# Patient Record
Sex: FEMALE | Race: WHITE | NOT HISPANIC OR LATINO
[De-identification: names, ages, dates, MRNs, and addresses within clinical notes are randomized per-mention and may not be internally consistent; named-entity substitution may affect disease eponyms.]

---

## 2017-02-03 ENCOUNTER — APPOINTMENT (OUTPATIENT)
Dept: MRI IMAGING | Facility: IMAGING CENTER | Age: 52
End: 2017-02-03

## 2017-02-03 ENCOUNTER — OUTPATIENT (OUTPATIENT)
Dept: OUTPATIENT SERVICES | Facility: HOSPITAL | Age: 52
LOS: 1 days | End: 2017-02-03
Payer: COMMERCIAL

## 2017-02-03 DIAGNOSIS — G91.9 HYDROCEPHALUS, UNSPECIFIED: ICD-10-CM

## 2017-04-13 PROCEDURE — 70553 MRI BRAIN STEM W/O & W/DYE: CPT

## 2017-04-13 PROCEDURE — A9585: CPT

## 2017-10-11 ENCOUNTER — APPOINTMENT (OUTPATIENT)
Dept: MRI IMAGING | Facility: IMAGING CENTER | Age: 52
End: 2017-10-11

## 2017-11-15 ENCOUNTER — OUTPATIENT (OUTPATIENT)
Dept: OUTPATIENT SERVICES | Facility: HOSPITAL | Age: 52
LOS: 1 days | End: 2017-11-15
Payer: COMMERCIAL

## 2017-11-15 ENCOUNTER — APPOINTMENT (OUTPATIENT)
Dept: MRI IMAGING | Facility: IMAGING CENTER | Age: 52
End: 2017-11-15
Payer: COMMERCIAL

## 2017-11-15 DIAGNOSIS — Z00.8 ENCOUNTER FOR OTHER GENERAL EXAMINATION: ICD-10-CM

## 2017-11-15 PROCEDURE — 70553 MRI BRAIN STEM W/O & W/DYE: CPT | Mod: 26

## 2017-11-15 PROCEDURE — A9585: CPT

## 2017-11-15 PROCEDURE — 70553 MRI BRAIN STEM W/O & W/DYE: CPT

## 2018-11-07 ENCOUNTER — OUTPATIENT (OUTPATIENT)
Dept: OUTPATIENT SERVICES | Facility: HOSPITAL | Age: 53
LOS: 1 days | End: 2018-11-07
Payer: COMMERCIAL

## 2018-11-07 ENCOUNTER — APPOINTMENT (OUTPATIENT)
Dept: MRI IMAGING | Facility: IMAGING CENTER | Age: 53
End: 2018-11-07
Payer: COMMERCIAL

## 2018-11-07 DIAGNOSIS — Z00.8 ENCOUNTER FOR OTHER GENERAL EXAMINATION: ICD-10-CM

## 2018-11-07 PROCEDURE — 70553 MRI BRAIN STEM W/O & W/DYE: CPT

## 2018-11-07 PROCEDURE — A9585: CPT

## 2018-11-07 PROCEDURE — 70553 MRI BRAIN STEM W/O & W/DYE: CPT | Mod: 26

## 2021-08-25 ENCOUNTER — APPOINTMENT (OUTPATIENT)
Dept: MRI IMAGING | Facility: IMAGING CENTER | Age: 56
End: 2021-08-25
Payer: COMMERCIAL

## 2021-08-25 ENCOUNTER — OUTPATIENT (OUTPATIENT)
Dept: OUTPATIENT SERVICES | Facility: HOSPITAL | Age: 56
LOS: 1 days | End: 2021-08-25
Payer: COMMERCIAL

## 2021-08-25 DIAGNOSIS — Z00.8 ENCOUNTER FOR OTHER GENERAL EXAMINATION: ICD-10-CM

## 2021-08-25 DIAGNOSIS — D49.6 NEOPLASM OF UNSPECIFIED BEHAVIOR OF BRAIN: ICD-10-CM

## 2021-08-25 PROCEDURE — 70553 MRI BRAIN STEM W/O & W/DYE: CPT

## 2021-08-25 PROCEDURE — A9585: CPT

## 2021-08-25 PROCEDURE — 70553 MRI BRAIN STEM W/O & W/DYE: CPT | Mod: 26

## 2023-10-19 ENCOUNTER — APPOINTMENT (OUTPATIENT)
Dept: MRI IMAGING | Facility: HOSPITAL | Age: 58
End: 2023-10-19
Payer: COMMERCIAL

## 2023-10-19 ENCOUNTER — OUTPATIENT (OUTPATIENT)
Dept: OUTPATIENT SERVICES | Facility: HOSPITAL | Age: 58
LOS: 1 days | End: 2023-10-19

## 2023-10-19 DIAGNOSIS — D49.6 NEOPLASM OF UNSPECIFIED BEHAVIOR OF BRAIN: ICD-10-CM

## 2023-10-19 PROCEDURE — 70553 MRI BRAIN STEM W/O & W/DYE: CPT | Mod: 26

## 2023-11-24 ENCOUNTER — OUTPATIENT (OUTPATIENT)
Dept: OUTPATIENT SERVICES | Facility: HOSPITAL | Age: 58
LOS: 1 days | End: 2023-11-24
Payer: COMMERCIAL

## 2023-11-24 VITALS
HEIGHT: 63.5 IN | SYSTOLIC BLOOD PRESSURE: 118 MMHG | TEMPERATURE: 98 F | DIASTOLIC BLOOD PRESSURE: 78 MMHG | RESPIRATION RATE: 16 BRPM | WEIGHT: 126.99 LBS | OXYGEN SATURATION: 98 % | HEART RATE: 83 BPM

## 2023-11-24 DIAGNOSIS — Z98.2 PRESENCE OF CEREBROSPINAL FLUID DRAINAGE DEVICE: Chronic | ICD-10-CM

## 2023-11-24 DIAGNOSIS — D49.6 NEOPLASM OF UNSPECIFIED BEHAVIOR OF BRAIN: ICD-10-CM

## 2023-11-24 DIAGNOSIS — Z87.898 PERSONAL HISTORY OF OTHER SPECIFIED CONDITIONS: Chronic | ICD-10-CM

## 2023-11-24 DIAGNOSIS — F41.9 ANXIETY DISORDER, UNSPECIFIED: ICD-10-CM

## 2023-11-24 LAB
ALBUMIN SERPL ELPH-MCNC: 3.8 G/DL — SIGNIFICANT CHANGE UP (ref 3.3–5)
ALBUMIN SERPL ELPH-MCNC: 3.8 G/DL — SIGNIFICANT CHANGE UP (ref 3.3–5)
ALP SERPL-CCNC: 97 U/L — SIGNIFICANT CHANGE UP (ref 40–120)
ALP SERPL-CCNC: 97 U/L — SIGNIFICANT CHANGE UP (ref 40–120)
ALT FLD-CCNC: 12 U/L — SIGNIFICANT CHANGE UP (ref 4–33)
ALT FLD-CCNC: 12 U/L — SIGNIFICANT CHANGE UP (ref 4–33)
ANION GAP SERPL CALC-SCNC: 7 MMOL/L — SIGNIFICANT CHANGE UP (ref 7–14)
ANION GAP SERPL CALC-SCNC: 7 MMOL/L — SIGNIFICANT CHANGE UP (ref 7–14)
AST SERPL-CCNC: 12 U/L — SIGNIFICANT CHANGE UP (ref 4–32)
AST SERPL-CCNC: 12 U/L — SIGNIFICANT CHANGE UP (ref 4–32)
BILIRUB SERPL-MCNC: 0.6 MG/DL — SIGNIFICANT CHANGE UP (ref 0.2–1.2)
BILIRUB SERPL-MCNC: 0.6 MG/DL — SIGNIFICANT CHANGE UP (ref 0.2–1.2)
BLD GP AB SCN SERPL QL: POSITIVE — SIGNIFICANT CHANGE UP
BLD GP AB SCN SERPL QL: POSITIVE — SIGNIFICANT CHANGE UP
BUN SERPL-MCNC: 20 MG/DL — SIGNIFICANT CHANGE UP (ref 7–23)
BUN SERPL-MCNC: 20 MG/DL — SIGNIFICANT CHANGE UP (ref 7–23)
CALCIUM SERPL-MCNC: 9.2 MG/DL — SIGNIFICANT CHANGE UP (ref 8.4–10.5)
CALCIUM SERPL-MCNC: 9.2 MG/DL — SIGNIFICANT CHANGE UP (ref 8.4–10.5)
CHLORIDE SERPL-SCNC: 106 MMOL/L — SIGNIFICANT CHANGE UP (ref 98–107)
CHLORIDE SERPL-SCNC: 106 MMOL/L — SIGNIFICANT CHANGE UP (ref 98–107)
CO2 SERPL-SCNC: 28 MMOL/L — SIGNIFICANT CHANGE UP (ref 22–31)
CO2 SERPL-SCNC: 28 MMOL/L — SIGNIFICANT CHANGE UP (ref 22–31)
CREAT SERPL-MCNC: 0.65 MG/DL — SIGNIFICANT CHANGE UP (ref 0.5–1.3)
CREAT SERPL-MCNC: 0.65 MG/DL — SIGNIFICANT CHANGE UP (ref 0.5–1.3)
EGFR: 102 ML/MIN/1.73M2 — SIGNIFICANT CHANGE UP
EGFR: 102 ML/MIN/1.73M2 — SIGNIFICANT CHANGE UP
GLUCOSE SERPL-MCNC: 85 MG/DL — SIGNIFICANT CHANGE UP (ref 70–99)
GLUCOSE SERPL-MCNC: 85 MG/DL — SIGNIFICANT CHANGE UP (ref 70–99)
HCT VFR BLD CALC: 39 % — SIGNIFICANT CHANGE UP (ref 34.5–45)
HCT VFR BLD CALC: 39 % — SIGNIFICANT CHANGE UP (ref 34.5–45)
HGB BLD-MCNC: 13.1 G/DL — SIGNIFICANT CHANGE UP (ref 11.5–15.5)
HGB BLD-MCNC: 13.1 G/DL — SIGNIFICANT CHANGE UP (ref 11.5–15.5)
MCHC RBC-ENTMCNC: 28.2 PG — SIGNIFICANT CHANGE UP (ref 27–34)
MCHC RBC-ENTMCNC: 28.2 PG — SIGNIFICANT CHANGE UP (ref 27–34)
MCHC RBC-ENTMCNC: 33.6 GM/DL — SIGNIFICANT CHANGE UP (ref 32–36)
MCHC RBC-ENTMCNC: 33.6 GM/DL — SIGNIFICANT CHANGE UP (ref 32–36)
MCV RBC AUTO: 84.1 FL — SIGNIFICANT CHANGE UP (ref 80–100)
MCV RBC AUTO: 84.1 FL — SIGNIFICANT CHANGE UP (ref 80–100)
NRBC # BLD: 0 /100 WBCS — SIGNIFICANT CHANGE UP (ref 0–0)
NRBC # BLD: 0 /100 WBCS — SIGNIFICANT CHANGE UP (ref 0–0)
NRBC # FLD: 0 K/UL — SIGNIFICANT CHANGE UP (ref 0–0)
NRBC # FLD: 0 K/UL — SIGNIFICANT CHANGE UP (ref 0–0)
PLATELET # BLD AUTO: 76 K/UL — LOW (ref 150–400)
PLATELET # BLD AUTO: 76 K/UL — LOW (ref 150–400)
POTASSIUM SERPL-MCNC: 5 MMOL/L — SIGNIFICANT CHANGE UP (ref 3.5–5.3)
POTASSIUM SERPL-MCNC: 5 MMOL/L — SIGNIFICANT CHANGE UP (ref 3.5–5.3)
POTASSIUM SERPL-SCNC: 5 MMOL/L — SIGNIFICANT CHANGE UP (ref 3.5–5.3)
POTASSIUM SERPL-SCNC: 5 MMOL/L — SIGNIFICANT CHANGE UP (ref 3.5–5.3)
PROT SERPL-MCNC: 7.1 G/DL — SIGNIFICANT CHANGE UP (ref 6–8.3)
PROT SERPL-MCNC: 7.1 G/DL — SIGNIFICANT CHANGE UP (ref 6–8.3)
RBC # BLD: 4.64 M/UL — SIGNIFICANT CHANGE UP (ref 3.8–5.2)
RBC # BLD: 4.64 M/UL — SIGNIFICANT CHANGE UP (ref 3.8–5.2)
RBC # FLD: 14.2 % — SIGNIFICANT CHANGE UP (ref 10.3–14.5)
RBC # FLD: 14.2 % — SIGNIFICANT CHANGE UP (ref 10.3–14.5)
RH IG SCN BLD-IMP: NEGATIVE — SIGNIFICANT CHANGE UP
SODIUM SERPL-SCNC: 141 MMOL/L — SIGNIFICANT CHANGE UP (ref 135–145)
SODIUM SERPL-SCNC: 141 MMOL/L — SIGNIFICANT CHANGE UP (ref 135–145)
WBC # BLD: 4.99 K/UL — SIGNIFICANT CHANGE UP (ref 3.8–10.5)
WBC # BLD: 4.99 K/UL — SIGNIFICANT CHANGE UP (ref 3.8–10.5)
WBC # FLD AUTO: 4.99 K/UL — SIGNIFICANT CHANGE UP (ref 3.8–10.5)
WBC # FLD AUTO: 4.99 K/UL — SIGNIFICANT CHANGE UP (ref 3.8–10.5)

## 2023-11-24 PROCEDURE — 86077 PHYS BLOOD BANK SERV XMATCH: CPT

## 2023-11-24 PROCEDURE — 93010 ELECTROCARDIOGRAM REPORT: CPT

## 2023-11-24 RX ORDER — SODIUM CHLORIDE 9 MG/ML
1000 INJECTION, SOLUTION INTRAVENOUS
Refills: 0 | Status: DISCONTINUED | OUTPATIENT
Start: 2023-12-01 | End: 2023-12-02

## 2023-11-24 NOTE — H&P PST ADULT - PROBLEM SELECTOR PLAN 1
Patient tentatively scheduled for stereotactic left retromastoid craniectomy for resection of brain tumor for 12/1/23. Pre-op instructions provided. Pt given verbal and written instructions with teach back on pepcid. Pt verbalized understanding with return demonstration.     CBC CMP T&S/ABO EKG done    Pt states she received cardiac clearance for the procedure.  Pending copy of cardiac evaluation with echo due to scheduled high risk procedure

## 2023-11-24 NOTE — H&P PST ADULT - PROBLEM SELECTOR PLAN 2
Patient instructed to take sertraline and bupropion with a sip of water on the morning of procedure.

## 2023-11-24 NOTE — H&P PST ADULT - NSICDXPASTMEDICALHX_GEN_ALL_CORE_FT
PAST MEDICAL HISTORY:  Anxiety     Asthma     Depression     History of hydrocephalus     Neoplasm of uncertain behavior of brain     Neurofibromatosis, type 1

## 2023-11-24 NOTE — H&P PST ADULT - NSICDXPASTSURGICALHX_GEN_ALL_CORE_FT
PAST SURGICAL HISTORY:  Delivery with history of      H/O brain tumor     History of arthroscopy of left knee meniscus     S/P LASIK Surgery     S/P tubal ligation     S/P  shunt

## 2023-11-24 NOTE — H&P PST ADULT - HISTORY OF PRESENT ILLNESS
59 y/o female presents to presurgical testing with diagnosis neoplasm of unspecified behavior of brain. Pt with h/o neurofibromatosis, s/p resection of brain tumor and  shunt placement in 2013. Pt with imbalance and headaches. Pt is scheduled for a stereotactic left retromastoid craniectomy for resection of brain tumor.

## 2023-11-30 ENCOUNTER — TRANSCRIPTION ENCOUNTER (OUTPATIENT)
Age: 58
End: 2023-11-30

## 2023-11-30 NOTE — ASU PATIENT PROFILE, ADULT - FALL HARM RISK - UNIVERSAL INTERVENTIONS
Bed in lowest position, wheels locked, appropriate side rails in place/Call bell, personal items and telephone in reach/Instruct patient to call for assistance before getting out of bed or chair/Non-slip footwear when patient is out of bed/[place] to call system/Physically safe environment - no spills, clutter or unnecessary equipment/Purposeful Proactive Rounding/Room/bathroom lighting operational, light cord in reach

## 2023-12-01 ENCOUNTER — INPATIENT (INPATIENT)
Facility: HOSPITAL | Age: 58
LOS: 0 days | Discharge: HOME | End: 2023-12-02
Attending: NEUROLOGICAL SURGERY | Admitting: NEUROLOGICAL SURGERY
Payer: COMMERCIAL

## 2023-12-01 ENCOUNTER — TRANSCRIPTION ENCOUNTER (OUTPATIENT)
Age: 58
End: 2023-12-01

## 2023-12-01 VITALS
SYSTOLIC BLOOD PRESSURE: 114 MMHG | HEIGHT: 63.5 IN | RESPIRATION RATE: 16 BRPM | TEMPERATURE: 99 F | DIASTOLIC BLOOD PRESSURE: 51 MMHG | OXYGEN SATURATION: 98 % | HEART RATE: 95 BPM | WEIGHT: 126.99 LBS

## 2023-12-01 DIAGNOSIS — Z98.2 PRESENCE OF CEREBROSPINAL FLUID DRAINAGE DEVICE: Chronic | ICD-10-CM

## 2023-12-01 DIAGNOSIS — Z87.898 PERSONAL HISTORY OF OTHER SPECIFIED CONDITIONS: Chronic | ICD-10-CM

## 2023-12-01 DIAGNOSIS — D49.6 NEOPLASM OF UNSPECIFIED BEHAVIOR OF BRAIN: ICD-10-CM

## 2023-12-01 LAB
ANION GAP SERPL CALC-SCNC: 10 MMOL/L — SIGNIFICANT CHANGE UP (ref 7–14)
ANION GAP SERPL CALC-SCNC: 10 MMOL/L — SIGNIFICANT CHANGE UP (ref 7–14)
BUN SERPL-MCNC: 17 MG/DL — SIGNIFICANT CHANGE UP (ref 7–23)
BUN SERPL-MCNC: 17 MG/DL — SIGNIFICANT CHANGE UP (ref 7–23)
CALCIUM SERPL-MCNC: 8.7 MG/DL — SIGNIFICANT CHANGE UP (ref 8.4–10.5)
CALCIUM SERPL-MCNC: 8.7 MG/DL — SIGNIFICANT CHANGE UP (ref 8.4–10.5)
CHLORIDE SERPL-SCNC: 101 MMOL/L — SIGNIFICANT CHANGE UP (ref 98–107)
CHLORIDE SERPL-SCNC: 101 MMOL/L — SIGNIFICANT CHANGE UP (ref 98–107)
CO2 SERPL-SCNC: 22 MMOL/L — SIGNIFICANT CHANGE UP (ref 22–31)
CO2 SERPL-SCNC: 22 MMOL/L — SIGNIFICANT CHANGE UP (ref 22–31)
CREAT SERPL-MCNC: 0.67 MG/DL — SIGNIFICANT CHANGE UP (ref 0.5–1.3)
CREAT SERPL-MCNC: 0.67 MG/DL — SIGNIFICANT CHANGE UP (ref 0.5–1.3)
EGFR: 101 ML/MIN/1.73M2 — SIGNIFICANT CHANGE UP
EGFR: 101 ML/MIN/1.73M2 — SIGNIFICANT CHANGE UP
GAS PNL BLDA: SIGNIFICANT CHANGE UP
GAS PNL BLDA: SIGNIFICANT CHANGE UP
GLUCOSE SERPL-MCNC: 130 MG/DL — HIGH (ref 70–99)
GLUCOSE SERPL-MCNC: 130 MG/DL — HIGH (ref 70–99)
HCT VFR BLD CALC: 33.9 % — LOW (ref 34.5–45)
HCT VFR BLD CALC: 33.9 % — LOW (ref 34.5–45)
HGB BLD-MCNC: 11.3 G/DL — LOW (ref 11.5–15.5)
HGB BLD-MCNC: 11.3 G/DL — LOW (ref 11.5–15.5)
MAGNESIUM SERPL-MCNC: 1.7 MG/DL — SIGNIFICANT CHANGE UP (ref 1.6–2.6)
MAGNESIUM SERPL-MCNC: 1.7 MG/DL — SIGNIFICANT CHANGE UP (ref 1.6–2.6)
MCHC RBC-ENTMCNC: 26.8 PG — LOW (ref 27–34)
MCHC RBC-ENTMCNC: 26.8 PG — LOW (ref 27–34)
MCHC RBC-ENTMCNC: 33.3 GM/DL — SIGNIFICANT CHANGE UP (ref 32–36)
MCHC RBC-ENTMCNC: 33.3 GM/DL — SIGNIFICANT CHANGE UP (ref 32–36)
MCV RBC AUTO: 80.3 FL — SIGNIFICANT CHANGE UP (ref 80–100)
MCV RBC AUTO: 80.3 FL — SIGNIFICANT CHANGE UP (ref 80–100)
NRBC # BLD: 0 /100 WBCS — SIGNIFICANT CHANGE UP (ref 0–0)
NRBC # BLD: 0 /100 WBCS — SIGNIFICANT CHANGE UP (ref 0–0)
NRBC # FLD: 0 K/UL — SIGNIFICANT CHANGE UP (ref 0–0)
NRBC # FLD: 0 K/UL — SIGNIFICANT CHANGE UP (ref 0–0)
PHOSPHATE SERPL-MCNC: 3.6 MG/DL — SIGNIFICANT CHANGE UP (ref 2.5–4.5)
PHOSPHATE SERPL-MCNC: 3.6 MG/DL — SIGNIFICANT CHANGE UP (ref 2.5–4.5)
PLATELET # BLD AUTO: 100 K/UL — LOW (ref 150–400)
PLATELET # BLD AUTO: 100 K/UL — LOW (ref 150–400)
POTASSIUM SERPL-MCNC: 4.1 MMOL/L — SIGNIFICANT CHANGE UP (ref 3.5–5.3)
POTASSIUM SERPL-MCNC: 4.1 MMOL/L — SIGNIFICANT CHANGE UP (ref 3.5–5.3)
POTASSIUM SERPL-SCNC: 4.1 MMOL/L — SIGNIFICANT CHANGE UP (ref 3.5–5.3)
POTASSIUM SERPL-SCNC: 4.1 MMOL/L — SIGNIFICANT CHANGE UP (ref 3.5–5.3)
RBC # BLD: 4.22 M/UL — SIGNIFICANT CHANGE UP (ref 3.8–5.2)
RBC # BLD: 4.22 M/UL — SIGNIFICANT CHANGE UP (ref 3.8–5.2)
RBC # FLD: 14 % — SIGNIFICANT CHANGE UP (ref 10.3–14.5)
RBC # FLD: 14 % — SIGNIFICANT CHANGE UP (ref 10.3–14.5)
SODIUM SERPL-SCNC: 133 MMOL/L — LOW (ref 135–145)
SODIUM SERPL-SCNC: 133 MMOL/L — LOW (ref 135–145)
WBC # BLD: 7.84 K/UL — SIGNIFICANT CHANGE UP (ref 3.8–10.5)
WBC # BLD: 7.84 K/UL — SIGNIFICANT CHANGE UP (ref 3.8–10.5)
WBC # FLD AUTO: 7.84 K/UL — SIGNIFICANT CHANGE UP (ref 3.8–10.5)
WBC # FLD AUTO: 7.84 K/UL — SIGNIFICANT CHANGE UP (ref 3.8–10.5)

## 2023-12-01 PROCEDURE — 88360 TUMOR IMMUNOHISTOCHEM/MANUAL: CPT | Mod: 26,59

## 2023-12-01 PROCEDURE — 99222 1ST HOSP IP/OBS MODERATE 55: CPT

## 2023-12-01 PROCEDURE — 88342 IMHCHEM/IMCYTCHM 1ST ANTB: CPT | Mod: 26

## 2023-12-01 PROCEDURE — 88334 PATH CONSLTJ SURG CYTO XM EA: CPT | Mod: 26,59

## 2023-12-01 PROCEDURE — 88341 IMHCHEM/IMCYTCHM EA ADD ANTB: CPT | Mod: 26

## 2023-12-01 PROCEDURE — 88307 TISSUE EXAM BY PATHOLOGIST: CPT | Mod: 26

## 2023-12-01 PROCEDURE — 88331 PATH CONSLTJ SURG 1 BLK 1SPC: CPT | Mod: 26

## 2023-12-01 DEVICE — SCREW UN3 AXS SELF DRILL 1.5X4MM: Type: IMPLANTABLE DEVICE | Status: FUNCTIONAL

## 2023-12-01 DEVICE — PLATE BONE MICRO 10MM 2H: Type: IMPLANTABLE DEVICE | Status: FUNCTIONAL

## 2023-12-01 DEVICE — BONE WAX 2.5GM: Type: IMPLANTABLE DEVICE | Status: FUNCTIONAL

## 2023-12-01 DEVICE — SURGIFLO MATRIX WITH THROMBIN KIT: Type: IMPLANTABLE DEVICE | Status: FUNCTIONAL

## 2023-12-01 DEVICE — SURGICEL 2 X 14": Type: IMPLANTABLE DEVICE | Status: FUNCTIONAL

## 2023-12-01 DEVICE — SURGIFOAM PAD 8CM X 12.5CM X 2MM (100C): Type: IMPLANTABLE DEVICE | Status: FUNCTIONAL

## 2023-12-01 DEVICE — TACHOSIL 4.8 X 4.8CM: Type: IMPLANTABLE DEVICE | Status: FUNCTIONAL

## 2023-12-01 DEVICE — MAYFIELD SKULL PIN ADULT PLASTIC: Type: IMPLANTABLE DEVICE | Status: FUNCTIONAL

## 2023-12-01 RX ORDER — HYDROMORPHONE HYDROCHLORIDE 2 MG/ML
0.5 INJECTION INTRAMUSCULAR; INTRAVENOUS; SUBCUTANEOUS
Refills: 0 | Status: DISCONTINUED | OUTPATIENT
Start: 2023-12-01 | End: 2023-12-02

## 2023-12-01 RX ORDER — ONDANSETRON 8 MG/1
4 TABLET, FILM COATED ORAL ONCE
Refills: 0 | Status: DISCONTINUED | OUTPATIENT
Start: 2023-12-01 | End: 2023-12-02

## 2023-12-01 RX ORDER — DEXAMETHASONE 0.5 MG/5ML
1 ELIXIR ORAL EVERY 24 HOURS
Refills: 0 | Status: CANCELLED | OUTPATIENT
Start: 2023-12-10 | End: 2023-12-02

## 2023-12-01 RX ORDER — DEXAMETHASONE 0.5 MG/5ML
2 ELIXIR ORAL EVERY 12 HOURS
Refills: 0 | Status: CANCELLED | OUTPATIENT
Start: 2023-12-06 | End: 2023-12-02

## 2023-12-01 RX ORDER — BUPROPION HYDROCHLORIDE 150 MG/1
150 TABLET, EXTENDED RELEASE ORAL DAILY
Refills: 0 | Status: DISCONTINUED | OUTPATIENT
Start: 2023-12-01 | End: 2023-12-02

## 2023-12-01 RX ORDER — DEXAMETHASONE 0.5 MG/5ML
4 ELIXIR ORAL EVERY 6 HOURS
Refills: 0 | Status: DISCONTINUED | OUTPATIENT
Start: 2023-12-01 | End: 2023-12-02

## 2023-12-01 RX ORDER — HYDROMORPHONE HYDROCHLORIDE 2 MG/ML
1 INJECTION INTRAMUSCULAR; INTRAVENOUS; SUBCUTANEOUS
Refills: 0 | Status: DISCONTINUED | OUTPATIENT
Start: 2023-12-01 | End: 2023-12-02

## 2023-12-01 RX ORDER — MAGNESIUM SULFATE 500 MG/ML
2 VIAL (ML) INJECTION ONCE
Refills: 0 | Status: COMPLETED | OUTPATIENT
Start: 2023-12-01 | End: 2023-12-01

## 2023-12-01 RX ORDER — DEXAMETHASONE 0.5 MG/5ML
1 ELIXIR ORAL EVERY 12 HOURS
Refills: 0 | Status: CANCELLED | OUTPATIENT
Start: 2023-12-08 | End: 2023-12-02

## 2023-12-01 RX ORDER — MIRTAZAPINE 45 MG/1
7.5 TABLET, ORALLY DISINTEGRATING ORAL DAILY
Refills: 0 | Status: DISCONTINUED | OUTPATIENT
Start: 2023-12-01 | End: 2023-12-02

## 2023-12-01 RX ORDER — DEXAMETHASONE 0.5 MG/5ML
3 ELIXIR ORAL EVERY 8 HOURS
Refills: 0 | Status: DISCONTINUED | OUTPATIENT
Start: 2023-12-03 | End: 2023-12-02

## 2023-12-01 RX ORDER — BUPROPION HYDROCHLORIDE 150 MG/1
300 TABLET, EXTENDED RELEASE ORAL DAILY
Refills: 0 | Status: DISCONTINUED | OUTPATIENT
Start: 2023-12-01 | End: 2023-12-02

## 2023-12-01 RX ORDER — SERTRALINE 25 MG/1
50 TABLET, FILM COATED ORAL DAILY
Refills: 0 | Status: DISCONTINUED | OUTPATIENT
Start: 2023-12-01 | End: 2023-12-02

## 2023-12-01 RX ORDER — ACETAMINOPHEN 500 MG
975 TABLET ORAL EVERY 6 HOURS
Refills: 0 | Status: DISCONTINUED | OUTPATIENT
Start: 2023-12-01 | End: 2023-12-02

## 2023-12-01 RX ORDER — DEXAMETHASONE 0.5 MG/5ML
ELIXIR ORAL
Refills: 0 | Status: DISCONTINUED | OUTPATIENT
Start: 2023-12-01 | End: 2023-12-02

## 2023-12-01 RX ORDER — OXYCODONE HYDROCHLORIDE 5 MG/1
5 TABLET ORAL EVERY 6 HOURS
Refills: 0 | Status: DISCONTINUED | OUTPATIENT
Start: 2023-12-01 | End: 2023-12-02

## 2023-12-01 RX ORDER — PANTOPRAZOLE SODIUM 20 MG/1
40 TABLET, DELAYED RELEASE ORAL
Refills: 0 | Status: DISCONTINUED | OUTPATIENT
Start: 2023-12-01 | End: 2023-12-02

## 2023-12-01 RX ORDER — OXYCODONE HYDROCHLORIDE 5 MG/1
5 TABLET ORAL ONCE
Refills: 0 | Status: DISCONTINUED | OUTPATIENT
Start: 2023-12-01 | End: 2023-12-01

## 2023-12-01 RX ADMIN — Medication 975 MILLIGRAM(S): at 21:35

## 2023-12-01 RX ADMIN — OXYCODONE HYDROCHLORIDE 5 MILLIGRAM(S): 5 TABLET ORAL at 17:08

## 2023-12-01 RX ADMIN — Medication 25 GRAM(S): at 21:37

## 2023-12-01 RX ADMIN — Medication 4 MILLIGRAM(S): at 19:02

## 2023-12-01 RX ADMIN — OXYCODONE HYDROCHLORIDE 5 MILLIGRAM(S): 5 TABLET ORAL at 22:05

## 2023-12-01 RX ADMIN — OXYCODONE HYDROCHLORIDE 5 MILLIGRAM(S): 5 TABLET ORAL at 16:38

## 2023-12-01 RX ADMIN — OXYCODONE HYDROCHLORIDE 5 MILLIGRAM(S): 5 TABLET ORAL at 21:34

## 2023-12-01 RX ADMIN — Medication 975 MILLIGRAM(S): at 22:05

## 2023-12-01 NOTE — CONSULT NOTE ADULT - SUBJECTIVE AND OBJECTIVE BOX
=====================================================                                                             SICU Consultation Note                                                             =====================================================  Patient is a 58y old  Female who presents with a chief complaint of "I'm having surgery to remove a brain tumor" (2023 07:17)    HPI: 59 y/o female presents to presurgical testing with diagnosis neoplasm of unspecified behavior of brain. Pt with h/o neurofibromatosis, s/p resection of brain tumor and  shunt placement in . Pt with imbalance and headaches. Pt is s/p stereotactic left retromastoid craniectomy for resection of brain tumor. SICU consulted for neurovascular checks    Surgery Information  ***  Case Duration:      EBL:       IV Fluids:       Blood Products:         Complications:        HISTORY  58y Female  Allergies: No Known Allergies  PAST MEDICAL & SURGICAL HISTORY:  Asthma  Depression  Neoplasm of uncertain behavior of brain  Neurofibromatosis, type 1  History of hydrocephalus  Anxiety  Delivery with history of     S/P tubal ligation    History of arthroscopy of left knee  meniscus   S/P LASIK Surgery    H/O brain tmor  S/P  shunt    FAMILY HISTORY:    SOCIAL HISTORY:    ADVANCE DIRECTIVES: Presumed Full Code    REVIEW OF SYSTEMS:    General: Non-Contributory  Skin/Breast: Non-Contributory  Ophthalmologic: Non-Contributory  ENMT: Non-Contributory  Respiratory and Thorax: Non-Contributory  Cardiovascular: Non-Contributory  Gastrointestinal: Non-Contributory  Genitourinary: Non-Contributory  Musculoskeletal: Non-Contributory  Neurological: Non-Contributory  Psychiatric: Non-Contributory  Hematology/Lymphatics: Non-Contributory  Endocrine: Non-Contributory  Allergic/Immunologic: Non-Contributory  HOME MEDICATIONS:   · 	buPROPion 300 mg/24 hours (XL) oral tablet, extended release: Last Dose Taken:  , 1 tab(s) orally once a day  · 	buPROPion 150 mg/24 hours (XL) oral tablet, extended release: Last Dose Taken:  , 1 tab(s) orally once a day  · 	sertraline 50 mg oral tablet: Last Dose Taken:  , 1 tab(s) orally once a day  · 	mirtazapine 7.5 mg oral tablet: Last Dose Taken:  , 1 tab(s) orally once a day (at bedtime)    CURRENT MEDICATIONS:   Gastrointestinal Medications  lactated ringers. 1000 milliLiter(s) IV Continuous <Continuous>    Topical/Other Medications    --------------------------------------------------------------------------------------  VITAL SIGNS, INS/OUTS (last 24 hours):  PENDING    EXAM  NEUROLOGY  Exam: Normal, NAD, alert, oriented x 3, no focal deficits.   HEENT  Exam: Normocephalic, atraumatic.  EOMI   RESPIRATORY  Exam: Lungs clear to auscultation, Normal expansion/effort.    Mechanical Ventilation:   CARDIOVASCULAR  Exam: S1, S2.  Regular rate and rhythm.  Peripheral edema    GI/NUTRITION  Exam: Abdomen soft, Non-tender, Non-distended.  Current Diet:  NPO  VASCULAR  Exam: Extremities warm, pink, well-perfused.   MUSCULOSKELETAL  Exam: All extremities moving spontaneously without limitations.    SKIN:  Exam: Good skin turgor, no skin breakdown.    METABOLIC/FLUIDS/ELECTROLYTES  lactated ringers. 1000 milliLiter(s) IV Continuous <Continuous>    HEMATOLOGIC  [x] DVT Prophylaxis:   Transfusions:	[] PRBC	[] Platelets		[] FFP	[] Cryoprecipitate  INFECTIOUS DISEASE  Antimicrobials/Immunologic Medications:    Day #  	of    ***  Tubes/Lines/Drains  ***  [x] Peripheral IV  [] Central Venous Line     	[] R	[] L	[] IJ	[] Fem	[] SC	Date Placed:   [] Arterial Line		[] R	[] L	[] Fem	[] Rad	[] Ax	Date Placed:   [] PICC:         	[] Midline		[] Mediport  [] Urinary Catheter		Date Placed:     LABS  ABG - ( 01 Dec 2023 11:13 )  pH: 7.44  /  pCO2: 37    /  pO2: 348   / HCO3: 25    / Base Excess: 1.1   /  SaO2: 98.9  / Lactate: x                                                                  =====================================================                                                             SICU Consultation Note                                                             =====================================================  Patient is a 58y old  Female who presents with a chief complaint of "I'm having surgery to remove a brain tumor" (2023 07:17)    HPI: 57 y/o female presents to presurgical testing with diagnosis neoplasm of unspecified behavior of brain. Pt with h/o neurofibromatosis, s/p resection of brain tumor and  shunt placement in . Pt with imbalance and headaches. Pt is s/p stereotactic left retromastoid craniectomy for resection of brain tumor. SICU consulted for neurovascular checks    Surgery Information  ***  Case Duration:      EBL:       IV Fluids:       Blood Products:         Complications:        HISTORY  58y Female  Allergies: No Known Allergies  PAST MEDICAL & SURGICAL HISTORY:  Asthma  Depression  Neoplasm of uncertain behavior of brain  Neurofibromatosis, type 1  History of hydrocephalus  Anxiety  Delivery with history of     S/P tubal ligation    History of arthroscopy of left knee  meniscus   S/P LASIK Surgery    H/O brain tmor  S/P  shunt    FAMILY HISTORY:    SOCIAL HISTORY:    ADVANCE DIRECTIVES: Presumed Full Code    REVIEW OF SYSTEMS:    General: Non-Contributory  Skin/Breast: Non-Contributory  Ophthalmologic: Non-Contributory  ENMT: Non-Contributory  Respiratory and Thorax: Non-Contributory  Cardiovascular: Non-Contributory  Gastrointestinal: Non-Contributory  Genitourinary: Non-Contributory  Musculoskeletal: Non-Contributory  Neurological: Non-Contributory  Psychiatric: Non-Contributory  Hematology/Lymphatics: Non-Contributory  Endocrine: Non-Contributory  Allergic/Immunologic: Non-Contributory  HOME MEDICATIONS:   · 	buPROPion 300 mg/24 hours (XL) oral tablet, extended release: Last Dose Taken:  , 1 tab(s) orally once a day  · 	buPROPion 150 mg/24 hours (XL) oral tablet, extended release: Last Dose Taken:  , 1 tab(s) orally once a day  · 	sertraline 50 mg oral tablet: Last Dose Taken:  , 1 tab(s) orally once a day  · 	mirtazapine 7.5 mg oral tablet: Last Dose Taken:  , 1 tab(s) orally once a day (at bedtime)    CURRENT MEDICATIONS:   Gastrointestinal Medications  lactated ringers. 1000 milliLiter(s) IV Continuous <Continuous>    Topical/Other Medications    --------------------------------------------------------------------------------------  VITAL SIGNS, INS/OUTS (last 24 hours):  PENDING    EXAM  NEUROLOGY  Exam: Normal, NAD, alert, oriented x 3, no focal deficits.   HEENT  Exam: Normocephalic, atraumatic.  EOMI   RESPIRATORY  Exam: Lungs clear to auscultation, Normal expansion/effort.    Mechanical Ventilation:   CARDIOVASCULAR  Exam: S1, S2.  Regular rate and rhythm.  NO Peripheral edema    GI/NUTRITION  Exam: Abdomen soft, Non-tender, Non-distended.  Current Diet:  NPO  VASCULAR  Exam: Extremities warm, pink, well-perfused.   MUSCULOSKELETAL  Exam: All extremities moving spontaneously without limitations.    SKIN:  Exam: Good skin turgor, no skin breakdown.    METABOLIC/FLUIDS/ELECTROLYTES  lactated ringers. 1000 milliLiter(s) IV Continuous <Continuous>    HEMATOLOGIC  [x] DVT Prophylaxis:   Transfusions:	[] PRBC	[] Platelets		[] FFP	[] Cryoprecipitate  INFECTIOUS DISEASE  Antimicrobials/Immunologic Medications:    Day #  	of    ***  Tubes/Lines/Drains  ***  [x] Peripheral IV  [] Central Venous Line     	[] R	[] L	[] IJ	[] Fem	[] SC	Date Placed:   [] Arterial Line		[] R	[] L	[] Fem	[] Rad	[] Ax	Date Placed:   [] PICC:         	[] Midline		[] Mediport  [] Urinary Catheter		Date Placed:     LABS  ABG - ( 01 Dec 2023 11:13 )  pH: 7.44  /  pCO2: 37    /  pO2: 348   / HCO3: 25    / Base Excess: 1.1   /  SaO2: 98.9  / Lactate: x

## 2023-12-01 NOTE — BRIEF OPERATIVE NOTE - OPERATION/FINDINGS
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

## 2023-12-01 NOTE — CONSULT NOTE ADULT - ASSESSMENT
58y Female s/p left crani for resection of tumor    PLAN:  ***  Neurologic:   - Tylenol standing for pain control  Respiratory:   -  Cardiovascular:   -  Gastrointestinal/Nutrition:   -  Renal/Genitourinary:   -  Hematologic:   - scds for DVT ppx  Infectious Disease:   -  Tubes/Lines/Drains:   Endocrine:   -  Disposition: PACU  --------------------------------------------------------------------------------------  Critical Care Diagnoses:   58y Female s/p left crani for resection of tumor, frozen neurofibromatosis     PLAN:  ***  Neurologic:   - Tylenol standing for pain control, dilaudid for moderate to severe pain  - Continue home Wellbutrin Remeron and zoloft  Respiratory:   -  Cardiovascular:   -  Gastrointestinal/Nutrition:   -  Renal/Genitourinary:   -  Hematologic:   - scds for DVT ppx  Infectious Disease:   -  Tubes/Lines/Drains:   Endocrine:   -  Disposition: PACU  --------------------------------------------------------------------------------------  Critical Care Diagnoses:   58y Female s/p left crani for resection of tumor, frozen neurofibromatosis     PLAN:  ***  Neurologic:   - Tylenol standing for pain control, dilaudid for moderate to severe pain  - Continue home Wellbutrin Remeron and zoloft  Respiratory:   - Satting well  Cardiovascular:   - Stable  Gastrointestinal/Nutrition:   - ADAT  Renal/Genitourinary:   - F/u bmp and replete lytes as needed  Hematologic:   - scds for DVT ppx  Infectious Disease:   - not warranted at this time  Tubes/Lines/Drains:   Endocrine:   - Decadron taper  Disposition: PACU    Patients needs an MRI tomorrow  --------------------------------------------------------------------------------------  Critical Care Diagnoses:   58y Female s/p left crani for resection of tumor, frozen neurofibromatosis     PLAN:  ***  Neurologic:   - Tylenol standing for pain control, Dilaudid for moderate to severe pain  - Continue home Wellbutrin Remeron and zoloft  Respiratory:   - Satting well  Cardiovascular:   - Stable  Gastrointestinal/Nutrition:   - ADAT  Renal/Genitourinary:   - F/u bmp and replete lytes as needed  Hematologic:   - scds for DVT ppx  Infectious Disease:   - not warranted at this time  Tubes/Lines/Drains:   Endocrine:   - Decadron taper  Disposition: PACU    Patients needs an MRI tomorrow  --------------------------------------------------------------------------------------  Critical Care Diagnoses:

## 2023-12-01 NOTE — PACU DISCHARGE NOTE - NSCLINEINSERTRD_GEN_ALL_CORE
Your provider has ordered testing for further evaluation. An order/prescription has been included in your paper work. To schedule outpatient testing, contact Central Scheduling by calling [employer] ([phone]). Plan:  1. Decrease metoprolol tartrate to 12.5 mg twice daily ~ trial to assist with symptom fatigue  2. Your condition does not require antibiotic prior to dental procedure. 3. Order echocardiogram after July 2023 ~ tricuspid regurgitation  ~A test that records movement of your heart valves and chambers by ultrasound. Evaluates heart valves, any chamber enlargement, abnormal openings, or any fluid in the sac surrounding the heart. 4.  Follow up in 1 year.
No

## 2023-12-01 NOTE — CONSULT NOTE ADULT - ATTENDING COMMENTS
I agree with the detailed interval history, physical, and plan, which I have reviewed and edited where appropriate'; also agree with notes/assessment with my team on service.  I have personally examined the patient.  I was physically present for the key portions of the evaluation and management (E/M) service provided.  I reviewed all the pertinent data.  The patient is a critical care patient with life threatening hemodynamic and metabolic instability in SICU.  The SICU team has a constant risk benefit analyzes discussion and coordinating care with the primary team and all consultants.   The patient is in SICU with the chief complaint and diagnosis mentioned in the note.   The plan will be specified in the note.  58y Female s/p left crani for resection of tumor, frozen neurofibromatosis.  SICU consult for HD and neuro monitoring.   AO3  Lung clear  Heart RR  Abd soft  PLAN:    Neurologic:   - Tylenol   - Wellbutrin Remeron and zoloft  Respiratory:   - RA  Cardiovascular:   - MAP >65  Gastrointestinal/Nutrition:   - ADAT  Renal/Genitourinary:   - F/u UO  Hematologic:   -  DVT ppx  Infectious Disease:   - No Abx  Endocrine  - Decadron taper  Disposition: PACU/SICU

## 2023-12-02 ENCOUNTER — TRANSCRIPTION ENCOUNTER (OUTPATIENT)
Age: 58
End: 2023-12-02

## 2023-12-02 VITALS
OXYGEN SATURATION: 97 % | TEMPERATURE: 99 F | SYSTOLIC BLOOD PRESSURE: 136 MMHG | RESPIRATION RATE: 16 BRPM | HEART RATE: 100 BPM | DIASTOLIC BLOOD PRESSURE: 75 MMHG

## 2023-12-02 LAB
ANION GAP SERPL CALC-SCNC: 11 MMOL/L — SIGNIFICANT CHANGE UP (ref 7–14)
ANION GAP SERPL CALC-SCNC: 11 MMOL/L — SIGNIFICANT CHANGE UP (ref 7–14)
BUN SERPL-MCNC: 15 MG/DL — SIGNIFICANT CHANGE UP (ref 7–23)
BUN SERPL-MCNC: 15 MG/DL — SIGNIFICANT CHANGE UP (ref 7–23)
CALCIUM SERPL-MCNC: 9 MG/DL — SIGNIFICANT CHANGE UP (ref 8.4–10.5)
CALCIUM SERPL-MCNC: 9 MG/DL — SIGNIFICANT CHANGE UP (ref 8.4–10.5)
CHLORIDE SERPL-SCNC: 101 MMOL/L — SIGNIFICANT CHANGE UP (ref 98–107)
CHLORIDE SERPL-SCNC: 101 MMOL/L — SIGNIFICANT CHANGE UP (ref 98–107)
CO2 SERPL-SCNC: 22 MMOL/L — SIGNIFICANT CHANGE UP (ref 22–31)
CO2 SERPL-SCNC: 22 MMOL/L — SIGNIFICANT CHANGE UP (ref 22–31)
CREAT SERPL-MCNC: 0.55 MG/DL — SIGNIFICANT CHANGE UP (ref 0.5–1.3)
CREAT SERPL-MCNC: 0.55 MG/DL — SIGNIFICANT CHANGE UP (ref 0.5–1.3)
EGFR: 106 ML/MIN/1.73M2 — SIGNIFICANT CHANGE UP
EGFR: 106 ML/MIN/1.73M2 — SIGNIFICANT CHANGE UP
GLUCOSE SERPL-MCNC: 128 MG/DL — HIGH (ref 70–99)
GLUCOSE SERPL-MCNC: 128 MG/DL — HIGH (ref 70–99)
HCT VFR BLD CALC: 34.6 % — SIGNIFICANT CHANGE UP (ref 34.5–45)
HCT VFR BLD CALC: 34.6 % — SIGNIFICANT CHANGE UP (ref 34.5–45)
HGB BLD-MCNC: 11.8 G/DL — SIGNIFICANT CHANGE UP (ref 11.5–15.5)
HGB BLD-MCNC: 11.8 G/DL — SIGNIFICANT CHANGE UP (ref 11.5–15.5)
MAGNESIUM SERPL-MCNC: 2.7 MG/DL — HIGH (ref 1.6–2.6)
MAGNESIUM SERPL-MCNC: 2.7 MG/DL — HIGH (ref 1.6–2.6)
MCHC RBC-ENTMCNC: 26.6 PG — LOW (ref 27–34)
MCHC RBC-ENTMCNC: 26.6 PG — LOW (ref 27–34)
MCHC RBC-ENTMCNC: 34.1 GM/DL — SIGNIFICANT CHANGE UP (ref 32–36)
MCHC RBC-ENTMCNC: 34.1 GM/DL — SIGNIFICANT CHANGE UP (ref 32–36)
MCV RBC AUTO: 78.1 FL — LOW (ref 80–100)
MCV RBC AUTO: 78.1 FL — LOW (ref 80–100)
NRBC # BLD: 0 /100 WBCS — SIGNIFICANT CHANGE UP (ref 0–0)
NRBC # BLD: 0 /100 WBCS — SIGNIFICANT CHANGE UP (ref 0–0)
NRBC # FLD: 0 K/UL — SIGNIFICANT CHANGE UP (ref 0–0)
NRBC # FLD: 0 K/UL — SIGNIFICANT CHANGE UP (ref 0–0)
PHOSPHATE SERPL-MCNC: 3.4 MG/DL — SIGNIFICANT CHANGE UP (ref 2.5–4.5)
PHOSPHATE SERPL-MCNC: 3.4 MG/DL — SIGNIFICANT CHANGE UP (ref 2.5–4.5)
PLATELET # BLD AUTO: 119 K/UL — LOW (ref 150–400)
PLATELET # BLD AUTO: 119 K/UL — LOW (ref 150–400)
POTASSIUM SERPL-MCNC: 4.4 MMOL/L — SIGNIFICANT CHANGE UP (ref 3.5–5.3)
POTASSIUM SERPL-MCNC: 4.4 MMOL/L — SIGNIFICANT CHANGE UP (ref 3.5–5.3)
POTASSIUM SERPL-SCNC: 4.4 MMOL/L — SIGNIFICANT CHANGE UP (ref 3.5–5.3)
POTASSIUM SERPL-SCNC: 4.4 MMOL/L — SIGNIFICANT CHANGE UP (ref 3.5–5.3)
RBC # BLD: 4.43 M/UL — SIGNIFICANT CHANGE UP (ref 3.8–5.2)
RBC # BLD: 4.43 M/UL — SIGNIFICANT CHANGE UP (ref 3.8–5.2)
RBC # FLD: 13.9 % — SIGNIFICANT CHANGE UP (ref 10.3–14.5)
RBC # FLD: 13.9 % — SIGNIFICANT CHANGE UP (ref 10.3–14.5)
SODIUM SERPL-SCNC: 134 MMOL/L — LOW (ref 135–145)
SODIUM SERPL-SCNC: 134 MMOL/L — LOW (ref 135–145)
WBC # BLD: 7.56 K/UL — SIGNIFICANT CHANGE UP (ref 3.8–10.5)
WBC # BLD: 7.56 K/UL — SIGNIFICANT CHANGE UP (ref 3.8–10.5)
WBC # FLD AUTO: 7.56 K/UL — SIGNIFICANT CHANGE UP (ref 3.8–10.5)
WBC # FLD AUTO: 7.56 K/UL — SIGNIFICANT CHANGE UP (ref 3.8–10.5)

## 2023-12-02 PROCEDURE — 70553 MRI BRAIN STEM W/O & W/DYE: CPT | Mod: 26

## 2023-12-02 PROCEDURE — 99233 SBSQ HOSP IP/OBS HIGH 50: CPT

## 2023-12-02 RX ORDER — PANTOPRAZOLE SODIUM 20 MG/1
1 TABLET, DELAYED RELEASE ORAL
Qty: 7 | Refills: 0
Start: 2023-12-02 | End: 2023-12-08

## 2023-12-02 RX ORDER — OXYCODONE HYDROCHLORIDE 5 MG/1
1 TABLET ORAL
Qty: 20 | Refills: 0
Start: 2023-12-02 | End: 2023-12-06

## 2023-12-02 RX ORDER — DEXAMETHASONE 0.5 MG/5ML
1 ELIXIR ORAL
Qty: 31 | Refills: 0
Start: 2023-12-02 | End: 2023-12-08

## 2023-12-02 RX ORDER — ACETAMINOPHEN 500 MG
3 TABLET ORAL
Qty: 0 | Refills: 0 | DISCHARGE
Start: 2023-12-02

## 2023-12-02 RX ORDER — INFLUENZA VIRUS VACCINE 15; 15; 15; 15 UG/.5ML; UG/.5ML; UG/.5ML; UG/.5ML
0.5 SUSPENSION INTRAMUSCULAR ONCE
Refills: 0 | Status: COMPLETED | OUTPATIENT
Start: 2023-12-02 | End: 2023-12-02

## 2023-12-02 RX ADMIN — BUPROPION HYDROCHLORIDE 150 MILLIGRAM(S): 150 TABLET, EXTENDED RELEASE ORAL at 13:08

## 2023-12-02 RX ADMIN — PANTOPRAZOLE SODIUM 40 MILLIGRAM(S): 20 TABLET, DELAYED RELEASE ORAL at 06:02

## 2023-12-02 RX ADMIN — Medication 4 MILLIGRAM(S): at 17:06

## 2023-12-02 RX ADMIN — Medication 975 MILLIGRAM(S): at 03:33

## 2023-12-02 RX ADMIN — Medication 975 MILLIGRAM(S): at 09:28

## 2023-12-02 RX ADMIN — OXYCODONE HYDROCHLORIDE 5 MILLIGRAM(S): 5 TABLET ORAL at 05:20

## 2023-12-02 RX ADMIN — OXYCODONE HYDROCHLORIDE 5 MILLIGRAM(S): 5 TABLET ORAL at 17:05

## 2023-12-02 RX ADMIN — OXYCODONE HYDROCHLORIDE 5 MILLIGRAM(S): 5 TABLET ORAL at 18:05

## 2023-12-02 RX ADMIN — OXYCODONE HYDROCHLORIDE 5 MILLIGRAM(S): 5 TABLET ORAL at 04:50

## 2023-12-02 RX ADMIN — MIRTAZAPINE 7.5 MILLIGRAM(S): 45 TABLET, ORALLY DISINTEGRATING ORAL at 13:09

## 2023-12-02 RX ADMIN — Medication 4 MILLIGRAM(S): at 06:02

## 2023-12-02 RX ADMIN — SERTRALINE 50 MILLIGRAM(S): 25 TABLET, FILM COATED ORAL at 13:08

## 2023-12-02 RX ADMIN — BUPROPION HYDROCHLORIDE 300 MILLIGRAM(S): 150 TABLET, EXTENDED RELEASE ORAL at 13:09

## 2023-12-02 RX ADMIN — Medication 4 MILLIGRAM(S): at 00:16

## 2023-12-02 RX ADMIN — Medication 4 MILLIGRAM(S): at 13:08

## 2023-12-02 RX ADMIN — Medication 975 MILLIGRAM(S): at 08:52

## 2023-12-02 RX ADMIN — Medication 975 MILLIGRAM(S): at 04:00

## 2023-12-02 NOTE — DISCHARGE NOTE PROVIDER - NPI NUMBER (FOR SYSADMIN USE ONLY) :
[facility] ONCOLOGY  Hematology/Oncology Follow Up Note     Patient: Karolyn Awad Age: 62 year old  MRN: [id number]     Date of Visit: January 7, 2022     Chief Complaint:   Metastatic non-small cell lung cancer  Cancer History:  Cancer Staging  Non-small cell lung cancer metastatic to lymph node of head and neck region (CMS/HCC)  Staging form: Lung, AJCC 8th Edition  - Clinical stage from 2/15/2018: Stage IV (rcTX, cN3, pM1) - Signed by Kamari Campbell DO on 10/20/2020      Oncology History   Non-small cell lung cancer metastatic to lymph node of head and neck region (CMS/HCC)   2/15/2018 -  Cancer Staged    Staging form: Lung, AJCC 8th Edition  - Clinical stage from 2/15/2018: Stage IV (rcTX, cN3, pM1) - Signed by Kamari Campbell DO on 10/20/2020       10/20/2020 Initial Diagnosis    Non-small cell lung cancer metastatic to lymph node of head and neck region (CMS/HCC)     10/23/2020 -  Immunotherapy     NIVOLUMAB 480 MG EVERY 28 DAYS  Plan Provider: Kamari Campbell DO  Treatment goal: Palliative  Line of treatment: Second Line          SUBJECTIVE     HPI:  Karolyn Awad is a 62 year old female who presents to the office with her  in follow-up for metastatic non-small cell lung cancer. She continues on therapy with nivolumab.  Her chronic cough has flared. She has a lose cough, foul tasting, green/yellow in color typically but this morning it was brown. No fevers. No sinus pain or pressure. No ear pain or pressure. She is breathing well. No recent illness or infection. Energy is good. Appetite is stable. No major bowel changes.       Review of Systems:  A 10 point review of systems was conducted and is negative unless mentioned in HPI.    Current Outpatient Medications   Medication Sig Dispense Refill   • sodium chloride 0.9 % Solution 100 mL with NIVOlumab 100 MG/10ML Solution Inject 240 mg into the vein every 28 days.     • oxyCODONE-acetaminophen (Percocet)  MG per tablet Take 2 tablets by  mouth every 4 hours as needed for Pain. 180 tablet 0   • ALPRAZolam (XANAX) 0.5 MG tablet 1 tablet three times daily prn anxiety 90 tablet 3   • FLUoxetine (PROzac) 10 MG capsule Take 30 mg by mouth daily.      • Ferrous Sulfate (IRON PO)      • fluticasone-umeclidin-vilanterol (Trelegy Ellipta) 100-62.5-25 MCG/INH inhaler Inhale 1 puff into the lungs every 24 hours. 1 each 11   • albuterol 108 (90 Base) MCG/ACT inhaler Inhale 2 puffs into the lungs every 4 hours as needed for Shortness of Breath or Wheezing. 1 Inhaler 11   • benzonatate (TESSALON PERLES) 100 MG capsule Take 1 capsule by mouth 3 times daily as needed for Cough. 90 capsule 11   • traZODone (DESYREL) 50 MG tablet Take 50 mg by mouth at bedtime.     • metoPROLOL succinate (TOPROL-XL) 200 MG 24 hr tablet Take 200 mg by mouth daily.     • levothyroxine 125 MCG tablet Take 125 mcg by mouth daily.      • dilTIAZem (Cartia XT) 300 MG 24 hr capsule Take 300 mg by mouth every 24 hours.      • Cobalamin Combinations (Vitamin B12-Folic Acid) 500-400 MCG Tab Take 1 tablet by mouth daily.     • atorvastatin (LIPITOR) 20 MG tablet TAKE 1 TABLET BY MOUTH EVERY DAY **2/1/20**       Current Facility-Administered Medications   Medication Dose Route Frequency Provider Last Rate Last Admin   • heparin 100 UNIT/ML lock flush 500 Units  5 mL Intracatheter Once PRN Morena Strange PA-C         ALLERGIES:  No Known Allergies      OBJECTIVE    There were no vitals taken for this visit.    Physical Examination  Performance Status: ECOG 1  General: Well-developed, well-nourished, in no acute distress  HEENT: normocephalic, without obvious abnormality, no scleral icterus  Lungs: clear to auscultation bilaterally  Heart: regular rate and rhythm, S1 and S2 normal  Extremities: no clubbing, cyanosis, edema  Musculoskeletal: symmetrical strength and tone  Skin: skin color and turgor normal; no rash, purpura, or petechiae  Neurologic: oriented x 3, no focal deficits  Psych: mood and  affect normal    Laboratory/Imaging    Office Visit on 12/10/2021   Component Date Value   • Sodium 12/10/2021 138    • Potassium 12/10/2021 4.1    • Chloride 12/10/2021 105    • Carbon Dioxide 12/10/2021 27    • Anion Gap 12/10/2021 10    • Glucose 12/10/2021 125*   • BUN 12/10/2021 13    • Creatinine 12/10/2021 0.89    • Glomerular Filtration Ra* 12/10/2021 70    • BUN/ Creatinine Ratio 12/10/2021 15    • Calcium 12/10/2021 8.5    • Bilirubin, Total 12/10/2021 0.4    • GOT/AST 12/10/2021 17    • GPT/ALT 12/10/2021 21    • Alkaline Phosphatase 12/10/2021 80    • Albumin 12/10/2021 3.6    • Protein, Total 12/10/2021 6.7    • Globulin 12/10/2021 3.1    • A/G Ratio 12/10/2021 1.2    • TSH 12/10/2021 2.561    • WBC 12/10/2021 4.2    • RBC 12/10/2021 4.55    • HGB 12/10/2021 11.3*   • HCT 12/10/2021 37.0    • MCV 12/10/2021 81.3    • MCH 12/10/2021 24.8*   • MCHC 12/10/2021 30.5*   • RDW-CV 12/10/2021 16.7*   • RDW-SD 12/10/2021 49.8    • PLT 12/10/2021 136*   • Neutrophil, Percent 12/10/2021 57    • Lymphocytes, Percent 12/10/2021 26    • Mono, Percent 12/10/2021 11    • Eosinophils, Percent 12/10/2021 5    • Basophils, Percent 12/10/2021 1    • Immature Granulocytes 12/10/2021 0    • Absolute Neutrophils 12/10/2021 2.4    • Absolute Lymphocytes 12/10/2021 1.1    • Absolute Monocytes 12/10/2021 0.5    • Absolute Eosinophils  12/10/2021 0.2    • Absolute Basophils 12/10/2021 0.0    • Absolute Immmature Granu* 12/10/2021 0.0           ASSESSMENT/PLAN  1. Metastatic non-small cell lung cancer. We will continue nivolumab. She is tolerating it well and recent scans have been stable. She will be due for CT imaging at this time. I will order for her to complete prior to her next visit.       2. Allergic rhinitis. Stable.    3. Chronic cough. This has been present for 1+ month and is purulent. I will send a zpak.      4. Chronic pain. She uses oxycodone with improvement. I refilled this today    5. Anxiety, she takes  alprazolam if needed. I refilled this today.     RTC: 4 weeks labs/MD,tx L. Rylee Scopp, PA-C    Hematology/Oncology         [[id number]] [[id number]] [[id number]]

## 2023-12-02 NOTE — PROGRESS NOTE ADULT - ASSESSMENT
58y Female s/p left crani for resection of tumor, frozen neurofibromatosis     PLAN:      Neurologic:   - Tylenol standing for pain control, Dilaudid for moderate to severe pain  - Continue home Wellbutrin Remeron and zoloft    Respiratory:   - Satting well    Cardiovascular:   - Stable    Gastrointestinal/Nutrition:   - ADAT    Renal/Genitourinary:   - F/u bmp and replete lytes as needed    Hematologic:   - scds for DVT ppx    Infectious Disease:   - not warranted at this time    Endocrine:   - Decadron taper  Disposition: PACU    Tubes/Lines/Drains:     Patients needs an MRI tomorrow  --------------------------------------------------------------------------------------  Critical Care Diagnoses:  
58F s/p left retromastoid craniectomy for resection of BT    P:  - q1h neuro checks  - po mri brain w/wo today  - c/w decadron taper

## 2023-12-02 NOTE — DISCHARGE NOTE PROVIDER - HOSPITAL COURSE
57 y/o female presents to presurgical testing with diagnosis neoplasm of unspecified behavior of brain. Pt with h/o neurofibromatosis, s/p resection of brain tumor and  shunt placement in 2013. Pt with imbalance and headaches. Pt is scheduled for a stereotactic left retromastoid craniectomy for resection of brain tumor. 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 Pt with h/o neurofibromatosis, s/p resection of brain tumor and  shunt placement in 2013. Pt with imbalance and headaches. Pt is scheduled for a stereotactic left retromastoid craniectomy for resection of brain tumor. 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

## 2023-12-02 NOTE — DISCHARGE NOTE PROVIDER - PROVIDER TOKENS
PROVIDER:[TOKEN:[[id number]],FOLLOWUP:[1 week]] PROVIDER:[TOKEN:[[id number]],FOLLOWUP:[1 week]] PROVIDER:[TOKEN:[[id number]],FOLLOWUP:[1 week]]

## 2023-12-02 NOTE — PROGRESS NOTE ADULT - SUBJECTIVE AND OBJECTIVE BOX
PAST 24HR EVENTS: s/p left retromastoid craniectomy for resection of BT, pod #1    HPI: 58y Female HPI:  57 y/o female presents to presurgical testing with diagnosis neoplasm of unspecified behavior of brain. Pt with h/o neurofibromatosis, s/p resection of brain tumor and  shunt placement in 2013. Pt with imbalance and headaches. Pt is scheduled for a stereotactic left retromastoid craniectomy for resection of brain tumor. (24 Nov 2023 07:17)        Vital Signs Last 24 Hrs  T(C): 36.7 (02 Dec 2023 04:00), Max: 37 (01 Dec 2023 10:07)  T(F): 98.1 (02 Dec 2023 04:00), Max: 98.6 (01 Dec 2023 10:07)  HR: 86 (02 Dec 2023 05:00) (82 - 95)  BP: 121/60 (02 Dec 2023 05:00) (106/48 - 131/67)  BP(mean): 75 (02 Dec 2023 05:00) (61 - 96)  RR: 14 (02 Dec 2023 05:00) (10 - 24)  SpO2: 100% (02 Dec 2023 05:00) (93% - 100%)    Parameters below as of 02 Dec 2023 04:00  Patient On (Oxygen Delivery Method): room air          MEDS:   acetaminophen     Tablet .. 975 milliGRAM(s) Oral every 6 hours PRN  buPROPion XL (24-Hour) . 300 milliGRAM(s) Oral daily  buPROPion XL (24-Hour) . 150 milliGRAM(s) Oral daily  dexAMETHasone     Tablet 4 milliGRAM(s) Oral every 6 hours  dexAMETHasone     Tablet   Oral   HYDROmorphone  Injectable 1 milliGRAM(s) IV Push every 10 minutes PRN  HYDROmorphone  Injectable 0.5 milliGRAM(s) IV Push every 10 minutes PRN  influenza   Vaccine 0.5 milliLiter(s) IntraMuscular once  lactated ringers. 1000 milliLiter(s) IV Continuous <Continuous>  mirtazapine 7.5 milliGRAM(s) Oral daily  ondansetron Injectable 4 milliGRAM(s) IV Push once PRN  oxyCODONE    IR 5 milliGRAM(s) Oral every 6 hours PRN  pantoprazole    Tablet 40 milliGRAM(s) Oral before breakfast  sertraline 50 milliGRAM(s) Oral daily      LABS:                        11.8   7.56  )-----------( 119      ( 02 Dec 2023 01:20 )             34.6     12-02    134<L>  |  101  |  15  ----------------------------<  128<H>  4.4   |  22  |  0.55    Ca    9.0      02 Dec 2023 01:20  Phos  3.4     12-02  Mg     2.70     12-02          RADIOLOGY:       PHYSICAL EXAM: AAOx3, fc  perrl  john 5/5  eomi   PAST 24HR EVENTS: s/p left retromastoid craniectomy for resection of BT, pod #1    HPI: 58y Female HPI:  59 y/o female presents to presurgical testing with diagnosis neoplasm of unspecified behavior of brain. Pt with h/o neurofibromatosis, s/p resection of brain tumor and  shunt placement in 2013. Pt with imbalance and headaches. Pt is scheduled for a stereotactic left retromastoid craniectomy for resection of brain tumor. (24 Nov 2023 07:17)        Vital Signs Last 24 Hrs  T(C): 36.7 (02 Dec 2023 04:00), Max: 37 (01 Dec 2023 10:07)  T(F): 98.1 (02 Dec 2023 04:00), Max: 98.6 (01 Dec 2023 10:07)  HR: 86 (02 Dec 2023 05:00) (82 - 95)  BP: 121/60 (02 Dec 2023 05:00) (106/48 - 131/67)  BP(mean): 75 (02 Dec 2023 05:00) (61 - 96)  RR: 14 (02 Dec 2023 05:00) (10 - 24)  SpO2: 100% (02 Dec 2023 05:00) (93% - 100%)    Parameters below as of 02 Dec 2023 04:00  Patient On (Oxygen Delivery Method): room air          MEDS:   acetaminophen     Tablet .. 975 milliGRAM(s) Oral every 6 hours PRN  buPROPion XL (24-Hour) . 300 milliGRAM(s) Oral daily  buPROPion XL (24-Hour) . 150 milliGRAM(s) Oral daily  dexAMETHasone     Tablet 4 milliGRAM(s) Oral every 6 hours  dexAMETHasone     Tablet   Oral   HYDROmorphone  Injectable 1 milliGRAM(s) IV Push every 10 minutes PRN  HYDROmorphone  Injectable 0.5 milliGRAM(s) IV Push every 10 minutes PRN  influenza   Vaccine 0.5 milliLiter(s) IntraMuscular once  lactated ringers. 1000 milliLiter(s) IV Continuous <Continuous>  mirtazapine 7.5 milliGRAM(s) Oral daily  ondansetron Injectable 4 milliGRAM(s) IV Push once PRN  oxyCODONE    IR 5 milliGRAM(s) Oral every 6 hours PRN  pantoprazole    Tablet 40 milliGRAM(s) Oral before breakfast  sertraline 50 milliGRAM(s) Oral daily      LABS:                        11.8   7.56  )-----------( 119      ( 02 Dec 2023 01:20 )             34.6     12-02    134<L>  |  101  |  15  ----------------------------<  128<H>  4.4   |  22  |  0.55    Ca    9.0      02 Dec 2023 01:20  Phos  3.4     12-02  Mg     2.70     12-02          RADIOLOGY:       PHYSICAL EXAM: AAOx3, fc  perrl  john 5/5  eomi

## 2023-12-02 NOTE — DISCHARGE NOTE PROVIDER - CARE PROVIDERS DIRECT ADDRESSES
[email or web address]

## 2023-12-02 NOTE — DISCHARGE NOTE PROVIDER - CARE PROVIDER_API CALL
Andrews Brush  Pediatric Neurosurgery  [address] [place] [address], [facility] [address]  [place] [address]  Phone: [phone]  Fax: [phone]  Follow Up Time: 1 week   Andrews Brush  Pediatric Neurosurgery  [address] [place] [address], [facility] [address]  [place] [address]  Phone: [phone]  Fax: [phone]  Follow Up Time: 1 week   Andrews Brush  Pediatric Neurosurgery  [address] [place] [address], [facility] [address]  [place] [address]  Phone: [phone]  Fax: [phone]  Follow Up Time: 1 week

## 2023-12-02 NOTE — DISCHARGE NOTE PROVIDER - NSDCFUADDINST_GEN_ALL_CORE_FT
- You had surgery on 12/1/23. The surgery you had was left craniotomy for resection of brain tumor.     - Remove bandage from incision site on post op day 3 if it was not removed by the surgical team prior to discharge. Once removed, incision site does not need a bandage or ointment on it. If you have steri strips (small, skinny beige strips), they will eventually fall off over time. Do not pull at steri strips. If steri strips are more than residential off, you may remove them. Do not touch or scratch incision to prevent infection.    - If your incision is closed with clear sutures, these are absorbable and will dissolve over time. If you see metallic staples or black stitches, these will need to be removed in the office 7-14 days after surgery. Your surgeon will tell you at your follow up appt when your sutures/staples will be removed, if applicable.  Do not pick or scratch at incision.     - Shower daily with shampoo/soap on post operative day 4 (12/5/23) Avoid long soaks and do not submerge incision in water (no baths.) Allow soap and water to run over the incision. Pat incision area dry with clean towel- do not scrub. Please shower regularly to ensure incision stays clean to avoid post operative infections.  You may have a body shower daily, as long as your head incision is covered by a shower cap and does not get wet until post op day 4.     - Notify your surgeon if you notice increased redness, drainage or your incision area opening.     - Return to ER immediately for high fevers, severe headache, vomiting, lethargy or weakness    - Please call your neurosurgeon following discharge to make follow up appointment in 1 week after discharge unless otherwise specified. See contact information.    - Prescription post operative medication has been sent to [facility] in the hospital. All post operative prescriptions should be picked up before departing the hospital. You can also take over the counter tylenol for pain as needed.     - Ambulate as tolerate. Continue with all "activities of daily living." Avoid strenuous activity or heavy lifting until cleared for additional activity at your follow up appointment. You cannot drive while taking narcotics (oxycodone, valium, etc.)     - Do not return to work or school until cleared by your neurosurgeon at your follow up visit unless specified to you during your hospital stay    - Do not take any blood thinning medications such as aspirin, motrin, ibuprofen, warfarin, coumadin, plavix, heparin, lovenox, Xarelto, Eliquis etc. until cleared by your neurosurgeon    - Surgery, anesthesia, and pain medications can cause constipation. Please take over the counter stool softeners daily until regular bowel movements are achieved. Examples include Miralax, Senna, Colace, Milk of Magnesia, or Dulcolax suppositories. Please consult drug store pharmacist or pediatrician if there are question about dosing if the patient is pediatric.   - You had surgery on 12/1/23. The surgery you had was left craniotomy for resection of brain tumor.     - Remove bandage from incision site on post op day 3 if it was not removed by the surgical team prior to discharge. Once removed, incision site does not need a bandage or ointment on it. If you have steri strips (small, skinny beige strips), they will eventually fall off over time. Do not pull at steri strips. If steri strips are more than longterm off, you may remove them. Do not touch or scratch incision to prevent infection.    - If your incision is closed with clear sutures, these are absorbable and will dissolve over time. If you see metallic staples or black stitches, these will need to be removed in the office 7-14 days after surgery. Your surgeon will tell you at your follow up appt when your sutures/staples will be removed, if applicable.  Do not pick or scratch at incision.     - Shower daily with shampoo/soap on post operative day 4 (12/5/23) Avoid long soaks and do not submerge incision in water (no baths.) Allow soap and water to run over the incision. Pat incision area dry with clean towel- do not scrub. Please shower regularly to ensure incision stays clean to avoid post operative infections.  You may have a body shower daily, as long as your head incision is covered by a shower cap and does not get wet until post op day 4.     - Notify your surgeon if you notice increased redness, drainage or your incision area opening.     - Return to ER immediately for high fevers, severe headache, vomiting, lethargy or weakness    - Please call your neurosurgeon following discharge to make follow up appointment in 1 week after discharge unless otherwise specified. See contact information.    - Prescription post operative medication has been sent to [facility] in the hospital. All post operative prescriptions should be picked up before departing the hospital. You can also take over the counter tylenol for pain as needed.     - Ambulate as tolerate. Continue with all "activities of daily living." Avoid strenuous activity or heavy lifting until cleared for additional activity at your follow up appointment. You cannot drive while taking narcotics (oxycodone, valium, etc.)     - Do not return to work or school until cleared by your neurosurgeon at your follow up visit unless specified to you during your hospital stay    - Do not take any blood thinning medications such as aspirin, motrin, ibuprofen, warfarin, coumadin, plavix, heparin, lovenox, Xarelto, Eliquis etc. until cleared by your neurosurgeon    - Surgery, anesthesia, and pain medications can cause constipation. Please take over the counter stool softeners daily until regular bowel movements are achieved. Examples include Miralax, Senna, Colace, Milk of Magnesia, or Dulcolax suppositories. Please consult drug store pharmacist or pediatrician if there are question about dosing if the patient is pediatric.   - You had surgery on 12/1/23. The surgery you had was left craniotomy for resection of brain tumor.     - Remove bandage from incision site on post op day 3 if it was not removed by the surgical team prior to discharge. Once removed, incision site does not need a bandage or ointment on it. If you have steri strips (small, skinny beige strips), they will eventually fall off over time. Do not pull at steri strips. If steri strips are more than prison off, you may remove them. Do not touch or scratch incision to prevent infection.    - If your incision is closed with clear sutures, these are absorbable and will dissolve over time. If you see metallic staples or black stitches, these will need to be removed in the office 7-14 days after surgery. Your surgeon will tell you at your follow up appt when your sutures/staples will be removed, if applicable.  Do not pick or scratch at incision.     - Shower daily with shampoo/soap on post operative day 4 (12/5/23) Avoid long soaks and do not submerge incision in water (no baths.) Allow soap and water to run over the incision. Pat incision area dry with clean towel- do not scrub. Please shower regularly to ensure incision stays clean to avoid post operative infections.  You may have a body shower daily, as long as your head incision is covered by a shower cap and does not get wet until post op day 4.     - Notify your surgeon if you notice increased redness, drainage or your incision area opening.     - Return to ER immediately for high fevers, severe headache, vomiting, lethargy or weakness    - Please call your neurosurgeon following discharge to make follow up appointment in 1 week after discharge unless otherwise specified. See contact information.    - Prescription post operative medication has been sent to [facility] in the hospital. All post operative prescriptions should be picked up before departing the hospital. You can also take over the counter tylenol for pain as needed.     - Ambulate as tolerate. Continue with all "activities of daily living." Avoid strenuous activity or heavy lifting until cleared for additional activity at your follow up appointment. You cannot drive while taking narcotics (oxycodone, valium, etc.)     - Do not return to work or school until cleared by your neurosurgeon at your follow up visit unless specified to you during your hospital stay    - Do not take any blood thinning medications such as aspirin, motrin, ibuprofen, warfarin, coumadin, plavix, heparin, lovenox, Xarelto, Eliquis etc. until cleared by your neurosurgeon    - Surgery, anesthesia, and pain medications can cause constipation. Please take over the counter stool softeners daily until regular bowel movements are achieved. Examples include Miralax, Senna, Colace, Milk of Magnesia, or Dulcolax suppositories. Please consult drug store pharmacist or pediatrician if there are question about dosing if the patient is pediatric.

## 2023-12-02 NOTE — DISCHARGE NOTE NURSING/CASE MANAGEMENT/SOCIAL WORK - NSDCPEFALRISK_GEN_ALL_CORE
For information on Fall & Injury Prevention, visit: [email or web address] OR  [email or web address] OR  https://www.cdc.gov/steadi/patient.html

## 2023-12-02 NOTE — PATIENT PROFILE ADULT - FALL HARM RISK - HARM RISK INTERVENTIONS
Assistance with ambulation/Assistance OOB with selected safe patient handling equipment/Communicate Risk of Fall with Harm to all staff/Monitor gait and stability/Reinforce activity limits and safety measures with patient and family/Review medications for side effects contributing to fall risk/Sit up slowly, dangle for a short time, stand at bedside before walking/Tailored Fall Risk Interventions/Toileting schedule using arm’s reach rule for commode and bathroom/Use of alarms - bed, chair and/or voice tab/Visual Cue: Yellow wristband and red socks/Bed in lowest position, wheels locked, appropriate side rails in place/Call bell, personal items and telephone in reach/Instruct patient to call for assistance before getting out of bed or chair/Non-slip footwear when patient is out of bed/[place] to call system/Physically safe environment - no spills, clutter or unnecessary equipment/Purposeful Proactive Rounding/Room/bathroom lighting operational, light cord in reach

## 2023-12-02 NOTE — PATIENT PROFILE ADULT - FALL HARM RISK - FACTORS NURSING JUDGEMENT
January 15, 2019      [facility]                                                                                                                                                       [address] [facility]  [place] [address]  Phone: [phone]  Fax: [phone]       Patient: Chelsie Payne   YOB: 1979  Date of Visit: 01/15/2019    To Whom It May Concern:    Hardik Payne  was at [facility] on 01/15/2019. She may return to work/school on 01/22/2019 with no restrictions. If you have any questions or concerns, or if I can be of further assistance, please do not hesitate to contact me.    Sincerely,        Wolfgang Cast MD     
Yes

## 2023-12-02 NOTE — DISCHARGE NOTE NURSING/CASE MANAGEMENT/SOCIAL WORK - PATIENT PORTAL LINK FT
You can access the FollowMyHealth Patient Portal offered by [facility] by registering at the following website: [email or web address]. By joining [facility]’s FollowMyHealth portal, you will also be able to view your health information using other applications (apps) compatible with our system.

## 2023-12-02 NOTE — DISCHARGE NOTE PROVIDER - NSDCCPCAREPLAN_GEN_ALL_CORE_FT
PRINCIPAL DISCHARGE DIAGNOSIS  Diagnosis: Neoplasm of unspecified behavior of brain  Assessment and Plan of Treatment:

## 2023-12-02 NOTE — PROGRESS NOTE ADULT - ATTENDING COMMENTS
I agree with the detailed interval history, physical, and plan, which I have reviewed and edited where appropriate'; also agree with notes/assessment with my team on service.  I have personally examined the patient.  I was physically present for the key portions of the evaluation and management (E/M) service provided.  I reviewed all the pertinent data.  The patient is a critical care patient with life threatening hemodynamic and metabolic instability in SICU.  The SICU team has a constant risk benefit analyzes discussion and coordinating care with the primary team and all consultants.   The patient is in SICU with the chief complaint and diagnosis mentioned in the note.   The plan will be specified in the note.  58y Female s/p left crani for resection of tumor, frozen neurofibromatosis.  SICU consult for HD and neuro monitoring.   AO3  Lung clear  Heart RR  Abd soft  PLAN:    Neurologic:   - Tylenol   - Wellbutrin Remeron and zoloft  Respiratory:   - RA  Cardiovascular:   - MAP >65  Gastrointestinal/Nutrition:   - ADAT  Renal/Genitourinary:   - F/u UO  Hematologic:   -  DVT ppx  Infectious Disease:   - No Abx  Endocrine  - Decadron taper  Disposition: DC Floor

## 2023-12-02 NOTE — DISCHARGE NOTE PROVIDER - NSDCMRMEDTOKEN_GEN_ALL_CORE_FT
acetaminophen 325 mg oral tablet: 3 tab(s) orally every 6 hours As needed Temp greater or equal to 38C (100.4F), Mild Pain (1 - 3)  buPROPion 150 mg/24 hours (XL) oral tablet, extended release: 1 tab(s) orally once a day  buPROPion 300 mg/24 hours (XL) oral tablet, extended release: 1 tab(s) orally once a day  dexAMETHasone 1 mg oral tablet: 1 tab(s) orally once a day 3mg orally every 8 hours x 2 days, 2mg every 12 hours x 2 days, 1mg every 12 hours x 2 days, 1mg every 24 hrs then off  mirtazapine 7.5 mg oral tablet: 1 tab(s) orally once a day (at bedtime)  oxyCODONE 5 mg oral tablet: 1 tab(s) orally every 6 hours MDD: 4  pantoprazole 20 mg oral delayed release tablet: 1 tab(s) orally once a day  sertraline 50 mg oral tablet: 1 tab(s) orally once a day

## 2023-12-02 NOTE — PROGRESS NOTE ADULT - SUBJECTIVE AND OBJECTIVE BOX
SICU Daily Progress Note:    HPI: 59 y/o female presents to presurgical testing with diagnosis neoplasm of unspecified behavior of brain. Pt with h/o neurofibromatosis, s/p resection of brain tumor and  shunt placement in 2013. Pt with imbalance and headaches. Pt is s/p stereotactic left retromastoid craniectomy for resection of brain tumor. SICU consulted for Q1 neurovascular checks    MEDICATIONS  (STANDING):  buPROPion XL (24-Hour) . 300 milliGRAM(s) Oral daily  buPROPion XL (24-Hour) . 150 milliGRAM(s) Oral daily  dexAMETHasone     Tablet 4 milliGRAM(s) Oral every 6 hours  dexAMETHasone     Tablet   Oral   lactated ringers. 1000 milliLiter(s) (30 mL/Hr) IV Continuous <Continuous>  mirtazapine 7.5 milliGRAM(s) Oral daily  pantoprazole    Tablet 40 milliGRAM(s) Oral before breakfast  sertraline 50 milliGRAM(s) Oral daily    MEDICATIONS  (PRN):  acetaminophen     Tablet .. 975 milliGRAM(s) Oral every 6 hours PRN Temp greater or equal to 38C (100.4F), Mild Pain (1 - 3)  HYDROmorphone  Injectable 0.5 milliGRAM(s) IV Push every 10 minutes PRN Moderate Pain (4 - 6)  HYDROmorphone  Injectable 1 milliGRAM(s) IV Push every 10 minutes PRN Severe Pain (7 - 10)  ondansetron Injectable 4 milliGRAM(s) IV Push once PRN Nausea and/or Vomiting  oxyCODONE    IR 5 milliGRAM(s) Oral every 6 hours PRN Moderate Pain (4 - 6)    ICU Vital Signs Last 24 Hrs  T(C): 36.7 (02 Dec 2023 00:00), Max: 37 (01 Dec 2023 10:07)  T(F): 98 (02 Dec 2023 00:00), Max: 98.6 (01 Dec 2023 10:07)  HR: 82 (02 Dec 2023 00:00) (82 - 95)  BP: 122/72 (02 Dec 2023 00:00) (106/48 - 131/67)  BP(mean): 74 (02 Dec 2023 00:00) (61 - 83)  ABP: 110/50 (02 Dec 2023 00:00) (110/50 - 141/71)  ABP(mean): 74 (02 Dec 2023 00:00) (74 - 103)  RR: 14 (02 Dec 2023 00:00) (10 - 24)  SpO2: 96% (02 Dec 2023 00:00) (93% - 98%)    O2 Parameters below as of 02 Dec 2023 00:00  Patient On (Oxygen Delivery Method): room air    I&O's Detail    01 Dec 2023 07:01  -  02 Dec 2023 01:00  --------------------------------------------------------  IN:    Oral Fluid: 240 mL  Total IN: 240 mL    OUT:    Indwelling Catheter - Urethral (mL): 650 mL    Voided (mL): 550 mL  Total OUT: 1200 mL    Total NET: -960 mL          --------------------------------------------------------------------------------------    EXAM  NEUROLOGY  Exam: Normal, NAD, alert, oriented x 3, no focal deficits.     HEENT  Exam: Normocephalic, atraumatic.  EOMI     RESPIRATORY  Exam: Lungs clear to auscultation, Normal expansion/effort.    Mechanical Ventilation:     CARDIOVASCULAR  Exam: S1, S2.  Regular rate and rhythm.  NO Peripheral edema      GI/NUTRITION  Exam: Abdomen soft, Non-tender, Non-distended.  Current Diet:  NPO    VASCULAR  Exam: Extremities warm, pink, well-perfused.     MUSCULOSKELETAL  Exam: All extremities moving spontaneously without limitations.      SKIN:  Exam: Good skin turgor, no skin breakdown.      METABOLIC/FLUIDS/ELECTROLYTES  lactated ringers. 1000 milliLiter(s) IV Continuous <Continuous>    HEMATOLOGIC  [x] DVT Prophylaxis:   Transfusions:	[] PRBC	[] Platelets		[] FFP	[] Cryoprecipitate  INFECTIOUS DISEASE  Antimicrobials/Immunologic Medications:    Day #  	of    ***  Tubes/Lines/Drains  ***  [x] Peripheral IV  [] Central Venous Line     	[] R	[] L	[] IJ	[] Fem	[] SC	Date Placed:   [] Arterial Line		[] R	[] L	[] Fem	[] Rad	[] Ax	Date Placed:   [] PICC:         	[] Midline		[] Mediport  [] Urinary Catheter		Date Placed:     LABS  CBC (12-01 @ 19:45)                              11.3<L>                         7.84    )----------------(  100<L>     --    % Neutrophils, --    % Lymphocytes, ANC: --                                  33.9<L>    BMP (12-01 @ 19:45)             133<L>  |  101     |  17    		Ca++ --      Ca 8.7                ---------------------------------( 130<H>		Mg 1.70               4.1     |  22      |  0.67  			Ph 3.6             ABG (12-01 @ 11:13)     7.44 / 37 / 348<H> / 25 / 1.1 / 98.9<H>%     Lactate:

## 2023-12-02 NOTE — DISCHARGE NOTE PROVIDER - NSDCCPTREATMENT_GEN_ALL_CORE_FT
PRINCIPAL PROCEDURE  Procedure: Craniotomy for brain tumor using navigation system  Findings and Treatment:

## 2023-12-08 LAB
SURGICAL PATHOLOGY STUDY: SIGNIFICANT CHANGE UP

## 2024-02-08 PROBLEM — F41.9 ANXIETY DISORDER, UNSPECIFIED: Chronic | Status: ACTIVE | Noted: 2023-11-24

## 2024-02-08 PROBLEM — Z86.69 PERSONAL HISTORY OF OTHER DISEASES OF THE NERVOUS SYSTEM AND SENSE ORGANS: Chronic | Status: ACTIVE | Noted: 2023-11-24

## 2024-02-23 ENCOUNTER — OUTPATIENT (OUTPATIENT)
Dept: OUTPATIENT SERVICES | Facility: HOSPITAL | Age: 59
LOS: 1 days | End: 2024-02-23

## 2024-02-23 ENCOUNTER — APPOINTMENT (OUTPATIENT)
Dept: MRI IMAGING | Facility: HOSPITAL | Age: 59
End: 2024-02-23
Payer: COMMERCIAL

## 2024-02-23 DIAGNOSIS — D49.6 NEOPLASM OF UNSPECIFIED BEHAVIOR OF BRAIN: ICD-10-CM

## 2024-02-23 DIAGNOSIS — Z98.2 PRESENCE OF CEREBROSPINAL FLUID DRAINAGE DEVICE: Chronic | ICD-10-CM

## 2024-02-23 DIAGNOSIS — Z87.898 PERSONAL HISTORY OF OTHER SPECIFIED CONDITIONS: Chronic | ICD-10-CM

## 2024-02-23 PROCEDURE — 70553 MRI BRAIN STEM W/O & W/DYE: CPT | Mod: 26

## 2024-04-08 ENCOUNTER — APPOINTMENT (OUTPATIENT)
Dept: NEUROLOGY | Facility: CLINIC | Age: 59
End: 2024-04-08
Payer: COMMERCIAL

## 2024-04-08 VITALS
RESPIRATION RATE: 18 BRPM | SYSTOLIC BLOOD PRESSURE: 123 MMHG | TEMPERATURE: 98.7 F | HEART RATE: 96 BPM | OXYGEN SATURATION: 98 % | DIASTOLIC BLOOD PRESSURE: 83 MMHG

## 2024-04-08 DIAGNOSIS — D49.7 NEOPLASM OF UNSPECIFIED BEHAVIOR OF ENDOCRINE GLANDS AND OTHER PARTS OF NERVOUS SYSTEM: ICD-10-CM

## 2024-04-08 DIAGNOSIS — G93.9 DISORDER OF BRAIN, UNSPECIFIED: ICD-10-CM

## 2024-04-08 DIAGNOSIS — Q85.00 NEUROFIBROMATOSIS, UNSPECIFIED: ICD-10-CM

## 2024-04-08 PROCEDURE — 99205 OFFICE O/P NEW HI 60 MIN: CPT

## 2024-04-08 RX ORDER — SERTRALINE HYDROCHLORIDE 50 MG/1
50 TABLET, FILM COATED ORAL DAILY
Refills: 0 | Status: ACTIVE | COMMUNITY
Start: 2024-04-08

## 2024-04-08 RX ORDER — BUPROPION HYDROCHLORIDE 150 MG/1
150 TABLET, FILM COATED, EXTENDED RELEASE ORAL DAILY
Refills: 0 | Status: ACTIVE | COMMUNITY
Start: 2024-04-08

## 2024-04-08 RX ORDER — BUPROPION HYDROCHLORIDE 300 MG/1
300 TABLET, EXTENDED RELEASE ORAL DAILY
Refills: 0 | Status: ACTIVE | COMMUNITY
Start: 2024-04-08

## 2024-04-14 PROBLEM — D49.7 GLIONEURONAL TUMOR: Status: ACTIVE | Noted: 2024-04-14

## 2024-04-14 PROBLEM — G93.9 BRAIN LESION: Status: ACTIVE | Noted: 2024-04-14

## 2024-04-14 PROBLEM — Q85.00 NEUROFIBROMATOSIS: Status: ACTIVE | Noted: 2024-04-14

## 2024-04-15 NOTE — DATA REVIEWED
[de-identified] : Serial MRI brain reviewed from 11/18, 8/21, 10/23, 2/24, showing resection of growing left cerebellar tumor, stable multiple other lesions in white matter and septum pellucidum, and new focus of vague enhancement in right trevor

## 2024-04-15 NOTE — DISCUSSION/SUMMARY
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 Acharya RTC 6w

## 2024-04-15 NOTE — PHYSICAL EXAM
[FreeTextEntry1] :  Exam as below (with documented exceptions in parentheses):  General:  Healthy appearing woman well groomed and without deformities. KPS is 70  Numerous neurofibromas  Mental Status:  Awake, alert and attentive. Oriented to person, place and time. Recent and remote memory intact. Normal concentration. Fluent spontaneous speech with intact naming and repetition. Normal fund of knowledge.    Cranial Nerves: II: Full visual fields. III,IV,VI:Pupils round, reactive to light. Full extraocular movements. No nystagmus. V: Normal bilateral bite, facial sensation. VII: No facial weakness. VIII: Hearing intact. IX,X: Palate midline, intact gag. XI:  Sternocleidomastoids normal. XII: Tongue protrudes midline. No dysarthria.  (dysconjugate gaze, but no palsies)  Motor:  Normal tone, bulk and power throughout including arms and legs, proximal and distal. No pronator drift. No abnormal movements.   Sensation: Normal in arms, legs and trunk to pin, proprioception and vibration. Negative Romberg.   Coordination: No dysmetria or dysdiadochokinesis bilaterally. Normal heel-shin testing. (right hemiataxia)  Gait: Normal including heel and toe walking. Normal station.  (cannot tandem)  Reflexes: Normoactive and symmetric throughout. Absent Babinski bilaterally.   Vascular: No peripheral edema/calf tenderness.   Eyes: Funduscopic exam without papilledema (right pallor)  Heart: Regular rate and rhythm. Normal s1-s2. No murmurs, rubs or gallops.   Respiratory: Lungs clear to auscultation bilaterally.   Abdomen: Nontender, non-distended. No hepatosplenomegaly. Normal bowel sounds in four quadrants.

## 2024-04-15 NOTE — HISTORY OF PRESENT ILLNESS
[FreeTextEntry1] : NEURO-ONCOLOGY  This 59 year old right handed woman is referred by Dr. Brush for my advice and opinion regarding brain tumor  She has neurofibromatosis.  She has multiple known brain tumors stable over time. She had a shunt placed in 2013.  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  Proliferation index was low.  Since surgery her balance remains poor, and she has slowed thinking. She has chronic poor vision.  Most recent MRI with punctate focus of enhancement in trevor of uncertain etiology.  No diplopia or facial palsy.    PMH: as above PSH: as above SHX:  Lives in [place].  nonsmoker. nondrinker.  worked as .  FHX: unknown

## 2024-05-21 ENCOUNTER — TRANSCRIPTION ENCOUNTER (OUTPATIENT)
Age: 59
End: 2024-05-21

## 2024-05-22 ENCOUNTER — INPATIENT (INPATIENT)
Facility: HOSPITAL | Age: 59
LOS: 36 days | Discharge: NURSING, REHAB OR LONG-TERM CARE | End: 2024-06-28
Attending: NEUROLOGICAL SURGERY | Admitting: NEUROLOGICAL SURGERY
Payer: COMMERCIAL

## 2024-05-22 ENCOUNTER — EMERGENCY (EMERGENCY)
Facility: HOSPITAL | Age: 59
LOS: 1 days | Discharge: ANOTHER HOSPITAL | End: 2024-05-22
Attending: STUDENT IN AN ORGANIZED HEALTH CARE EDUCATION/TRAINING PROGRAM
Payer: COMMERCIAL

## 2024-05-22 VITALS
RESPIRATION RATE: 21 BRPM | DIASTOLIC BLOOD PRESSURE: 81 MMHG | OXYGEN SATURATION: 96 % | SYSTOLIC BLOOD PRESSURE: 134 MMHG | HEART RATE: 116 BPM

## 2024-05-22 VITALS
SYSTOLIC BLOOD PRESSURE: 122 MMHG | DIASTOLIC BLOOD PRESSURE: 60 MMHG | TEMPERATURE: 98 F | OXYGEN SATURATION: 94 % | HEART RATE: 121 BPM | RESPIRATION RATE: 20 BRPM | WEIGHT: 154.32 LBS | HEIGHT: 66 IN

## 2024-05-22 VITALS
HEART RATE: 125 BPM | OXYGEN SATURATION: 90 % | RESPIRATION RATE: 21 BRPM | DIASTOLIC BLOOD PRESSURE: 74 MMHG | SYSTOLIC BLOOD PRESSURE: 151 MMHG

## 2024-05-22 DIAGNOSIS — Z98.2 PRESENCE OF CEREBROSPINAL FLUID DRAINAGE DEVICE: Chronic | ICD-10-CM

## 2024-05-22 DIAGNOSIS — S06.5XAA TRAUMATIC SUBDURAL HEMORRHAGE WITH LOSS OF CONSCIOUSNESS STATUS UNKNOWN, INITIAL ENCOUNTER: ICD-10-CM

## 2024-05-22 DIAGNOSIS — Z87.898 PERSONAL HISTORY OF OTHER SPECIFIED CONDITIONS: Chronic | ICD-10-CM

## 2024-05-22 LAB
ALBUMIN SERPL ELPH-MCNC: 3.6 G/DL — SIGNIFICANT CHANGE UP (ref 3.5–5)
ALBUMIN SERPL ELPH-MCNC: 3.8 G/DL — SIGNIFICANT CHANGE UP (ref 3.3–5)
ALP SERPL-CCNC: 107 U/L — SIGNIFICANT CHANGE UP (ref 40–120)
ALP SERPL-CCNC: 114 U/L — SIGNIFICANT CHANGE UP (ref 40–120)
ALT FLD-CCNC: 10 U/L — SIGNIFICANT CHANGE UP (ref 4–33)
ALT FLD-CCNC: 16 U/L DA — SIGNIFICANT CHANGE UP (ref 10–60)
ANION GAP SERPL CALC-SCNC: 12 MMOL/L — SIGNIFICANT CHANGE UP (ref 7–14)
ANION GAP SERPL CALC-SCNC: 14 MMOL/L — SIGNIFICANT CHANGE UP (ref 7–14)
ANION GAP SERPL CALC-SCNC: 9 MMOL/L — SIGNIFICANT CHANGE UP (ref 5–17)
ANISOCYTOSIS BLD QL: SLIGHT — SIGNIFICANT CHANGE UP
ANISOCYTOSIS BLD QL: SLIGHT — SIGNIFICANT CHANGE UP
APTT BLD: 38.1 SEC — HIGH (ref 24.5–35.6)
APTT BLD: 39.4 SEC — HIGH (ref 24.5–35.6)
APTT BLD: 40.5 SEC — HIGH (ref 24.5–35.6)
AST SERPL-CCNC: 10 U/L — SIGNIFICANT CHANGE UP (ref 4–32)
AST SERPL-CCNC: 8 U/L — LOW (ref 10–40)
BASE EXCESS BLDA CALC-SCNC: 0.6 MMOL/L — SIGNIFICANT CHANGE UP (ref -2–3)
BASOPHILS # BLD AUTO: 0.03 K/UL — SIGNIFICANT CHANGE UP (ref 0–0.2)
BASOPHILS # BLD AUTO: 0.09 K/UL — SIGNIFICANT CHANGE UP (ref 0–0.2)
BASOPHILS NFR BLD AUTO: 0.2 % — SIGNIFICANT CHANGE UP (ref 0–2)
BASOPHILS NFR BLD AUTO: 0.5 % — SIGNIFICANT CHANGE UP (ref 0–2)
BILIRUB SERPL-MCNC: 1 MG/DL — SIGNIFICANT CHANGE UP (ref 0.2–1.2)
BILIRUB SERPL-MCNC: 1 MG/DL — SIGNIFICANT CHANGE UP (ref 0.2–1.2)
BLD GP AB SCN SERPL QL: POSITIVE — SIGNIFICANT CHANGE UP
BUN SERPL-MCNC: 10 MG/DL — SIGNIFICANT CHANGE UP (ref 7–23)
BUN SERPL-MCNC: 12 MG/DL — SIGNIFICANT CHANGE UP (ref 7–18)
BUN SERPL-MCNC: 8 MG/DL — SIGNIFICANT CHANGE UP (ref 7–23)
CALCIUM SERPL-MCNC: 8.8 MG/DL — SIGNIFICANT CHANGE UP (ref 8.4–10.5)
CALCIUM SERPL-MCNC: 9 MG/DL — SIGNIFICANT CHANGE UP (ref 8.4–10.5)
CALCIUM SERPL-MCNC: 9.1 MG/DL — SIGNIFICANT CHANGE UP (ref 8.4–10.5)
CHLORIDE SERPL-SCNC: 101 MMOL/L — SIGNIFICANT CHANGE UP (ref 98–107)
CHLORIDE SERPL-SCNC: 107 MMOL/L — SIGNIFICANT CHANGE UP (ref 96–108)
CHLORIDE SERPL-SCNC: 97 MMOL/L — LOW (ref 98–107)
CO2 BLDA-SCNC: 26 MMOL/L — HIGH (ref 19–24)
CO2 SERPL-SCNC: 23 MMOL/L — SIGNIFICANT CHANGE UP (ref 22–31)
CREAT SERPL-MCNC: 0.35 MG/DL — LOW (ref 0.5–1.3)
CREAT SERPL-MCNC: 0.52 MG/DL — SIGNIFICANT CHANGE UP (ref 0.5–1.3)
CREAT SERPL-MCNC: 0.68 MG/DL — SIGNIFICANT CHANGE UP (ref 0.5–1.3)
EGFR: 100 ML/MIN/1.73M2 — SIGNIFICANT CHANGE UP
EGFR: 107 ML/MIN/1.73M2 — SIGNIFICANT CHANGE UP
EGFR: 118 ML/MIN/1.73M2 — SIGNIFICANT CHANGE UP
EOSINOPHIL # BLD AUTO: 0 K/UL — SIGNIFICANT CHANGE UP (ref 0–0.5)
EOSINOPHIL # BLD AUTO: 0.17 K/UL — SIGNIFICANT CHANGE UP (ref 0–0.5)
EOSINOPHIL NFR BLD AUTO: 0 % — SIGNIFICANT CHANGE UP (ref 0–6)
EOSINOPHIL NFR BLD AUTO: 1 % — SIGNIFICANT CHANGE UP (ref 0–6)
FLUAV AG NPH QL: SIGNIFICANT CHANGE UP
FLUBV AG NPH QL: SIGNIFICANT CHANGE UP
GLUCOSE SERPL-MCNC: 128 MG/DL — HIGH (ref 70–99)
GLUCOSE SERPL-MCNC: 133 MG/DL — HIGH (ref 70–99)
GLUCOSE SERPL-MCNC: 167 MG/DL — HIGH (ref 70–99)
HCG SERPL-ACNC: 1 MIU/ML — SIGNIFICANT CHANGE UP
HCO3 BLDA-SCNC: 24 MMOL/L — SIGNIFICANT CHANGE UP (ref 21–28)
HCT VFR BLD CALC: 33.2 % — LOW (ref 34.5–45)
HCT VFR BLD CALC: 36.9 % — SIGNIFICANT CHANGE UP (ref 34.5–45)
HCT VFR BLD CALC: 44.8 % — SIGNIFICANT CHANGE UP (ref 34.5–45)
HGB BLD-MCNC: 11.5 G/DL — SIGNIFICANT CHANGE UP (ref 11.5–15.5)
HGB BLD-MCNC: 12.3 G/DL — SIGNIFICANT CHANGE UP (ref 11.5–15.5)
HGB BLD-MCNC: 14 G/DL — SIGNIFICANT CHANGE UP (ref 11.5–15.5)
HOROWITZ INDEX BLDA+IHG-RTO: 60 — SIGNIFICANT CHANGE UP
HYPOCHROMIA BLD QL: SLIGHT — SIGNIFICANT CHANGE UP
IANC: 10.74 K/UL — HIGH (ref 1.8–7.4)
IMM GRANULOCYTES NFR BLD AUTO: 0.6 % — SIGNIFICANT CHANGE UP (ref 0–0.9)
IMM GRANULOCYTES NFR BLD AUTO: 1.1 % — HIGH (ref 0–0.9)
INR BLD: 1.07 RATIO — SIGNIFICANT CHANGE UP (ref 0.85–1.18)
INR BLD: 1.08 RATIO — SIGNIFICANT CHANGE UP (ref 0.85–1.18)
INR BLD: 1.1 RATIO — SIGNIFICANT CHANGE UP (ref 0.85–1.18)
LACTATE SERPL-SCNC: 7.4 MMOL/L — CRITICAL HIGH (ref 0.7–2)
LYMPHOCYTES # BLD AUTO: 0.67 K/UL — LOW (ref 1–3.3)
LYMPHOCYTES # BLD AUTO: 12.6 % — LOW (ref 13–44)
LYMPHOCYTES # BLD AUTO: 2.19 K/UL — SIGNIFICANT CHANGE UP (ref 1–3.3)
LYMPHOCYTES # BLD AUTO: 5.3 % — LOW (ref 13–44)
MAGNESIUM SERPL-MCNC: 1.8 MG/DL — SIGNIFICANT CHANGE UP (ref 1.6–2.6)
MANUAL SMEAR VERIFICATION: SIGNIFICANT CHANGE UP
MANUAL SMEAR VERIFICATION: SIGNIFICANT CHANGE UP
MCHC RBC-ENTMCNC: 26.1 PG — LOW (ref 27–34)
MCHC RBC-ENTMCNC: 26.1 PG — LOW (ref 27–34)
MCHC RBC-ENTMCNC: 26.4 PG — LOW (ref 27–34)
MCHC RBC-ENTMCNC: 31.3 GM/DL — LOW (ref 32–36)
MCHC RBC-ENTMCNC: 33.3 GM/DL — SIGNIFICANT CHANGE UP (ref 32–36)
MCHC RBC-ENTMCNC: 34.6 GM/DL — SIGNIFICANT CHANGE UP (ref 32–36)
MCV RBC AUTO: 76.1 FL — LOW (ref 80–100)
MCV RBC AUTO: 78.2 FL — LOW (ref 80–100)
MCV RBC AUTO: 83.4 FL — SIGNIFICANT CHANGE UP (ref 80–100)
MICROCYTES BLD QL: SLIGHT — SIGNIFICANT CHANGE UP
MONOCYTES # BLD AUTO: 1.12 K/UL — HIGH (ref 0–0.9)
MONOCYTES # BLD AUTO: 1.71 K/UL — HIGH (ref 0–0.9)
MONOCYTES NFR BLD AUTO: 8.9 % — SIGNIFICANT CHANGE UP (ref 2–14)
MONOCYTES NFR BLD AUTO: 9.8 % — SIGNIFICANT CHANGE UP (ref 2–14)
NEUTROPHILS # BLD AUTO: 10.74 K/UL — HIGH (ref 1.8–7.4)
NEUTROPHILS # BLD AUTO: 13.06 K/UL — HIGH (ref 1.8–7.4)
NEUTROPHILS NFR BLD AUTO: 75 % — SIGNIFICANT CHANGE UP (ref 43–77)
NEUTROPHILS NFR BLD AUTO: 85 % — HIGH (ref 43–77)
NRBC # BLD: 0 /100 WBCS — SIGNIFICANT CHANGE UP (ref 0–0)
NRBC # FLD: 0 K/UL — SIGNIFICANT CHANGE UP (ref 0–0)
NRBC # FLD: 0 K/UL — SIGNIFICANT CHANGE UP (ref 0–0)
PCO2 BLDA: 36 MMHG — SIGNIFICANT CHANGE UP (ref 32–45)
PH BLDA: 7.44 — SIGNIFICANT CHANGE UP (ref 7.35–7.45)
PHOSPHATE SERPL-MCNC: 3.1 MG/DL — SIGNIFICANT CHANGE UP (ref 2.5–4.5)
PLAT MORPH BLD: ABNORMAL
PLAT MORPH BLD: ABNORMAL
PLATELET # BLD AUTO: 106 K/UL — LOW (ref 150–400)
PLATELET # BLD AUTO: 107 K/UL — LOW (ref 150–400)
PLATELET # BLD AUTO: SIGNIFICANT CHANGE UP K/UL (ref 150–400)
PLATELET CLUMP BLD QL SMEAR: ABNORMAL
PLATELET CLUMP BLD QL SMEAR: SLIGHT
PLATELET COUNT - ESTIMATE: ABNORMAL
PLATELET COUNT - ESTIMATE: ABNORMAL
PO2 BLDA: 164 MMHG — HIGH (ref 83–108)
POIKILOCYTOSIS BLD QL AUTO: SLIGHT — SIGNIFICANT CHANGE UP
POLYCHROMASIA BLD QL SMEAR: SLIGHT — SIGNIFICANT CHANGE UP
POTASSIUM SERPL-MCNC: 3.6 MMOL/L — SIGNIFICANT CHANGE UP (ref 3.5–5.3)
POTASSIUM SERPL-MCNC: 4 MMOL/L — SIGNIFICANT CHANGE UP (ref 3.5–5.3)
POTASSIUM SERPL-MCNC: 4.3 MMOL/L — SIGNIFICANT CHANGE UP (ref 3.5–5.3)
POTASSIUM SERPL-SCNC: 3.6 MMOL/L — SIGNIFICANT CHANGE UP (ref 3.5–5.3)
POTASSIUM SERPL-SCNC: 4 MMOL/L — SIGNIFICANT CHANGE UP (ref 3.5–5.3)
POTASSIUM SERPL-SCNC: 4.3 MMOL/L — SIGNIFICANT CHANGE UP (ref 3.5–5.3)
PROT SERPL-MCNC: 7.2 G/DL — SIGNIFICANT CHANGE UP (ref 6–8.3)
PROT SERPL-MCNC: 7.8 G/DL — SIGNIFICANT CHANGE UP (ref 6–8.3)
PROTHROM AB SERPL-ACNC: 12.2 SEC — SIGNIFICANT CHANGE UP (ref 9.5–13)
PROTHROM AB SERPL-ACNC: 12.2 SEC — SIGNIFICANT CHANGE UP (ref 9.5–13)
PROTHROM AB SERPL-ACNC: 12.4 SEC — SIGNIFICANT CHANGE UP (ref 9.5–13)
RBC # BLD: 4.36 M/UL — SIGNIFICANT CHANGE UP (ref 3.8–5.2)
RBC # BLD: 4.72 M/UL — SIGNIFICANT CHANGE UP (ref 3.8–5.2)
RBC # BLD: 5.37 M/UL — HIGH (ref 3.8–5.2)
RBC # FLD: 14.6 % — HIGH (ref 10.3–14.5)
RBC BLD AUTO: ABNORMAL
RBC BLD AUTO: ABNORMAL
RH IG SCN BLD-IMP: NEGATIVE — SIGNIFICANT CHANGE UP
RSV RNA NPH QL NAA+NON-PROBE: SIGNIFICANT CHANGE UP
SAO2 % BLDA: 98.7 % — HIGH (ref 94–98)
SARS-COV-2 RNA SPEC QL NAA+PROBE: SIGNIFICANT CHANGE UP
SODIUM SERPL-SCNC: 134 MMOL/L — LOW (ref 135–145)
SODIUM SERPL-SCNC: 136 MMOL/L — SIGNIFICANT CHANGE UP (ref 135–145)
SODIUM SERPL-SCNC: 139 MMOL/L — SIGNIFICANT CHANGE UP (ref 135–145)
WBC # BLD: 11.17 K/UL — HIGH (ref 3.8–10.5)
WBC # BLD: 12.64 K/UL — HIGH (ref 3.8–10.5)
WBC # BLD: 17.42 K/UL — HIGH (ref 3.8–10.5)
WBC # FLD AUTO: 11.17 K/UL — HIGH (ref 3.8–10.5)
WBC # FLD AUTO: 12.64 K/UL — HIGH (ref 3.8–10.5)
WBC # FLD AUTO: 17.42 K/UL — HIGH (ref 3.8–10.5)

## 2024-05-22 PROCEDURE — 36415 COLL VENOUS BLD VENIPUNCTURE: CPT

## 2024-05-22 PROCEDURE — 80053 COMPREHEN METABOLIC PANEL: CPT

## 2024-05-22 PROCEDURE — 96375 TX/PRO/DX INJ NEW DRUG ADDON: CPT | Mod: XU

## 2024-05-22 PROCEDURE — 99285 EMERGENCY DEPT VISIT HI MDM: CPT | Mod: 25

## 2024-05-22 PROCEDURE — 99285 EMERGENCY DEPT VISIT HI MDM: CPT

## 2024-05-22 PROCEDURE — 71045 X-RAY EXAM CHEST 1 VIEW: CPT

## 2024-05-22 PROCEDURE — 71045 X-RAY EXAM CHEST 1 VIEW: CPT | Mod: 26,77

## 2024-05-22 PROCEDURE — 71045 X-RAY EXAM CHEST 1 VIEW: CPT | Mod: 26

## 2024-05-22 PROCEDURE — 70450 CT HEAD/BRAIN W/O DYE: CPT | Mod: 26

## 2024-05-22 PROCEDURE — 83605 ASSAY OF LACTIC ACID: CPT

## 2024-05-22 PROCEDURE — 84702 CHORIONIC GONADOTROPIN TEST: CPT

## 2024-05-22 PROCEDURE — 87040 BLOOD CULTURE FOR BACTERIA: CPT

## 2024-05-22 PROCEDURE — 99223 1ST HOSP IP/OBS HIGH 75: CPT

## 2024-05-22 PROCEDURE — 70498 CT ANGIOGRAPHY NECK: CPT | Mod: MC

## 2024-05-22 PROCEDURE — 96376 TX/PRO/DX INJ SAME DRUG ADON: CPT

## 2024-05-22 PROCEDURE — 70496 CT ANGIOGRAPHY HEAD: CPT | Mod: 26,MC

## 2024-05-22 PROCEDURE — 86077 PHYS BLOOD BANK SERV XMATCH: CPT

## 2024-05-22 PROCEDURE — 85025 COMPLETE CBC W/AUTO DIFF WBC: CPT

## 2024-05-22 PROCEDURE — 70496 CT ANGIOGRAPHY HEAD: CPT | Mod: MC

## 2024-05-22 PROCEDURE — 99291 CRITICAL CARE FIRST HOUR: CPT

## 2024-05-22 PROCEDURE — 61322 CRNEC/CRNOT DCMPRV W/O LOBEC: CPT | Mod: AS

## 2024-05-22 PROCEDURE — 96365 THER/PROPH/DIAG IV INF INIT: CPT | Mod: XU

## 2024-05-22 PROCEDURE — 85730 THROMBOPLASTIN TIME PARTIAL: CPT

## 2024-05-22 PROCEDURE — 70498 CT ANGIOGRAPHY NECK: CPT | Mod: 26,MC

## 2024-05-22 PROCEDURE — 85610 PROTHROMBIN TIME: CPT

## 2024-05-22 PROCEDURE — 93005 ELECTROCARDIOGRAM TRACING: CPT

## 2024-05-22 PROCEDURE — 82962 GLUCOSE BLOOD TEST: CPT

## 2024-05-22 DEVICE — SURGIFOAM PAD 8CM X 12.5CM X 2MM (100C): Type: IMPLANTABLE DEVICE | Status: FUNCTIONAL

## 2024-05-22 DEVICE — IMPLANTABLE DEVICE: Type: IMPLANTABLE DEVICE | Status: FUNCTIONAL

## 2024-05-22 DEVICE — MON KIT PRES INTCRAN TRANDUCTIP EACH: Type: IMPLANTABLE DEVICE | Status: FUNCTIONAL

## 2024-05-22 DEVICE — COLLAGEN DURAMATRIX ONLAY PLUS 4X5IN: Type: IMPLANTABLE DEVICE | Status: FUNCTIONAL

## 2024-05-22 DEVICE — SURGICEL 2 X 14": Type: IMPLANTABLE DEVICE | Status: FUNCTIONAL

## 2024-05-22 RX ORDER — BUPROPION HYDROCHLORIDE 150 MG/1
300 TABLET, EXTENDED RELEASE ORAL DAILY
Refills: 0 | Status: DISCONTINUED | OUTPATIENT
Start: 2024-05-22 | End: 2024-05-24

## 2024-05-22 RX ORDER — PROPOFOL 10 MG/ML
20 INJECTION, EMULSION INTRAVENOUS
Qty: 1000 | Refills: 0 | Status: DISCONTINUED | OUTPATIENT
Start: 2024-05-22 | End: 2024-05-24

## 2024-05-22 RX ORDER — SODIUM CHLORIDE 9 MG/ML
1000 INJECTION INTRAMUSCULAR; INTRAVENOUS; SUBCUTANEOUS ONCE
Refills: 0 | Status: COMPLETED | OUTPATIENT
Start: 2024-05-22 | End: 2024-05-22

## 2024-05-22 RX ORDER — LEVETIRACETAM 250 MG/1
1500 TABLET, FILM COATED ORAL ONCE
Refills: 0 | Status: DISCONTINUED | OUTPATIENT
Start: 2024-05-22 | End: 2024-05-22

## 2024-05-22 RX ORDER — ACETAMINOPHEN 325 MG
1000 TABLET ORAL EVERY 6 HOURS
Refills: 0 | Status: COMPLETED | OUTPATIENT
Start: 2024-05-22 | End: 2024-05-23

## 2024-05-22 RX ORDER — LEVETIRACETAM 250 MG/1
1500 TABLET, FILM COATED ORAL ONCE
Refills: 0 | Status: COMPLETED | OUTPATIENT
Start: 2024-05-22 | End: 2024-05-22

## 2024-05-22 RX ORDER — DEXTROSE MONOHYDRATE AND SODIUM CHLORIDE 5; .3 G/100ML; G/100ML
1000 INJECTION, SOLUTION INTRAVENOUS
Refills: 0 | Status: DISCONTINUED | OUTPATIENT
Start: 2024-05-22 | End: 2024-05-24

## 2024-05-22 RX ORDER — FENTANYL CITRATE 50 UG/ML
0.5 INJECTION, SOLUTION INTRAMUSCULAR; INTRAVENOUS
Qty: 2500 | Refills: 0 | Status: DISCONTINUED | OUTPATIENT
Start: 2024-05-22 | End: 2024-05-24

## 2024-05-22 RX ORDER — SODIUM CHLORIDE 3 G/100ML
500 INJECTION, SOLUTION INTRAVENOUS
Refills: 0 | Status: DISCONTINUED | OUTPATIENT
Start: 2024-05-22 | End: 2024-05-22

## 2024-05-22 RX ORDER — MIRTAZAPINE 15 MG/1
7.5 TABLET, FILM COATED ORAL AT BEDTIME
Refills: 0 | Status: DISCONTINUED | OUTPATIENT
Start: 2024-05-22 | End: 2024-05-25

## 2024-05-22 RX ORDER — BUPROPION HYDROCHLORIDE 150 MG/1
150 TABLET, EXTENDED RELEASE ORAL DAILY
Refills: 0 | Status: DISCONTINUED | OUTPATIENT
Start: 2024-05-22 | End: 2024-05-25

## 2024-05-22 RX ORDER — SERTRALINE HYDROCHLORIDE 100 MG/1
50 TABLET, FILM COATED ORAL DAILY
Refills: 0 | Status: DISCONTINUED | OUTPATIENT
Start: 2024-05-22 | End: 2024-05-25

## 2024-05-22 RX ORDER — SODIUM CHLORIDE 3 G/100ML
500 INJECTION, SOLUTION INTRAVENOUS
Refills: 0 | Status: DISCONTINUED | OUTPATIENT
Start: 2024-05-22 | End: 2024-05-23

## 2024-05-22 RX ORDER — CEFAZOLIN 10 G/1
1000 INJECTION, POWDER, FOR SOLUTION INTRAVENOUS EVERY 8 HOURS
Refills: 0 | Status: COMPLETED | OUTPATIENT
Start: 2024-05-22 | End: 2024-05-24

## 2024-05-22 RX ADMIN — LEVETIRACETAM 1500 MILLIGRAM(S): 250 TABLET, FILM COATED ORAL at 08:22

## 2024-05-22 RX ADMIN — PROPOFOL 8.4 MICROGRAM(S)/KG/MIN: 10 INJECTION, EMULSION INTRAVENOUS at 17:36

## 2024-05-22 RX ADMIN — SODIUM CHLORIDE 30 MILLILITER(S): 3 INJECTION, SOLUTION INTRAVENOUS at 21:55

## 2024-05-22 RX ADMIN — Medication 15 MILLILITER(S): at 19:19

## 2024-05-22 RX ADMIN — SODIUM CHLORIDE 1000 MILLILITER(S): 9 INJECTION INTRAMUSCULAR; INTRAVENOUS; SUBCUTANEOUS at 08:23

## 2024-05-22 RX ADMIN — LEVETIRACETAM 400 MILLIGRAM(S): 250 TABLET, FILM COATED ORAL at 07:36

## 2024-05-22 RX ADMIN — Medication 4 MILLIGRAM(S): at 07:15

## 2024-05-22 RX ADMIN — Medication 2 MILLIGRAM(S): at 06:54

## 2024-05-22 RX ADMIN — DEXTROSE MONOHYDRATE AND SODIUM CHLORIDE 70 MILLILITER(S): 5; .3 INJECTION, SOLUTION INTRAVENOUS at 21:55

## 2024-05-22 RX ADMIN — LEVETIRACETAM 1500 MILLIGRAM(S): 250 TABLET, FILM COATED ORAL at 06:58

## 2024-05-22 RX ADMIN — CEFAZOLIN 100 MILLIGRAM(S): 10 INJECTION, POWDER, FOR SOLUTION INTRAVENOUS at 22:26

## 2024-05-22 RX ADMIN — FENTANYL CITRATE 3.5 MICROGRAM(S)/KG/HR: 50 INJECTION, SOLUTION INTRAMUSCULAR; INTRAVENOUS at 16:47

## 2024-05-22 RX ADMIN — PROPOFOL 8.4 MICROGRAM(S)/KG/MIN: 10 INJECTION, EMULSION INTRAVENOUS at 21:55

## 2024-05-22 RX ADMIN — PROPOFOL 8.4 MICROGRAM(S)/KG/MIN: 10 INJECTION, EMULSION INTRAVENOUS at 22:27

## 2024-05-22 RX ADMIN — FENTANYL CITRATE 3.5 MICROGRAM(S)/KG/HR: 50 INJECTION, SOLUTION INTRAMUSCULAR; INTRAVENOUS at 21:55

## 2024-05-22 RX ADMIN — Medication 400 MILLIGRAM(S): at 23:39

## 2024-05-22 NOTE — ED PROVIDER NOTE - PHYSICAL EXAMINATION
Gen: not answering questions  Head: hematoma R forehead  Eyes: L lateral gaze   Resp: CTAB, no W/R/R  CV: tachy 113, RRR, +S1/S2, no M/R/G  GI: Abdomen soft non-distended, NTTP, no masses  MSK: No open wounds, no bruising, no lower extremity edema  Neuro: A&Ox0, nonverbal, MAEx4, not following commands  Ext: no edema, no deformity, warm and well-perfused  Skin: no rash or bruising Gen: not answering questions  Head: hematoma R forehead  Eyes: L lateral gaze   Resp: CTAB, no W/R/R  CV: tachy 113, RRR, +S1/S2, no M/R/G  GI: Abdomen soft non-distended, NTTP, no masses  MSK: No open wounds, no bruising, no lower extremity edema  Neuro: A&Ox0, moaning, MAEx4, not following commands  Ext: no edema, no deformity, warm and well-perfused  Skin: no rash or bruising

## 2024-05-22 NOTE — ED ADULT NURSE NOTE - ED STAT RN HANDOFF DETAILS
Pt will transfer to [facility] for Neuro eval. Endorses to [facility] for continuation of care. awaiting for transport.

## 2024-05-22 NOTE — ED PROVIDER NOTE - PROGRESS NOTE DETAILS
Martina LEE: Patient noted to have another seizure after receiving the 2 of Ativan.  Seizure broke after receiving 4 of IV Ativan.  Will give another 1500 of Keppra.  patient will be taken to CT.  Satting well on NRB, maintaining airway for now. Martina LEE: Patient noted to have another seizure after receiving the 2 of Ativan.  Seizure broke after receiving 4 of IV Ativan, total 6mg given. Will give another 1500 of Keppra.  patient will be taken to CT.  Satting 90 RA, improved to 2L NC. Martina LEE: pt noted to have subdural hematoma, no shift. attempted to call [facility] for neurosurgery, awaiting call back from Dr Mtz. Martina LEE: attempted to call Dr Brush again/[facility] for SDH. Updated patient's partner at bedside, all questions answered. Pt moaning and moving all 4 extremities. Sating 100 on 2L, , normotensive, in stable condition.

## 2024-05-22 NOTE — ED ADULT NURSE NOTE - NSFALLRISKINTERV_ED_ALL_ED
Assistance OOB with selected safe patient handling equipment if applicable/Assistance with ambulation/Communicate fall risk and risk factors to all staff, patient, and family/Monitor gait and stability/Monitor for mental status changes and reorient to person, place, and time, as needed/Provide visual cue: yellow wristband, yellow gown, etc/Reinforce activity limits and safety measures with patient and family/Toileting schedule using arm’s reach rule for commode and bathroom/Use of alarms - bed, stretcher, chair and/or video monitoring/Call bell, personal items and telephone in reach/Instruct patient to call for assistance before getting out of bed/chair/stretcher/Non-slip footwear applied when patient is off stretcher/[place] to call system/Physically safe environment - no spills, clutter or unnecessary equipment/Purposeful Proactive Rounding/Room/bathroom lighting operational, light cord in reach

## 2024-05-22 NOTE — ED ADULT NURSE REASSESSMENT NOTE - NS ED NURSE REASSESS COMMENT FT1
pt received upon changing shift, lethargic, responsive to tactile/painful stimuli. breathing unlabored via 2LNC. no active seizure noted. MD evaluation on going.

## 2024-05-22 NOTE — ED ADULT NURSE NOTE - HIV OFFER
Please schedule cystoscopy which has been ordered Unable to answer due to medical condition/unresponsive/etc...

## 2024-05-22 NOTE — ED PROVIDER NOTE - CARE PLAN
Principal Discharge DX:	SDH (subdural hematoma)  Secondary Diagnosis:	SAH (subarachnoid hemorrhage)  Secondary Diagnosis:	Seizure   1

## 2024-05-22 NOTE — ED ADULT TRIAGE NOTE - CHIEF COMPLAINT QUOTE
Pt BIBA c/o seizure. As per EMS pt has history of brain tumor, shunt and pt fell 2days ago, old hematoma noted to right forehead and discoloration noted under right eye

## 2024-05-22 NOTE — ED PROVIDER NOTE - CLINICAL SUMMARY MEDICAL DECISION MAKING FREE TEXT BOX
58 y/o female presents to presurgical testing with diagnosis neoplasm of unspecified behavior of brain. Pt with h/o neurofibromatosis, s/p resection of brain tumor and  shunt placement in 2013, s/p L craniectomy for resection of cerebellar brain tumor 12/2023 followed by Dr Brush (neurosurgery at [facility]) presenting with seizure and unresponsiveness.  Patient is accompanied by partner at bedside, Jonelle.  Unable to obtain history from patient.  Per Jonelle, patient was walking 2 days ago, when she accidentally fell and hit her head.  She sustained a hematoma to the right side of the forehead, at that time felt well and did not want to go to the hospital for further evaluation.  Patient was endorsing headache at the time,   no other complaints.   Patient was found at 6 AM on the ground with shaking-like activity, per Jonelle seized for around 2 minutes.  Patient would open her eyes however not able to answer questions.  Patient picked up by EMS, was found to have another seizure upon arrival to the ED, discontinued after receiving some Ativan.  Patient unable to contribute to history.  Patient with left lateral gaze, unable to follow commands.  Satting well on nasal cannula, tachycardic otherwise vital signs stable.  Large hematoma to the right side of the forehead.   Concern for possible intracranial catastrophe ICH versus SAH, or  sequela from multiple brain tumors surgeries/neurofibromatosis.  Will obtain CT imaging, as well as x-ray shunt series. Will keppra load, ativan at the bedside ready. CT imaging head 58 y/o female h/o neurofibromatosis type, s/p resection of brain tumor and  shunt placement in 2013, s/p L craniectomy for resection of cerebellar brain tumor 12/2023 followed by Dr Brush (neurosurgery at [facility]) presenting with seizure and unresponsiveness.  Patient is accompanied by partner at bedside, Jonelle.  Unable to obtain history from patient.  Per Jonelle, patient was walking 2 days ago, when she accidentally fell and hit her head.  She sustained a hematoma to the right side of the forehead, at that time felt well and did not want to go to the hospital for further evaluation.  Patient was endorsing headache at the time,   no other complaints.   Patient was found at 6 AM on the ground with shaking-like activity, per Jonelle seized for around 2 minutes.  Patient would open her eyes however not able to answer questions.  Patient picked up by EMS, was found to have another seizure upon arrival to the ED, discontinued after receiving some Ativan.  Patient unable to contribute to history.  Patient with left lateral gaze, unable to follow commands.  Satting well on nasal cannula, tachycardic otherwise vital signs stable.  Large hematoma to the right side of the forehead.   Concern for possible intracranial catastrophe ICH versus SAH, or  sequela from multiple brain tumors surgeries/neurofibromatosis.  Will obtain CT imaging, as well as x-ray shunt series. Will keppra load, ativan at the bedside ready. CT imaging head 58 y/o female h/o neurofibromatosis type, s/p resection of brain tumor and  shunt placement in 2013, s/p L craniectomy for resection of cerebellar brain tumor 12/2023 followed by Dr Brush (neurosurgery at [facility]) presenting with seizure and unresponsiveness.  Patient is accompanied by partner at bedside, Jonelle.  Unable to obtain history from patient.  Per Jonelle, patient was walking 2 days ago, when she accidentally fell and hit her head.  She sustained a hematoma to the right side of the forehead, at that time felt well and did not want to go to the hospital for further evaluation.  Patient was endorsing headache at the time,   no other complaints.   Patient was found at 6 AM on the ground with shaking-like activity, per Jonelle seized for around 2 minutes.  Patient would open her eyes however not able to answer questions.  Patient picked up by EMS, was found to have another seizure upon arrival to the ED, discontinued after receiving some Ativan.  Patient unable to contribute to history.  Patient with left lateral gaze, unable to follow commands in the setting of postictal state and receiving ativan.  Satting well on nasal cannula, tachycardic otherwise vital signs stable.  Large hematoma to the right side of the forehead.   Concern for possible intracranial catastrophe ICH versus SAH, or  sequela from multiple brain tumors surgeries/neurofibromatosis.  Will obtain CT imaging, as well as x-ray shunt series. Will keppra load, ativan at the bedside ready. CT imaging head

## 2024-05-22 NOTE — ED PROVIDER NOTE - OBJECTIVE STATEMENT
58 y/o female presents to presurgical testing with diagnosis neoplasm of unspecified behavior of brain. Pt with h/o neurofibromatosis, s/p resection of brain tumor and  shunt placement in 2013, s/p L craniectomy for resection of cerebellar brain tumor 12/2023 followed by Dr Brush (neurosurgery at [facility]) presenting with seizure and unresponsiveness.  Patient is accompanied by partner at bedside, Jonelle.  Unable to obtain history from patient.  Per Jonelle, patient was walking 2 days ago, when she accidentally fell and hit her head.  She sustained a hematoma to the right side of the forehead, at that time felt well and did not want to go to the hospital for further evaluation.  Patient was endorsing headache at the time,   no other complaints.   Patient was found at 6 AM on the ground with shaking-like activity, per Jonelle seized for around 2 minutes.  Patient would open her eyes however not able to answer questions.  Patient picked up by EMS, was found to have another seizure upon arrival to the ED, discontinued after receiving some Ativan.  Patient unable to contribute to history.  Patient with left lateral gaze, unable to follow commands.  Satting well on nasal cannula, tachycardic otherwise vital signs stable.  Large hematoma to the right side of the forehead. 60 y/o female h/o neurofibromatosis type I, s/p resection of brain tumor and  shunt placement in 2013, s/p L craniectomy for resection of cerebellar brain tumor 12/2023 followed by Dr Brush (neurosurgery at [facility]) presenting with seizure and unresponsiveness.  Patient is accompanied by partner at bedside, Jonelle.  Unable to obtain history from patient.  Per Jonelle, patient was walking 2 days ago, when she accidentally fell and hit her head.  She sustained a hematoma to the right side of the forehead, at that time felt well and did not want to go to the hospital for further evaluation.  Patient was endorsing headache at the time,   no other complaints.   Patient was found at 6 AM on the ground with shaking-like activity, per Jonelle seized for around 2 minutes.  Patient would open her eyes however not able to answer questions.  Patient picked up by EMS, was found to have another seizure upon arrival to the ED, discontinued after receiving some Ativan.  Patient unable to contribute to history.  Patient with left lateral gaze, unable to follow commands.  Satting well on nasal cannula, tachycardic otherwise vital signs stable.  Large hematoma to the right side of the forehead.

## 2024-05-23 LAB
ANION GAP SERPL CALC-SCNC: 10 MMOL/L — SIGNIFICANT CHANGE UP (ref 7–14)
ANION GAP SERPL CALC-SCNC: 11 MMOL/L — SIGNIFICANT CHANGE UP (ref 7–14)
ANION GAP SERPL CALC-SCNC: 12 MMOL/L — SIGNIFICANT CHANGE UP (ref 7–14)
ANION GAP SERPL CALC-SCNC: 13 MMOL/L — SIGNIFICANT CHANGE UP (ref 7–14)
ANION GAP SERPL CALC-SCNC: 13 MMOL/L — SIGNIFICANT CHANGE UP (ref 7–14)
APPEARANCE UR: CLEAR — SIGNIFICANT CHANGE UP
BACTERIA # UR AUTO: NEGATIVE /HPF — SIGNIFICANT CHANGE UP
BILIRUB UR-MCNC: NEGATIVE — SIGNIFICANT CHANGE UP
BLOOD GAS ARTERIAL - LYTES,HGB,ICA,LACT RESULT: SIGNIFICANT CHANGE UP
BLOOD GAS ARTERIAL - LYTES,HGB,ICA,LACT RESULT: SIGNIFICANT CHANGE UP
BUN SERPL-MCNC: 11 MG/DL — SIGNIFICANT CHANGE UP (ref 7–23)
BUN SERPL-MCNC: 11 MG/DL — SIGNIFICANT CHANGE UP (ref 7–23)
BUN SERPL-MCNC: 12 MG/DL — SIGNIFICANT CHANGE UP (ref 7–23)
CALCIUM SERPL-MCNC: 8.3 MG/DL — LOW (ref 8.4–10.5)
CALCIUM SERPL-MCNC: 8.3 MG/DL — LOW (ref 8.4–10.5)
CALCIUM SERPL-MCNC: 8.4 MG/DL — SIGNIFICANT CHANGE UP (ref 8.4–10.5)
CAST: 1 /LPF — SIGNIFICANT CHANGE UP (ref 0–4)
CHLORIDE SERPL-SCNC: 101 MMOL/L — SIGNIFICANT CHANGE UP (ref 98–107)
CHLORIDE SERPL-SCNC: 103 MMOL/L — SIGNIFICANT CHANGE UP (ref 98–107)
CHLORIDE SERPL-SCNC: 104 MMOL/L — SIGNIFICANT CHANGE UP (ref 98–107)
CHLORIDE SERPL-SCNC: 105 MMOL/L — SIGNIFICANT CHANGE UP (ref 98–107)
CHLORIDE SERPL-SCNC: 105 MMOL/L — SIGNIFICANT CHANGE UP (ref 98–107)
CO2 SERPL-SCNC: 21 MMOL/L — LOW (ref 22–31)
CO2 SERPL-SCNC: 21 MMOL/L — LOW (ref 22–31)
CO2 SERPL-SCNC: 22 MMOL/L — SIGNIFICANT CHANGE UP (ref 22–31)
COLOR SPEC: YELLOW — SIGNIFICANT CHANGE UP
CREAT SERPL-MCNC: 0.44 MG/DL — LOW (ref 0.5–1.3)
CREAT SERPL-MCNC: 0.44 MG/DL — LOW (ref 0.5–1.3)
CREAT SERPL-MCNC: 0.46 MG/DL — LOW (ref 0.5–1.3)
CREAT SERPL-MCNC: 0.48 MG/DL — LOW (ref 0.5–1.3)
CREAT SERPL-MCNC: 0.49 MG/DL — LOW (ref 0.5–1.3)
DIFF PNL FLD: ABNORMAL
EGFR: 108 ML/MIN/1.73M2 — SIGNIFICANT CHANGE UP
EGFR: 109 ML/MIN/1.73M2 — SIGNIFICANT CHANGE UP
EGFR: 110 ML/MIN/1.73M2 — SIGNIFICANT CHANGE UP
EGFR: 111 ML/MIN/1.73M2 — SIGNIFICANT CHANGE UP
EGFR: 111 ML/MIN/1.73M2 — SIGNIFICANT CHANGE UP
GLUCOSE SERPL-MCNC: 115 MG/DL — HIGH (ref 70–99)
GLUCOSE SERPL-MCNC: 117 MG/DL — HIGH (ref 70–99)
GLUCOSE SERPL-MCNC: 119 MG/DL — HIGH (ref 70–99)
GLUCOSE SERPL-MCNC: 124 MG/DL — HIGH (ref 70–99)
GLUCOSE SERPL-MCNC: 131 MG/DL — HIGH (ref 70–99)
GLUCOSE UR QL: NEGATIVE MG/DL — SIGNIFICANT CHANGE UP
HCT VFR BLD CALC: 27.9 % — LOW (ref 34.5–45)
HGB BLD-MCNC: 9.6 G/DL — LOW (ref 11.5–15.5)
KETONES UR-MCNC: ABNORMAL MG/DL
LEUKOCYTE ESTERASE UR-ACNC: NEGATIVE — SIGNIFICANT CHANGE UP
MAGNESIUM SERPL-MCNC: 1.8 MG/DL — SIGNIFICANT CHANGE UP (ref 1.6–2.6)
MAGNESIUM SERPL-MCNC: 1.8 MG/DL — SIGNIFICANT CHANGE UP (ref 1.6–2.6)
MAGNESIUM SERPL-MCNC: 1.9 MG/DL — SIGNIFICANT CHANGE UP (ref 1.6–2.6)
MCHC RBC-ENTMCNC: 26.6 PG — LOW (ref 27–34)
MCHC RBC-ENTMCNC: 34.4 GM/DL — SIGNIFICANT CHANGE UP (ref 32–36)
MCV RBC AUTO: 77.3 FL — LOW (ref 80–100)
NITRITE UR-MCNC: NEGATIVE — SIGNIFICANT CHANGE UP
NRBC # BLD: 0 /100 WBCS — SIGNIFICANT CHANGE UP (ref 0–0)
NRBC # FLD: 0 K/UL — SIGNIFICANT CHANGE UP (ref 0–0)
PH UR: 5.5 — SIGNIFICANT CHANGE UP (ref 5–8)
PHOSPHATE SERPL-MCNC: 2.5 MG/DL — SIGNIFICANT CHANGE UP (ref 2.5–4.5)
PHOSPHATE SERPL-MCNC: 2.7 MG/DL — SIGNIFICANT CHANGE UP (ref 2.5–4.5)
PHOSPHATE SERPL-MCNC: 3.8 MG/DL — SIGNIFICANT CHANGE UP (ref 2.5–4.5)
PHOSPHATE SERPL-MCNC: 3.8 MG/DL — SIGNIFICANT CHANGE UP (ref 2.5–4.5)
PHOSPHATE SERPL-MCNC: 4.1 MG/DL — SIGNIFICANT CHANGE UP (ref 2.5–4.5)
PLATELET # BLD AUTO: 116 K/UL — LOW (ref 150–400)
POTASSIUM SERPL-MCNC: 3.3 MMOL/L — LOW (ref 3.5–5.3)
POTASSIUM SERPL-MCNC: 3.3 MMOL/L — LOW (ref 3.5–5.3)
POTASSIUM SERPL-MCNC: 3.8 MMOL/L — SIGNIFICANT CHANGE UP (ref 3.5–5.3)
POTASSIUM SERPL-MCNC: 4.2 MMOL/L — SIGNIFICANT CHANGE UP (ref 3.5–5.3)
POTASSIUM SERPL-MCNC: 4.4 MMOL/L — SIGNIFICANT CHANGE UP (ref 3.5–5.3)
POTASSIUM SERPL-SCNC: 3.3 MMOL/L — LOW (ref 3.5–5.3)
POTASSIUM SERPL-SCNC: 3.3 MMOL/L — LOW (ref 3.5–5.3)
POTASSIUM SERPL-SCNC: 3.8 MMOL/L — SIGNIFICANT CHANGE UP (ref 3.5–5.3)
POTASSIUM SERPL-SCNC: 4.2 MMOL/L — SIGNIFICANT CHANGE UP (ref 3.5–5.3)
POTASSIUM SERPL-SCNC: 4.4 MMOL/L — SIGNIFICANT CHANGE UP (ref 3.5–5.3)
PROT UR-MCNC: NEGATIVE MG/DL — SIGNIFICANT CHANGE UP
RBC # BLD: 3.61 M/UL — LOW (ref 3.8–5.2)
RBC # FLD: 14.4 % — SIGNIFICANT CHANGE UP (ref 10.3–14.5)
RBC CASTS # UR COMP ASSIST: 15 /HPF — HIGH (ref 0–4)
SODIUM SERPL-SCNC: 136 MMOL/L — SIGNIFICANT CHANGE UP (ref 135–145)
SODIUM SERPL-SCNC: 136 MMOL/L — SIGNIFICANT CHANGE UP (ref 135–145)
SODIUM SERPL-SCNC: 137 MMOL/L — SIGNIFICANT CHANGE UP (ref 135–145)
SODIUM SERPL-SCNC: 138 MMOL/L — SIGNIFICANT CHANGE UP (ref 135–145)
SODIUM SERPL-SCNC: 138 MMOL/L — SIGNIFICANT CHANGE UP (ref 135–145)
SP GR SPEC: 1.03 — SIGNIFICANT CHANGE UP (ref 1–1.03)
SQUAMOUS # UR AUTO: 1 /HPF — SIGNIFICANT CHANGE UP (ref 0–5)
UROBILINOGEN FLD QL: 0.2 MG/DL — SIGNIFICANT CHANGE UP (ref 0.2–1)
WBC # BLD: 9.16 K/UL — SIGNIFICANT CHANGE UP (ref 3.8–10.5)
WBC # FLD AUTO: 9.16 K/UL — SIGNIFICANT CHANGE UP (ref 3.8–10.5)
WBC UR QL: 3 /HPF — SIGNIFICANT CHANGE UP (ref 0–5)

## 2024-05-23 PROCEDURE — 77011 CT SCAN FOR LOCALIZATION: CPT | Mod: 26

## 2024-05-23 PROCEDURE — 99291 CRITICAL CARE FIRST HOUR: CPT | Mod: 24

## 2024-05-23 PROCEDURE — 70450 CT HEAD/BRAIN W/O DYE: CPT | Mod: 26,GC

## 2024-05-23 PROCEDURE — 70450 CT HEAD/BRAIN W/O DYE: CPT | Mod: 26,77,59

## 2024-05-23 PROCEDURE — 71045 X-RAY EXAM CHEST 1 VIEW: CPT | Mod: 26

## 2024-05-23 RX ORDER — ALBUMIN HUMAN 5 %
250 INTRAVENOUS SOLUTION INTRAVENOUS ONCE
Refills: 0 | Status: COMPLETED | OUTPATIENT
Start: 2024-05-23 | End: 2024-05-23

## 2024-05-23 RX ORDER — HYDROMORPHONE HCL 0.2 MG/ML
0.5 INJECTION, SOLUTION INTRAVENOUS EVERY 4 HOURS
Refills: 0 | Status: DISCONTINUED | OUTPATIENT
Start: 2024-05-23 | End: 2024-05-25

## 2024-05-23 RX ORDER — SODIUM CHLORIDE 3 G/100ML
500 INJECTION, SOLUTION INTRAVENOUS
Refills: 0 | Status: COMPLETED | OUTPATIENT
Start: 2024-05-23 | End: 2024-05-24

## 2024-05-23 RX ORDER — POTASSIUM CHLORIDE 600 MG/1
10 TABLET, FILM COATED, EXTENDED RELEASE ORAL
Refills: 0 | Status: COMPLETED | OUTPATIENT
Start: 2024-05-23 | End: 2024-05-23

## 2024-05-23 RX ORDER — SODIUM CHLORIDE 3 G/100ML
500 INJECTION, SOLUTION INTRAVENOUS
Refills: 0 | Status: COMPLETED | OUTPATIENT
Start: 2024-05-23 | End: 2024-05-23

## 2024-05-23 RX ORDER — LEVETIRACETAM 100 MG/ML
500 INJECTION INTRAVENOUS EVERY 12 HOURS
Refills: 0 | Status: DISCONTINUED | OUTPATIENT
Start: 2024-05-23 | End: 2024-06-07

## 2024-05-23 RX ORDER — PANTOPRAZOLE SODIUM 40 MG/10ML
40 INJECTION, POWDER, FOR SOLUTION INTRAVENOUS DAILY
Refills: 0 | Status: DISCONTINUED | OUTPATIENT
Start: 2024-05-23 | End: 2024-05-23

## 2024-05-23 RX ORDER — HYDROMORPHONE HCL 0.2 MG/ML
0.2 INJECTION, SOLUTION INTRAVENOUS EVERY 4 HOURS
Refills: 0 | Status: DISCONTINUED | OUTPATIENT
Start: 2024-05-23 | End: 2024-05-23

## 2024-05-23 RX ORDER — PANTOPRAZOLE SODIUM 40 MG/10ML
40 INJECTION, POWDER, FOR SOLUTION INTRAVENOUS DAILY
Refills: 0 | Status: DISCONTINUED | OUTPATIENT
Start: 2024-05-23 | End: 2024-06-06

## 2024-05-23 RX ORDER — ACETAMINOPHEN 325 MG
1000 TABLET ORAL EVERY 6 HOURS
Refills: 0 | Status: COMPLETED | OUTPATIENT
Start: 2024-05-23 | End: 2024-05-24

## 2024-05-23 RX ORDER — PHENYLEPHRINE HYDROCHLORIDE 10 MG/ML
0.1 INJECTION INTRAVENOUS
Qty: 160 | Refills: 0 | Status: DISCONTINUED | OUTPATIENT
Start: 2024-05-23 | End: 2024-05-24

## 2024-05-23 RX ORDER — DEXMEDETOMIDINE HYDROCHLORIDE 4 UG/ML
0.5 INJECTION, SOLUTION INTRAVENOUS
Qty: 400 | Refills: 0 | Status: DISCONTINUED | OUTPATIENT
Start: 2024-05-23 | End: 2024-05-24

## 2024-05-23 RX ADMIN — POTASSIUM CHLORIDE 100 MILLIEQUIVALENT(S): 600 TABLET, FILM COATED, EXTENDED RELEASE ORAL at 07:58

## 2024-05-23 RX ADMIN — DEXTROSE MONOHYDRATE AND SODIUM CHLORIDE 70 MILLILITER(S): 5; .3 INJECTION, SOLUTION INTRAVENOUS at 20:06

## 2024-05-23 RX ADMIN — PROPOFOL 8.4 MICROGRAM(S)/KG/MIN: 10 INJECTION, EMULSION INTRAVENOUS at 03:02

## 2024-05-23 RX ADMIN — DEXMEDETOMIDINE HYDROCHLORIDE 6.88 MICROGRAM(S)/KG/HR: 4 INJECTION, SOLUTION INTRAVENOUS at 20:07

## 2024-05-23 RX ADMIN — LEVETIRACETAM 500 MILLIGRAM(S): 100 INJECTION INTRAVENOUS at 17:54

## 2024-05-23 RX ADMIN — Medication 125 MILLILITER(S): at 20:45

## 2024-05-23 RX ADMIN — POTASSIUM CHLORIDE 100 MILLIEQUIVALENT(S): 600 TABLET, FILM COATED, EXTENDED RELEASE ORAL at 04:14

## 2024-05-23 RX ADMIN — Medication 15 MILLILITER(S): at 05:20

## 2024-05-23 RX ADMIN — POTASSIUM CHLORIDE 100 MILLIEQUIVALENT(S): 600 TABLET, FILM COATED, EXTENDED RELEASE ORAL at 06:46

## 2024-05-23 RX ADMIN — PANTOPRAZOLE SODIUM 40 MILLIGRAM(S): 40 INJECTION, POWDER, FOR SOLUTION INTRAVENOUS at 11:41

## 2024-05-23 RX ADMIN — POTASSIUM CHLORIDE 100 MILLIEQUIVALENT(S): 600 TABLET, FILM COATED, EXTENDED RELEASE ORAL at 02:15

## 2024-05-23 RX ADMIN — PROPOFOL 8.4 MICROGRAM(S)/KG/MIN: 10 INJECTION, EMULSION INTRAVENOUS at 20:07

## 2024-05-23 RX ADMIN — PROPOFOL 8.4 MICROGRAM(S)/KG/MIN: 10 INJECTION, EMULSION INTRAVENOUS at 09:06

## 2024-05-23 RX ADMIN — CEFAZOLIN 100 MILLIGRAM(S): 10 INJECTION, POWDER, FOR SOLUTION INTRAVENOUS at 22:26

## 2024-05-23 RX ADMIN — Medication 400 MILLIGRAM(S): at 17:53

## 2024-05-23 RX ADMIN — Medication 15 MILLILITER(S): at 17:53

## 2024-05-23 RX ADMIN — FENTANYL CITRATE 3.5 MICROGRAM(S)/KG/HR: 50 INJECTION, SOLUTION INTRAMUSCULAR; INTRAVENOUS at 09:07

## 2024-05-23 RX ADMIN — Medication 400 MILLIGRAM(S): at 11:41

## 2024-05-23 RX ADMIN — CEFAZOLIN 100 MILLIGRAM(S): 10 INJECTION, POWDER, FOR SOLUTION INTRAVENOUS at 13:28

## 2024-05-23 RX ADMIN — CEFAZOLIN 100 MILLIGRAM(S): 10 INJECTION, POWDER, FOR SOLUTION INTRAVENOUS at 05:20

## 2024-05-23 RX ADMIN — SODIUM CHLORIDE 30 MILLILITER(S): 3 INJECTION, SOLUTION INTRAVENOUS at 20:08

## 2024-05-23 RX ADMIN — POTASSIUM CHLORIDE 100 MILLIEQUIVALENT(S): 600 TABLET, FILM COATED, EXTENDED RELEASE ORAL at 03:00

## 2024-05-23 RX ADMIN — DEXMEDETOMIDINE HYDROCHLORIDE 6.88 MICROGRAM(S)/KG/HR: 4 INJECTION, SOLUTION INTRAVENOUS at 13:26

## 2024-05-23 RX ADMIN — POTASSIUM CHLORIDE 100 MILLIEQUIVALENT(S): 600 TABLET, FILM COATED, EXTENDED RELEASE ORAL at 09:06

## 2024-05-23 RX ADMIN — Medication 400 MILLIGRAM(S): at 05:20

## 2024-05-23 RX ADMIN — SODIUM CHLORIDE 30 MILLILITER(S): 3 INJECTION, SOLUTION INTRAVENOUS at 06:03

## 2024-05-24 LAB
ANION GAP SERPL CALC-SCNC: 12 MMOL/L — SIGNIFICANT CHANGE UP (ref 7–14)
ANION GAP SERPL CALC-SCNC: 12 MMOL/L — SIGNIFICANT CHANGE UP (ref 7–14)
ANION GAP SERPL CALC-SCNC: 16 MMOL/L — HIGH (ref 7–14)
APPEARANCE UR: CLEAR — SIGNIFICANT CHANGE UP
BACTERIA # UR AUTO: NEGATIVE /HPF — SIGNIFICANT CHANGE UP
BILIRUB UR-MCNC: NEGATIVE — SIGNIFICANT CHANGE UP
BLOOD GAS ARTERIAL - LYTES,HGB,ICA,LACT RESULT: SIGNIFICANT CHANGE UP
BUN SERPL-MCNC: 11 MG/DL — SIGNIFICANT CHANGE UP (ref 7–23)
BUN SERPL-MCNC: 12 MG/DL — SIGNIFICANT CHANGE UP (ref 7–23)
BUN SERPL-MCNC: 12 MG/DL — SIGNIFICANT CHANGE UP (ref 7–23)
CALCIUM SERPL-MCNC: 8.3 MG/DL — LOW (ref 8.4–10.5)
CALCIUM SERPL-MCNC: 8.4 MG/DL — SIGNIFICANT CHANGE UP (ref 8.4–10.5)
CALCIUM SERPL-MCNC: 8.4 MG/DL — SIGNIFICANT CHANGE UP (ref 8.4–10.5)
CAST: 6 /LPF — HIGH (ref 0–4)
CHLORIDE SERPL-SCNC: 106 MMOL/L — SIGNIFICANT CHANGE UP (ref 98–107)
CHLORIDE SERPL-SCNC: 106 MMOL/L — SIGNIFICANT CHANGE UP (ref 98–107)
CHLORIDE SERPL-SCNC: 107 MMOL/L — SIGNIFICANT CHANGE UP (ref 98–107)
CO2 SERPL-SCNC: 17 MMOL/L — LOW (ref 22–31)
CO2 SERPL-SCNC: 20 MMOL/L — LOW (ref 22–31)
CO2 SERPL-SCNC: 20 MMOL/L — LOW (ref 22–31)
COLOR SPEC: YELLOW — SIGNIFICANT CHANGE UP
CREAT ?TM UR-MCNC: 48 MG/DL — SIGNIFICANT CHANGE UP
CREAT SERPL-MCNC: 0.38 MG/DL — LOW (ref 0.5–1.3)
CREAT SERPL-MCNC: 0.41 MG/DL — LOW (ref 0.5–1.3)
CREAT SERPL-MCNC: 0.44 MG/DL — LOW (ref 0.5–1.3)
CULTURE RESULTS: NO GROWTH — SIGNIFICANT CHANGE UP
DIFF PNL FLD: ABNORMAL
EGFR: 111 ML/MIN/1.73M2 — SIGNIFICANT CHANGE UP
EGFR: 113 ML/MIN/1.73M2 — SIGNIFICANT CHANGE UP
EGFR: 115 ML/MIN/1.73M2 — SIGNIFICANT CHANGE UP
GLUCOSE SERPL-MCNC: 111 MG/DL — HIGH (ref 70–99)
GLUCOSE SERPL-MCNC: 114 MG/DL — HIGH (ref 70–99)
GLUCOSE SERPL-MCNC: 97 MG/DL — SIGNIFICANT CHANGE UP (ref 70–99)
GLUCOSE UR QL: NEGATIVE MG/DL — SIGNIFICANT CHANGE UP
HCT VFR BLD CALC: 23.4 % — LOW (ref 34.5–45)
HGB BLD-MCNC: 7.8 G/DL — LOW (ref 11.5–15.5)
KETONES UR-MCNC: 40 MG/DL
LEUKOCYTE ESTERASE UR-ACNC: NEGATIVE — SIGNIFICANT CHANGE UP
MAGNESIUM SERPL-MCNC: 1.8 MG/DL — SIGNIFICANT CHANGE UP (ref 1.6–2.6)
MAGNESIUM SERPL-MCNC: 1.9 MG/DL — SIGNIFICANT CHANGE UP (ref 1.6–2.6)
MAGNESIUM SERPL-MCNC: 1.9 MG/DL — SIGNIFICANT CHANGE UP (ref 1.6–2.6)
MCHC RBC-ENTMCNC: 26.7 PG — LOW (ref 27–34)
MCHC RBC-ENTMCNC: 33.3 GM/DL — SIGNIFICANT CHANGE UP (ref 32–36)
MCV RBC AUTO: 80.1 FL — SIGNIFICANT CHANGE UP (ref 80–100)
NITRITE UR-MCNC: NEGATIVE — SIGNIFICANT CHANGE UP
NRBC # BLD: 0 /100 WBCS — SIGNIFICANT CHANGE UP (ref 0–0)
NRBC # FLD: 0 K/UL — SIGNIFICANT CHANGE UP (ref 0–0)
OSMOLALITY UR: 767 MOSM/KG — SIGNIFICANT CHANGE UP (ref 50–1200)
PH UR: 5.5 — SIGNIFICANT CHANGE UP (ref 5–8)
PHOSPHATE SERPL-MCNC: 2.1 MG/DL — LOW (ref 2.5–4.5)
PHOSPHATE SERPL-MCNC: 2.4 MG/DL — LOW (ref 2.5–4.5)
PHOSPHATE SERPL-MCNC: 2.6 MG/DL — SIGNIFICANT CHANGE UP (ref 2.5–4.5)
PLATELET # BLD AUTO: 108 K/UL — LOW (ref 150–400)
POTASSIUM SERPL-MCNC: 3.4 MMOL/L — LOW (ref 3.5–5.3)
POTASSIUM SERPL-MCNC: 3.8 MMOL/L — SIGNIFICANT CHANGE UP (ref 3.5–5.3)
POTASSIUM SERPL-MCNC: 3.9 MMOL/L — SIGNIFICANT CHANGE UP (ref 3.5–5.3)
POTASSIUM SERPL-SCNC: 3.4 MMOL/L — LOW (ref 3.5–5.3)
POTASSIUM SERPL-SCNC: 3.8 MMOL/L — SIGNIFICANT CHANGE UP (ref 3.5–5.3)
POTASSIUM SERPL-SCNC: 3.9 MMOL/L — SIGNIFICANT CHANGE UP (ref 3.5–5.3)
POTASSIUM UR-SCNC: 38.5 MMOL/L — SIGNIFICANT CHANGE UP
PROT ?TM UR-MCNC: 26 MG/DL — SIGNIFICANT CHANGE UP
PROT UR-MCNC: SIGNIFICANT CHANGE UP MG/DL
PROT/CREAT UR-RTO: 0.6 RATIO — HIGH (ref 0–0.2)
RBC # BLD: 2.92 M/UL — LOW (ref 3.8–5.2)
RBC # FLD: 14.5 % — SIGNIFICANT CHANGE UP (ref 10.3–14.5)
RBC CASTS # UR COMP ASSIST: 13 /HPF — HIGH (ref 0–4)
REVIEW: SIGNIFICANT CHANGE UP
SODIUM SERPL-SCNC: 138 MMOL/L — SIGNIFICANT CHANGE UP (ref 135–145)
SODIUM SERPL-SCNC: 138 MMOL/L — SIGNIFICANT CHANGE UP (ref 135–145)
SODIUM SERPL-SCNC: 140 MMOL/L — SIGNIFICANT CHANGE UP (ref 135–145)
SODIUM UR-SCNC: 199 MMOL/L — SIGNIFICANT CHANGE UP
SP GR SPEC: 1.02 — SIGNIFICANT CHANGE UP (ref 1–1.03)
SPECIMEN SOURCE: SIGNIFICANT CHANGE UP
SQUAMOUS # UR AUTO: 1 /HPF — SIGNIFICANT CHANGE UP (ref 0–5)
UROBILINOGEN FLD QL: 0.2 MG/DL — SIGNIFICANT CHANGE UP (ref 0.2–1)
UUN UR-MCNC: 616.9 MG/DL — SIGNIFICANT CHANGE UP
WBC # BLD: 6.03 K/UL — SIGNIFICANT CHANGE UP (ref 3.8–10.5)
WBC # FLD AUTO: 6.03 K/UL — SIGNIFICANT CHANGE UP (ref 3.8–10.5)
WBC UR QL: 3 /HPF — SIGNIFICANT CHANGE UP (ref 0–5)

## 2024-05-24 PROCEDURE — 99291 CRITICAL CARE FIRST HOUR: CPT | Mod: 24

## 2024-05-24 PROCEDURE — 99221 1ST HOSP IP/OBS SF/LOW 40: CPT

## 2024-05-24 PROCEDURE — 71045 X-RAY EXAM CHEST 1 VIEW: CPT | Mod: 26

## 2024-05-24 RX ORDER — MAGNESIUM SULFATE 100 %
1 POWDER (GRAM) MISCELLANEOUS ONCE
Refills: 0 | Status: COMPLETED | OUTPATIENT
Start: 2024-05-24 | End: 2024-05-24

## 2024-05-24 RX ORDER — POTASSIUM CHLORIDE 600 MG/1
10 TABLET, FILM COATED, EXTENDED RELEASE ORAL
Refills: 0 | Status: COMPLETED | OUTPATIENT
Start: 2024-05-24 | End: 2024-05-24

## 2024-05-24 RX ORDER — ACETAMINOPHEN 325 MG
1000 TABLET ORAL EVERY 6 HOURS
Refills: 0 | Status: COMPLETED | OUTPATIENT
Start: 2024-05-24 | End: 2024-05-25

## 2024-05-24 RX ORDER — BUPROPION HYDROCHLORIDE 150 MG/1
300 TABLET, EXTENDED RELEASE ORAL AT BEDTIME
Refills: 0 | Status: DISCONTINUED | OUTPATIENT
Start: 2024-05-24 | End: 2024-05-25

## 2024-05-24 RX ORDER — POTASSIUM PHOSPHATE, MONOBASIC POTASSIUM PHOSPHATE, DIBASIC INJECTION, 236; 224 MG/ML; MG/ML
15 SOLUTION, CONCENTRATE INTRAVENOUS ONCE
Refills: 0 | Status: COMPLETED | OUTPATIENT
Start: 2024-05-24 | End: 2024-05-24

## 2024-05-24 RX ORDER — HEPARIN SODIUM 50 [USP'U]/ML
5000 INJECTION, SOLUTION INTRAVENOUS EVERY 8 HOURS
Refills: 0 | Status: COMPLETED | OUTPATIENT
Start: 2024-05-24 | End: 2024-05-24

## 2024-05-24 RX ADMIN — Medication 400 MILLIGRAM(S): at 12:25

## 2024-05-24 RX ADMIN — Medication 1000 MILLIGRAM(S): at 19:00

## 2024-05-24 RX ADMIN — Medication 15 MILLILITER(S): at 05:28

## 2024-05-24 RX ADMIN — HEPARIN SODIUM 5000 UNIT(S): 50 INJECTION, SOLUTION INTRAVENOUS at 14:58

## 2024-05-24 RX ADMIN — POTASSIUM PHOSPHATE, MONOBASIC POTASSIUM PHOSPHATE, DIBASIC INJECTION, 62.5 MILLIMOLE(S): 236; 224 SOLUTION, CONCENTRATE INTRAVENOUS at 17:58

## 2024-05-24 RX ADMIN — POTASSIUM CHLORIDE 100 MILLIEQUIVALENT(S): 600 TABLET, FILM COATED, EXTENDED RELEASE ORAL at 17:48

## 2024-05-24 RX ADMIN — HEPARIN SODIUM 5000 UNIT(S): 50 INJECTION, SOLUTION INTRAVENOUS at 21:46

## 2024-05-24 RX ADMIN — Medication 1000 MILLIGRAM(S): at 13:00

## 2024-05-24 RX ADMIN — Medication 1 APPLICATION(S): at 15:15

## 2024-05-24 RX ADMIN — POTASSIUM CHLORIDE 100 MILLIEQUIVALENT(S): 600 TABLET, FILM COATED, EXTENDED RELEASE ORAL at 18:48

## 2024-05-24 RX ADMIN — SODIUM CHLORIDE 30 MILLILITER(S): 3 INJECTION, SOLUTION INTRAVENOUS at 05:29

## 2024-05-24 RX ADMIN — Medication 400 MILLIGRAM(S): at 23:38

## 2024-05-24 RX ADMIN — CEFAZOLIN 100 MILLIGRAM(S): 10 INJECTION, POWDER, FOR SOLUTION INTRAVENOUS at 14:57

## 2024-05-24 RX ADMIN — Medication 400 MILLIGRAM(S): at 18:30

## 2024-05-24 RX ADMIN — PANTOPRAZOLE SODIUM 40 MILLIGRAM(S): 40 INJECTION, POWDER, FOR SOLUTION INTRAVENOUS at 12:58

## 2024-05-24 RX ADMIN — Medication 100 GRAM(S): at 17:48

## 2024-05-24 RX ADMIN — LEVETIRACETAM 500 MILLIGRAM(S): 100 INJECTION INTRAVENOUS at 17:48

## 2024-05-24 RX ADMIN — CEFAZOLIN 100 MILLIGRAM(S): 10 INJECTION, POWDER, FOR SOLUTION INTRAVENOUS at 05:29

## 2024-05-24 RX ADMIN — Medication 400 MILLIGRAM(S): at 05:29

## 2024-05-24 RX ADMIN — POTASSIUM CHLORIDE 100 MILLIEQUIVALENT(S): 600 TABLET, FILM COATED, EXTENDED RELEASE ORAL at 19:50

## 2024-05-24 RX ADMIN — DEXMEDETOMIDINE HYDROCHLORIDE 6.88 MICROGRAM(S)/KG/HR: 4 INJECTION, SOLUTION INTRAVENOUS at 08:41

## 2024-05-24 RX ADMIN — LEVETIRACETAM 500 MILLIGRAM(S): 100 INJECTION INTRAVENOUS at 05:26

## 2024-05-24 RX ADMIN — SODIUM CHLORIDE 30 MILLILITER(S): 3 INJECTION, SOLUTION INTRAVENOUS at 08:41

## 2024-05-24 RX ADMIN — DEXTROSE MONOHYDRATE AND SODIUM CHLORIDE 70 MILLILITER(S): 5; .3 INJECTION, SOLUTION INTRAVENOUS at 08:41

## 2024-05-24 RX ADMIN — Medication 400 MILLIGRAM(S): at 00:29

## 2024-05-25 LAB
ANION GAP SERPL CALC-SCNC: 15 MMOL/L — HIGH (ref 7–14)
BLD GP AB SCN SERPL QL: POSITIVE — SIGNIFICANT CHANGE UP
BUN SERPL-MCNC: 10 MG/DL — SIGNIFICANT CHANGE UP (ref 7–23)
CALCIUM SERPL-MCNC: 8.4 MG/DL — SIGNIFICANT CHANGE UP (ref 8.4–10.5)
CHLORIDE SERPL-SCNC: 103 MMOL/L — SIGNIFICANT CHANGE UP (ref 98–107)
CO2 SERPL-SCNC: 18 MMOL/L — LOW (ref 22–31)
CREAT SERPL-MCNC: 0.32 MG/DL — LOW (ref 0.5–1.3)
EGFR: 120 ML/MIN/1.73M2 — SIGNIFICANT CHANGE UP
GLUCOSE SERPL-MCNC: 88 MG/DL — SIGNIFICANT CHANGE UP (ref 70–99)
HCT VFR BLD CALC: 24.2 % — LOW (ref 34.5–45)
HGB BLD-MCNC: 8.1 G/DL — LOW (ref 11.5–15.5)
MAGNESIUM SERPL-MCNC: 2 MG/DL — SIGNIFICANT CHANGE UP (ref 1.6–2.6)
MCHC RBC-ENTMCNC: 26.6 PG — LOW (ref 27–34)
MCHC RBC-ENTMCNC: 33.5 GM/DL — SIGNIFICANT CHANGE UP (ref 32–36)
MCV RBC AUTO: 79.3 FL — LOW (ref 80–100)
NRBC # BLD: 0 /100 WBCS — SIGNIFICANT CHANGE UP (ref 0–0)
NRBC # FLD: 0 K/UL — SIGNIFICANT CHANGE UP (ref 0–0)
PHOSPHATE SERPL-MCNC: 2.5 MG/DL — SIGNIFICANT CHANGE UP (ref 2.5–4.5)
PLATELET # BLD AUTO: 143 K/UL — LOW (ref 150–400)
POTASSIUM SERPL-MCNC: 3.8 MMOL/L — SIGNIFICANT CHANGE UP (ref 3.5–5.3)
POTASSIUM SERPL-SCNC: 3.8 MMOL/L — SIGNIFICANT CHANGE UP (ref 3.5–5.3)
RBC # BLD: 3.05 M/UL — LOW (ref 3.8–5.2)
RBC # FLD: 14.2 % — SIGNIFICANT CHANGE UP (ref 10.3–14.5)
RH IG SCN BLD-IMP: NEGATIVE — SIGNIFICANT CHANGE UP
SODIUM SERPL-SCNC: 136 MMOL/L — SIGNIFICANT CHANGE UP (ref 135–145)
WBC # BLD: 6.62 K/UL — SIGNIFICANT CHANGE UP (ref 3.8–10.5)
WBC # FLD AUTO: 6.62 K/UL — SIGNIFICANT CHANGE UP (ref 3.8–10.5)

## 2024-05-25 PROCEDURE — 74018 RADEX ABDOMEN 1 VIEW: CPT | Mod: 26

## 2024-05-25 PROCEDURE — 99291 CRITICAL CARE FIRST HOUR: CPT | Mod: 24

## 2024-05-25 RX ORDER — MIRTAZAPINE 15 MG/1
30 TABLET, FILM COATED ORAL AT BEDTIME
Refills: 0 | Status: DISCONTINUED | OUTPATIENT
Start: 2024-05-25 | End: 2024-05-25

## 2024-05-25 RX ORDER — ACETAMINOPHEN 325 MG
1000 TABLET ORAL ONCE
Refills: 0 | Status: COMPLETED | OUTPATIENT
Start: 2024-05-25 | End: 2024-05-25

## 2024-05-25 RX ORDER — POTASSIUM CHLORIDE 600 MG/1
10 TABLET, FILM COATED, EXTENDED RELEASE ORAL
Refills: 0 | Status: COMPLETED | OUTPATIENT
Start: 2024-05-25 | End: 2024-05-25

## 2024-05-25 RX ORDER — SERTRALINE HYDROCHLORIDE 100 MG/1
50 TABLET, FILM COATED ORAL EVERY 24 HOURS
Refills: 0 | Status: DISCONTINUED | OUTPATIENT
Start: 2024-05-26 | End: 2024-05-26

## 2024-05-25 RX ORDER — BUPROPION HYDROCHLORIDE 150 MG/1
150 TABLET, EXTENDED RELEASE ORAL
Refills: 0 | Status: DISCONTINUED | OUTPATIENT
Start: 2024-05-25 | End: 2024-05-25

## 2024-05-25 RX ORDER — DEXTROSE MONOHYDRATE, SODIUM CHLORIDE, AND POTASSIUM CHLORIDE 50; 4.5; 2.24 G/1000ML; G/1000ML; G/1000ML
1000 INJECTION, SOLUTION INTRAVENOUS
Refills: 0 | Status: DISCONTINUED | OUTPATIENT
Start: 2024-05-25 | End: 2024-05-26

## 2024-05-25 RX ORDER — POTASSIUM PHOSPHATE, MONOBASIC POTASSIUM PHOSPHATE, DIBASIC INJECTION, 236; 224 MG/ML; MG/ML
15 SOLUTION, CONCENTRATE INTRAVENOUS ONCE
Refills: 0 | Status: COMPLETED | OUTPATIENT
Start: 2024-05-25 | End: 2024-05-25

## 2024-05-25 RX ORDER — HEPARIN SODIUM 50 [USP'U]/ML
5000 INJECTION, SOLUTION INTRAVENOUS EVERY 8 HOURS
Refills: 0 | Status: DISCONTINUED | OUTPATIENT
Start: 2024-05-25 | End: 2024-06-16

## 2024-05-25 RX ORDER — MIRTAZAPINE 15 MG/1
7.5 TABLET, FILM COATED ORAL AT BEDTIME
Refills: 0 | Status: DISCONTINUED | OUTPATIENT
Start: 2024-05-25 | End: 2024-06-07

## 2024-05-25 RX ORDER — BUPROPION HYDROCHLORIDE 150 MG/1
150 TABLET, EXTENDED RELEASE ORAL
Refills: 0 | Status: DISCONTINUED | OUTPATIENT
Start: 2024-05-25 | End: 2024-06-07

## 2024-05-25 RX ADMIN — Medication 1000 MILLIGRAM(S): at 00:08

## 2024-05-25 RX ADMIN — BUPROPION HYDROCHLORIDE 150 MILLIGRAM(S): 150 TABLET, EXTENDED RELEASE ORAL at 23:24

## 2024-05-25 RX ADMIN — LEVETIRACETAM 500 MILLIGRAM(S): 100 INJECTION INTRAVENOUS at 06:13

## 2024-05-25 RX ADMIN — HEPARIN SODIUM 5000 UNIT(S): 50 INJECTION, SOLUTION INTRAVENOUS at 22:19

## 2024-05-25 RX ADMIN — Medication 1000 MILLIGRAM(S): at 07:00

## 2024-05-25 RX ADMIN — Medication 400 MILLIGRAM(S): at 11:30

## 2024-05-25 RX ADMIN — POTASSIUM CHLORIDE 100 MILLIEQUIVALENT(S): 600 TABLET, FILM COATED, EXTENDED RELEASE ORAL at 02:11

## 2024-05-25 RX ADMIN — DEXTROSE MONOHYDRATE, SODIUM CHLORIDE, AND POTASSIUM CHLORIDE 50 MILLILITER(S): 50; 4.5; 2.24 INJECTION, SOLUTION INTRAVENOUS at 03:01

## 2024-05-25 RX ADMIN — POTASSIUM CHLORIDE 100 MILLIEQUIVALENT(S): 600 TABLET, FILM COATED, EXTENDED RELEASE ORAL at 03:20

## 2024-05-25 RX ADMIN — HYDROMORPHONE HCL 0.5 MILLIGRAM(S): 0.2 INJECTION, SOLUTION INTRAVENOUS at 03:23

## 2024-05-25 RX ADMIN — Medication 1000 MILLIGRAM(S): at 12:00

## 2024-05-25 RX ADMIN — DEXTROSE MONOHYDRATE, SODIUM CHLORIDE, AND POTASSIUM CHLORIDE 50 MILLILITER(S): 50; 4.5; 2.24 INJECTION, SOLUTION INTRAVENOUS at 22:19

## 2024-05-25 RX ADMIN — Medication 400 MILLIGRAM(S): at 06:13

## 2024-05-25 RX ADMIN — LEVETIRACETAM 500 MILLIGRAM(S): 100 INJECTION INTRAVENOUS at 18:06

## 2024-05-25 RX ADMIN — Medication 400 MILLIGRAM(S): at 18:00

## 2024-05-25 RX ADMIN — Medication 1000 MILLIGRAM(S): at 19:00

## 2024-05-25 RX ADMIN — PANTOPRAZOLE SODIUM 40 MILLIGRAM(S): 40 INJECTION, POWDER, FOR SOLUTION INTRAVENOUS at 11:33

## 2024-05-25 RX ADMIN — HYDROMORPHONE HCL 0.5 MILLIGRAM(S): 0.2 INJECTION, SOLUTION INTRAVENOUS at 03:53

## 2024-05-25 RX ADMIN — POTASSIUM PHOSPHATE, MONOBASIC POTASSIUM PHOSPHATE, DIBASIC INJECTION, 62.5 MILLIMOLE(S): 236; 224 SOLUTION, CONCENTRATE INTRAVENOUS at 02:09

## 2024-05-25 RX ADMIN — MIRTAZAPINE 7.5 MILLIGRAM(S): 15 TABLET, FILM COATED ORAL at 23:23

## 2024-05-25 RX ADMIN — DEXTROSE MONOHYDRATE, SODIUM CHLORIDE, AND POTASSIUM CHLORIDE 50 MILLILITER(S): 50; 4.5; 2.24 INJECTION, SOLUTION INTRAVENOUS at 08:27

## 2024-05-25 RX ADMIN — Medication 1 APPLICATION(S): at 11:33

## 2024-05-26 LAB
ANION GAP SERPL CALC-SCNC: 15 MMOL/L — HIGH (ref 7–14)
BUN SERPL-MCNC: 8 MG/DL — SIGNIFICANT CHANGE UP (ref 7–23)
CALCIUM SERPL-MCNC: 8.7 MG/DL — SIGNIFICANT CHANGE UP (ref 8.4–10.5)
CHLORIDE SERPL-SCNC: 98 MMOL/L — SIGNIFICANT CHANGE UP (ref 98–107)
CO2 SERPL-SCNC: 18 MMOL/L — LOW (ref 22–31)
CREAT SERPL-MCNC: 0.24 MG/DL — LOW (ref 0.5–1.3)
EGFR: 129 ML/MIN/1.73M2 — SIGNIFICANT CHANGE UP
GLUCOSE SERPL-MCNC: 107 MG/DL — HIGH (ref 70–99)
HCT VFR BLD CALC: 26.9 % — LOW (ref 34.5–45)
HGB BLD-MCNC: 9.4 G/DL — LOW (ref 11.5–15.5)
MAGNESIUM SERPL-MCNC: 1.6 MG/DL — SIGNIFICANT CHANGE UP (ref 1.6–2.6)
MCHC RBC-ENTMCNC: 26.9 PG — LOW (ref 27–34)
MCHC RBC-ENTMCNC: 34.9 GM/DL — SIGNIFICANT CHANGE UP (ref 32–36)
MCV RBC AUTO: 76.9 FL — LOW (ref 80–100)
NRBC # BLD: 0 /100 WBCS — SIGNIFICANT CHANGE UP (ref 0–0)
NRBC # FLD: 0 K/UL — SIGNIFICANT CHANGE UP (ref 0–0)
PHOSPHATE SERPL-MCNC: 2 MG/DL — LOW (ref 2.5–4.5)
PLATELET # BLD AUTO: 228 K/UL — SIGNIFICANT CHANGE UP (ref 150–400)
POTASSIUM SERPL-MCNC: 3.5 MMOL/L — SIGNIFICANT CHANGE UP (ref 3.5–5.3)
POTASSIUM SERPL-SCNC: 3.5 MMOL/L — SIGNIFICANT CHANGE UP (ref 3.5–5.3)
RBC # BLD: 3.5 M/UL — LOW (ref 3.8–5.2)
RBC # FLD: 13.5 % — SIGNIFICANT CHANGE UP (ref 10.3–14.5)
SODIUM SERPL-SCNC: 131 MMOL/L — LOW (ref 135–145)
WBC # BLD: 7.23 K/UL — SIGNIFICANT CHANGE UP (ref 3.8–10.5)
WBC # FLD AUTO: 7.23 K/UL — SIGNIFICANT CHANGE UP (ref 3.8–10.5)

## 2024-05-26 PROCEDURE — 99232 SBSQ HOSP IP/OBS MODERATE 35: CPT | Mod: 24,GC

## 2024-05-26 RX ORDER — SERTRALINE HYDROCHLORIDE 100 MG/1
50 TABLET, FILM COATED ORAL EVERY 24 HOURS
Refills: 0 | Status: DISCONTINUED | OUTPATIENT
Start: 2024-05-27 | End: 2024-06-07

## 2024-05-26 RX ORDER — POTASSIUM CHLORIDE 600 MG/1
30 TABLET, FILM COATED, EXTENDED RELEASE ORAL ONCE
Refills: 0 | Status: COMPLETED | OUTPATIENT
Start: 2024-05-26 | End: 2024-05-26

## 2024-05-26 RX ORDER — SENNOSIDES 8.6 MG
10 TABLET ORAL AT BEDTIME
Refills: 0 | Status: DISCONTINUED | OUTPATIENT
Start: 2024-05-26 | End: 2024-06-01

## 2024-05-26 RX ORDER — ACETAMINOPHEN 325 MG
975 TABLET ORAL EVERY 6 HOURS
Refills: 0 | Status: DISCONTINUED | OUTPATIENT
Start: 2024-05-26 | End: 2024-05-26

## 2024-05-26 RX ORDER — DEXTROSE MONOHYDRATE AND SODIUM CHLORIDE 5; .3 G/100ML; G/100ML
1000 INJECTION, SOLUTION INTRAVENOUS
Refills: 0 | Status: DISCONTINUED | OUTPATIENT
Start: 2024-05-26 | End: 2024-05-26

## 2024-05-26 RX ORDER — MAGNESIUM SULFATE 100 %
2 POWDER (GRAM) MISCELLANEOUS ONCE
Refills: 0 | Status: COMPLETED | OUTPATIENT
Start: 2024-05-26 | End: 2024-05-26

## 2024-05-26 RX ORDER — SENNOSIDES 8.6 MG
2 TABLET ORAL AT BEDTIME
Refills: 0 | Status: DISCONTINUED | OUTPATIENT
Start: 2024-05-26 | End: 2024-05-26

## 2024-05-26 RX ORDER — POLYETHYLENE GLYCOL 3350 1 G/G
17 POWDER ORAL DAILY
Refills: 0 | Status: DISCONTINUED | OUTPATIENT
Start: 2024-05-26 | End: 2024-06-01

## 2024-05-26 RX ORDER — ACETAMINOPHEN 325 MG
650 TABLET ORAL EVERY 6 HOURS
Refills: 0 | Status: DISCONTINUED | OUTPATIENT
Start: 2024-05-26 | End: 2024-05-30

## 2024-05-26 RX ORDER — POLYETHYLENE GLYCOL 3350 1 G/G
17 POWDER ORAL DAILY
Refills: 0 | Status: DISCONTINUED | OUTPATIENT
Start: 2024-05-26 | End: 2024-05-26

## 2024-05-26 RX ORDER — POTASSIUM PHOSPHATE, MONOBASIC POTASSIUM PHOSPHATE, DIBASIC INJECTION, 236; 224 MG/ML; MG/ML
30 SOLUTION, CONCENTRATE INTRAVENOUS ONCE
Refills: 0 | Status: COMPLETED | OUTPATIENT
Start: 2024-05-26 | End: 2024-05-26

## 2024-05-26 RX ORDER — SODIUM CHLORIDE 0.9 % (FLUSH) 0.9 %
1000 SYRINGE (ML) INJECTION
Refills: 0 | Status: DISCONTINUED | OUTPATIENT
Start: 2024-05-26 | End: 2024-05-26

## 2024-05-26 RX ADMIN — POTASSIUM PHOSPHATE, MONOBASIC POTASSIUM PHOSPHATE, DIBASIC INJECTION, 83.33 MILLIMOLE(S): 236; 224 SOLUTION, CONCENTRATE INTRAVENOUS at 07:37

## 2024-05-26 RX ADMIN — Medication 975 MILLIGRAM(S): at 07:57

## 2024-05-26 RX ADMIN — HEPARIN SODIUM 5000 UNIT(S): 50 INJECTION, SOLUTION INTRAVENOUS at 21:20

## 2024-05-26 RX ADMIN — POTASSIUM CHLORIDE 30 MILLIEQUIVALENT(S): 600 TABLET, FILM COATED, EXTENDED RELEASE ORAL at 05:39

## 2024-05-26 RX ADMIN — PANTOPRAZOLE SODIUM 40 MILLIGRAM(S): 40 INJECTION, POWDER, FOR SOLUTION INTRAVENOUS at 11:47

## 2024-05-26 RX ADMIN — Medication 2 TABLET(S): at 21:21

## 2024-05-26 RX ADMIN — Medication 50 MILLILITER(S): at 07:59

## 2024-05-26 RX ADMIN — HEPARIN SODIUM 5000 UNIT(S): 50 INJECTION, SOLUTION INTRAVENOUS at 14:35

## 2024-05-26 RX ADMIN — Medication 975 MILLIGRAM(S): at 02:09

## 2024-05-26 RX ADMIN — LEVETIRACETAM 500 MILLIGRAM(S): 100 INJECTION INTRAVENOUS at 17:45

## 2024-05-26 RX ADMIN — Medication 25 GRAM(S): at 04:18

## 2024-05-26 RX ADMIN — Medication 975 MILLIGRAM(S): at 02:39

## 2024-05-26 RX ADMIN — HEPARIN SODIUM 5000 UNIT(S): 50 INJECTION, SOLUTION INTRAVENOUS at 05:39

## 2024-05-26 RX ADMIN — MIRTAZAPINE 7.5 MILLIGRAM(S): 15 TABLET, FILM COATED ORAL at 21:20

## 2024-05-26 RX ADMIN — BUPROPION HYDROCHLORIDE 150 MILLIGRAM(S): 150 TABLET, EXTENDED RELEASE ORAL at 21:47

## 2024-05-26 RX ADMIN — Medication 1 APPLICATION(S): at 11:48

## 2024-05-26 RX ADMIN — SERTRALINE HYDROCHLORIDE 50 MILLIGRAM(S): 100 TABLET, FILM COATED ORAL at 05:39

## 2024-05-26 RX ADMIN — BUPROPION HYDROCHLORIDE 150 MILLIGRAM(S): 150 TABLET, EXTENDED RELEASE ORAL at 11:47

## 2024-05-26 RX ADMIN — LEVETIRACETAM 500 MILLIGRAM(S): 100 INJECTION INTRAVENOUS at 05:39

## 2024-05-26 RX ADMIN — Medication 975 MILLIGRAM(S): at 14:14

## 2024-05-27 LAB
ANION GAP SERPL CALC-SCNC: 16 MMOL/L — HIGH (ref 7–14)
APPEARANCE UR: ABNORMAL
BACTERIA # UR AUTO: ABNORMAL /HPF
BILIRUB UR-MCNC: NEGATIVE — SIGNIFICANT CHANGE UP
BUN SERPL-MCNC: 11 MG/DL — SIGNIFICANT CHANGE UP (ref 7–23)
CALCIUM SERPL-MCNC: 9.3 MG/DL — SIGNIFICANT CHANGE UP (ref 8.4–10.5)
CHLORIDE SERPL-SCNC: 99 MMOL/L — SIGNIFICANT CHANGE UP (ref 98–107)
CO2 SERPL-SCNC: 18 MMOL/L — LOW (ref 22–31)
COLOR SPEC: SIGNIFICANT CHANGE UP
COMMENT - URINE: SIGNIFICANT CHANGE UP
CREAT SERPL-MCNC: 0.29 MG/DL — LOW (ref 0.5–1.3)
CULTURE RESULTS: SIGNIFICANT CHANGE UP
CULTURE RESULTS: SIGNIFICANT CHANGE UP
DIFF PNL FLD: ABNORMAL
EGFR: 123 ML/MIN/1.73M2 — SIGNIFICANT CHANGE UP
EPI CELLS # UR: 2 — SIGNIFICANT CHANGE UP
GLUCOSE SERPL-MCNC: 116 MG/DL — HIGH (ref 70–99)
GLUCOSE UR QL: NEGATIVE MG/DL — SIGNIFICANT CHANGE UP
HCT VFR BLD CALC: 35.3 % — SIGNIFICANT CHANGE UP (ref 34.5–45)
HGB BLD-MCNC: 12 G/DL — SIGNIFICANT CHANGE UP (ref 11.5–15.5)
KETONES UR-MCNC: NEGATIVE MG/DL — SIGNIFICANT CHANGE UP
LEUKOCYTE ESTERASE UR-ACNC: ABNORMAL
MAGNESIUM SERPL-MCNC: 2 MG/DL — SIGNIFICANT CHANGE UP (ref 1.6–2.6)
MCHC RBC-ENTMCNC: 26.1 PG — LOW (ref 27–34)
MCHC RBC-ENTMCNC: 34 GM/DL — SIGNIFICANT CHANGE UP (ref 32–36)
MCV RBC AUTO: 76.9 FL — LOW (ref 80–100)
NITRITE UR-MCNC: NEGATIVE — SIGNIFICANT CHANGE UP
NRBC # BLD: 0 /100 WBCS — SIGNIFICANT CHANGE UP (ref 0–0)
NRBC # FLD: 0 K/UL — SIGNIFICANT CHANGE UP (ref 0–0)
PH UR: 6 — SIGNIFICANT CHANGE UP (ref 5–8)
PHOSPHATE SERPL-MCNC: 2.5 MG/DL — SIGNIFICANT CHANGE UP (ref 2.5–4.5)
PLATELET # BLD AUTO: 352 K/UL — SIGNIFICANT CHANGE UP (ref 150–400)
POTASSIUM SERPL-MCNC: 4 MMOL/L — SIGNIFICANT CHANGE UP (ref 3.5–5.3)
POTASSIUM SERPL-SCNC: 4 MMOL/L — SIGNIFICANT CHANGE UP (ref 3.5–5.3)
PROT UR-MCNC: 100 MG/DL
RBC # BLD: 4.59 M/UL — SIGNIFICANT CHANGE UP (ref 3.8–5.2)
RBC # FLD: 13.6 % — SIGNIFICANT CHANGE UP (ref 10.3–14.5)
RBC CASTS # UR COMP ASSIST: 4 /HPF — SIGNIFICANT CHANGE UP (ref 0–4)
SODIUM SERPL-SCNC: 133 MMOL/L — LOW (ref 135–145)
SP GR SPEC: 1.04 — HIGH (ref 1–1.03)
SPECIMEN SOURCE: SIGNIFICANT CHANGE UP
SPECIMEN SOURCE: SIGNIFICANT CHANGE UP
UROBILINOGEN FLD QL: 1 MG/DL — SIGNIFICANT CHANGE UP (ref 0.2–1)
WBC # BLD: 8.28 K/UL — SIGNIFICANT CHANGE UP (ref 3.8–10.5)
WBC # FLD AUTO: 8.28 K/UL — SIGNIFICANT CHANGE UP (ref 3.8–10.5)
WBC UR QL: 5 /HPF — SIGNIFICANT CHANGE UP (ref 0–5)

## 2024-05-27 PROCEDURE — 71045 X-RAY EXAM CHEST 1 VIEW: CPT | Mod: 26

## 2024-05-27 PROCEDURE — 70450 CT HEAD/BRAIN W/O DYE: CPT | Mod: 26

## 2024-05-27 RX ORDER — SODIUM CHLORIDE 0.9 % (FLUSH) 0.9 %
2 SYRINGE (ML) INJECTION DAILY
Refills: 0 | Status: DISCONTINUED | OUTPATIENT
Start: 2024-05-27 | End: 2024-05-29

## 2024-05-27 RX ADMIN — Medication 650 MILLIGRAM(S): at 19:29

## 2024-05-27 RX ADMIN — BUPROPION HYDROCHLORIDE 150 MILLIGRAM(S): 150 TABLET, EXTENDED RELEASE ORAL at 21:50

## 2024-05-27 RX ADMIN — Medication 650 MILLIGRAM(S): at 19:59

## 2024-05-27 RX ADMIN — HEPARIN SODIUM 5000 UNIT(S): 50 INJECTION, SOLUTION INTRAVENOUS at 14:27

## 2024-05-27 RX ADMIN — MIRTAZAPINE 7.5 MILLIGRAM(S): 15 TABLET, FILM COATED ORAL at 21:50

## 2024-05-27 RX ADMIN — Medication 10 MILLILITER(S): at 21:50

## 2024-05-27 RX ADMIN — HEPARIN SODIUM 5000 UNIT(S): 50 INJECTION, SOLUTION INTRAVENOUS at 21:49

## 2024-05-27 RX ADMIN — LEVETIRACETAM 500 MILLIGRAM(S): 100 INJECTION INTRAVENOUS at 17:54

## 2024-05-27 RX ADMIN — Medication 2 GRAM(S): at 10:01

## 2024-05-27 RX ADMIN — Medication 650 MILLIGRAM(S): at 06:43

## 2024-05-27 RX ADMIN — BUPROPION HYDROCHLORIDE 150 MILLIGRAM(S): 150 TABLET, EXTENDED RELEASE ORAL at 10:01

## 2024-05-27 RX ADMIN — LEVETIRACETAM 500 MILLIGRAM(S): 100 INJECTION INTRAVENOUS at 06:30

## 2024-05-27 RX ADMIN — PANTOPRAZOLE SODIUM 40 MILLIGRAM(S): 40 INJECTION, POWDER, FOR SOLUTION INTRAVENOUS at 10:15

## 2024-05-27 RX ADMIN — SERTRALINE HYDROCHLORIDE 50 MILLIGRAM(S): 100 TABLET, FILM COATED ORAL at 06:42

## 2024-05-27 RX ADMIN — HEPARIN SODIUM 5000 UNIT(S): 50 INJECTION, SOLUTION INTRAVENOUS at 06:34

## 2024-05-28 LAB
ANION GAP SERPL CALC-SCNC: 15 MMOL/L — HIGH (ref 7–14)
BUN SERPL-MCNC: 27 MG/DL — HIGH (ref 7–23)
CALCIUM SERPL-MCNC: 9.2 MG/DL — SIGNIFICANT CHANGE UP (ref 8.4–10.5)
CHLORIDE SERPL-SCNC: 103 MMOL/L — SIGNIFICANT CHANGE UP (ref 98–107)
CO2 SERPL-SCNC: 19 MMOL/L — LOW (ref 22–31)
CREAT SERPL-MCNC: 0.35 MG/DL — LOW (ref 0.5–1.3)
EGFR: 118 ML/MIN/1.73M2 — SIGNIFICANT CHANGE UP
GLUCOSE SERPL-MCNC: 122 MG/DL — HIGH (ref 70–99)
HCT VFR BLD CALC: 35.3 % — SIGNIFICANT CHANGE UP (ref 34.5–45)
HGB BLD-MCNC: 12.1 G/DL — SIGNIFICANT CHANGE UP (ref 11.5–15.5)
MCHC RBC-ENTMCNC: 26.7 PG — LOW (ref 27–34)
MCHC RBC-ENTMCNC: 34.3 GM/DL — SIGNIFICANT CHANGE UP (ref 32–36)
MCV RBC AUTO: 77.9 FL — LOW (ref 80–100)
NRBC # BLD: 0 /100 WBCS — SIGNIFICANT CHANGE UP (ref 0–0)
NRBC # FLD: 0 K/UL — SIGNIFICANT CHANGE UP (ref 0–0)
PLATELET # BLD AUTO: 247 K/UL — SIGNIFICANT CHANGE UP (ref 150–400)
POTASSIUM SERPL-MCNC: 3.8 MMOL/L — SIGNIFICANT CHANGE UP (ref 3.5–5.3)
POTASSIUM SERPL-SCNC: 3.8 MMOL/L — SIGNIFICANT CHANGE UP (ref 3.5–5.3)
RBC # BLD: 4.53 M/UL — SIGNIFICANT CHANGE UP (ref 3.8–5.2)
RBC # FLD: 14.4 % — SIGNIFICANT CHANGE UP (ref 10.3–14.5)
SODIUM SERPL-SCNC: 137 MMOL/L — SIGNIFICANT CHANGE UP (ref 135–145)
WBC # BLD: 9.32 K/UL — SIGNIFICANT CHANGE UP (ref 3.8–10.5)
WBC # FLD AUTO: 9.32 K/UL — SIGNIFICANT CHANGE UP (ref 3.8–10.5)

## 2024-05-28 PROCEDURE — 99223 1ST HOSP IP/OBS HIGH 75: CPT

## 2024-05-28 PROCEDURE — 99222 1ST HOSP IP/OBS MODERATE 55: CPT

## 2024-05-28 PROCEDURE — 99232 SBSQ HOSP IP/OBS MODERATE 35: CPT

## 2024-05-28 PROCEDURE — 93010 ELECTROCARDIOGRAM REPORT: CPT

## 2024-05-28 RX ORDER — SODIUM CHLORIDE 0.9 % (FLUSH) 0.9 %
1000 SYRINGE (ML) INJECTION
Refills: 0 | Status: DISCONTINUED | OUTPATIENT
Start: 2024-05-28 | End: 2024-05-29

## 2024-05-28 RX ADMIN — LEVETIRACETAM 500 MILLIGRAM(S): 100 INJECTION INTRAVENOUS at 17:16

## 2024-05-28 RX ADMIN — SERTRALINE HYDROCHLORIDE 50 MILLIGRAM(S): 100 TABLET, FILM COATED ORAL at 06:03

## 2024-05-28 RX ADMIN — Medication 100 MILLILITER(S): at 13:10

## 2024-05-28 RX ADMIN — HEPARIN SODIUM 5000 UNIT(S): 50 INJECTION, SOLUTION INTRAVENOUS at 14:19

## 2024-05-28 RX ADMIN — HEPARIN SODIUM 5000 UNIT(S): 50 INJECTION, SOLUTION INTRAVENOUS at 21:36

## 2024-05-28 RX ADMIN — Medication 2 GRAM(S): at 13:12

## 2024-05-28 RX ADMIN — LEVETIRACETAM 500 MILLIGRAM(S): 100 INJECTION INTRAVENOUS at 06:02

## 2024-05-28 RX ADMIN — PANTOPRAZOLE SODIUM 40 MILLIGRAM(S): 40 INJECTION, POWDER, FOR SOLUTION INTRAVENOUS at 13:12

## 2024-05-28 RX ADMIN — HEPARIN SODIUM 5000 UNIT(S): 50 INJECTION, SOLUTION INTRAVENOUS at 06:02

## 2024-05-28 RX ADMIN — BUPROPION HYDROCHLORIDE 150 MILLIGRAM(S): 150 TABLET, EXTENDED RELEASE ORAL at 21:36

## 2024-05-28 RX ADMIN — Medication 10 MILLILITER(S): at 21:36

## 2024-05-28 RX ADMIN — POLYETHYLENE GLYCOL 3350 17 GRAM(S): 1 POWDER ORAL at 13:11

## 2024-05-28 RX ADMIN — MIRTAZAPINE 7.5 MILLIGRAM(S): 15 TABLET, FILM COATED ORAL at 21:36

## 2024-05-28 RX ADMIN — BUPROPION HYDROCHLORIDE 150 MILLIGRAM(S): 150 TABLET, EXTENDED RELEASE ORAL at 08:49

## 2024-05-29 LAB
ADD ON TEST-SPECIMEN IN LAB: SIGNIFICANT CHANGE UP
T4 FREE SERPL-MCNC: 1.8 NG/DL — SIGNIFICANT CHANGE UP (ref 0.9–1.8)
TSH SERPL-MCNC: 1.11 UIU/ML — SIGNIFICANT CHANGE UP (ref 0.27–4.2)

## 2024-05-29 PROCEDURE — 70450 CT HEAD/BRAIN W/O DYE: CPT | Mod: 26

## 2024-05-29 PROCEDURE — 99233 SBSQ HOSP IP/OBS HIGH 50: CPT

## 2024-05-29 PROCEDURE — 71275 CT ANGIOGRAPHY CHEST: CPT | Mod: 26

## 2024-05-29 RX ORDER — SODIUM CHLORIDE 0.9 % (FLUSH) 0.9 %
1 SYRINGE (ML) INJECTION DAILY
Refills: 0 | Status: DISCONTINUED | OUTPATIENT
Start: 2024-05-29 | End: 2024-06-07

## 2024-05-29 RX ORDER — SODIUM CHLORIDE 0.9 % (FLUSH) 0.9 %
1000 SYRINGE (ML) INJECTION
Refills: 0 | Status: DISCONTINUED | OUTPATIENT
Start: 2024-05-29 | End: 2024-05-31

## 2024-05-29 RX ORDER — ACETAMINOPHEN 325 MG
1000 TABLET ORAL ONCE
Refills: 0 | Status: COMPLETED | OUTPATIENT
Start: 2024-05-29 | End: 2024-05-29

## 2024-05-29 RX ADMIN — BUPROPION HYDROCHLORIDE 150 MILLIGRAM(S): 150 TABLET, EXTENDED RELEASE ORAL at 09:25

## 2024-05-29 RX ADMIN — HEPARIN SODIUM 5000 UNIT(S): 50 INJECTION, SOLUTION INTRAVENOUS at 13:32

## 2024-05-29 RX ADMIN — LEVETIRACETAM 500 MILLIGRAM(S): 100 INJECTION INTRAVENOUS at 06:08

## 2024-05-29 RX ADMIN — PANTOPRAZOLE SODIUM 40 MILLIGRAM(S): 40 INJECTION, POWDER, FOR SOLUTION INTRAVENOUS at 11:23

## 2024-05-29 RX ADMIN — Medication 75 MILLILITER(S): at 11:26

## 2024-05-29 RX ADMIN — HEPARIN SODIUM 5000 UNIT(S): 50 INJECTION, SOLUTION INTRAVENOUS at 22:17

## 2024-05-29 RX ADMIN — Medication 1000 MILLIGRAM(S): at 19:00

## 2024-05-29 RX ADMIN — HEPARIN SODIUM 5000 UNIT(S): 50 INJECTION, SOLUTION INTRAVENOUS at 06:08

## 2024-05-29 RX ADMIN — SERTRALINE HYDROCHLORIDE 50 MILLIGRAM(S): 100 TABLET, FILM COATED ORAL at 06:09

## 2024-05-29 RX ADMIN — LEVETIRACETAM 500 MILLIGRAM(S): 100 INJECTION INTRAVENOUS at 18:33

## 2024-05-29 RX ADMIN — Medication 400 MILLIGRAM(S): at 18:32

## 2024-05-30 LAB
ANION GAP SERPL CALC-SCNC: 14 MMOL/L — SIGNIFICANT CHANGE UP (ref 7–14)
BUN SERPL-MCNC: 19 MG/DL — SIGNIFICANT CHANGE UP (ref 7–23)
CALCIUM SERPL-MCNC: 9 MG/DL — SIGNIFICANT CHANGE UP (ref 8.4–10.5)
CHLORIDE SERPL-SCNC: 104 MMOL/L — SIGNIFICANT CHANGE UP (ref 98–107)
CO2 SERPL-SCNC: 20 MMOL/L — LOW (ref 22–31)
CREAT SERPL-MCNC: 0.31 MG/DL — LOW (ref 0.5–1.3)
EGFR: 121 ML/MIN/1.73M2 — SIGNIFICANT CHANGE UP
GLUCOSE SERPL-MCNC: 107 MG/DL — HIGH (ref 70–99)
HCT VFR BLD CALC: 30.8 % — LOW (ref 34.5–45)
HGB BLD-MCNC: 10.1 G/DL — LOW (ref 11.5–15.5)
MAGNESIUM SERPL-MCNC: 2.1 MG/DL — SIGNIFICANT CHANGE UP (ref 1.6–2.6)
MCHC RBC-ENTMCNC: 26.6 PG — LOW (ref 27–34)
MCHC RBC-ENTMCNC: 32.8 GM/DL — SIGNIFICANT CHANGE UP (ref 32–36)
MCV RBC AUTO: 81.1 FL — SIGNIFICANT CHANGE UP (ref 80–100)
NRBC # BLD: 0 /100 WBCS — SIGNIFICANT CHANGE UP (ref 0–0)
NRBC # FLD: 0 K/UL — SIGNIFICANT CHANGE UP (ref 0–0)
PHOSPHATE SERPL-MCNC: 3.9 MG/DL — SIGNIFICANT CHANGE UP (ref 2.5–4.5)
PLATELET # BLD AUTO: 317 K/UL — SIGNIFICANT CHANGE UP (ref 150–400)
POTASSIUM SERPL-MCNC: 4.1 MMOL/L — SIGNIFICANT CHANGE UP (ref 3.5–5.3)
POTASSIUM SERPL-SCNC: 4.1 MMOL/L — SIGNIFICANT CHANGE UP (ref 3.5–5.3)
RBC # BLD: 3.8 M/UL — SIGNIFICANT CHANGE UP (ref 3.8–5.2)
RBC # FLD: 14.6 % — HIGH (ref 10.3–14.5)
SODIUM SERPL-SCNC: 138 MMOL/L — SIGNIFICANT CHANGE UP (ref 135–145)
WBC # BLD: 9.34 K/UL — SIGNIFICANT CHANGE UP (ref 3.8–10.5)
WBC # FLD AUTO: 9.34 K/UL — SIGNIFICANT CHANGE UP (ref 3.8–10.5)

## 2024-05-30 PROCEDURE — 99223 1ST HOSP IP/OBS HIGH 75: CPT

## 2024-05-30 PROCEDURE — 99233 SBSQ HOSP IP/OBS HIGH 50: CPT

## 2024-05-30 RX ORDER — ACETAMINOPHEN 325 MG
1000 TABLET ORAL EVERY 6 HOURS
Refills: 0 | Status: COMPLETED | OUTPATIENT
Start: 2024-05-30 | End: 2024-06-01

## 2024-05-30 RX ADMIN — Medication 75 MILLILITER(S): at 05:55

## 2024-05-30 RX ADMIN — Medication 75 MILLILITER(S): at 11:24

## 2024-05-30 RX ADMIN — Medication 1 GRAM(S): at 11:25

## 2024-05-30 RX ADMIN — LEVETIRACETAM 500 MILLIGRAM(S): 100 INJECTION INTRAVENOUS at 17:04

## 2024-05-30 RX ADMIN — HEPARIN SODIUM 5000 UNIT(S): 50 INJECTION, SOLUTION INTRAVENOUS at 05:55

## 2024-05-30 RX ADMIN — BUPROPION HYDROCHLORIDE 150 MILLIGRAM(S): 150 TABLET, EXTENDED RELEASE ORAL at 10:41

## 2024-05-30 RX ADMIN — MIRTAZAPINE 7.5 MILLIGRAM(S): 15 TABLET, FILM COATED ORAL at 21:52

## 2024-05-30 RX ADMIN — Medication 1000 MILLIGRAM(S): at 17:04

## 2024-05-30 RX ADMIN — Medication 1000 MILLIGRAM(S): at 12:19

## 2024-05-30 RX ADMIN — BUPROPION HYDROCHLORIDE 150 MILLIGRAM(S): 150 TABLET, EXTENDED RELEASE ORAL at 21:52

## 2024-05-30 RX ADMIN — PANTOPRAZOLE SODIUM 40 MILLIGRAM(S): 40 INJECTION, POWDER, FOR SOLUTION INTRAVENOUS at 11:25

## 2024-05-30 RX ADMIN — Medication 75 MILLILITER(S): at 21:53

## 2024-05-30 RX ADMIN — Medication 10 MILLILITER(S): at 21:59

## 2024-05-30 RX ADMIN — HEPARIN SODIUM 5000 UNIT(S): 50 INJECTION, SOLUTION INTRAVENOUS at 21:53

## 2024-05-30 RX ADMIN — Medication 1000 MILLIGRAM(S): at 11:25

## 2024-05-30 RX ADMIN — HEPARIN SODIUM 5000 UNIT(S): 50 INJECTION, SOLUTION INTRAVENOUS at 13:02

## 2024-05-30 RX ADMIN — Medication 1000 MILLIGRAM(S): at 18:00

## 2024-05-30 RX ADMIN — LEVETIRACETAM 500 MILLIGRAM(S): 100 INJECTION INTRAVENOUS at 05:56

## 2024-05-31 DIAGNOSIS — Z98.890 OTHER SPECIFIED POSTPROCEDURAL STATES: ICD-10-CM

## 2024-05-31 LAB
ANION GAP SERPL CALC-SCNC: 12 MMOL/L — SIGNIFICANT CHANGE UP (ref 7–14)
BUN SERPL-MCNC: 21 MG/DL — SIGNIFICANT CHANGE UP (ref 7–23)
CALCIUM SERPL-MCNC: 9.1 MG/DL — SIGNIFICANT CHANGE UP (ref 8.4–10.5)
CHLORIDE SERPL-SCNC: 103 MMOL/L — SIGNIFICANT CHANGE UP (ref 98–107)
CO2 SERPL-SCNC: 22 MMOL/L — SIGNIFICANT CHANGE UP (ref 22–31)
CREAT SERPL-MCNC: 0.28 MG/DL — LOW (ref 0.5–1.3)
EGFR: 124 ML/MIN/1.73M2 — SIGNIFICANT CHANGE UP
GLUCOSE SERPL-MCNC: 118 MG/DL — HIGH (ref 70–99)
HCT VFR BLD CALC: 33.6 % — LOW (ref 34.5–45)
HGB BLD-MCNC: 10.8 G/DL — LOW (ref 11.5–15.5)
MAGNESIUM SERPL-MCNC: 1.9 MG/DL — SIGNIFICANT CHANGE UP (ref 1.6–2.6)
MCHC RBC-ENTMCNC: 25.7 PG — LOW (ref 27–34)
MCHC RBC-ENTMCNC: 32.1 GM/DL — SIGNIFICANT CHANGE UP (ref 32–36)
MCV RBC AUTO: 80 FL — SIGNIFICANT CHANGE UP (ref 80–100)
NRBC # BLD: 0 /100 WBCS — SIGNIFICANT CHANGE UP (ref 0–0)
NRBC # FLD: 0 K/UL — SIGNIFICANT CHANGE UP (ref 0–0)
PHOSPHATE SERPL-MCNC: 3.5 MG/DL — SIGNIFICANT CHANGE UP (ref 2.5–4.5)
PLATELET # BLD AUTO: 343 K/UL — SIGNIFICANT CHANGE UP (ref 150–400)
POTASSIUM SERPL-MCNC: 3.6 MMOL/L — SIGNIFICANT CHANGE UP (ref 3.5–5.3)
POTASSIUM SERPL-SCNC: 3.6 MMOL/L — SIGNIFICANT CHANGE UP (ref 3.5–5.3)
RBC # BLD: 4.2 M/UL — SIGNIFICANT CHANGE UP (ref 3.8–5.2)
RBC # FLD: 13.9 % — SIGNIFICANT CHANGE UP (ref 10.3–14.5)
SODIUM SERPL-SCNC: 137 MMOL/L — SIGNIFICANT CHANGE UP (ref 135–145)
WBC # BLD: 9.84 K/UL — SIGNIFICANT CHANGE UP (ref 3.8–10.5)
WBC # FLD AUTO: 9.84 K/UL — SIGNIFICANT CHANGE UP (ref 3.8–10.5)

## 2024-05-31 PROCEDURE — 99223 1ST HOSP IP/OBS HIGH 75: CPT

## 2024-05-31 PROCEDURE — 99232 SBSQ HOSP IP/OBS MODERATE 35: CPT

## 2024-05-31 RX ORDER — CEFTRIAXONE SODIUM 500 MG
1000 VIAL (EA) INJECTION EVERY 24 HOURS
Refills: 0 | Status: COMPLETED | OUTPATIENT
Start: 2024-05-31 | End: 2024-06-02

## 2024-05-31 RX ADMIN — BUPROPION HYDROCHLORIDE 150 MILLIGRAM(S): 150 TABLET, EXTENDED RELEASE ORAL at 23:11

## 2024-05-31 RX ADMIN — Medication 1000 MILLIGRAM(S): at 09:05

## 2024-05-31 RX ADMIN — Medication 1000 MILLIGRAM(S): at 23:11

## 2024-05-31 RX ADMIN — HEPARIN SODIUM 5000 UNIT(S): 50 INJECTION, SOLUTION INTRAVENOUS at 06:54

## 2024-05-31 RX ADMIN — PANTOPRAZOLE SODIUM 40 MILLIGRAM(S): 40 INJECTION, POWDER, FOR SOLUTION INTRAVENOUS at 11:42

## 2024-05-31 RX ADMIN — MIRTAZAPINE 7.5 MILLIGRAM(S): 15 TABLET, FILM COATED ORAL at 23:11

## 2024-05-31 RX ADMIN — Medication 1000 MILLIGRAM(S): at 10:05

## 2024-05-31 RX ADMIN — Medication 1000 MILLIGRAM(S): at 01:45

## 2024-05-31 RX ADMIN — Medication 1 GRAM(S): at 11:42

## 2024-05-31 RX ADMIN — BUPROPION HYDROCHLORIDE 150 MILLIGRAM(S): 150 TABLET, EXTENDED RELEASE ORAL at 09:01

## 2024-05-31 RX ADMIN — Medication 1000 MILLIGRAM(S): at 15:02

## 2024-05-31 RX ADMIN — LEVETIRACETAM 500 MILLIGRAM(S): 100 INJECTION INTRAVENOUS at 17:52

## 2024-05-31 RX ADMIN — Medication 100 MILLIGRAM(S): at 13:54

## 2024-05-31 RX ADMIN — Medication 1000 MILLIGRAM(S): at 14:02

## 2024-05-31 RX ADMIN — LEVETIRACETAM 500 MILLIGRAM(S): 100 INJECTION INTRAVENOUS at 06:54

## 2024-05-31 RX ADMIN — HEPARIN SODIUM 5000 UNIT(S): 50 INJECTION, SOLUTION INTRAVENOUS at 13:54

## 2024-05-31 RX ADMIN — HEPARIN SODIUM 5000 UNIT(S): 50 INJECTION, SOLUTION INTRAVENOUS at 23:12

## 2024-05-31 RX ADMIN — Medication 1000 MILLIGRAM(S): at 02:45

## 2024-05-31 RX ADMIN — SERTRALINE HYDROCHLORIDE 50 MILLIGRAM(S): 100 TABLET, FILM COATED ORAL at 06:54

## 2024-06-01 LAB
-  AMOXICILLIN/CLAVULANIC ACID: SIGNIFICANT CHANGE UP
-  AMOXICILLIN/CLAVULANIC ACID: SIGNIFICANT CHANGE UP
-  AMPICILLIN/SULBACTAM: SIGNIFICANT CHANGE UP
-  AMPICILLIN/SULBACTAM: SIGNIFICANT CHANGE UP
-  AMPICILLIN: SIGNIFICANT CHANGE UP
-  AMPICILLIN: SIGNIFICANT CHANGE UP
-  AZTREONAM: SIGNIFICANT CHANGE UP
-  AZTREONAM: SIGNIFICANT CHANGE UP
-  CEFAZOLIN: SIGNIFICANT CHANGE UP
-  CEFAZOLIN: SIGNIFICANT CHANGE UP
-  CEFEPIME: SIGNIFICANT CHANGE UP
-  CEFEPIME: SIGNIFICANT CHANGE UP
-  CEFOXITIN: SIGNIFICANT CHANGE UP
-  CEFOXITIN: SIGNIFICANT CHANGE UP
-  CEFTRIAXONE: SIGNIFICANT CHANGE UP
-  CEFTRIAXONE: SIGNIFICANT CHANGE UP
-  CEFUROXIME: SIGNIFICANT CHANGE UP
-  CIPROFLOXACIN: SIGNIFICANT CHANGE UP
-  CIPROFLOXACIN: SIGNIFICANT CHANGE UP
-  ERTAPENEM: SIGNIFICANT CHANGE UP
-  ERTAPENEM: SIGNIFICANT CHANGE UP
-  GENTAMICIN: SIGNIFICANT CHANGE UP
-  GENTAMICIN: SIGNIFICANT CHANGE UP
-  IMIPENEM: SIGNIFICANT CHANGE UP
-  IMIPENEM: SIGNIFICANT CHANGE UP
-  LEVOFLOXACIN: SIGNIFICANT CHANGE UP
-  LEVOFLOXACIN: SIGNIFICANT CHANGE UP
-  MEROPENEM: SIGNIFICANT CHANGE UP
-  MEROPENEM: SIGNIFICANT CHANGE UP
-  NITROFURANTOIN: SIGNIFICANT CHANGE UP
-  NITROFURANTOIN: SIGNIFICANT CHANGE UP
-  PIPERACILLIN/TAZOBACTAM: SIGNIFICANT CHANGE UP
-  PIPERACILLIN/TAZOBACTAM: SIGNIFICANT CHANGE UP
-  TOBRAMYCIN: SIGNIFICANT CHANGE UP
-  TOBRAMYCIN: SIGNIFICANT CHANGE UP
-  TRIMETHOPRIM/SULFAMETHOXAZOLE: SIGNIFICANT CHANGE UP
-  TRIMETHOPRIM/SULFAMETHOXAZOLE: SIGNIFICANT CHANGE UP
ANION GAP SERPL CALC-SCNC: 14 MMOL/L — SIGNIFICANT CHANGE UP (ref 7–14)
BUN SERPL-MCNC: 22 MG/DL — SIGNIFICANT CHANGE UP (ref 7–23)
CALCIUM SERPL-MCNC: 9.4 MG/DL — SIGNIFICANT CHANGE UP (ref 8.4–10.5)
CHLORIDE SERPL-SCNC: 101 MMOL/L — SIGNIFICANT CHANGE UP (ref 98–107)
CO2 SERPL-SCNC: 22 MMOL/L — SIGNIFICANT CHANGE UP (ref 22–31)
CREAT SERPL-MCNC: 0.3 MG/DL — LOW (ref 0.5–1.3)
CULTURE RESULTS: ABNORMAL
EGFR: 122 ML/MIN/1.73M2 — SIGNIFICANT CHANGE UP
GLUCOSE SERPL-MCNC: 131 MG/DL — HIGH (ref 70–99)
HCT VFR BLD CALC: 34.7 % — SIGNIFICANT CHANGE UP (ref 34.5–45)
HGB BLD-MCNC: 11.6 G/DL — SIGNIFICANT CHANGE UP (ref 11.5–15.5)
MAGNESIUM SERPL-MCNC: 2 MG/DL — SIGNIFICANT CHANGE UP (ref 1.6–2.6)
MCHC RBC-ENTMCNC: 26.1 PG — LOW (ref 27–34)
MCHC RBC-ENTMCNC: 33.4 GM/DL — SIGNIFICANT CHANGE UP (ref 32–36)
MCV RBC AUTO: 78.2 FL — LOW (ref 80–100)
METHOD TYPE: SIGNIFICANT CHANGE UP
METHOD TYPE: SIGNIFICANT CHANGE UP
NRBC # BLD: 0 /100 WBCS — SIGNIFICANT CHANGE UP (ref 0–0)
NRBC # FLD: 0 K/UL — SIGNIFICANT CHANGE UP (ref 0–0)
ORGANISM # SPEC MICROSCOPIC CNT: ABNORMAL
PHOSPHATE SERPL-MCNC: 3.5 MG/DL — SIGNIFICANT CHANGE UP (ref 2.5–4.5)
PLATELET # BLD AUTO: 369 K/UL — SIGNIFICANT CHANGE UP (ref 150–400)
POTASSIUM SERPL-MCNC: 3.9 MMOL/L — SIGNIFICANT CHANGE UP (ref 3.5–5.3)
POTASSIUM SERPL-SCNC: 3.9 MMOL/L — SIGNIFICANT CHANGE UP (ref 3.5–5.3)
RBC # BLD: 4.44 M/UL — SIGNIFICANT CHANGE UP (ref 3.8–5.2)
RBC # FLD: 14.3 % — SIGNIFICANT CHANGE UP (ref 10.3–14.5)
SODIUM SERPL-SCNC: 137 MMOL/L — SIGNIFICANT CHANGE UP (ref 135–145)
SPECIMEN SOURCE: SIGNIFICANT CHANGE UP
WBC # BLD: 9.77 K/UL — SIGNIFICANT CHANGE UP (ref 3.8–10.5)
WBC # FLD AUTO: 9.77 K/UL — SIGNIFICANT CHANGE UP (ref 3.8–10.5)

## 2024-06-01 PROCEDURE — 99232 SBSQ HOSP IP/OBS MODERATE 35: CPT

## 2024-06-01 RX ADMIN — BUPROPION HYDROCHLORIDE 150 MILLIGRAM(S): 150 TABLET, EXTENDED RELEASE ORAL at 10:51

## 2024-06-01 RX ADMIN — HEPARIN SODIUM 5000 UNIT(S): 50 INJECTION, SOLUTION INTRAVENOUS at 05:46

## 2024-06-01 RX ADMIN — MIRTAZAPINE 7.5 MILLIGRAM(S): 15 TABLET, FILM COATED ORAL at 21:52

## 2024-06-01 RX ADMIN — SERTRALINE HYDROCHLORIDE 50 MILLIGRAM(S): 100 TABLET, FILM COATED ORAL at 05:45

## 2024-06-01 RX ADMIN — BUPROPION HYDROCHLORIDE 150 MILLIGRAM(S): 150 TABLET, EXTENDED RELEASE ORAL at 21:52

## 2024-06-01 RX ADMIN — PANTOPRAZOLE SODIUM 40 MILLIGRAM(S): 40 INJECTION, POWDER, FOR SOLUTION INTRAVENOUS at 13:31

## 2024-06-01 RX ADMIN — Medication 1000 MILLIGRAM(S): at 00:11

## 2024-06-01 RX ADMIN — Medication 100 MILLIGRAM(S): at 13:31

## 2024-06-01 RX ADMIN — HEPARIN SODIUM 5000 UNIT(S): 50 INJECTION, SOLUTION INTRAVENOUS at 21:52

## 2024-06-01 RX ADMIN — HEPARIN SODIUM 5000 UNIT(S): 50 INJECTION, SOLUTION INTRAVENOUS at 13:32

## 2024-06-01 RX ADMIN — LEVETIRACETAM 500 MILLIGRAM(S): 100 INJECTION INTRAVENOUS at 05:45

## 2024-06-01 RX ADMIN — LEVETIRACETAM 500 MILLIGRAM(S): 100 INJECTION INTRAVENOUS at 18:15

## 2024-06-01 RX ADMIN — Medication 1 GRAM(S): at 13:32

## 2024-06-02 LAB
ANION GAP SERPL CALC-SCNC: 13 MMOL/L — SIGNIFICANT CHANGE UP (ref 7–14)
BUN SERPL-MCNC: 29 MG/DL — HIGH (ref 7–23)
CALCIUM SERPL-MCNC: 9.5 MG/DL — SIGNIFICANT CHANGE UP (ref 8.4–10.5)
CHLORIDE SERPL-SCNC: 104 MMOL/L — SIGNIFICANT CHANGE UP (ref 98–107)
CO2 SERPL-SCNC: 23 MMOL/L — SIGNIFICANT CHANGE UP (ref 22–31)
CREAT SERPL-MCNC: 0.33 MG/DL — LOW (ref 0.5–1.3)
EGFR: 119 ML/MIN/1.73M2 — SIGNIFICANT CHANGE UP
GLUCOSE SERPL-MCNC: 155 MG/DL — HIGH (ref 70–99)
HCT VFR BLD CALC: 37.4 % — SIGNIFICANT CHANGE UP (ref 34.5–45)
HGB BLD-MCNC: 12.2 G/DL — SIGNIFICANT CHANGE UP (ref 11.5–15.5)
MAGNESIUM SERPL-MCNC: 2.3 MG/DL — SIGNIFICANT CHANGE UP (ref 1.6–2.6)
MCHC RBC-ENTMCNC: 25.9 PG — LOW (ref 27–34)
MCHC RBC-ENTMCNC: 32.6 GM/DL — SIGNIFICANT CHANGE UP (ref 32–36)
MCV RBC AUTO: 79.4 FL — LOW (ref 80–100)
NRBC # BLD: 0 /100 WBCS — SIGNIFICANT CHANGE UP (ref 0–0)
NRBC # FLD: 0 K/UL — SIGNIFICANT CHANGE UP (ref 0–0)
PHOSPHATE SERPL-MCNC: 3.8 MG/DL — SIGNIFICANT CHANGE UP (ref 2.5–4.5)
PLATELET # BLD AUTO: 435 K/UL — HIGH (ref 150–400)
POTASSIUM SERPL-MCNC: 3.9 MMOL/L — SIGNIFICANT CHANGE UP (ref 3.5–5.3)
POTASSIUM SERPL-SCNC: 3.9 MMOL/L — SIGNIFICANT CHANGE UP (ref 3.5–5.3)
RBC # BLD: 4.71 M/UL — SIGNIFICANT CHANGE UP (ref 3.8–5.2)
RBC # FLD: 14.6 % — HIGH (ref 10.3–14.5)
SODIUM SERPL-SCNC: 140 MMOL/L — SIGNIFICANT CHANGE UP (ref 135–145)
WBC # BLD: 11.62 K/UL — HIGH (ref 3.8–10.5)
WBC # FLD AUTO: 11.62 K/UL — HIGH (ref 3.8–10.5)

## 2024-06-02 PROCEDURE — 71045 X-RAY EXAM CHEST 1 VIEW: CPT | Mod: 26

## 2024-06-02 PROCEDURE — 99232 SBSQ HOSP IP/OBS MODERATE 35: CPT

## 2024-06-02 PROCEDURE — 70450 CT HEAD/BRAIN W/O DYE: CPT | Mod: 26

## 2024-06-02 RX ADMIN — HEPARIN SODIUM 5000 UNIT(S): 50 INJECTION, SOLUTION INTRAVENOUS at 14:49

## 2024-06-02 RX ADMIN — BUPROPION HYDROCHLORIDE 150 MILLIGRAM(S): 150 TABLET, EXTENDED RELEASE ORAL at 22:20

## 2024-06-02 RX ADMIN — MIRTAZAPINE 7.5 MILLIGRAM(S): 15 TABLET, FILM COATED ORAL at 22:20

## 2024-06-02 RX ADMIN — HEPARIN SODIUM 5000 UNIT(S): 50 INJECTION, SOLUTION INTRAVENOUS at 06:28

## 2024-06-02 RX ADMIN — Medication 1 GRAM(S): at 12:35

## 2024-06-02 RX ADMIN — BUPROPION HYDROCHLORIDE 150 MILLIGRAM(S): 150 TABLET, EXTENDED RELEASE ORAL at 09:35

## 2024-06-02 RX ADMIN — LEVETIRACETAM 500 MILLIGRAM(S): 100 INJECTION INTRAVENOUS at 18:24

## 2024-06-02 RX ADMIN — SERTRALINE HYDROCHLORIDE 50 MILLIGRAM(S): 100 TABLET, FILM COATED ORAL at 06:28

## 2024-06-02 RX ADMIN — LEVETIRACETAM 500 MILLIGRAM(S): 100 INJECTION INTRAVENOUS at 06:28

## 2024-06-02 RX ADMIN — Medication 100 MILLIGRAM(S): at 14:48

## 2024-06-02 RX ADMIN — HEPARIN SODIUM 5000 UNIT(S): 50 INJECTION, SOLUTION INTRAVENOUS at 22:20

## 2024-06-02 RX ADMIN — PANTOPRAZOLE SODIUM 40 MILLIGRAM(S): 40 INJECTION, POWDER, FOR SOLUTION INTRAVENOUS at 12:35

## 2024-06-03 ENCOUNTER — APPOINTMENT (OUTPATIENT)
Dept: NEUROLOGY | Facility: CLINIC | Age: 59
End: 2024-06-03

## 2024-06-03 DIAGNOSIS — K59.00 CONSTIPATION, UNSPECIFIED: ICD-10-CM

## 2024-06-03 DIAGNOSIS — E87.1 HYPO-OSMOLALITY AND HYPONATREMIA: ICD-10-CM

## 2024-06-03 DIAGNOSIS — R50.9 FEVER, UNSPECIFIED: ICD-10-CM

## 2024-06-03 DIAGNOSIS — R06.03 ACUTE RESPIRATORY DISTRESS: ICD-10-CM

## 2024-06-03 LAB
ANION GAP SERPL CALC-SCNC: 13 MMOL/L — SIGNIFICANT CHANGE UP (ref 7–14)
ANION GAP SERPL CALC-SCNC: 14 MMOL/L — SIGNIFICANT CHANGE UP (ref 7–14)
APPEARANCE CSF: CLEAR — SIGNIFICANT CHANGE UP
APPEARANCE SPUN FLD: COLORLESS — SIGNIFICANT CHANGE UP
APPEARANCE UR: CLEAR — SIGNIFICANT CHANGE UP
BASE EXCESS BLDA CALC-SCNC: 2.9 MMOL/L — SIGNIFICANT CHANGE UP (ref -2–3)
BILIRUB UR-MCNC: NEGATIVE — SIGNIFICANT CHANGE UP
BLD GP AB SCN SERPL QL: NEGATIVE — SIGNIFICANT CHANGE UP
BLOOD GAS ARTERIAL - LYTES,HGB,ICA,LACT RESULT: SIGNIFICANT CHANGE UP
BUN SERPL-MCNC: 32 MG/DL — HIGH (ref 7–23)
BUN SERPL-MCNC: 33 MG/DL — HIGH (ref 7–23)
CALCIUM SERPL-MCNC: 9.3 MG/DL — SIGNIFICANT CHANGE UP (ref 8.4–10.5)
CALCIUM SERPL-MCNC: 9.4 MG/DL — SIGNIFICANT CHANGE UP (ref 8.4–10.5)
CHLORIDE SERPL-SCNC: 103 MMOL/L — SIGNIFICANT CHANGE UP (ref 98–107)
CHLORIDE SERPL-SCNC: 104 MMOL/L — SIGNIFICANT CHANGE UP (ref 98–107)
CO2 BLDA-SCNC: 27 MMOL/L — HIGH (ref 19–24)
CO2 SERPL-SCNC: 22 MMOL/L — SIGNIFICANT CHANGE UP (ref 22–31)
CO2 SERPL-SCNC: 24 MMOL/L — SIGNIFICANT CHANGE UP (ref 22–31)
COLOR CSF: COLORLESS — SIGNIFICANT CHANGE UP
COLOR SPEC: YELLOW — SIGNIFICANT CHANGE UP
CREAT SERPL-MCNC: 0.41 MG/DL — LOW (ref 0.5–1.3)
CREAT SERPL-MCNC: 0.44 MG/DL — LOW (ref 0.5–1.3)
CULTURE RESULTS: ABNORMAL
DIFF PNL FLD: NEGATIVE — SIGNIFICANT CHANGE UP
EGFR: 111 ML/MIN/1.73M2 — SIGNIFICANT CHANGE UP
EGFR: 113 ML/MIN/1.73M2 — SIGNIFICANT CHANGE UP
GLUCOSE BLDC GLUCOMTR-MCNC: 109 MG/DL — HIGH (ref 70–99)
GLUCOSE BLDC GLUCOMTR-MCNC: 125 MG/DL — HIGH (ref 70–99)
GLUCOSE BLDC GLUCOMTR-MCNC: 147 MG/DL — HIGH (ref 70–99)
GLUCOSE CSF-MCNC: 87 MG/DL — HIGH (ref 40–70)
GLUCOSE SERPL-MCNC: 129 MG/DL — HIGH (ref 70–99)
GLUCOSE SERPL-MCNC: 131 MG/DL — HIGH (ref 70–99)
GLUCOSE UR QL: NEGATIVE MG/DL — SIGNIFICANT CHANGE UP
GRAM STN FLD: SIGNIFICANT CHANGE UP
HCO3 BLDA-SCNC: 26 MMOL/L — SIGNIFICANT CHANGE UP (ref 21–28)
HCT VFR BLD CALC: 35.3 % — SIGNIFICANT CHANGE UP (ref 34.5–45)
HCT VFR BLD CALC: 38.5 % — SIGNIFICANT CHANGE UP (ref 34.5–45)
HGB BLD-MCNC: 11.7 G/DL — SIGNIFICANT CHANGE UP (ref 11.5–15.5)
HGB BLD-MCNC: 12.4 G/DL — SIGNIFICANT CHANGE UP (ref 11.5–15.5)
HOROWITZ INDEX BLDA+IHG-RTO: 21 — SIGNIFICANT CHANGE UP
KETONES UR-MCNC: NEGATIVE MG/DL — SIGNIFICANT CHANGE UP
LEUKOCYTE ESTERASE UR-ACNC: NEGATIVE — SIGNIFICANT CHANGE UP
LYMPHOCYTES # CSF: 26 % — SIGNIFICANT CHANGE UP
MAGNESIUM SERPL-MCNC: 2.3 MG/DL — SIGNIFICANT CHANGE UP (ref 1.6–2.6)
MAGNESIUM SERPL-MCNC: 2.7 MG/DL — HIGH (ref 1.6–2.6)
MCHC RBC-ENTMCNC: 25.9 PG — LOW (ref 27–34)
MCHC RBC-ENTMCNC: 26.4 PG — LOW (ref 27–34)
MCHC RBC-ENTMCNC: 32.2 GM/DL — SIGNIFICANT CHANGE UP (ref 32–36)
MCHC RBC-ENTMCNC: 33.1 GM/DL — SIGNIFICANT CHANGE UP (ref 32–36)
MCV RBC AUTO: 79.7 FL — LOW (ref 80–100)
MCV RBC AUTO: 80.5 FL — SIGNIFICANT CHANGE UP (ref 80–100)
MONOS+MACROS NFR CSF: 74 — SIGNIFICANT CHANGE UP
MRSA PCR RESULT.: SIGNIFICANT CHANGE UP
NEUTROPHILS # CSF: 0 % — SIGNIFICANT CHANGE UP
NITRITE UR-MCNC: NEGATIVE — SIGNIFICANT CHANGE UP
NRBC # BLD: 0 /100 WBCS — SIGNIFICANT CHANGE UP (ref 0–0)
NRBC # BLD: 0 /100 WBCS — SIGNIFICANT CHANGE UP (ref 0–0)
NRBC # FLD: 0 K/UL — SIGNIFICANT CHANGE UP (ref 0–0)
NRBC # FLD: 0 K/UL — SIGNIFICANT CHANGE UP (ref 0–0)
NRBC NFR CSF: 4 CELLS/UL — SIGNIFICANT CHANGE UP (ref 0–5)
PCO2 BLDA: 33 MMHG — SIGNIFICANT CHANGE UP (ref 32–45)
PH BLDA: 7.5 — HIGH (ref 7.35–7.45)
PH UR: 5.5 — SIGNIFICANT CHANGE UP (ref 5–8)
PHOSPHATE SERPL-MCNC: 3.8 MG/DL — SIGNIFICANT CHANGE UP (ref 2.5–4.5)
PHOSPHATE SERPL-MCNC: 4.8 MG/DL — HIGH (ref 2.5–4.5)
PLATELET # BLD AUTO: 442 K/UL — HIGH (ref 150–400)
PLATELET # BLD AUTO: 531 K/UL — HIGH (ref 150–400)
PO2 BLDA: 188 MMHG — HIGH (ref 83–108)
POTASSIUM SERPL-MCNC: 4.2 MMOL/L — SIGNIFICANT CHANGE UP (ref 3.5–5.3)
POTASSIUM SERPL-MCNC: 4.8 MMOL/L — SIGNIFICANT CHANGE UP (ref 3.5–5.3)
POTASSIUM SERPL-SCNC: 4.2 MMOL/L — SIGNIFICANT CHANGE UP (ref 3.5–5.3)
POTASSIUM SERPL-SCNC: 4.8 MMOL/L — SIGNIFICANT CHANGE UP (ref 3.5–5.3)
PROT CSF-MCNC: 7 MG/DL — LOW (ref 15–45)
PROT UR-MCNC: SIGNIFICANT CHANGE UP MG/DL
RBC # BLD: 4.43 M/UL — SIGNIFICANT CHANGE UP (ref 3.8–5.2)
RBC # BLD: 4.78 M/UL — SIGNIFICANT CHANGE UP (ref 3.8–5.2)
RBC # CSF: 114 CELLS/UL — HIGH (ref 0–0)
RBC # FLD: 15.1 % — HIGH (ref 10.3–14.5)
RBC # FLD: 15.4 % — HIGH (ref 10.3–14.5)
RH IG SCN BLD-IMP: NEGATIVE — SIGNIFICANT CHANGE UP
S AUREUS DNA NOSE QL NAA+PROBE: DETECTED
SAO2 % BLDA: 98.6 % — HIGH (ref 94–98)
SODIUM SERPL-SCNC: 139 MMOL/L — SIGNIFICANT CHANGE UP (ref 135–145)
SODIUM SERPL-SCNC: 141 MMOL/L — SIGNIFICANT CHANGE UP (ref 135–145)
SP GR SPEC: 1.06 — HIGH (ref 1–1.03)
SPECIMEN SOURCE: SIGNIFICANT CHANGE UP
TOTAL CELLS COUNTED, SPINAL FLUID: 55 CELLS — SIGNIFICANT CHANGE UP
TUBE TYPE: SIGNIFICANT CHANGE UP
UROBILINOGEN FLD QL: 0.2 MG/DL — SIGNIFICANT CHANGE UP (ref 0.2–1)
WBC # BLD: 14.01 K/UL — HIGH (ref 3.8–10.5)
WBC # BLD: 14.75 K/UL — HIGH (ref 3.8–10.5)
WBC # FLD AUTO: 14.01 K/UL — HIGH (ref 3.8–10.5)
WBC # FLD AUTO: 14.75 K/UL — HIGH (ref 3.8–10.5)

## 2024-06-03 PROCEDURE — 70450 CT HEAD/BRAIN W/O DYE: CPT | Mod: 26

## 2024-06-03 PROCEDURE — 99222 1ST HOSP IP/OBS MODERATE 55: CPT

## 2024-06-03 PROCEDURE — 71045 X-RAY EXAM CHEST 1 VIEW: CPT | Mod: 26

## 2024-06-03 PROCEDURE — 71260 CT THORAX DX C+: CPT | Mod: 26

## 2024-06-03 PROCEDURE — 71045 X-RAY EXAM CHEST 1 VIEW: CPT | Mod: 26,77

## 2024-06-03 PROCEDURE — 74177 CT ABD & PELVIS W/CONTRAST: CPT | Mod: 26

## 2024-06-03 PROCEDURE — 93010 ELECTROCARDIOGRAM REPORT: CPT

## 2024-06-03 RX ORDER — ACETAMINOPHEN 325 MG
1000 TABLET ORAL ONCE
Refills: 0 | Status: COMPLETED | OUTPATIENT
Start: 2024-06-03 | End: 2024-06-03

## 2024-06-03 RX ORDER — SODIUM CHLORIDE 0.9 % (FLUSH) 0.9 %
1000 SYRINGE (ML) INJECTION
Refills: 0 | Status: DISCONTINUED | OUTPATIENT
Start: 2024-06-03 | End: 2024-06-05

## 2024-06-03 RX ORDER — SODIUM CHLORIDE 0.9 % (FLUSH) 0.9 %
500 SYRINGE (ML) INJECTION ONCE
Refills: 0 | Status: COMPLETED | OUTPATIENT
Start: 2024-06-03 | End: 2024-06-03

## 2024-06-03 RX ORDER — PROPOFOL 10 MG/ML
50 INJECTION, EMULSION INTRAVENOUS
Qty: 1000 | Refills: 0 | Status: DISCONTINUED | OUTPATIENT
Start: 2024-06-03 | End: 2024-06-04

## 2024-06-03 RX ORDER — ACETAMINOPHEN 325 MG
975 TABLET ORAL EVERY 6 HOURS
Refills: 0 | Status: DISCONTINUED | OUTPATIENT
Start: 2024-06-03 | End: 2024-06-07

## 2024-06-03 RX ORDER — SODIUM CHLORIDE 0.9 % (FLUSH) 0.9 %
1000 SYRINGE (ML) INJECTION ONCE
Refills: 0 | Status: COMPLETED | OUTPATIENT
Start: 2024-06-03 | End: 2024-06-03

## 2024-06-03 RX ADMIN — PANTOPRAZOLE SODIUM 40 MILLIGRAM(S): 40 INJECTION, POWDER, FOR SOLUTION INTRAVENOUS at 12:23

## 2024-06-03 RX ADMIN — HEPARIN SODIUM 5000 UNIT(S): 50 INJECTION, SOLUTION INTRAVENOUS at 06:41

## 2024-06-03 RX ADMIN — LEVETIRACETAM 500 MILLIGRAM(S): 100 INJECTION INTRAVENOUS at 06:41

## 2024-06-03 RX ADMIN — SERTRALINE HYDROCHLORIDE 50 MILLIGRAM(S): 100 TABLET, FILM COATED ORAL at 06:41

## 2024-06-03 RX ADMIN — Medication 975 MILLIGRAM(S): at 19:00

## 2024-06-03 RX ADMIN — Medication 1 GRAM(S): at 14:45

## 2024-06-03 RX ADMIN — Medication 1000 MILLILITER(S): at 08:39

## 2024-06-03 RX ADMIN — LEVETIRACETAM 500 MILLIGRAM(S): 100 INJECTION INTRAVENOUS at 18:38

## 2024-06-03 RX ADMIN — HEPARIN SODIUM 5000 UNIT(S): 50 INJECTION, SOLUTION INTRAVENOUS at 13:24

## 2024-06-03 RX ADMIN — Medication 1 APPLICATION(S): at 15:46

## 2024-06-03 RX ADMIN — Medication 400 MILLIGRAM(S): at 08:29

## 2024-06-03 RX ADMIN — PROPOFOL 16.5 MICROGRAM(S)/KG/MIN: 10 INJECTION, EMULSION INTRAVENOUS at 13:24

## 2024-06-03 RX ADMIN — Medication 15 MILLILITER(S): at 18:38

## 2024-06-03 RX ADMIN — HEPARIN SODIUM 5000 UNIT(S): 50 INJECTION, SOLUTION INTRAVENOUS at 22:08

## 2024-06-03 RX ADMIN — Medication 975 MILLIGRAM(S): at 18:30

## 2024-06-03 RX ADMIN — MIRTAZAPINE 7.5 MILLIGRAM(S): 15 TABLET, FILM COATED ORAL at 22:08

## 2024-06-03 RX ADMIN — BUPROPION HYDROCHLORIDE 150 MILLIGRAM(S): 150 TABLET, EXTENDED RELEASE ORAL at 14:45

## 2024-06-03 RX ADMIN — Medication 500 MILLILITER(S): at 16:46

## 2024-06-03 RX ADMIN — Medication 80 MILLILITER(S): at 11:23

## 2024-06-04 LAB
ANION GAP SERPL CALC-SCNC: 13 MMOL/L — SIGNIFICANT CHANGE UP (ref 7–14)
BASE EXCESS BLDV CALC-SCNC: -0.3 MMOL/L — SIGNIFICANT CHANGE UP (ref -2–3)
BLOOD GAS VENOUS COMPREHENSIVE RESULT: SIGNIFICANT CHANGE UP
BUN SERPL-MCNC: 31 MG/DL — HIGH (ref 7–23)
CALCIUM SERPL-MCNC: 9.1 MG/DL — SIGNIFICANT CHANGE UP (ref 8.4–10.5)
CHLORIDE BLDV-SCNC: 108 MMOL/L — SIGNIFICANT CHANGE UP (ref 96–108)
CHLORIDE SERPL-SCNC: 107 MMOL/L — SIGNIFICANT CHANGE UP (ref 98–107)
CO2 BLDV-SCNC: 25.1 MMOL/L — SIGNIFICANT CHANGE UP (ref 22–26)
CO2 SERPL-SCNC: 20 MMOL/L — LOW (ref 22–31)
CREAT SERPL-MCNC: 0.39 MG/DL — LOW (ref 0.5–1.3)
CULTURE RESULTS: NO GROWTH — SIGNIFICANT CHANGE UP
EGFR: 115 ML/MIN/1.73M2 — SIGNIFICANT CHANGE UP
GAS PNL BLDV: 138 MMOL/L — SIGNIFICANT CHANGE UP (ref 136–145)
GLUCOSE BLDC GLUCOMTR-MCNC: 105 MG/DL — HIGH (ref 70–99)
GLUCOSE BLDC GLUCOMTR-MCNC: 107 MG/DL — HIGH (ref 70–99)
GLUCOSE BLDV-MCNC: 103 MG/DL — HIGH (ref 70–99)
GLUCOSE SERPL-MCNC: 110 MG/DL — HIGH (ref 70–99)
HCO3 BLDV-SCNC: 24 MMOL/L — SIGNIFICANT CHANGE UP (ref 22–29)
HCT VFR BLD CALC: 31.5 % — LOW (ref 34.5–45)
HCT VFR BLDA CALC: 32 % — LOW (ref 34.5–46.5)
HGB BLD CALC-MCNC: 10.6 G/DL — LOW (ref 11.7–16.1)
HGB BLD-MCNC: 10.2 G/DL — LOW (ref 11.5–15.5)
LACTATE BLDV-MCNC: 0.7 MMOL/L — SIGNIFICANT CHANGE UP (ref 0.5–2)
MAGNESIUM SERPL-MCNC: 2.3 MG/DL — SIGNIFICANT CHANGE UP (ref 1.6–2.6)
MCHC RBC-ENTMCNC: 26.6 PG — LOW (ref 27–34)
MCHC RBC-ENTMCNC: 32.4 GM/DL — SIGNIFICANT CHANGE UP (ref 32–36)
MCV RBC AUTO: 82 FL — SIGNIFICANT CHANGE UP (ref 80–100)
NRBC # BLD: 0 /100 WBCS — SIGNIFICANT CHANGE UP (ref 0–0)
NRBC # FLD: 0 K/UL — SIGNIFICANT CHANGE UP (ref 0–0)
PCO2 BLDV: 37 MMHG — LOW (ref 39–52)
PH BLDV: 7.42 — SIGNIFICANT CHANGE UP (ref 7.32–7.43)
PHOSPHATE SERPL-MCNC: 4.6 MG/DL — HIGH (ref 2.5–4.5)
PLATELET # BLD AUTO: 335 K/UL — SIGNIFICANT CHANGE UP (ref 150–400)
PO2 BLDV: 137 MMHG — HIGH (ref 25–45)
POTASSIUM BLDV-SCNC: 4.1 MMOL/L — SIGNIFICANT CHANGE UP (ref 3.5–5.1)
POTASSIUM SERPL-MCNC: 4.1 MMOL/L — SIGNIFICANT CHANGE UP (ref 3.5–5.3)
POTASSIUM SERPL-SCNC: 4.1 MMOL/L — SIGNIFICANT CHANGE UP (ref 3.5–5.3)
RBC # BLD: 3.84 M/UL — SIGNIFICANT CHANGE UP (ref 3.8–5.2)
RBC # FLD: 15.2 % — HIGH (ref 10.3–14.5)
SAO2 % BLDV: 98.1 % — HIGH (ref 67–88)
SODIUM SERPL-SCNC: 140 MMOL/L — SIGNIFICANT CHANGE UP (ref 135–145)
SPECIMEN SOURCE: SIGNIFICANT CHANGE UP
WBC # BLD: 8.87 K/UL — SIGNIFICANT CHANGE UP (ref 3.8–10.5)
WBC # FLD AUTO: 8.87 K/UL — SIGNIFICANT CHANGE UP (ref 3.8–10.5)

## 2024-06-04 PROCEDURE — 99291 CRITICAL CARE FIRST HOUR: CPT

## 2024-06-04 PROCEDURE — 93010 ELECTROCARDIOGRAM REPORT: CPT

## 2024-06-04 PROCEDURE — 99292 CRITICAL CARE ADDL 30 MIN: CPT

## 2024-06-04 PROCEDURE — 99232 SBSQ HOSP IP/OBS MODERATE 35: CPT

## 2024-06-04 PROCEDURE — 70450 CT HEAD/BRAIN W/O DYE: CPT | Mod: 26

## 2024-06-04 RX ORDER — DEXAMETHASONE 1 MG/1
10 TABLET ORAL EVERY 8 HOURS
Refills: 0 | Status: DISCONTINUED | OUTPATIENT
Start: 2024-06-04 | End: 2024-06-04

## 2024-06-04 RX ORDER — OXYCODONE HYDROCHLORIDE 100 MG/5ML
5 SOLUTION ORAL ONCE
Refills: 0 | Status: DISCONTINUED | OUTPATIENT
Start: 2024-06-04 | End: 2024-06-04

## 2024-06-04 RX ORDER — DEXAMETHASONE 1 MG/1
4 TABLET ORAL EVERY 6 HOURS
Refills: 0 | Status: DISCONTINUED | OUTPATIENT
Start: 2024-06-04 | End: 2024-06-04

## 2024-06-04 RX ORDER — DEXAMETHASONE 1 MG/1
10 TABLET ORAL EVERY 8 HOURS
Refills: 0 | Status: COMPLETED | OUTPATIENT
Start: 2024-06-04 | End: 2024-06-05

## 2024-06-04 RX ORDER — IPRATROPIUM BROMIDE AND ALBUTEROL SULFATE .5; 3 MG/3ML; MG/3ML
3 SOLUTION RESPIRATORY (INHALATION) ONCE
Refills: 0 | Status: COMPLETED | OUTPATIENT
Start: 2024-06-04 | End: 2024-06-04

## 2024-06-04 RX ADMIN — Medication 975 MILLIGRAM(S): at 11:17

## 2024-06-04 RX ADMIN — Medication 0.5 MILLILITER(S): at 15:56

## 2024-06-04 RX ADMIN — SERTRALINE HYDROCHLORIDE 50 MILLIGRAM(S): 100 TABLET, FILM COATED ORAL at 05:00

## 2024-06-04 RX ADMIN — Medication 15 MILLILITER(S): at 05:00

## 2024-06-04 RX ADMIN — PROPOFOL 16.5 MICROGRAM(S)/KG/MIN: 10 INJECTION, EMULSION INTRAVENOUS at 07:53

## 2024-06-04 RX ADMIN — HEPARIN SODIUM 5000 UNIT(S): 50 INJECTION, SOLUTION INTRAVENOUS at 13:00

## 2024-06-04 RX ADMIN — PANTOPRAZOLE SODIUM 40 MILLIGRAM(S): 40 INJECTION, POWDER, FOR SOLUTION INTRAVENOUS at 11:17

## 2024-06-04 RX ADMIN — OXYCODONE HYDROCHLORIDE 5 MILLIGRAM(S): 100 SOLUTION ORAL at 12:00

## 2024-06-04 RX ADMIN — Medication 975 MILLIGRAM(S): at 00:02

## 2024-06-04 RX ADMIN — Medication 80 MILLILITER(S): at 00:03

## 2024-06-04 RX ADMIN — Medication 975 MILLIGRAM(S): at 00:32

## 2024-06-04 RX ADMIN — Medication 1 GRAM(S): at 11:17

## 2024-06-04 RX ADMIN — Medication 80 MILLILITER(S): at 19:48

## 2024-06-04 RX ADMIN — DEXAMETHASONE 102 MILLIGRAM(S): 1 TABLET ORAL at 16:43

## 2024-06-04 RX ADMIN — OXYCODONE HYDROCHLORIDE 5 MILLIGRAM(S): 100 SOLUTION ORAL at 11:16

## 2024-06-04 RX ADMIN — Medication 80 MILLILITER(S): at 07:53

## 2024-06-04 RX ADMIN — Medication 975 MILLIGRAM(S): at 18:00

## 2024-06-04 RX ADMIN — LEVETIRACETAM 500 MILLIGRAM(S): 100 INJECTION INTRAVENOUS at 05:01

## 2024-06-04 RX ADMIN — IPRATROPIUM BROMIDE AND ALBUTEROL SULFATE 3 MILLILITER(S): .5; 3 SOLUTION RESPIRATORY (INHALATION) at 10:42

## 2024-06-04 RX ADMIN — Medication 0.5 MILLILITER(S): at 15:20

## 2024-06-04 RX ADMIN — Medication 975 MILLIGRAM(S): at 05:00

## 2024-06-04 RX ADMIN — HEPARIN SODIUM 5000 UNIT(S): 50 INJECTION, SOLUTION INTRAVENOUS at 21:25

## 2024-06-04 RX ADMIN — HEPARIN SODIUM 5000 UNIT(S): 50 INJECTION, SOLUTION INTRAVENOUS at 05:01

## 2024-06-04 RX ADMIN — Medication 0.5 MILLILITER(S): at 17:31

## 2024-06-04 RX ADMIN — MIRTAZAPINE 7.5 MILLIGRAM(S): 15 TABLET, FILM COATED ORAL at 21:23

## 2024-06-04 RX ADMIN — BUPROPION HYDROCHLORIDE 150 MILLIGRAM(S): 150 TABLET, EXTENDED RELEASE ORAL at 02:04

## 2024-06-04 RX ADMIN — DEXAMETHASONE 102 MILLIGRAM(S): 1 TABLET ORAL at 22:01

## 2024-06-04 RX ADMIN — Medication 975 MILLIGRAM(S): at 17:02

## 2024-06-04 RX ADMIN — Medication 975 MILLIGRAM(S): at 12:00

## 2024-06-04 RX ADMIN — BUPROPION HYDROCHLORIDE 150 MILLIGRAM(S): 150 TABLET, EXTENDED RELEASE ORAL at 10:23

## 2024-06-04 RX ADMIN — LEVETIRACETAM 500 MILLIGRAM(S): 100 INJECTION INTRAVENOUS at 17:02

## 2024-06-04 RX ADMIN — Medication 1 APPLICATION(S): at 11:17

## 2024-06-04 RX ADMIN — Medication 975 MILLIGRAM(S): at 05:30

## 2024-06-04 RX ADMIN — BUPROPION HYDROCHLORIDE 150 MILLIGRAM(S): 150 TABLET, EXTENDED RELEASE ORAL at 21:23

## 2024-06-05 LAB
ANION GAP SERPL CALC-SCNC: 13 MMOL/L — SIGNIFICANT CHANGE UP (ref 7–14)
BUN SERPL-MCNC: 16 MG/DL — SIGNIFICANT CHANGE UP (ref 7–23)
CALCIUM SERPL-MCNC: 9.2 MG/DL — SIGNIFICANT CHANGE UP (ref 8.4–10.5)
CHLORIDE SERPL-SCNC: 103 MMOL/L — SIGNIFICANT CHANGE UP (ref 98–107)
CO2 SERPL-SCNC: 21 MMOL/L — LOW (ref 22–31)
CREAT SERPL-MCNC: 0.24 MG/DL — LOW (ref 0.5–1.3)
EGFR: 129 ML/MIN/1.73M2 — SIGNIFICANT CHANGE UP
GLUCOSE SERPL-MCNC: 114 MG/DL — HIGH (ref 70–99)
HCT VFR BLD CALC: 29.3 % — LOW (ref 34.5–45)
HGB BLD-MCNC: 9.5 G/DL — LOW (ref 11.5–15.5)
MAGNESIUM SERPL-MCNC: 2 MG/DL — SIGNIFICANT CHANGE UP (ref 1.6–2.6)
MCHC RBC-ENTMCNC: 26.5 PG — LOW (ref 27–34)
MCHC RBC-ENTMCNC: 32.4 GM/DL — SIGNIFICANT CHANGE UP (ref 32–36)
MCV RBC AUTO: 81.6 FL — SIGNIFICANT CHANGE UP (ref 80–100)
NRBC # BLD: 0 /100 WBCS — SIGNIFICANT CHANGE UP (ref 0–0)
NRBC # FLD: 0 K/UL — SIGNIFICANT CHANGE UP (ref 0–0)
PHOSPHATE SERPL-MCNC: 3.4 MG/DL — SIGNIFICANT CHANGE UP (ref 2.5–4.5)
PLATELET # BLD AUTO: 271 K/UL — SIGNIFICANT CHANGE UP (ref 150–400)
POTASSIUM SERPL-MCNC: 4.3 MMOL/L — SIGNIFICANT CHANGE UP (ref 3.5–5.3)
POTASSIUM SERPL-SCNC: 4.3 MMOL/L — SIGNIFICANT CHANGE UP (ref 3.5–5.3)
RBC # BLD: 3.59 M/UL — LOW (ref 3.8–5.2)
RBC # FLD: 14.4 % — SIGNIFICANT CHANGE UP (ref 10.3–14.5)
SODIUM SERPL-SCNC: 137 MMOL/L — SIGNIFICANT CHANGE UP (ref 135–145)
WBC # BLD: 8.01 K/UL — SIGNIFICANT CHANGE UP (ref 3.8–10.5)
WBC # FLD AUTO: 8.01 K/UL — SIGNIFICANT CHANGE UP (ref 3.8–10.5)

## 2024-06-05 PROCEDURE — 99233 SBSQ HOSP IP/OBS HIGH 50: CPT

## 2024-06-05 RX ADMIN — Medication 975 MILLIGRAM(S): at 01:02

## 2024-06-05 RX ADMIN — Medication 975 MILLIGRAM(S): at 11:46

## 2024-06-05 RX ADMIN — Medication 975 MILLIGRAM(S): at 06:08

## 2024-06-05 RX ADMIN — Medication 975 MILLIGRAM(S): at 17:50

## 2024-06-05 RX ADMIN — SERTRALINE HYDROCHLORIDE 50 MILLIGRAM(S): 100 TABLET, FILM COATED ORAL at 06:08

## 2024-06-05 RX ADMIN — LEVETIRACETAM 500 MILLIGRAM(S): 100 INJECTION INTRAVENOUS at 06:36

## 2024-06-05 RX ADMIN — HEPARIN SODIUM 5000 UNIT(S): 50 INJECTION, SOLUTION INTRAVENOUS at 21:30

## 2024-06-05 RX ADMIN — Medication 1 GRAM(S): at 11:46

## 2024-06-05 RX ADMIN — Medication 80 MILLILITER(S): at 09:20

## 2024-06-05 RX ADMIN — Medication 975 MILLIGRAM(S): at 00:32

## 2024-06-05 RX ADMIN — HEPARIN SODIUM 5000 UNIT(S): 50 INJECTION, SOLUTION INTRAVENOUS at 06:08

## 2024-06-05 RX ADMIN — BUPROPION HYDROCHLORIDE 150 MILLIGRAM(S): 150 TABLET, EXTENDED RELEASE ORAL at 21:31

## 2024-06-05 RX ADMIN — DEXAMETHASONE 102 MILLIGRAM(S): 1 TABLET ORAL at 06:09

## 2024-06-05 RX ADMIN — PANTOPRAZOLE SODIUM 40 MILLIGRAM(S): 40 INJECTION, POWDER, FOR SOLUTION INTRAVENOUS at 11:46

## 2024-06-05 RX ADMIN — LEVETIRACETAM 500 MILLIGRAM(S): 100 INJECTION INTRAVENOUS at 17:50

## 2024-06-05 RX ADMIN — HEPARIN SODIUM 5000 UNIT(S): 50 INJECTION, SOLUTION INTRAVENOUS at 14:31

## 2024-06-05 RX ADMIN — Medication 1 APPLICATION(S): at 11:46

## 2024-06-05 RX ADMIN — BUPROPION HYDROCHLORIDE 150 MILLIGRAM(S): 150 TABLET, EXTENDED RELEASE ORAL at 09:20

## 2024-06-05 RX ADMIN — MIRTAZAPINE 7.5 MILLIGRAM(S): 15 TABLET, FILM COATED ORAL at 21:31

## 2024-06-06 LAB
ANION GAP SERPL CALC-SCNC: 10 MMOL/L — SIGNIFICANT CHANGE UP (ref 7–14)
BUN SERPL-MCNC: 21 MG/DL — SIGNIFICANT CHANGE UP (ref 7–23)
CALCIUM SERPL-MCNC: 9.1 MG/DL — SIGNIFICANT CHANGE UP (ref 8.4–10.5)
CHLORIDE SERPL-SCNC: 103 MMOL/L — SIGNIFICANT CHANGE UP (ref 98–107)
CO2 SERPL-SCNC: 23 MMOL/L — SIGNIFICANT CHANGE UP (ref 22–31)
CREAT SERPL-MCNC: 0.31 MG/DL — LOW (ref 0.5–1.3)
EGFR: 121 ML/MIN/1.73M2 — SIGNIFICANT CHANGE UP
GLUCOSE SERPL-MCNC: 107 MG/DL — HIGH (ref 70–99)
HCT VFR BLD CALC: 29.1 % — LOW (ref 34.5–45)
HGB BLD-MCNC: 9.3 G/DL — LOW (ref 11.5–15.5)
MAGNESIUM SERPL-MCNC: 1.9 MG/DL — SIGNIFICANT CHANGE UP (ref 1.6–2.6)
MCHC RBC-ENTMCNC: 25.6 PG — LOW (ref 27–34)
MCHC RBC-ENTMCNC: 32 GM/DL — SIGNIFICANT CHANGE UP (ref 32–36)
MCV RBC AUTO: 80.2 FL — SIGNIFICANT CHANGE UP (ref 80–100)
NRBC # BLD: 0 /100 WBCS — SIGNIFICANT CHANGE UP (ref 0–0)
NRBC # FLD: 0 K/UL — SIGNIFICANT CHANGE UP (ref 0–0)
PHOSPHATE SERPL-MCNC: 2.5 MG/DL — SIGNIFICANT CHANGE UP (ref 2.5–4.5)
PLATELET # BLD AUTO: 297 K/UL — SIGNIFICANT CHANGE UP (ref 150–400)
POTASSIUM SERPL-MCNC: 3 MMOL/L — LOW (ref 3.5–5.3)
POTASSIUM SERPL-SCNC: 3 MMOL/L — LOW (ref 3.5–5.3)
RBC # BLD: 3.63 M/UL — LOW (ref 3.8–5.2)
RBC # FLD: 14.4 % — SIGNIFICANT CHANGE UP (ref 10.3–14.5)
SODIUM SERPL-SCNC: 136 MMOL/L — SIGNIFICANT CHANGE UP (ref 135–145)
WBC # BLD: 6.83 K/UL — SIGNIFICANT CHANGE UP (ref 3.8–10.5)
WBC # FLD AUTO: 6.83 K/UL — SIGNIFICANT CHANGE UP (ref 3.8–10.5)

## 2024-06-06 PROCEDURE — 99233 SBSQ HOSP IP/OBS HIGH 50: CPT

## 2024-06-06 RX ORDER — POTASSIUM CHLORIDE 600 MG/1
40 TABLET, FILM COATED, EXTENDED RELEASE ORAL ONCE
Refills: 0 | Status: COMPLETED | OUTPATIENT
Start: 2024-06-06 | End: 2024-06-06

## 2024-06-06 RX ADMIN — Medication 975 MILLIGRAM(S): at 17:00

## 2024-06-06 RX ADMIN — LEVETIRACETAM 500 MILLIGRAM(S): 100 INJECTION INTRAVENOUS at 17:00

## 2024-06-06 RX ADMIN — HEPARIN SODIUM 5000 UNIT(S): 50 INJECTION, SOLUTION INTRAVENOUS at 07:33

## 2024-06-06 RX ADMIN — BUPROPION HYDROCHLORIDE 150 MILLIGRAM(S): 150 TABLET, EXTENDED RELEASE ORAL at 09:43

## 2024-06-06 RX ADMIN — Medication 975 MILLIGRAM(S): at 00:54

## 2024-06-06 RX ADMIN — POTASSIUM CHLORIDE 40 MILLIEQUIVALENT(S): 600 TABLET, FILM COATED, EXTENDED RELEASE ORAL at 09:43

## 2024-06-06 RX ADMIN — LEVETIRACETAM 500 MILLIGRAM(S): 100 INJECTION INTRAVENOUS at 05:14

## 2024-06-06 RX ADMIN — Medication 975 MILLIGRAM(S): at 05:13

## 2024-06-06 RX ADMIN — MIRTAZAPINE 7.5 MILLIGRAM(S): 15 TABLET, FILM COATED ORAL at 23:12

## 2024-06-06 RX ADMIN — HEPARIN SODIUM 5000 UNIT(S): 50 INJECTION, SOLUTION INTRAVENOUS at 13:13

## 2024-06-06 RX ADMIN — Medication 975 MILLIGRAM(S): at 13:12

## 2024-06-06 RX ADMIN — SERTRALINE HYDROCHLORIDE 50 MILLIGRAM(S): 100 TABLET, FILM COATED ORAL at 05:18

## 2024-06-06 RX ADMIN — Medication 1 GRAM(S): at 13:13

## 2024-06-06 RX ADMIN — BUPROPION HYDROCHLORIDE 150 MILLIGRAM(S): 150 TABLET, EXTENDED RELEASE ORAL at 23:12

## 2024-06-06 RX ADMIN — Medication 1 APPLICATION(S): at 13:13

## 2024-06-06 RX ADMIN — HEPARIN SODIUM 5000 UNIT(S): 50 INJECTION, SOLUTION INTRAVENOUS at 23:12

## 2024-06-07 LAB
ANION GAP SERPL CALC-SCNC: 13 MMOL/L — SIGNIFICANT CHANGE UP (ref 7–14)
BUN SERPL-MCNC: 12 MG/DL — SIGNIFICANT CHANGE UP (ref 7–23)
CALCIUM SERPL-MCNC: 9.2 MG/DL — SIGNIFICANT CHANGE UP (ref 8.4–10.5)
CHLORIDE SERPL-SCNC: 100 MMOL/L — SIGNIFICANT CHANGE UP (ref 98–107)
CO2 SERPL-SCNC: 23 MMOL/L — SIGNIFICANT CHANGE UP (ref 22–31)
CREAT SERPL-MCNC: 0.24 MG/DL — LOW (ref 0.5–1.3)
EGFR: 129 ML/MIN/1.73M2 — SIGNIFICANT CHANGE UP
GLUCOSE SERPL-MCNC: 116 MG/DL — HIGH (ref 70–99)
HCT VFR BLD CALC: 32.4 % — LOW (ref 34.5–45)
HGB BLD-MCNC: 10.4 G/DL — LOW (ref 11.5–15.5)
MAGNESIUM SERPL-MCNC: 1.8 MG/DL — SIGNIFICANT CHANGE UP (ref 1.6–2.6)
MCHC RBC-ENTMCNC: 26.1 PG — LOW (ref 27–34)
MCHC RBC-ENTMCNC: 32.1 GM/DL — SIGNIFICANT CHANGE UP (ref 32–36)
MCV RBC AUTO: 81.4 FL — SIGNIFICANT CHANGE UP (ref 80–100)
NRBC # BLD: 0 /100 WBCS — SIGNIFICANT CHANGE UP (ref 0–0)
NRBC # FLD: 0 K/UL — SIGNIFICANT CHANGE UP (ref 0–0)
PHOSPHATE SERPL-MCNC: 2.7 MG/DL — SIGNIFICANT CHANGE UP (ref 2.5–4.5)
PLATELET # BLD AUTO: 214 K/UL — SIGNIFICANT CHANGE UP (ref 150–400)
POTASSIUM SERPL-MCNC: 3.2 MMOL/L — LOW (ref 3.5–5.3)
POTASSIUM SERPL-SCNC: 3.2 MMOL/L — LOW (ref 3.5–5.3)
RBC # BLD: 3.98 M/UL — SIGNIFICANT CHANGE UP (ref 3.8–5.2)
RBC # FLD: 14.4 % — SIGNIFICANT CHANGE UP (ref 10.3–14.5)
SODIUM SERPL-SCNC: 136 MMOL/L — SIGNIFICANT CHANGE UP (ref 135–145)
WBC # BLD: 6.76 K/UL — SIGNIFICANT CHANGE UP (ref 3.8–10.5)
WBC # FLD AUTO: 6.76 K/UL — SIGNIFICANT CHANGE UP (ref 3.8–10.5)

## 2024-06-07 PROCEDURE — 74230 X-RAY XM SWLNG FUNCJ C+: CPT | Mod: 26

## 2024-06-07 PROCEDURE — 99233 SBSQ HOSP IP/OBS HIGH 50: CPT

## 2024-06-07 RX ORDER — ACETAMINOPHEN 325 MG
650 TABLET ORAL EVERY 6 HOURS
Refills: 0 | Status: DISCONTINUED | OUTPATIENT
Start: 2024-06-07 | End: 2024-06-28

## 2024-06-07 RX ORDER — SERTRALINE HYDROCHLORIDE 100 MG/1
50 TABLET, FILM COATED ORAL EVERY 24 HOURS
Refills: 0 | Status: DISCONTINUED | OUTPATIENT
Start: 2024-06-07 | End: 2024-06-07

## 2024-06-07 RX ORDER — SODIUM CHLORIDE 0.9 % (FLUSH) 0.9 %
1 SYRINGE (ML) INJECTION DAILY
Refills: 0 | Status: DISCONTINUED | OUTPATIENT
Start: 2024-06-07 | End: 2024-06-20

## 2024-06-07 RX ORDER — BUPROPION HYDROCHLORIDE 150 MG/1
300 TABLET, EXTENDED RELEASE ORAL DAILY
Refills: 0 | Status: DISCONTINUED | OUTPATIENT
Start: 2024-06-07 | End: 2024-06-28

## 2024-06-07 RX ORDER — ACETAMINOPHEN 325 MG
650 TABLET ORAL EVERY 6 HOURS
Refills: 0 | Status: DISCONTINUED | OUTPATIENT
Start: 2024-06-07 | End: 2024-06-07

## 2024-06-07 RX ORDER — MIRTAZAPINE 15 MG/1
7.5 TABLET, FILM COATED ORAL AT BEDTIME
Refills: 0 | Status: DISCONTINUED | OUTPATIENT
Start: 2024-06-07 | End: 2024-06-28

## 2024-06-07 RX ORDER — SERTRALINE HYDROCHLORIDE 100 MG/1
50 TABLET, FILM COATED ORAL DAILY
Refills: 0 | Status: DISCONTINUED | OUTPATIENT
Start: 2024-06-07 | End: 2024-06-28

## 2024-06-07 RX ORDER — LEVETIRACETAM 100 MG/ML
500 INJECTION INTRAVENOUS
Refills: 0 | Status: DISCONTINUED | OUTPATIENT
Start: 2024-06-07 | End: 2024-06-28

## 2024-06-07 RX ADMIN — HEPARIN SODIUM 5000 UNIT(S): 50 INJECTION, SOLUTION INTRAVENOUS at 21:52

## 2024-06-07 RX ADMIN — SERTRALINE HYDROCHLORIDE 50 MILLIGRAM(S): 100 TABLET, FILM COATED ORAL at 05:45

## 2024-06-07 RX ADMIN — LEVETIRACETAM 500 MILLIGRAM(S): 100 INJECTION INTRAVENOUS at 05:45

## 2024-06-07 RX ADMIN — Medication 1 GRAM(S): at 13:50

## 2024-06-07 RX ADMIN — LEVETIRACETAM 500 MILLIGRAM(S): 100 INJECTION INTRAVENOUS at 19:00

## 2024-06-07 RX ADMIN — HEPARIN SODIUM 5000 UNIT(S): 50 INJECTION, SOLUTION INTRAVENOUS at 13:37

## 2024-06-07 RX ADMIN — MIRTAZAPINE 7.5 MILLIGRAM(S): 15 TABLET, FILM COATED ORAL at 21:52

## 2024-06-07 RX ADMIN — Medication 975 MILLIGRAM(S): at 01:46

## 2024-06-07 RX ADMIN — BUPROPION HYDROCHLORIDE 150 MILLIGRAM(S): 150 TABLET, EXTENDED RELEASE ORAL at 10:58

## 2024-06-07 RX ADMIN — HEPARIN SODIUM 5000 UNIT(S): 50 INJECTION, SOLUTION INTRAVENOUS at 05:46

## 2024-06-08 DIAGNOSIS — E87.6 HYPOKALEMIA: ICD-10-CM

## 2024-06-08 LAB
CULTURE RESULTS: SIGNIFICANT CHANGE UP
SPECIMEN SOURCE: SIGNIFICANT CHANGE UP

## 2024-06-08 PROCEDURE — 99232 SBSQ HOSP IP/OBS MODERATE 35: CPT

## 2024-06-08 RX ORDER — BISACODYL 5 MG
5 TABLET, DELAYED RELEASE (ENTERIC COATED) ORAL EVERY 12 HOURS
Refills: 0 | Status: DISCONTINUED | OUTPATIENT
Start: 2024-06-08 | End: 2024-06-28

## 2024-06-08 RX ORDER — SENNOSIDES 8.6 MG
2 TABLET ORAL AT BEDTIME
Refills: 0 | Status: DISCONTINUED | OUTPATIENT
Start: 2024-06-08 | End: 2024-06-28

## 2024-06-08 RX ORDER — POLYETHYLENE GLYCOL 3350 1 G/G
17 POWDER ORAL DAILY
Refills: 0 | Status: DISCONTINUED | OUTPATIENT
Start: 2024-06-08 | End: 2024-06-28

## 2024-06-08 RX ADMIN — Medication 2 TABLET(S): at 21:26

## 2024-06-08 RX ADMIN — MIRTAZAPINE 7.5 MILLIGRAM(S): 15 TABLET, FILM COATED ORAL at 21:26

## 2024-06-08 RX ADMIN — Medication 650 MILLIGRAM(S): at 22:26

## 2024-06-08 RX ADMIN — HEPARIN SODIUM 5000 UNIT(S): 50 INJECTION, SOLUTION INTRAVENOUS at 06:19

## 2024-06-08 RX ADMIN — HEPARIN SODIUM 5000 UNIT(S): 50 INJECTION, SOLUTION INTRAVENOUS at 13:17

## 2024-06-08 RX ADMIN — LEVETIRACETAM 500 MILLIGRAM(S): 100 INJECTION INTRAVENOUS at 06:18

## 2024-06-08 RX ADMIN — SERTRALINE HYDROCHLORIDE 50 MILLIGRAM(S): 100 TABLET, FILM COATED ORAL at 13:17

## 2024-06-08 RX ADMIN — HEPARIN SODIUM 5000 UNIT(S): 50 INJECTION, SOLUTION INTRAVENOUS at 21:27

## 2024-06-08 RX ADMIN — Medication 650 MILLIGRAM(S): at 21:26

## 2024-06-08 RX ADMIN — Medication 1 GRAM(S): at 13:16

## 2024-06-08 RX ADMIN — Medication 3 MILLIGRAM(S): at 00:24

## 2024-06-08 RX ADMIN — LEVETIRACETAM 500 MILLIGRAM(S): 100 INJECTION INTRAVENOUS at 17:38

## 2024-06-08 RX ADMIN — BUPROPION HYDROCHLORIDE 300 MILLIGRAM(S): 150 TABLET, EXTENDED RELEASE ORAL at 13:16

## 2024-06-09 PROCEDURE — 99232 SBSQ HOSP IP/OBS MODERATE 35: CPT

## 2024-06-09 RX ADMIN — LEVETIRACETAM 500 MILLIGRAM(S): 100 INJECTION INTRAVENOUS at 17:22

## 2024-06-09 RX ADMIN — SERTRALINE HYDROCHLORIDE 50 MILLIGRAM(S): 100 TABLET, FILM COATED ORAL at 11:51

## 2024-06-09 RX ADMIN — Medication 1 GRAM(S): at 11:50

## 2024-06-09 RX ADMIN — HEPARIN SODIUM 5000 UNIT(S): 50 INJECTION, SOLUTION INTRAVENOUS at 21:19

## 2024-06-09 RX ADMIN — LEVETIRACETAM 500 MILLIGRAM(S): 100 INJECTION INTRAVENOUS at 06:26

## 2024-06-09 RX ADMIN — BUPROPION HYDROCHLORIDE 300 MILLIGRAM(S): 150 TABLET, EXTENDED RELEASE ORAL at 11:50

## 2024-06-09 RX ADMIN — Medication 2 TABLET(S): at 21:19

## 2024-06-09 RX ADMIN — MIRTAZAPINE 7.5 MILLIGRAM(S): 15 TABLET, FILM COATED ORAL at 21:20

## 2024-06-09 RX ADMIN — HEPARIN SODIUM 5000 UNIT(S): 50 INJECTION, SOLUTION INTRAVENOUS at 06:26

## 2024-06-09 RX ADMIN — HEPARIN SODIUM 5000 UNIT(S): 50 INJECTION, SOLUTION INTRAVENOUS at 13:46

## 2024-06-10 PROCEDURE — 99232 SBSQ HOSP IP/OBS MODERATE 35: CPT

## 2024-06-10 PROCEDURE — 70450 CT HEAD/BRAIN W/O DYE: CPT | Mod: 26

## 2024-06-10 RX ADMIN — LEVETIRACETAM 500 MILLIGRAM(S): 100 INJECTION INTRAVENOUS at 05:31

## 2024-06-10 RX ADMIN — HEPARIN SODIUM 5000 UNIT(S): 50 INJECTION, SOLUTION INTRAVENOUS at 21:37

## 2024-06-10 RX ADMIN — HEPARIN SODIUM 5000 UNIT(S): 50 INJECTION, SOLUTION INTRAVENOUS at 13:02

## 2024-06-10 RX ADMIN — BUPROPION HYDROCHLORIDE 300 MILLIGRAM(S): 150 TABLET, EXTENDED RELEASE ORAL at 11:21

## 2024-06-10 RX ADMIN — SERTRALINE HYDROCHLORIDE 50 MILLIGRAM(S): 100 TABLET, FILM COATED ORAL at 11:20

## 2024-06-10 RX ADMIN — Medication 2 TABLET(S): at 21:35

## 2024-06-10 RX ADMIN — LEVETIRACETAM 500 MILLIGRAM(S): 100 INJECTION INTRAVENOUS at 17:14

## 2024-06-10 RX ADMIN — Medication 1 GRAM(S): at 11:21

## 2024-06-10 RX ADMIN — HEPARIN SODIUM 5000 UNIT(S): 50 INJECTION, SOLUTION INTRAVENOUS at 05:32

## 2024-06-10 RX ADMIN — MIRTAZAPINE 7.5 MILLIGRAM(S): 15 TABLET, FILM COATED ORAL at 21:35

## 2024-06-11 PROCEDURE — 99232 SBSQ HOSP IP/OBS MODERATE 35: CPT

## 2024-06-11 RX ADMIN — SERTRALINE HYDROCHLORIDE 50 MILLIGRAM(S): 100 TABLET, FILM COATED ORAL at 12:25

## 2024-06-11 RX ADMIN — Medication 2 TABLET(S): at 21:03

## 2024-06-11 RX ADMIN — HEPARIN SODIUM 5000 UNIT(S): 50 INJECTION, SOLUTION INTRAVENOUS at 05:23

## 2024-06-11 RX ADMIN — LEVETIRACETAM 500 MILLIGRAM(S): 100 INJECTION INTRAVENOUS at 05:24

## 2024-06-11 RX ADMIN — Medication 650 MILLIGRAM(S): at 07:03

## 2024-06-11 RX ADMIN — Medication 1 GRAM(S): at 12:25

## 2024-06-11 RX ADMIN — Medication 650 MILLIGRAM(S): at 20:17

## 2024-06-11 RX ADMIN — MIRTAZAPINE 7.5 MILLIGRAM(S): 15 TABLET, FILM COATED ORAL at 21:03

## 2024-06-11 RX ADMIN — Medication 650 MILLIGRAM(S): at 19:17

## 2024-06-11 RX ADMIN — HEPARIN SODIUM 5000 UNIT(S): 50 INJECTION, SOLUTION INTRAVENOUS at 14:45

## 2024-06-11 RX ADMIN — BUPROPION HYDROCHLORIDE 300 MILLIGRAM(S): 150 TABLET, EXTENDED RELEASE ORAL at 12:25

## 2024-06-11 RX ADMIN — LEVETIRACETAM 500 MILLIGRAM(S): 100 INJECTION INTRAVENOUS at 17:46

## 2024-06-11 RX ADMIN — Medication 650 MILLIGRAM(S): at 06:03

## 2024-06-11 RX ADMIN — HEPARIN SODIUM 5000 UNIT(S): 50 INJECTION, SOLUTION INTRAVENOUS at 21:03

## 2024-06-12 PROCEDURE — 99232 SBSQ HOSP IP/OBS MODERATE 35: CPT

## 2024-06-12 RX ADMIN — Medication 1 GRAM(S): at 12:15

## 2024-06-12 RX ADMIN — SERTRALINE HYDROCHLORIDE 50 MILLIGRAM(S): 100 TABLET, FILM COATED ORAL at 12:15

## 2024-06-12 RX ADMIN — HEPARIN SODIUM 5000 UNIT(S): 50 INJECTION, SOLUTION INTRAVENOUS at 14:20

## 2024-06-12 RX ADMIN — Medication 3 MILLIGRAM(S): at 21:50

## 2024-06-12 RX ADMIN — BUPROPION HYDROCHLORIDE 300 MILLIGRAM(S): 150 TABLET, EXTENDED RELEASE ORAL at 12:14

## 2024-06-12 RX ADMIN — Medication 650 MILLIGRAM(S): at 18:37

## 2024-06-12 RX ADMIN — LEVETIRACETAM 500 MILLIGRAM(S): 100 INJECTION INTRAVENOUS at 05:52

## 2024-06-12 RX ADMIN — Medication 650 MILLIGRAM(S): at 19:37

## 2024-06-12 RX ADMIN — HEPARIN SODIUM 5000 UNIT(S): 50 INJECTION, SOLUTION INTRAVENOUS at 05:52

## 2024-06-12 RX ADMIN — LEVETIRACETAM 500 MILLIGRAM(S): 100 INJECTION INTRAVENOUS at 17:37

## 2024-06-12 RX ADMIN — HEPARIN SODIUM 5000 UNIT(S): 50 INJECTION, SOLUTION INTRAVENOUS at 21:49

## 2024-06-12 RX ADMIN — MIRTAZAPINE 7.5 MILLIGRAM(S): 15 TABLET, FILM COATED ORAL at 21:47

## 2024-06-13 LAB
ANION GAP SERPL CALC-SCNC: 12 MMOL/L — SIGNIFICANT CHANGE UP (ref 7–14)
ANISOCYTOSIS BLD QL: SLIGHT — SIGNIFICANT CHANGE UP
BUN SERPL-MCNC: 10 MG/DL — SIGNIFICANT CHANGE UP (ref 7–23)
CALCIUM SERPL-MCNC: 9.3 MG/DL — SIGNIFICANT CHANGE UP (ref 8.4–10.5)
CHLORIDE SERPL-SCNC: 100 MMOL/L — SIGNIFICANT CHANGE UP (ref 98–107)
CLOSURE TME COLL+EPINEP BLD: 71.5 K/UL — SIGNIFICANT CHANGE UP (ref 150–400)
CO2 SERPL-SCNC: 22 MMOL/L — SIGNIFICANT CHANGE UP (ref 22–31)
CREAT SERPL-MCNC: 0.37 MG/DL — LOW (ref 0.5–1.3)
EGFR: 116 ML/MIN/1.73M2 — SIGNIFICANT CHANGE UP
GLUCOSE SERPL-MCNC: 82 MG/DL — SIGNIFICANT CHANGE UP (ref 70–99)
HCT VFR BLD CALC: 36.7 % — SIGNIFICANT CHANGE UP (ref 34.5–45)
HGB BLD-MCNC: 11.9 G/DL — SIGNIFICANT CHANGE UP (ref 11.5–15.5)
MAGNESIUM SERPL-MCNC: 2 MG/DL — SIGNIFICANT CHANGE UP (ref 1.6–2.6)
MANUAL SMEAR VERIFICATION: SIGNIFICANT CHANGE UP
MCHC RBC-ENTMCNC: 26.5 PG — LOW (ref 27–34)
MCHC RBC-ENTMCNC: 32.4 GM/DL — SIGNIFICANT CHANGE UP (ref 32–36)
MCV RBC AUTO: 81.7 FL — SIGNIFICANT CHANGE UP (ref 80–100)
MICROCYTES BLD QL: SLIGHT — SIGNIFICANT CHANGE UP
NRBC # BLD: 0 /100 WBCS — SIGNIFICANT CHANGE UP (ref 0–0)
NRBC # FLD: 0 K/UL — SIGNIFICANT CHANGE UP (ref 0–0)
PHOSPHATE SERPL-MCNC: 3.9 MG/DL — SIGNIFICANT CHANGE UP (ref 2.5–4.5)
PLAT MORPH BLD: NORMAL — SIGNIFICANT CHANGE UP
PLATELET # BLD AUTO: 99 K/UL — LOW (ref 150–400)
PLATELET COUNT - ESTIMATE: ABNORMAL
POIKILOCYTOSIS BLD QL AUTO: SLIGHT — SIGNIFICANT CHANGE UP
POLYCHROMASIA BLD QL SMEAR: SLIGHT — SIGNIFICANT CHANGE UP
POTASSIUM SERPL-MCNC: 3.9 MMOL/L — SIGNIFICANT CHANGE UP (ref 3.5–5.3)
POTASSIUM SERPL-SCNC: 3.9 MMOL/L — SIGNIFICANT CHANGE UP (ref 3.5–5.3)
RBC # BLD: 4.49 M/UL — SIGNIFICANT CHANGE UP (ref 3.8–5.2)
RBC # FLD: 14.9 % — HIGH (ref 10.3–14.5)
RBC BLD AUTO: ABNORMAL
SODIUM SERPL-SCNC: 134 MMOL/L — LOW (ref 135–145)
WBC # BLD: 4.43 K/UL — SIGNIFICANT CHANGE UP (ref 3.8–10.5)
WBC # FLD AUTO: 4.43 K/UL — SIGNIFICANT CHANGE UP (ref 3.8–10.5)

## 2024-06-13 PROCEDURE — 99232 SBSQ HOSP IP/OBS MODERATE 35: CPT

## 2024-06-13 RX ADMIN — LEVETIRACETAM 500 MILLIGRAM(S): 100 INJECTION INTRAVENOUS at 05:40

## 2024-06-13 RX ADMIN — LEVETIRACETAM 500 MILLIGRAM(S): 100 INJECTION INTRAVENOUS at 17:51

## 2024-06-13 RX ADMIN — HEPARIN SODIUM 5000 UNIT(S): 50 INJECTION, SOLUTION INTRAVENOUS at 22:03

## 2024-06-13 RX ADMIN — MIRTAZAPINE 7.5 MILLIGRAM(S): 15 TABLET, FILM COATED ORAL at 22:03

## 2024-06-13 RX ADMIN — Medication 1 GRAM(S): at 12:16

## 2024-06-13 RX ADMIN — HEPARIN SODIUM 5000 UNIT(S): 50 INJECTION, SOLUTION INTRAVENOUS at 14:32

## 2024-06-13 RX ADMIN — HEPARIN SODIUM 5000 UNIT(S): 50 INJECTION, SOLUTION INTRAVENOUS at 05:40

## 2024-06-13 RX ADMIN — SERTRALINE HYDROCHLORIDE 50 MILLIGRAM(S): 100 TABLET, FILM COATED ORAL at 12:16

## 2024-06-13 RX ADMIN — BUPROPION HYDROCHLORIDE 300 MILLIGRAM(S): 150 TABLET, EXTENDED RELEASE ORAL at 12:16

## 2024-06-14 LAB
ANION GAP SERPL CALC-SCNC: 12 MMOL/L — SIGNIFICANT CHANGE UP (ref 7–14)
BUN SERPL-MCNC: 7 MG/DL — SIGNIFICANT CHANGE UP (ref 7–23)
CALCIUM SERPL-MCNC: 9.8 MG/DL — SIGNIFICANT CHANGE UP (ref 8.4–10.5)
CHLORIDE SERPL-SCNC: 102 MMOL/L — SIGNIFICANT CHANGE UP (ref 98–107)
CO2 SERPL-SCNC: 24 MMOL/L — SIGNIFICANT CHANGE UP (ref 22–31)
CREAT SERPL-MCNC: 0.35 MG/DL — LOW (ref 0.5–1.3)
EGFR: 118 ML/MIN/1.73M2 — SIGNIFICANT CHANGE UP
GLUCOSE SERPL-MCNC: 95 MG/DL — SIGNIFICANT CHANGE UP (ref 70–99)
HCT VFR BLD CALC: 39.6 % — SIGNIFICANT CHANGE UP (ref 34.5–45)
HGB BLD-MCNC: 13.3 G/DL — SIGNIFICANT CHANGE UP (ref 11.5–15.5)
MCHC RBC-ENTMCNC: 26.4 PG — LOW (ref 27–34)
MCHC RBC-ENTMCNC: 33.6 GM/DL — SIGNIFICANT CHANGE UP (ref 32–36)
MCV RBC AUTO: 78.6 FL — LOW (ref 80–100)
NRBC # BLD: 0 /100 WBCS — SIGNIFICANT CHANGE UP (ref 0–0)
NRBC # FLD: 0 K/UL — SIGNIFICANT CHANGE UP (ref 0–0)
PLATELET # BLD AUTO: 93 K/UL — LOW (ref 150–400)
POTASSIUM SERPL-MCNC: 4 MMOL/L — SIGNIFICANT CHANGE UP (ref 3.5–5.3)
POTASSIUM SERPL-SCNC: 4 MMOL/L — SIGNIFICANT CHANGE UP (ref 3.5–5.3)
RBC # BLD: 5.04 M/UL — SIGNIFICANT CHANGE UP (ref 3.8–5.2)
RBC # FLD: 15.5 % — HIGH (ref 10.3–14.5)
SODIUM SERPL-SCNC: 138 MMOL/L — SIGNIFICANT CHANGE UP (ref 135–145)
WBC # BLD: 3.83 K/UL — SIGNIFICANT CHANGE UP (ref 3.8–10.5)
WBC # FLD AUTO: 3.83 K/UL — SIGNIFICANT CHANGE UP (ref 3.8–10.5)

## 2024-06-14 PROCEDURE — 99233 SBSQ HOSP IP/OBS HIGH 50: CPT

## 2024-06-14 RX ADMIN — LEVETIRACETAM 500 MILLIGRAM(S): 100 INJECTION INTRAVENOUS at 05:35

## 2024-06-14 RX ADMIN — LEVETIRACETAM 500 MILLIGRAM(S): 100 INJECTION INTRAVENOUS at 17:55

## 2024-06-14 RX ADMIN — HEPARIN SODIUM 5000 UNIT(S): 50 INJECTION, SOLUTION INTRAVENOUS at 21:22

## 2024-06-14 RX ADMIN — HEPARIN SODIUM 5000 UNIT(S): 50 INJECTION, SOLUTION INTRAVENOUS at 05:35

## 2024-06-14 RX ADMIN — HEPARIN SODIUM 5000 UNIT(S): 50 INJECTION, SOLUTION INTRAVENOUS at 14:55

## 2024-06-14 RX ADMIN — Medication 2 TABLET(S): at 21:22

## 2024-06-14 RX ADMIN — MIRTAZAPINE 7.5 MILLIGRAM(S): 15 TABLET, FILM COATED ORAL at 21:22

## 2024-06-14 RX ADMIN — SERTRALINE HYDROCHLORIDE 50 MILLIGRAM(S): 100 TABLET, FILM COATED ORAL at 14:09

## 2024-06-14 RX ADMIN — BUPROPION HYDROCHLORIDE 300 MILLIGRAM(S): 150 TABLET, EXTENDED RELEASE ORAL at 14:10

## 2024-06-14 RX ADMIN — Medication 1 GRAM(S): at 14:10

## 2024-06-15 LAB
ADD ON TEST-SPECIMEN IN LAB: SIGNIFICANT CHANGE UP
ANION GAP SERPL CALC-SCNC: 11 MMOL/L — SIGNIFICANT CHANGE UP (ref 7–14)
APTT BLD: 43.4 SEC — HIGH (ref 24.5–35.6)
BLD GP AB SCN SERPL QL: NEGATIVE — SIGNIFICANT CHANGE UP
BUN SERPL-MCNC: 10 MG/DL — SIGNIFICANT CHANGE UP (ref 7–23)
CALCIUM SERPL-MCNC: 9.6 MG/DL — SIGNIFICANT CHANGE UP (ref 8.4–10.5)
CHLORIDE SERPL-SCNC: 100 MMOL/L — SIGNIFICANT CHANGE UP (ref 98–107)
CO2 SERPL-SCNC: 25 MMOL/L — SIGNIFICANT CHANGE UP (ref 22–31)
CREAT SERPL-MCNC: 0.46 MG/DL — LOW (ref 0.5–1.3)
EGFR: 110 ML/MIN/1.73M2 — SIGNIFICANT CHANGE UP
GLUCOSE SERPL-MCNC: 95 MG/DL — SIGNIFICANT CHANGE UP (ref 70–99)
HCT VFR BLD CALC: 39.9 % — SIGNIFICANT CHANGE UP (ref 34.5–45)
HGB BLD-MCNC: 13.1 G/DL — SIGNIFICANT CHANGE UP (ref 11.5–15.5)
INR BLD: 1.12 RATIO — SIGNIFICANT CHANGE UP (ref 0.85–1.18)
MCHC RBC-ENTMCNC: 26.5 PG — LOW (ref 27–34)
MCHC RBC-ENTMCNC: 32.8 GM/DL — SIGNIFICANT CHANGE UP (ref 32–36)
MCV RBC AUTO: 80.6 FL — SIGNIFICANT CHANGE UP (ref 80–100)
NRBC # BLD: 0 /100 WBCS — SIGNIFICANT CHANGE UP (ref 0–0)
NRBC # FLD: 0 K/UL — SIGNIFICANT CHANGE UP (ref 0–0)
PLATELET # BLD AUTO: 113 K/UL — LOW (ref 150–400)
POTASSIUM SERPL-MCNC: 3.7 MMOL/L — SIGNIFICANT CHANGE UP (ref 3.5–5.3)
POTASSIUM SERPL-SCNC: 3.7 MMOL/L — SIGNIFICANT CHANGE UP (ref 3.5–5.3)
PROTHROM AB SERPL-ACNC: 12.6 SEC — SIGNIFICANT CHANGE UP (ref 9.5–13)
RBC # BLD: 4.95 M/UL — SIGNIFICANT CHANGE UP (ref 3.8–5.2)
RBC # FLD: 15.2 % — HIGH (ref 10.3–14.5)
RH IG SCN BLD-IMP: NEGATIVE — SIGNIFICANT CHANGE UP
SODIUM SERPL-SCNC: 136 MMOL/L — SIGNIFICANT CHANGE UP (ref 135–145)
VIT B12 SERPL-MCNC: 1632 PG/ML — HIGH (ref 200–900)
WBC # BLD: 4.15 K/UL — SIGNIFICANT CHANGE UP (ref 3.8–10.5)
WBC # FLD AUTO: 4.15 K/UL — SIGNIFICANT CHANGE UP (ref 3.8–10.5)

## 2024-06-15 PROCEDURE — 99232 SBSQ HOSP IP/OBS MODERATE 35: CPT

## 2024-06-15 RX ADMIN — MIRTAZAPINE 7.5 MILLIGRAM(S): 15 TABLET, FILM COATED ORAL at 21:02

## 2024-06-15 RX ADMIN — SERTRALINE HYDROCHLORIDE 50 MILLIGRAM(S): 100 TABLET, FILM COATED ORAL at 11:41

## 2024-06-15 RX ADMIN — HEPARIN SODIUM 5000 UNIT(S): 50 INJECTION, SOLUTION INTRAVENOUS at 05:14

## 2024-06-15 RX ADMIN — Medication 1 GRAM(S): at 11:41

## 2024-06-15 RX ADMIN — BUPROPION HYDROCHLORIDE 300 MILLIGRAM(S): 150 TABLET, EXTENDED RELEASE ORAL at 11:41

## 2024-06-15 RX ADMIN — HEPARIN SODIUM 5000 UNIT(S): 50 INJECTION, SOLUTION INTRAVENOUS at 14:32

## 2024-06-15 RX ADMIN — LEVETIRACETAM 500 MILLIGRAM(S): 100 INJECTION INTRAVENOUS at 17:38

## 2024-06-15 RX ADMIN — LEVETIRACETAM 500 MILLIGRAM(S): 100 INJECTION INTRAVENOUS at 05:14

## 2024-06-15 RX ADMIN — HEPARIN SODIUM 5000 UNIT(S): 50 INJECTION, SOLUTION INTRAVENOUS at 21:03

## 2024-06-16 ENCOUNTER — TRANSCRIPTION ENCOUNTER (OUTPATIENT)
Age: 59
End: 2024-06-16

## 2024-06-16 LAB
ANION GAP SERPL CALC-SCNC: 9 MMOL/L — SIGNIFICANT CHANGE UP (ref 7–14)
APTT BLD: 46.1 SEC — HIGH (ref 24.5–35.6)
APTT BLD: 52.4 SEC — HIGH (ref 24.5–35.6)
APTT BLD: 52.7 SEC — HIGH (ref 24.5–35.6)
BUN SERPL-MCNC: 11 MG/DL — SIGNIFICANT CHANGE UP (ref 7–23)
CALCIUM SERPL-MCNC: 10 MG/DL — SIGNIFICANT CHANGE UP (ref 8.4–10.5)
CHLORIDE SERPL-SCNC: 99 MMOL/L — SIGNIFICANT CHANGE UP (ref 98–107)
CO2 SERPL-SCNC: 28 MMOL/L — SIGNIFICANT CHANGE UP (ref 22–31)
CREAT SERPL-MCNC: 0.52 MG/DL — SIGNIFICANT CHANGE UP (ref 0.5–1.3)
EGFR: 107 ML/MIN/1.73M2 — SIGNIFICANT CHANGE UP
GLUCOSE SERPL-MCNC: 102 MG/DL — HIGH (ref 70–99)
HCT VFR BLD CALC: 40.8 % — SIGNIFICANT CHANGE UP (ref 34.5–45)
HGB BLD-MCNC: 13.6 G/DL — SIGNIFICANT CHANGE UP (ref 11.5–15.5)
INR BLD: 1.13 RATIO — SIGNIFICANT CHANGE UP (ref 0.85–1.18)
MCHC RBC-ENTMCNC: 26.6 PG — LOW (ref 27–34)
MCHC RBC-ENTMCNC: 33.3 GM/DL — SIGNIFICANT CHANGE UP (ref 32–36)
MCV RBC AUTO: 79.8 FL — LOW (ref 80–100)
NRBC # BLD: 0 /100 WBCS — SIGNIFICANT CHANGE UP (ref 0–0)
NRBC # FLD: 0 K/UL — SIGNIFICANT CHANGE UP (ref 0–0)
PLATELET # BLD AUTO: 146 K/UL — LOW (ref 150–400)
POTASSIUM SERPL-MCNC: 4.2 MMOL/L — SIGNIFICANT CHANGE UP (ref 3.5–5.3)
POTASSIUM SERPL-SCNC: 4.2 MMOL/L — SIGNIFICANT CHANGE UP (ref 3.5–5.3)
PROTHROM AB SERPL-ACNC: 12.6 SEC — SIGNIFICANT CHANGE UP (ref 9.5–13)
RBC # BLD: 5.11 M/UL — SIGNIFICANT CHANGE UP (ref 3.8–5.2)
RBC # FLD: 15.3 % — HIGH (ref 10.3–14.5)
SODIUM SERPL-SCNC: 136 MMOL/L — SIGNIFICANT CHANGE UP (ref 135–145)
WBC # BLD: 3.94 K/UL — SIGNIFICANT CHANGE UP (ref 3.8–10.5)
WBC # FLD AUTO: 3.94 K/UL — SIGNIFICANT CHANGE UP (ref 3.8–10.5)

## 2024-06-16 PROCEDURE — 99232 SBSQ HOSP IP/OBS MODERATE 35: CPT

## 2024-06-16 RX ORDER — HEPARIN SODIUM 50 [USP'U]/ML
5000 INJECTION, SOLUTION INTRAVENOUS ONCE
Refills: 0 | Status: COMPLETED | OUTPATIENT
Start: 2024-06-16 | End: 2024-06-16

## 2024-06-16 RX ORDER — SODIUM CHLORIDE 0.9 % (FLUSH) 0.9 %
1000 SYRINGE (ML) INJECTION
Refills: 0 | Status: DISCONTINUED | OUTPATIENT
Start: 2024-06-16 | End: 2024-06-20

## 2024-06-16 RX ADMIN — SERTRALINE HYDROCHLORIDE 50 MILLIGRAM(S): 100 TABLET, FILM COATED ORAL at 11:31

## 2024-06-16 RX ADMIN — BUPROPION HYDROCHLORIDE 300 MILLIGRAM(S): 150 TABLET, EXTENDED RELEASE ORAL at 11:31

## 2024-06-16 RX ADMIN — LEVETIRACETAM 500 MILLIGRAM(S): 100 INJECTION INTRAVENOUS at 05:05

## 2024-06-16 RX ADMIN — LEVETIRACETAM 500 MILLIGRAM(S): 100 INJECTION INTRAVENOUS at 17:32

## 2024-06-16 RX ADMIN — HEPARIN SODIUM 5000 UNIT(S): 50 INJECTION, SOLUTION INTRAVENOUS at 14:37

## 2024-06-16 RX ADMIN — Medication 1 GRAM(S): at 11:31

## 2024-06-16 RX ADMIN — MIRTAZAPINE 7.5 MILLIGRAM(S): 15 TABLET, FILM COATED ORAL at 21:18

## 2024-06-16 RX ADMIN — HEPARIN SODIUM 5000 UNIT(S): 50 INJECTION, SOLUTION INTRAVENOUS at 05:06

## 2024-06-17 LAB
ANION GAP SERPL CALC-SCNC: 11 MMOL/L — SIGNIFICANT CHANGE UP (ref 7–14)
APTT BLD: 42.7 SEC — HIGH (ref 24.5–35.6)
BUN SERPL-MCNC: 9 MG/DL — SIGNIFICANT CHANGE UP (ref 7–23)
CALCIUM SERPL-MCNC: 9.6 MG/DL — SIGNIFICANT CHANGE UP (ref 8.4–10.5)
CHLORIDE SERPL-SCNC: 100 MMOL/L — SIGNIFICANT CHANGE UP (ref 98–107)
CO2 SERPL-SCNC: 25 MMOL/L — SIGNIFICANT CHANGE UP (ref 22–31)
CREAT SERPL-MCNC: 0.5 MG/DL — SIGNIFICANT CHANGE UP (ref 0.5–1.3)
CULTURE RESULTS: SIGNIFICANT CHANGE UP
EGFR: 108 ML/MIN/1.73M2 — SIGNIFICANT CHANGE UP
FACT VIII ACT/NOR PPP: 105.1 % — SIGNIFICANT CHANGE UP (ref 45–125)
GLUCOSE SERPL-MCNC: 94 MG/DL — SIGNIFICANT CHANGE UP (ref 70–99)
HCT VFR BLD CALC: 41.1 % — SIGNIFICANT CHANGE UP (ref 34.5–45)
HGB BLD-MCNC: 13.7 G/DL — SIGNIFICANT CHANGE UP (ref 11.5–15.5)
MCHC RBC-ENTMCNC: 26.7 PG — LOW (ref 27–34)
MCHC RBC-ENTMCNC: 33.3 GM/DL — SIGNIFICANT CHANGE UP (ref 32–36)
MCV RBC AUTO: 80 FL — SIGNIFICANT CHANGE UP (ref 80–100)
NRBC # BLD: 0 /100 WBCS — SIGNIFICANT CHANGE UP (ref 0–0)
NRBC # FLD: 0 K/UL — SIGNIFICANT CHANGE UP (ref 0–0)
PLATELET # BLD AUTO: 141 K/UL — LOW (ref 150–400)
POTASSIUM SERPL-MCNC: 4 MMOL/L — SIGNIFICANT CHANGE UP (ref 3.5–5.3)
POTASSIUM SERPL-SCNC: 4 MMOL/L — SIGNIFICANT CHANGE UP (ref 3.5–5.3)
RBC # BLD: 5.14 M/UL — SIGNIFICANT CHANGE UP (ref 3.8–5.2)
RBC # FLD: 15.6 % — HIGH (ref 10.3–14.5)
SODIUM SERPL-SCNC: 136 MMOL/L — SIGNIFICANT CHANGE UP (ref 135–145)
SPECIMEN SOURCE: SIGNIFICANT CHANGE UP
WBC # BLD: 4.25 K/UL — SIGNIFICANT CHANGE UP (ref 3.8–10.5)
WBC # FLD AUTO: 4.25 K/UL — SIGNIFICANT CHANGE UP (ref 3.8–10.5)

## 2024-06-17 PROCEDURE — 99232 SBSQ HOSP IP/OBS MODERATE 35: CPT

## 2024-06-17 PROCEDURE — 70450 CT HEAD/BRAIN W/O DYE: CPT | Mod: 26

## 2024-06-17 DEVICE — IMPLANTABLE DEVICE: Type: IMPLANTABLE DEVICE | Status: FUNCTIONAL

## 2024-06-17 DEVICE — SURGIFLO MATRIX WITH THROMBIN KIT: Type: IMPLANTABLE DEVICE | Status: FUNCTIONAL

## 2024-06-17 DEVICE — BONE WAX 2.5GM: Type: IMPLANTABLE DEVICE | Status: FUNCTIONAL

## 2024-06-17 RX ORDER — PROTAMINE SULFATE 10 MG/ML
25 INJECTION, SOLUTION INTRAVENOUS ONCE
Refills: 0 | Status: COMPLETED | OUTPATIENT
Start: 2024-06-17 | End: 2024-06-17

## 2024-06-17 RX ORDER — ACETAMINOPHEN 325 MG
650 TABLET ORAL EVERY 6 HOURS
Refills: 0 | Status: DISCONTINUED | OUTPATIENT
Start: 2024-06-17 | End: 2024-06-28

## 2024-06-17 RX ORDER — HYDROMORPHONE HCL 0.2 MG/ML
0.25 INJECTION, SOLUTION INTRAVENOUS
Refills: 0 | Status: DISCONTINUED | OUTPATIENT
Start: 2024-06-17 | End: 2024-06-18

## 2024-06-17 RX ORDER — CEFAZOLIN 10 G/1
2000 INJECTION, POWDER, FOR SOLUTION INTRAVENOUS EVERY 8 HOURS
Refills: 0 | Status: COMPLETED | OUTPATIENT
Start: 2024-06-18 | End: 2024-06-18

## 2024-06-17 RX ORDER — OXYCODONE HYDROCHLORIDE 100 MG/5ML
5 SOLUTION ORAL ONCE
Refills: 0 | Status: DISCONTINUED | OUTPATIENT
Start: 2024-06-17 | End: 2024-06-18

## 2024-06-17 RX ORDER — ONDANSETRON HYDROCHLORIDE 2 MG/ML
4 INJECTION INTRAMUSCULAR; INTRAVENOUS ONCE
Refills: 0 | Status: DISCONTINUED | OUTPATIENT
Start: 2024-06-17 | End: 2024-06-18

## 2024-06-17 RX ORDER — OXYCODONE HYDROCHLORIDE 100 MG/5ML
5 SOLUTION ORAL EVERY 4 HOURS
Refills: 0 | Status: DISCONTINUED | OUTPATIENT
Start: 2024-06-17 | End: 2024-06-17

## 2024-06-17 RX ADMIN — LEVETIRACETAM 500 MILLIGRAM(S): 100 INJECTION INTRAVENOUS at 05:16

## 2024-06-17 RX ADMIN — SERTRALINE HYDROCHLORIDE 50 MILLIGRAM(S): 100 TABLET, FILM COATED ORAL at 11:53

## 2024-06-17 RX ADMIN — Medication 80 MILLILITER(S): at 20:20

## 2024-06-17 RX ADMIN — Medication 1 GRAM(S): at 11:53

## 2024-06-17 RX ADMIN — Medication 80 MILLILITER(S): at 00:28

## 2024-06-17 RX ADMIN — Medication 80 MILLILITER(S): at 08:58

## 2024-06-17 RX ADMIN — PROTAMINE SULFATE 210 MILLIGRAM(S): 10 INJECTION, SOLUTION INTRAVENOUS at 08:58

## 2024-06-17 RX ADMIN — BUPROPION HYDROCHLORIDE 300 MILLIGRAM(S): 150 TABLET, EXTENDED RELEASE ORAL at 11:53

## 2024-06-18 LAB
ANION GAP SERPL CALC-SCNC: 18 MMOL/L — HIGH (ref 7–14)
BUN SERPL-MCNC: 6 MG/DL — LOW (ref 7–23)
CALCIUM SERPL-MCNC: 9.2 MG/DL — SIGNIFICANT CHANGE UP (ref 8.4–10.5)
CHLORIDE SERPL-SCNC: 97 MMOL/L — LOW (ref 98–107)
CO2 SERPL-SCNC: 20 MMOL/L — LOW (ref 22–31)
CREAT SERPL-MCNC: 0.34 MG/DL — LOW (ref 0.5–1.3)
EGFR: 118 ML/MIN/1.73M2 — SIGNIFICANT CHANGE UP
GLUCOSE SERPL-MCNC: 95 MG/DL — SIGNIFICANT CHANGE UP (ref 70–99)
HCT VFR BLD CALC: 36.3 % — SIGNIFICANT CHANGE UP (ref 34.5–45)
HGB BLD-MCNC: 12 G/DL — SIGNIFICANT CHANGE UP (ref 11.5–15.5)
MCHC RBC-ENTMCNC: 26.4 PG — LOW (ref 27–34)
MCHC RBC-ENTMCNC: 33.1 GM/DL — SIGNIFICANT CHANGE UP (ref 32–36)
MCV RBC AUTO: 80 FL — SIGNIFICANT CHANGE UP (ref 80–100)
NRBC # BLD: 0 /100 WBCS — SIGNIFICANT CHANGE UP (ref 0–0)
NRBC # FLD: 0 K/UL — SIGNIFICANT CHANGE UP (ref 0–0)
PLATELET # BLD AUTO: 112 K/UL — LOW (ref 150–400)
POTASSIUM SERPL-MCNC: 3.9 MMOL/L — SIGNIFICANT CHANGE UP (ref 3.5–5.3)
POTASSIUM SERPL-SCNC: 3.9 MMOL/L — SIGNIFICANT CHANGE UP (ref 3.5–5.3)
RBC # BLD: 4.54 M/UL — SIGNIFICANT CHANGE UP (ref 3.8–5.2)
RBC # FLD: 15 % — HIGH (ref 10.3–14.5)
SODIUM SERPL-SCNC: 135 MMOL/L — SIGNIFICANT CHANGE UP (ref 135–145)
WBC # BLD: 10.01 K/UL — SIGNIFICANT CHANGE UP (ref 3.8–10.5)
WBC # FLD AUTO: 10.01 K/UL — SIGNIFICANT CHANGE UP (ref 3.8–10.5)

## 2024-06-18 PROCEDURE — 99232 SBSQ HOSP IP/OBS MODERATE 35: CPT

## 2024-06-18 RX ADMIN — Medication 1 GRAM(S): at 11:15

## 2024-06-18 RX ADMIN — CEFAZOLIN 100 MILLIGRAM(S): 10 INJECTION, POWDER, FOR SOLUTION INTRAVENOUS at 02:40

## 2024-06-18 RX ADMIN — LEVETIRACETAM 500 MILLIGRAM(S): 100 INJECTION INTRAVENOUS at 18:00

## 2024-06-18 RX ADMIN — CEFAZOLIN 100 MILLIGRAM(S): 10 INJECTION, POWDER, FOR SOLUTION INTRAVENOUS at 10:17

## 2024-06-18 RX ADMIN — SERTRALINE HYDROCHLORIDE 50 MILLIGRAM(S): 100 TABLET, FILM COATED ORAL at 11:15

## 2024-06-18 RX ADMIN — Medication 2 TABLET(S): at 22:22

## 2024-06-18 RX ADMIN — MIRTAZAPINE 7.5 MILLIGRAM(S): 15 TABLET, FILM COATED ORAL at 22:22

## 2024-06-18 RX ADMIN — BUPROPION HYDROCHLORIDE 300 MILLIGRAM(S): 150 TABLET, EXTENDED RELEASE ORAL at 11:15

## 2024-06-18 RX ADMIN — LEVETIRACETAM 500 MILLIGRAM(S): 100 INJECTION INTRAVENOUS at 05:44

## 2024-06-19 LAB
ANION GAP SERPL CALC-SCNC: 16 MMOL/L — HIGH (ref 7–14)
APTT BLD: 41.2 SEC — HIGH (ref 24.5–35.6)
BUN SERPL-MCNC: 6 MG/DL — LOW (ref 7–23)
CALCIUM SERPL-MCNC: 9.6 MG/DL — SIGNIFICANT CHANGE UP (ref 8.4–10.5)
CHLORIDE SERPL-SCNC: 96 MMOL/L — LOW (ref 98–107)
CO2 SERPL-SCNC: 25 MMOL/L — SIGNIFICANT CHANGE UP (ref 22–31)
CREAT SERPL-MCNC: 0.42 MG/DL — LOW (ref 0.5–1.3)
EGFR: 113 ML/MIN/1.73M2 — SIGNIFICANT CHANGE UP
GLUCOSE SERPL-MCNC: 94 MG/DL — SIGNIFICANT CHANGE UP (ref 70–99)
HCT VFR BLD CALC: 38.3 % — SIGNIFICANT CHANGE UP (ref 34.5–45)
HGB BLD-MCNC: 13 G/DL — SIGNIFICANT CHANGE UP (ref 11.5–15.5)
MCHC RBC-ENTMCNC: 26.6 PG — LOW (ref 27–34)
MCHC RBC-ENTMCNC: 33.9 GM/DL — SIGNIFICANT CHANGE UP (ref 32–36)
MCV RBC AUTO: 78.5 FL — LOW (ref 80–100)
NRBC # BLD: 0 /100 WBCS — SIGNIFICANT CHANGE UP (ref 0–0)
NRBC # FLD: 0 K/UL — SIGNIFICANT CHANGE UP (ref 0–0)
PLATELET # BLD AUTO: 111 K/UL — LOW (ref 150–400)
POTASSIUM SERPL-MCNC: 3.5 MMOL/L — SIGNIFICANT CHANGE UP (ref 3.5–5.3)
POTASSIUM SERPL-SCNC: 3.5 MMOL/L — SIGNIFICANT CHANGE UP (ref 3.5–5.3)
RBC # BLD: 4.88 M/UL — SIGNIFICANT CHANGE UP (ref 3.8–5.2)
RBC # FLD: 15.8 % — HIGH (ref 10.3–14.5)
SODIUM SERPL-SCNC: 137 MMOL/L — SIGNIFICANT CHANGE UP (ref 135–145)
WBC # BLD: 7.73 K/UL — SIGNIFICANT CHANGE UP (ref 3.8–10.5)
WBC # FLD AUTO: 7.73 K/UL — SIGNIFICANT CHANGE UP (ref 3.8–10.5)

## 2024-06-19 PROCEDURE — 99232 SBSQ HOSP IP/OBS MODERATE 35: CPT

## 2024-06-19 RX ORDER — HEPARIN SODIUM 50 [USP'U]/ML
5000 INJECTION, SOLUTION INTRAVENOUS EVERY 8 HOURS
Refills: 0 | Status: DISCONTINUED | OUTPATIENT
Start: 2024-06-19 | End: 2024-06-19

## 2024-06-19 RX ORDER — ENOXAPARIN SODIUM 100 MG/ML
40 INJECTION SUBCUTANEOUS EVERY 24 HOURS
Refills: 0 | Status: DISCONTINUED | OUTPATIENT
Start: 2024-06-20 | End: 2024-06-28

## 2024-06-19 RX ADMIN — BUPROPION HYDROCHLORIDE 300 MILLIGRAM(S): 150 TABLET, EXTENDED RELEASE ORAL at 11:41

## 2024-06-19 RX ADMIN — MIRTAZAPINE 7.5 MILLIGRAM(S): 15 TABLET, FILM COATED ORAL at 23:00

## 2024-06-19 RX ADMIN — Medication 2 TABLET(S): at 23:00

## 2024-06-19 RX ADMIN — LEVETIRACETAM 500 MILLIGRAM(S): 100 INJECTION INTRAVENOUS at 17:39

## 2024-06-19 RX ADMIN — SERTRALINE HYDROCHLORIDE 50 MILLIGRAM(S): 100 TABLET, FILM COATED ORAL at 11:41

## 2024-06-19 RX ADMIN — LEVETIRACETAM 500 MILLIGRAM(S): 100 INJECTION INTRAVENOUS at 06:40

## 2024-06-19 RX ADMIN — Medication 1 GRAM(S): at 11:41

## 2024-06-19 RX ADMIN — HEPARIN SODIUM 5000 UNIT(S): 50 INJECTION, SOLUTION INTRAVENOUS at 22:43

## 2024-06-20 DIAGNOSIS — G91.9 HYDROCEPHALUS, UNSPECIFIED: ICD-10-CM

## 2024-06-20 LAB
ANION GAP SERPL CALC-SCNC: 15 MMOL/L — HIGH (ref 7–14)
APPEARANCE CSF: CLEAR — SIGNIFICANT CHANGE UP
APPEARANCE SPUN FLD: COLORLESS — SIGNIFICANT CHANGE UP
BUN SERPL-MCNC: 8 MG/DL — SIGNIFICANT CHANGE UP (ref 7–23)
CALCIUM SERPL-MCNC: 9.5 MG/DL — SIGNIFICANT CHANGE UP (ref 8.4–10.5)
CHLORIDE SERPL-SCNC: 95 MMOL/L — LOW (ref 98–107)
CO2 SERPL-SCNC: 24 MMOL/L — SIGNIFICANT CHANGE UP (ref 22–31)
COLOR CSF: COLORLESS — SIGNIFICANT CHANGE UP
CREAT SERPL-MCNC: 0.37 MG/DL — LOW (ref 0.5–1.3)
EGFR: 116 ML/MIN/1.73M2 — SIGNIFICANT CHANGE UP
FIBRINOGEN AG PPP IA-MCNC: 441 MG/DL — SIGNIFICANT CHANGE UP (ref 233–496)
GLUCOSE CSF-MCNC: 75 MG/DL — HIGH (ref 40–70)
GLUCOSE SERPL-MCNC: 105 MG/DL — HIGH (ref 70–99)
GRAM STN FLD: SIGNIFICANT CHANGE UP
HCT VFR BLD CALC: 36 % — SIGNIFICANT CHANGE UP (ref 34.5–45)
HGB BLD-MCNC: 12.2 G/DL — SIGNIFICANT CHANGE UP (ref 11.5–15.5)
LYMPHOCYTES # CSF: 50 % — SIGNIFICANT CHANGE UP
MCHC RBC-ENTMCNC: 27.1 PG — SIGNIFICANT CHANGE UP (ref 27–34)
MCHC RBC-ENTMCNC: 33.9 GM/DL — SIGNIFICANT CHANGE UP (ref 32–36)
MCV RBC AUTO: 79.8 FL — LOW (ref 80–100)
MONOS+MACROS NFR CSF: 50 % — SIGNIFICANT CHANGE UP
NEUTROPHILS # CSF: 0 % — SIGNIFICANT CHANGE UP
NRBC # BLD: 0 /100 WBCS — SIGNIFICANT CHANGE UP (ref 0–0)
NRBC # FLD: 0 K/UL — SIGNIFICANT CHANGE UP (ref 0–0)
NRBC NFR CSF: 2 CELLS/UL — SIGNIFICANT CHANGE UP (ref 0–5)
PLATELET # BLD AUTO: 134 K/UL — LOW (ref 150–400)
POTASSIUM SERPL-MCNC: 3.4 MMOL/L — LOW (ref 3.5–5.3)
POTASSIUM SERPL-SCNC: 3.4 MMOL/L — LOW (ref 3.5–5.3)
PROT CSF-MCNC: 6 MG/DL — LOW (ref 15–45)
RBC # BLD: 4.51 M/UL — SIGNIFICANT CHANGE UP (ref 3.8–5.2)
RBC # CSF: 122 CELLS/UL — HIGH (ref 0–0)
RBC # FLD: 15.5 % — HIGH (ref 10.3–14.5)
SODIUM SERPL-SCNC: 134 MMOL/L — LOW (ref 135–145)
SPECIMEN SOURCE: SIGNIFICANT CHANGE UP
TOTAL CELLS COUNTED, SPINAL FLUID: 4 CELLS — SIGNIFICANT CHANGE UP
TUBE TYPE: SIGNIFICANT CHANGE UP
WBC # BLD: 5.08 K/UL — SIGNIFICANT CHANGE UP (ref 3.8–10.5)
WBC # FLD AUTO: 5.08 K/UL — SIGNIFICANT CHANGE UP (ref 3.8–10.5)

## 2024-06-20 PROCEDURE — 62252 CSF SHUNT REPROGRAM: CPT | Mod: 26

## 2024-06-20 PROCEDURE — 70450 CT HEAD/BRAIN W/O DYE: CPT | Mod: 26

## 2024-06-20 PROCEDURE — 99232 SBSQ HOSP IP/OBS MODERATE 35: CPT

## 2024-06-20 RX ORDER — POTASSIUM CHLORIDE 600 MG/1
40 TABLET, FILM COATED, EXTENDED RELEASE ORAL ONCE
Refills: 0 | Status: COMPLETED | OUTPATIENT
Start: 2024-06-20 | End: 2024-06-20

## 2024-06-20 RX ORDER — SODIUM CHLORIDE 0.9 % (FLUSH) 0.9 %
1 SYRINGE (ML) INJECTION THREE TIMES A DAY
Refills: 0 | Status: DISCONTINUED | OUTPATIENT
Start: 2024-06-20 | End: 2024-06-28

## 2024-06-20 RX ADMIN — LEVETIRACETAM 500 MILLIGRAM(S): 100 INJECTION INTRAVENOUS at 18:38

## 2024-06-20 RX ADMIN — ENOXAPARIN SODIUM 40 MILLIGRAM(S): 100 INJECTION SUBCUTANEOUS at 09:47

## 2024-06-20 RX ADMIN — POTASSIUM CHLORIDE 40 MILLIEQUIVALENT(S): 600 TABLET, FILM COATED, EXTENDED RELEASE ORAL at 09:47

## 2024-06-20 RX ADMIN — BUPROPION HYDROCHLORIDE 300 MILLIGRAM(S): 150 TABLET, EXTENDED RELEASE ORAL at 12:57

## 2024-06-20 RX ADMIN — Medication 1 GRAM(S): at 21:11

## 2024-06-20 RX ADMIN — SERTRALINE HYDROCHLORIDE 50 MILLIGRAM(S): 100 TABLET, FILM COATED ORAL at 13:00

## 2024-06-20 RX ADMIN — Medication 5 MILLIGRAM(S): at 13:00

## 2024-06-20 RX ADMIN — MIRTAZAPINE 7.5 MILLIGRAM(S): 15 TABLET, FILM COATED ORAL at 21:08

## 2024-06-20 RX ADMIN — LEVETIRACETAM 500 MILLIGRAM(S): 100 INJECTION INTRAVENOUS at 05:33

## 2024-06-20 RX ADMIN — Medication 2 TABLET(S): at 21:09

## 2024-06-20 RX ADMIN — Medication 1 GRAM(S): at 12:57

## 2024-06-21 LAB
ANION GAP SERPL CALC-SCNC: 10 MMOL/L — SIGNIFICANT CHANGE UP (ref 7–14)
BUN SERPL-MCNC: 10 MG/DL — SIGNIFICANT CHANGE UP (ref 7–23)
CALCIUM SERPL-MCNC: 9.4 MG/DL — SIGNIFICANT CHANGE UP (ref 8.4–10.5)
CHLORIDE SERPL-SCNC: 100 MMOL/L — SIGNIFICANT CHANGE UP (ref 98–107)
CO2 SERPL-SCNC: 26 MMOL/L — SIGNIFICANT CHANGE UP (ref 22–31)
CREAT SERPL-MCNC: 0.45 MG/DL — LOW (ref 0.5–1.3)
EGFR: 111 ML/MIN/1.73M2 — SIGNIFICANT CHANGE UP
GLUCOSE SERPL-MCNC: 105 MG/DL — HIGH (ref 70–99)
HCT VFR BLD CALC: 34.4 % — LOW (ref 34.5–45)
HGB BLD-MCNC: 11.7 G/DL — SIGNIFICANT CHANGE UP (ref 11.5–15.5)
MCHC RBC-ENTMCNC: 27.1 PG — SIGNIFICANT CHANGE UP (ref 27–34)
MCHC RBC-ENTMCNC: 34 GM/DL — SIGNIFICANT CHANGE UP (ref 32–36)
MCV RBC AUTO: 79.8 FL — LOW (ref 80–100)
NRBC # BLD: 0 /100 WBCS — SIGNIFICANT CHANGE UP (ref 0–0)
NRBC # FLD: 0 K/UL — SIGNIFICANT CHANGE UP (ref 0–0)
PLATELET # BLD AUTO: 199 K/UL — SIGNIFICANT CHANGE UP (ref 150–400)
POTASSIUM SERPL-MCNC: 3.8 MMOL/L — SIGNIFICANT CHANGE UP (ref 3.5–5.3)
POTASSIUM SERPL-SCNC: 3.8 MMOL/L — SIGNIFICANT CHANGE UP (ref 3.5–5.3)
RBC # BLD: 4.31 M/UL — SIGNIFICANT CHANGE UP (ref 3.8–5.2)
RBC # FLD: 15.6 % — HIGH (ref 10.3–14.5)
SODIUM SERPL-SCNC: 136 MMOL/L — SIGNIFICANT CHANGE UP (ref 135–145)
WBC # BLD: 4.58 K/UL — SIGNIFICANT CHANGE UP (ref 3.8–10.5)
WBC # FLD AUTO: 4.58 K/UL — SIGNIFICANT CHANGE UP (ref 3.8–10.5)

## 2024-06-21 PROCEDURE — 70450 CT HEAD/BRAIN W/O DYE: CPT | Mod: 26

## 2024-06-21 PROCEDURE — 99232 SBSQ HOSP IP/OBS MODERATE 35: CPT

## 2024-06-21 RX ADMIN — Medication 1 GRAM(S): at 22:46

## 2024-06-21 RX ADMIN — ENOXAPARIN SODIUM 40 MILLIGRAM(S): 100 INJECTION SUBCUTANEOUS at 09:16

## 2024-06-21 RX ADMIN — SERTRALINE HYDROCHLORIDE 50 MILLIGRAM(S): 100 TABLET, FILM COATED ORAL at 11:22

## 2024-06-21 RX ADMIN — MIRTAZAPINE 7.5 MILLIGRAM(S): 15 TABLET, FILM COATED ORAL at 22:46

## 2024-06-21 RX ADMIN — Medication 1 GRAM(S): at 05:23

## 2024-06-21 RX ADMIN — LEVETIRACETAM 500 MILLIGRAM(S): 100 INJECTION INTRAVENOUS at 05:23

## 2024-06-21 RX ADMIN — Medication 2 TABLET(S): at 22:47

## 2024-06-21 RX ADMIN — LEVETIRACETAM 500 MILLIGRAM(S): 100 INJECTION INTRAVENOUS at 17:28

## 2024-06-21 RX ADMIN — Medication 1 GRAM(S): at 11:23

## 2024-06-21 RX ADMIN — BUPROPION HYDROCHLORIDE 300 MILLIGRAM(S): 150 TABLET, EXTENDED RELEASE ORAL at 11:21

## 2024-06-22 LAB
ANION GAP SERPL CALC-SCNC: 12 MMOL/L — SIGNIFICANT CHANGE UP (ref 7–14)
BUN SERPL-MCNC: 11 MG/DL — SIGNIFICANT CHANGE UP (ref 7–23)
CALCIUM SERPL-MCNC: 9 MG/DL — SIGNIFICANT CHANGE UP (ref 8.4–10.5)
CHLORIDE SERPL-SCNC: 101 MMOL/L — SIGNIFICANT CHANGE UP (ref 98–107)
CO2 SERPL-SCNC: 23 MMOL/L — SIGNIFICANT CHANGE UP (ref 22–31)
CREAT SERPL-MCNC: 0.39 MG/DL — LOW (ref 0.5–1.3)
EGFR: 115 ML/MIN/1.73M2 — SIGNIFICANT CHANGE UP
GLUCOSE SERPL-MCNC: 86 MG/DL — SIGNIFICANT CHANGE UP (ref 70–99)
HCT VFR BLD CALC: 34.7 % — SIGNIFICANT CHANGE UP (ref 34.5–45)
HGB BLD-MCNC: 11.6 G/DL — SIGNIFICANT CHANGE UP (ref 11.5–15.5)
MCHC RBC-ENTMCNC: 27 PG — SIGNIFICANT CHANGE UP (ref 27–34)
MCHC RBC-ENTMCNC: 33.4 GM/DL — SIGNIFICANT CHANGE UP (ref 32–36)
MCV RBC AUTO: 80.7 FL — SIGNIFICANT CHANGE UP (ref 80–100)
NRBC # BLD: 0 /100 WBCS — SIGNIFICANT CHANGE UP (ref 0–0)
NRBC # FLD: 0 K/UL — SIGNIFICANT CHANGE UP (ref 0–0)
PLATELET # BLD AUTO: 170 K/UL — SIGNIFICANT CHANGE UP (ref 150–400)
POTASSIUM SERPL-MCNC: 3.9 MMOL/L — SIGNIFICANT CHANGE UP (ref 3.5–5.3)
POTASSIUM SERPL-SCNC: 3.9 MMOL/L — SIGNIFICANT CHANGE UP (ref 3.5–5.3)
RBC # BLD: 4.3 M/UL — SIGNIFICANT CHANGE UP (ref 3.8–5.2)
RBC # FLD: 15.8 % — HIGH (ref 10.3–14.5)
SODIUM SERPL-SCNC: 136 MMOL/L — SIGNIFICANT CHANGE UP (ref 135–145)
WBC # BLD: 3.43 K/UL — LOW (ref 3.8–10.5)
WBC # FLD AUTO: 3.43 K/UL — LOW (ref 3.8–10.5)

## 2024-06-22 PROCEDURE — 99232 SBSQ HOSP IP/OBS MODERATE 35: CPT

## 2024-06-22 PROCEDURE — 70450 CT HEAD/BRAIN W/O DYE: CPT | Mod: 26

## 2024-06-22 RX ADMIN — LEVETIRACETAM 500 MILLIGRAM(S): 100 INJECTION INTRAVENOUS at 05:09

## 2024-06-22 RX ADMIN — MIRTAZAPINE 7.5 MILLIGRAM(S): 15 TABLET, FILM COATED ORAL at 23:09

## 2024-06-22 RX ADMIN — Medication 1 GRAM(S): at 05:08

## 2024-06-22 RX ADMIN — SERTRALINE HYDROCHLORIDE 50 MILLIGRAM(S): 100 TABLET, FILM COATED ORAL at 11:43

## 2024-06-22 RX ADMIN — Medication 1 GRAM(S): at 23:08

## 2024-06-22 RX ADMIN — Medication 1 GRAM(S): at 14:44

## 2024-06-22 RX ADMIN — LEVETIRACETAM 500 MILLIGRAM(S): 100 INJECTION INTRAVENOUS at 18:16

## 2024-06-22 RX ADMIN — ENOXAPARIN SODIUM 40 MILLIGRAM(S): 100 INJECTION SUBCUTANEOUS at 09:35

## 2024-06-22 RX ADMIN — BUPROPION HYDROCHLORIDE 300 MILLIGRAM(S): 150 TABLET, EXTENDED RELEASE ORAL at 11:43

## 2024-06-23 LAB
ANION GAP SERPL CALC-SCNC: 11 MMOL/L — SIGNIFICANT CHANGE UP (ref 7–14)
BUN SERPL-MCNC: 11 MG/DL — SIGNIFICANT CHANGE UP (ref 7–23)
CALCIUM SERPL-MCNC: 8.7 MG/DL — SIGNIFICANT CHANGE UP (ref 8.4–10.5)
CHLORIDE SERPL-SCNC: 101 MMOL/L — SIGNIFICANT CHANGE UP (ref 98–107)
CO2 SERPL-SCNC: 24 MMOL/L — SIGNIFICANT CHANGE UP (ref 22–31)
CREAT SERPL-MCNC: 0.44 MG/DL — LOW (ref 0.5–1.3)
EGFR: 111 ML/MIN/1.73M2 — SIGNIFICANT CHANGE UP
GLUCOSE SERPL-MCNC: 99 MG/DL — SIGNIFICANT CHANGE UP (ref 70–99)
HCT VFR BLD CALC: 33.9 % — LOW (ref 34.5–45)
HGB BLD-MCNC: 11.3 G/DL — LOW (ref 11.5–15.5)
MCHC RBC-ENTMCNC: 26.7 PG — LOW (ref 27–34)
MCHC RBC-ENTMCNC: 33.3 GM/DL — SIGNIFICANT CHANGE UP (ref 32–36)
MCV RBC AUTO: 80.1 FL — SIGNIFICANT CHANGE UP (ref 80–100)
NRBC # BLD: 0 /100 WBCS — SIGNIFICANT CHANGE UP (ref 0–0)
NRBC # FLD: 0 K/UL — SIGNIFICANT CHANGE UP (ref 0–0)
PLATELET # BLD AUTO: 196 K/UL — SIGNIFICANT CHANGE UP (ref 150–400)
POTASSIUM SERPL-MCNC: 3.9 MMOL/L — SIGNIFICANT CHANGE UP (ref 3.5–5.3)
POTASSIUM SERPL-SCNC: 3.9 MMOL/L — SIGNIFICANT CHANGE UP (ref 3.5–5.3)
RBC # BLD: 4.23 M/UL — SIGNIFICANT CHANGE UP (ref 3.8–5.2)
RBC # FLD: 15.3 % — HIGH (ref 10.3–14.5)
SODIUM SERPL-SCNC: 136 MMOL/L — SIGNIFICANT CHANGE UP (ref 135–145)
WBC # BLD: 4.03 K/UL — SIGNIFICANT CHANGE UP (ref 3.8–10.5)
WBC # FLD AUTO: 4.03 K/UL — SIGNIFICANT CHANGE UP (ref 3.8–10.5)

## 2024-06-23 PROCEDURE — 99232 SBSQ HOSP IP/OBS MODERATE 35: CPT

## 2024-06-23 RX ADMIN — LEVETIRACETAM 500 MILLIGRAM(S): 100 INJECTION INTRAVENOUS at 17:53

## 2024-06-23 RX ADMIN — Medication 3 MILLIGRAM(S): at 21:14

## 2024-06-23 RX ADMIN — MIRTAZAPINE 7.5 MILLIGRAM(S): 15 TABLET, FILM COATED ORAL at 21:08

## 2024-06-23 RX ADMIN — Medication 1 GRAM(S): at 21:08

## 2024-06-23 RX ADMIN — LEVETIRACETAM 500 MILLIGRAM(S): 100 INJECTION INTRAVENOUS at 05:59

## 2024-06-23 RX ADMIN — BUPROPION HYDROCHLORIDE 300 MILLIGRAM(S): 150 TABLET, EXTENDED RELEASE ORAL at 13:39

## 2024-06-23 RX ADMIN — Medication 1 GRAM(S): at 13:38

## 2024-06-23 RX ADMIN — ENOXAPARIN SODIUM 40 MILLIGRAM(S): 100 INJECTION SUBCUTANEOUS at 08:33

## 2024-06-23 RX ADMIN — SERTRALINE HYDROCHLORIDE 50 MILLIGRAM(S): 100 TABLET, FILM COATED ORAL at 13:39

## 2024-06-23 RX ADMIN — Medication 1 GRAM(S): at 06:00

## 2024-06-24 LAB
ANION GAP SERPL CALC-SCNC: 12 MMOL/L — SIGNIFICANT CHANGE UP (ref 7–14)
BUN SERPL-MCNC: 9 MG/DL — SIGNIFICANT CHANGE UP (ref 7–23)
CALCIUM SERPL-MCNC: 9.1 MG/DL — SIGNIFICANT CHANGE UP (ref 8.4–10.5)
CHLORIDE SERPL-SCNC: 101 MMOL/L — SIGNIFICANT CHANGE UP (ref 98–107)
CO2 SERPL-SCNC: 23 MMOL/L — SIGNIFICANT CHANGE UP (ref 22–31)
CREAT SERPL-MCNC: 0.51 MG/DL — SIGNIFICANT CHANGE UP (ref 0.5–1.3)
EGFR: 107 ML/MIN/1.73M2 — SIGNIFICANT CHANGE UP
GLUCOSE SERPL-MCNC: 100 MG/DL — HIGH (ref 70–99)
HCT VFR BLD CALC: 34.5 % — SIGNIFICANT CHANGE UP (ref 34.5–45)
HGB BLD-MCNC: 11.2 G/DL — LOW (ref 11.5–15.5)
MCHC RBC-ENTMCNC: 26.2 PG — LOW (ref 27–34)
MCHC RBC-ENTMCNC: 32.5 GM/DL — SIGNIFICANT CHANGE UP (ref 32–36)
MCV RBC AUTO: 80.8 FL — SIGNIFICANT CHANGE UP (ref 80–100)
NRBC # BLD: 0 /100 WBCS — SIGNIFICANT CHANGE UP (ref 0–0)
NRBC # FLD: 0 K/UL — SIGNIFICANT CHANGE UP (ref 0–0)
PLATELET # BLD AUTO: 190 K/UL — SIGNIFICANT CHANGE UP (ref 150–400)
POTASSIUM SERPL-MCNC: 3.7 MMOL/L — SIGNIFICANT CHANGE UP (ref 3.5–5.3)
POTASSIUM SERPL-SCNC: 3.7 MMOL/L — SIGNIFICANT CHANGE UP (ref 3.5–5.3)
RBC # BLD: 4.27 M/UL — SIGNIFICANT CHANGE UP (ref 3.8–5.2)
RBC # FLD: 15.1 % — HIGH (ref 10.3–14.5)
SODIUM SERPL-SCNC: 136 MMOL/L — SIGNIFICANT CHANGE UP (ref 135–145)
WBC # BLD: 3.84 K/UL — SIGNIFICANT CHANGE UP (ref 3.8–10.5)
WBC # FLD AUTO: 3.84 K/UL — SIGNIFICANT CHANGE UP (ref 3.8–10.5)

## 2024-06-24 PROCEDURE — 99232 SBSQ HOSP IP/OBS MODERATE 35: CPT

## 2024-06-24 RX ADMIN — Medication 3 MILLIGRAM(S): at 21:04

## 2024-06-24 RX ADMIN — Medication 1 GRAM(S): at 05:19

## 2024-06-24 RX ADMIN — Medication 2 TABLET(S): at 21:05

## 2024-06-24 RX ADMIN — LEVETIRACETAM 500 MILLIGRAM(S): 100 INJECTION INTRAVENOUS at 05:20

## 2024-06-24 RX ADMIN — Medication 1 GRAM(S): at 21:04

## 2024-06-24 RX ADMIN — SERTRALINE HYDROCHLORIDE 50 MILLIGRAM(S): 100 TABLET, FILM COATED ORAL at 12:32

## 2024-06-24 RX ADMIN — LEVETIRACETAM 500 MILLIGRAM(S): 100 INJECTION INTRAVENOUS at 17:16

## 2024-06-24 RX ADMIN — BUPROPION HYDROCHLORIDE 300 MILLIGRAM(S): 150 TABLET, EXTENDED RELEASE ORAL at 12:33

## 2024-06-24 RX ADMIN — ENOXAPARIN SODIUM 40 MILLIGRAM(S): 100 INJECTION SUBCUTANEOUS at 09:45

## 2024-06-24 RX ADMIN — MIRTAZAPINE 7.5 MILLIGRAM(S): 15 TABLET, FILM COATED ORAL at 21:05

## 2024-06-24 RX ADMIN — Medication 1 GRAM(S): at 13:10

## 2024-06-25 ENCOUNTER — TRANSCRIPTION ENCOUNTER (OUTPATIENT)
Age: 59
End: 2024-06-25

## 2024-06-25 LAB
ANION GAP SERPL CALC-SCNC: 12 MMOL/L — SIGNIFICANT CHANGE UP (ref 7–14)
BUN SERPL-MCNC: 9 MG/DL — SIGNIFICANT CHANGE UP (ref 7–23)
CALCIUM SERPL-MCNC: 8.9 MG/DL — SIGNIFICANT CHANGE UP (ref 8.4–10.5)
CHLORIDE SERPL-SCNC: 100 MMOL/L — SIGNIFICANT CHANGE UP (ref 98–107)
CO2 SERPL-SCNC: 24 MMOL/L — SIGNIFICANT CHANGE UP (ref 22–31)
CREAT SERPL-MCNC: 0.41 MG/DL — LOW (ref 0.5–1.3)
CULTURE RESULTS: NO GROWTH — SIGNIFICANT CHANGE UP
EGFR: 113 ML/MIN/1.73M2 — SIGNIFICANT CHANGE UP
GLUCOSE SERPL-MCNC: 83 MG/DL — SIGNIFICANT CHANGE UP (ref 70–99)
HCT VFR BLD CALC: 35.5 % — SIGNIFICANT CHANGE UP (ref 34.5–45)
HGB BLD-MCNC: 11.8 G/DL — SIGNIFICANT CHANGE UP (ref 11.5–15.5)
MCHC RBC-ENTMCNC: 27.1 PG — SIGNIFICANT CHANGE UP (ref 27–34)
MCHC RBC-ENTMCNC: 33.2 GM/DL — SIGNIFICANT CHANGE UP (ref 32–36)
MCV RBC AUTO: 81.6 FL — SIGNIFICANT CHANGE UP (ref 80–100)
NRBC # BLD: 0 /100 WBCS — SIGNIFICANT CHANGE UP (ref 0–0)
NRBC # FLD: 0 K/UL — SIGNIFICANT CHANGE UP (ref 0–0)
PLATELET # BLD AUTO: 187 K/UL — SIGNIFICANT CHANGE UP (ref 150–400)
POTASSIUM SERPL-MCNC: 3.6 MMOL/L — SIGNIFICANT CHANGE UP (ref 3.5–5.3)
POTASSIUM SERPL-SCNC: 3.6 MMOL/L — SIGNIFICANT CHANGE UP (ref 3.5–5.3)
RBC # BLD: 4.35 M/UL — SIGNIFICANT CHANGE UP (ref 3.8–5.2)
RBC # FLD: 14.9 % — HIGH (ref 10.3–14.5)
SODIUM SERPL-SCNC: 136 MMOL/L — SIGNIFICANT CHANGE UP (ref 135–145)
SPECIMEN SOURCE: SIGNIFICANT CHANGE UP
WBC # BLD: 3.14 K/UL — LOW (ref 3.8–10.5)
WBC # FLD AUTO: 3.14 K/UL — LOW (ref 3.8–10.5)

## 2024-06-25 PROCEDURE — 99232 SBSQ HOSP IP/OBS MODERATE 35: CPT

## 2024-06-25 RX ORDER — ACETAMINOPHEN 325 MG
2 TABLET ORAL
Qty: 0 | Refills: 0 | DISCHARGE
Start: 2024-06-25

## 2024-06-25 RX ORDER — BUPROPION HYDROCHLORIDE 150 MG/1
1 TABLET, EXTENDED RELEASE ORAL
Qty: 0 | Refills: 0 | DISCHARGE
Start: 2024-06-25

## 2024-06-25 RX ORDER — MIRTAZAPINE 15 MG/1
1 TABLET, FILM COATED ORAL
Qty: 0 | Refills: 0 | DISCHARGE
Start: 2024-06-25

## 2024-06-25 RX ORDER — BUPROPION HYDROCHLORIDE 150 MG/1
1 TABLET, EXTENDED RELEASE ORAL
Refills: 0 | DISCHARGE

## 2024-06-25 RX ORDER — SERTRALINE 25 MG/1
1 TABLET, FILM COATED ORAL
Refills: 0 | DISCHARGE

## 2024-06-25 RX ORDER — SENNOSIDES 8.6 MG
2 TABLET ORAL
Qty: 0 | Refills: 0 | DISCHARGE
Start: 2024-06-25

## 2024-06-25 RX ORDER — SERTRALINE HYDROCHLORIDE 100 MG/1
1 TABLET, FILM COATED ORAL
Qty: 0 | Refills: 0 | DISCHARGE
Start: 2024-06-25

## 2024-06-25 RX ORDER — LEVETIRACETAM 100 MG/ML
1 INJECTION INTRAVENOUS
Qty: 0 | Refills: 0 | DISCHARGE
Start: 2024-06-25

## 2024-06-25 RX ORDER — MIRTAZAPINE 45 MG/1
1 TABLET, ORALLY DISINTEGRATING ORAL
Refills: 0 | DISCHARGE

## 2024-06-25 RX ORDER — SODIUM CHLORIDE 0.9 % (FLUSH) 0.9 %
1 SYRINGE (ML) INJECTION
Qty: 0 | Refills: 0 | DISCHARGE
Start: 2024-06-25

## 2024-06-25 RX ORDER — POLYETHYLENE GLYCOL 3350 1 G/G
17 POWDER ORAL
Qty: 0 | Refills: 0 | DISCHARGE
Start: 2024-06-25

## 2024-06-25 RX ORDER — BISACODYL 5 MG
1 TABLET, DELAYED RELEASE (ENTERIC COATED) ORAL
Qty: 0 | Refills: 0 | DISCHARGE
Start: 2024-06-25

## 2024-06-25 RX ADMIN — Medication 1 GRAM(S): at 21:38

## 2024-06-25 RX ADMIN — SERTRALINE HYDROCHLORIDE 50 MILLIGRAM(S): 100 TABLET, FILM COATED ORAL at 12:03

## 2024-06-25 RX ADMIN — Medication 1 GRAM(S): at 05:16

## 2024-06-25 RX ADMIN — Medication 2 TABLET(S): at 21:39

## 2024-06-25 RX ADMIN — BUPROPION HYDROCHLORIDE 300 MILLIGRAM(S): 150 TABLET, EXTENDED RELEASE ORAL at 12:03

## 2024-06-25 RX ADMIN — MIRTAZAPINE 7.5 MILLIGRAM(S): 15 TABLET, FILM COATED ORAL at 21:38

## 2024-06-25 RX ADMIN — LEVETIRACETAM 500 MILLIGRAM(S): 100 INJECTION INTRAVENOUS at 17:16

## 2024-06-25 RX ADMIN — Medication 1 GRAM(S): at 13:22

## 2024-06-25 RX ADMIN — ENOXAPARIN SODIUM 40 MILLIGRAM(S): 100 INJECTION SUBCUTANEOUS at 10:59

## 2024-06-25 RX ADMIN — LEVETIRACETAM 500 MILLIGRAM(S): 100 INJECTION INTRAVENOUS at 05:16

## 2024-06-25 RX ADMIN — Medication 3 MILLIGRAM(S): at 21:39

## 2024-06-26 LAB
ANION GAP SERPL CALC-SCNC: 14 MMOL/L — SIGNIFICANT CHANGE UP (ref 7–14)
BUN SERPL-MCNC: 10 MG/DL — SIGNIFICANT CHANGE UP (ref 7–23)
CALCIUM SERPL-MCNC: 8.8 MG/DL — SIGNIFICANT CHANGE UP (ref 8.4–10.5)
CHLORIDE SERPL-SCNC: 103 MMOL/L — SIGNIFICANT CHANGE UP (ref 98–107)
CO2 SERPL-SCNC: 22 MMOL/L — SIGNIFICANT CHANGE UP (ref 22–31)
CREAT SERPL-MCNC: 0.45 MG/DL — LOW (ref 0.5–1.3)
EGFR: 111 ML/MIN/1.73M2 — SIGNIFICANT CHANGE UP
GLUCOSE SERPL-MCNC: 97 MG/DL — SIGNIFICANT CHANGE UP (ref 70–99)
HCT VFR BLD CALC: 35 % — SIGNIFICANT CHANGE UP (ref 34.5–45)
HGB BLD-MCNC: 11.7 G/DL — SIGNIFICANT CHANGE UP (ref 11.5–15.5)
MCHC RBC-ENTMCNC: 26.9 PG — LOW (ref 27–34)
MCHC RBC-ENTMCNC: 33.4 GM/DL — SIGNIFICANT CHANGE UP (ref 32–36)
MCV RBC AUTO: 80.5 FL — SIGNIFICANT CHANGE UP (ref 80–100)
NRBC # BLD: 0 /100 WBCS — SIGNIFICANT CHANGE UP (ref 0–0)
NRBC # FLD: 0 K/UL — SIGNIFICANT CHANGE UP (ref 0–0)
PLATELET # BLD AUTO: 184 K/UL — SIGNIFICANT CHANGE UP (ref 150–400)
POTASSIUM SERPL-MCNC: 3.7 MMOL/L — SIGNIFICANT CHANGE UP (ref 3.5–5.3)
POTASSIUM SERPL-SCNC: 3.7 MMOL/L — SIGNIFICANT CHANGE UP (ref 3.5–5.3)
RBC # BLD: 4.35 M/UL — SIGNIFICANT CHANGE UP (ref 3.8–5.2)
RBC # FLD: 15.1 % — HIGH (ref 10.3–14.5)
SODIUM SERPL-SCNC: 139 MMOL/L — SIGNIFICANT CHANGE UP (ref 135–145)
WBC # BLD: 3.44 K/UL — LOW (ref 3.8–10.5)
WBC # FLD AUTO: 3.44 K/UL — LOW (ref 3.8–10.5)

## 2024-06-26 PROCEDURE — 99232 SBSQ HOSP IP/OBS MODERATE 35: CPT

## 2024-06-26 RX ADMIN — BUPROPION HYDROCHLORIDE 300 MILLIGRAM(S): 150 TABLET, EXTENDED RELEASE ORAL at 11:32

## 2024-06-26 RX ADMIN — Medication 1 GRAM(S): at 13:40

## 2024-06-26 RX ADMIN — LEVETIRACETAM 500 MILLIGRAM(S): 100 INJECTION INTRAVENOUS at 17:36

## 2024-06-26 RX ADMIN — MIRTAZAPINE 7.5 MILLIGRAM(S): 15 TABLET, FILM COATED ORAL at 21:40

## 2024-06-26 RX ADMIN — SERTRALINE HYDROCHLORIDE 50 MILLIGRAM(S): 100 TABLET, FILM COATED ORAL at 11:33

## 2024-06-26 RX ADMIN — Medication 2 TABLET(S): at 21:40

## 2024-06-26 RX ADMIN — Medication 1 GRAM(S): at 05:57

## 2024-06-26 RX ADMIN — LEVETIRACETAM 500 MILLIGRAM(S): 100 INJECTION INTRAVENOUS at 05:57

## 2024-06-26 RX ADMIN — ENOXAPARIN SODIUM 40 MILLIGRAM(S): 100 INJECTION SUBCUTANEOUS at 08:54

## 2024-06-26 RX ADMIN — Medication 3 MILLIGRAM(S): at 21:45

## 2024-06-26 RX ADMIN — Medication 1 GRAM(S): at 21:40

## 2024-06-27 LAB
ANION GAP SERPL CALC-SCNC: 10 MMOL/L — SIGNIFICANT CHANGE UP (ref 7–14)
BUN SERPL-MCNC: 11 MG/DL — SIGNIFICANT CHANGE UP (ref 7–23)
CALCIUM SERPL-MCNC: 9.2 MG/DL — SIGNIFICANT CHANGE UP (ref 8.4–10.5)
CHLORIDE SERPL-SCNC: 103 MMOL/L — SIGNIFICANT CHANGE UP (ref 98–107)
CO2 SERPL-SCNC: 24 MMOL/L — SIGNIFICANT CHANGE UP (ref 22–31)
CREAT SERPL-MCNC: 0.56 MG/DL — SIGNIFICANT CHANGE UP (ref 0.5–1.3)
EGFR: 105 ML/MIN/1.73M2 — SIGNIFICANT CHANGE UP
GLUCOSE SERPL-MCNC: 93 MG/DL — SIGNIFICANT CHANGE UP (ref 70–99)
HCT VFR BLD CALC: 35.1 % — SIGNIFICANT CHANGE UP (ref 34.5–45)
HGB BLD-MCNC: 11.3 G/DL — LOW (ref 11.5–15.5)
MCHC RBC-ENTMCNC: 26.2 PG — LOW (ref 27–34)
MCHC RBC-ENTMCNC: 32.2 GM/DL — SIGNIFICANT CHANGE UP (ref 32–36)
MCV RBC AUTO: 81.3 FL — SIGNIFICANT CHANGE UP (ref 80–100)
NRBC # BLD: 0 /100 WBCS — SIGNIFICANT CHANGE UP (ref 0–0)
NRBC # FLD: 0 K/UL — SIGNIFICANT CHANGE UP (ref 0–0)
PLATELET # BLD AUTO: 206 K/UL — SIGNIFICANT CHANGE UP (ref 150–400)
POTASSIUM SERPL-MCNC: 4.2 MMOL/L — SIGNIFICANT CHANGE UP (ref 3.5–5.3)
POTASSIUM SERPL-SCNC: 4.2 MMOL/L — SIGNIFICANT CHANGE UP (ref 3.5–5.3)
RBC # BLD: 4.32 M/UL — SIGNIFICANT CHANGE UP (ref 3.8–5.2)
RBC # FLD: 14.7 % — HIGH (ref 10.3–14.5)
SODIUM SERPL-SCNC: 137 MMOL/L — SIGNIFICANT CHANGE UP (ref 135–145)
WBC # BLD: 3.63 K/UL — LOW (ref 3.8–10.5)
WBC # FLD AUTO: 3.63 K/UL — LOW (ref 3.8–10.5)

## 2024-06-27 PROCEDURE — 99232 SBSQ HOSP IP/OBS MODERATE 35: CPT

## 2024-06-27 RX ADMIN — Medication 2 TABLET(S): at 21:15

## 2024-06-27 RX ADMIN — Medication 1 GRAM(S): at 21:15

## 2024-06-27 RX ADMIN — LEVETIRACETAM 500 MILLIGRAM(S): 100 INJECTION INTRAVENOUS at 05:28

## 2024-06-27 RX ADMIN — Medication 1 GRAM(S): at 13:56

## 2024-06-27 RX ADMIN — BUPROPION HYDROCHLORIDE 300 MILLIGRAM(S): 150 TABLET, EXTENDED RELEASE ORAL at 12:06

## 2024-06-27 RX ADMIN — Medication 1 GRAM(S): at 05:28

## 2024-06-27 RX ADMIN — Medication 3 MILLIGRAM(S): at 21:15

## 2024-06-27 RX ADMIN — ENOXAPARIN SODIUM 40 MILLIGRAM(S): 100 INJECTION SUBCUTANEOUS at 08:52

## 2024-06-27 RX ADMIN — SERTRALINE HYDROCHLORIDE 50 MILLIGRAM(S): 100 TABLET, FILM COATED ORAL at 12:06

## 2024-06-27 RX ADMIN — MIRTAZAPINE 7.5 MILLIGRAM(S): 15 TABLET, FILM COATED ORAL at 21:15

## 2024-06-27 RX ADMIN — LEVETIRACETAM 500 MILLIGRAM(S): 100 INJECTION INTRAVENOUS at 17:06

## 2024-06-28 ENCOUNTER — TRANSCRIPTION ENCOUNTER (OUTPATIENT)
Age: 59
End: 2024-06-28

## 2024-06-28 VITALS
RESPIRATION RATE: 17 BRPM | TEMPERATURE: 98 F | DIASTOLIC BLOOD PRESSURE: 51 MMHG | HEART RATE: 102 BPM | OXYGEN SATURATION: 99 % | SYSTOLIC BLOOD PRESSURE: 110 MMHG

## 2024-06-28 LAB
ANION GAP SERPL CALC-SCNC: 11 MMOL/L — SIGNIFICANT CHANGE UP (ref 7–14)
BUN SERPL-MCNC: 10 MG/DL — SIGNIFICANT CHANGE UP (ref 7–23)
CALCIUM SERPL-MCNC: 9.6 MG/DL — SIGNIFICANT CHANGE UP (ref 8.4–10.5)
CHLORIDE SERPL-SCNC: 102 MMOL/L — SIGNIFICANT CHANGE UP (ref 98–107)
CO2 SERPL-SCNC: 21 MMOL/L — LOW (ref 22–31)
CREAT SERPL-MCNC: 0.48 MG/DL — LOW (ref 0.5–1.3)
EGFR: 109 ML/MIN/1.73M2 — SIGNIFICANT CHANGE UP
GLUCOSE SERPL-MCNC: 93 MG/DL — SIGNIFICANT CHANGE UP (ref 70–99)
HCT VFR BLD CALC: 39.4 % — SIGNIFICANT CHANGE UP (ref 34.5–45)
HGB BLD-MCNC: 13.1 G/DL — SIGNIFICANT CHANGE UP (ref 11.5–15.5)
MCHC RBC-ENTMCNC: 27 PG — SIGNIFICANT CHANGE UP (ref 27–34)
MCHC RBC-ENTMCNC: 33.2 GM/DL — SIGNIFICANT CHANGE UP (ref 32–36)
MCV RBC AUTO: 81.1 FL — SIGNIFICANT CHANGE UP (ref 80–100)
NRBC # BLD: 0 /100 WBCS — SIGNIFICANT CHANGE UP (ref 0–0)
NRBC # FLD: 0 K/UL — SIGNIFICANT CHANGE UP (ref 0–0)
PLATELET # BLD AUTO: 187 K/UL — SIGNIFICANT CHANGE UP (ref 150–400)
POTASSIUM SERPL-MCNC: 4.2 MMOL/L — SIGNIFICANT CHANGE UP (ref 3.5–5.3)
POTASSIUM SERPL-SCNC: 4.2 MMOL/L — SIGNIFICANT CHANGE UP (ref 3.5–5.3)
RBC # BLD: 4.86 M/UL — SIGNIFICANT CHANGE UP (ref 3.8–5.2)
RBC # FLD: 14.9 % — HIGH (ref 10.3–14.5)
SODIUM SERPL-SCNC: 134 MMOL/L — LOW (ref 135–145)
WBC # BLD: 4.53 K/UL — SIGNIFICANT CHANGE UP (ref 3.8–10.5)
WBC # FLD AUTO: 4.53 K/UL — SIGNIFICANT CHANGE UP (ref 3.8–10.5)

## 2024-06-28 PROCEDURE — 99232 SBSQ HOSP IP/OBS MODERATE 35: CPT

## 2024-06-28 RX ORDER — ENOXAPARIN SODIUM 100 MG/ML
40 INJECTION SUBCUTANEOUS
Qty: 0 | Refills: 0 | DISCHARGE
Start: 2024-06-28

## 2024-06-28 RX ADMIN — Medication 1 GRAM(S): at 05:53

## 2024-06-28 RX ADMIN — Medication 1 GRAM(S): at 12:50

## 2024-06-28 RX ADMIN — LEVETIRACETAM 500 MILLIGRAM(S): 100 INJECTION INTRAVENOUS at 05:53

## 2024-06-28 RX ADMIN — ENOXAPARIN SODIUM 40 MILLIGRAM(S): 100 INJECTION SUBCUTANEOUS at 09:55

## 2024-06-28 RX ADMIN — BUPROPION HYDROCHLORIDE 300 MILLIGRAM(S): 150 TABLET, EXTENDED RELEASE ORAL at 12:51

## 2024-06-28 RX ADMIN — SERTRALINE HYDROCHLORIDE 50 MILLIGRAM(S): 100 TABLET, FILM COATED ORAL at 12:51

## 2024-07-18 ENCOUNTER — INPATIENT (INPATIENT)
Facility: HOSPITAL | Age: 59
LOS: 10 days | Discharge: HOME | End: 2024-07-29
Attending: NEUROLOGICAL SURGERY | Admitting: NEUROLOGICAL SURGERY
Payer: COMMERCIAL

## 2024-07-18 ENCOUNTER — TRANSCRIPTION ENCOUNTER (OUTPATIENT)
Age: 59
End: 2024-07-18

## 2024-07-18 VITALS
HEART RATE: 96 BPM | DIASTOLIC BLOOD PRESSURE: 71 MMHG | OXYGEN SATURATION: 95 % | RESPIRATION RATE: 18 BRPM | TEMPERATURE: 98 F | SYSTOLIC BLOOD PRESSURE: 110 MMHG

## 2024-07-18 DIAGNOSIS — Z87.898 PERSONAL HISTORY OF OTHER SPECIFIED CONDITIONS: Chronic | ICD-10-CM

## 2024-07-18 DIAGNOSIS — G91.9 HYDROCEPHALUS, UNSPECIFIED: ICD-10-CM

## 2024-07-18 DIAGNOSIS — T85.09XA OTHER MECHANICAL COMPLICATION OF VENTRICULAR INTRACRANIAL (COMMUNICATING) SHUNT, INITIAL ENCOUNTER: ICD-10-CM

## 2024-07-18 DIAGNOSIS — Z98.2 PRESENCE OF CEREBROSPINAL FLUID DRAINAGE DEVICE: Chronic | ICD-10-CM

## 2024-07-18 LAB
ALBUMIN SERPL ELPH-MCNC: 3.7 G/DL — SIGNIFICANT CHANGE UP (ref 3.3–5)
ALP SERPL-CCNC: 116 U/L — SIGNIFICANT CHANGE UP (ref 40–120)
ALT FLD-CCNC: 15 U/L — SIGNIFICANT CHANGE UP (ref 4–33)
ANION GAP SERPL CALC-SCNC: 12 MMOL/L — SIGNIFICANT CHANGE UP (ref 7–14)
APPEARANCE CSF: ABNORMAL
APPEARANCE SPUN FLD: COLORLESS — SIGNIFICANT CHANGE UP
APTT BLD: 46.4 SEC — HIGH (ref 24.5–35.6)
AST SERPL-CCNC: 10 U/L — SIGNIFICANT CHANGE UP (ref 4–32)
BILIRUB SERPL-MCNC: 0.6 MG/DL — SIGNIFICANT CHANGE UP (ref 0.2–1.2)
BLD GP AB SCN SERPL QL: NEGATIVE — SIGNIFICANT CHANGE UP
BUN SERPL-MCNC: 22 MG/DL — SIGNIFICANT CHANGE UP (ref 7–23)
CALCIUM SERPL-MCNC: 9.6 MG/DL — SIGNIFICANT CHANGE UP (ref 8.4–10.5)
CHLORIDE SERPL-SCNC: 102 MMOL/L — SIGNIFICANT CHANGE UP (ref 98–107)
CO2 SERPL-SCNC: 22 MMOL/L — SIGNIFICANT CHANGE UP (ref 22–31)
COLOR CSF: SIGNIFICANT CHANGE UP
CREAT SERPL-MCNC: 0.52 MG/DL — SIGNIFICANT CHANGE UP (ref 0.5–1.3)
EGFR: 107 ML/MIN/1.73M2 — SIGNIFICANT CHANGE UP
GLUCOSE CSF-MCNC: 68 MG/DL — SIGNIFICANT CHANGE UP (ref 40–70)
GLUCOSE SERPL-MCNC: 101 MG/DL — HIGH (ref 70–99)
GRAM STN FLD: SIGNIFICANT CHANGE UP
HCT VFR BLD CALC: 40.8 % — SIGNIFICANT CHANGE UP (ref 34.5–45)
HGB BLD-MCNC: 13.5 G/DL — SIGNIFICANT CHANGE UP (ref 11.5–15.5)
INR BLD: 1.16 RATIO — SIGNIFICANT CHANGE UP (ref 0.85–1.18)
LYMPHOCYTES # CSF: 20 % — SIGNIFICANT CHANGE UP
MAGNESIUM SERPL-MCNC: 2.2 MG/DL — SIGNIFICANT CHANGE UP (ref 1.6–2.6)
MCHC RBC-ENTMCNC: 25.7 PG — LOW (ref 27–34)
MCHC RBC-ENTMCNC: 33.1 GM/DL — SIGNIFICANT CHANGE UP (ref 32–36)
MCV RBC AUTO: 77.7 FL — LOW (ref 80–100)
MONOS+MACROS NFR CSF: 20 % — SIGNIFICANT CHANGE UP
NEUTROPHILS # CSF: 60 % — SIGNIFICANT CHANGE UP
NRBC # BLD: 0 /100 WBCS — SIGNIFICANT CHANGE UP (ref 0–0)
NRBC # FLD: 0 K/UL — SIGNIFICANT CHANGE UP (ref 0–0)
NRBC NFR CSF: 1 CELLS/UL — SIGNIFICANT CHANGE UP (ref 0–5)
PLATELET # BLD AUTO: 199 K/UL — SIGNIFICANT CHANGE UP (ref 150–400)
POTASSIUM SERPL-MCNC: 4 MMOL/L — SIGNIFICANT CHANGE UP (ref 3.5–5.3)
POTASSIUM SERPL-SCNC: 4 MMOL/L — SIGNIFICANT CHANGE UP (ref 3.5–5.3)
PROT CSF-MCNC: 9 MG/DL — LOW (ref 15–45)
PROT SERPL-MCNC: 6.7 G/DL — SIGNIFICANT CHANGE UP (ref 6–8.3)
PROTHROM AB SERPL-ACNC: 12.9 SEC — SIGNIFICANT CHANGE UP (ref 9.5–13)
RBC # BLD: 5.25 M/UL — HIGH (ref 3.8–5.2)
RBC # CSF: 6300 CELLS/UL — SIGNIFICANT CHANGE UP (ref 0–0)
RBC # FLD: 13.9 % — SIGNIFICANT CHANGE UP (ref 10.3–14.5)
RH IG SCN BLD-IMP: NEGATIVE — SIGNIFICANT CHANGE UP
SODIUM SERPL-SCNC: 136 MMOL/L — SIGNIFICANT CHANGE UP (ref 135–145)
SPECIMEN SOURCE: SIGNIFICANT CHANGE UP
TOTAL CELLS COUNTED, SPINAL FLUID: 10 CELLS — SIGNIFICANT CHANGE UP
TUBE TYPE: SIGNIFICANT CHANGE UP
WBC # BLD: 5.47 K/UL — SIGNIFICANT CHANGE UP (ref 3.8–10.5)
WBC # FLD AUTO: 5.47 K/UL — SIGNIFICANT CHANGE UP (ref 3.8–10.5)

## 2024-07-18 PROCEDURE — 99223 1ST HOSP IP/OBS HIGH 75: CPT

## 2024-07-18 PROCEDURE — 77011 CT SCAN FOR LOCALIZATION: CPT | Mod: 26

## 2024-07-18 PROCEDURE — 93010 ELECTROCARDIOGRAM REPORT: CPT

## 2024-07-18 PROCEDURE — 70552 MRI BRAIN STEM W/DYE: CPT | Mod: 26

## 2024-07-18 PROCEDURE — 62252 CSF SHUNT REPROGRAM: CPT | Mod: 26

## 2024-07-18 PROCEDURE — 99235 HOSP IP/OBS SAME DATE MOD 70: CPT

## 2024-07-18 RX ORDER — LEVETIRACETAM 1000 MG/1
500 TABLET, FILM COATED ORAL
Refills: 0 | Status: DISCONTINUED | OUTPATIENT
Start: 2024-07-18 | End: 2024-07-29

## 2024-07-18 RX ORDER — LORATADINE 10 MG
17 TABLET,DISINTEGRATING ORAL DAILY
Refills: 0 | Status: DISCONTINUED | OUTPATIENT
Start: 2024-07-18 | End: 2024-07-21

## 2024-07-18 RX ORDER — BACTERIOSTATIC SODIUM CHLORIDE 0.9 %
1000 VIAL (ML) INJECTION
Refills: 0 | Status: DISCONTINUED | OUTPATIENT
Start: 2024-07-18 | End: 2024-07-19

## 2024-07-18 RX ORDER — SERTRALINE HYDROCHLORIDE 100 MG/1
50 TABLET, FILM COATED ORAL DAILY
Refills: 0 | Status: DISCONTINUED | OUTPATIENT
Start: 2024-07-18 | End: 2024-07-29

## 2024-07-18 RX ORDER — BUPROPION HCL 100 MG
300 TABLET,SUSTAINED-RELEASE 12 HR ORAL DAILY
Refills: 0 | Status: DISCONTINUED | OUTPATIENT
Start: 2024-07-18 | End: 2024-07-29

## 2024-07-18 RX ORDER — SENNOSIDES 8.6 MG/1
2 TABLET ORAL AT BEDTIME
Refills: 0 | Status: DISCONTINUED | OUTPATIENT
Start: 2024-07-18 | End: 2024-07-29

## 2024-07-18 RX ORDER — ACETAMINOPHEN 500 MG
650 TABLET ORAL EVERY 6 HOURS
Refills: 0 | Status: DISCONTINUED | OUTPATIENT
Start: 2024-07-18 | End: 2024-07-19

## 2024-07-18 RX ORDER — ASPIRIN 325 MG
5 TABLET ORAL EVERY 12 HOURS
Refills: 0 | Status: DISCONTINUED | OUTPATIENT
Start: 2024-07-18 | End: 2024-07-29

## 2024-07-18 RX ORDER — BACTERIOSTATIC SODIUM CHLORIDE 0.9 %
1000 VIAL (ML) INJECTION
Refills: 0 | Status: DISCONTINUED | OUTPATIENT
Start: 2024-07-18 | End: 2024-07-18

## 2024-07-18 RX ORDER — BACTERIOSTATIC SODIUM CHLORIDE 0.9 %
2 VIAL (ML) INJECTION DAILY
Refills: 0 | Status: DISCONTINUED | OUTPATIENT
Start: 2024-07-18 | End: 2024-07-29

## 2024-07-18 RX ADMIN — Medication 75 MILLILITER(S): at 23:55

## 2024-07-18 RX ADMIN — LEVETIRACETAM 500 MILLIGRAM(S): 1000 TABLET, FILM COATED ORAL at 19:18

## 2024-07-18 RX ADMIN — Medication 2 GRAM(S): at 14:05

## 2024-07-18 RX ADMIN — Medication 75 MILLILITER(S): at 14:05

## 2024-07-18 RX ADMIN — Medication 300 MILLIGRAM(S): at 14:04

## 2024-07-18 RX ADMIN — SERTRALINE HYDROCHLORIDE 50 MILLIGRAM(S): 100 TABLET, FILM COATED ORAL at 14:04

## 2024-07-18 NOTE — H&P ADULT - NSHPPHYSICALEXAM_GEN_ALL_CORE
WDWN female in NAD  Neuro: AAO to self, place, month  PERRL, Dysconjugate gaze  CN 2-12 otherwise grossly intact  CLARK antigravity  No pronator drift  HEENT: Shunt pumps and fills slowly  Neck: Supple, NT  Heart: RRR  Lungs: CTA  Abdomen: Soft, NT  Extremities: FROM  Psych: Normal affect  Skin: Normal color and turgor

## 2024-07-18 NOTE — CONSULT NOTE ADULT - ASSESSMENT
60 YO female with Hx neurofibromatosis,  shunt fell in March sustaining a large acute right SDH, patient underwent a right decompressive hemicraniectomy on 5/22. now admitted w concern for acute encephalopathy     # Encephalopathy  - reported from rehab but appears at baseline currently  - stereotactic CTH stable  - per neurosx, plan for tap   - no definitive OR plans for revision at this time  - no other obvious toxic-metabolic process accounting for AMS - can send UA - recent b12, TFT reassuring - add folate  - on Keppra for sz h/o vs ppx w long hx of craniotomy   - pls obtain EKG for pre-op in case plan for OR    # h/o SIADH  - was discharged on salt tabs prior admission  - NA normal and clinically euvolemic but currently on NS for npo status  - would dc IVF as soon as on po as this can cause hyponatremia   - pls resume home salt tabs  - trend na    # Psych  - on Wellbutrin and zoloft for MDD  - Wellbutrin can lower sz threshold - pt on keppra for sz ppx vs hx - consider neuro input    # DVT ppx   - per primary team

## 2024-07-18 NOTE — H&P ADULT - ASSESSMENT
60 YO female with Hx NF, VPS, s/p right craniectomy for acute SDH, cranioplasty transferred from rehab to [place] due to decline in mentation and physical effort and subsequently transferred to [facility] to rule out shunt malfunction 60 YO F with PMHx of neurofibromatosis, with  shunt (Strata programmed at 0.5 in [facility]), s/p traumatic fall with head trauma, no LOC, in 3/2024 and sustained a large acute right SDH, and she underwent an emergent right decompressive hemicraniectomy on 5/22 (bone discarded), s/p ICP monitor placement on 5/22, s/p right cranioplasty on 6/17. VPS reprogrammed from 1.0 to 0.5 due to increased ventricles on CTH, and pt. is now at [facility] for possible  shunt revision as mentation did not improve after shunt reprogramming.

## 2024-07-18 NOTE — OCCUPATIONAL THERAPY INITIAL EVALUATION ADULT - GENERAL OBSERVATIONS, REHAB EVAL
Patient received semisupine in bed in NAD; agreeable to participate in OT evaluation. +IV. HR 98 bpm.

## 2024-07-18 NOTE — PROCEDURE NOTE - NSASSISTBY_GEN_A_CORE
Myself
Problem: Impaired Activities of Daily Living  Goal: Achieve highest/safest level of independence in self care  Interventions:  - Assess ability and encourage patient to participate in ADLs to maximize function  - Promote sitting position while performing A
Marcelina/PA

## 2024-07-18 NOTE — PROCEDURE NOTE - ADDITIONAL PROCEDURE DETAILS
tapped reservoir with a 23G butterfly needle under sterile technique. No complications. No blood loss. shunt tapped under sterile technique using 23G butterfly needle, 10cc CSF aspirated with much resistance. Patient tolerated procedure well. No complications.

## 2024-07-18 NOTE — PATIENT PROFILE ADULT - FALL HARM RISK - HARM RISK INTERVENTIONS
Assistance with ambulation/Assistance OOB with selected safe patient handling equipment/Communicate Risk of Fall with Harm to all staff/Discuss with provider need for PT consult/Monitor gait and stability/Provide patient with walking aids - walker, cane, crutches/Reinforce activity limits and safety measures with patient and family/Review medications for side effects contributing to fall risk/Sit up slowly, dangle for a short time, stand at bedside before walking/Tailored Fall Risk Interventions/Toileting schedule using arm’s reach rule for commode and bathroom/Visual Cue: Yellow wristband and red socks/Bed in lowest position, wheels locked, appropriate side rails in place/Call bell, personal items and telephone in reach/Instruct patient to call for assistance before getting out of bed or chair/Non-slip footwear when patient is out of bed/[place] to call system/Physically safe environment - no spills, clutter or unnecessary equipment/Purposeful Proactive Rounding/Room/bathroom lighting operational, light cord in reach

## 2024-07-18 NOTE — OCCUPATIONAL THERAPY INITIAL EVALUATION ADULT - PERTINENT HX OF CURRENT PROBLEM, REHAB EVAL
59 year old female with history of neurofibromatosis,  shunt, s/p traumatic fall with head trauma in March 2024 sustaining a large acute right SDH, emergent right decompressive hemicraniectomy on 5/22  s/p ICP monitor placement, s/p right cranioplasty on 6/17 and subsequently discharged to rehab on 6/28 presenting from rehab facility to [place] due to worsening mentation and physical effort at rehab. Now transferred to [facility] for possible  shunt revision.

## 2024-07-18 NOTE — OCCUPATIONAL THERAPY INITIAL EVALUATION ADULT - VISUAL ASSESSMENT: TRACKING
Difficulty tracking objects in all directions, appears to have difficulty seeing objects in right visual field

## 2024-07-18 NOTE — PATIENT PROFILE ADULT - VISION (WITH CORRECTIVE LENSES IF THE PATIENT USUALLY WEARS THEM):
Normal vision: sees adequately in most situations; can see medication labels, newsprint Stage 2: Additional Anesthesia Type: 1% lidocaine with epinephrine

## 2024-07-18 NOTE — H&P ADULT - HISTORY OF PRESENT ILLNESS
58 YO female with Hx neurofibromatosis,  shunt fell in March sustaining a large acute right SDH, patient underwent a right decompressive hemicraniectomy on 5/22. Patient had a long hospital course, had a right cranioplasty performed on 6/17 and subsequently discharged to rehab on 6/28. Patient sent from rehab facility to [place] due to worsening mentation and physical effort at rehab, shunt reprogrammed to 0.5, patient treated for UTI, patient subsequently transferred to [facility] for possible  shunt revision. 58 YO female with PMHx of neurofibromatosis,  shunt (Strata programmed at 0.5 in [facility]), s/p traumatic fall with head trauma, no LOC, which took place back in March 2024 and sustained a large acute right SDH, and she underwent an emergent right decompressive hemicraniectomy on 5/22 (bone discarded), s/p ICP monitor placement on 5/22, s/p right cranioplasty on 6/17 and subsequently discharged to rehab on 6/28. Shunt was at 1.0 at that time. Patient sent from rehab facility to [place] due to worsening mentation and physical effort at rehab, shunt reprogrammed from 1.0 to 0.5 due to increased ventricles on CTH, patient treated for UTI, patient subsequently transferred to [facility] for possible  shunt revision as mentation did not improve after shunt reprogramming.

## 2024-07-18 NOTE — CONSULT NOTE ADULT - NSCONSULTADDITIONALINFOA_GEN_ALL_CORE
addendum   per neurosx PA now plan for likely OR phuong for shunt revision vs ETV  RCRI score 0  functional capacity limited by chronic condition  clinically asymptomatic   EKG pending - dw nurse to obtain  medically optimized for OR pending EKG addendum   per neurosx PA now plan for likely OR phuong for shunt revision vs ETV  RCRI score 0  functional capacity limited by chronic condition  clinically asymptomatic   EKG pending - dw nurse to obtain  medically optimized for OR pending EKG    addendum   EKG sinus tach @ 108, otherwise no acute ischemic ST-T seg changes  may proceed to OR for shunt revision vs ETV wo further work up

## 2024-07-18 NOTE — CONSULT NOTE ADULT - SUBJECTIVE AND OBJECTIVE BOX
58 YO female with Hx neurofibromatosis,  shunt fell in March sustaining a large acute right SDH, patient underwent a right decompressive hemicraniectomy on 5/22. Patient had a long hospital course, had a right cranioplasty performed on 6/17 and subsequently discharged to rehab on 6/28. Patient sent from rehab facility to [place] due to worsening mentation and physical effort at rehab, shunt reprogrammed to 0.5, patient treated for UTI, patient subsequently transferred to [facility] for possible  shunt revision. on prior admission w fall pt noted w possible SIADH, was discharged on salt tabs. also was noted w thrombocytopenia which was felt to be reactive and self resolved. MS had returned to baseline prior to discharge on last admission. per neurosx who is familiar w pt, she is also currently now mentating at baseline so no definitive plans for OR as of now  she denies any complaints this AM - no HA/DZ, vision disturbance, N/V. she is afebrile and HDS       NKDA    PMHx: neurofibromatosis  Social: Denies  FHX: no pertinent FHX      MEDICATIONS  (STANDING):  buPROPion XL (24-Hour) . 300 milliGRAM(s) Oral daily  levETIRAcetam 500 milliGRAM(s) Oral two times a day  senna 2 Tablet(s) Oral at bedtime  sertraline 50 milliGRAM(s) Oral daily  sodium chloride 0.9%. 1000 milliLiter(s) (75 mL/Hr) IV Continuous <Continuous>    MEDICATIONS  (PRN):  acetaminophen     Tablet .. 650 milliGRAM(s) Oral every 6 hours PRN Temp greater or equal to 38C (100.4F), Mild Pain (1 - 3)  bisacodyl 5 milliGRAM(s) Oral every 12 hours PRN Constipation  polyethylene glycol 3350 17 Gram(s) Oral daily PRN Constipation      T(C): 36.8 (07-18-24 @ 04:50)  HR: 96 (07-18-24 @ 04:50)  BP: 110/71 (07-18-24 @ 04:50)  RR: 18 (07-18-24 @ 04:50)  SpO2: 95% (07-18-24 @ 04:50)  CAPILLARY BLOOD GLUCOSE        I&O's Summary      PHYSICAL EXAM:  GENERAL: NAD, awake and alert, grossly non focal   HEAD:  Atraumatic, Normocephalic  EYES: EOMI, PERRL, conjunctiva and sclera clear  NECK: Supple, No JVD  CHEST/LUNG: Clear to auscultation bilaterally  HEART: Regular rate and rhythm; No murmurs, rubs, or gallops, No Edema  ABDOMEN: Soft, Nontender, Nondistended; Bowel sounds present  EXTREMITIES:  2+ Peripheral Pulses, No clubbing, cyanosis    LABS:                        13.5   5.47  )-----------( 199      ( 18 Jul 2024 05:46 )             40.8     07-18    136  |  102  |  22  ----------------------------<  101<H>  4.0   |  22  |  0.52    Ca    9.6      18 Jul 2024 05:46  Mg     2.20     07-18    TPro  6.7  /  Alb  3.7  /  TBili  0.6  /  DBili  x   /  AST  10  /  ALT  15  /  AlkPhos  116  07-18    PT/INR - ( 18 Jul 2024 05:46 )   PT: 12.9 sec;   INR: 1.16 ratio         PTT - ( 18 Jul 2024 05:46 )  PTT:46.4 sec      Urinalysis Basic - ( 18 Jul 2024 05:46 )    Color: x / Appearance: x / SG: x / pH: x  Gluc: 101 mg/dL / Ketone: x  / Bili: x / Urobili: x   Blood: x / Protein: x / Nitrite: x   Leuk Esterase: x / RBC: x / WBC x   Sq Epi: x / Non Sq Epi: x / Bacteria: x            RADIOLOGY & ADDITIONAL TESTS:    Imaging Personally Reviewed: CT - no acute bleed    Consultant(s) Notes Reviewed:      Care Discussed with Consultants/Other Providers: neurosx PA - [place]

## 2024-07-18 NOTE — OCCUPATIONAL THERAPY INITIAL EVALUATION ADULT - DIAGNOSIS, OT EVAL
s/p right cranioplasty, s/p pending  shunt revision; decreased functional mobility, decreased ADL performance

## 2024-07-18 NOTE — OCCUPATIONAL THERAPY INITIAL EVALUATION ADULT - ADDITIONAL COMMENTS
Patient is a questionable historian. Per chart review, patient admitted from rehab facility. Patient reports being able to perform ADLs independently and using a rolling walker at times. Prior to initial hospitalization, patient lived in a house with her partner and was independent with ADLs and mobility.

## 2024-07-19 LAB
ALBUMIN SERPL ELPH-MCNC: 3.8 G/DL — SIGNIFICANT CHANGE UP (ref 3.3–5)
ALP SERPL-CCNC: 121 U/L — HIGH (ref 40–120)
ALT FLD-CCNC: 13 U/L — SIGNIFICANT CHANGE UP (ref 4–33)
ANION GAP SERPL CALC-SCNC: 13 MMOL/L — SIGNIFICANT CHANGE UP (ref 7–14)
ANION GAP SERPL CALC-SCNC: 13 MMOL/L — SIGNIFICANT CHANGE UP (ref 7–14)
APPEARANCE CSF: CLEAR — SIGNIFICANT CHANGE UP
APPEARANCE SPUN FLD: COLORLESS — SIGNIFICANT CHANGE UP
APTT BLD: 43.5 SEC — HIGH (ref 24.5–35.6)
APTT BLD: 44.7 SEC — HIGH (ref 24.5–35.6)
AST SERPL-CCNC: 10 U/L — SIGNIFICANT CHANGE UP (ref 4–32)
BACTERIAL AG PNL SER: 0 % — SIGNIFICANT CHANGE UP
BILIRUB SERPL-MCNC: 0.6 MG/DL — SIGNIFICANT CHANGE UP (ref 0.2–1.2)
BLD GP AB SCN SERPL QL: NEGATIVE — SIGNIFICANT CHANGE UP
BUN SERPL-MCNC: 12 MG/DL — SIGNIFICANT CHANGE UP (ref 7–23)
BUN SERPL-MCNC: 9 MG/DL — SIGNIFICANT CHANGE UP (ref 7–23)
CALCIUM SERPL-MCNC: 9.2 MG/DL — SIGNIFICANT CHANGE UP (ref 8.4–10.5)
CALCIUM SERPL-MCNC: 9.7 MG/DL — SIGNIFICANT CHANGE UP (ref 8.4–10.5)
CHLORIDE SERPL-SCNC: 100 MMOL/L — SIGNIFICANT CHANGE UP (ref 98–107)
CHLORIDE SERPL-SCNC: 98 MMOL/L — SIGNIFICANT CHANGE UP (ref 98–107)
CO2 SERPL-SCNC: 22 MMOL/L — SIGNIFICANT CHANGE UP (ref 22–31)
CO2 SERPL-SCNC: 22 MMOL/L — SIGNIFICANT CHANGE UP (ref 22–31)
COLOR CSF: COLORLESS — SIGNIFICANT CHANGE UP
CREAT SERPL-MCNC: 0.4 MG/DL — LOW (ref 0.5–1.3)
CREAT SERPL-MCNC: 0.49 MG/DL — LOW (ref 0.5–1.3)
EGFR: 108 ML/MIN/1.73M2 — SIGNIFICANT CHANGE UP
EGFR: 114 ML/MIN/1.73M2 — SIGNIFICANT CHANGE UP
EOSINOPHIL # CSF: 0 % — SIGNIFICANT CHANGE UP
GLUCOSE CSF-MCNC: 25 MG/DL — LOW (ref 40–70)
GLUCOSE SERPL-MCNC: 119 MG/DL — HIGH (ref 70–99)
GLUCOSE SERPL-MCNC: 98 MG/DL — SIGNIFICANT CHANGE UP (ref 70–99)
GRAM STN FLD: SIGNIFICANT CHANGE UP
HCT VFR BLD CALC: 39.9 % — SIGNIFICANT CHANGE UP (ref 34.5–45)
HCT VFR BLD CALC: 40.4 % — SIGNIFICANT CHANGE UP (ref 34.5–45)
HGB BLD-MCNC: 12.7 G/DL — SIGNIFICANT CHANGE UP (ref 11.5–15.5)
HGB BLD-MCNC: 13.1 G/DL — SIGNIFICANT CHANGE UP (ref 11.5–15.5)
INR BLD: 1.09 RATIO — SIGNIFICANT CHANGE UP (ref 0.85–1.18)
INR BLD: 1.12 RATIO — SIGNIFICANT CHANGE UP (ref 0.85–1.18)
LYMPHOCYTES # CSF: 50 % — SIGNIFICANT CHANGE UP
MAGNESIUM SERPL-MCNC: 1.9 MG/DL — SIGNIFICANT CHANGE UP (ref 1.6–2.6)
MAGNESIUM SERPL-MCNC: 2 MG/DL — SIGNIFICANT CHANGE UP (ref 1.6–2.6)
MCHC RBC-ENTMCNC: 25.2 PG — LOW (ref 27–34)
MCHC RBC-ENTMCNC: 25.4 PG — LOW (ref 27–34)
MCHC RBC-ENTMCNC: 31.8 GM/DL — LOW (ref 32–36)
MCHC RBC-ENTMCNC: 32.4 GM/DL — SIGNIFICANT CHANGE UP (ref 32–36)
MCV RBC AUTO: 78.3 FL — LOW (ref 80–100)
MCV RBC AUTO: 79.3 FL — LOW (ref 80–100)
MONOS+MACROS NFR CSF: 0 % — SIGNIFICANT CHANGE UP
NEUTROPHILS # CSF: 50 % — SIGNIFICANT CHANGE UP
NRBC # BLD: 0 /100 WBCS — SIGNIFICANT CHANGE UP (ref 0–0)
NRBC # BLD: 0 /100 WBCS — SIGNIFICANT CHANGE UP (ref 0–0)
NRBC # FLD: 0 K/UL — SIGNIFICANT CHANGE UP (ref 0–0)
NRBC # FLD: 0 K/UL — SIGNIFICANT CHANGE UP (ref 0–0)
NRBC NFR CSF: 0 CELLS/UL — SIGNIFICANT CHANGE UP (ref 0–5)
OTHER CELLS CSF MANUAL: 0 % — SIGNIFICANT CHANGE UP
PHOSPHATE SERPL-MCNC: 3.1 MG/DL — SIGNIFICANT CHANGE UP (ref 2.5–4.5)
PHOSPHATE SERPL-MCNC: 4.2 MG/DL — SIGNIFICANT CHANGE UP (ref 2.5–4.5)
PLATELET # BLD AUTO: 146 K/UL — LOW (ref 150–400)
PLATELET # BLD AUTO: 245 K/UL — SIGNIFICANT CHANGE UP (ref 150–400)
POTASSIUM SERPL-MCNC: 3.8 MMOL/L — SIGNIFICANT CHANGE UP (ref 3.5–5.3)
POTASSIUM SERPL-MCNC: 5 MMOL/L — SIGNIFICANT CHANGE UP (ref 3.5–5.3)
POTASSIUM SERPL-SCNC: 3.8 MMOL/L — SIGNIFICANT CHANGE UP (ref 3.5–5.3)
POTASSIUM SERPL-SCNC: 5 MMOL/L — SIGNIFICANT CHANGE UP (ref 3.5–5.3)
PROT CSF-MCNC: 6 MG/DL — LOW (ref 15–45)
PROT SERPL-MCNC: 6.7 G/DL — SIGNIFICANT CHANGE UP (ref 6–8.3)
PROTHROM AB SERPL-ACNC: 12.3 SEC — SIGNIFICANT CHANGE UP (ref 9.5–13)
PROTHROM AB SERPL-ACNC: 12.6 SEC — SIGNIFICANT CHANGE UP (ref 9.5–13)
RBC # BLD: 5.03 M/UL — SIGNIFICANT CHANGE UP (ref 3.8–5.2)
RBC # BLD: 5.16 M/UL — SIGNIFICANT CHANGE UP (ref 3.8–5.2)
RBC # CSF: 50 CELLS/UL — HIGH (ref 0–0)
RBC # FLD: 13.1 % — SIGNIFICANT CHANGE UP (ref 10.3–14.5)
RBC # FLD: 13.3 % — SIGNIFICANT CHANGE UP (ref 10.3–14.5)
RH IG SCN BLD-IMP: NEGATIVE — SIGNIFICANT CHANGE UP
SODIUM SERPL-SCNC: 133 MMOL/L — LOW (ref 135–145)
SODIUM SERPL-SCNC: 135 MMOL/L — SIGNIFICANT CHANGE UP (ref 135–145)
SPECIMEN SOURCE: SIGNIFICANT CHANGE UP
TOTAL CELLS COUNTED, SPINAL FLUID: 2 CELLS — SIGNIFICANT CHANGE UP
TUBE TYPE: SIGNIFICANT CHANGE UP
WBC # BLD: 4.5 K/UL — SIGNIFICANT CHANGE UP (ref 3.8–10.5)
WBC # BLD: 5.53 K/UL — SIGNIFICANT CHANGE UP (ref 3.8–10.5)
WBC # FLD AUTO: 4.5 K/UL — SIGNIFICANT CHANGE UP (ref 3.8–10.5)
WBC # FLD AUTO: 5.53 K/UL — SIGNIFICANT CHANGE UP (ref 3.8–10.5)

## 2024-07-19 PROCEDURE — 99232 SBSQ HOSP IP/OBS MODERATE 35: CPT

## 2024-07-19 PROCEDURE — 99222 1ST HOSP IP/OBS MODERATE 55: CPT

## 2024-07-19 DEVICE — SURGIFLO MATRIX WITH THROMBIN KIT: Type: IMPLANTABLE DEVICE | Status: FUNCTIONAL

## 2024-07-19 DEVICE — SCREW 1.5X5MM: Type: IMPLANTABLE DEVICE | Status: FUNCTIONAL

## 2024-07-19 DEVICE — SURGIFOAM PAD 8CM X 12.5CM X 2MM (100C): Type: IMPLANTABLE DEVICE | Status: FUNCTIONAL

## 2024-07-19 DEVICE — BONE WAX 2.5GM: Type: IMPLANTABLE DEVICE | Status: FUNCTIONAL

## 2024-07-19 DEVICE — CATH BLLN MINI COMP 60CM: Type: IMPLANTABLE DEVICE | Status: FUNCTIONAL

## 2024-07-19 DEVICE — PLATE BURR 10MM HOLE COVER: Type: IMPLANTABLE DEVICE | Status: FUNCTIONAL

## 2024-07-19 DEVICE — CLIP APPLIER COVIDIEN SURGICLIP 11.5" MEDIUM: Type: IMPLANTABLE DEVICE | Status: FUNCTIONAL

## 2024-07-19 DEVICE — DURAGEN PLUS MATRIX 1 X 3": Type: IMPLANTABLE DEVICE | Status: FUNCTIONAL

## 2024-07-19 RX ORDER — FENTANYL CITRATE 1200 UG/1
25 LOZENGE ORAL; TRANSMUCOSAL
Refills: 0 | Status: DISCONTINUED | OUTPATIENT
Start: 2024-07-19 | End: 2024-07-19

## 2024-07-19 RX ORDER — ACETAMINOPHEN 500 MG
650 TABLET ORAL EVERY 6 HOURS
Refills: 0 | Status: DISCONTINUED | OUTPATIENT
Start: 2024-07-19 | End: 2024-07-29

## 2024-07-19 RX ORDER — OXYCODONE HYDROCHLORIDE 30 MG/1
2.5 TABLET ORAL EVERY 6 HOURS
Refills: 0 | Status: DISCONTINUED | OUTPATIENT
Start: 2024-07-19 | End: 2024-07-19

## 2024-07-19 RX ORDER — DEXTROSE MONOHYDRATE, SODIUM CHLORIDE, SODIUM LACTATE, CALCIUM CHLORIDE, MAGNESIUM CHLORIDE 1.5; 538; 448; 18.4; 5.08 G/100ML; MG/100ML; MG/100ML; MG/100ML; MG/100ML
1000 SOLUTION INTRAPERITONEAL
Refills: 0 | Status: DISCONTINUED | OUTPATIENT
Start: 2024-07-19 | End: 2024-07-20

## 2024-07-19 RX ORDER — ONDANSETRON HCL/PF 4 MG/2 ML
4 VIAL (ML) INJECTION ONCE
Refills: 0 | Status: DISCONTINUED | OUTPATIENT
Start: 2024-07-19 | End: 2024-07-20

## 2024-07-19 RX ORDER — OXYCODONE HYDROCHLORIDE 30 MG/1
5 TABLET ORAL EVERY 6 HOURS
Refills: 0 | Status: DISCONTINUED | OUTPATIENT
Start: 2024-07-19 | End: 2024-07-21

## 2024-07-19 RX ORDER — CEFAZOLIN SODIUM 10 G
2000 VIAL (EA) INJECTION EVERY 8 HOURS
Refills: 0 | Status: COMPLETED | OUTPATIENT
Start: 2024-07-19 | End: 2024-07-20

## 2024-07-19 RX ADMIN — LEVETIRACETAM 500 MILLIGRAM(S): 1000 TABLET, FILM COATED ORAL at 18:58

## 2024-07-19 RX ADMIN — Medication 75 MILLILITER(S): at 11:26

## 2024-07-19 RX ADMIN — DEXTROSE MONOHYDRATE, SODIUM CHLORIDE, SODIUM LACTATE, CALCIUM CHLORIDE, MAGNESIUM CHLORIDE 75 MILLILITER(S): 1.5; 538; 448; 18.4; 5.08 SOLUTION INTRAPERITONEAL at 16:30

## 2024-07-19 RX ADMIN — Medication 650 MILLIGRAM(S): at 22:00

## 2024-07-19 RX ADMIN — Medication 75 MILLILITER(S): at 15:12

## 2024-07-19 RX ADMIN — SENNOSIDES 2 TABLET(S): 8.6 TABLET ORAL at 21:48

## 2024-07-19 RX ADMIN — LEVETIRACETAM 500 MILLIGRAM(S): 1000 TABLET, FILM COATED ORAL at 05:44

## 2024-07-19 RX ADMIN — Medication 650 MILLIGRAM(S): at 21:32

## 2024-07-19 RX ADMIN — OXYCODONE HYDROCHLORIDE 5 MILLIGRAM(S): 30 TABLET ORAL at 23:32

## 2024-07-19 RX ADMIN — Medication 100 MILLIGRAM(S): at 21:48

## 2024-07-19 RX ADMIN — OXYCODONE HYDROCHLORIDE 5 MILLIGRAM(S): 30 TABLET ORAL at 22:55

## 2024-07-19 NOTE — PRE-OP CHECKLIST - 3.
pt was having difficulty opening her left eye pt was having difficulty opening her left eye,pt can open her right eye. this was confirmed also with RN on 4 tower that this is the pt's baseline

## 2024-07-19 NOTE — PROGRESS NOTE ADULT - SUBJECTIVE AND OBJECTIVE BOX
Shunt tap performed  Preop for ETV vs  shunt revision  Preop MRI completed  Vital Signs Last 24 Hrs  T(C): 36.6 (18 Jul 2024 23:50), Max: 36.8 (18 Jul 2024 04:50)  T(F): 97.8 (18 Jul 2024 23:50), Max: 98.3 (18 Jul 2024 04:50)  HR: 90 (18 Jul 2024 23:50) (90 - 103)  BP: 114/78 (18 Jul 2024 23:50) (109/72 - 119/60)  BP(mean): --  RR: 18 (18 Jul 2024 23:50) (17 - 19)  SpO2: 97% (18 Jul 2024 23:50) (95% - 100%)    Parameters below as of 18 Jul 2024 23:50  Patient On (Oxygen Delivery Method): room air    AAO to self, place, month  PERRL, Dysconjugate gaze  CN 2-12 otherwise grossly intact  CLARK antigravity  No pronator drift    MEDICATIONS  (STANDING):  buPROPion XL (24-Hour) . 300 milliGRAM(s) Oral daily  levETIRAcetam 500 milliGRAM(s) Oral two times a day  senna 2 Tablet(s) Oral at bedtime  sertraline 50 milliGRAM(s) Oral daily  sodium chloride 2 Gram(s) Oral daily  sodium chloride 0.9%. 1000 milliLiter(s) (75 mL/Hr) IV Continuous <Continuous>    MEDICATIONS  (PRN):  acetaminophen     Tablet .. 650 milliGRAM(s) Oral every 6 hours PRN Temp greater or equal to 38C (100.4F), Mild Pain (1 - 3)  bisacodyl 5 milliGRAM(s) Oral every 12 hours PRN Constipation  polyethylene glycol 3350 17 Gram(s) Oral daily PRN Constipation                          13.5   5.47  )-----------( 199      ( 18 Jul 2024 05:46 )             40.8     07-18    136  |  102  |  22  ----------------------------<  101<H>  4.0   |  22  |  0.52    Ca    9.6      18 Jul 2024 05:46  Mg     2.20     07-18    TPro  6.7  /  Alb  3.7  /  TBili  0.6  /  DBili  x   /  AST  10  /  ALT  15  /  AlkPhos  116  07-18    PT/INR - ( 18 Jul 2024 05:46 )   PT: 12.9 sec;   INR: 1.16 ratio         PTT - ( 18 Jul 2024 05:46 )  PTT:46.4 sec    Type + Screen (07.18.24 @ 05:56)    ABO Interpretation: O   Rh Interpretation: Negative   Antibody Screen: Negative    Culture - CSF with Gram Stain . (07.18.24 @ 11:11)    Gram Stain:   No polymorphonuclear cells seen  No organisms seen  by cytocentrifuge   Specimen Source: .CSF CSF LUMBAR    Protein, CSF (07.18.24 @ 11:05)    Protein, CSF: 9 mg/dL    Glucose, CSF (07.18.24 @ 11:05)    Glucose, CSF: 68 mg/dL    Cerebrospinal Fluid Cell Count-1 (07.18.24 @ 11:05)    Tube Type: Tube 4   CSF Appearance: Hazy   CSF Lymphocytes: 20 %   CSF Monocytes/Macrophages: 20 %   CSF Color: Pink   RBC Count - Spinal Fluid: 6300: Red Cell count correlates with the number and proportion of cells on  cytospin preparation. cells/uL   Total Nucleated Cell Count, CSF: 1 cells/uL   CSF Neutrophils: 60 %   Appearance Spun: Colorless   Total Cells Counted, Spinal Fluid: 10 Cells     Shunt tap performed  Preop for ETV vs  shunt revision  Preop MRI completed    Vital Signs Last 24 Hrs  T(C): 36.6 (18 Jul 2024 23:50), Max: 36.8 (18 Jul 2024 04:50)  T(F): 97.8 (18 Jul 2024 23:50), Max: 98.3 (18 Jul 2024 04:50)  HR: 90 (18 Jul 2024 23:50) (90 - 103)  BP: 114/78 (18 Jul 2024 23:50) (109/72 - 119/60)  BP(mean): --  RR: 18 (18 Jul 2024 23:50) (17 - 19)  SpO2: 97% (18 Jul 2024 23:50) (95% - 100%)    Parameters below as of 18 Jul 2024 23:50  Patient On (Oxygen Delivery Method): room air    AAO to self, place, month  PERRL, Dysconjugate gaze  CN 2-12 otherwise grossly intact  CLARK antigravity  No pronator drift    MEDICATIONS  (STANDING):  buPROPion XL (24-Hour) . 300 milliGRAM(s) Oral daily  levETIRAcetam 500 milliGRAM(s) Oral two times a day  senna 2 Tablet(s) Oral at bedtime  sertraline 50 milliGRAM(s) Oral daily  sodium chloride 2 Gram(s) Oral daily  sodium chloride 0.9%. 1000 milliLiter(s) (75 mL/Hr) IV Continuous <Continuous>    MEDICATIONS  (PRN):  acetaminophen     Tablet .. 650 milliGRAM(s) Oral every 6 hours PRN Temp greater or equal to 38C (100.4F), Mild Pain (1 - 3)  bisacodyl 5 milliGRAM(s) Oral every 12 hours PRN Constipation  polyethylene glycol 3350 17 Gram(s) Oral daily PRN Constipation                          13.5   5.47  )-----------( 199      ( 18 Jul 2024 05:46 )             40.8     07-18    136  |  102  |  22  ----------------------------<  101<H>  4.0   |  22  |  0.52    Ca    9.6      18 Jul 2024 05:46  Mg     2.20     07-18    TPro  6.7  /  Alb  3.7  /  TBili  0.6  /  DBili  x   /  AST  10  /  ALT  15  /  AlkPhos  116  07-18    PT/INR - ( 18 Jul 2024 05:46 )   PT: 12.9 sec;   INR: 1.16 ratio         PTT - ( 18 Jul 2024 05:46 )  PTT:46.4 sec    Type + Screen (07.18.24 @ 05:56)    ABO Interpretation: O   Rh Interpretation: Negative   Antibody Screen: Negative    Culture - CSF with Gram Stain . (07.18.24 @ 11:11)    Gram Stain:   No polymorphonuclear cells seen  No organisms seen  by cytocentrifuge   Specimen Source: .CSF CSF LUMBAR    Protein, CSF (07.18.24 @ 11:05)    Protein, CSF: 9 mg/dL    Glucose, CSF (07.18.24 @ 11:05)    Glucose, CSF: 68 mg/dL    Cerebrospinal Fluid Cell Count-1 (07.18.24 @ 11:05)    Tube Type: Tube 4   CSF Appearance: Hazy   CSF Lymphocytes: 20 %   CSF Monocytes/Macrophages: 20 %   CSF Color: Pink   RBC Count - Spinal Fluid: 6300: Red Cell count correlates with the number and proportion of cells on  cytospin preparation. cells/uL   Total Nucleated Cell Count, CSF: 1 cells/uL   CSF Neutrophils: 60 %   Appearance Spun: Colorless   Total Cells Counted, Spinal Fluid: 10 Cells

## 2024-07-19 NOTE — PRE-OP CHECKLIST - 2.
pt has a birth celeste on left and right buttocks, lower back has redness, b/l lower extremities healing brusises

## 2024-07-19 NOTE — PROGRESS NOTE ADULT - ASSESSMENT
60 YO F with PMHx of neurofibromatosis, with  shunt (Strata programmed at 0.5 in [facility]), s/p traumatic fall with head trauma, no LOC, in 3/2024 and sustained a large acute right SDH, and she underwent an emergent right decompressive hemicraniectomy on 5/22 (bone discarded), s/p ICP monitor placement on 5/22, s/p right cranioplasty on 6/17. VPS reprogrammed from 1.0 to 0.5 due to increased ventricles on CTH, and pt. is now at [facility] for possible  shunt revision as mentation did not improve after shunt reprogramming.     7/19: Shunt tap and preop MRI performed. Preop for ETV vs  shunt revision today. 58yo F with PMHx of neurofibromatosis,  shunt (Strata programmed at 0.5 in [facility] recently), s/p traumatic fall with head trauma in 3/2024, at that time sustained a large acute right SDH, and underwent an emergent right decompressive hemicraniectomy on 5/22/24, s/p right cranioplasty on 6/17/24 with custom plate. VPS reprogrammed from 1.0 to 0.5 due to increased ventricles on CTH at [facility] rehab. Patient transferred to [facility] for possible  shunt revision as mentation did not improve after shunt reprogramming and ventricles are up on CTH.     7/18: Shunt tap met with resistance but able to pull 10cc off. CTH showed increase in ventricles. MRI with flow study completed for OR tomorrow, preop for ETV vs  shunt revision.  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 VPS reprogrammed from 1.0 to 0.5 due to increased ventricles on CTH at [facility] rehab. Patient transferred to [facility] for possible  shunt revision as mentation did not improve after shunt reprogramming and ventricles are up on CTH.     7/18: Shunt tap met with resistance but able to pull 10cc off. CTH showed increase in ventricles. MRI with flow study completed for OR tomorrow, preop for ETV vs  shunt revision.  7/19: OR for ETV vs shunt revision

## 2024-07-19 NOTE — PROGRESS NOTE ADULT - SUBJECTIVE AND OBJECTIVE BOX
Patient is a 59y old  Female who presents with a chief complaint of Rule out  shunt malfunction (19 Jul 2024 01:42)      SUBJECTIVE / OVERNIGHT EVENTS: MS the same, no overnight events reported - awaiting MRI - npo for possible OR today    ROS:  all neg unless mentioned in hpi     MEDICATIONS  (STANDING):  buPROPion XL (24-Hour) . 300 milliGRAM(s) Oral daily  levETIRAcetam 500 milliGRAM(s) Oral two times a day  senna 2 Tablet(s) Oral at bedtime  sertraline 50 milliGRAM(s) Oral daily  sodium chloride 2 Gram(s) Oral daily  sodium chloride 0.9%. 1000 milliLiter(s) (75 mL/Hr) IV Continuous <Continuous>    MEDICATIONS  (PRN):  acetaminophen     Tablet .. 650 milliGRAM(s) Oral every 6 hours PRN Temp greater or equal to 38C (100.4F), Mild Pain (1 - 3)  bisacodyl 5 milliGRAM(s) Oral every 12 hours PRN Constipation  polyethylene glycol 3350 17 Gram(s) Oral daily PRN Constipation      T(C): 36.6 (07-19-24 @ 05:43)  HR: 87 (07-19-24 @ 05:43)  BP: 109/66 (07-19-24 @ 05:43)  RR: 18 (07-19-24 @ 05:43)  SpO2: 98% (07-19-24 @ 05:43)  CAPILLARY BLOOD GLUCOSE        I&O's Summary    18 Jul 2024 07:01  -  19 Jul 2024 07:00  --------------------------------------------------------  IN: 450 mL / OUT: 0 mL / NET: 450 mL        PHYSICAL EXAM:  GENERAL: NAD, alert, answer questions appropriately, grossly non focal   CHEST/LUNG: Clear to auscultation bilaterally  HEART: Regular rate and rhythm; No murmurs, rubs, or gallops, No Edema  ABDOMEN: Soft, Nontender, Nondistended; Bowel sounds present  EXTREMITIES:  2+ Peripheral Pulses, No clubbing, cyanosis      LABS:                        12.7   4.50  )-----------( 146      ( 19 Jul 2024 06:06 )             39.9     07-19    135  |  100  |  12  ----------------------------<  98  3.8   |  22  |  0.40<L>    Ca    9.2      19 Jul 2024 06:06  Phos  3.1     07-19  Mg     1.90     07-19    TPro  6.7  /  Alb  3.7  /  TBili  0.6  /  DBili  x   /  AST  10  /  ALT  15  /  AlkPhos  116  07-18    PT/INR - ( 19 Jul 2024 06:06 )   PT: 12.6 sec;   INR: 1.12 ratio         PTT - ( 19 Jul 2024 06:06 )  PTT:43.5 sec      Urinalysis Basic - ( 19 Jul 2024 06:06 )    Color: x / Appearance: x / SG: x / pH: x  Gluc: 98 mg/dL / Ketone: x  / Bili: x / Urobili: x   Blood: x / Protein: x / Nitrite: x   Leuk Esterase: x / RBC: x / WBC x   Sq Epi: x / Non Sq Epi: x / Bacteria: x      Blood Culture  No growth to date        RADIOLOGY & ADDITIONAL TESTS:    Imaging Personally Reviewed:    Consultant(s) Notes Reviewed:      Care Discussed with Consultants/Other Providers:

## 2024-07-19 NOTE — PROGRESS NOTE ADULT - ASSESSMENT
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 VPS reprogrammed from 1.0 to 0.5 due to increased ventricles on CTH at [facility] rehab. Patient transferred to [facility] for possible  shunt revision as mentation did not improve after shunt reprogramming and ventricles are up on CTH.     7/18: Shunt tap met with resistance but able to pull 10cc off. CTH showed increase in ventricles. MRI with flow study completed for OR tomorrow, preop for ETV vs  shunt revision.  7/19: s/p Left frontal Endoscopic third ventriculostomy and ligation of Right  shunt with hemoclips with retention of shunt as rescue device and retention proximal and distal catheters. 2 incisions closed with staples.

## 2024-07-19 NOTE — CONSULT NOTE ADULT - SUBJECTIVE AND OBJECTIVE BOX
SICU Consult Note    HPI: 58 YO female with PMHx of neurofibromatosis,  shunt (Strata programmed at 0.5 in [facility]), s/p traumatic fall with head trauma, no LOC, which took place back in March 2024 and sustained a large acute right SDH, and she underwent an emergent right decompressive hemicraniectomy on 5/22 (bone discarded), s/p ICP monitor placement on 5/22, s/p right cranioplasty on 6/17 and subsequently discharged to rehab on 6/28. Shunt was at 1.0 at that time. Patient sent from rehab facility to [place] due to worsening mentation and physical effort at rehab, shunt reprogrammed from 1.0 to 0.5 due to increased ventricles on CTH, patient treated for UTI, patient subsequently transferred to [facility] for possible  shunt revision as mentation did not improve after shunt reprogramming.     ALLERGIES:  No Known Allergies      --------------------------------------------------------------------------------------    MEDICATIONS:    Neurologic Medications  fentaNYL    Injectable 25 MICROGram(s) IV Push every 5 minutes PRN Moderate Pain (4 - 6)  fentaNYL    Injectable 50 MICROGram(s) IV Push every 15 minutes PRN Severe Pain (7 - 10)  ondansetron Injectable 4 milliGRAM(s) IV Push once PRN Nausea and/or Vomiting    Respiratory Medications    Cardiovascular Medications    Gastrointestinal Medications  lactated ringers. 1000 milliLiter(s) IV Continuous <Continuous>    Genitourinary Medications    Hematologic/Oncologic Medications    Antimicrobial/Immunologic Medications    Endocrine/Metabolic Medications  insulin lispro (ADMELOG) corrective regimen sliding scale   SubCutaneous every 6 hours    Topical/Other Medications  chlorhexidine 4% Liquid 1 Application(s) Topical daily    --------------------------------------------------------------------------------------    VITAL SIGNS:  T(C): 36.2 (07-19-24 @ 16:00), Max: 36.7 (07-18-24 @ 17:44)  HR: 76 (07-19-24 @ 16:45) (75 - 103)  BP: 111/61 (07-19-24 @ 16:45) (106/84 - 131/78)  RR: 10 (07-19-24 @ 16:45) (10 - 18)  SpO2: 99% (07-19-24 @ 16:45) (96% - 100%)  --------------------------------------------------------------------------------------    INS AND OUTS:    07-18-24 @ 07:01  -  07-19-24 @ 07:00  --------------------------------------------------------  IN: 450 mL / OUT: 0 mL / NET: 450 mL    07-19-24 @ 07:01  -  07-19-24 @ 16:50  --------------------------------------------------------  IN: 300 mL / OUT: 0 mL / NET: 300 mL      --------------------------------------------------------------------------------------  EXAM    NEURO: A&Ox0, opening eyes to saying name loudly, not following commands or responding verbally. Neurosurgery at bedside, says mental status waxes and wanes and this is relatively expected immediately postop as anesthesia wears off. Initial neuro exam limited by post anesthesia.   HEENT: NC/AT. Dressing c/d/i.   RESPIRATORY: nonlabored respirations, normal CW expansion  CARDIO: VSS, RRR.   ABDOMEN: soft, NTND.  EXTREMITIES: normal strength, no deformities, moving all 4    --------------------------------------------------------------------------------------    LABS                          12.7   4.50  )-----------( 146      ( 19 Jul 2024 06:06 )             39.9     07-19    135  |  100  |  12  ----------------------------<  98  3.8   |  22  |  0.40<L>    Ca    9.2      19 Jul 2024 06:06  Phos  3.1     07-19  Mg     1.90     07-19    TPro  6.7  /  Alb  3.7  /  TBili  0.6  /  DBili  x   /  AST  10  /  ALT  15  /  AlkPhos  116  07-18  -------------------------------------------------------------------------------------- SICU Consult Note    HPI: 58 YO female with PMHx of neurofibromatosis,  shunt (Strata programmed at 0.5 in [facility]), s/p traumatic fall with head trauma, no LOC, which took place back in March 2024 and sustained a large acute right SDH, and she underwent an emergent right decompressive hemicraniectomy on 5/22 (bone discarded), s/p ICP monitor placement on 5/22, s/p right cranioplasty on 6/17 and subsequently discharged to rehab on 6/28. Shunt was at 1.0 at that time. Patient sent from rehab facility to [place] due to worsening mentation and physical effort at rehab, shunt reprogrammed from 1.0 to 0.5 due to increased ventricles on CTH, patient treated for UTI, patient subsequently transferred to [facility] for possible  shunt revision as mentation did not improve after shunt reprogramming.     ALLERGIES:  No Known Allergies  --------------------------------------------------------------------------------------    MEDICATIONS:    Neurologic Medications  fentaNYL    Injectable 25 MICROGram(s) IV Push every 5 minutes PRN Moderate Pain (4 - 6)  fentaNYL    Injectable 50 MICROGram(s) IV Push every 15 minutes PRN Severe Pain (7 - 10)  ondansetron Injectable 4 milliGRAM(s) IV Push once PRN Nausea and/or Vomiting    Respiratory Medications    Cardiovascular Medications    Gastrointestinal Medications  lactated ringers. 1000 milliLiter(s) IV Continuous <Continuous>    Genitourinary Medications    Hematologic/Oncologic Medications    Antimicrobial/Immunologic Medications    Endocrine/Metabolic Medications  insulin lispro (ADMELOG) corrective regimen sliding scale   SubCutaneous every 6 hours    Topical/Other Medications  chlorhexidine 4% Liquid 1 Application(s) Topical daily  --------------------------------------------------------------------------------------    VITAL SIGNS:  T(C): 36.2 (07-19-24 @ 16:00), Max: 36.7 (07-18-24 @ 17:44)  HR: 76 (07-19-24 @ 16:45) (75 - 103)  BP: 111/61 (07-19-24 @ 16:45) (106/84 - 131/78)  RR: 10 (07-19-24 @ 16:45) (10 - 18)  SpO2: 99% (07-19-24 @ 16:45) (96% - 100%)  --------------------------------------------------------------------------------------  INS AND OUTS:    07-18-24 @ 07:01  -  07-19-24 @ 07:00  --------------------------------------------------------  IN: 450 mL / OUT: 0 mL / NET: 450 mL    07-19-24 @ 07:01  -  07-19-24 @ 16:50  --------------------------------------------------------  IN: 300 mL / OUT: 0 mL / NET: 300 mL    --------------------------------------------------------------------------------------  EXAM    NEURO: A&Ox3, opening eyes to saying name loudly, able to follow commands although responding nonverbally. Able to turn head, EOMI, PERRL.  strength 4/5 b/l, toe point 4/5 b/l, Per family at bedside, patient has a residual left sided weakness and facial asymmetry present before procedure that is her baseline.   HEENT: NC/AT. Dressing c/d/i.   RESPIRATORY: nonlabored respirations, normal CW expansion  CARDIO: VSS, RRR.   ABDOMEN: soft, NTND.  EXTREMITIES: normal strength, no deformities, moving all 4  --------------------------------------------------------------------------------------  LABS                      12.7   4.50  )-----------( 146      ( 19 Jul 2024 06:06 )             39.9   07-19    135  |  100  |  12  ----------------------------<  98  3.8   |  22  |  0.40<L>  Ca    9.2      19 Jul 2024 06:06  Phos  3.1     07-19  Mg     1.90     07-19    TPro  6.7  /  Alb  3.7  /  TBili  0.6  /  DBili  x   /  AST  10  /  ALT  15  /  AlkPhos  116  07-18  --------------------------------------------------------------------------------------

## 2024-07-19 NOTE — PROGRESS NOTE ADULT - ASSESSMENT
58 YO female with Hx neurofibromatosis,  shunt fell in March sustaining a large acute right SDH, patient underwent a right decompressive hemicraniectomy on 5/22. now admitted w concern for acute encephalopathy     # Encephalopathy  - reported from rehab but appears at baseline currently  - stereotactic CTH stable, MR pending   - s/p shunt tap - neg for nucleated cells w prelim cx and gram stain neg, glucose normal, protein low    - per neurosx plan for OR for shunt revision   - no other obvious toxic-metabolic process accounting for AMS - can send UA - recent b12, TFT reassuring - add folate  - on Keppra for sz h/o vs ppx w long hx of craniotomy   - RCRI score 0 - EKG non ischemic - may proceed to OR for shunt revision vs ETV wo further work up     # h/o SIADH  - was discharged on salt tabs prior admission  - NA normal and clinically euvolemic but currently on NS for npo status  - would dc IVF as soon as on po as this can cause hyponatremia   - salt tabs resumed   - trend na    # Psych  - on Wellbutrin and zoloft for MDD  - Wellbutrin can lower sz threshold - pt on keppra for sz ppx vs hx - consider neuro input    # DVT ppx   - per primary team

## 2024-07-19 NOTE — PROVIDER CONTACT NOTE (OTHER) - ASSESSMENT
Patient resting in bed. Patient lethargic but arousable. Oriented only to name. Patient not able to lift b/l lower extremities.  /63, HR 94, temp 98.1, RR 17, pulse ox 98% RA.

## 2024-07-19 NOTE — BRIEF OPERATIVE NOTE - OPERATION/FINDINGS
Left frontal Endoscopic third ventriculostomy and ligation of Right  shunt with hemoclips with  Left frontal Endoscopic third ventriculostomy and ligation of Right  shunt with hemoclips with retention of shunt and proximal and distal catheters. 2 incisions closed with staples.  Left frontal Endoscopic third ventriculostomy and ligation of Right  shunt with hemoclips with retention of shunt as rescue device and retention proximal and distal catheters. 2 incisions closed with staples.  Left frontal Endoscopic third ventriculostomy and ligation of Right  shunt with ligaclips with retention of valve as rescue device and retention of proximal and distal catheters. 2 incisions closed with staples.

## 2024-07-19 NOTE — CONSULT NOTE ADULT - ASSESSMENT
ASSESSMENT:  59F PMHx neurofibromatosis,  shunt (Strata programmed at 0.5 in [facility]), s/p traumatic fall with head trauma 3/2024 c/b acute R SDH s/p decompressive hemicrani 05/22/24 s/p R cranioplasty 6/17/24 who p/w worsening mentation and increased ventricle size c/f dysfunction of  shunt who is now s/p L frontal endoscopic third ventriculostomy and ligation of R  shunt 7/19. SICU consulted for q1hour neuro checks.     NEUROLOGIC   - Pain control: IV tylenol, dilaudid (transition to PO when awake enough to tolerate PO)  - q1hour neuro checks  - pending full neuroexam after   -     RESPIRATORY   - Monitor SpO2 goal >92%  - Incentive spirometry     CARDIOVASCULAR   - Monitor hemodynamics   -     GASTROINTESTINAL   - Diet:   -     /RENAL   - IV fluids: LR @ 125cc/hr  - Strict I/Os  - Monitor BMP qd  - Maintain storey catheter, strict Is/Os  - Monitor electrolytes, replete PRN    HEMATOLOGIC  - Monitor H/H   - DVT ppx: Lovenox/SQH & SCD's    INFECTIOUS DISEASE  - Monitor fever / WBC    ENDOCRINE  - Monitor gluc    LINES  - IJ CVC (  /  )  - A line (  /  )  - Storey (  /  )  - PIV     DISPO:   --------------------------------------------------------------------------------------    Critical Care Diagnoses: ASSESSMENT:  59F PMHx neurofibromatosis,  shunt (Strata programmed at 0.5 in [facility]), s/p traumatic fall with head trauma 3/2024 c/b acute R SDH s/p decompressive hemicrani 05/22/24 s/p R cranioplasty 6/17/24 who p/w worsening mentation and increased ventricle size c/f dysfunction of  shunt who is now s/p L frontal endoscopic third ventriculostomy and ligation of R  shunt 7/19. SICU consulted for q1hour neuro checks.     NEUROLOGIC   - Pain control: tylenol and oxycodone PRN  - q1hour neuro checks    RESPIRATORY   - Monitor SpO2 goal >92%  - Incentive spirometry     CARDIOVASCULAR   - Monitor hemodynamics   - MAP >65    GASTROINTESTINAL   - Diet: Regular diet  - senna dulcolax bowel reg    /RENAL   - IV fluids: LR @ 75cc/hr  - Strict I/Os  - Monitor BMP qd  - Monitor electrolytes, replete PRN    HEMATOLOGIC  - Monitor H/H   - DVT ppx: SCD's hold chem DVT ppx for at least 24 hours.     INFECTIOUS DISEASE  - Monitor fever / WBC  - Perioperative ancef per nsg for 24 hours    ENDOCRINE  - Monitor gluc    LINES  - PIVs    DISPO: SICU - q1h neurochecks   ASSESSMENT:  59F PMHx neurofibromatosis,  shunt (Strata programmed at 0.5 in [facility]), s/p traumatic fall with head trauma 3/2024 c/b acute R SDH s/p decompressive hemicrani 05/22/24 s/p R cranioplasty 6/17/24 who p/w worsening mentation and increased ventricle size c/f dysfunction of  shunt who is now s/p L frontal endoscopic third ventriculostomy and ligation of R  shunt 7/19. SICU consulted for q1hour neuro checks.     NEUROLOGIC   - Pain control: tylenol and oxycodone PRN  - q1hour neuro checks  - Home Keppra, buproprion and zoloft    RESPIRATORY   - Monitor SpO2 goal >92%  - Incentive spirometry     CARDIOVASCULAR   - Monitor hemodynamics   - MAP >65    GASTROINTESTINAL   - Diet: Regular diet  - senna dulcolax bowel reg    /RENAL   - IV fluids: LR @ 75cc/hr  - Strict I/Os  - Monitor BMP qd  - Monitor electrolytes, replete PRN    HEMATOLOGIC  - Monitor H/H   - DVT ppx: SCD's hold chem DVT ppx for at least 24 hours.     INFECTIOUS DISEASE  - Monitor fever / WBC  - Perioperative ancef per nsg for 24 hours    ENDOCRINE  - Monitor gluc    LINES  - PIVs    DISPO: SICU - q1h neurochecks

## 2024-07-19 NOTE — PRE-OP CHECKLIST - ISOLATION PRECAUTIONS
none [General Appearance - Alert] : alert [Sclera] : the sclera and conjunctiva were normal [General Appearance - In No Acute Distress] : in no acute distress [Hearing Threshold Finger Rub Not [place]] : hearing was normal [Neck Appearance] : the appearance of the neck was normal [Exaggerated Use Of Accessory Muscles For Inspiration] : no accessory muscle use [Heart Sounds] : normal S1 and S2 [Bowel Sounds] : normal bowel sounds [No CVA Tenderness] : no ~M costovertebral angle tenderness [Abdomen Soft] : soft [No Spinal Tenderness] : no spinal tenderness [Skin Color & Pigmentation] : normal skin color and pigmentation [] : no rash [Oriented To Time, Place, And Person] : oriented to person, place, and time [Abnormal Walk] : normal gait

## 2024-07-19 NOTE — CONSULT NOTE ADULT - ATTENDING COMMENTS
I agree with the detailed interval history, physical, and plan, which I have reviewed and edited where appropriate'; also agree with notes/assessment with my team on service.  I have personally examined the patient.  I was physically present for the key portions of the evaluation and management (E/M) service provided.  I reviewed all the pertinent data.  The patient is a critical care patient with life threatening hemodynamic and metabolic instability in SICU.  The SICU team has a constant risk benefit analyzes discussion and coordinating care with the primary team and all consultants.   The patient is in SICU with the chief complaint and diagnosis mentioned in the note.   The plan will be specified in the note.  59F PMHx neurofibromatosis,  shunt; s/p L frontal endoscopic third ventriculostomy and ligation of R  shunt. SICU consulted for q1hour neuro checks.   EXAM  NEURO:  strength 4/5 b/l, toe point 4/5 b/l.  RESPIRATORY: clear  CARDIO: RR  ABDOMEN: soft, NTND.  EXTREMITIES: normal strength, no deformities    NEUROLOGIC   - tylenol  -oxycodone PRN  - q1hour neuro checks  - Keppra, buproprion and zoloft  RESPIRATORY   - Monitor SpO2 goal >92%  CARDIOVASCULAR   - MAP >65  GASTROINTESTINAL   - Diet: Regular diet  /RENAL   - IV fluids: LR @ 75cc/hr  HEMATOLOGIC  - DVT ppx: SCD's   INFECTIOUS DISEASE  -Ancef   ENDOCRINE  - Monitor glucose    DISPO: SICU - q1h neurochecks

## 2024-07-19 NOTE — PROGRESS NOTE ADULT - SUBJECTIVE AND OBJECTIVE BOX
NEUROSURGERY POST OP CHECK   07-19-24 @ 17:32    Dx: 59y Female s/p Left frontal Endoscopic third ventriculostomy and ligation of Right  shunt with hemoclips with retention of shunt as rescue device and retention proximal and distal catheters. 2 incisions closed with staples. Pt doing well. Pain controlled, denies HA.      MEDICATIONS  (STANDING):  buPROPion XL (24-Hour) . 300 milliGRAM(s) Oral daily  ceFAZolin   IVPB 2000 milliGRAM(s) IV Intermittent every 8 hours  lactated ringers. 1000 milliLiter(s) (75 mL/Hr) IV Continuous <Continuous>  levETIRAcetam 500 milliGRAM(s) Oral two times a day  senna 2 Tablet(s) Oral at bedtime  sertraline 50 milliGRAM(s) Oral daily  sodium chloride 2 Gram(s) Oral daily    MEDICATIONS  (PRN):  bisacodyl 5 milliGRAM(s) Oral every 12 hours PRN Constipation  fentaNYL    Injectable 25 MICROGram(s) IV Push every 5 minutes PRN Moderate Pain (4 - 6)  ondansetron Injectable 4 milliGRAM(s) IV Push once PRN Nausea and/or Vomiting  polyethylene glycol 3350 17 Gram(s) Oral daily PRN Constipation                          12.7   4.50  )-----------( 146      ( 19 Jul 2024 06:06 )             39.9     07-19    135  |  100  |  12  ----------------------------<  98  3.8   |  22  |  0.40<L>    Ca    9.2      19 Jul 2024 06:06  Phos  3.1     07-19  Mg     1.90     07-19    TPro  6.7  /  Alb  3.7  /  TBili  0.6  /  DBili  x   /  AST  10  /  ALT  15  /  AlkPhos  116  07-18    I&O's Summary    18 Jul 2024 07:01  -  19 Jul 2024 07:00  --------------------------------------------------------  IN: 450 mL / OUT: 0 mL / NET: 450 mL    19 Jul 2024 07:01  -  19 Jul 2024 17:32  --------------------------------------------------------  IN: 300 mL / OUT: 0 mL / NET: 300 mL        T(C): 36.2 (07-19-24 @ 16:00), Max: 36.4 (07-19-24 @ 15:10)  HR: 76 (07-19-24 @ 16:45) (75 - 82)  BP: 111/61 (07-19-24 @ 16:45) (111/61 - 131/78)  RR: 10 (07-19-24 @ 16:45) (10 - 13)  SpO2: 99% (07-19-24 @ 16:45) (98% - 99%)    PHYSICAL EXAM:  AOx3, appropriate, follows commands  PERRL, Upward gaze palsy, face symmetrical   BUE 5/5, Bl HF 0/5,  Bl KE 0/5, Bl pf/df 3/5, Bl EHL 3/5   Sensation intact to light touch   Left pronator drift   Incision C/D/I

## 2024-07-19 NOTE — PROVIDER CONTACT NOTE (OTHER) - SITUATION
Patient alert but lethargic. Patient A&Ox1, only to self. Patient not able to lift b/l lower extremities.

## 2024-07-20 DIAGNOSIS — Z98.890 OTHER SPECIFIED POSTPROCEDURAL STATES: ICD-10-CM

## 2024-07-20 LAB
ANION GAP SERPL CALC-SCNC: 12 MMOL/L — SIGNIFICANT CHANGE UP (ref 7–14)
BUN SERPL-MCNC: 8 MG/DL — SIGNIFICANT CHANGE UP (ref 7–23)
CALCIUM SERPL-MCNC: 9.4 MG/DL — SIGNIFICANT CHANGE UP (ref 8.4–10.5)
CHLORIDE SERPL-SCNC: 99 MMOL/L — SIGNIFICANT CHANGE UP (ref 98–107)
CO2 SERPL-SCNC: 23 MMOL/L — SIGNIFICANT CHANGE UP (ref 22–31)
CREAT SERPL-MCNC: 0.47 MG/DL — LOW (ref 0.5–1.3)
EGFR: 110 ML/MIN/1.73M2 — SIGNIFICANT CHANGE UP
GLUCOSE SERPL-MCNC: 92 MG/DL — SIGNIFICANT CHANGE UP (ref 70–99)
HCT VFR BLD CALC: 38.5 % — SIGNIFICANT CHANGE UP (ref 34.5–45)
HGB BLD-MCNC: 12.4 G/DL — SIGNIFICANT CHANGE UP (ref 11.5–15.5)
MAGNESIUM SERPL-MCNC: 1.9 MG/DL — SIGNIFICANT CHANGE UP (ref 1.6–2.6)
MCHC RBC-ENTMCNC: 25.2 PG — LOW (ref 27–34)
MCHC RBC-ENTMCNC: 32.2 GM/DL — SIGNIFICANT CHANGE UP (ref 32–36)
MCV RBC AUTO: 78.3 FL — LOW (ref 80–100)
NRBC # BLD: 0 /100 WBCS — SIGNIFICANT CHANGE UP (ref 0–0)
NRBC # FLD: 0 K/UL — SIGNIFICANT CHANGE UP (ref 0–0)
PHOSPHATE SERPL-MCNC: 4.1 MG/DL — SIGNIFICANT CHANGE UP (ref 2.5–4.5)
PLATELET # BLD AUTO: 203 K/UL — SIGNIFICANT CHANGE UP (ref 150–400)
POTASSIUM SERPL-MCNC: 4.1 MMOL/L — SIGNIFICANT CHANGE UP (ref 3.5–5.3)
POTASSIUM SERPL-SCNC: 4.1 MMOL/L — SIGNIFICANT CHANGE UP (ref 3.5–5.3)
RBC # BLD: 4.92 M/UL — SIGNIFICANT CHANGE UP (ref 3.8–5.2)
RBC # FLD: 13.3 % — SIGNIFICANT CHANGE UP (ref 10.3–14.5)
SODIUM SERPL-SCNC: 134 MMOL/L — LOW (ref 135–145)
WBC # BLD: 6.2 K/UL — SIGNIFICANT CHANGE UP (ref 3.8–10.5)
WBC # FLD AUTO: 6.2 K/UL — SIGNIFICANT CHANGE UP (ref 3.8–10.5)

## 2024-07-20 PROCEDURE — 70450 CT HEAD/BRAIN W/O DYE: CPT | Mod: 26

## 2024-07-20 PROCEDURE — 99233 SBSQ HOSP IP/OBS HIGH 50: CPT

## 2024-07-20 RX ORDER — MELATONIN 3 MG
3 TABLET ORAL AT BEDTIME
Refills: 0 | Status: DISCONTINUED | OUTPATIENT
Start: 2024-07-20 | End: 2024-07-29

## 2024-07-20 RX ADMIN — Medication 300 MILLIGRAM(S): at 11:46

## 2024-07-20 RX ADMIN — Medication 100 MILLIGRAM(S): at 06:09

## 2024-07-20 RX ADMIN — SENNOSIDES 2 TABLET(S): 8.6 TABLET ORAL at 22:21

## 2024-07-20 RX ADMIN — Medication 650 MILLIGRAM(S): at 17:31

## 2024-07-20 RX ADMIN — Medication 2 GRAM(S): at 11:47

## 2024-07-20 RX ADMIN — Medication 650 MILLIGRAM(S): at 22:18

## 2024-07-20 RX ADMIN — SERTRALINE HYDROCHLORIDE 50 MILLIGRAM(S): 100 TABLET, FILM COATED ORAL at 11:47

## 2024-07-20 RX ADMIN — LEVETIRACETAM 500 MILLIGRAM(S): 1000 TABLET, FILM COATED ORAL at 05:13

## 2024-07-20 RX ADMIN — Medication 650 MILLIGRAM(S): at 06:00

## 2024-07-20 RX ADMIN — Medication 650 MILLIGRAM(S): at 05:13

## 2024-07-20 RX ADMIN — Medication 650 MILLIGRAM(S): at 11:47

## 2024-07-20 RX ADMIN — LEVETIRACETAM 500 MILLIGRAM(S): 1000 TABLET, FILM COATED ORAL at 17:31

## 2024-07-20 NOTE — PROGRESS NOTE ADULT - ASSESSMENT
59F PMHx neurofibromatosis,  shunt (Strata programmed at 0.5 in [facility]), s/p traumatic fall with head trauma 3/2024 c/b acute R SDH s/p decompressive hemicrani 05/22/24 s/p R cranioplasty 6/17/24 who p/w worsening mentation and increased ventricle size c/f dysfunction of  shunt who is now s/p L frontal endoscopic third ventriculostomy and ligation of R  shunt 7/19. SICU consulted for q1hour neuro checks.       NEUROLOGIC   - Pain control: tylenol and oxycodone PRN  - q1hour neuro checks  - Home Keppra, buproprion and zoloft    RESPIRATORY   - Monitor SpO2 goal >92%  - Incentive spirometry     CARDIOVASCULAR   - Monitor hemodynamics   - MAP >65    GASTROINTESTINAL   - Diet: Regular diet  - senna dulcolax bowel reg    /RENAL   - IV fluids: LR @ 75cc/hr  - Strict I/Os  - Monitor BMP qd  - Monitor electrolytes, replete PRN    HEMATOLOGIC  - Monitor H/H   - DVT ppx: SCD's hold chem DVT ppx for at least 24 hours.     INFECTIOUS DISEASE  - Monitor fever / WBC  - Perioperative ancef per nsg for 24 hours    ENDOCRINE  - Monitor gluc    LINES  - PIVs    DISPO: SICU - q1h neurochecks

## 2024-07-20 NOTE — PROGRESS NOTE ADULT - ATTENDING COMMENTS
At risk for malnutrition  a.  Diet as tolerated  b.  Heplock IVF    s/p Third ventriculostomy  a.  NVS Q 1 to q 4  b.  Obtain aily CBC, BMP  c.  CTH reviewed  d,  Discuss with NSX, transfer to floor    At risk for DVT  a.  On venodynes, encourage ambulation  b.  Hold DVT prophylaxis per NSX team    Respiratory abnormality  a.  With adequate oxygenation  b.  May supplement with o2 via NC for spo2 <92%  c.  Encourage incentive spirometry

## 2024-07-20 NOTE — PROGRESS NOTE ADULT - SUBJECTIVE AND OBJECTIVE BOX
POD # 1 S/P EVT, Ligation of  shunt  No issues overnight  Vital Signs Last 24 Hrs  T(C): 36.9 (20 Jul 2024 00:00), Max: 36.9 (19 Jul 2024 21:00)  T(F): 98.4 (20 Jul 2024 00:00), Max: 98.4 (19 Jul 2024 21:00)  HR: 84 (20 Jul 2024 01:00) (75 - 94)  BP: 110/65 (20 Jul 2024 01:00) (91/76 - 131/78)  BP(mean): 76 (20 Jul 2024 01:00) (61 - 109)  RR: 12 (20 Jul 2024 01:00) (10 - 18)  SpO2: 95% (20 Jul 2024 01:00) (95% - 100%)    Parameters below as of 20 Jul 2024 01:00  Patient On (Oxygen Delivery Method): room air    AAO X 2  PERRL with dysconjugate gaze  CN 2-12 otherwise intact  Bilateral UE 4/5, no drift  Bilateral LE antigravity    Dressing C/D/I    MEDICATIONS  (STANDING):  acetaminophen     Tablet .. 650 milliGRAM(s) Oral every 6 hours  buPROPion XL (24-Hour) . 300 milliGRAM(s) Oral daily  ceFAZolin   IVPB 2000 milliGRAM(s) IV Intermittent every 8 hours  lactated ringers. 1000 milliLiter(s) (75 mL/Hr) IV Continuous <Continuous>  levETIRAcetam 500 milliGRAM(s) Oral two times a day  senna 2 Tablet(s) Oral at bedtime  sertraline 50 milliGRAM(s) Oral daily  sodium chloride 2 Gram(s) Oral daily    MEDICATIONS  (PRN):  bisacodyl 5 milliGRAM(s) Oral every 12 hours PRN Constipation  ondansetron Injectable 4 milliGRAM(s) IV Push once PRN Nausea and/or Vomiting  oxyCODONE    IR 5 milliGRAM(s) Oral every 6 hours PRN Severe Pain (7 - 10)  oxyCODONE    IR 2.5 milliGRAM(s) Oral every 6 hours PRN Moderate Pain (4 - 6)  polyethylene glycol 3350 17 Gram(s) Oral daily PRN Constipation                          12.4   6.20  )-----------( 203      ( 20 Jul 2024 01:00 )             38.5     07-20    134<L>  |  99  |  8   ----------------------------<  92  4.1   |  23  |  0.47<L>    Ca    9.4      20 Jul 2024 01:00  Phos  4.1     07-20  Mg     1.90     07-20    TPro  6.7  /  Alb  3.8  /  TBili  0.6  /  DBili  x   /  AST  10  /  ALT  13  /  AlkPhos  121<H>  07-19

## 2024-07-20 NOTE — PROGRESS NOTE ADULT - SUBJECTIVE AND OBJECTIVE BOX
SICU Daily Progress Note  =====================================================  Interval event:  - carmela ovn      ALLERGIES:  No Known Allergies      --------------------------------------------------------------------------------------    MEDICATIONS:    Neurologic Medications  acetaminophen     Tablet .. 650 milliGRAM(s) Oral every 6 hours  buPROPion XL (24-Hour) . 300 milliGRAM(s) Oral daily  levETIRAcetam 500 milliGRAM(s) Oral two times a day  ondansetron Injectable 4 milliGRAM(s) IV Push once PRN Nausea and/or Vomiting  oxyCODONE    IR 2.5 milliGRAM(s) Oral every 6 hours PRN Moderate Pain (4 - 6)  oxyCODONE    IR 5 milliGRAM(s) Oral every 6 hours PRN Severe Pain (7 - 10)  sertraline 50 milliGRAM(s) Oral daily    Respiratory Medications    Cardiovascular Medications    Gastrointestinal Medications  bisacodyl 5 milliGRAM(s) Oral every 12 hours PRN Constipation  lactated ringers. 1000 milliLiter(s) IV Continuous <Continuous>  polyethylene glycol 3350 17 Gram(s) Oral daily PRN Constipation  senna 2 Tablet(s) Oral at bedtime  sodium chloride 2 Gram(s) Oral daily    Genitourinary Medications    Hematologic/Oncologic Medications    Antimicrobial/Immunologic Medications  ceFAZolin   IVPB 2000 milliGRAM(s) IV Intermittent every 8 hours    Endocrine/Metabolic Medications    Topical/Other Medications    --------------------------------------------------------------------------------------    VITAL SIGNS:  ICU Vital Signs Last 24 Hrs  T(C): 36.8 (19 Jul 2024 23:00), Max: 36.9 (19 Jul 2024 21:00)  T(F): 98.2 (19 Jul 2024 23:00), Max: 98.4 (19 Jul 2024 21:00)  HR: 88 (19 Jul 2024 23:00) (75 - 94)  BP: 122/61 (19 Jul 2024 23:00) (91/76 - 131/78)  BP(mean): 77 (19 Jul 2024 23:00) (61 - 109)  ABP: --  ABP(mean): --  RR: 14 (19 Jul 2024 23:00) (10 - 18)  SpO2: 100% (19 Jul 2024 23:00) (95% - 100%)    O2 Parameters below as of 19 Jul 2024 23:00  Patient On (Oxygen Delivery Method): room air  --------------------------------------------------------------------------------------    INS AND OUTS:  I&O's Detail    18 Jul 2024 07:01  -  19 Jul 2024 07:00  --------------------------------------------------------  IN:    sodium chloride 0.9%: 450 mL  Total IN: 450 mL    OUT:    Oral Fluid: 0 mL  Total OUT: 0 mL    Total NET: 450 mL      19 Jul 2024 07:01  -  20 Jul 2024 00:25  --------------------------------------------------------  IN:    IV PiggyBack: 50 mL    Lactated Ringers: 675 mL    Oral Fluid: 210 mL    sodium chloride 0.9%: 300 mL  Total IN: 1235 mL    OUT:    Voided (mL): 875 mL  Total OUT: 875 mL    Total NET: 360 mL    --------------------------------------------------------------------------------------      NEURO: A&Ox3, opening eyes to saying name loudly, able to follow commands. Able to turn head, EOMI, PERRL.  strength 4/5 b/l, toe point 4/5 b/l, Per family at bedside, patient has a residual left sided weakness and facial asymmetry present before procedure that is her baseline.   HEENT: NC/AT. Dressing c/d/i.   RESPIRATORY: nonlabored respirations, normal CW expansion  CARDIO: VSS, RRR.   ABDOMEN: soft, NTND.  EXTREMITIES: normal strength, no deformities, moving all 4    METABOLIC / FLUIDS / ELECTROLYTES  lactated ringers. 1000 milliLiter(s) IV Continuous <Continuous>  sodium chloride 2 Gram(s) Oral daily      HEMATOLOGIC  [x] VTE Prophylaxis:   Transfusions:	[] PRBC	[] Platelets		[] FFP	[] Cryoprecipitate    INFECTIOUS DISEASE  Antimicrobials/Immunologic Medications:  ceFAZolin   IVPB 2000 milliGRAM(s) IV Intermittent every 8 hours    Day #      of     ***    TUBES / LINES / DRAINS  ***  [x] Peripheral IV  [] Central Venous Line     	[] R	[] L	[] IJ	[] Fem	[] SC	Date Placed:   [] Arterial Line		[] R	[] L	[] Fem	[] Rad	[] Ax	Date Placed:   [] PICC		[] Midline		[] Mediport  [] Urinary Catheter		Date Placed:   [x] Necessity of urinary, arterial, and venous catheters discussed    --------------------------------------------------------------------------------------    LABS                          13.1   5.53  )-----------( 245      ( 19 Jul 2024 19:50 )             40.4   07-19    133<L>  |  98  |  9   ----------------------------<  119<H>  5.0   |  22  |  0.49<L>    Ca    9.7      19 Jul 2024 19:50  Phos  4.2     07-19  Mg     2.00     07-19    TPro  6.7  /  Alb  3.8  /  TBili  0.6  /  DBili  x   /  AST  10  /  ALT  13  /  AlkPhos  121<H>  07-19    --------------------------------------------------------------------------------------    OTHER LABORATORY:     IMAGING STUDIES:   CXR:

## 2024-07-20 NOTE — CHART NOTE - NSCHARTNOTEFT_GEN_A_CORE
Patient received floor bed 4T 419A  Patient stable for downgrade.  Signed out to neurosurgery team.    SICU  [id number]

## 2024-07-20 NOTE — PROGRESS NOTE ADULT - ASSESSMENT
58 yo F with PMHx of neurofibromatosis, s/p L suboccipital craniectomy for resection of cerebellar brain tumor 12/2023 by Dr Brush, s/p  shunt (Strata programmed at 0.5 in [facility] recently), s/p traumatic fall with head trauma in 5/20/2024, at that time sustained a large acute right SDH, and underwent an emergent right decompressive hemicraniectomy on 5/22/24, s/p right cranioplasty on 6/17/24 with custom plate. VPS reprogrammed from 1.0 to 0.5 due to increased ventricles on CTH at [facility] rehab. Patient transferred to [facility] for possible  shunt revision as mentation did not improve after shunt reprogramming and ventricles are up on CTH.     7/18: Shunt tap met with resistance but able to pull 10cc off. CTH showed increase in ventricles. MRI with flow study completed for OR tomorrow, preop for ETV vs  shunt revision.  7/19: s/p Left frontal Endoscopic third ventriculostomy and ligation of Right  shunt with hemoclips with retention of shunt as rescue device and retention proximal and distal catheters. 2 incisions closed with staples.  7/20: Stable POD # 1 S/P ETV, ligation of shunt.

## 2024-07-21 LAB
ANION GAP SERPL CALC-SCNC: 12 MMOL/L — SIGNIFICANT CHANGE UP (ref 7–14)
BUN SERPL-MCNC: 7 MG/DL — SIGNIFICANT CHANGE UP (ref 7–23)
CALCIUM SERPL-MCNC: 9.4 MG/DL — SIGNIFICANT CHANGE UP (ref 8.4–10.5)
CHLORIDE SERPL-SCNC: 97 MMOL/L — LOW (ref 98–107)
CO2 SERPL-SCNC: 24 MMOL/L — SIGNIFICANT CHANGE UP (ref 22–31)
CREAT SERPL-MCNC: 0.41 MG/DL — LOW (ref 0.5–1.3)
EGFR: 113 ML/MIN/1.73M2 — SIGNIFICANT CHANGE UP
GLUCOSE SERPL-MCNC: 92 MG/DL — SIGNIFICANT CHANGE UP (ref 70–99)
HCT VFR BLD CALC: 36.1 % — SIGNIFICANT CHANGE UP (ref 34.5–45)
HGB BLD-MCNC: 11.7 G/DL — SIGNIFICANT CHANGE UP (ref 11.5–15.5)
MAGNESIUM SERPL-MCNC: 1.9 MG/DL — SIGNIFICANT CHANGE UP (ref 1.6–2.6)
MCHC RBC-ENTMCNC: 25.7 PG — LOW (ref 27–34)
MCHC RBC-ENTMCNC: 32.4 GM/DL — SIGNIFICANT CHANGE UP (ref 32–36)
MCV RBC AUTO: 79.2 FL — LOW (ref 80–100)
NRBC # BLD: 0 /100 WBCS — SIGNIFICANT CHANGE UP (ref 0–0)
NRBC # FLD: 0 K/UL — SIGNIFICANT CHANGE UP (ref 0–0)
PHOSPHATE SERPL-MCNC: 3.2 MG/DL — SIGNIFICANT CHANGE UP (ref 2.5–4.5)
PLATELET # BLD AUTO: 131 K/UL — LOW (ref 150–400)
POTASSIUM SERPL-MCNC: 3.2 MMOL/L — LOW (ref 3.5–5.3)
POTASSIUM SERPL-SCNC: 3.2 MMOL/L — LOW (ref 3.5–5.3)
RBC # BLD: 4.56 M/UL — SIGNIFICANT CHANGE UP (ref 3.8–5.2)
RBC # FLD: 13.4 % — SIGNIFICANT CHANGE UP (ref 10.3–14.5)
SODIUM SERPL-SCNC: 133 MMOL/L — LOW (ref 135–145)
WBC # BLD: 4.53 K/UL — SIGNIFICANT CHANGE UP (ref 3.8–10.5)
WBC # FLD AUTO: 4.53 K/UL — SIGNIFICANT CHANGE UP (ref 3.8–10.5)

## 2024-07-21 RX ORDER — ENOXAPARIN SODIUM 120 MG/.8ML
40 INJECTION SUBCUTANEOUS
Refills: 0 | Status: DISCONTINUED | OUTPATIENT
Start: 2024-07-21 | End: 2024-07-29

## 2024-07-21 RX ORDER — LORATADINE 10 MG
17 TABLET,DISINTEGRATING ORAL DAILY
Refills: 0 | Status: DISCONTINUED | OUTPATIENT
Start: 2024-07-21 | End: 2024-07-29

## 2024-07-21 RX ORDER — DEXTROSE MONOHYDRATE, SODIUM CHLORIDE, SODIUM LACTATE, CALCIUM CHLORIDE, MAGNESIUM CHLORIDE 1.5; 538; 448; 18.4; 5.08 G/100ML; MG/100ML; MG/100ML; MG/100ML; MG/100ML
500 SOLUTION INTRAPERITONEAL ONCE
Refills: 0 | Status: COMPLETED | OUTPATIENT
Start: 2024-07-21 | End: 2024-07-21

## 2024-07-21 RX ADMIN — LEVETIRACETAM 500 MILLIGRAM(S): 1000 TABLET, FILM COATED ORAL at 05:41

## 2024-07-21 RX ADMIN — Medication 3 MILLIGRAM(S): at 23:10

## 2024-07-21 RX ADMIN — Medication 300 MILLIGRAM(S): at 11:57

## 2024-07-21 RX ADMIN — ENOXAPARIN SODIUM 40 MILLIGRAM(S): 120 INJECTION SUBCUTANEOUS at 20:30

## 2024-07-21 RX ADMIN — Medication 2 GRAM(S): at 11:56

## 2024-07-21 RX ADMIN — Medication 12.5 MILLIGRAM(S): at 00:05

## 2024-07-21 RX ADMIN — Medication 17 GRAM(S): at 11:56

## 2024-07-21 RX ADMIN — Medication 650 MILLIGRAM(S): at 18:25

## 2024-07-21 RX ADMIN — Medication 650 MILLIGRAM(S): at 11:55

## 2024-07-21 RX ADMIN — Medication 650 MILLIGRAM(S): at 17:40

## 2024-07-21 RX ADMIN — Medication 650 MILLIGRAM(S): at 05:41

## 2024-07-21 RX ADMIN — Medication 3 MILLIGRAM(S): at 00:06

## 2024-07-21 RX ADMIN — DEXTROSE MONOHYDRATE, SODIUM CHLORIDE, SODIUM LACTATE, CALCIUM CHLORIDE, MAGNESIUM CHLORIDE 500 MILLILITER(S): 1.5; 538; 448; 18.4; 5.08 SOLUTION INTRAPERITONEAL at 05:59

## 2024-07-21 RX ADMIN — SERTRALINE HYDROCHLORIDE 50 MILLIGRAM(S): 100 TABLET, FILM COATED ORAL at 11:58

## 2024-07-21 RX ADMIN — LEVETIRACETAM 500 MILLIGRAM(S): 1000 TABLET, FILM COATED ORAL at 17:39

## 2024-07-21 RX ADMIN — Medication 650 MILLIGRAM(S): at 06:41

## 2024-07-21 RX ADMIN — Medication 12.5 MILLIGRAM(S): at 23:10

## 2024-07-21 RX ADMIN — SENNOSIDES 2 TABLET(S): 8.6 TABLET ORAL at 21:32

## 2024-07-21 RX ADMIN — Medication 650 MILLIGRAM(S): at 23:10

## 2024-07-21 NOTE — PHYSICAL THERAPY INITIAL EVALUATION ADULT - NSPTDISCHREC_GEN_A_CORE
[facility]. Pt able to tolerate three hours of therapy a day. Recommend PM&R consultation to determine eligibility for acute rehab.

## 2024-07-21 NOTE — PHYSICAL THERAPY INITIAL EVALUATION ADULT - PERTINENT HX OF CURRENT PROBLEM, REHAB EVAL
Pt is a 59 year old female presenting from [facility] for worsening mentation and physical effort at rehab. Of note, shunt was reprogrammed from 1.0 to 0.5 due to increased ventricles on CTH and subsequently transferred to [facility] for possible  shunt revision as mentation did not improve after shunt reprogramming. MRI head with CSF flow revealed parenchymal hemorrhage involving the right frontal cortical and subcortical region, dilated lateral third ventricle seen consisting with noncommunicating hydrocephalus, and CSF flow study demonstrates decreased flow seen through the cerebral aqueduct and fourth ventricle region with increased flow seen through the basal cistern region.
59 year old female presents for possible  shunt revision. Patient sent from rehab facility to [place] due to worsening mentation and physical effort at rehab, shunt reprogrammed from 1.0 to 0.5 due to increased ventricles on CT Head. Now sent to [facility] as mentation did not improve after shunt reprogramming.

## 2024-07-21 NOTE — PHYSICAL THERAPY INITIAL EVALUATION ADULT - ADDITIONAL COMMENTS
Pt admitted from [facility]. Prior to recent hospitalizations, patient lived in a private house with her partner; there are 3 steps to enter + 1 flight to bedroom.    At rehab, patient has been ambulatory with assistance with rolling walker.
As per chart review pt at baseline was able to ambulate independently, lived with her partner in a house. Pt A&Ox2, states at rehab she was independent in ADLs and ambulated with a rolling walker at times.    Following evaluation, pt was left semireclined in bed in no distress, call bell in reach, bed alarm engaged.

## 2024-07-21 NOTE — PHYSICAL THERAPY INITIAL EVALUATION ADULT - BALANCE DISTURBANCE, IDENTIFIED IMPAIRMENT CONTRIBUTE, REHAB EVAL
impaired coordination/impaired motor control/impaired postural control/decreased strength
impaired postural control/decreased strength

## 2024-07-21 NOTE — PHYSICAL THERAPY INITIAL EVALUATION ADULT - SITTING BALANCE: STATIC
Required minimal assistance to maintain balance on EOB. Loss of balance observed posteriorly towards right./poor balance
poor balance

## 2024-07-21 NOTE — PHYSICAL THERAPY INITIAL EVALUATION ADULT - PATIENT PROFILE REVIEW, REHAB EVAL
Activity - ambulate with assistance/yes
PT initial evaluation received and chart review completed. Pt agreeable to participate in PT evaluation. ACTIVITY: AMBULATE WITH ASSISTANCE/yes

## 2024-07-21 NOTE — PHYSICAL THERAPY INITIAL EVALUATION ADULT - IMPAIRMENTS CONTRIBUTING IMPAIRED BED MOBILITY, REHAB EVAL
impaired balance/impaired coordination/impaired motor control/impaired postural control/decreased strength
impaired balance/impaired postural control/decreased strength

## 2024-07-21 NOTE — PHYSICAL THERAPY INITIAL EVALUATION ADULT - IMPAIRMENTS FOUND, PT EVAL
aerobic capacity/endurance/decreased midline orientation/ergonomics and body mechanics/gait, locomotion, and balance/gross motor/muscle strength/neuromotor development and sensory integration/posture
gait, locomotion, and balance/muscle strength

## 2024-07-21 NOTE — PHYSICAL THERAPY INITIAL EVALUATION ADULT - LEVEL OF INDEPENDENCE: SIT/STAND, REHAB EVAL
Deferred due to poor static seated balance.
secondary to bilateral LE weakness and poor sitting balance/unable to perform

## 2024-07-21 NOTE — PROGRESS NOTE ADULT - ASSESSMENT
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 VPS reprogrammed from 1.0 to 0.5 due to increased ventricles on CTH at [facility] rehab. Patient transferred to [facility] for possible  shunt revision as mentation did not improve after shunt reprogramming and ventricles are up on CTH.     7/18: Shunt tap met with resistance but able to pull 10cc off. CTH showed increase in ventricles. MRI with flow study completed for OR tomorrow, preop for ETV vs  shunt revision.  7/19: s/p Left frontal Endoscopic third ventriculostomy and ligation of Right  shunt with hemoclips with retention of shunt as rescue device and retention proximal and distal catheters. 2 incisions closed with staples.  7/20: Stable POD # 1 S/P ETV, ligation of shunt. 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 VPS reprogrammed from 1.0 to 0.5 due to increased ventricles on CTH at [facility] rehab. Patient transferred to [facility] for possible  shunt revision as mentation did not improve after shunt reprogramming and ventricles are up on CTH.     7/18: Shunt tap met with resistance but able to pull 10cc off. CTH showed increase in ventricles. MRI with flow study completed for OR tomorrow, preop for ETV vs  shunt revision.  7/19: s/p Left frontal Endoscopic third ventriculostomy and ligation of Right  shunt with ligaclips with retention of valve as rescue device and retention proximal and distal catheters. 2 incisions closed with staples.  7/20: Stable POD #1 S/P ETV, ligation of shunt. Downgraded from SICU to floor. CTH showed slight decr in vents   7/21: stable, pending MRI w flow

## 2024-07-21 NOTE — PHYSICAL THERAPY INITIAL EVALUATION ADULT - DIAGNOSIS, PT EVAL
Impairments in functional mobility, balance, posture, and strength
Generalized weakness, impaired balance, impaired mobility, decreased trunk control.

## 2024-07-21 NOTE — PHYSICAL THERAPY INITIAL EVALUATION ADULT - GENERAL OBSERVATIONS, REHAB EVAL
Pt encountered semireclined in bed in no distress. Heart rate = 98 bpm.
Upon entry, pt semi-supine in bed in NAD. HR 92 bpm. Pt left as received with all tubes/lines intact, bed alarm on, call bell in reach and in NAD.

## 2024-07-21 NOTE — PROGRESS NOTE ADULT - SUBJECTIVE AND OBJECTIVE BOX
POD # 1 S/P EVT, Ligation of  shunt  No issues overnight  Vital Signs Last 24 Hrs  T(C): 36.9 (20 Jul 2024 00:00), Max: 36.9 (19 Jul 2024 21:00)  T(F): 98.4 (20 Jul 2024 00:00), Max: 98.4 (19 Jul 2024 21:00)  HR: 84 (20 Jul 2024 01:00) (75 - 94)  BP: 110/65 (20 Jul 2024 01:00) (91/76 - 131/78)  BP(mean): 76 (20 Jul 2024 01:00) (61 - 109)  RR: 12 (20 Jul 2024 01:00) (10 - 18)  SpO2: 95% (20 Jul 2024 01:00) (95% - 100%)    Parameters below as of 20 Jul 2024 01:00  Patient On (Oxygen Delivery Method): room air    AAO X 2  PERRL with dysconjugate gaze  CN 2-12 otherwise intact  Bilateral UE 4/5, no drift  Bilateral LE antigravity    Dressing C/D/I    MEDICATIONS  (STANDING):  acetaminophen     Tablet .. 650 milliGRAM(s) Oral every 6 hours  buPROPion XL (24-Hour) . 300 milliGRAM(s) Oral daily  ceFAZolin   IVPB 2000 milliGRAM(s) IV Intermittent every 8 hours  lactated ringers. 1000 milliLiter(s) (75 mL/Hr) IV Continuous <Continuous>  levETIRAcetam 500 milliGRAM(s) Oral two times a day  senna 2 Tablet(s) Oral at bedtime  sertraline 50 milliGRAM(s) Oral daily  sodium chloride 2 Gram(s) Oral daily    MEDICATIONS  (PRN):  bisacodyl 5 milliGRAM(s) Oral every 12 hours PRN Constipation  ondansetron Injectable 4 milliGRAM(s) IV Push once PRN Nausea and/or Vomiting  oxyCODONE    IR 5 milliGRAM(s) Oral every 6 hours PRN Severe Pain (7 - 10)  oxyCODONE    IR 2.5 milliGRAM(s) Oral every 6 hours PRN Moderate Pain (4 - 6)  polyethylene glycol 3350 17 Gram(s) Oral daily PRN Constipation                          12.4   6.20  )-----------( 203      ( 20 Jul 2024 01:00 )             38.5     07-20    134<L>  |  99  |  8   ----------------------------<  92  4.1   |  23  |  0.47<L>    Ca    9.4      20 Jul 2024 01:00  Phos  4.1     07-20  Mg     1.90     07-20    TPro  6.7  /  Alb  3.8  /  TBili  0.6  /  DBili  x   /  AST  10  /  ALT  13  /  AlkPhos  121<H>  07-19     POD #2 S/P EVT, Ligation of  shunt    No issues overnight  Vital Signs Last 24 Hrs  T(C): 36.9 (20 Jul 2024 00:00), Max: 36.9 (19 Jul 2024 21:00)  T(F): 98.4 (20 Jul 2024 00:00), Max: 98.4 (19 Jul 2024 21:00)  HR: 84 (20 Jul 2024 01:00) (75 - 94)  BP: 110/65 (20 Jul 2024 01:00) (91/76 - 131/78)  BP(mean): 76 (20 Jul 2024 01:00) (61 - 109)  RR: 12 (20 Jul 2024 01:00) (10 - 18)  SpO2: 95% (20 Jul 2024 01:00) (95% - 100%)    Parameters below as of 20 Jul 2024 01:00  Patient On (Oxygen Delivery Method): room air    AAO X 2  PERRL with dysconjugate gaze  CN 2-12 otherwise intact  Bilateral UE 4/5, no drift  Bilateral LE antigravity    Dressing C/D/I    MEDICATIONS  (STANDING):  acetaminophen     Tablet .. 650 milliGRAM(s) Oral every 6 hours  buPROPion XL (24-Hour) . 300 milliGRAM(s) Oral daily  ceFAZolin   IVPB 2000 milliGRAM(s) IV Intermittent every 8 hours  lactated ringers. 1000 milliLiter(s) (75 mL/Hr) IV Continuous <Continuous>  levETIRAcetam 500 milliGRAM(s) Oral two times a day  senna 2 Tablet(s) Oral at bedtime  sertraline 50 milliGRAM(s) Oral daily  sodium chloride 2 Gram(s) Oral daily    MEDICATIONS  (PRN):  bisacodyl 5 milliGRAM(s) Oral every 12 hours PRN Constipation  ondansetron Injectable 4 milliGRAM(s) IV Push once PRN Nausea and/or Vomiting  oxyCODONE    IR 5 milliGRAM(s) Oral every 6 hours PRN Severe Pain (7 - 10)  oxyCODONE    IR 2.5 milliGRAM(s) Oral every 6 hours PRN Moderate Pain (4 - 6)  polyethylene glycol 3350 17 Gram(s) Oral daily PRN Constipation                          12.4   6.20  )-----------( 203      ( 20 Jul 2024 01:00 )             38.5     07-20    134<L>  |  99  |  8   ----------------------------<  92  4.1   |  23  |  0.47<L>    Ca    9.4      20 Jul 2024 01:00  Phos  4.1     07-20  Mg     1.90     07-20    TPro  6.7  /  Alb  3.8  /  TBili  0.6  /  DBili  x   /  AST  10  /  ALT  13  /  AlkPhos  121<H>  07-19

## 2024-07-22 LAB
ANION GAP SERPL CALC-SCNC: 10 MMOL/L — SIGNIFICANT CHANGE UP (ref 7–14)
BUN SERPL-MCNC: 9 MG/DL — SIGNIFICANT CHANGE UP (ref 7–23)
CALCIUM SERPL-MCNC: 9.1 MG/DL — SIGNIFICANT CHANGE UP (ref 8.4–10.5)
CHLORIDE SERPL-SCNC: 101 MMOL/L — SIGNIFICANT CHANGE UP (ref 98–107)
CO2 SERPL-SCNC: 23 MMOL/L — SIGNIFICANT CHANGE UP (ref 22–31)
CREAT SERPL-MCNC: 0.39 MG/DL — LOW (ref 0.5–1.3)
EGFR: 115 ML/MIN/1.73M2 — SIGNIFICANT CHANGE UP
GLUCOSE SERPL-MCNC: 99 MG/DL — SIGNIFICANT CHANGE UP (ref 70–99)
HCT VFR BLD CALC: 36.4 % — SIGNIFICANT CHANGE UP (ref 34.5–45)
HGB BLD-MCNC: 11.8 G/DL — SIGNIFICANT CHANGE UP (ref 11.5–15.5)
MAGNESIUM SERPL-MCNC: 1.8 MG/DL — SIGNIFICANT CHANGE UP (ref 1.6–2.6)
MCHC RBC-ENTMCNC: 25.4 PG — LOW (ref 27–34)
MCHC RBC-ENTMCNC: 32.4 GM/DL — SIGNIFICANT CHANGE UP (ref 32–36)
MCV RBC AUTO: 78.4 FL — LOW (ref 80–100)
NRBC # BLD: 0 /100 WBCS — SIGNIFICANT CHANGE UP (ref 0–0)
NRBC # FLD: 0 K/UL — SIGNIFICANT CHANGE UP (ref 0–0)
PHOSPHATE SERPL-MCNC: 3.1 MG/DL — SIGNIFICANT CHANGE UP (ref 2.5–4.5)
PLATELET # BLD AUTO: 132 K/UL — LOW (ref 150–400)
POTASSIUM SERPL-MCNC: 3.4 MMOL/L — LOW (ref 3.5–5.3)
POTASSIUM SERPL-SCNC: 3.4 MMOL/L — LOW (ref 3.5–5.3)
RBC # BLD: 4.64 M/UL — SIGNIFICANT CHANGE UP (ref 3.8–5.2)
RBC # FLD: 13.2 % — SIGNIFICANT CHANGE UP (ref 10.3–14.5)
SODIUM SERPL-SCNC: 134 MMOL/L — LOW (ref 135–145)
WBC # BLD: 4.36 K/UL — SIGNIFICANT CHANGE UP (ref 3.8–10.5)
WBC # FLD AUTO: 4.36 K/UL — SIGNIFICANT CHANGE UP (ref 3.8–10.5)

## 2024-07-22 PROCEDURE — 99222 1ST HOSP IP/OBS MODERATE 55: CPT

## 2024-07-22 PROCEDURE — 99232 SBSQ HOSP IP/OBS MODERATE 35: CPT

## 2024-07-22 RX ADMIN — Medication 3 MILLIGRAM(S): at 23:25

## 2024-07-22 RX ADMIN — SERTRALINE HYDROCHLORIDE 50 MILLIGRAM(S): 100 TABLET, FILM COATED ORAL at 11:54

## 2024-07-22 RX ADMIN — LEVETIRACETAM 500 MILLIGRAM(S): 1000 TABLET, FILM COATED ORAL at 05:36

## 2024-07-22 RX ADMIN — Medication 650 MILLIGRAM(S): at 23:25

## 2024-07-22 RX ADMIN — ENOXAPARIN SODIUM 40 MILLIGRAM(S): 120 INJECTION SUBCUTANEOUS at 22:00

## 2024-07-22 RX ADMIN — Medication 650 MILLIGRAM(S): at 18:03

## 2024-07-22 RX ADMIN — Medication 2 GRAM(S): at 11:54

## 2024-07-22 RX ADMIN — Medication 650 MILLIGRAM(S): at 05:36

## 2024-07-22 RX ADMIN — Medication 17 GRAM(S): at 12:01

## 2024-07-22 RX ADMIN — Medication 650 MILLIGRAM(S): at 12:53

## 2024-07-22 RX ADMIN — Medication 650 MILLIGRAM(S): at 11:55

## 2024-07-22 RX ADMIN — Medication 650 MILLIGRAM(S): at 00:10

## 2024-07-22 RX ADMIN — Medication 300 MILLIGRAM(S): at 11:54

## 2024-07-22 RX ADMIN — LEVETIRACETAM 500 MILLIGRAM(S): 1000 TABLET, FILM COATED ORAL at 18:04

## 2024-07-22 RX ADMIN — Medication 650 MILLIGRAM(S): at 06:36

## 2024-07-22 RX ADMIN — Medication 12.5 MILLIGRAM(S): at 23:25

## 2024-07-22 NOTE — PROGRESS NOTE ADULT - SUBJECTIVE AND OBJECTIVE BOX
Patient is a 59y old  Female who presents with a chief complaint of Rule out  shunt malfunction (22 Jul 2024 09:57)      SUBJECTIVE / OVERNIGHT EVENTS: no events     ROS:  all neg unless mentioned in hpi     MEDICATIONS  (STANDING):  acetaminophen     Tablet .. 650 milliGRAM(s) Oral every 6 hours  buPROPion XL (24-Hour) . 300 milliGRAM(s) Oral daily  enoxaparin Injectable 40 milliGRAM(s) SubCutaneous <User Schedule>  levETIRAcetam 500 milliGRAM(s) Oral two times a day  polyethylene glycol 3350 17 Gram(s) Oral daily  senna 2 Tablet(s) Oral at bedtime  sertraline 50 milliGRAM(s) Oral daily  sodium chloride 2 Gram(s) Oral daily    MEDICATIONS  (PRN):  bisacodyl 5 milliGRAM(s) Oral every 12 hours PRN Constipation  melatonin 3 milliGRAM(s) Oral at bedtime PRN Insomnia  oxyCODONE    IR 2.5 milliGRAM(s) Oral every 6 hours PRN Moderate Pain (4 - 6)  QUEtiapine 12.5 milliGRAM(s) Oral at bedtime PRN insomnia      T(C): 36.7 (07-22-24 @ 09:39)  HR: 100 (07-22-24 @ 09:39)  BP: 108/57 (07-22-24 @ 09:39)  RR: 18 (07-22-24 @ 09:39)  SpO2: 95% (07-22-24 @ 09:39)  CAPILLARY BLOOD GLUCOSE        I&O's Summary    21 Jul 2024 07:01  -  22 Jul 2024 07:00  --------------------------------------------------------  IN: 720 mL / OUT: 0 mL / NET: 720 mL    22 Jul 2024 07:01  -  22 Jul 2024 12:02  --------------------------------------------------------  IN: 320 mL / OUT: 0 mL / NET: 320 mL        PHYSICAL EXAM:  GENERAL: NAD  CHEST/LUNG: Clear to auscultation bilaterally  HEART: Regular rate and rhythm; No murmurs, rubs, or gallops, No Edema  ABDOMEN: Soft, Nontender, Nondistended; Bowel sounds present  EXTREMITIES:  2+ Peripheral Pulses, No clubbing, cyanosis    LABS:                        11.8   4.36  )-----------( 132      ( 22 Jul 2024 05:13 )             36.4     07-22    134<L>  |  101  |  9   ----------------------------<  99  3.4<L>   |  23  |  0.39<L>    Ca    9.1      22 Jul 2024 05:13  Phos  3.1     07-22  Mg     1.80     07-22            Urinalysis Basic - ( 22 Jul 2024 05:13 )    Color: x / Appearance: x / SG: x / pH: x  Gluc: 99 mg/dL / Ketone: x  / Bili: x / Urobili: x   Blood: x / Protein: x / Nitrite: x   Leuk Esterase: x / RBC: x / WBC x   Sq Epi: x / Non Sq Epi: x / Bacteria: x            RADIOLOGY & ADDITIONAL TESTS:    Imaging Personally Reviewed:    Consultant(s) Notes Reviewed:      Care Discussed with Consultants/Other Providers:

## 2024-07-22 NOTE — PROGRESS NOTE ADULT - ASSESSMENT
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 VPS reprogrammed from 1.0 to 0.5 due to increased ventricles on CTH at [facility] rehab. Patient transferred to [facility] for possible  shunt revision as mentation did not improve after shunt reprogramming and ventricles are up on CTH.     7/18: Shunt tap met with resistance but able to pull 10cc off. CTH showed increase in ventricles. MRI with flow study completed for OR tomorrow, preop for ETV vs  shunt revision.  7/19: s/p Left frontal Endoscopic third ventriculostomy and ligation of Right  shunt with ligaclips with retention of valve as rescue device and retention proximal and distal catheters. 2 incisions closed with staples.  7/20: Stable POD #1 S/P ETV, ligation of shunt. Downgraded from SICU to floor. CTH showed slight decr in vents   7/21-22: stable, pending MRI w flow

## 2024-07-22 NOTE — CONSULT NOTE ADULT - SUBJECTIVE AND OBJECTIVE BOX
Patient is a 59y old  Female who presents with a chief complaint of Rule out  shunt malfunction (2024 01:16)      HPI:  58 YO female with PMHx of neurofibromatosis,  shunt (Strata programmed at 0.5 in [facility]), s/p traumatic fall with head trauma, no LOC, which took place back in 2024 and sustained a large acute right SDH, and she underwent an emergent right decompressive hemicraniectomy on  (bone discarded), s/p ICP monitor placement on , s/p right cranioplasty on  and subsequently discharged to rehab on . Shunt was at 1.0 at that time. Patient sent from rehab facility to [place] due to worsening mentation and physical effort at rehab, shunt reprogrammed from 1.0 to 0.5 due to increased ventricles on CTH, patient treated for UTI, patient subsequently transferred to [facility] for possible  shunt revision as mentation did not improve after shunt reprogramming.  (2024 05:59)    admitted to r/o  shunt malfunction    underwent Left frontal Endoscopic third ventriculostomy and ligation of Right  shunt with ligaclips with retention of valve as rescue device and retention proximal and distal catheters. 2 incisions closed with staples on 24    REVIEW OF SYSTEMS  Constitutional - No fever, No weight loss, No fatigue  HEENT - No eye pain, No visual disturbances, No difficulty hearing, No tinnitus, No vertigo, No neck pain  Respiratory - No cough, No wheezing, No shortness of breath  Cardiovascular - No chest pain, No palpitations  Gastrointestinal - No abdominal pain, No nausea, No vomiting, No diarrhea, No constipation  Genitourinary - No dysuria, No frequency, No hematuria, No incontinence  Neurological - No headaches, No memory loss, No loss of strength, No numbness, No tremors  Skin - No itching, No rashes, No lesions   Endocrine - No temperature intolerance  Musculoskeletal - No joint pain, No joint swelling, No muscle pain  Psychiatric - No depression, No anxiety    PAST MEDICAL & SURGICAL HISTORY  Asthma    Depression    Neoplasm of uncertain behavior of brain    Neurofibromatosis, type 1    Neurofibromatosis    History of hydrocephalus    Anxiety    Delivery with history of     S/P tubal ligation    History of arthroscopy of left knee    S/P LASIK Surgery    H/O brain tumor    S/P  shunt       FUNCTIONAL HISTORY  admitted from [facility] acute rehab- ambulated with RW with assistance in rehab  lives in private house with stairs to enter, 1 flight inside       CURRENT FUNCTIONAL STATUS    Bed Mobility: Rolling/Turning:     · Level of [place]	minimum assist (75% patients effort)  · Physical Assist/Nonphysical Assist	1 person assist; nonverbal cues (demo/gestures); verbal cues  · Assistive Device	bed rails    Bed Mobility: Sit to Supine:     · Level of [place]	maximum assist (25% patients effort)  · Physical Assist/Nonphysical Assist	1 person assist; nonverbal cues (demo/gestures); verbal cues    Bed Mobility: Supine to Sit:     · Level of [place]	moderate assist (50% patients effort)  · Physical Assist/Nonphysical Assist	1 person assist; nonverbal cues (demo/gestures); verbal cues  · Assistive Device	bed rails; HOB elevated    Bed Mobility Analysis:     · Bed Mobility Limitations	decreased ability to use arms for pushing/pulling; decreased ability to use legs for bridging/pushing; impaired ability to control trunk for mobility  · Impairments Contributing to Impaired Bed Mobility	impaired balance; impaired coordination; impaired motor control; impaired postural control; decreased strength         RECENT LABS/IMAGING  CBC Full  -  ( 2024 05:13 )  WBC Count : 4.36 K/uL  RBC Count : 4.64 M/uL  Hemoglobin : 11.8 g/dL  Hematocrit : 36.4 %  Platelet Count - Automated : 132 K/uL  Mean Cell Volume : 78.4 fL  Mean Cell Hemoglobin : 25.4 pg  Mean Cell Hemoglobin Concentration : 32.4 gm/dL  Auto Neutrophil # : x  Auto Lymphocyte # : x  Auto Monocyte # : x  Auto Eosinophil # : x  Auto Basophil # : x  Auto Neutrophil % : x  Auto Lymphocyte % : x  Auto Monocyte % : x  Auto Eosinophil % : x  Auto Basophil % : x        134<L>  |  101  |  9   ----------------------------<  99  3.4<L>   |  23  |  0.39<L>    Ca    9.1      2024 05:13  Phos  3.1     -  Mg     1.80           Urinalysis Basic - ( 2024 05:13 )    Color: x / Appearance: x / SG: x / pH: x  Gluc: 99 mg/dL / Ketone: x  / Bili: x / Urobili: x   Blood: x / Protein: x / Nitrite: x   Leuk Esterase: x / RBC: x / WBC x   Sq Epi: x / Non Sq Epi: x / Bacteria: x        VITALS  T(C): 36.7 (24 @ 09:39), Max: 37 (24 @ 17:50)  HR: 100 (24 @ 09:39) (65 - 112)  BP: 108/57 (24 @ 09:39) (101/62 - 116/54)  RR: 18 (24 @ 09:39) (18 - 20)  SpO2: 95% (24 @ 09:39) (95% - 99%)  Wt(kg): --    ALLERGIES  No Known Allergies      MEDICATIONS   acetaminophen     Tablet .. 650 milliGRAM(s) Oral every 6 hours  bisacodyl 5 milliGRAM(s) Oral every 12 hours PRN  buPROPion XL (24-Hour) . 300 milliGRAM(s) Oral daily  enoxaparin Injectable 40 milliGRAM(s) SubCutaneous <User Schedule>  levETIRAcetam 500 milliGRAM(s) Oral two times a day  melatonin 3 milliGRAM(s) Oral at bedtime PRN  oxyCODONE    IR 2.5 milliGRAM(s) Oral every 6 hours PRN  polyethylene glycol 3350 17 Gram(s) Oral daily  QUEtiapine 12.5 milliGRAM(s) Oral at bedtime PRN  senna 2 Tablet(s) Oral at bedtime  sertraline 50 milliGRAM(s) Oral daily  sodium chloride 2 Gram(s) Oral daily      ----------------------------------------------------------------------------------------  PHYSICAL EXAM  Constitutional - NAD, Comfortable  HEENT - NCAT, EOMI  Neck - Supple, No limited ROM  Chest - CTA bilaterally, No wheeze, No rhonchi, No crackles  Cardiovascular - RRR, S1S2, No murmurs  Abdomen - BS+, Soft, NTND  Extremities - No C/C/E, No calf tenderness   Neurologic Exam -                    Cognitive - Awake, Alert, AAO to self, place, date, year, situation     Communication - Fluent, No dysarthria     Cranial Nerves - CN 2-12 intact     Motor - No focal deficits                    LEFT    UE - ShAB 5/5, EF 5/5, EE 5/5, WE 5/5,  5/5                    RIGHT UE - ShAB 5/5, EF 5/5, EE 5/5, WE 5/5,  5/5                    LEFT    LE - HF 5/5, KE 5/5, DF 5/5, PF 5/5                    RIGHT LE - HF 5/5, KE 5/5, DF 5/5, PF 5/5        Sensory - Intact to LT     Reflexes - DTR Intact, No primitive reflexive     Coordination - FTN intact     OculoVestibular - No saccades, No nystagmus, VOR         Balance - WNL Static  Psychiatric - Mood stable, Affect WNL  ----------------------------------------------------------------------------------------  ASSESSMENT/PLAN    Pain - oxy ir prn  DVT PPX - lovenox  Rehab -     incomplete note Patient is a 59y old  Female who presents with a chief complaint of Rule out  shunt malfunction (2024 01:16)      HPI:  60 YO female with PMHx of neurofibromatosis,  shunt (Strata programmed at 0.5 in [facility]), s/p traumatic fall with head trauma, no LOC, which took place back in 2024 and sustained a large acute right SDH, and she underwent an emergent right decompressive hemicraniectomy on  (bone discarded), s/p ICP monitor placement on , s/p right cranioplasty on  and subsequently discharged to rehab on . Shunt was at 1.0 at that time. Patient sent from rehab facility to [place] due to worsening mentation and physical effort at rehab, shunt reprogrammed from 1.0 to 0.5 due to increased ventricles on CTH, patient treated for UTI, patient subsequently transferred to [facility] for possible  shunt revision as mentation did not improve after shunt reprogramming.  (2024 05:59)    admitted to r/o  shunt malfunction    underwent Left frontal Endoscopic third ventriculostomy and ligation of Right  shunt with ligaclips with retention of valve as rescue device and retention proximal and distal catheters. 2 incisions closed with staples on 24    REVIEW OF SYSTEMS  weakness    PAST MEDICAL & SURGICAL HISTORY  Asthma    Depression    Neoplasm of uncertain behavior of brain    Neurofibromatosis, type 1    Neurofibromatosis    History of hydrocephalus    Anxiety    Delivery with history of     S/P tubal ligation    History of arthroscopy of left knee    S/P LASIK Surgery    H/O brain tumor    S/P  shunt       FUNCTIONAL HISTORY  admitted from [facility] acute rehab- ambulated with RW with assistance in rehab  lives in private house with stairs to enter, 1 flight inside       CURRENT FUNCTIONAL STATUS    Bed Mobility: Rolling/Turning:     · Level of [place]	minimum assist (75% patients effort)  · Physical Assist/Nonphysical Assist	1 person assist; nonverbal cues (demo/gestures); verbal cues  · Assistive Device	bed rails    Bed Mobility: Sit to Supine:     · Level of [place]	maximum assist (25% patients effort)  · Physical Assist/Nonphysical Assist	1 person assist; nonverbal cues (demo/gestures); verbal cues    Bed Mobility: Supine to Sit:     · Level of [place]	moderate assist (50% patients effort)  · Physical Assist/Nonphysical Assist	1 person assist; nonverbal cues (demo/gestures); verbal cues  · Assistive Device	bed rails; HOB elevated    Bed Mobility Analysis:     · Bed Mobility Limitations	decreased ability to use arms for pushing/pulling; decreased ability to use legs for bridging/pushing; impaired ability to control trunk for mobility  · Impairments Contributing to Impaired Bed Mobility	impaired balance; impaired coordination; impaired motor control; impaired postural control; decreased strength         RECENT LABS/IMAGING  CBC Full  -  ( 2024 05:13 )  WBC Count : 4.36 K/uL  RBC Count : 4.64 M/uL  Hemoglobin : 11.8 g/dL  Hematocrit : 36.4 %  Platelet Count - Automated : 132 K/uL  Mean Cell Volume : 78.4 fL  Mean Cell Hemoglobin : 25.4 pg  Mean Cell Hemoglobin Concentration : 32.4 gm/dL  Auto Neutrophil # : x  Auto Lymphocyte # : x  Auto Monocyte # : x  Auto Eosinophil # : x  Auto Basophil # : x  Auto Neutrophil % : x  Auto Lymphocyte % : x  Auto Monocyte % : x  Auto Eosinophil % : x  Auto Basophil % : x        134<L>  |  101  |  9   ----------------------------<  99  3.4<L>   |  23  |  0.39<L>    Ca    9.1      2024 05:13  Phos  3.1       Mg     1.80           Urinalysis Basic - ( 2024 05:13 )    Color: x / Appearance: x / SG: x / pH: x  Gluc: 99 mg/dL / Ketone: x  / Bili: x / Urobili: x   Blood: x / Protein: x / Nitrite: x   Leuk Esterase: x / RBC: x / WBC x   Sq Epi: x / Non Sq Epi: x / Bacteria: x        VITALS  T(C): 36.7 (24 @ 09:39), Max: 37 (24 @ 17:50)  HR: 100 (24 @ 09:39) (65 - 112)  BP: 108/57 (24 @ 09:39) (101/62 - 116/54)  RR: 18 (24 @ 09:39) (18 - 20)  SpO2: 95% (07-22-24 @ 09:39) (95% - 99%)  Wt(kg): --    ALLERGIES  No Known Allergies      MEDICATIONS   acetaminophen     Tablet .. 650 milliGRAM(s) Oral every 6 hours  bisacodyl 5 milliGRAM(s) Oral every 12 hours PRN  buPROPion XL (24-Hour) . 300 milliGRAM(s) Oral daily  enoxaparin Injectable 40 milliGRAM(s) SubCutaneous <User Schedule>  levETIRAcetam 500 milliGRAM(s) Oral two times a day  melatonin 3 milliGRAM(s) Oral at bedtime PRN  oxyCODONE    IR 2.5 milliGRAM(s) Oral every 6 hours PRN  polyethylene glycol 3350 17 Gram(s) Oral daily  QUEtiapine 12.5 milliGRAM(s) Oral at bedtime PRN  senna 2 Tablet(s) Oral at bedtime  sertraline 50 milliGRAM(s) Oral daily  sodium chloride 2 Gram(s) Oral daily      ----------------------------------------------------------------------------------------  PHYSICAL EXAM  Constitutional - NAD, Comfortable  HEENT - + staples  Neck - Supple, No limited ROM  Chest - no respiratory distress  Cardiovascular - RRR, S1S2   Abdomen - BS+, Soft, NTND  Extremities - No C/C/E, No calf tenderness   Neurologic Exam -                    Cognitive - Awake, Alert, AAO to self, reports she is in a [employer], knows the year with prompting      Communication - Fluent, No dysarthria     Cranial Nerves - CN 2-12 intact     Motor - moves all extremities at least 4/5     Sensory - Intact to LT     Reflexes - DTR Intact      Balance - WNL Static  Psychiatric - Mood stable, Affect WNL  ----------------------------------------------------------------------------------------  ASSESSMENT/PLAN  60 YO female with PMHx of neurofibromatosis,  shunt (Strata programmed at 0.5 in [facility]), s/p traumatic fall with head trauma, no LOC, which took place back in 2024 and sustained a large acute right SDH, and she underwent an emergent right decompressive hemicraniectomy on  (bone discarded), s/p ICP monitor placement on , s/p right cranioplasty on  and subsequently discharged to rehab on . returned from acute rehab due to worsening mentation and physical effort at rehab, shunt reprogrammed and patient sent to [facility] for possible revision of shunt.   She underwent Left frontal Endoscopic third ventriculostomy and ligation of Right  shunt with ligaclips with retention of valve as rescue device and retention proximal and distal catheters. 2 incisions closed with staples on 24  continue bedside PT  OT evaluation  Pain - oxy ir prn  DVT PPX - lovenox  Rehab - recommend acute rehab when medically cleared. Patient can tolerate 3 hours per day of therapy with medical supervision.

## 2024-07-22 NOTE — PROGRESS NOTE ADULT - SUBJECTIVE AND OBJECTIVE BOX
No issues overnight  Vital Signs Last 24 Hrs  T(C): 36.9 (21 Jul 2024 21:21), Max: 37 (21 Jul 2024 17:50)  T(F): 98.4 (21 Jul 2024 21:21), Max: 98.6 (21 Jul 2024 17:50)  HR: 112 (21 Jul 2024 21:21) (65 - 112)  BP: 116/54 (21 Jul 2024 21:21) (104/57 - 116/54)  BP(mean): --  RR: 18 (21 Jul 2024 21:21) (18 - 18)  SpO2: 99% (21 Jul 2024 21:21) (97% - 100%)    Parameters below as of 21 Jul 2024 21:21  Patient On (Oxygen Delivery Method): room air    AAO X 2  PERRL with dysconjugate gaze  CN 2-12 otherwise intact  Bilateral UE 4/5, no drift  Bilateral LE antigravity    MEDICATIONS  (STANDING):  acetaminophen     Tablet .. 650 milliGRAM(s) Oral every 6 hours  buPROPion XL (24-Hour) . 300 milliGRAM(s) Oral daily  enoxaparin Injectable 40 milliGRAM(s) SubCutaneous <User Schedule>  levETIRAcetam 500 milliGRAM(s) Oral two times a day  polyethylene glycol 3350 17 Gram(s) Oral daily  senna 2 Tablet(s) Oral at bedtime  sertraline 50 milliGRAM(s) Oral daily  sodium chloride 2 Gram(s) Oral daily    MEDICATIONS  (PRN):  bisacodyl 5 milliGRAM(s) Oral every 12 hours PRN Constipation  melatonin 3 milliGRAM(s) Oral at bedtime PRN Insomnia  oxyCODONE    IR 2.5 milliGRAM(s) Oral every 6 hours PRN Moderate Pain (4 - 6)  QUEtiapine 12.5 milliGRAM(s) Oral at bedtime PRN insomnia                          11.7   4.53  )-----------( 131      ( 21 Jul 2024 05:30 )             36.1     07-21    133<L>  |  97<L>  |  7   ----------------------------<  92  3.2<L>   |  24  |  0.41<L>    Ca    9.4      21 Jul 2024 05:30  Phos  3.2     07-21  Mg     1.90     07-21

## 2024-07-22 NOTE — PROGRESS NOTE ADULT - ASSESSMENT
60 YO female with Hx neurofibromatosis,  shunt fell in March sustaining a large acute right SDH, patient underwent a right decompressive hemicraniectomy on 5/22. now admitted w concern for acute encephalopathy     # Encephalopathy  - care per primary team  - now S/P ETV, ligation of shunt. Downgraded from SICU to floor. CTH showed slight decr in vents   - pending MRI w flow      # h/o SIADH  - was discharged on salt tabs prior admission  - salt tabs   - trend na    # Psych  - on Wellbutrin and zoloft for MDD  - Wellbutrin can lower sz threshold - pt on keppra for sz ppx vs hx - consider neuro input    # DVT ppx   - per primary team

## 2024-07-23 LAB
CULTURE RESULTS: SIGNIFICANT CHANGE UP
SPECIMEN SOURCE: SIGNIFICANT CHANGE UP

## 2024-07-23 PROCEDURE — 70551 MRI BRAIN STEM W/O DYE: CPT | Mod: 26

## 2024-07-23 PROCEDURE — 99232 SBSQ HOSP IP/OBS MODERATE 35: CPT

## 2024-07-23 RX ADMIN — Medication 650 MILLIGRAM(S): at 13:20

## 2024-07-23 RX ADMIN — SENNOSIDES 2 TABLET(S): 8.6 TABLET ORAL at 21:12

## 2024-07-23 RX ADMIN — Medication 2 GRAM(S): at 13:20

## 2024-07-23 RX ADMIN — Medication 650 MILLIGRAM(S): at 05:50

## 2024-07-23 RX ADMIN — Medication 17 GRAM(S): at 13:21

## 2024-07-23 RX ADMIN — Medication 650 MILLIGRAM(S): at 18:17

## 2024-07-23 RX ADMIN — Medication 300 MILLIGRAM(S): at 13:20

## 2024-07-23 RX ADMIN — ENOXAPARIN SODIUM 40 MILLIGRAM(S): 120 INJECTION SUBCUTANEOUS at 21:12

## 2024-07-23 RX ADMIN — Medication 650 MILLIGRAM(S): at 00:25

## 2024-07-23 RX ADMIN — LEVETIRACETAM 500 MILLIGRAM(S): 1000 TABLET, FILM COATED ORAL at 05:50

## 2024-07-23 RX ADMIN — SERTRALINE HYDROCHLORIDE 50 MILLIGRAM(S): 100 TABLET, FILM COATED ORAL at 13:21

## 2024-07-23 RX ADMIN — LEVETIRACETAM 500 MILLIGRAM(S): 1000 TABLET, FILM COATED ORAL at 18:17

## 2024-07-23 RX ADMIN — Medication 650 MILLIGRAM(S): at 06:50

## 2024-07-23 NOTE — PROGRESS NOTE ADULT - SUBJECTIVE AND OBJECTIVE BOX
Patient is a 59y old  Female who presents with a chief complaint of Rule out  shunt malfunction (23 Jul 2024 05:13)      SUBJECTIVE / OVERNIGHT EVENTS: no events     ROS:  all neg unless mentioned inhpi     MEDICATIONS  (STANDING):  acetaminophen     Tablet .. 650 milliGRAM(s) Oral every 6 hours  buPROPion XL (24-Hour) . 300 milliGRAM(s) Oral daily  enoxaparin Injectable 40 milliGRAM(s) SubCutaneous <User Schedule>  levETIRAcetam 500 milliGRAM(s) Oral two times a day  polyethylene glycol 3350 17 Gram(s) Oral daily  senna 2 Tablet(s) Oral at bedtime  sertraline 50 milliGRAM(s) Oral daily  sodium chloride 2 Gram(s) Oral daily    MEDICATIONS  (PRN):  bisacodyl 5 milliGRAM(s) Oral every 12 hours PRN Constipation  melatonin 3 milliGRAM(s) Oral at bedtime PRN Insomnia  oxyCODONE    IR 2.5 milliGRAM(s) Oral every 6 hours PRN Moderate Pain (4 - 6)  QUEtiapine 12.5 milliGRAM(s) Oral at bedtime PRN insomnia      T(C): 36.7 (07-23-24 @ 05:50)  HR: 91 (07-23-24 @ 05:50)  BP: 116/57 (07-23-24 @ 05:50)  RR: 18 (07-23-24 @ 05:50)  SpO2: 94% (07-23-24 @ 05:50)  CAPILLARY BLOOD GLUCOSE        I&O's Summary    22 Jul 2024 07:01  -  23 Jul 2024 07:00  --------------------------------------------------------  IN: 830 mL / OUT: 0 mL / NET: 830 mL        PHYSICAL EXAM:  GENERAL: NAD  CHEST/LUNG: Clear to auscultation bilaterally  HEART: Regular rate and rhythm; No murmurs, rubs, or gallops, No Edema  ABDOMEN: Soft, Nontender, Nondistended; Bowel sounds present  EXTREMITIES:  2+ Peripheral Pulses, No clubbing, cyanosis    LABS:                        11.8   4.36  )-----------( 132      ( 22 Jul 2024 05:13 )             36.4     07-22    134<L>  |  101  |  9   ----------------------------<  99  3.4<L>   |  23  |  0.39<L>    Ca    9.1      22 Jul 2024 05:13  Phos  3.1     07-22  Mg     1.80     07-22            Urinalysis Basic - ( 22 Jul 2024 05:13 )    Color: x / Appearance: x / SG: x / pH: x  Gluc: 99 mg/dL / Ketone: x  / Bili: x / Urobili: x   Blood: x / Protein: x / Nitrite: x   Leuk Esterase: x / RBC: x / WBC x   Sq Epi: x / Non Sq Epi: x / Bacteria: x            RADIOLOGY & ADDITIONAL TESTS:    Imaging Personally Reviewed:    Consultant(s) Notes Reviewed:      Care Discussed with Consultants/Other Providers:

## 2024-07-23 NOTE — PROGRESS NOTE ADULT - ASSESSMENT
60 yo F with PMHx of neurofibromatosis, s/p L suboccipital craniectomy for resection of cerebellar brain tumor 12/2023 by Dr Brush, s/p  shunt (Strata programmed at 0.5 in [facility] recently), s/p traumatic fall with head trauma in 5/20/2024, at that time sustained a large acute right SDH, and underwent an emergent right decompressive hemicraniectomy on 5/22/24, s/p right cranioplasty on 6/17/24 with custom plate. VPS reprogrammed from 1.0 to 0.5 due to increased ventricles on CTH at [facility] rehab. Patient transferred to [facility] for possible  shunt revision as mentation did not improve after shunt reprogramming and ventricles are up on CTH.     7/18: Shunt tap met with resistance but able to pull 10cc off. CTH showed increase in ventricles. MRI with flow study completed for OR tomorrow, preop for ETV vs  shunt revision.  7/19: s/p Left frontal Endoscopic third ventriculostomy and ligation of Right  shunt with ligaclips with retention of valve as rescue device and retention proximal and distal catheters. 2 incisions closed with staples.  7/20: Stable POD #1 S/P ETV, ligation of shunt. Downgraded from SICU to floor. CTH showed slight decr in vents   7/21-23: stable, pending MRI w flow done.

## 2024-07-23 NOTE — PROGRESS NOTE ADULT - SUBJECTIVE AND OBJECTIVE BOX
No issues overnight, mri w/ flow done yesterday f/u read.    Vital Signs Last 24 Hrs  T(C): 36.6 (23 Jul 2024 01:28), Max: 36.8 (22 Jul 2024 14:21)  T(F): 97.8 (23 Jul 2024 01:28), Max: 98.2 (22 Jul 2024 14:21)  HR: 95 (23 Jul 2024 01:28) (92 - 100)  BP: 118/64 (23 Jul 2024 01:28) (107/54 - 119/52)  BP(mean): --  ABP: --  ABP(mean): --  RR: 17 (23 Jul 2024 01:28) (16 - 18)  SpO2: 96% (23 Jul 2024 01:28) (95% - 100%)    O2 Parameters below as of 23 Jul 2024 01:28  Patient On (Oxygen Delivery Method): room air        air    AAO X 2  PERRL with dysconjugate gaze  CN 2-12 otherwise intact  Bilateral UE 4/5, no drift  Bilateral LE antigravity    MEDICATIONS  (STANDING):  acetaminophen     Tablet .. 650 milliGRAM(s) Oral every 6 hours  buPROPion XL (24-Hour) . 300 milliGRAM(s) Oral daily  enoxaparin Injectable 40 milliGRAM(s) SubCutaneous <User Schedule>  levETIRAcetam 500 milliGRAM(s) Oral two times a day  polyethylene glycol 3350 17 Gram(s) Oral daily  senna 2 Tablet(s) Oral at bedtime  sertraline 50 milliGRAM(s) Oral daily  sodium chloride 2 Gram(s) Oral daily    MEDICATIONS  (PRN):  bisacodyl 5 milliGRAM(s) Oral every 12 hours PRN Constipation  melatonin 3 milliGRAM(s) Oral at bedtime PRN Insomnia  oxyCODONE    IR 2.5 milliGRAM(s) Oral every 6 hours PRN Moderate Pain (4 - 6)  QUEtiapine 12.5 milliGRAM(s) Oral at bedtime PRN insomnia                                         11.8   4.36  )-----------( 132      ( 22 Jul 2024 05:13 )             36.4     07-22    134<L>  |  101  |  9   ----------------------------<  99  3.4<L>   |  23  |  0.39<L>    Ca    9.1      22 Jul 2024 05:13  Phos  3.1     07-22  Mg     1.80     07-22

## 2024-07-24 PROCEDURE — 99232 SBSQ HOSP IP/OBS MODERATE 35: CPT

## 2024-07-24 RX ADMIN — Medication 650 MILLIGRAM(S): at 11:43

## 2024-07-24 RX ADMIN — Medication 2 GRAM(S): at 11:43

## 2024-07-24 RX ADMIN — SERTRALINE HYDROCHLORIDE 50 MILLIGRAM(S): 100 TABLET, FILM COATED ORAL at 11:43

## 2024-07-24 RX ADMIN — Medication 650 MILLIGRAM(S): at 17:07

## 2024-07-24 RX ADMIN — SENNOSIDES 2 TABLET(S): 8.6 TABLET ORAL at 21:00

## 2024-07-24 RX ADMIN — Medication 17 GRAM(S): at 11:42

## 2024-07-24 RX ADMIN — Medication 650 MILLIGRAM(S): at 06:23

## 2024-07-24 RX ADMIN — LEVETIRACETAM 500 MILLIGRAM(S): 1000 TABLET, FILM COATED ORAL at 05:23

## 2024-07-24 RX ADMIN — Medication 650 MILLIGRAM(S): at 05:23

## 2024-07-24 RX ADMIN — ENOXAPARIN SODIUM 40 MILLIGRAM(S): 120 INJECTION SUBCUTANEOUS at 21:01

## 2024-07-24 RX ADMIN — Medication 300 MILLIGRAM(S): at 11:43

## 2024-07-24 RX ADMIN — LEVETIRACETAM 500 MILLIGRAM(S): 1000 TABLET, FILM COATED ORAL at 17:07

## 2024-07-24 NOTE — PROGRESS NOTE ADULT - SUBJECTIVE AND OBJECTIVE BOX
Patient is a 59y old  Female who presents with a chief complaint of Rule out  shunt malfunction (24 Jul 2024 02:10)      SUBJECTIVE / OVERNIGHT EVENTS: no events     ROS:  all neg unless mentioned in hpi     MEDICATIONS  (STANDING):  acetaminophen     Tablet .. 650 milliGRAM(s) Oral every 6 hours  buPROPion XL (24-Hour) . 300 milliGRAM(s) Oral daily  enoxaparin Injectable 40 milliGRAM(s) SubCutaneous <User Schedule>  levETIRAcetam 500 milliGRAM(s) Oral two times a day  polyethylene glycol 3350 17 Gram(s) Oral daily  senna 2 Tablet(s) Oral at bedtime  sertraline 50 milliGRAM(s) Oral daily  sodium chloride 2 Gram(s) Oral daily    MEDICATIONS  (PRN):  bisacodyl 5 milliGRAM(s) Oral every 12 hours PRN Constipation  melatonin 3 milliGRAM(s) Oral at bedtime PRN Insomnia  oxyCODONE    IR 2.5 milliGRAM(s) Oral every 6 hours PRN Moderate Pain (4 - 6)  QUEtiapine 12.5 milliGRAM(s) Oral at bedtime PRN insomnia      T(C): 36.8 (07-24-24 @ 06:44)  HR: 80 (07-24-24 @ 06:44)  BP: 110/52 (07-24-24 @ 06:44)  RR: 17 (07-24-24 @ 06:44)  SpO2: 100% (07-24-24 @ 06:44)  CAPILLARY BLOOD GLUCOSE        I&O's Summary    23 Jul 2024 07:01  -  24 Jul 2024 07:00  --------------------------------------------------------  IN: 1000 mL / OUT: 0 mL / NET: 1000 mL        PHYSICAL EXAM:  GENERAL: NAD  CHEST/LUNG: Clear to auscultation bilaterally  HEART: Regular rate and rhythm; No murmurs, rubs, or gallops, No Edema  ABDOMEN: Soft, Nontender, Nondistended; Bowel sounds present  EXTREMITIES:  2+ Peripheral Pulses, No clubbing, cyanosis      LABS:                        RADIOLOGY & ADDITIONAL TESTS:    Imaging Personally Reviewed:    Consultant(s) Notes Reviewed:      Care Discussed with Consultants/Other Providers:

## 2024-07-24 NOTE — PROGRESS NOTE ADULT - SUBJECTIVE AND OBJECTIVE BOX
Patient is a 59y old  Female who presents with a chief complaint of Rule out  shunt malfunction (2024 09:58)      HPI:  58 YO female with PMHx of neurofibromatosis,  shunt (Strata programmed at 0.5 in [facility]), s/p traumatic fall with head trauma, no LOC, which took place back in 2024 and sustained a large acute right SDH, and she underwent an emergent right decompressive hemicraniectomy on  (bone discarded), s/p ICP monitor placement on , s/p right cranioplasty on  and subsequently discharged to rehab on . Shunt was at 1.0 at that time. Patient sent from rehab facility to [place] due to worsening mentation and physical effort at rehab, shunt reprogrammed from 1.0 to 0.5 due to increased ventricles on CTH, patient treated for UTI, patient subsequently transferred to [facility] for possible  shunt revision as mentation did not improve after shunt reprogramming.  (2024 05:59)      REVIEW OF SYSTEMS  denies pain    PAST MEDICAL & SURGICAL HISTORY  Asthma    Depression    Neoplasm of uncertain behavior of brain    Neurofibromatosis, type 1    Neurofibromatosis    History of hydrocephalus    Anxiety    Delivery with history of     S/P tubal ligation    History of arthroscopy of left knee    S/P LASIK Surgery    H/O brain tumor    S/P  shunt       CURRENT FUNCTIONAL STATUS    Bed Mobility  Bed Mobility Training Rehab Potential: good, to achieve stated therapy goals  Bed Mobility Training Symptoms Noted During/After Treatment: none  Bed Mobility Training Sit-to-Supine: minimum assist (75% patient effort);  1 person assist;  verbal cues  Bed Mobility Training Supine-to-Sit: minimum assist (75% patient effort);  1 person assist;  verbal cues  Bed Mobility Training Limitations: impaired ability to control trunk for mobility;  decreased ability to use legs for bridging/pushing;  decreased strength;  impaired balance;  impaired motor control    Sit-Stand Transfer Training  Sit-to-Stand Transfer Training Rehab Potential: good, to achieve stated therapy goals  Sit-to-Stand Transfer Training Symptoms Noted During/After Treatment: none  Transfer Training Sit-to-Stand Transfer: minimum assist (75% patient effort);  2 person assist;  verbal cues;  weight-bearing as tolerated   rolling walker  Transfer Training Stand-to-Sit Transfer: minimum assist (75% patient effort);  2 person assist;  verbal cues;  weight-bearing as tolerated   rolling walker  Sit-to-Stand Transfer Training Transfer Safety Analysis: decreased balance;  decreased weight-shifting ability;  decreased strength;  impaired balance;  impaired motor control;  rolling walker    Gait Training  Gait Training Rehab Potential: good, to achieve stated therapy goals  Gait Training Symptoms Noted During/After Treatment: none  Gait Training: minimum assist (75% patient effort);  2 person assist;  verbal cues;  weight-bearing as tolerated   rolling walker;  4 steps fwd/ bwd, 2 lateral steps  Gait Analysis: 3-point gait   decreased anita;  decreased step length;  decreased weight-shifting ability;  shuffling;  decreased strength;  impaired balance;  impaired motor control;  4 steps fwd/ bwd, 2 lateral steps;  rolling walker  Gait Number of Times:: x 1      RECENT LABS/IMAGING    < from: CT Head No Cont (24 @ 10:17) >    ACC: [id number] EXAM:  CT BRAIN   ORDERED BY: KAEL MCCAIN     PROCEDURE DATE:  2024          INTERPRETATION:  History:  shunt insertion.    Description: A noncontrast head CT was performed.    Comparison is made to a prior CT from 2024.    The study is limited by motion and by streak artifacts.    Right frontal temporal craniectomy-cranioplasty postsurgical change is   < from: MR Head w/ CSF Flow, No Cont (24 @ 09:52) >    ACC: [id number] EXAM:  MR BRAIN W CSF FLOW   ORDERED BY: ALFIE MARTÍNEZ     PROCEDURE DATE:  2024          INTERPRETATION:  Clinical indication: Status post ETV.    MRI of the brain was performed sagittal T1 axial T1 and T2 T2 FLAIR   diffusion and susceptibility sequence. Sagittal T2 sequence was   performed. Coronal T2-weighted sequence performed. CSF flow study was   performed    This exam is compared with prior head CT performed on 2024 and   MRI of the brain performed on 2024    Extensive parenchymal volume loss out of proportion to the patient's age   is seen    Abnormal T2 prolongation in the periportal matter region is seen which   could be compatible with chronic microvessel ischemic changes especially   in thesetting of diabetes or hypertension. Other possibilities include   areas of infection inflammation ischemia or migraine headaches.    Artifact limits evaluation of the high right frontal region.   Encephalomalacia and gliosis involving the right frontal region is again   seen.    There is evidence of dilatation of the lateral third ventricle seen   though overall this dilatation has decreased when compared with the prior   brain MRI.    Encephalomalacia and gliosis involving the left cerebellar region is   again seen. High left frontal shunt catheter tract is again seen.    No acute territorial infarcts seen.    CSF flow study demonstrates flow seen through the third ventriculostomy   and basal cistern region. Decreased flow is seen through the cerebral   aqueduct and fourth ventricle region    IMPRESSION: Decrease in size of the lateral third ventricles are seen.    Flow is identified through the third ventriculostomy and basal cisternal   region though decreased flow seen through the cerebral aqueduct and   fourth ventricle region.    --- End of Report ---        < end of copied text >  again noted. Left suboccipital craniotomy changes are again visualized.   Bilateral clarissa hole changes are present. An extradural low density fluid   collection adjacent to the right frontal temporal cranioplasty is stable   in size. A postoperative cavity is again noted involving the left   cerebellum, unchanged.    A right frontal approach  shunt catheter remains with tip in the right   lateral ventricle. A left frontal surgical tract from the third   ventriculostomy procedure is noted. Postoperative intraventricular air   involves the right temporal horn. The dilated lateral and third   ventricles and transependymal flow of CSF appears similar compared to the   2024 head CT study.    Right frontal lobe hypodensity is unchanged.    There is no gross CT evidence for superimposed acute intracranial   hemorrhage or large vascular distribution ischemia.    IMPRESSION:    The dilated lateral and third ventricles and transependymal flow of CSF   appears similar compared to the 2024 head CT study.    --- End of Report ---          VANDA SORENSEN DO; Resident Radiologist  This document has beenelectronically signed.  MARSHALL MOHAMUD MD; Attending Radiologist  This document has been electronically signed. 2024     < end of copied text >        VITALS  T(C): 36.7 (24 @ 10:22), Max: 36.8 (24 @ 17:39)  HR: 96 (24 @ 10:22) (80 - 96)  BP: 118/42 (24 @ 10:22) (97/49 - 118/42)  RR: 16 (24 @ 10:22) (15 - 17)  SpO2: 96% (24 @ 10:22) (96% - 100%)  Wt(kg): --    ALLERGIES  No Known Allergies      MEDICATIONS   acetaminophen     Tablet .. 650 milliGRAM(s) Oral every 6 hours  bisacodyl 5 milliGRAM(s) Oral every 12 hours PRN  buPROPion XL (24-Hour) . 300 milliGRAM(s) Oral daily  enoxaparin Injectable 40 milliGRAM(s) SubCutaneous <User Schedule>  levETIRAcetam 500 milliGRAM(s) Oral two times a day  melatonin 3 milliGRAM(s) Oral at bedtime PRN  oxyCODONE    IR 2.5 milliGRAM(s) Oral every 6 hours PRN  polyethylene glycol 3350 17 Gram(s) Oral daily  QUEtiapine 12.5 milliGRAM(s) Oral at bedtime PRN  senna 2 Tablet(s) Oral at bedtime  sertraline 50 milliGRAM(s) Oral daily  sodium chloride 2 Gram(s) Oral daily      ----------------------------------------------------------------------------------------  PHYSICAL EXAM  Constitutional - NAD, Comfortable  HEENT - + staples   Chest - no respiratory distress  Cardiovascular - mild tachy  Abdomen - Soft, NTND  Extremities - No C/C/E, No calf tenderness   Neurologic Exam -                    Cognitive - Awake, Alert, AAO to self, reports she is in a library, reports date is 2024     Communication - Fluent      Cranial Nerves - CN 2-12 intact     Motor - moves all extremities at least 4/5     Sensory - Intact to LT      Balance - WNL Static  Psychiatric - Mood stable, Affect WNL  ----------------------------------------------------------------------------------------  ASSESSMENT/PLAN  58 YO female with PMHx of neurofibromatosis,  shunt (Strata programmed at 0.5 in [facility]), s/p traumatic fall with head trauma, no LOC, which took place back in 2024 and sustained a large acute right SDH, and she underwent an emergent right decompressive hemicraniectomy on  (bone discarded), s/p ICP monitor placement on , s/p right cranioplasty on  and subsequently discharged to rehab on . returned from acute rehab due to worsening mentation and physical effort at rehab, shunt reprogrammed and patient sent to [facility] for possible revision of shunt.   She underwent Left frontal Endoscopic third ventriculostomy and ligation of Right  shunt with ligaclips with retention of valve as rescue device and retention proximal and distal catheters. 2 incisions closed with staples on 24  MRI shows improvement, decreased ventricular size  continue bedside PT and OT  Pain - oxy ir prn  DVT PPX - lovenox  Rehab - recommend acute rehab when medically cleared. Patient can tolerate 3 hours per day of therapy with medical supervision.

## 2024-07-24 NOTE — PROGRESS NOTE ADULT - ASSESSMENT
58 YO female with Hx neurofibromatosis,  shunt fell in March sustaining a large acute right SDH, patient underwent a right decompressive hemicraniectomy on 5/22. now admitted w concern for acute encephalopathy     # Encephalopathy  - care per primary team  - now S/P ETV, ligation of shunt. Downgraded from SICU to floor. CTH/MR showed slight decr in vents       # h/o SIADH  - was discharged on salt tabs prior admission  - salt tabs   - trend na    # Psych  - on Wellbutrin and zoloft for MDD  - Wellbutrin can lower sz threshold - pt on keppra for sz ppx vs hx - consider neuro input    # DVT ppx   - per primary team

## 2024-07-24 NOTE — PROGRESS NOTE ADULT - ASSESSMENT
60 yo F with PMHx of neurofibromatosis, s/p L suboccipital craniectomy for resection of cerebellar brain tumor 12/2023 by Dr Brush, s/p  shunt (Strata programmed at 0.5 in [facility] recently), s/p traumatic fall with head trauma in 5/20/2024, at that time sustained a large acute right SDH, and underwent an emergent right decompressive hemicraniectomy on 5/22/24, s/p right cranioplasty on 6/17/24 with custom plate. VPS reprogrammed from 1.0 to 0.5 due to increased ventricles on CTH at [facility] rehab. Patient transferred to [facility] for possible  shunt revision as mentation did not improve after shunt reprogramming and ventricles are up on CTH.     7/18: Shunt tap met with resistance but able to pull 10cc off. CTH showed increase in ventricles. MRI with flow study completed for OR tomorrow, preop for ETV vs  shunt revision.  7/19: s/p Left frontal Endoscopic third ventriculostomy and ligation of Right  shunt with ligaclips with retention of valve as rescue device and retention proximal and distal catheters. 2 incisions closed with staples.  7/20: Stable POD #1 S/P ETV, ligation of shunt. Downgraded from SICU to floor. CTH showed slight decr in vents   7/21-23: stable, pending MRI w flow done.  7/24: Stable exam, MRI shows decreased ventricular size and flow through ETV. Rehab placement pending

## 2024-07-24 NOTE — PROGRESS NOTE ADULT - SUBJECTIVE AND OBJECTIVE BOX
No issues overnight  Vital Signs Last 24 Hrs  T(C): 36.6 (24 Jul 2024 01:15), Max: 36.8 (23 Jul 2024 17:39)  T(F): 97.9 (24 Jul 2024 01:15), Max: 98.2 (23 Jul 2024 17:39)  HR: 88 (24 Jul 2024 01:15) (88 - 92)  BP: 110/53 (24 Jul 2024 01:15) (97/49 - 116/57)  BP(mean): --  RR: 15 (24 Jul 2024 01:15) (15 - 18)  SpO2: 97% (24 Jul 2024 01:15) (94% - 99%)    Parameters below as of 24 Jul 2024 01:15  Patient On (Oxygen Delivery Method): room air    AAO X 2  PERRL with dysconjugate gaze  CN 2-12 otherwise intact  Bilateral UE 4/5, no drift  Bilateral LE antigravity  SILT    MEDICATIONS  (STANDING):  acetaminophen     Tablet .. 650 milliGRAM(s) Oral every 6 hours  buPROPion XL (24-Hour) . 300 milliGRAM(s) Oral daily  enoxaparin Injectable 40 milliGRAM(s) SubCutaneous <User Schedule>  levETIRAcetam 500 milliGRAM(s) Oral two times a day  polyethylene glycol 3350 17 Gram(s) Oral daily  senna 2 Tablet(s) Oral at bedtime  sertraline 50 milliGRAM(s) Oral daily  sodium chloride 2 Gram(s) Oral daily    MEDICATIONS  (PRN):  bisacodyl 5 milliGRAM(s) Oral every 12 hours PRN Constipation  melatonin 3 milliGRAM(s) Oral at bedtime PRN Insomnia  oxyCODONE    IR 2.5 milliGRAM(s) Oral every 6 hours PRN Moderate Pain (4 - 6)  QUEtiapine 12.5 milliGRAM(s) Oral at bedtime PRN insomnia                          11.8   4.36  )-----------( 132      ( 22 Jul 2024 05:13 )             36.4     07-22    134<L>  |  101  |  9   ----------------------------<  99  3.4<L>   |  23  |  0.39<L>    Ca    9.1      22 Jul 2024 05:13  Phos  3.1     07-22  Mg     1.80     07-22    < from: MR Head w/ CSF Flow, No Cont (07.23.24 @ 09:52) >  This exam is compared with prior head CT performed on July 19, 2024 and   MRI of the brain performed on July 18, 2024    Extensive parenchymal volume loss out of proportion to the patient's age   is seen    Abnormal T2 prolongation in the periportal matter region is seen which   could be compatible with chronic microvessel ischemic changes especially   in thesetting of diabetes or hypertension. Other possibilities include   areas of infection inflammation ischemia or migraine headaches.    Artifact limits evaluation of the high right frontal region.   Encephalomalacia and gliosis involving the right frontal region is again   seen.    There is evidence of dilatation of the lateral third ventricle seen   though overall this dilatation has decreased when compared with the prior   brain MRI.    Encephalomalacia and gliosis involving the left cerebellar region is   again seen. High left frontal shunt catheter tract is again seen.    No acute territorial infarcts seen.    CSF flow study demonstrates flow seen through the third ventriculostomy   and basal cistern region. Decreased flow is seen through the cerebral   aqueduct and fourth ventricle region    IMPRESSION: Decrease in size of the lateral third ventricles are seen.    Flow is identified through the third ventriculostomy and basal cisternal   region though decreased flow seen through the cerebral aqueduct and   fourth ventricle region.    < end of copied text >

## 2024-07-25 LAB
ANION GAP SERPL CALC-SCNC: 12 MMOL/L — SIGNIFICANT CHANGE UP (ref 7–14)
BUN SERPL-MCNC: 6 MG/DL — LOW (ref 7–23)
CALCIUM SERPL-MCNC: 9.5 MG/DL — SIGNIFICANT CHANGE UP (ref 8.4–10.5)
CHLORIDE SERPL-SCNC: 101 MMOL/L — SIGNIFICANT CHANGE UP (ref 98–107)
CO2 SERPL-SCNC: 22 MMOL/L — SIGNIFICANT CHANGE UP (ref 22–31)
CREAT SERPL-MCNC: 0.32 MG/DL — LOW (ref 0.5–1.3)
EGFR: 120 ML/MIN/1.73M2 — SIGNIFICANT CHANGE UP
GLUCOSE SERPL-MCNC: 103 MG/DL — HIGH (ref 70–99)
MAGNESIUM SERPL-MCNC: 1.9 MG/DL — SIGNIFICANT CHANGE UP (ref 1.6–2.6)
PHOSPHATE SERPL-MCNC: 3.5 MG/DL — SIGNIFICANT CHANGE UP (ref 2.5–4.5)
POTASSIUM SERPL-MCNC: 3.7 MMOL/L — SIGNIFICANT CHANGE UP (ref 3.5–5.3)
POTASSIUM SERPL-SCNC: 3.7 MMOL/L — SIGNIFICANT CHANGE UP (ref 3.5–5.3)
SODIUM SERPL-SCNC: 135 MMOL/L — SIGNIFICANT CHANGE UP (ref 135–145)

## 2024-07-25 PROCEDURE — 99232 SBSQ HOSP IP/OBS MODERATE 35: CPT

## 2024-07-25 RX ADMIN — ENOXAPARIN SODIUM 40 MILLIGRAM(S): 120 INJECTION SUBCUTANEOUS at 20:10

## 2024-07-25 RX ADMIN — LEVETIRACETAM 500 MILLIGRAM(S): 1000 TABLET, FILM COATED ORAL at 17:24

## 2024-07-25 RX ADMIN — Medication 650 MILLIGRAM(S): at 05:41

## 2024-07-25 RX ADMIN — LEVETIRACETAM 500 MILLIGRAM(S): 1000 TABLET, FILM COATED ORAL at 05:42

## 2024-07-25 RX ADMIN — Medication 17 GRAM(S): at 11:42

## 2024-07-25 RX ADMIN — Medication 650 MILLIGRAM(S): at 17:24

## 2024-07-25 RX ADMIN — SENNOSIDES 2 TABLET(S): 8.6 TABLET ORAL at 22:00

## 2024-07-25 RX ADMIN — Medication 650 MILLIGRAM(S): at 06:41

## 2024-07-25 RX ADMIN — Medication 300 MILLIGRAM(S): at 11:41

## 2024-07-25 RX ADMIN — SERTRALINE HYDROCHLORIDE 50 MILLIGRAM(S): 100 TABLET, FILM COATED ORAL at 11:42

## 2024-07-25 RX ADMIN — Medication 650 MILLIGRAM(S): at 11:41

## 2024-07-25 RX ADMIN — Medication 2 GRAM(S): at 11:41

## 2024-07-25 NOTE — PROGRESS NOTE ADULT - SUBJECTIVE AND OBJECTIVE BOX
Patient is a 59y old  Female who presents with a chief complaint of Rule out  shunt malfunction (25 Jul 2024 02:29)      SUBJECTIVE / OVERNIGHT EVENTS: no events     ROS:  all neg unless mentioned in hpi     MEDICATIONS  (STANDING):  acetaminophen     Tablet .. 650 milliGRAM(s) Oral every 6 hours  buPROPion XL (24-Hour) . 300 milliGRAM(s) Oral daily  enoxaparin Injectable 40 milliGRAM(s) SubCutaneous <User Schedule>  levETIRAcetam 500 milliGRAM(s) Oral two times a day  polyethylene glycol 3350 17 Gram(s) Oral daily  senna 2 Tablet(s) Oral at bedtime  sertraline 50 milliGRAM(s) Oral daily  sodium chloride 2 Gram(s) Oral daily    MEDICATIONS  (PRN):  bisacodyl 5 milliGRAM(s) Oral every 12 hours PRN Constipation  melatonin 3 milliGRAM(s) Oral at bedtime PRN Insomnia  oxyCODONE    IR 2.5 milliGRAM(s) Oral every 6 hours PRN Moderate Pain (4 - 6)  QUEtiapine 12.5 milliGRAM(s) Oral at bedtime PRN insomnia      T(C): 36.6 (07-25-24 @ 01:49)  HR: 84 (07-25-24 @ 01:49)  BP: 117/60 (07-25-24 @ 01:49)  RR: 20 (07-25-24 @ 01:49)  SpO2: 99% (07-25-24 @ 01:49)  CAPILLARY BLOOD GLUCOSE        I&O's Summary    24 Jul 2024 07:01  -  25 Jul 2024 07:00  --------------------------------------------------------  IN: 480 mL / OUT: 1 mL / NET: 479 mL        PHYSICAL EXAM:  GENERAL: NAD  CHEST/LUNG: Clear to auscultation bilaterally  HEART: Regular rate and rhythm; No murmurs, rubs, or gallops, No Edema  ABDOMEN: Soft, Nontender, Nondistended; Bowel sounds present  EXTREMITIES:  2+ Peripheral Pulses, No clubbing, cyanosis    LABS:    07-25    135  |  101  |  6<L>  ----------------------------<  103<H>  3.7   |  22  |  0.32<L>    Ca    9.5      25 Jul 2024 06:05  Phos  3.5     07-25  Mg     1.90     07-25            Urinalysis Basic - ( 25 Jul 2024 06:05 )    Color: x / Appearance: x / SG: x / pH: x  Gluc: 103 mg/dL / Ketone: x  / Bili: x / Urobili: x   Blood: x / Protein: x / Nitrite: x   Leuk Esterase: x / RBC: x / WBC x   Sq Epi: x / Non Sq Epi: x / Bacteria: x            RADIOLOGY & ADDITIONAL TESTS:    Imaging Personally Reviewed:    Consultant(s) Notes Reviewed:      Care Discussed with Consultants/Other Providers:

## 2024-07-25 NOTE — DIETITIAN INITIAL EVALUATION ADULT - NS FNS WEIGHT CHANGE REASON
Patient unable to provide weight related history. Per previous RD note on 5/24/24- 55kg/121.3lbs recorded./unintentional

## 2024-07-25 NOTE — DIETITIAN INITIAL EVALUATION ADULT - REASON FOR ADMISSION
Hydrocephalus    60 y/o female with medical history of neurofibromatosis,  shunt (Strata programmed at 0.5 in [facility]), s/p traumatic fall with head trauma, no LOC, which took place back in March 2024 and sustained a large acute right SDH, and she underwent an emergent right decompressive hemicraniectomy on 5/22 (bone discarded), s/p ICP monitor placement on 5/22, s/p right cranioplasty on 6/17 and subsequently discharged to rehab on 6/28. Shunt was at 1.0 at that time. Patient sent from rehab facility to [facility] due to worsening mentation and physical effort at rehab, shunt reprogrammed from 1.0 to 0.5 due to increased ventricles on CTH, patient treated for UTI, patient subsequently transferred to [facility] for possible  shunt revision as mentation did not improve after shunt reprogramming.

## 2024-07-25 NOTE — PROGRESS NOTE ADULT - SUBJECTIVE AND OBJECTIVE BOX
Patient is a 59y old  Female who presents with a chief complaint of Rule out  shunt malfunction (2024 10:42)      HPI:  60 YO female with PMHx of neurofibromatosis,  shunt (Strata programmed at 0.5 in [facility]), s/p traumatic fall with head trauma, no LOC, which took place back in 2024 and sustained a large acute right SDH, and she underwent an emergent right decompressive hemicraniectomy on  (bone discarded), s/p ICP monitor placement on , s/p right cranioplasty on  and subsequently discharged to rehab on . Shunt was at 1.0 at that time. Patient sent from rehab facility to [facility] due to worsening mentation and physical effort at rehab, shunt reprogrammed from 1.0 to 0.5 due to increased ventricles on CTH, patient treated for UTI, patient subsequently transferred to [facility] for possible  shunt revision as mentation did not improve after shunt reprogramming.  (2024 05:59)      REVIEW OF SYSTEMS  denies complaint    PAST MEDICAL & SURGICAL HISTORY  Asthma    Depression    Neoplasm of uncertain behavior of brain    Neurofibromatosis, type 1    Neurofibromatosis    History of hydrocephalus    Anxiety    Delivery with history of     S/P tubal ligation    History of arthroscopy of left knee    S/P LASIK Surgery    H/O brain tumor    S/P  shunt        CURRENT FUNCTIONAL STATUS       Bed Mobility  Bed Mobility Training Rehab Potential: good, to achieve stated therapy goals  Bed Mobility Training Symptoms Noted During/After Treatment: none  Bed Mobility Training Sit-to-Supine: minimum assist (75% patient effort);  1 person assist;  verbal cues  Bed Mobility Training Supine-to-Sit: minimum assist (75% patient effort);  1 person assist;  verbal cues  Bed Mobility Training Limitations: impaired ability to control trunk for mobility;  decreased ability to use legs for bridging/pushing;  decreased strength;  impaired balance;  impaired motor control    Sit-Stand Transfer Training  Sit-to-Stand Transfer Training Rehab Potential: good, to achieve stated therapy goals  Sit-to-Stand Transfer Training Symptoms Noted During/After Treatment: none  Transfer Training Sit-to-Stand Transfer: minimum assist (75% patient effort);  2 person assist;  verbal cues;  weight-bearing as tolerated   rolling walker  Transfer Training Stand-to-Sit Transfer: minimum assist (75% patient effort);  2 person assist;  verbal cues;  weight-bearing as tolerated   rolling walker  Sit-to-Stand Transfer Training Transfer Safety Analysis: decreased balance;  decreased weight-shifting ability;  decreased strength;  impaired balance;  impaired motor control;  rolling walker    Gait Training  Gait Training Rehab Potential: good, to achieve stated therapy goals  Gait Training Symptoms Noted During/After Treatment: none  Gait Training: minimum assist (75% patient effort);  2 person assist;  verbal cues;  weight-bearing as tolerated   rolling walker;  4 steps fwd/ bwd, 2 lateral steps  Gait Analysis: 3-point gait   decreased anita;  decreased step length;  decreased weight-shifting ability;  shuffling;  decreased strength;  impaired balance;  impaired motor control;  4 steps fwd/ bwd, 2 lateral steps;  rolling walker  Gait Number of Times:: x 1       RECENT LABS/IMAGING        135  |  101  |  6<L>  ----------------------------<  103<H>  3.7   |  22  |  0.32<L>    Ca    9.5      2024 06:05  Phos  3.5       Mg     1.90           Urinalysis Basic - ( 2024 06:05 )    Color: x / Appearance: x / SG: x / pH: x  Gluc: 103 mg/dL / Ketone: x  / Bili: x / Urobili: x   Blood: x / Protein: x / Nitrite: x   Leuk Esterase: x / RBC: x / WBC x   Sq Epi: x / Non Sq Epi: x / Bacteria: x        VITALS  T(C): 37.2 (24 @ 10:28), Max: 37.2 (24 @ 10:28)  HR: 88 (24 @ 10:28) (84 - 113)  BP: 109/60 (24 @ 10:28) (107/74 - 117/60)  RR: 18 (24 @ 10:28) (18 - 20)  SpO2: 99% (24 @ 10:28) (99% - 99%)  Wt(kg): --    ALLERGIES  No Known Allergies      MEDICATIONS   acetaminophen     Tablet .. 650 milliGRAM(s) Oral every 6 hours  bisacodyl 5 milliGRAM(s) Oral every 12 hours PRN  buPROPion XL (24-Hour) . 300 milliGRAM(s) Oral daily  enoxaparin Injectable 40 milliGRAM(s) SubCutaneous <User Schedule>  levETIRAcetam 500 milliGRAM(s) Oral two times a day  melatonin 3 milliGRAM(s) Oral at bedtime PRN  oxyCODONE    IR 2.5 milliGRAM(s) Oral every 6 hours PRN  polyethylene glycol 3350 17 Gram(s) Oral daily  QUEtiapine 12.5 milliGRAM(s) Oral at bedtime PRN  senna 2 Tablet(s) Oral at bedtime  sertraline 50 milliGRAM(s) Oral daily  sodium chloride 2 Gram(s) Oral daily      ----------------------------------------------------------------------------------------  PHYSICAL EXAM  Constitutional - NAD, Comfortable  HEENT - + staples   Chest - no respiratory distress  Cardiovascular -RRR  Abdomen - Soft, NTND  Extremities - No C/C/E, No calf tenderness   Neurologic Exam -                    Cognitive - Awake, Alert, AAO x 3      Communication - Fluent      Cranial Nerves - CN 2-12 intact     Motor - moves all extremities at least 4/5     Sensory - Intact to LT      Balance - WNL Static  Psychiatric - Mood stable, Affect WNL  ----------------------------------------------------------------------------------------  ASSESSMENT/PLAN  60 YO female with PMHx of neurofibromatosis,  shunt (Strata programmed at 0.5 in [facility]), s/p traumatic fall with head trauma, no LOC, which took place back in 2024 and sustained a large acute right SDH, and she underwent an emergent right decompressive hemicraniectomy on  (bone discarded), s/p ICP monitor placement on , s/p right cranioplasty on  and subsequently discharged to rehab on . returned from acute rehab due to worsening mentation and physical effort at rehab, shunt reprogrammed and patient sent to [facility] for possible revision of shunt.   She underwent Left frontal Endoscopic third ventriculostomy and ligation of Right  shunt with ligaclips with retention of valve as rescue device and retention proximal and distal catheters. 2 incisions closed with staples on 24  MRI shows improvement, decreased ventricular size  continue bedside PT and OT  Pain - oxy ir prn  DVT PPX - lovenox  Rehab - recommend acute rehab when medically cleared. Patient can tolerate 3 hours per day of therapy with medical supervision.

## 2024-07-25 NOTE — DIETITIAN INITIAL EVALUATION ADULT - DIET TYPE
Recommend liberalizing diet to Regular diet and d/c DASH/TLC (cholesterol and sodium restricted)./regular

## 2024-07-25 NOTE — PROGRESS NOTE ADULT - SUBJECTIVE AND OBJECTIVE BOX
No issues overnight  Vital Signs Last 24 Hrs  T(C): 36.6 (25 Jul 2024 01:49), Max: 36.9 (24 Jul 2024 18:17)  T(F): 97.8 (25 Jul 2024 01:49), Max: 98.4 (24 Jul 2024 18:17)  HR: 84 (25 Jul 2024 01:49) (80 - 113)  BP: 117/60 (25 Jul 2024 01:49) (107/74 - 118/72)  BP(mean): --  RR: 20 (25 Jul 2024 01:49) (16 - 20)  SpO2: 99% (25 Jul 2024 01:49) (96% - 100%)    Parameters below as of 25 Jul 2024 01:49  Patient On (Oxygen Delivery Method): room air    AAO X 2  PERRL with dysconjugate gaze  CN 2-12 otherwise intact  Bilateral UE 4/5, no drift  Bilateral LE antigravity  SILT    MEDICATIONS  (STANDING):  acetaminophen     Tablet .. 650 milliGRAM(s) Oral every 6 hours  buPROPion XL (24-Hour) . 300 milliGRAM(s) Oral daily  enoxaparin Injectable 40 milliGRAM(s) SubCutaneous <User Schedule>  levETIRAcetam 500 milliGRAM(s) Oral two times a day  polyethylene glycol 3350 17 Gram(s) Oral daily  senna 2 Tablet(s) Oral at bedtime  sertraline 50 milliGRAM(s) Oral daily  sodium chloride 2 Gram(s) Oral daily    MEDICATIONS  (PRN):  bisacodyl 5 milliGRAM(s) Oral every 12 hours PRN Constipation  melatonin 3 milliGRAM(s) Oral at bedtime PRN Insomnia  oxyCODONE    IR 2.5 milliGRAM(s) Oral every 6 hours PRN Moderate Pain (4 - 6)  QUEtiapine 12.5 milliGRAM(s) Oral at bedtime PRN insomnia

## 2024-07-25 NOTE — DIETITIAN INITIAL EVALUATION ADULT - OTHER INFO
Met with patient at bedside. Patient w/ AMS unable to provide nutrition related history but states she is doing well and eating good. Per nursing flowsheets, % po intake mostly recorded. No nausea/vomiting/diarrhea/constipation or difficulty chewing and swallowing reported. +Fecal incontinence. Patient with overt physical sign of muscle wasting and fat loss, would consider addition of Ensure Plus HP for added calories and protein.

## 2024-07-25 NOTE — DIETITIAN INITIAL EVALUATION ADULT - ORAL NUTRITION SUPPLEMENTS
Recommend add Ensure Plus High Protein (provides 350 kcal, 20 gm protein per 8 oz serving) x 2 per day for added calories and protein.

## 2024-07-25 NOTE — DIETITIAN INITIAL EVALUATION ADULT - PERTINENT LABORATORY DATA
07-25    135  |  101  |  6<L>  ----------------------------<  103<H>  3.7   |  22  |  0.32<L>    Ca    9.5      25 Jul 2024 06:05  Phos  3.5     07-25  Mg     1.90     07-25

## 2024-07-25 NOTE — DIETITIAN INITIAL EVALUATION ADULT - PERTINENT MEDS FT
MEDICATIONS  (STANDING):  acetaminophen     Tablet .. 650 milliGRAM(s) Oral every 6 hours  buPROPion XL (24-Hour) . 300 milliGRAM(s) Oral daily  enoxaparin Injectable 40 milliGRAM(s) SubCutaneous <User Schedule>  levETIRAcetam 500 milliGRAM(s) Oral two times a day  polyethylene glycol 3350 17 Gram(s) Oral daily  senna 2 Tablet(s) Oral at bedtime  sertraline 50 milliGRAM(s) Oral daily  sodium chloride 2 Gram(s) Oral daily    MEDICATIONS  (PRN):  bisacodyl 5 milliGRAM(s) Oral every 12 hours PRN Constipation  melatonin 3 milliGRAM(s) Oral at bedtime PRN Insomnia  oxyCODONE    IR 2.5 milliGRAM(s) Oral every 6 hours PRN Moderate Pain (4 - 6)  QUEtiapine 12.5 milliGRAM(s) Oral at bedtime PRN insomnia

## 2024-07-25 NOTE — PROGRESS NOTE ADULT - ASSESSMENT
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 VPS reprogrammed from 1.0 to 0.5 due to increased ventricles on CTH at [facility] rehab. Patient transferred to [facility] for possible  shunt revision as mentation did not improve after shunt reprogramming and ventricles are up on CTH.     7/18: Shunt tap met with resistance but able to pull 10cc off. CTH showed increase in ventricles. MRI with flow study completed for OR tomorrow, preop for ETV vs  shunt revision.  7/19: s/p Left frontal Endoscopic third ventriculostomy and ligation of Right  shunt with ligaclips with retention of valve as rescue device and retention proximal and distal catheters. 2 incisions closed with staples.  7/20: Stable POD #1 S/P ETV, ligation of shunt. Downgraded from SICU to floor. CTH showed slight decr in vents   7/21-23: stable, pending MRI w flow done.  7/24-25: Stable exam, MRI shows decreased ventricular size and flow through ETV. Rehab placement pending

## 2024-07-26 PROCEDURE — 99232 SBSQ HOSP IP/OBS MODERATE 35: CPT

## 2024-07-26 RX ADMIN — Medication 300 MILLIGRAM(S): at 13:32

## 2024-07-26 RX ADMIN — Medication 650 MILLIGRAM(S): at 23:36

## 2024-07-26 RX ADMIN — Medication 650 MILLIGRAM(S): at 18:01

## 2024-07-26 RX ADMIN — LEVETIRACETAM 500 MILLIGRAM(S): 1000 TABLET, FILM COATED ORAL at 18:00

## 2024-07-26 RX ADMIN — Medication 650 MILLIGRAM(S): at 14:30

## 2024-07-26 RX ADMIN — Medication 3 MILLIGRAM(S): at 23:36

## 2024-07-26 RX ADMIN — Medication 650 MILLIGRAM(S): at 13:32

## 2024-07-26 RX ADMIN — SERTRALINE HYDROCHLORIDE 50 MILLIGRAM(S): 100 TABLET, FILM COATED ORAL at 13:32

## 2024-07-26 RX ADMIN — LEVETIRACETAM 500 MILLIGRAM(S): 1000 TABLET, FILM COATED ORAL at 05:26

## 2024-07-26 RX ADMIN — Medication 12.5 MILLIGRAM(S): at 23:36

## 2024-07-26 RX ADMIN — Medication 2 GRAM(S): at 13:32

## 2024-07-26 RX ADMIN — SENNOSIDES 2 TABLET(S): 8.6 TABLET ORAL at 21:18

## 2024-07-26 RX ADMIN — ENOXAPARIN SODIUM 40 MILLIGRAM(S): 120 INJECTION SUBCUTANEOUS at 21:17

## 2024-07-26 NOTE — PROGRESS NOTE ADULT - SUBJECTIVE AND OBJECTIVE BOX
No issues overnight  Vital Signs Last 24 Hrs  T(C): 36.7 (26 Jul 2024 01:44), Max: 37.2 (25 Jul 2024 10:28)  T(F): 98 (26 Jul 2024 01:44), Max: 98.9 (25 Jul 2024 10:28)  HR: 97 (26 Jul 2024 01:44) (84 - 97)  BP: 107/60 (26 Jul 2024 01:44) (107/60 - 117/60)  BP(mean): --  RR: 17 (26 Jul 2024 01:44) (17 - 20)  SpO2: 97% (26 Jul 2024 01:44) (97% - 99%)    Parameters below as of 26 Jul 2024 01:44  Patient On (Oxygen Delivery Method): room air    AAO X 2  PERRL with dysconjugate gaze  CN 2-12 otherwise intact  Bilateral UE 4+/5, no drift  Bilateral LE 5/5  SILT    MEDICATIONS  (STANDING):  acetaminophen     Tablet .. 650 milliGRAM(s) Oral every 6 hours  buPROPion XL (24-Hour) . 300 milliGRAM(s) Oral daily  enoxaparin Injectable 40 milliGRAM(s) SubCutaneous <User Schedule>  levETIRAcetam 500 milliGRAM(s) Oral two times a day  polyethylene glycol 3350 17 Gram(s) Oral daily  senna 2 Tablet(s) Oral at bedtime  sertraline 50 milliGRAM(s) Oral daily  sodium chloride 2 Gram(s) Oral daily    MEDICATIONS  (PRN):  bisacodyl 5 milliGRAM(s) Oral every 12 hours PRN Constipation  melatonin 3 milliGRAM(s) Oral at bedtime PRN Insomnia  oxyCODONE    IR 2.5 milliGRAM(s) Oral every 6 hours PRN Moderate Pain (4 - 6)  QUEtiapine 12.5 milliGRAM(s) Oral at bedtime PRN insomnia    07-25    135  |  101  |  6<L>  ----------------------------<  103<H>  3.7   |  22  |  0.32<L>    Ca    9.5      25 Jul 2024 06:05  Phos  3.5     07-25  Mg     1.90     07-25

## 2024-07-26 NOTE — PROGRESS NOTE ADULT - SUBJECTIVE AND OBJECTIVE BOX
Patient is a 59y old  Female who presents with a chief complaint of Rule out  shunt malfunction (26 Jul 2024 01:46)      SUBJECTIVE / OVERNIGHT EVENTS: no events     ROS:  all neg unless mentioned in hpi     MEDICATIONS  (STANDING):  acetaminophen     Tablet .. 650 milliGRAM(s) Oral every 6 hours  buPROPion XL (24-Hour) . 300 milliGRAM(s) Oral daily  enoxaparin Injectable 40 milliGRAM(s) SubCutaneous <User Schedule>  levETIRAcetam 500 milliGRAM(s) Oral two times a day  polyethylene glycol 3350 17 Gram(s) Oral daily  senna 2 Tablet(s) Oral at bedtime  sertraline 50 milliGRAM(s) Oral daily  sodium chloride 2 Gram(s) Oral daily    MEDICATIONS  (PRN):  bisacodyl 5 milliGRAM(s) Oral every 12 hours PRN Constipation  melatonin 3 milliGRAM(s) Oral at bedtime PRN Insomnia  oxyCODONE    IR 2.5 milliGRAM(s) Oral every 6 hours PRN Moderate Pain (4 - 6)  QUEtiapine 12.5 milliGRAM(s) Oral at bedtime PRN insomnia      T(C): 36.5 (07-26-24 @ 09:08)  HR: 100 (07-26-24 @ 09:08)  BP: 113/55 (07-26-24 @ 09:08)  RR: 17 (07-26-24 @ 09:08)  SpO2: 99% (07-26-24 @ 09:08)  CAPILLARY BLOOD GLUCOSE        I&O's Summary    25 Jul 2024 07:01  -  26 Jul 2024 07:00  --------------------------------------------------------  IN: 740 mL / OUT: 0 mL / NET: 740 mL    26 Jul 2024 07:01  -  26 Jul 2024 09:45  --------------------------------------------------------  IN: 120 mL / OUT: 0 mL / NET: 120 mL        PHYSICAL EXAM:  GENERAL: NAD  CHEST/LUNG: Clear to auscultation bilaterally  HEART: Regular rate and rhythm; No murmurs, rubs, or gallops, No Edema  ABDOMEN: Soft, Nontender, Nondistended; Bowel sounds present  EXTREMITIES:  2+ Peripheral Pulses, No clubbing, cyanosis    LABS:    07-25    135  |  101  |  6<L>  ----------------------------<  103<H>  3.7   |  22  |  0.32<L>    Ca    9.5      25 Jul 2024 06:05  Phos  3.5     07-25  Mg     1.90     07-25            Urinalysis Basic - ( 25 Jul 2024 06:05 )    Color: x / Appearance: x / SG: x / pH: x  Gluc: 103 mg/dL / Ketone: x  / Bili: x / Urobili: x   Blood: x / Protein: x / Nitrite: x   Leuk Esterase: x / RBC: x / WBC x   Sq Epi: x / Non Sq Epi: x / Bacteria: x            RADIOLOGY & ADDITIONAL TESTS:    Imaging Personally Reviewed:    Consultant(s) Notes Reviewed:      Care Discussed with Consultants/Other Providers:

## 2024-07-26 NOTE — PROGRESS NOTE ADULT - ASSESSMENT
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 VPS reprogrammed from 1.0 to 0.5 due to increased ventricles on CTH at [facility] rehab. Patient transferred to [facility] for possible  shunt revision as mentation did not improve after shunt reprogramming and ventricles are up on CTH.     7/18: Shunt tap met with resistance but able to pull 10cc off. CTH showed increase in ventricles. MRI with flow study completed for OR tomorrow, preop for ETV vs  shunt revision.  7/19: s/p Left frontal Endoscopic third ventriculostomy and ligation of Right  shunt with ligaclips with retention of valve as rescue device and retention proximal and distal catheters. 2 incisions closed with staples.  7/20: Stable POD #1 S/P ETV, ligation of shunt. Downgraded from SICU to floor. CTH showed slight decr in vents   7/21-23: stable, pending MRI w flow done.  7/24-26: Stable exam, MRI shows decreased ventricular size and flow through ETV. Rehab placement pending

## 2024-07-27 LAB
ANION GAP SERPL CALC-SCNC: 12 MMOL/L — SIGNIFICANT CHANGE UP (ref 7–14)
BUN SERPL-MCNC: 8 MG/DL — SIGNIFICANT CHANGE UP (ref 7–23)
CALCIUM SERPL-MCNC: 9.5 MG/DL — SIGNIFICANT CHANGE UP (ref 8.4–10.5)
CHLORIDE SERPL-SCNC: 98 MMOL/L — SIGNIFICANT CHANGE UP (ref 98–107)
CO2 SERPL-SCNC: 23 MMOL/L — SIGNIFICANT CHANGE UP (ref 22–31)
CREAT SERPL-MCNC: 0.45 MG/DL — LOW (ref 0.5–1.3)
EGFR: 111 ML/MIN/1.73M2 — SIGNIFICANT CHANGE UP
GLUCOSE SERPL-MCNC: 104 MG/DL — HIGH (ref 70–99)
MAGNESIUM SERPL-MCNC: 2 MG/DL — SIGNIFICANT CHANGE UP (ref 1.6–2.6)
PHOSPHATE SERPL-MCNC: 4.3 MG/DL — SIGNIFICANT CHANGE UP (ref 2.5–4.5)
POTASSIUM SERPL-MCNC: 3.5 MMOL/L — SIGNIFICANT CHANGE UP (ref 3.5–5.3)
POTASSIUM SERPL-SCNC: 3.5 MMOL/L — SIGNIFICANT CHANGE UP (ref 3.5–5.3)
SODIUM SERPL-SCNC: 133 MMOL/L — LOW (ref 135–145)

## 2024-07-27 PROCEDURE — 99232 SBSQ HOSP IP/OBS MODERATE 35: CPT

## 2024-07-27 RX ADMIN — SERTRALINE HYDROCHLORIDE 50 MILLIGRAM(S): 100 TABLET, FILM COATED ORAL at 11:55

## 2024-07-27 RX ADMIN — Medication 17 GRAM(S): at 11:55

## 2024-07-27 RX ADMIN — Medication 650 MILLIGRAM(S): at 07:35

## 2024-07-27 RX ADMIN — Medication 650 MILLIGRAM(S): at 19:00

## 2024-07-27 RX ADMIN — Medication 650 MILLIGRAM(S): at 00:36

## 2024-07-27 RX ADMIN — Medication 650 MILLIGRAM(S): at 06:35

## 2024-07-27 RX ADMIN — Medication 650 MILLIGRAM(S): at 11:55

## 2024-07-27 RX ADMIN — LEVETIRACETAM 500 MILLIGRAM(S): 1000 TABLET, FILM COATED ORAL at 17:50

## 2024-07-27 RX ADMIN — Medication 2 GRAM(S): at 11:55

## 2024-07-27 RX ADMIN — Medication 650 MILLIGRAM(S): at 17:50

## 2024-07-27 RX ADMIN — ENOXAPARIN SODIUM 40 MILLIGRAM(S): 120 INJECTION SUBCUTANEOUS at 19:59

## 2024-07-27 RX ADMIN — Medication 300 MILLIGRAM(S): at 11:55

## 2024-07-27 RX ADMIN — LEVETIRACETAM 500 MILLIGRAM(S): 1000 TABLET, FILM COATED ORAL at 06:35

## 2024-07-27 NOTE — PROGRESS NOTE ADULT - SUBJECTIVE AND OBJECTIVE BOX
Patient is a 59y old  Female who presents with a chief complaint of Rule out  shunt malfunction (27 Jul 2024 02:27)      SUBJECTIVE / OVERNIGHT EVENTS: no events     ROS:  all neg unless mentioned in hpi     MEDICATIONS  (STANDING):  acetaminophen     Tablet .. 650 milliGRAM(s) Oral every 6 hours  buPROPion XL (24-Hour) . 300 milliGRAM(s) Oral daily  enoxaparin Injectable 40 milliGRAM(s) SubCutaneous <User Schedule>  levETIRAcetam 500 milliGRAM(s) Oral two times a day  polyethylene glycol 3350 17 Gram(s) Oral daily  senna 2 Tablet(s) Oral at bedtime  sertraline 50 milliGRAM(s) Oral daily  sodium chloride 2 Gram(s) Oral daily    MEDICATIONS  (PRN):  bisacodyl 5 milliGRAM(s) Oral every 12 hours PRN Constipation  melatonin 3 milliGRAM(s) Oral at bedtime PRN Insomnia  QUEtiapine 12.5 milliGRAM(s) Oral at bedtime PRN insomnia      T(C): 36.8 (07-27-24 @ 06:30)  HR: 77 (07-27-24 @ 06:30)  BP: 125/59 (07-27-24 @ 06:30)  RR: 18 (07-27-24 @ 06:30)  SpO2: 99% (07-27-24 @ 06:30)  CAPILLARY BLOOD GLUCOSE        I&O's Summary    26 Jul 2024 07:01  -  27 Jul 2024 07:00  --------------------------------------------------------  IN: 820 mL / OUT: 0 mL / NET: 820 mL        PHYSICAL EXAM:  GENERAL: NAD  CHEST/LUNG: Clear to auscultation bilaterally  HEART: Regular rate and rhythm; No murmurs, rubs, or gallops, No Edema  ABDOMEN: Soft, Nontender, Nondistended; Bowel sounds present  EXTREMITIES:  2+ Peripheral Pulses, No clubbing, cyanosis      LABS:    07-27    133<L>  |  98  |  8   ----------------------------<  104<H>  3.5   |  23  |  0.45<L>    Ca    9.5      27 Jul 2024 05:32  Phos  4.3     07-27  Mg     2.00     07-27            Urinalysis Basic - ( 27 Jul 2024 05:32 )    Color: x / Appearance: x / SG: x / pH: x  Gluc: 104 mg/dL / Ketone: x  / Bili: x / Urobili: x   Blood: x / Protein: x / Nitrite: x   Leuk Esterase: x / RBC: x / WBC x   Sq Epi: x / Non Sq Epi: x / Bacteria: x            RADIOLOGY & ADDITIONAL TESTS:    Imaging Personally Reviewed:    Consultant(s) Notes Reviewed:      Care Discussed with Consultants/Other Providers:

## 2024-07-27 NOTE — PROGRESS NOTE ADULT - ASSESSMENT
60 yo F with PMHx of neurofibromatosis, s/p L suboccipital craniectomy for resection of cerebellar brain tumor 12/2023 by Dr rBush, s/p  shunt (Strata programmed at 0.5 in [facility] recently), s/p traumatic fall with head trauma in 5/20/2024, at that time sustained a large acute right SDH, and underwent an emergent right decompressive hemicraniectomy on 5/22/24, s/p right cranioplasty on 6/17/24 with custom plate. VPS reprogrammed from 1.0 to 0.5 due to increased ventricles on CTH at [facility] rehab. Patient transferred to [facility] for possible  shunt revision as mentation did not improve after shunt reprogramming and ventricles are up on CTH.     7/18: Shunt tap met with resistance but able to pull 10cc off. CTH showed increase in ventricles. MRI with flow study completed for OR tomorrow, preop for ETV vs  shunt revision.  7/19: s/p Left frontal Endoscopic third ventriculostomy and ligation of Right  shunt with ligaclips with retention of valve as rescue device and retention proximal and distal catheters. 2 incisions closed with staples.  7/20: Stable POD #1 S/P ETV, ligation of shunt. Downgraded from SICU to floor. CTH showed slight decr in vents   7/21-23: stable, pending MRI w flow done.  7/24-26: Stable exam, MRI shows decreased ventricular size and flow through ETV. Rehab placement pending

## 2024-07-27 NOTE — PROGRESS NOTE ADULT - SUBJECTIVE AND OBJECTIVE BOX
No issues overnight  Vital Signs Last 24 Hrs  T(C): 36.7 (27 Jul 2024 01:40), Max: 37.1 (26 Jul 2024 17:47)  T(F): 98.1 (27 Jul 2024 01:40), Max: 98.7 (26 Jul 2024 17:47)  HR: 89 (27 Jul 2024 01:40) (89 - 100)  BP: 114/62 (27 Jul 2024 01:40) (104/81 - 120/62)  BP(mean): --  RR: 20 (27 Jul 2024 01:40) (17 - 20)  SpO2: 98% (27 Jul 2024 01:40) (97% - 100%)    Parameters below as of 27 Jul 2024 01:40  Patient On (Oxygen Delivery Method): room air    AAO X 2  PERRL with dysconjugate gaze  CN 2-12 otherwise intact  Bilateral UE 4+/5, no drift  Bilateral LE 5/5  SILT    MEDICATIONS  (STANDING):  acetaminophen     Tablet .. 650 milliGRAM(s) Oral every 6 hours  buPROPion XL (24-Hour) . 300 milliGRAM(s) Oral daily  enoxaparin Injectable 40 milliGRAM(s) SubCutaneous <User Schedule>  levETIRAcetam 500 milliGRAM(s) Oral two times a day  polyethylene glycol 3350 17 Gram(s) Oral daily  senna 2 Tablet(s) Oral at bedtime  sertraline 50 milliGRAM(s) Oral daily  sodium chloride 2 Gram(s) Oral daily    MEDICATIONS  (PRN):  bisacodyl 5 milliGRAM(s) Oral every 12 hours PRN Constipation  melatonin 3 milliGRAM(s) Oral at bedtime PRN Insomnia  QUEtiapine 12.5 milliGRAM(s) Oral at bedtime PRN insomnia

## 2024-07-28 LAB
ANION GAP SERPL CALC-SCNC: 13 MMOL/L — SIGNIFICANT CHANGE UP (ref 7–14)
BUN SERPL-MCNC: 9 MG/DL — SIGNIFICANT CHANGE UP (ref 7–23)
CALCIUM SERPL-MCNC: 9.3 MG/DL — SIGNIFICANT CHANGE UP (ref 8.4–10.5)
CHLORIDE SERPL-SCNC: 99 MMOL/L — SIGNIFICANT CHANGE UP (ref 98–107)
CO2 SERPL-SCNC: 24 MMOL/L — SIGNIFICANT CHANGE UP (ref 22–31)
CREAT SERPL-MCNC: 0.4 MG/DL — LOW (ref 0.5–1.3)
EGFR: 114 ML/MIN/1.73M2 — SIGNIFICANT CHANGE UP
GLUCOSE SERPL-MCNC: 94 MG/DL — SIGNIFICANT CHANGE UP (ref 70–99)
MAGNESIUM SERPL-MCNC: 2 MG/DL — SIGNIFICANT CHANGE UP (ref 1.6–2.6)
PHOSPHATE SERPL-MCNC: 3.8 MG/DL — SIGNIFICANT CHANGE UP (ref 2.5–4.5)
POTASSIUM SERPL-MCNC: 3.8 MMOL/L — SIGNIFICANT CHANGE UP (ref 3.5–5.3)
POTASSIUM SERPL-SCNC: 3.8 MMOL/L — SIGNIFICANT CHANGE UP (ref 3.5–5.3)
SODIUM SERPL-SCNC: 136 MMOL/L — SIGNIFICANT CHANGE UP (ref 135–145)

## 2024-07-28 PROCEDURE — 99232 SBSQ HOSP IP/OBS MODERATE 35: CPT

## 2024-07-28 RX ADMIN — Medication 650 MILLIGRAM(S): at 07:18

## 2024-07-28 RX ADMIN — SERTRALINE HYDROCHLORIDE 50 MILLIGRAM(S): 100 TABLET, FILM COATED ORAL at 12:58

## 2024-07-28 RX ADMIN — Medication 650 MILLIGRAM(S): at 17:54

## 2024-07-28 RX ADMIN — Medication 300 MILLIGRAM(S): at 12:58

## 2024-07-28 RX ADMIN — SENNOSIDES 2 TABLET(S): 8.6 TABLET ORAL at 21:12

## 2024-07-28 RX ADMIN — ENOXAPARIN SODIUM 40 MILLIGRAM(S): 120 INJECTION SUBCUTANEOUS at 20:06

## 2024-07-28 RX ADMIN — Medication 2 GRAM(S): at 12:58

## 2024-07-28 RX ADMIN — LEVETIRACETAM 500 MILLIGRAM(S): 1000 TABLET, FILM COATED ORAL at 06:22

## 2024-07-28 RX ADMIN — Medication 3 MILLIGRAM(S): at 21:13

## 2024-07-28 RX ADMIN — Medication 650 MILLIGRAM(S): at 06:22

## 2024-07-28 RX ADMIN — Medication 650 MILLIGRAM(S): at 12:58

## 2024-07-28 RX ADMIN — Medication 17 GRAM(S): at 12:59

## 2024-07-28 RX ADMIN — LEVETIRACETAM 500 MILLIGRAM(S): 1000 TABLET, FILM COATED ORAL at 17:54

## 2024-07-28 NOTE — PROGRESS NOTE ADULT - SUBJECTIVE AND OBJECTIVE BOX
PAST 24HR EVENTS: No acute complaints. Pending rehab    Vital Signs Last 24 Hrs  T(C): 36.9 (27 Jul 2024 21:37), Max: 36.9 (27 Jul 2024 13:24)  T(F): 98.4 (27 Jul 2024 21:37), Max: 98.5 (27 Jul 2024 13:24)  HR: 92 (27 Jul 2024 21:37) (70 - 94)  BP: 105/54 (27 Jul 2024 21:37) (105/54 - 125/59)  BP(mean): --  RR: 17 (27 Jul 2024 21:37) (16 - 20)  SpO2: 97% (27 Jul 2024 21:37) (97% - 99%)    Parameters below as of 27 Jul 2024 21:37  Patient On (Oxygen Delivery Method): room air        MEDS:   acetaminophen     Tablet .. 650 milliGRAM(s) Oral every 6 hours  bisacodyl 5 milliGRAM(s) Oral every 12 hours PRN  buPROPion XL (24-Hour) . 300 milliGRAM(s) Oral daily  enoxaparin Injectable 40 milliGRAM(s) SubCutaneous <User Schedule>  levETIRAcetam 500 milliGRAM(s) Oral two times a day  melatonin 3 milliGRAM(s) Oral at bedtime PRN  polyethylene glycol 3350 17 Gram(s) Oral daily  QUEtiapine 12.5 milliGRAM(s) Oral at bedtime PRN  senna 2 Tablet(s) Oral at bedtime  sertraline 50 milliGRAM(s) Oral daily  sodium chloride 2 Gram(s) Oral daily      LABS:    07-27    133<L>  |  98  |  8   ----------------------------<  104<H>  3.5   |  23  |  0.45<L>    Ca    9.5      27 Jul 2024 05:32  Phos  4.3     07-27  Mg     2.00     07-27    PHYSICAL EXAM:  AAO X 2  PERRL with dysconjugate gaze  CN 2-12 otherwise intact  Bilateral UE 4+/5  Bilateral LE 5/5  SILT  No drift

## 2024-07-28 NOTE — PROGRESS NOTE ADULT - SUBJECTIVE AND OBJECTIVE BOX
Patient is a 59y old  Female who presents with a chief complaint of Rule out  shunt malfunction (28 Jul 2024 00:41)      SUBJECTIVE / OVERNIGHT EVENTS: no events     ROS:  all neg unless mentioned in hpi     MEDICATIONS  (STANDING):  acetaminophen     Tablet .. 650 milliGRAM(s) Oral every 6 hours  buPROPion XL (24-Hour) . 300 milliGRAM(s) Oral daily  enoxaparin Injectable 40 milliGRAM(s) SubCutaneous <User Schedule>  levETIRAcetam 500 milliGRAM(s) Oral two times a day  polyethylene glycol 3350 17 Gram(s) Oral daily  senna 2 Tablet(s) Oral at bedtime  sertraline 50 milliGRAM(s) Oral daily  sodium chloride 2 Gram(s) Oral daily    MEDICATIONS  (PRN):  bisacodyl 5 milliGRAM(s) Oral every 12 hours PRN Constipation  melatonin 3 milliGRAM(s) Oral at bedtime PRN Insomnia  QUEtiapine 12.5 milliGRAM(s) Oral at bedtime PRN insomnia      T(C): 36.4 (07-28-24 @ 06:20)  HR: 89 (07-28-24 @ 06:20)  BP: 111/53 (07-28-24 @ 06:20)  RR: 16 (07-28-24 @ 06:20)  SpO2: 98% (07-28-24 @ 06:20)  CAPILLARY BLOOD GLUCOSE        I&O's Summary    27 Jul 2024 07:01  -  28 Jul 2024 07:00  --------------------------------------------------------  IN: 540 mL / OUT: 200 mL / NET: 340 mL        PHYSICAL EXAM:  GENERAL: NAD  CHEST/LUNG: Clear to auscultation bilaterally  HEART: Regular rate and rhythm; No murmurs, rubs, or gallops, No Edema  ABDOMEN: Soft, Nontender, Nondistended; Bowel sounds present  EXTREMITIES:  2+ Peripheral Pulses, No clubbing, cyanosis      LABS:    07-27    133<L>  |  98  |  8   ----------------------------<  104<H>  3.5   |  23  |  0.45<L>    Ca    9.5      27 Jul 2024 05:32  Phos  4.3     07-27  Mg     2.00     07-27            Urinalysis Basic - ( 27 Jul 2024 05:32 )    Color: x / Appearance: x / SG: x / pH: x  Gluc: 104 mg/dL / Ketone: x  / Bili: x / Urobili: x   Blood: x / Protein: x / Nitrite: x   Leuk Esterase: x / RBC: x / WBC x   Sq Epi: x / Non Sq Epi: x / Bacteria: x            RADIOLOGY & ADDITIONAL TESTS:    Imaging Personally Reviewed:    Consultant(s) Notes Reviewed:      Care Discussed with Consultants/Other Providers:

## 2024-07-28 NOTE — PROGRESS NOTE ADULT - ASSESSMENT
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 VPS reprogrammed from 1.0 to 0.5 due to increased ventricles on CTH at [facility] rehab. Patient transferred to [facility] for possible  shunt revision as mentation did not improve after shunt reprogramming and ventricles are up on CTH.     7/18: Shunt tap met with resistance but able to pull 10cc off. CTH showed increase in ventricles. MRI with flow study completed for OR tomorrow, preop for ETV vs  shunt revision.  7/19: s/p Left frontal Endoscopic third ventriculostomy and ligation of Right  shunt with ligaclips with retention of valve as rescue device and retention proximal and distal catheters. 2 incisions closed with staples.  7/20: Stable POD #1 S/P ETV, ligation of shunt. Downgraded from SICU to floor. CTH showed slight decr in vents   7/21-23: stable, pending MRI w flow done.  7/24-28: Stable exam, MRI shows decreased ventricular size and flow through ETV. Rehab placement pending

## 2024-07-29 ENCOUNTER — TRANSCRIPTION ENCOUNTER (OUTPATIENT)
Age: 59
End: 2024-07-29

## 2024-07-29 VITALS
OXYGEN SATURATION: 100 % | HEART RATE: 96 BPM | DIASTOLIC BLOOD PRESSURE: 51 MMHG | TEMPERATURE: 98 F | RESPIRATION RATE: 16 BRPM | SYSTOLIC BLOOD PRESSURE: 113 MMHG

## 2024-07-29 LAB
ANION GAP SERPL CALC-SCNC: 9 MMOL/L — SIGNIFICANT CHANGE UP (ref 7–14)
BUN SERPL-MCNC: 11 MG/DL — SIGNIFICANT CHANGE UP (ref 7–23)
CALCIUM SERPL-MCNC: 9.1 MG/DL — SIGNIFICANT CHANGE UP (ref 8.4–10.5)
CHLORIDE SERPL-SCNC: 100 MMOL/L — SIGNIFICANT CHANGE UP (ref 98–107)
CO2 SERPL-SCNC: 26 MMOL/L — SIGNIFICANT CHANGE UP (ref 22–31)
CREAT SERPL-MCNC: 0.48 MG/DL — LOW (ref 0.5–1.3)
EGFR: 109 ML/MIN/1.73M2 — SIGNIFICANT CHANGE UP
GLUCOSE SERPL-MCNC: 90 MG/DL — SIGNIFICANT CHANGE UP (ref 70–99)
MAGNESIUM SERPL-MCNC: 2 MG/DL — SIGNIFICANT CHANGE UP (ref 1.6–2.6)
PHOSPHATE SERPL-MCNC: 3.5 MG/DL — SIGNIFICANT CHANGE UP (ref 2.5–4.5)
POTASSIUM SERPL-MCNC: 4 MMOL/L — SIGNIFICANT CHANGE UP (ref 3.5–5.3)
POTASSIUM SERPL-SCNC: 4 MMOL/L — SIGNIFICANT CHANGE UP (ref 3.5–5.3)
SODIUM SERPL-SCNC: 135 MMOL/L — SIGNIFICANT CHANGE UP (ref 135–145)

## 2024-07-29 PROCEDURE — 99232 SBSQ HOSP IP/OBS MODERATE 35: CPT

## 2024-07-29 RX ADMIN — Medication 650 MILLIGRAM(S): at 14:30

## 2024-07-29 RX ADMIN — Medication 650 MILLIGRAM(S): at 06:03

## 2024-07-29 RX ADMIN — Medication 650 MILLIGRAM(S): at 07:03

## 2024-07-29 RX ADMIN — Medication 300 MILLIGRAM(S): at 13:30

## 2024-07-29 RX ADMIN — Medication 2 GRAM(S): at 13:31

## 2024-07-29 RX ADMIN — Medication 650 MILLIGRAM(S): at 13:31

## 2024-07-29 RX ADMIN — SERTRALINE HYDROCHLORIDE 50 MILLIGRAM(S): 100 TABLET, FILM COATED ORAL at 13:30

## 2024-07-29 RX ADMIN — LEVETIRACETAM 500 MILLIGRAM(S): 1000 TABLET, FILM COATED ORAL at 06:03

## 2024-07-29 NOTE — PROGRESS NOTE ADULT - SUBJECTIVE AND OBJECTIVE BOX
PROGRESS NOTE:     Patient is a 59y old  Female who presents with a chief complaint of Rule out  shunt malfunction (29 Jul 2024 11:51)      SUBJECTIVE / OVERNIGHT EVENTS: No acute events.     ADDITIONAL REVIEW OF SYSTEMS:    MEDICATIONS  (STANDING):  acetaminophen     Tablet .. 650 milliGRAM(s) Oral every 6 hours  buPROPion XL (24-Hour) . 300 milliGRAM(s) Oral daily  enoxaparin Injectable 40 milliGRAM(s) SubCutaneous <User Schedule>  levETIRAcetam 500 milliGRAM(s) Oral two times a day  polyethylene glycol 3350 17 Gram(s) Oral daily  senna 2 Tablet(s) Oral at bedtime  sertraline 50 milliGRAM(s) Oral daily  sodium chloride 2 Gram(s) Oral daily    MEDICATIONS  (PRN):  bisacodyl 5 milliGRAM(s) Oral every 12 hours PRN Constipation  melatonin 3 milliGRAM(s) Oral at bedtime PRN Insomnia  QUEtiapine 12.5 milliGRAM(s) Oral at bedtime PRN insomnia      CAPILLARY BLOOD GLUCOSE        I&O's Summary    28 Jul 2024 07:01  -  29 Jul 2024 07:00  --------------------------------------------------------  IN: 1340 mL / OUT: 0 mL / NET: 1340 mL        PHYSICAL EXAM:  Vital Signs Last 24 Hrs  T(C): 36.6 (29 Jul 2024 10:01), Max: 36.8 (28 Jul 2024 21:45)  T(F): 97.9 (29 Jul 2024 10:01), Max: 98.2 (28 Jul 2024 21:45)  HR: 88 (29 Jul 2024 10:01) (76 - 108)  BP: 113/59 (29 Jul 2024 10:01) (113/59 - 124/64)  BP(mean): --  RR: 17 (29 Jul 2024 10:01) (16 - 18)  SpO2: 99% (29 Jul 2024 10:01) (99% - 100%)    Parameters below as of 29 Jul 2024 10:01  Patient On (Oxygen Delivery Method): room air        CONSTITUTIONAL: NAD  RESPIRATORY: Normal respiratory effort; lungs are clear to auscultation bilaterally  CARDIOVASCULAR: Regular rate and rhythm, normal S1 and S2, no murmur/rub/gallop; No lower extremity edema; Peripheral pulses are 2+ bilaterally  ABDOMEN: Nontender to palpation, normoactive bowel sounds, no rebound/guarding; No hepatosplenomegaly  MUSCLOSKELETAL: no clubbing or cyanosis of digits; no joint swelling or tenderness to palpation  PSYCH: A+O x 2; affect appropriate  NEURO: CN's intact, b/l UE 4+/5, b/l LE 5/5      LABS:    07-29    135  |  100  |  11  ----------------------------<  90  4.0   |  26  |  0.48<L>    Ca    9.1      29 Jul 2024 06:16  Phos  3.5     07-29  Mg     2.00     07-29            Urinalysis Basic - ( 29 Jul 2024 06:16 )    Color: x / Appearance: x / SG: x / pH: x  Gluc: 90 mg/dL / Ketone: x  / Bili: x / Urobili: x   Blood: x / Protein: x / Nitrite: x   Leuk Esterase: x / RBC: x / WBC x   Sq Epi: x / Non Sq Epi: x / Bacteria: x          RADIOLOGY & ADDITIONAL TESTS:  Results Reviewed:   Imaging Personally Reviewed:  Electrocardiogram Personally Reviewed:    COORDINATION OF CARE:  Care Discussed with Consultants/Other Providers [Y/N]:  Prior or Outpatient Records Reviewed [Y/N]:

## 2024-07-29 NOTE — PROGRESS NOTE ADULT - ASSESSMENT
58 yo F with PMHx of neurofibromatosis, s/p L suboccipital craniectomy for resection of cerebellar brain tumor 12/2023 by Dr Brush, s/p  shunt (Strata programmed at 0.5 in [facility] recently), s/p traumatic fall with head trauma in 5/20/2024, at that time sustained a large acute right SDH, and underwent an emergent right decompressive hemicraniectomy on 5/22/24, s/p right cranioplasty on 6/17/24 with custom plate. VPS reprogrammed from 1.0 to 0.5 due to increased ventricles on CTH at [facility] rehab. Patient transferred to [facility] for possible  shunt revision as mentation did not improve after shunt reprogramming and ventricles are up on CTH.     7/18: Shunt tap met with resistance but able to pull 10cc off. CTH showed increase in ventricles. MRI with flow study completed for OR tomorrow, preop for ETV vs  shunt revision.  7/19: s/p Left frontal Endoscopic third ventriculostomy and ligation of Right  shunt with ligaclips with retention of valve as rescue device and retention proximal and distal catheters. 2 incisions closed with staples.  7/20: Stable POD #1 S/P ETV, ligation of shunt. Downgraded from SICU to floor. CTH showed slight decr in vents   7/21-23: stable, pending MRI w flow done.  7/24-29: Stable exam, MRI shows decreased ventricular size and flow through ETV. Rehab placement pending

## 2024-07-29 NOTE — DISCHARGE NOTE PROVIDER - NSDCMRMEDTOKEN_GEN_ALL_CORE_FT
acetaminophen 325 mg oral tablet: 2 tab(s) orally every 6 hours As needed Temp greater or equal to 38C (100.4F), Mild Pain (1 - 3)  bisacodyl 5 mg oral delayed release tablet: 1 tab(s) orally every 12 hours As needed Constipation  enoxaparin: 40 milligram(s) subcutaneous every 24 hours LOVENOX  levETIRAcetam 500 mg oral tablet: 1 tab(s) orally 2 times a day  melatonin 3 mg oral tablet: 1 tab(s) orally once a day (at bedtime) As needed Sleep  mirtazapine 7.5 mg oral tablet: 1 tab(s) orally once a day (at bedtime)  polyethylene glycol 3350 oral powder for reconstitution: 17 gram(s) orally once a day As needed Constipation  senna leaf extract oral tablet: 2 tab(s) orally once a day (at bedtime)  sertraline 50 mg oral tablet: 1 tab(s) orally once a day  sodium chloride 1 g oral tablet: 1 tab(s) orally 3 times a day  Wellbutrin  mg/24 hours oral tablet, extended release: 1 tab(s) orally once a day

## 2024-07-29 NOTE — PROGRESS NOTE ADULT - SUBJECTIVE AND OBJECTIVE BOX
No issues overnight  Vital Signs Last 24 Hrs  T(C): 36.8 (28 Jul 2024 21:45), Max: 36.8 (28 Jul 2024 21:45)  T(F): 98.2 (28 Jul 2024 21:45), Max: 98.2 (28 Jul 2024 21:45)  HR: 76 (28 Jul 2024 21:45) (76 - 108)  BP: 118/51 (28 Jul 2024 21:45) (110/43 - 124/64)  BP(mean): --  RR: 18 (28 Jul 2024 21:45) (16 - 18)  SpO2: 100% (28 Jul 2024 21:45) (98% - 100%)    Parameters below as of 28 Jul 2024 21:45  Patient On (Oxygen Delivery Method): room air    AAO X 2  PERRL with dysconjugate gaze  CN 2-12 otherwise intact  Bilateral UE 4+/5  Bilateral LE 5/5  SILT  No drift    MEDICATIONS  (STANDING):  acetaminophen     Tablet .. 650 milliGRAM(s) Oral every 6 hours  buPROPion XL (24-Hour) . 300 milliGRAM(s) Oral daily  enoxaparin Injectable 40 milliGRAM(s) SubCutaneous <User Schedule>  levETIRAcetam 500 milliGRAM(s) Oral two times a day  polyethylene glycol 3350 17 Gram(s) Oral daily  senna 2 Tablet(s) Oral at bedtime  sertraline 50 milliGRAM(s) Oral daily  sodium chloride 2 Gram(s) Oral daily    MEDICATIONS  (PRN):  bisacodyl 5 milliGRAM(s) Oral every 12 hours PRN Constipation  melatonin 3 milliGRAM(s) Oral at bedtime PRN Insomnia  QUEtiapine 12.5 milliGRAM(s) Oral at bedtime PRN insomnia

## 2024-07-29 NOTE — PROGRESS NOTE ADULT - ASSESSMENT
58 YO female with Hx neurofibromatosis,  shunt fell in March sustaining a large acute right SDH, patient underwent a right decompressive hemicraniectomy on 5/22. Admitted w concern for acute encephalopathy     # Encephalopathy  - CT head showed increase in ventricles   - now S/P ETV, ligation of shunt  - CTH/MR showed slight decrease in vents     # h/o SIADH  - was discharged on salt tabs prior admission  - c/w salt tabs   - trend na    # Depression  - on Wellbutrin and zoloft     # DVT ppx   - per primary team

## 2024-07-29 NOTE — DISCHARGE NOTE PROVIDER - NSDCCPCAREPLAN_GEN_ALL_CORE_FT
PRINCIPAL DISCHARGE DIAGNOSIS  Diagnosis: Malfunction of ventriculoperitoneal shunt  Assessment and Plan of Treatment:

## 2024-07-29 NOTE — DISCHARGE NOTE PROVIDER - CARE PROVIDER_API CALL
Andrews Brush  Pediatric Neurosurgery  [address] [place] [address], [facility] [address]  [place] [address]  Phone: [phone]  Fax: [phone]  Follow Up Time: 1 week

## 2024-07-29 NOTE — DISCHARGE NOTE PROVIDER - HOSPITAL COURSE
60 yo F with PMHx of neurofibromatosis, s/p L suboccipital craniectomy for resection of cerebellar brain tumor 12/2023 by Dr Brush, s/p  shunt (Strata programmed at 0.5 in [facility] recently), s/p traumatic fall with head trauma in 5/20/2024, at that time sustained a large acute right SDH, and underwent an emergent right decompressive hemicraniectomy on 5/22/24, s/p right cranioplasty on 6/17/24 with custom plate. VPS reprogrammed from 1.0 to 0.5 due to increased ventricles on CTH at [facility] rehab. Patient transferred to [facility] for possible  shunt revision as mentation did not improve after shunt reprogramming and ventricles are up on CTH.     7/18: Shunt tap met with resistance but able to pull 10cc off. CTH showed increase in ventricles. MRI with flow study completed for OR tomorrow, preop for ETV vs  shunt revision.  7/19: s/p Left frontal Endoscopic third ventriculostomy and ligation of Right  shunt with ligaclips with retention of valve as rescue device and retention proximal and distal catheters. 2 incisions closed with staples.  7/20: Stable POD #1 S/P ETV, ligation of shunt. Downgraded from SICU to floor. CTH showed slight decr in vents   7/21-23: stable, pending MRI w flow done.  7/24-29: Stable exam, MRI shows decreased ventricular size and flow through ETV. Rehab placement pending

## 2024-07-29 NOTE — DISCHARGE NOTE NURSING/CASE MANAGEMENT/SOCIAL WORK - NSDCPEFALRISK_GEN_ALL_CORE
For information on Fall & Injury Prevention, visit: [email or web address] OR  [email or web address] OR  https://www.cdc.gov/steadi/patient.html

## 2024-07-29 NOTE — PROGRESS NOTE ADULT - REASON FOR ADMISSION
Rule out  shunt malfunction

## 2024-07-29 NOTE — PROGRESS NOTE ADULT - SUBJECTIVE AND OBJECTIVE BOX
Patient is a 59y old  Female who presents with a chief complaint of Rule out  shunt malfunction (2024 10:50)      HPI:  58 YO female with PMHx of neurofibromatosis,  shunt (Strata programmed at 0.5 in [facility]), s/p traumatic fall with head trauma, no LOC, which took place back in 2024 and sustained a large acute right SDH, and she underwent an emergent right decompressive hemicraniectomy on  (bone discarded), s/p ICP monitor placement on , s/p right cranioplasty on  and subsequently discharged to rehab on . Shunt was at 1.0 at that time. Patient sent from rehab facility to [facility] due to worsening mentation and physical effort at rehab, shunt reprogrammed from 1.0 to 0.5 due to increased ventricles on CTH, patient treated for UTI, patient subsequently transferred to [facility] for possible  shunt revision as mentation did not improve after shunt reprogramming.  (2024 05:59)      REVIEW OF SYSTEMS  denies new complaint    PAST MEDICAL & SURGICAL HISTORY  Asthma    Depression    Neoplasm of uncertain behavior of brain    Neurofibromatosis, type 1    Neurofibromatosis    History of hydrocephalus    Anxiety    Delivery with history of     S/P tubal ligation    History of arthroscopy of left knee    S/P LASIK Surgery    H/O brain tumor    S/P  shunt         CURRENT FUNCTIONAL STATUS    Bed Mobility  Bed Mobility Training Rehab Potential: good, to achieve stated therapy goals  Bed Mobility Training Symptoms Noted During/After Treatment: none  Bed Mobility Training Sit-to-Supine: minimum assist (75% patient effort);  1 person assist;  nonverbal cues (demo/gestures);  verbal cues;  bed rails  Bed Mobility Training Supine-to-Sit: minimum assist (75% patient effort);  1 person assist;  nonverbal cues (demo/gestures);  verbal cues;  bed rails  Bed Mobility Training Limitations: impaired ability to control trunk for mobility;  decreased strength;  impaired postural control    Sit-Stand Transfer Training  Sit-to-Stand Transfer Training Rehab Potential: good, to achieve stated therapy goals  Sit-to-Stand Transfer Training Symptoms Noted During/After Treatment: none  Transfer Training Sit-to-Stand Transfer: minimum assist (75% patient effort);  nonverbal cues (demo/gestures);  verbal cues;  1 person assist;  rolling walker  Transfer Training Stand-to-Sit Transfer: minimum assist (75% patient effort);  1 person assist;  nonverbal cues (demo/gestures);  verbal cues;  rolling walker  Sit-to-Stand Transfer Training Transfer Safety Analysis: decreased step length;  decreased strength;  impaired postural control;  rolling walker    Gait Training  Gait Training Rehab Potential: good, to achieve stated therapy goals  Gait Training Symptoms Noted During/After Treatment: none  Gait Training: minimum assist (75% patient effort);  1 person assist;  nonverbal cues (demo/gestures);  verbal cues;  rolling walker;  5 feet;  Distancel limited due to fatigue   Gait Analysis: 3-point gait   decreased anita;  decreased step length;  decreased stride length;  decreased strength;  5 feet;  rolling walker    Therapeutic Exercise  Therapeutic Exercise Rehab Effort: good  Therapeutic Exercise Symptoms Noted During/After Treatment: none  Therapeutic Exercise Detail: Pt performed seated ther ex of bilateral LE/UE to improve strength and mobility in extremties in order to peform functional activites.       RECENT LABS/IMAGING        135  |  100  |  11  ----------------------------<  90  4.0   |  26  |  0.48<L>    Ca    9.1      2024 06:16  Phos  3.5       Mg     2.00           Urinalysis Basic - ( 2024 06:16 )    Color: x / Appearance: x / SG: x / pH: x  Gluc: 90 mg/dL / Ketone: x  / Bili: x / Urobili: x   Blood: x / Protein: x / Nitrite: x   Leuk Esterase: x / RBC: x / WBC x   Sq Epi: x / Non Sq Epi: x / Bacteria: x        VITALS  T(C): 36.6 (24 @ 10:01), Max: 36.8 (24 @ 21:45)  HR: 88 (24 @ 10:01) (76 - 108)  BP: 113/59 (24 @ 10:01) (113/59 - 124/64)  RR: 17 (24 @ 10:01) (16 - 18)  SpO2: 99% (24 @ 10:01) (99% - 100%)  Wt(kg): --    ALLERGIES  No Known Allergies      MEDICATIONS   acetaminophen     Tablet .. 650 milliGRAM(s) Oral every 6 hours  bisacodyl 5 milliGRAM(s) Oral every 12 hours PRN  buPROPion XL (24-Hour) . 300 milliGRAM(s) Oral daily  enoxaparin Injectable 40 milliGRAM(s) SubCutaneous <User Schedule>  levETIRAcetam 500 milliGRAM(s) Oral two times a day  melatonin 3 milliGRAM(s) Oral at bedtime PRN  polyethylene glycol 3350 17 Gram(s) Oral daily  QUEtiapine 12.5 milliGRAM(s) Oral at bedtime PRN  senna 2 Tablet(s) Oral at bedtime  sertraline 50 milliGRAM(s) Oral daily  sodium chloride 2 Gram(s) Oral daily      ----------------------------------------------------------------------------------------  PHYSICAL EXAM  Constitutional - NAD, Comfortable  HEENT - + staples   Chest - no respiratory distress  Cardiovascular -RRR  Abdomen - Soft, NTND  Extremities - No C/C/E, No calf tenderness   Neurologic Exam -                    Cognitive - Awake, Alert, AAO x 2     Communication - Fluent      Cranial Nerves - CN 2-12 intact     Motor - moves all extremities at least 4/5     Sensory - Intact to LT      Balance - WNL Static  Psychiatric - Mood stable, Affect WNL  ----------------------------------------------------------------------------------------  ASSESSMENT/PLAN  58 YO female with PMHx of neurofibromatosis,  shunt (Strata programmed at 0.5 in [facility]), s/p traumatic fall with head trauma, no LOC, which took place back in 2024 and sustained a large acute right SDH, and she underwent an emergent right decompressive hemicraniectomy on  (bone discarded), s/p ICP monitor placement on , s/p right cranioplasty on  and subsequently discharged to rehab on . returned from acute rehab due to worsening mentation and physical effort at rehab, shunt reprogrammed and patient sent to [facility] for possible revision of shunt.   She underwent Left frontal Endoscopic third ventriculostomy and ligation of Right  shunt with ligaclips with retention of valve as rescue device and retention proximal and distal catheters. 2 incisions closed with staples on 24  MRI shows improvement, decreased ventricular size  continue bedside PT and OT  Pain - oxy ir prn  DVT PPX - lovenox  Rehab - recommend acute rehab when medically cleared. Patient can tolerate 3 hours per day of therapy with medical supervision.

## 2024-07-29 NOTE — PROGRESS NOTE ADULT - PROBLEM SELECTOR PLAN 1
Continue PT/OT  PM&R for rehab pending  f/u read on mri w/ flow
- Q4hr neuro checks  - Cont PT  - Pending Rehab    [id number]    Case discussed with attending neurosurgeon 
Keep NPO  IVF   Preop for ETV vs  shunt revision  Follow up MRI
Neurologic checks Q4H  Continue PT  Await rehab placement
Neurologic checks Q1H overnight  Postop MRI with flow study today  Advance diet  PT/OT consults
- admit to SICU  - MRI brain with flow studies  - ancef 24h post op  - pain control  - bowel regimen  - neurochecks q1h  - advance diet as tolerated   - OOB as tolerated     [id number]    Case discussed with attending neurosurgeon Dr. Brush
Continue PT/OT  PM&R for rehab pending  MRI with CSF flow study pending  Follow up AM labs
Neurologic checks Q4H  Continue PT  Await rehab placement
Neurologic checks Q4H overnight  Postop MRI with flow study today  PT/OT to re-eval
Neurologic checks Q4H  Continue PT  Await rehab placement

## 2024-07-29 NOTE — DISCHARGE NOTE PROVIDER - DETAILS OF MALNUTRITION DIAGNOSIS/DIAGNOSES
This patient has been assessed with a concern for Malnutrition and was treated during this hospitalization for the following Nutrition diagnosis/diagnoses:     -  07/25/2024: Severe protein-calorie malnutrition

## 2024-07-29 NOTE — DISCHARGE NOTE NURSING/CASE MANAGEMENT/SOCIAL WORK - NSDCPECAREGIVERED_GEN_ALL_CORE
pt ambulating with walker and assist x1, eating, incontinent of bowel and bladder. no iv access. no distress noted. bed alarm on.

## 2024-07-29 NOTE — PROGRESS NOTE ADULT - PROBLEM SELECTOR PROBLEM 1
S/P craniotomy
Malfunction of ventriculoperitoneal shunt
S/P craniotomy
S/P craniotomy
Malfunction of ventriculoperitoneal shunt
S/P craniotomy
S/P craniotomy
Malfunction of ventriculoperitoneal shunt
S/P craniotomy

## 2024-07-29 NOTE — DISCHARGE NOTE NURSING/CASE MANAGEMENT/SOCIAL WORK - PATIENT PORTAL LINK FT
You can access the FollowMyHealth Patient Portal offered by [facility] by registering at the following website: [email or web address]. By joining [employer]’s FollowMyHealth portal, you will also be able to view your health information using other applications (apps) compatible with our system.

## 2024-07-29 NOTE — PROGRESS NOTE ADULT - NUTRITIONAL ASSESSMENT
This patient has been assessed with a concern for Malnutrition and has been determined to have a diagnosis/diagnoses of Severe protein-calorie malnutrition.    This patient is being managed with:   Diet DASH/TLC-  Sodium & Cholesterol Restricted  Entered: Jul 18 2024 11:29AM  

## 2024-07-29 NOTE — PROGRESS NOTE ADULT - PROVIDER SPECIALTY LIST ADULT
Hospitalist
Neurosurgery
Rehab Medicine
Rehab Medicine
Hospitalist
Neurosurgery
Rehab Medicine
Hospitalist
Neurosurgery
SICU
Hospitalist
Neurosurgery

## 2024-07-29 NOTE — CHART NOTE - NSCHARTNOTEFT_GEN_A_CORE
Nutrition Follow-Up/Chart Note         Source: Patient A&Ox 2   Family [ ]     RN [ ]    Chart [x ]     Pt is seen for nutrition follow up due to severe/moderate malnutrition, per protocol.    Hospital Course:    Per chart review, Patient is a 60 YO female with Hx neurofibromatosis,  shunt fell in March sustaining a large acute right SDH, patient underwent a right decompressive hemicraniectomy on 5/22. now admitted w concern for acute encephalopathy     Nutrition Course:  Patient is being followed 22/2 Severe protein calorie malnutrition, per protocol.  Patient was with provider at time of visit. Information collateral from comprehensive chart review as pt is medically clear to be discharged back to rehab.  Patient was receiving a DASH/TLC diet in house. Noted variable PO intake per RN flowsheet.  Appeared to tolerate current diet consistency.  No reported GI issues such as nausea/vomiting/diarrhea/constipation, on bowel regimen. Last BM reported 7/22 with fecal incontinence. Patient was previously recommended for Ensure Plus High Protein 2 x/day (700kcal, 40gm protein). Recommend to continue.     Diet, DASH/TLC:   Sodium & Cholesterol Restricted (07-18-24 @ 11:30)      Anthropometrics:   Height (cm): 162.6 (07-21), 167.6 (06-16)  Weight (kg): 50.6 (07-19), 55 (06-16)  BMI (kg/m2): 19.1 (07-21), 18 (07-19), 19.6 (06-16)  IBW: 120 lbs/54.5kg +/- 10%    Weight Assessment: per RN flowsheet in KG  No new weight to assess  % weight change :  n/a    Edema: No edema per RN flowsheets     Skin: +right and left head surgical wounds per RN flowsheet. No pressure injury per RN flowsheets     Estimated Needs Assessment: [ x ] No change in need assessment    Weight Used: dosing weight 50.6kg   Estimated Energy: 2911-6484 Kcal/kg/day ( 30-35 kcal/kg)  Estimated Protein: 70.8-80.9 gm/kg/day ( 1.4-1.6 gm/kg)  Estimated Fluid: per Medical and team discretion.    __________________ Pertinent Medications__________________   MEDICATIONS  (STANDING):  acetaminophen     Tablet .. 650 milliGRAM(s) Oral every 6 hours  buPROPion XL (24-Hour) . 300 milliGRAM(s) Oral daily  enoxaparin Injectable 40 milliGRAM(s) SubCutaneous <User Schedule>  levETIRAcetam 500 milliGRAM(s) Oral two times a day  polyethylene glycol 3350 17 Gram(s) Oral daily  senna 2 Tablet(s) Oral at bedtime  sertraline 50 milliGRAM(s) Oral daily  sodium chloride 2 Gram(s) Oral daily    MEDICATIONS  (PRN):  bisacodyl 5 milliGRAM(s) Oral every 12 hours PRN Constipation  melatonin 3 milliGRAM(s) Oral at bedtime PRN Insomnia  QUEtiapine 12.5 milliGRAM(s) Oral at bedtime PRN insomnia      __________________ Pertinent Labs__________________   07-29 Na135 mmol/L Glu 90 mg/dL K+ 4.0 mmol/L Cr  0.48 mg/dL<L> BUN 11 mg/dL 07-29 Phos 3.5 mg/dL        Previous Nutrition Diagnosis: [ x ] Severe malnutrition    Nutrition Diagnosis is : [ x ] ongoing      New Nutrition Diagnosis :  [ x ] not applicable     Education:  [  ] Given today        Type of education provided:   [  ] Given on previous assessment by RD   [  ] Not applicable 2/2 cognitive deficit  [  ] Pt refusal of education offered   [  ] Not applicable 2/2 current prognosis  [  ] Not warranted at present    Recommendations:  1. Recommend to liberalize diet order as regular (discontinue DASH/TLC) with provision of Ensure Plus High Protein 2 x/day to further optimize nutritional status.  2. Honor food and fluid preferences as able.      Monitoring and Evaluation:   [ x ] Monitor PO intake, skin integrity, bowel regimen, and nutrition pertinent labs.  [ x ] Tolerance to diet prescription [ x ] weights [ x ] follow up per protocol Nutrition Follow-Up/Chart Note         Source: Patient A&Ox 2   Family [ ]     RN [ ]    Chart [x ]     Pt is seen for nutrition follow up due to severe/moderate malnutrition, per protocol.    Hospital Course:    Per chart review, Patient is a 58 YO female with Hx neurofibromatosis,  shunt fell in March sustaining a large acute right SDH, patient underwent a right decompressive hemicraniectomy on 5/22. now admitted w concern for acute encephalopathy     Nutrition Course:  Patient is being followed 22/2 Severe protein calorie malnutrition, per protocol.  Patient was with provider at time of visit, reattempt x2 successfully spoke with pt. Information also collateral from comprehensive chart review as pt is medically clear to be discharged back to rehab.  Patient was receiving a DASH/TLC diet in house. Noted variable PO intake per RN flowsheet.  Patient is visually impaired, needs assistance at meals.  Noted ~ 100% meal completion at lunch.  Appeared to tolerate current diet consistency.  No reported GI issues such as nausea/vomiting/diarrhea, on bowel regimen. Last BM reported 7/22 with fecal incontinence.  Patient declined prune juice when RD offered.  Continue to monitor bowel regularity Patient was previously recommended for Ensure Plus High Protein 2 x/day (700kcal, 40gm protein). Recommend to continue. Patient endorses disliking fish entrees this tour, same will be honored and entered in [facility].  Patient remains meeting criteria for malnutrition base on physical findings from visual observation.    Diet, DASH/TLC:   Sodium & Cholesterol Restricted (07-18-24 @ 11:30)      Anthropometrics:   Height (cm): 162.6 (07-21), 167.6 (06-16)  Weight (kg): 50.6 (07-19), 55 (06-16)  BMI (kg/m2): 19.1 (07-21), 18 (07-19), 19.6 (06-16)  IBW: 120 lbs/54.5kg +/- 10%    Weight Assessment: per RN flowsheet in KG  No new weight to assess  % weight change :  n/a    Edema: No edema per RN flowsheets     Skin: +right and left head surgical wounds per RN flowsheet. No pressure injury per RN flowsheets     Estimated Needs Assessment: [ x ] No change in need assessment    Weight Used: dosing weight 50.6kg   Estimated Energy: 3610-4886 Kcal/kg/day ( 30-35 kcal/kg)  Estimated Protein: 70.8-80.9 gm/kg/day ( 1.4-1.6 gm/kg)  Estimated Fluid: per Medical and team discretion.    __________________ Pertinent Medications__________________   MEDICATIONS  (STANDING):  acetaminophen     Tablet .. 650 milliGRAM(s) Oral every 6 hours  buPROPion XL (24-Hour) . 300 milliGRAM(s) Oral daily  enoxaparin Injectable 40 milliGRAM(s) SubCutaneous <User Schedule>  levETIRAcetam 500 milliGRAM(s) Oral two times a day  polyethylene glycol 3350 17 Gram(s) Oral daily  senna 2 Tablet(s) Oral at bedtime  sertraline 50 milliGRAM(s) Oral daily  sodium chloride 2 Gram(s) Oral daily    MEDICATIONS  (PRN):  bisacodyl 5 milliGRAM(s) Oral every 12 hours PRN Constipation  melatonin 3 milliGRAM(s) Oral at bedtime PRN Insomnia  QUEtiapine 12.5 milliGRAM(s) Oral at bedtime PRN insomnia      __________________ Pertinent Labs__________________   07-29 Na135 mmol/L Glu 90 mg/dL K+ 4.0 mmol/L Cr  0.48 mg/dL<L> BUN 11 mg/dL 07-29 Phos 3.5 mg/dL    Previous Nutrition Diagnosis: [ x ] Severe malnutrition    Nutrition Diagnosis is : [ x ] ongoing      New Nutrition Diagnosis :  [ x ] not applicable     Education: [ x ] Not applicable 2/2 cognitive deficit    Recommendations:  1. Recommend to liberalize diet order as regular (discontinue DASH/TLC) with provision of Ensure Plus High Protein 2 x/day to further optimize nutritional status.  2. Honor food and fluid preferences as able.      Monitoring and Evaluation:   [ x ] Monitor PO intake, skin integrity, bowel regimen, and nutrition pertinent labs.  [ x ] Tolerance to diet prescription [ x ] weights [ x ] follow up per protocol

## 2024-07-29 NOTE — DISCHARGE NOTE PROVIDER - NSDCFUADDINST_GEN_ALL_CORE_FT
- You had surgery on 7/19/24. The surgery you had was a left frontal endoscopic third ventriculostomy and ligation of Right  shunt with ligaclips with retention of valve as rescue device and retention of proximal and distal catheters. 2 incisions closed with staples.      - Remove bandage from incision site on post op day 3 if it was not removed by the surgical team prior to discharge. Once removed, incision site does not need a bandage or ointment on it. If you have steri strips (small, skinny beige strips), they will eventually fall off over time. Do not pull at steri strips. If steri strips are more than assisted off, you may remove them. Do not touch or scratch incision to prevent infection.    - If your incision is closed with clear sutures, these are absorbable and will dissolve over time. If you see metallic staples or black stitches, these will need to be removed in the office 7-14 days after surgery. Your surgeon will tell you at your follow up appt when your sutures/staples will be removed, if applicable.  Do not pick or scratch at incision.     - Shower daily with shampoo/soap on post operative day 4 (DATE: 7/23/24 ) Avoid long soaks and do not submerge incision in water (no baths.) Allow soap and water to run over the incision. Pat incision area dry with clean towel- do not scrub. Please shower regularly to ensure incision stays clean to avoid post operative infections.  You may have a body shower daily, as long as your head incision is covered by a shower cap and does not get wet until post op day 4.     - Notify your surgeon if you notice increased redness, drainage or your incision area opening.     - Return to ER immediately for high fevers, severe headache, vomiting, lethargy or weakness    - Please call your neurosurgeon following discharge to make follow up appointment in 1 week after discharge unless otherwise specified. See contact information.    - Prescription post operative medication has been sent to [facility] in the hospital. All post operative prescriptions should be picked up before departing the hospital. You can also take over the counter tylenol for pain as needed.     - Ambulate as tolerate. Continue with all "activities of daily living." Avoid strenuous activity or heavy lifting until cleared for additional activity at your follow up appointment. You cannot drive while taking narcotics (oxycodone, valium, etc.)     - Do not return to work or school until cleared by your neurosurgeon at your follow up visit unless specified to you during your hospital stay    - Do not take any blood thinning medications such as aspirin, motrin, ibuprofen, warfarin, coumadin, plavix, heparin, lovenox, Xarelto, Eliquis etc. until cleared by your neurosurgeon    - Surgery, anesthesia, and pain medications can cause constipation. Please take over the counter stool softeners daily until regular bowel movements are achieved. Examples include Miralax, Senna, Colace, Milk of Magnesia, or Dulcolax suppositories. Please consult drug store pharmacist or pediatrician if there are question about dosing if the patient is pediatric.

## 2024-08-02 LAB
CULTURE RESULTS: SIGNIFICANT CHANGE UP
SPECIMEN SOURCE: SIGNIFICANT CHANGE UP

## 2024-08-08 ENCOUNTER — TRANSCRIPTION ENCOUNTER (OUTPATIENT)
Age: 59
End: 2024-08-08

## 2024-08-14 ENCOUNTER — TRANSCRIPTION ENCOUNTER (OUTPATIENT)
Age: 59
End: 2024-08-14

## 2024-08-17 LAB
CULTURE RESULTS: SIGNIFICANT CHANGE UP
SPECIMEN SOURCE: SIGNIFICANT CHANGE UP

## 2024-11-06 ENCOUNTER — OUTPATIENT (OUTPATIENT)
Dept: OUTPATIENT SERVICES | Facility: HOSPITAL | Age: 59
LOS: 1 days | End: 2024-11-06
Payer: COMMERCIAL

## 2024-11-06 ENCOUNTER — APPOINTMENT (OUTPATIENT)
Dept: MRI IMAGING | Facility: IMAGING CENTER | Age: 59
End: 2024-11-06

## 2024-11-06 DIAGNOSIS — G91.9 HYDROCEPHALUS, UNSPECIFIED: ICD-10-CM

## 2024-11-06 DIAGNOSIS — Z87.898 PERSONAL HISTORY OF OTHER SPECIFIED CONDITIONS: Chronic | ICD-10-CM

## 2024-11-06 DIAGNOSIS — Z98.2 PRESENCE OF CEREBROSPINAL FLUID DRAINAGE DEVICE: Chronic | ICD-10-CM

## 2024-11-06 PROCEDURE — A9585: CPT

## 2024-11-06 PROCEDURE — 70553 MRI BRAIN STEM W/O & W/DYE: CPT

## 2024-11-06 PROCEDURE — 70553 MRI BRAIN STEM W/O & W/DYE: CPT | Mod: 26

## 2025-01-30 NOTE — ASU PREOP CHECKLIST - WEIGHT IN LBS
[General Appearance - Alert] : alert [General Appearance - In No Acute Distress] : in no acute distress [Sclera] : the sclera and conjunctiva were normal [Hearing Threshold Finger Rub Not [place]] : hearing was normal [Jugular Venous Distention Increased] : there was no jugular-venous distention [Thyroid Diffuse Enlargement] : the thyroid was not enlarged [Auscultation Breath Sounds / Voice Sounds] : lungs were clear to auscultation bilaterally [Heart Sounds] : normal S1 and S2 [Systolic grade ___/6] : A grade [unfilled]/6 systolic murmur was heard. [Edema] : there was no peripheral edema [Abdomen Tenderness] : non-tender [FreeTextEntry1] : No Valentino's sign [Urinary Bladder Findings] : the bladder was normal on palpation [No Spinal Tenderness] : no spinal tenderness [Abnormal Walk] : normal gait [Skin Color & Pigmentation] : normal skin color and pigmentation [] : no rash [No Focal Deficits] : no focal deficits [Oriented To Time, Place, And Person] : oriented to person, place, and time 126.9

## 2025-02-22 ENCOUNTER — INPATIENT (INPATIENT)
Facility: HOSPITAL | Age: 60
LOS: 0 days | Discharge: ANOTHER HOSPITAL | DRG: 101 | End: 2025-02-23
Attending: HOSPITALIST | Admitting: HOSPITALIST
Payer: COMMERCIAL

## 2025-02-22 VITALS
HEART RATE: 140 BPM | HEIGHT: 63 IN | DIASTOLIC BLOOD PRESSURE: 122 MMHG | SYSTOLIC BLOOD PRESSURE: 146 MMHG | OXYGEN SATURATION: 88 % | RESPIRATION RATE: 22 BRPM | WEIGHT: 136.03 LBS

## 2025-02-22 DIAGNOSIS — G40.901 EPILEPSY, UNSPECIFIED, NOT INTRACTABLE, WITH STATUS EPILEPTICUS: ICD-10-CM

## 2025-02-22 DIAGNOSIS — Z87.898 PERSONAL HISTORY OF OTHER SPECIFIED CONDITIONS: Chronic | ICD-10-CM

## 2025-02-22 DIAGNOSIS — Z98.2 PRESENCE OF CEREBROSPINAL FLUID DRAINAGE DEVICE: Chronic | ICD-10-CM

## 2025-02-22 LAB
ALBUMIN SERPL ELPH-MCNC: 4 G/DL — SIGNIFICANT CHANGE UP (ref 3.5–5)
ALP SERPL-CCNC: 146 U/L — HIGH (ref 40–120)
ALT FLD-CCNC: 23 U/L DA — SIGNIFICANT CHANGE UP (ref 10–60)
AMMONIA BLD-MCNC: 34 UMOL/L — HIGH (ref 11–32)
AMPHET UR-MCNC: NEGATIVE — SIGNIFICANT CHANGE UP
ANION GAP SERPL CALC-SCNC: 18 MMOL/L — HIGH (ref 5–17)
APAP SERPL-MCNC: >2 UG/ML — LOW (ref 10–30)
APPEARANCE UR: CLEAR — SIGNIFICANT CHANGE UP
AST SERPL-CCNC: 12 U/L — SIGNIFICANT CHANGE UP (ref 10–40)
BACTERIA # UR AUTO: ABNORMAL /HPF
BARBITURATES UR SCN-MCNC: NEGATIVE — SIGNIFICANT CHANGE UP
BASE EXCESS BLDA CALC-SCNC: -0.2 MMOL/L — SIGNIFICANT CHANGE UP (ref -2–3)
BASE EXCESS BLDV CALC-SCNC: -14.2 MMOL/L — SIGNIFICANT CHANGE UP
BASOPHILS # BLD AUTO: 0.12 K/UL — SIGNIFICANT CHANGE UP (ref 0–0.2)
BASOPHILS NFR BLD AUTO: 0.6 % — SIGNIFICANT CHANGE UP (ref 0–2)
BENZODIAZ UR-MCNC: POSITIVE
BILIRUB SERPL-MCNC: 0.8 MG/DL — SIGNIFICANT CHANGE UP (ref 0.2–1.2)
BILIRUB UR-MCNC: NEGATIVE — SIGNIFICANT CHANGE UP
BLOOD GAS COMMENTS ARTERIAL: SIGNIFICANT CHANGE UP
BUN SERPL-MCNC: 18 MG/DL — SIGNIFICANT CHANGE UP (ref 7–18)
CALCIUM SERPL-MCNC: 9.8 MG/DL — SIGNIFICANT CHANGE UP (ref 8.4–10.5)
CARBAMAZEPINE SERPL-MCNC: <0.8 UG/ML — LOW (ref 4–12)
CHLORIDE SERPL-SCNC: 102 MMOL/L — SIGNIFICANT CHANGE UP (ref 96–108)
CK SERPL-CCNC: 33 U/L — SIGNIFICANT CHANGE UP (ref 21–215)
CO2 SERPL-SCNC: 18 MMOL/L — LOW (ref 22–31)
COCAINE METAB.OTHER UR-MCNC: NEGATIVE — SIGNIFICANT CHANGE UP
COLOR SPEC: YELLOW — SIGNIFICANT CHANGE UP
CREAT SERPL-MCNC: 1.04 MG/DL — SIGNIFICANT CHANGE UP (ref 0.5–1.3)
DIFF PNL FLD: ABNORMAL
EGFR: 62 ML/MIN/1.73M2 — SIGNIFICANT CHANGE UP
EOSINOPHIL # BLD AUTO: 0.25 K/UL — SIGNIFICANT CHANGE UP (ref 0–0.5)
EOSINOPHIL NFR BLD AUTO: 1.3 % — SIGNIFICANT CHANGE UP (ref 0–6)
EPI CELLS # UR: PRESENT
ETHANOL SERPL-MCNC: 6 MG/DL — SIGNIFICANT CHANGE UP (ref 0–10)
ETHANOL SERPL-MCNC: <3 MG/DL — SIGNIFICANT CHANGE UP (ref 0–10)
GLUCOSE SERPL-MCNC: 195 MG/DL — HIGH (ref 70–99)
GLUCOSE UR QL: 250 MG/DL
HCG UR QL: NEGATIVE — SIGNIFICANT CHANGE UP
HCO3 BLDA-SCNC: 23 MMOL/L — SIGNIFICANT CHANGE UP (ref 21–28)
HCO3 BLDV-SCNC: 17 MMOL/L — LOW (ref 22–29)
HCT VFR BLD CALC: 50.2 % — HIGH (ref 34.5–45)
HGB BLD-MCNC: 16 G/DL — HIGH (ref 11.5–15.5)
HOROWITZ INDEX BLDA+IHG-RTO: 60 — SIGNIFICANT CHANGE UP
IMM GRANULOCYTES NFR BLD AUTO: 0.7 % — SIGNIFICANT CHANGE UP (ref 0–0.9)
KETONES UR-MCNC: NEGATIVE MG/DL — SIGNIFICANT CHANGE UP
LACTATE SERPL-SCNC: 1.4 MMOL/L — SIGNIFICANT CHANGE UP (ref 0.7–2)
LACTATE SERPL-SCNC: 13 MMOL/L — CRITICAL HIGH (ref 0.7–2)
LEUKOCYTE ESTERASE UR-ACNC: NEGATIVE — SIGNIFICANT CHANGE UP
LYMPHOCYTES # BLD AUTO: 25.5 % — SIGNIFICANT CHANGE UP (ref 13–44)
LYMPHOCYTES # BLD AUTO: 5.03 K/UL — HIGH (ref 1–3.3)
MAGNESIUM SERPL-MCNC: 2.3 MG/DL — SIGNIFICANT CHANGE UP (ref 1.6–2.6)
MANUAL SMEAR VERIFICATION: SIGNIFICANT CHANGE UP
MCHC RBC-ENTMCNC: 26.3 PG — LOW (ref 27–34)
MCHC RBC-ENTMCNC: 31.9 G/DL — LOW (ref 32–36)
MCV RBC AUTO: 82.6 FL — SIGNIFICANT CHANGE UP (ref 80–100)
METHADONE UR-MCNC: NEGATIVE — SIGNIFICANT CHANGE UP
MONOCYTES # BLD AUTO: 1.09 K/UL — HIGH (ref 0–0.9)
MONOCYTES NFR BLD AUTO: 5.5 % — SIGNIFICANT CHANGE UP (ref 2–14)
NEUTROPHILS # BLD AUTO: 13.14 K/UL — HIGH (ref 1.8–7.4)
NEUTROPHILS NFR BLD AUTO: 66.4 % — SIGNIFICANT CHANGE UP (ref 43–77)
NITRITE UR-MCNC: NEGATIVE — SIGNIFICANT CHANGE UP
NRBC BLD AUTO-RTO: 0 /100 WBCS — SIGNIFICANT CHANGE UP (ref 0–0)
OPIATES UR-MCNC: NEGATIVE — SIGNIFICANT CHANGE UP
PCO2 BLDA: 31 MMHG — LOW (ref 32–35)
PCO2 BLDV: 63 MMHG — HIGH (ref 39–42)
PCP SPEC-MCNC: SIGNIFICANT CHANGE UP
PCP UR-MCNC: NEGATIVE — SIGNIFICANT CHANGE UP
PH BLDA: 7.47 — HIGH (ref 7.35–7.45)
PH BLDV: 7.04 — LOW (ref 7.32–7.43)
PH UR: 5 — SIGNIFICANT CHANGE UP (ref 5–8)
PHENYTOIN FREE SERPL-MCNC: <0.4 UG/ML — LOW (ref 10–20)
PHOSPHATE SERPL-MCNC: 5.1 MG/DL — HIGH (ref 2.5–4.5)
PLAT MORPH BLD: NORMAL — SIGNIFICANT CHANGE UP
PLATELET # BLD AUTO: 411 K/UL — HIGH (ref 150–400)
PO2 BLDA: 180 MMHG — HIGH (ref 83–108)
PO2 BLDV: 52 MMHG — SIGNIFICANT CHANGE UP
POTASSIUM SERPL-MCNC: 4.4 MMOL/L — SIGNIFICANT CHANGE UP (ref 3.5–5.3)
POTASSIUM SERPL-SCNC: 4.4 MMOL/L — SIGNIFICANT CHANGE UP (ref 3.5–5.3)
PROT SERPL-MCNC: 8.6 G/DL — HIGH (ref 6–8.3)
PROT UR-MCNC: 30 MG/DL
RBC # BLD: 6.08 M/UL — HIGH (ref 3.8–5.2)
RBC # FLD: 14.1 % — SIGNIFICANT CHANGE UP (ref 10.3–14.5)
RBC BLD AUTO: NORMAL — SIGNIFICANT CHANGE UP
RBC CASTS # UR COMP ASSIST: 6 /HPF — HIGH (ref 0–4)
SALICYLATES SERPL-MCNC: <1.7 MG/DL — LOW (ref 2.8–20)
SAO2 % BLDA: 97 % — SIGNIFICANT CHANGE UP
SAO2 % BLDV: 72.5 % — SIGNIFICANT CHANGE UP
SODIUM SERPL-SCNC: 138 MMOL/L — SIGNIFICANT CHANGE UP (ref 135–145)
SP GR SPEC: 1.02 — SIGNIFICANT CHANGE UP (ref 1–1.03)
THC UR QL: NEGATIVE — SIGNIFICANT CHANGE UP
TROPONIN I, HIGH SENSITIVITY RESULT: 4.5 NG/L — SIGNIFICANT CHANGE UP
UROBILINOGEN FLD QL: 0.2 MG/DL — SIGNIFICANT CHANGE UP (ref 0.2–1)
VALPROATE SERPL-MCNC: <3 UG/ML — LOW (ref 50–100)
WBC # BLD: 19.76 K/UL — HIGH (ref 3.8–10.5)
WBC # FLD AUTO: 19.76 K/UL — HIGH (ref 3.8–10.5)
WBC UR QL: 2 /HPF — SIGNIFICANT CHANGE UP (ref 0–5)

## 2025-02-22 PROCEDURE — 31500 INSERT EMERGENCY AIRWAY: CPT

## 2025-02-22 PROCEDURE — 99291 CRITICAL CARE FIRST HOUR: CPT

## 2025-02-22 PROCEDURE — 70450 CT HEAD/BRAIN W/O DYE: CPT | Mod: 26

## 2025-02-22 PROCEDURE — 71045 X-RAY EXAM CHEST 1 VIEW: CPT | Mod: 26

## 2025-02-22 PROCEDURE — 99291 CRITICAL CARE FIRST HOUR: CPT | Mod: 25

## 2025-02-22 RX ORDER — VANCOMYCIN HCL IN 5 % DEXTROSE 1.5G/250ML
1000 PLASTIC BAG, INJECTION (ML) INTRAVENOUS EVERY 24 HOURS
Refills: 0 | Status: DISCONTINUED | OUTPATIENT
Start: 2025-02-22 | End: 2025-02-22

## 2025-02-22 RX ORDER — PROPOFOL 10 MG/ML
5 INJECTION, EMULSION INTRAVENOUS
Qty: 1000 | Refills: 0 | Status: DISCONTINUED | OUTPATIENT
Start: 2025-02-22 | End: 2025-02-22

## 2025-02-22 RX ORDER — MIRTAZAPINE 30 MG/1
1 TABLET, FILM COATED ORAL
Refills: 0 | DISCHARGE

## 2025-02-22 RX ORDER — PROPOFOL 10 MG/ML
5 INJECTION, EMULSION INTRAVENOUS
Qty: 1000 | Refills: 0 | Status: DISCONTINUED | OUTPATIENT
Start: 2025-02-22 | End: 2025-02-23

## 2025-02-22 RX ORDER — LEVETIRACETAM 10 MG/ML
1500 INJECTION, SOLUTION INTRAVENOUS ONCE
Refills: 0 | Status: DISCONTINUED | OUTPATIENT
Start: 2025-02-22 | End: 2025-02-22

## 2025-02-22 RX ORDER — DIAZEPAM 2 MG/1
5 TABLET ORAL ONCE
Refills: 0 | Status: DISCONTINUED | OUTPATIENT
Start: 2025-02-22 | End: 2025-02-22

## 2025-02-22 RX ORDER — MIDAZOLAM IN 0.9 % SOD.CHLORID 1 MG/ML
10 PLASTIC BAG, INJECTION (ML) INTRAVENOUS ONCE
Refills: 0 | Status: DISCONTINUED | OUTPATIENT
Start: 2025-02-22 | End: 2025-02-22

## 2025-02-22 RX ORDER — CEFEPIME 2 G/20ML
INJECTION, POWDER, FOR SOLUTION INTRAVENOUS
Refills: 0 | Status: DISCONTINUED | OUTPATIENT
Start: 2025-02-22 | End: 2025-02-23

## 2025-02-22 RX ORDER — CEFTRIAXONE 500 MG/1
1000 INJECTION, POWDER, FOR SOLUTION INTRAMUSCULAR; INTRAVENOUS EVERY 24 HOURS
Refills: 0 | Status: DISCONTINUED | OUTPATIENT
Start: 2025-02-22 | End: 2025-02-22

## 2025-02-22 RX ORDER — LORAZEPAM 4 MG/ML
2 VIAL (ML) INJECTION ONCE
Refills: 0 | Status: DISCONTINUED | OUTPATIENT
Start: 2025-02-22 | End: 2025-02-22

## 2025-02-22 RX ORDER — LEVETIRACETAM 10 MG/ML
3000 INJECTION, SOLUTION INTRAVENOUS ONCE
Refills: 0 | Status: DISCONTINUED | OUTPATIENT
Start: 2025-02-22 | End: 2025-02-22

## 2025-02-22 RX ORDER — ETOMIDATE 2 MG/ML
20 SYRINGE (ML) INTRAVENOUS ONCE
Refills: 0 | Status: COMPLETED | OUTPATIENT
Start: 2025-02-22 | End: 2025-02-22

## 2025-02-22 RX ORDER — ROCURONIUM BROMIDE 10 MG/ML
100 INJECTION, SOLUTION INTRAVENOUS ONCE
Refills: 0 | Status: DISCONTINUED | OUTPATIENT
Start: 2025-02-22 | End: 2025-02-22

## 2025-02-22 RX ORDER — SODIUM CHLORIDE 9 G/1000ML
1000 INJECTION, SOLUTION INTRAVENOUS
Refills: 0 | Status: DISCONTINUED | OUTPATIENT
Start: 2025-02-22 | End: 2025-02-23

## 2025-02-22 RX ORDER — LEVETIRACETAM 10 MG/ML
500 INJECTION, SOLUTION INTRAVENOUS EVERY 12 HOURS
Refills: 0 | Status: DISCONTINUED | OUTPATIENT
Start: 2025-02-22 | End: 2025-02-23

## 2025-02-22 RX ORDER — MIDAZOLAM IN 0.9 % SOD.CHLORID 1 MG/ML
5 PLASTIC BAG, INJECTION (ML) INTRAVENOUS ONCE
Refills: 0 | Status: DISCONTINUED | OUTPATIENT
Start: 2025-02-22 | End: 2025-02-22

## 2025-02-22 RX ORDER — VANCOMYCIN HCL IN 5 % DEXTROSE 1.5G/250ML
1000 PLASTIC BAG, INJECTION (ML) INTRAVENOUS EVERY 24 HOURS
Refills: 0 | Status: DISCONTINUED | OUTPATIENT
Start: 2025-02-22 | End: 2025-02-23

## 2025-02-22 RX ORDER — CEFEPIME 2 G/20ML
2000 INJECTION, POWDER, FOR SOLUTION INTRAVENOUS EVERY 8 HOURS
Refills: 0 | Status: DISCONTINUED | OUTPATIENT
Start: 2025-02-23 | End: 2025-02-23

## 2025-02-22 RX ORDER — ACETAMINOPHEN 500 MG/5ML
1000 LIQUID (ML) ORAL ONCE
Refills: 0 | Status: COMPLETED | OUTPATIENT
Start: 2025-02-22 | End: 2025-02-22

## 2025-02-22 RX ORDER — ROCURONIUM BROMIDE 10 MG/ML
70 INJECTION, SOLUTION INTRAVENOUS ONCE
Refills: 0 | Status: COMPLETED | OUTPATIENT
Start: 2025-02-22 | End: 2025-02-22

## 2025-02-22 RX ORDER — LEVETIRACETAM 10 MG/ML
500 INJECTION, SOLUTION INTRAVENOUS EVERY 12 HOURS
Refills: 0 | Status: DISCONTINUED | OUTPATIENT
Start: 2025-02-22 | End: 2025-02-22

## 2025-02-22 RX ORDER — HEPARIN SODIUM 1000 [USP'U]/ML
5000 INJECTION INTRAVENOUS; SUBCUTANEOUS EVERY 8 HOURS
Refills: 0 | Status: DISCONTINUED | OUTPATIENT
Start: 2025-02-22 | End: 2025-02-23

## 2025-02-22 RX ORDER — SERTRALINE 100 MG/1
1 TABLET, FILM COATED ORAL
Refills: 0 | DISCHARGE

## 2025-02-22 RX ORDER — CEFEPIME 2 G/20ML
2000 INJECTION, POWDER, FOR SOLUTION INTRAVENOUS ONCE
Refills: 0 | Status: COMPLETED | OUTPATIENT
Start: 2025-02-22 | End: 2025-02-22

## 2025-02-22 RX ADMIN — ROCURONIUM BROMIDE 70 MILLIGRAM(S): 10 INJECTION, SOLUTION INTRAVENOUS at 16:48

## 2025-02-22 RX ADMIN — Medication 250 MILLIGRAM(S): at 23:53

## 2025-02-22 RX ADMIN — Medication 2000 MILLILITER(S): at 17:13

## 2025-02-22 RX ADMIN — LEVETIRACETAM 420 MILLIGRAM(S): 10 INJECTION, SOLUTION INTRAVENOUS at 23:50

## 2025-02-22 RX ADMIN — Medication 1000 MILLILITER(S): at 18:02

## 2025-02-22 RX ADMIN — Medication 40 MILLIGRAM(S): at 23:52

## 2025-02-22 RX ADMIN — LEVETIRACETAM 3000 MILLIGRAM(S): 10 INJECTION, SOLUTION INTRAVENOUS at 17:04

## 2025-02-22 RX ADMIN — Medication 20 MILLIGRAM(S): at 16:44

## 2025-02-22 RX ADMIN — PROPOFOL 1.85 MICROGRAM(S)/KG/MIN: 10 INJECTION, EMULSION INTRAVENOUS at 22:29

## 2025-02-22 RX ADMIN — Medication 5 MILLIGRAM(S): at 16:40

## 2025-02-22 RX ADMIN — CEFEPIME 100 MILLIGRAM(S): 2 INJECTION, POWDER, FOR SOLUTION INTRAVENOUS at 23:53

## 2025-02-22 RX ADMIN — Medication 1 APPLICATION(S): at 23:22

## 2025-02-22 RX ADMIN — HEPARIN SODIUM 5000 UNIT(S): 1000 INJECTION INTRAVENOUS; SUBCUTANEOUS at 23:52

## 2025-02-22 RX ADMIN — PROPOFOL 1.85 MICROGRAM(S)/KG/MIN: 10 INJECTION, EMULSION INTRAVENOUS at 17:13

## 2025-02-22 RX ADMIN — Medication 2 MILLIGRAM(S): at 18:22

## 2025-02-22 RX ADMIN — Medication 400 MILLIGRAM(S): at 23:53

## 2025-02-22 NOTE — H&P ADULT - HISTORY OF PRESENT ILLNESS
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 As per ED note, Pt. arrived postictal and had a tonic-clonic seizure with gaze deviation to the left in ED. In ED, pt received versed 5 mg x2, ativan 2 mg x1. Patient intubated by ED for airway protection and status epilepticus. ROS is limited due to pt being intubated and sedated. As per  at bedside, pt did not experience any seizures since her accident.    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 As per ED note, Pt. arrived postictal and had a tonic-clonic seizure with gaze deviation to the left in ED. In ED, pt received versed 5 mg x2, ativan 2 mg x1. Patient intubated by ED for airway protection and status epilepticus. ROS is limited due to pt being intubated and sedated. As per  at bedside, pt did not experience any seizures since her accident. Pt is admitted to ICU for status epilepticus.  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 As per partner Ms. Jonelle Douglass at bedside, pt did not experience any seizures since her accident. She states that the pt was complaining of N and weakness today. She noticed the pt to be lethargic, drooling and was dragging her extremities which promoted her to call 911. She found the patient to have a seizure (generalized shakiness of her upper and lower extremities). She denies any incontinence. She had 2 more episodes of seizures when the EMS arrived and once on route to the ED. Each episode lasts around 1-2 minutes. As per ED note, Pt. arrived postictal and had a tonic-clonic seizure with gaze deviation to the left in ED. In ED, pt received versed 5 mg x2, ativan 2 mg x1. Patient intubated by ED for airway protection and status epilepticus. ROS is limited due to pt being intubated and sedated.  Pt is admitted to ICU for status epilepticus.

## 2025-02-22 NOTE — H&P ADULT - NSHPSOCIALHISTORY_GEN_ALL_CORE
lives at home with partner lives at home with partner. Denies smoking, alcohol, illicit drug use.   Ambulates independently

## 2025-02-22 NOTE — ED PROVIDER NOTE - CLINICAL SUMMARY MEDICAL DECISION MAKING FREE TEXT BOX
Patient BIBA s/p shaking/jerking movements witnessed in the field.    Differential diagnosis for seizures include nonadherence to anti-epileptic drugs or lowered seizure threshold from infection.  No known head trauma, however will CT head as pt altered and unable to give hx w family out of touch.  Possible stroke though less likely given overarching symptoms of seizure and no overt focal neuro deficits.    Workup: CBC, BMP, LFT, UA, CXR, lactate, ECG, CT Brain    Field Interventions:  ED Interventions:  Consult: Neurology re EEG  Disposition:  Admit

## 2025-02-22 NOTE — H&P ADULT - NSHPPHYSICALEXAM_GEN_ALL_CORE
GENERAL: NAD, well-groomed, well-developed  HEAD:  Atraumatic, Normocephalic  EYES: EOMI, PERRLA, conjunctiva and sclera clear  ENMT: No tonsillar erythema, exudates, or enlargement; Moist mucous membranes, Good dentition, No lesions  NECK: Supple, normal appearance, No JVD; Normal thyroid; Trachea midline  NERVOUS SYSTEM:  Alert & Oriented X3,  Motor Strength 5/5 B/L upper and lower extremities, sensation intact  CHEST/LUNG: Lungs clear to auscultation bilaterally, No rales, rhonchi, wheezing   HEART: Regular rate and rhythm; No murmurs, rubs, or gallops  ABDOMEN: Soft, Nontender, Nondistended; Bowel sounds present  EXTREMITIES:  2+ Peripheral Pulses, No clubbing, cyanosis, or edema  LYMPH: No lymphadenopathy noted  SKIN: No rashes or lesions;  Good capillary refill GENERAL: NAD, intubated sedated  HEAD:  s/p cranioplasty  EYES: conjunctiva and sclera clear  ENMT: intubated, No lesions  NECK: Supple, normal appearance, No JVD; Normal thyroid; Trachea midline  NERVOUS SYSTEM:  sedated, spontaneous movement. no focal deficits.   CHEST/LUNG: Lungs clear to auscultation bilaterally, No rales, rhonchi, wheezing   HEART: Regular rate and rhythm; No murmurs, rubs, or gallops  ABDOMEN: Soft, Nontender, Nondistended; Bowel sounds present  EXTREMITIES:  2+ Peripheral Pulses, No clubbing, cyanosis, or edema  LYMPH: No lymphadenopathy noted  SKIN: No rashes or lesions;  Good capillary refill

## 2025-02-22 NOTE — ED PROVIDER NOTE - CRITICAL CARE ATTENDING CONTRIBUTION TO CARE
Upon my evaluation, this patient had a high probability of imminent or life-threatening deterioration due to STATUS EPILEPTICUS  which required my direct attention, intervention, and personal management.    I have personally provided 60  minutes of critical care time exclusive of time spent on separately billable procedures. Time includes review of laboratory data, radiology results, discussion with consultants, and monitoring for potential decompensation. Interventions were performed as documented above.

## 2025-02-22 NOTE — ED PROCEDURE NOTE - NS ED PROCEUDURE1 POST INTUBATION REVIEW
Appropriate capnography
Appropriate capnography/Breath sounds bilateral/Positive end tidal Co2 noted/Chest X-Ray

## 2025-02-22 NOTE — ED PROVIDER NOTE - OBJECTIVE STATEMENT
59 female presents for seizures.  Patient had a witnessed seizure at home by her partner.  Patient arrived postictal unable to provide history.  Shortly after arrival had a tonic-clonic seizure with gaze deviation to the left.  Partner at the bedside reports last year patient having a TBI with hydrocephalus and a  shunt that has since been removed to a physiological shunt.  Partner states that she has been placed on cover after the TBI but has not had a seizure since the diagnosis.  On arrival patient required multiple doses of Versed to stop tonic-clonic movements but appeared to still be having subclinical seizures with gaze deviation persistently to the left.  Patient intubated for airway protection and status epilepticus.      GENERALIZED APPEARANCE: Appears post-ictal,  Non-verbal due to AMS.   SKIN:  Warm, dry, normal color for race, no rashes, no cyanosis.  HEENMT: Atraumatic. PERRLA. EOMI. No JVD. No lymphadenopathy.  +Gaze deviation to left  RESPIRATORY:  Clear to auscultation B/L, adequate air entry bilaterally, symmetrical chest expansion. No obvious respiratory distress.   CARDIOVASCULAR:  Regular rate, no obvious murmur.  GI:  Soft, non-tender, non-distended.  No rebound, no guarding.  EXTREMITIES:  No deformity, edema, or calf tenderness. Pulses intact x4.  NEURO: AAOx0. No focal deficits. Spontaneously awake but unable to respond..

## 2025-02-22 NOTE — ED ADULT NURSE NOTE - NSFALLRISKINTERV_ED_ALL_ED

## 2025-02-22 NOTE — H&P ADULT - ATTENDING COMMENTS
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), TBI with R SDH s/p R decompressive hemicraniectomy followed by cranioplasty, s/p L frontal Endoscopic third ventriculostomy and ligation of R  shunt with ligaclips with retention of valve, anxiety and depression presenting for recurrent witnessed seizures. Pt has generalized tonic clonic seizure with leftward gaze on arrival, got ativan 2 mg plus versed 10 mg with resolution of movement but persistent  to ED, got versed 10 mg Pt got intubated on arrival, Labs significant for leukocytosis, HAGMA, and lactic acidosis. CT head with no acute findings       As per partner MsXavier Jonelle Douglass at bedside, pt did not experience any seizures since her accident. She states that the pt was complaining of N and weakness today. She noticed the pt to be lethargic, drooling and was dragging her extremities which promoted her to call 911. She found the patient to have a seizure (generalized shakiness of her upper and lower extremities). She denies any incontinence. She had 2 more episodes of seizures when the EMS arrived and once on route to the ED. Each episode lasts around 1-2 minutes. As per ED note, Pt. arrived postictal and had a tonic-clonic seizure with gaze deviation to the left in ED. In ED, pt received versed 5 mg x2, ativan 2 mg x1. Patient intubated by ED for airway protection and status epilepticus. ROS is limited due to pt being intubated and sedated.  Pt is admitted to ICU for status epilepticus. 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 Pt has generalized tonic clonic seizure with leftward gauze on arrival, got ativan 2 mg plus versed 10 mg with resolution of movement but persistent leftward gauze and got intubated, Labs significant for leukocytosis, HAGMA, lactic acidosis and mild hyperglycemia. CT head with no major change from prior imaging.     Pt intubated, sedated, bilateral equal reacting pupils, no gauze preference, neck supple, RS bibasilar diminished breathe sounds, CVS s1s2, abdomen soft, BS +, not distended, no edema, all four extremities are equally warm to touch, and does not follow commands. POCUS with hyperdynamic LV and no pericardial effusion     A/P     Pt with acute respiratory failure, recurrent seizures, rule in status, possible severe sepsis, fever, source unclear, toxic metabolic plus post ictal encephalopathy, HAGMA, lactic acidosis, and rest PMH as above    Maintain lung protective ventilation, propofol to goal RASS, maintain BP goal, broad spectrum antibiotics, cultures to follow, head CT result reviewed by me, routine spot EEG, neurology consult, changing keppra to IV, adjust AED per neurology recommendations, I will continue to review with neurology decision regarding need for continuous video EEG, serial lactic acid check, NPO, IVF, FS monitoring, prognosis is guarded, discussed with ER, discussed with ICU team and [facility] time 55 minutes exclude any procedure time

## 2025-02-22 NOTE — ED PROVIDER NOTE - HOW PATIENT ADDRESSED, PROFILE
Scribe Attestation (For Scribes USE Only)... Pamela I have personally evaluated and examined the patient. The Attending was available to me as a supervising provider if needed./Scribe Attestation (For Scribes USE Only)...

## 2025-02-22 NOTE — H&P ADULT - ASSESSMENT
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 Pt is admitted to ICU for status epilepticus.     Assessment:  #Status Epilepticus  #SIRS  #Leukocytosis  #Elevated Lactate  #Metabolic Acidosis  #LOBO  #Hx of  shunt, anxiety and depression, hyponatremia    Plan:    =====================[CNS ]==============================  #Status Epilepticus  p/w witnessed tonic clonic seizures  Head CT: No acute intracranial hemorrhage, mass effect, or midline shift. Stable  shunt and moderate ventriculomegaly. Slight increase in size of extra-axial CSF collection overlying the right frontal duraplasty, likely a hygroma. There is no mass effect.  Lactate 13 > 1.4  CK level 33  s/p versed 5 mg x2, ativan 2 mg x1, started on propofol  Seizures/Aspirations/Fall Precautions  f/u EEG   f/u S/S once pt is extubated  Consult Neuro   c/w propofol drip for sedation for now    #Neurologic:   - Intubated, sedated   - RASS goal -1   - Propofol for sedation    # shunt  pt with hx of  shunt (in [facility]), s/p traumatic fall with head trauma and R SDH, s/p R decompressive hemicraniectomy, ICP monitor and R cranioplasty (5/2024), s/p L frontal Endoscopic third ventriculostomy and ligation of R  shunt with ligaclips with retention of valve, proximal and distal catheters (08/2024)  continue to monitor    #Anxiety and depression  History of anxiety and depression  hold home meds in the setting of intubation    =====================[CVS ]===============================  # No active issues    =====================[RESP ]==============================  # Intubated  due to airway protection in the setting of status epilepticus  SBT/SAT daily    =====================[ GI ]================================  # NPO  start PPI IV for ppx    ====================[ RENAL ]=============================   # LOBO  Pt w/ SCr 1.04 on admission  Baseline SCr - 0.5  likely prerenal  s/p 1 L bolus in the ED  IVF for now  follow BMP daily    #Metabolic acidosis  p/w pH 7.04, SCO2 18, AG 18, lactate 13  likely due to elevated lactate in the setting of seizure  repeat lactate 1.4  f/u ABG    =====================[ ID ]================================  #SIRS  #Leukocytosis  #Elevated Lactate  p/w HR >90, WBC 19k, lactate 13  lactate 1.4  ua neg  received 1 L bolus  likely reactive in the setting of seizure  f/u blood cultures, urine cultures  start CTX empirically    ===================[ HEME/ONC ]===========================  #Polycythemia  p/w hb 16, hct 50, wbc 19  likely hemoconcentration  s/p 1 L bolus in the ED  c/w IVF  f/u repeat CBC    =====================[ ENDO ]=============================  # No active issues  blood glucose q6h while NPO    ================= Skin/Catheters============================  # Peripheral IV lines  #FC 02/22    ==================[ PROPHYLAXIS ]==========================   # DVT ppx: Hep subq  # GI ppx: PPI IV qd     ====================[ DISPO ]==============================   - Monitor in ICU  59 year old female with PMHx of neurofibromatosis, 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 Pt is admitted to ICU for status epilepticus.     Assessment:  #Status Epilepticus  #SIRS  #Leukocytosis  #Elevated Lactate  #Metabolic Acidosis  #LOBO  #Hx of  shunt, anxiety and depression    Plan:    =====================[CNS ]==============================  #Status Epilepticus  p/w witnessed tonic clonic seizures  Head CT: No acute intracranial hemorrhage, mass effect, or midline shift. Stable  shunt and moderate ventriculomegaly. Slight increase in size of extra-axial CSF collection overlying the right frontal duraplasty, likely a hygroma. There is no mass effect.  Lactate 13 > 1.4  CK level 33  s/p versed 5 mg x2, ativan 2 mg x1, started on propofol, keppra load 3 g  takes keppra 500 mg bid at home  c/w home meds  Seizures/Aspirations/Fall Precautions  f/u EEG   f/u S/S once pt is extubated  Consult Neuro   c/w propofol drip for sedation for now    #Neurologic:   - Intubated, sedated   - RASS goal -1   - Propofol for sedation    # shunt  pt with hx of  shunt (in [facility]), s/p traumatic fall with head trauma and R SDH, s/p R decompressive hemicraniectomy, ICP monitor and R cranioplasty (5/2024), s/p L frontal Endoscopic third ventriculostomy and ligation of R  shunt with ligaclips with retention of valve, proximal and distal catheters (08/2024)  continue to monitor    #Anxiety and depression  History of anxiety and depression  takes zoloft 50 mg qd, mirtazapine 7.5 mg qd at home  hold home meds in the setting of intubation    =====================[CVS ]===============================  # No active issues    =====================[RESP ]==============================  # Intubated  due to airway protection in the setting of status epilepticus  SBT/SAT daily    =====================[ GI ]================================  # NPO  start PPI IV for ppx    ====================[ RENAL ]=============================   # LOBO  Pt w/ SCr 1.04 on admission  Baseline SCr - 0.5  likely prerenal  s/p 1 L bolus in the ED  IVF for now  follow BMP daily    #Metabolic acidosis  p/w pH 7.04, SCO2 18, AG 18, lactate 13  likely due to elevated lactate in the setting of seizure  repeat lactate 1.4  f/u ABG    =====================[ ID ]================================  #SIRS  #Leukocytosis  #Elevated Lactate  p/w HR >90, WBC 19k, lactate 13  lactate 1.4  ua neg  received 1 L bolus  likely reactive in the setting of seizure  f/u blood cultures, urine cultures  start CTX empirically    ===================[ HEME/ONC ]===========================  #Polycythemia  p/w hb 16, hct 50, wbc 19  likely hemoconcentration  s/p 1 L bolus in the ED  c/w IVF  f/u repeat CBC    =====================[ ENDO ]=============================  # No active issues  blood glucose q6h while NPO    ================= Skin/Catheters============================  # Peripheral IV lines  #FC 02/22    ==================[ PROPHYLAXIS ]==========================   # DVT ppx: Hep subq  # GI ppx: PPI IV qd     ====================[ DISPO ]==============================   - Monitor in ICU

## 2025-02-22 NOTE — ED PROCEDURE NOTE - CPROC ED TRACHE INTUB DETAIL1
Patient was pre-oxygenated. An endotracheal tube (ETT) was placed through the vocal cords into the trachea.  ETT position was confirmed by auscultation of bilateral breath sounds to all lung fields. ETCO2 level was appropriate.
Patient was pre-oxygenated. An endotracheal tube (ETT) was placed through the vocal cords into the trachea.  ETT position was confirmed by auscultation of bilateral breath sounds to all lung fields. ETCO2 level was appropriate.

## 2025-02-22 NOTE — ED ADULT NURSE NOTE - NEURO ASSESSMENT
Anticoagulation Progress Note    Indication: Atrial fibrillation  Goal INR: 2-3  INR Result:  1.1  75 year old female returns to the [facility] for a follow-up visit after 6 weeks.        Assessment  (-) bleeding, bruising or thromboembolic complications  (-) missed/extra warfarin doses(  -) medication changes  (-) dietary changes  (-) alcohol  (-) smoking  (-) activity level changes  (+) hospital visits, ER visits, interruptions in therapy: Pt is off of coumadin since Sat last week for foot surgery today 10/27/21 (bunion)  (-) illness/infection, injuries, or falls    Plan   -INR - 1.1 subtherapeutic.   Pt has been taking 23mg/week coumadin. Pt will take 5mg x 3 days if ok to resume coumadin and after that regular dose of 4mg tu & th and ROW 3mg with INR check in 2 week on 11/9 at 11.45am at [facility] after 's appt at 10.30am same day.   - - -

## 2025-02-23 ENCOUNTER — INPATIENT (INPATIENT)
Facility: HOSPITAL | Age: 60
LOS: 38 days | Discharge: NURSING, REHAB OR LONG-TERM CARE | End: 2025-04-03
Attending: NEUROLOGICAL SURGERY | Admitting: NEUROLOGICAL SURGERY
Payer: COMMERCIAL

## 2025-02-23 VITALS
TEMPERATURE: 99 F | DIASTOLIC BLOOD PRESSURE: 63 MMHG | SYSTOLIC BLOOD PRESSURE: 96 MMHG | HEART RATE: 70 BPM | OXYGEN SATURATION: 100 %

## 2025-02-23 VITALS
HEIGHT: 62.99 IN | HEART RATE: 61 BPM | WEIGHT: 146.61 LBS | DIASTOLIC BLOOD PRESSURE: 52 MMHG | SYSTOLIC BLOOD PRESSURE: 77 MMHG | TEMPERATURE: 99 F

## 2025-02-23 DIAGNOSIS — D18.1 LYMPHANGIOMA, ANY SITE: ICD-10-CM

## 2025-02-23 DIAGNOSIS — Z87.898 PERSONAL HISTORY OF OTHER SPECIFIED CONDITIONS: Chronic | ICD-10-CM

## 2025-02-23 DIAGNOSIS — G40.911 EPILEPSY, UNSPECIFIED, INTRACTABLE, WITH STATUS EPILEPTICUS: ICD-10-CM

## 2025-02-23 DIAGNOSIS — Z98.2 PRESENCE OF CEREBROSPINAL FLUID DRAINAGE DEVICE: Chronic | ICD-10-CM

## 2025-02-23 LAB
ALBUMIN SERPL ELPH-MCNC: 2.9 G/DL — LOW (ref 3.5–5)
ALBUMIN SERPL ELPH-MCNC: 3 G/DL — LOW (ref 3.5–5)
ALP SERPL-CCNC: 107 U/L — SIGNIFICANT CHANGE UP (ref 40–120)
ALP SERPL-CCNC: 110 U/L — SIGNIFICANT CHANGE UP (ref 40–120)
ALT FLD-CCNC: 18 U/L DA — SIGNIFICANT CHANGE UP (ref 10–60)
ALT FLD-CCNC: 23 U/L DA — SIGNIFICANT CHANGE UP (ref 10–60)
ANION GAP SERPL CALC-SCNC: 6 MMOL/L — SIGNIFICANT CHANGE UP (ref 5–17)
ANION GAP SERPL CALC-SCNC: 8 MMOL/L — SIGNIFICANT CHANGE UP (ref 5–17)
AST SERPL-CCNC: 12 U/L — SIGNIFICANT CHANGE UP (ref 10–40)
AST SERPL-CCNC: 41 U/L — HIGH (ref 10–40)
BASOPHILS # BLD AUTO: 0.04 K/UL — SIGNIFICANT CHANGE UP (ref 0–0.2)
BASOPHILS # BLD AUTO: 0.04 K/UL — SIGNIFICANT CHANGE UP (ref 0–0.2)
BASOPHILS NFR BLD AUTO: 0.3 % — SIGNIFICANT CHANGE UP (ref 0–2)
BASOPHILS NFR BLD AUTO: 0.3 % — SIGNIFICANT CHANGE UP (ref 0–2)
BILIRUB SERPL-MCNC: 0.8 MG/DL — SIGNIFICANT CHANGE UP (ref 0.2–1.2)
BILIRUB SERPL-MCNC: 0.9 MG/DL — SIGNIFICANT CHANGE UP (ref 0.2–1.2)
BLOOD GAS ARTERIAL COMPREHENSIVE RESULT: SIGNIFICANT CHANGE UP
BUN SERPL-MCNC: 13 MG/DL — SIGNIFICANT CHANGE UP (ref 7–18)
BUN SERPL-MCNC: 14 MG/DL — SIGNIFICANT CHANGE UP (ref 7–18)
CALCIUM SERPL-MCNC: 8.4 MG/DL — SIGNIFICANT CHANGE UP (ref 8.4–10.5)
CALCIUM SERPL-MCNC: 8.8 MG/DL — SIGNIFICANT CHANGE UP (ref 8.4–10.5)
CHLORIDE SERPL-SCNC: 106 MMOL/L — SIGNIFICANT CHANGE UP (ref 96–108)
CHLORIDE SERPL-SCNC: 107 MMOL/L — SIGNIFICANT CHANGE UP (ref 96–108)
CO2 SERPL-SCNC: 20 MMOL/L — LOW (ref 22–31)
CO2 SERPL-SCNC: 23 MMOL/L — SIGNIFICANT CHANGE UP (ref 22–31)
CREAT SERPL-MCNC: 0.58 MG/DL — SIGNIFICANT CHANGE UP (ref 0.5–1.3)
CREAT SERPL-MCNC: 0.75 MG/DL — SIGNIFICANT CHANGE UP (ref 0.5–1.3)
EGFR: 104 ML/MIN/1.73M2 — SIGNIFICANT CHANGE UP
EGFR: 92 ML/MIN/1.73M2 — SIGNIFICANT CHANGE UP
EOSINOPHIL # BLD AUTO: 0.01 K/UL — SIGNIFICANT CHANGE UP (ref 0–0.5)
EOSINOPHIL # BLD AUTO: 0.02 K/UL — SIGNIFICANT CHANGE UP (ref 0–0.5)
EOSINOPHIL NFR BLD AUTO: 0.1 % — SIGNIFICANT CHANGE UP (ref 0–6)
EOSINOPHIL NFR BLD AUTO: 0.2 % — SIGNIFICANT CHANGE UP (ref 0–6)
FLUAV AG NPH QL: SIGNIFICANT CHANGE UP
FLUBV AG NPH QL: SIGNIFICANT CHANGE UP
GLUCOSE BLDC GLUCOMTR-MCNC: 115 MG/DL — HIGH (ref 70–99)
GLUCOSE SERPL-MCNC: 109 MG/DL — HIGH (ref 70–99)
GLUCOSE SERPL-MCNC: 111 MG/DL — HIGH (ref 70–99)
HCT VFR BLD CALC: 40 % — SIGNIFICANT CHANGE UP (ref 34.5–45)
HCT VFR BLD CALC: 44 % — SIGNIFICANT CHANGE UP (ref 34.5–45)
HGB BLD-MCNC: 13.2 G/DL — SIGNIFICANT CHANGE UP (ref 11.5–15.5)
HGB BLD-MCNC: 14.6 G/DL — SIGNIFICANT CHANGE UP (ref 11.5–15.5)
IMM GRANULOCYTES NFR BLD AUTO: 0.5 % — SIGNIFICANT CHANGE UP (ref 0–0.9)
IMM GRANULOCYTES NFR BLD AUTO: 0.5 % — SIGNIFICANT CHANGE UP (ref 0–0.9)
LYMPHOCYTES # BLD AUTO: 0.91 K/UL — LOW (ref 1–3.3)
LYMPHOCYTES # BLD AUTO: 1.3 K/UL — SIGNIFICANT CHANGE UP (ref 1–3.3)
LYMPHOCYTES # BLD AUTO: 10.2 % — LOW (ref 13–44)
LYMPHOCYTES # BLD AUTO: 7.8 % — LOW (ref 13–44)
MAGNESIUM SERPL-MCNC: 1.9 MG/DL — SIGNIFICANT CHANGE UP (ref 1.6–2.6)
MAGNESIUM SERPL-MCNC: 2.1 MG/DL — SIGNIFICANT CHANGE UP (ref 1.6–2.6)
MANUAL SMEAR VERIFICATION: SIGNIFICANT CHANGE UP
MCHC RBC-ENTMCNC: 25.9 PG — LOW (ref 27–34)
MCHC RBC-ENTMCNC: 26.5 PG — LOW (ref 27–34)
MCHC RBC-ENTMCNC: 33 G/DL — SIGNIFICANT CHANGE UP (ref 32–36)
MCHC RBC-ENTMCNC: 33.2 G/DL — SIGNIFICANT CHANGE UP (ref 32–36)
MCV RBC AUTO: 78.6 FL — LOW (ref 80–100)
MCV RBC AUTO: 79.9 FL — LOW (ref 80–100)
MONOCYTES # BLD AUTO: 1.09 K/UL — HIGH (ref 0–0.9)
MONOCYTES # BLD AUTO: 1.31 K/UL — HIGH (ref 0–0.9)
MONOCYTES NFR BLD AUTO: 10.3 % — SIGNIFICANT CHANGE UP (ref 2–14)
MONOCYTES NFR BLD AUTO: 9.3 % — SIGNIFICANT CHANGE UP (ref 2–14)
MRSA PCR RESULT.: SIGNIFICANT CHANGE UP
NEUTROPHILS # BLD AUTO: 10.01 K/UL — HIGH (ref 1.8–7.4)
NEUTROPHILS # BLD AUTO: 9.62 K/UL — HIGH (ref 1.8–7.4)
NEUTROPHILS NFR BLD AUTO: 78.5 % — HIGH (ref 43–77)
NEUTROPHILS NFR BLD AUTO: 82 % — HIGH (ref 43–77)
NRBC BLD AUTO-RTO: 0 /100 WBCS — SIGNIFICANT CHANGE UP (ref 0–0)
NRBC BLD AUTO-RTO: 0 /100 WBCS — SIGNIFICANT CHANGE UP (ref 0–0)
PHOSPHATE SERPL-MCNC: 3.2 MG/DL — SIGNIFICANT CHANGE UP (ref 2.5–4.5)
PHOSPHATE SERPL-MCNC: 3.6 MG/DL — SIGNIFICANT CHANGE UP (ref 2.5–4.5)
PLAT MORPH BLD: NORMAL — SIGNIFICANT CHANGE UP
PLATELET # BLD AUTO: 119 K/UL — LOW (ref 150–400)
PLATELET # BLD AUTO: 143 K/UL — LOW (ref 150–400)
PLATELET CLUMP BLD QL SMEAR: ABNORMAL
PLATELET COUNT - ESTIMATE: ABNORMAL
POTASSIUM SERPL-MCNC: 4.2 MMOL/L — SIGNIFICANT CHANGE UP (ref 3.5–5.3)
POTASSIUM SERPL-MCNC: 4.6 MMOL/L — SIGNIFICANT CHANGE UP (ref 3.5–5.3)
POTASSIUM SERPL-SCNC: 4.2 MMOL/L — SIGNIFICANT CHANGE UP (ref 3.5–5.3)
POTASSIUM SERPL-SCNC: 4.6 MMOL/L — SIGNIFICANT CHANGE UP (ref 3.5–5.3)
PROLACTIN SERPL-MCNC: 76.1 NG/ML — HIGH (ref 3.4–24.1)
PROT SERPL-MCNC: 6.6 G/DL — SIGNIFICANT CHANGE UP (ref 6–8.3)
PROT SERPL-MCNC: 7 G/DL — SIGNIFICANT CHANGE UP (ref 6–8.3)
RBC # BLD: 5.09 M/UL — SIGNIFICANT CHANGE UP (ref 3.8–5.2)
RBC # BLD: 5.51 M/UL — HIGH (ref 3.8–5.2)
RBC # FLD: 14 % — SIGNIFICANT CHANGE UP (ref 10.3–14.5)
RBC # FLD: 14.3 % — SIGNIFICANT CHANGE UP (ref 10.3–14.5)
RBC BLD AUTO: NORMAL — SIGNIFICANT CHANGE UP
RSV RNA NPH QL NAA+NON-PROBE: SIGNIFICANT CHANGE UP
S AUREUS DNA NOSE QL NAA+PROBE: DETECTED
SARS-COV-2 RNA SPEC QL NAA+PROBE: SIGNIFICANT CHANGE UP
SODIUM SERPL-SCNC: 133 MMOL/L — LOW (ref 135–145)
SODIUM SERPL-SCNC: 137 MMOL/L — SIGNIFICANT CHANGE UP (ref 135–145)
TSH SERPL-MCNC: 1.52 UU/ML — SIGNIFICANT CHANGE UP (ref 0.34–4.82)
WBC # BLD: 11.73 K/UL — HIGH (ref 3.8–10.5)
WBC # BLD: 12.75 K/UL — HIGH (ref 3.8–10.5)
WBC # FLD AUTO: 11.73 K/UL — HIGH (ref 3.8–10.5)
WBC # FLD AUTO: 12.75 K/UL — HIGH (ref 3.8–10.5)

## 2025-02-23 PROCEDURE — 31500 INSERT EMERGENCY AIRWAY: CPT

## 2025-02-23 PROCEDURE — 81025 URINE PREGNANCY TEST: CPT

## 2025-02-23 PROCEDURE — 80185 ASSAY OF PHENYTOIN TOTAL: CPT

## 2025-02-23 PROCEDURE — 84443 ASSAY THYROID STIM HORMONE: CPT

## 2025-02-23 PROCEDURE — 82803 BLOOD GASES ANY COMBINATION: CPT

## 2025-02-23 PROCEDURE — 82962 GLUCOSE BLOOD TEST: CPT

## 2025-02-23 PROCEDURE — 96375 TX/PRO/DX INJ NEW DRUG ADDON: CPT

## 2025-02-23 PROCEDURE — 70450 CT HEAD/BRAIN W/O DYE: CPT | Mod: MC

## 2025-02-23 PROCEDURE — 93005 ELECTROCARDIOGRAM TRACING: CPT

## 2025-02-23 PROCEDURE — 84100 ASSAY OF PHOSPHORUS: CPT

## 2025-02-23 PROCEDURE — 84146 ASSAY OF PROLACTIN: CPT

## 2025-02-23 PROCEDURE — 96376 TX/PRO/DX INJ SAME DRUG ADON: CPT

## 2025-02-23 PROCEDURE — 85025 COMPLETE CBC W/AUTO DIFF WBC: CPT

## 2025-02-23 PROCEDURE — 94002 VENT MGMT INPAT INIT DAY: CPT

## 2025-02-23 PROCEDURE — 87040 BLOOD CULTURE FOR BACTERIA: CPT

## 2025-02-23 PROCEDURE — 95718 EEG PHYS/QHP 2-12 HR W/VEEG: CPT

## 2025-02-23 PROCEDURE — 96374 THER/PROPH/DIAG INJ IV PUSH: CPT

## 2025-02-23 PROCEDURE — 80164 ASSAY DIPROPYLACETIC ACD TOT: CPT

## 2025-02-23 PROCEDURE — 82550 ASSAY OF CK (CPK): CPT

## 2025-02-23 PROCEDURE — 36415 COLL VENOUS BLD VENIPUNCTURE: CPT

## 2025-02-23 PROCEDURE — 84702 CHORIONIC GONADOTROPIN TEST: CPT

## 2025-02-23 PROCEDURE — 87640 STAPH A DNA AMP PROBE: CPT

## 2025-02-23 PROCEDURE — 99291 CRITICAL CARE FIRST HOUR: CPT | Mod: GC

## 2025-02-23 PROCEDURE — 71045 X-RAY EXAM CHEST 1 VIEW: CPT | Mod: 26,76,59

## 2025-02-23 PROCEDURE — 99223 1ST HOSP IP/OBS HIGH 75: CPT

## 2025-02-23 PROCEDURE — 80156 ASSAY CARBAMAZEPINE TOTAL: CPT

## 2025-02-23 PROCEDURE — 84484 ASSAY OF TROPONIN QUANT: CPT

## 2025-02-23 PROCEDURE — 94003 VENT MGMT INPAT SUBQ DAY: CPT

## 2025-02-23 PROCEDURE — 99291 CRITICAL CARE FIRST HOUR: CPT

## 2025-02-23 PROCEDURE — 71045 X-RAY EXAM CHEST 1 VIEW: CPT | Mod: 26

## 2025-02-23 PROCEDURE — 87641 MR-STAPH DNA AMP PROBE: CPT

## 2025-02-23 PROCEDURE — 71045 X-RAY EXAM CHEST 1 VIEW: CPT

## 2025-02-23 PROCEDURE — 96361 HYDRATE IV INFUSION ADD-ON: CPT

## 2025-02-23 PROCEDURE — 87637 SARSCOV2&INF A&B&RSV AMP PRB: CPT

## 2025-02-23 PROCEDURE — 95816 EEG AWAKE AND DROWSY: CPT

## 2025-02-23 PROCEDURE — 95713 VEEG 2-12 HR CONT MNTR: CPT

## 2025-02-23 PROCEDURE — 95716 VEEG EA 12-26HR CONT MNTR: CPT

## 2025-02-23 PROCEDURE — 82140 ASSAY OF AMMONIA: CPT

## 2025-02-23 PROCEDURE — 80177 DRUG SCRN QUAN LEVETIRACETAM: CPT

## 2025-02-23 PROCEDURE — 80053 COMPREHEN METABOLIC PANEL: CPT

## 2025-02-23 PROCEDURE — 81001 URINALYSIS AUTO W/SCOPE: CPT

## 2025-02-23 PROCEDURE — 83605 ASSAY OF LACTIC ACID: CPT

## 2025-02-23 PROCEDURE — 80307 DRUG TEST PRSMV CHEM ANLYZR: CPT

## 2025-02-23 PROCEDURE — 83735 ASSAY OF MAGNESIUM: CPT

## 2025-02-23 PROCEDURE — 93010 ELECTROCARDIOGRAM REPORT: CPT

## 2025-02-23 RX ORDER — PROPOFOL 10 MG/ML
10 INJECTION, EMULSION INTRAVENOUS
Qty: 1000 | Refills: 0 | Status: DISCONTINUED | OUTPATIENT
Start: 2025-02-23 | End: 2025-02-24

## 2025-02-23 RX ORDER — HYDROMORPHONE/SOD CHLOR,ISO/PF 2 MG/10 ML
0.2 SYRINGE (ML) INJECTION EVERY 4 HOURS
Refills: 0 | Status: DISCONTINUED | OUTPATIENT
Start: 2025-02-23 | End: 2025-02-25

## 2025-02-23 RX ORDER — PROPOFOL 10 MG/ML
4.83 INJECTION, EMULSION INTRAVENOUS
Qty: 1000 | Refills: 0 | Status: DISCONTINUED | OUTPATIENT
Start: 2025-02-23 | End: 2025-02-23

## 2025-02-23 RX ORDER — PIPERACILLIN-TAZO-DEXTROSE,ISO 3.375G/5
3.38 IV SOLUTION, PIGGYBACK PREMIX FROZEN(ML) INTRAVENOUS EVERY 8 HOURS
Refills: 0 | Status: DISCONTINUED | OUTPATIENT
Start: 2025-02-23 | End: 2025-02-24

## 2025-02-23 RX ORDER — CEFTRIAXONE 500 MG/1
2000 INJECTION, POWDER, FOR SOLUTION INTRAMUSCULAR; INTRAVENOUS EVERY 12 HOURS
Refills: 0 | Status: DISCONTINUED | OUTPATIENT
Start: 2025-02-23 | End: 2025-02-23

## 2025-02-23 RX ORDER — DEXMEDETOMIDINE HYDROCHLORIDE IN SODIUM CHLORIDE 4 UG/ML
0.2 INJECTION INTRAVENOUS
Qty: 200 | Refills: 0 | Status: DISCONTINUED | OUTPATIENT
Start: 2025-02-23 | End: 2025-02-24

## 2025-02-23 RX ORDER — FENTANYL CITRATE-0.9 % NACL/PF 100MCG/2ML
50 SYRINGE (ML) INTRAVENOUS EVERY 6 HOURS
Refills: 0 | Status: DISCONTINUED | OUTPATIENT
Start: 2025-02-23 | End: 2025-02-23

## 2025-02-23 RX ORDER — LEVETIRACETAM 10 MG/ML
1000 INJECTION, SOLUTION INTRAVENOUS EVERY 12 HOURS
Refills: 0 | Status: DISCONTINUED | OUTPATIENT
Start: 2025-02-23 | End: 2025-02-23

## 2025-02-23 RX ORDER — DEXAMETHASONE 0.5 MG/1
10 TABLET ORAL EVERY 6 HOURS
Refills: 0 | Status: DISCONTINUED | OUTPATIENT
Start: 2025-02-23 | End: 2025-02-23

## 2025-02-23 RX ORDER — HEPARIN SODIUM 1000 [USP'U]/ML
5000 INJECTION INTRAVENOUS; SUBCUTANEOUS EVERY 8 HOURS
Refills: 0 | Status: DISCONTINUED | OUTPATIENT
Start: 2025-02-23 | End: 2025-02-28

## 2025-02-23 RX ORDER — AMPICILLIN SODIUM 1 G/1
INJECTION, POWDER, FOR SOLUTION INTRAMUSCULAR; INTRAVENOUS
Refills: 0 | Status: DISCONTINUED | OUTPATIENT
Start: 2025-02-23 | End: 2025-02-23

## 2025-02-23 RX ORDER — CEFEPIME 2 G/20ML
2000 INJECTION, POWDER, FOR SOLUTION INTRAVENOUS EVERY 8 HOURS
Refills: 0 | Status: DISCONTINUED | OUTPATIENT
Start: 2025-02-23 | End: 2025-02-23

## 2025-02-23 RX ORDER — MELATONIN 5 MG
1 TABLET ORAL
Refills: 0 | DISCHARGE

## 2025-02-23 RX ORDER — ACETAMINOPHEN 500 MG/5ML
1000 LIQUID (ML) ORAL EVERY 6 HOURS
Refills: 0 | Status: COMPLETED | OUTPATIENT
Start: 2025-02-23 | End: 2025-02-25

## 2025-02-23 RX ORDER — PIPERACILLIN-TAZO-DEXTROSE,ISO 3.375G/5
3.38 IV SOLUTION, PIGGYBACK PREMIX FROZEN(ML) INTRAVENOUS ONCE
Refills: 0 | Status: DISCONTINUED | OUTPATIENT
Start: 2025-02-24 | End: 2025-02-23

## 2025-02-23 RX ORDER — NOREPINEPHRINE BITARTRATE 8 MG
0.05 SOLUTION INTRAVENOUS
Qty: 8 | Refills: 0 | Status: DISCONTINUED | OUTPATIENT
Start: 2025-02-23 | End: 2025-02-23

## 2025-02-23 RX ORDER — SODIUM CHLORIDE 9 G/1000ML
1000 INJECTION, SOLUTION INTRAVENOUS
Refills: 0 | Status: DISCONTINUED | OUTPATIENT
Start: 2025-02-23 | End: 2025-02-25

## 2025-02-23 RX ORDER — CEFEPIME 2 G/20ML
INJECTION, POWDER, FOR SOLUTION INTRAVENOUS
Refills: 0 | Status: DISCONTINUED | OUTPATIENT
Start: 2025-02-23 | End: 2025-02-23

## 2025-02-23 RX ORDER — AMPICILLIN SODIUM 1 G/1
2 INJECTION, POWDER, FOR SOLUTION INTRAMUSCULAR; INTRAVENOUS ONCE
Refills: 0 | Status: DISCONTINUED | OUTPATIENT
Start: 2025-02-23 | End: 2025-02-23

## 2025-02-23 RX ORDER — NOREPINEPHRINE BITARTRATE 8 MG
0.05 SOLUTION INTRAVENOUS
Qty: 8 | Refills: 0 | Status: DISCONTINUED | OUTPATIENT
Start: 2025-02-23 | End: 2025-02-25

## 2025-02-23 RX ORDER — PIPERACILLIN-TAZO-DEXTROSE,ISO 3.375G/5
3.38 IV SOLUTION, PIGGYBACK PREMIX FROZEN(ML) INTRAVENOUS ONCE
Refills: 0 | Status: COMPLETED | OUTPATIENT
Start: 2025-02-23 | End: 2025-02-23

## 2025-02-23 RX ORDER — CEFEPIME 2 G/20ML
2000 INJECTION, POWDER, FOR SOLUTION INTRAVENOUS ONCE
Refills: 0 | Status: COMPLETED | OUTPATIENT
Start: 2025-02-23 | End: 2025-02-23

## 2025-02-23 RX ORDER — VANCOMYCIN HCL IN 5 % DEXTROSE 1.5G/250ML
1500 PLASTIC BAG, INJECTION (ML) INTRAVENOUS EVERY 12 HOURS
Refills: 0 | Status: DISCONTINUED | OUTPATIENT
Start: 2025-02-23 | End: 2025-02-23

## 2025-02-23 RX ORDER — DEXMEDETOMIDINE HYDROCHLORIDE IN SODIUM CHLORIDE 4 UG/ML
0.05 INJECTION INTRAVENOUS
Qty: 400 | Refills: 0 | Status: DISCONTINUED | OUTPATIENT
Start: 2025-02-23 | End: 2025-02-23

## 2025-02-23 RX ORDER — PIPERACILLIN-TAZO-DEXTROSE,ISO 3.375G/5
3.38 IV SOLUTION, PIGGYBACK PREMIX FROZEN(ML) INTRAVENOUS EVERY 8 HOURS
Refills: 0 | Status: DISCONTINUED | OUTPATIENT
Start: 2025-02-24 | End: 2025-02-23

## 2025-02-23 RX ORDER — AMPICILLIN SODIUM 1 G/1
2 INJECTION, POWDER, FOR SOLUTION INTRAMUSCULAR; INTRAVENOUS EVERY 4 HOURS
Refills: 0 | Status: DISCONTINUED | OUTPATIENT
Start: 2025-02-23 | End: 2025-02-23

## 2025-02-23 RX ORDER — HYDROMORPHONE/SOD CHLOR,ISO/PF 2 MG/10 ML
0.5 SYRINGE (ML) INJECTION EVERY 4 HOURS
Refills: 0 | Status: DISCONTINUED | OUTPATIENT
Start: 2025-02-23 | End: 2025-02-25

## 2025-02-23 RX ORDER — LEVETIRACETAM 10 MG/ML
1000 INJECTION, SOLUTION INTRAVENOUS EVERY 12 HOURS
Refills: 0 | Status: DISCONTINUED | OUTPATIENT
Start: 2025-02-23 | End: 2025-03-07

## 2025-02-23 RX ADMIN — LEVETIRACETAM 420 MILLIGRAM(S): 10 INJECTION, SOLUTION INTRAVENOUS at 06:04

## 2025-02-23 RX ADMIN — CEFTRIAXONE 100 MILLIGRAM(S): 500 INJECTION, POWDER, FOR SOLUTION INTRAMUSCULAR; INTRAVENOUS at 12:58

## 2025-02-23 RX ADMIN — Medication 50 MICROGRAM(S): at 15:57

## 2025-02-23 RX ADMIN — NOREPINEPHRINE BITARTRATE 5.99 MICROGRAM(S)/KG/MIN: 8 SOLUTION at 20:35

## 2025-02-23 RX ADMIN — SODIUM CHLORIDE 100 MILLILITER(S): 9 INJECTION, SOLUTION INTRAVENOUS at 22:48

## 2025-02-23 RX ADMIN — Medication 50 MICROGRAM(S): at 09:58

## 2025-02-23 RX ADMIN — HEPARIN SODIUM 5000 UNIT(S): 1000 INJECTION INTRAVENOUS; SUBCUTANEOUS at 06:04

## 2025-02-23 RX ADMIN — SODIUM CHLORIDE 100 MILLILITER(S): 9 INJECTION, SOLUTION INTRAVENOUS at 06:05

## 2025-02-23 RX ADMIN — PROPOFOL 1.85 MICROGRAM(S)/KG/MIN: 10 INJECTION, EMULSION INTRAVENOUS at 14:18

## 2025-02-23 RX ADMIN — Medication 1 APPLICATION(S): at 05:34

## 2025-02-23 RX ADMIN — PROPOFOL 1.85 MICROGRAM(S)/KG/MIN: 10 INJECTION, EMULSION INTRAVENOUS at 06:04

## 2025-02-23 RX ADMIN — PROPOFOL 3.99 MICROGRAM(S)/KG/MIN: 10 INJECTION, EMULSION INTRAVENOUS at 23:38

## 2025-02-23 RX ADMIN — Medication 50 MICROGRAM(S): at 13:20

## 2025-02-23 RX ADMIN — Medication 1000 MILLIGRAM(S): at 00:44

## 2025-02-23 RX ADMIN — Medication 40 MILLIGRAM(S): at 12:58

## 2025-02-23 RX ADMIN — CEFEPIME 100 MILLIGRAM(S): 2 INJECTION, POWDER, FOR SOLUTION INTRAVENOUS at 06:04

## 2025-02-23 RX ADMIN — PROPOFOL 1.85 MICROGRAM(S)/KG/MIN: 10 INJECTION, EMULSION INTRAVENOUS at 08:50

## 2025-02-23 RX ADMIN — Medication 200 GRAM(S): at 14:40

## 2025-02-23 RX ADMIN — LEVETIRACETAM 400 MILLIGRAM(S): 10 INJECTION, SOLUTION INTRAVENOUS at 18:22

## 2025-02-23 RX ADMIN — DEXMEDETOMIDINE HYDROCHLORIDE IN SODIUM CHLORIDE 0.8 MICROGRAM(S)/KG/HR: 4 INJECTION INTRAVENOUS at 18:22

## 2025-02-23 NOTE — PROGRESS NOTE ADULT - ATTENDING COMMENTS
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 Pt is admitted to ICU for status epilepticus and acute hypoxic resp failure , intubated on vent support                      ASSESSMENT     - Status Epilepticus   - Acute hypoxic resp failure   - SIRS  - Leukocytosis  - Elevated Lactate  - Metabolic Acidosis  - LOBO  - Hx of  shunt, anxiety and depression    Plan     - Continue propofol   - EEG   - Neuro eval   - Started on Keppra   - Doubt meningitis at this time   - Neuro eval   - Continue vent support   - Adjust vent as per ABG   - Hemodynamic monitoring   - NPO   - Monitor I/O   - Antibx   - F/U cultures   - ID eval   - DVT GI prophy

## 2025-02-23 NOTE — ACUTE INTERFACILITY TRANSFER NOTE - HOSPITAL COURSE
Pt with acute respiratory failure, recurrent seizures, rule in status, possible severe sepsis, fever, source unclear likley hygroma, toxic metabolic plus post ictal encephalopathy, HAGMA, lactic acidosis.    Maintain lung protective ventilation, propofol to goal RASS, maintain BP goal, broad spectrum antibiotics, cultures to follow, head CT resulted, f/u routine spot EEG, neurology consulted changing keppra to IV, adjust AED per neurology recommendations. NPO, IVF, FS monitoring, prognosis is guarded, discussed with ER.

## 2025-02-23 NOTE — H&P ADULT - NSHPPHYSICALEXAM_GEN_ALL_CORE
examined off sedation (precedex)  intubated, follows commands when off sedation like thumbs up or two fingers  Pupils 5mm in size b/l but briskly reactive to light b/l  RUE AG distally  LUE not withdraw to noxious stimuli  RLE and LLE spont movement  Incision sites are well healed and intact

## 2025-02-23 NOTE — PATIENT PROFILE ADULT - FALL HARM RISK - HARM RISK INTERVENTIONS
Assistance with ambulation/Assistance OOB with selected safe patient handling equipment/Communicate Risk of Fall with Harm to all staff/Reinforce activity limits and safety measures with patient and family/Reorient to person, place and time as needed/Review medications for side effects contributing to fall risk/Sit up slowly, dangle for a short time, stand at bedside before walking/Tailored Fall Risk Interventions/Toileting schedule using arm’s reach rule for commode and bathroom/Visual Cue: Yellow wristband and red socks/Bed in lowest position, wheels locked, appropriate side rails in place/Call bell, personal items and telephone in reach/Instruct patient to call for assistance before getting out of bed or chair/Non-slip footwear when patient is out of bed/[place] to call system/Physically safe environment - no spills, clutter or unnecessary equipment/Purposeful Proactive Rounding/Room/bathroom lighting operational, light cord in reach

## 2025-02-23 NOTE — CONSULT NOTE ADULT - SUBJECTIVE AND OBJECTIVE BOX
Neurology - Consult Note    HPI:  History limited as patient intubated and sedated:    Per H&P:  "Mrs. 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 Per partner MsXavier Jonelle Douglass at bedside, pt did not experience any seizures since her accident 5/2024 and has remained compliant with Keppra 500 bid. No seizures since that TBI. On day of admission, patient's first seizure started with staring speel and non responsiveness, eyes remained open and gaze midline initially. Some question of possible head version to Left, partner not very clear. Then patient found with generalized convulsions shortly after, lasting about a minute. Patient had 2-3 more GTC en route and in ER, each lasting 1-2 minutes. As per ED note, Pt. arrived postictal and had a tonic-clonic seizure with gaze deviation to the left in ED. In ED, pt received versed 5 mg x2, ativan 2 mg x1. Patient intubated by ED for airway protection and status epilepticus. ROS is limited due to pt being intubated and sedated.  Pt is admitted to ICU for status epilepticus."    Partner denies any seizure history prior to TBI. Partner denies any reports of recent infectious symptoms or confusion. Patient was at baseline just prior to seizure.    Neurology consulted to rule out ongoing non-convulsive status and question about need for lumbar puncture.    Review of Systems:   unable to obtain due to mental status    Allergies:  No Known Allergies      PMHx/PSHx/Family Hx: As above, otherwise see below   Asthma  Depression  Neoplasm of uncertain behavior of brain  Neurofibromatosis, type 1  Neurofibromatosis  History of hydrocephalus  Anxiety      Social Hx:  No current use of tobacco, alcohol, or illicit drugs  Lives with partner at home  worked as  in [employer], but not since injury last year    Medications:  MEDICATIONS  (STANDING):  cefTRIAXone   IVPB 2000 milliGRAM(s) IV Intermittent every 12 hours  chlorhexidine 2% Cloths 1 Application(s) Topical <User Schedule>  fentaNYL    Injectable 50 MICROGram(s) IV Push every 6 hours  lactated ringers. 1000 milliLiter(s) (100 mL/Hr) IV Continuous <Continuous>  pantoprazole  Injectable 40 milliGRAM(s) IV Push daily  propofol Infusion 5 MICROgram(s)/kG/Min (1.85 mL/Hr) IV Continuous <Continuous>  propofol Infusion. 5 MICROgram(s)/kG/Min (1.85 mL/Hr) IV Continuous <Continuous>  vancomycin  IVPB 1500 milliGRAM(s) IV Intermittent every 12 hours    MEDICATIONS  (PRN):      Vitals:  T(C): 37.5 (02-23-25 @ 08:00), Max: 38.8 (02-22-25 @ 23:11)  HR: 74 (02-23-25 @ 08:13) (74 - 140)  BP: 134/61 (02-23-25 @ 08:00) (94/42 - 146/122)  RR: 19 (02-22-25 @ 23:11) (16 - 22)  SpO2: 100% (02-23-25 @ 08:13) (88% - 100%)    Physical Examination:  General - NAD  Cardiovascular - Peripheral pulses palpable, no edema  Eyes - MMM, no scleral icterus    Neurologic Exam:    Propofol held for 10-15 minutes  fentanyl bolus administered 45 minutes prior to my exam for discomfort with ETT    Eyes open to voice  attends to me briefly  Follows commands to squeeze fingers and show two fingers  Intubated  PERRL, EOM full  +corneals, eyes not open long enough for BTT assessment  face grossly symmetric  +gag to suction  Moves arms vigorously to noxious  grimace to noxious in all limbs  did not participate FNF    Labs:                        13.2   12.75 )-----------( 143      ( 23 Feb 2025 03:25 )             40.0     02-23    137  |  106  |  13  ----------------------------<  109[H]  4.2   |  23  |  0.75    Ca    8.8      23 Feb 2025 03:25  Phos  3.6     02-23  Mg     2.1     02-23    TPro  6.6  /  Alb  3.0[L]  /  TBili  0.9  /  DBili  x   /  AST  12  /  ALT  18  /  AlkPhos  107  02-23    CAPILLARY BLOOD GLUCOSE      POCT Blood Glucose.: 181 mg/dL (22 Feb 2025 16:26)    LIVER FUNCTIONS - ( 23 Feb 2025 03:25 )  Alb: 3.0 g/dL / Pro: 6.6 g/dL / ALK PHOS: 107 U/L / ALT: 18 U/L DA / AST: 12 U/L / GGT: x             Urinalysis with Rflx Culture (collected 22 Feb 2025 17:56)    Radiology:  CT Head No Cont:  (22 Feb 2025 18:21)  R subdural hygroma  mild ventriculomegaly  shunt catheter from R skull through R lateral and then 3rd ventricle  resection cavity in L cerebelum

## 2025-02-23 NOTE — CHART NOTE - NSCHARTNOTEFT_GEN_A_CORE
Neurosurgery consult placed. I spoke with Dr. Bolivar who requested that I reach out to patient's neurosurgeon Dr. Brush.  connected me to Dr. Jacobs who was covering for Dr. Brush (who recommended transfer to [facility] for neurosurg evaluation. I contacted Dr. Inder Miles at [facility] who accepted patient for transfer. Patient presented and accepted by Dr. Pompa at [facility] SICU. Patient's partner Jonelle made aware, consent for transfer obtained.

## 2025-02-23 NOTE — H&P ADULT - HISTORY OF PRESENT ILLNESS
60 yo F with PMHx of neurofibromatosis, s/p L suboccipital craniectomy for resection of cerebellar brain tumor 12/2023 by Dr Brush, s/p traumatic fall with head trauma in 5/20/2024, at that time sustained a large acute right SDH, and underwent an emergent right decompressive hemicraniectomy on 5/22/24, s/p right cranioplasty on 6/17/24 with custom plate, s/p ETV, ligation of shunt on the right with ligaclips with retention of valve as rescue device, presents as a transfer from [facility] for witnessed seizure at home. CTH obtained at [facility] revealed stable  shunt and moderate ventriculomegaly and slight increase in size of extra-axial CSF collection overlying the right frontal duraplasty, likely a hygroma and no mass effect.

## 2025-02-23 NOTE — CONSULT NOTE ADULT - ASSESSMENT
Aspiration Pneumonia- likely  Status epilepticus  Leukocytosis  Fevers- high grade     Plan:   ·	DC Maxipime  ·	DC Vancomycin   ·	Start Zosyn 3.375gms iv q8hrs   ·	Blood cultures are testing  ·	CXR tomorrow  Aspiration Pneumonia- likely  Status epilepticus  Leukocytosis  Fevers- high grade   No clinical evidence of Meningitis or Encephalitis     Plan:   ·	DC Maxipime  ·	DC Vancomycin   ·	Start Zosyn 3.375gms iv q8hrs   ·	Blood cultures are testing  ·	repeat CXR tomorrow

## 2025-02-23 NOTE — CONSULT NOTE ADULT - ASSESSMENT
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 CTH showed stable  shunt and moderate ventriculomegaly, slight increase in size of extra-axial CSF collection overlying the right frontal duraplasty, likely a hygroma. There is no mass effect. Transferred to [facility] for Neurosurgery evaluation. SICU consulted for vent management & Q1 neurochecks.       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 CTH showed stable  shunt and moderate ventriculomegaly, slight increase in size of extra-axial CSF collection overlying the right frontal duraplasty, likely a hygroma. There is no mass effect. Transferred to [facility] for Neurosurgery evaluation, r/o infective etiology. SICU consulted for vent management & Q1 neurochecks.       PLAN:      Neurologic:   Q1 neurochecks  Sedated on precedex/propofol   Keppra 1g BID  Pain control: IV Tylenol, dilaudid PRN   MR head w CSF flow   VEEG     Respiratory:   Intubated 16/450/5/40    Cardiovascular:   MAP > 65  on levo for transfer     Gastrointestinal/Nutrition:   NPO    Renal/Genitourinary:   Monitor electrolytes   LR@100  storey     Hematologic:   Hep sub q     Infectious Disease:   Monitor WBC, fever curves   Zosyn (presumed aspiration pneumonia)   blood cx sent x2 ([place])   Endocrine:   Monitor glucose     Tubes/Lines/Drains:   PIV     Disposition: SICU    --------------------------------------------------------------------------------------    Critical Care Diagnoses:

## 2025-02-23 NOTE — PATIENT PROFILE ADULT - FALL HARM RISK - HARM RISK INTERVENTIONS

## 2025-02-23 NOTE — PROGRESS NOTE ADULT - SUBJECTIVE AND OBJECTIVE BOX
INTERVAL HPI/OVERNIGHT EVENTS:       PRESSORS: [ ] YES [ ] NO  WHICH:    ANTIBIOTICS:                  DATE STARTED:  ANTIBIOTICS:                  DATE STARTED:    Antimicrobial:  cefepime   IVPB      cefepime   IVPB 2000 milliGRAM(s) IV Intermittent every 8 hours  vancomycin  IVPB 1000 milliGRAM(s) IV Intermittent every 24 hours    Cardiovascular:    Pulmonary:    Hematalogic:  heparin   Injectable 5000 Unit(s) SubCutaneous every 8 hours    Other:  chlorhexidine 2% Cloths 1 Application(s) Topical <User Schedule>  lactated ringers. 1000 milliLiter(s) IV Continuous <Continuous>  levETIRAcetam  IVPB 500 milliGRAM(s) IV Intermittent every 12 hours  pantoprazole  Injectable 40 milliGRAM(s) IV Push daily  propofol Infusion 5 MICROgram(s)/kG/Min IV Continuous <Continuous>  propofol Infusion. 5 MICROgram(s)/kG/Min IV Continuous <Continuous>    cefepime   IVPB      cefepime   IVPB 2000 milliGRAM(s) IV Intermittent every 8 hours  chlorhexidine 2% Cloths 1 Application(s) Topical <User Schedule>  heparin   Injectable 5000 Unit(s) SubCutaneous every 8 hours  lactated ringers. 1000 milliLiter(s) IV Continuous <Continuous>  levETIRAcetam  IVPB 500 milliGRAM(s) IV Intermittent every 12 hours  pantoprazole  Injectable 40 milliGRAM(s) IV Push daily  propofol Infusion 5 MICROgram(s)/kG/Min IV Continuous <Continuous>  propofol Infusion. 5 MICROgram(s)/kG/Min IV Continuous <Continuous>  vancomycin  IVPB 1000 milliGRAM(s) IV Intermittent every 24 hours    Drug Dosing Weight  Height (cm): 160 (2025 16:28)  Weight (kg): 63.9 (2025 23:11)  BMI (kg/m2): 25 (2025 23:11)  BSA (m2): 1.67 (2025 23:11)    PHYSICAL EXAM:  GENERAL: NAD  EYES: EOMI, PERRLA  NECK: Supple, No JVD; Normal thyroid; Trachea midline: No LAD   NERVOUS SYSTEM:  Alert & Oriented X3,  Motor Strength 5/5 B/L upper and lower extremities; DTRs 2+ intact and symmetric  CHEST/LUNG: No rales, rhonchi, wheezing, breath sounds present bilaterally  HEART: Regular rate and rhythm; No murmurs, no gallops  ABDOMEN: Soft, Nontender, Nondistended; Bowel sounds present, no pain or masses on palpation  : voiding well, Miller in place  EXTREMITIES:  2+ Peripheral Pulses, No clubbing, cyanosis, or edema  SKIN: warm, intact, no lesions     LINES/DRAINS/DEVICES  CENTRAL LINE: [ ] YES [ ] NO  LOCATION:     MILLER: [ ] YES [ ] NO     A-LINE:  [ ] YES [ ] NO  LOCATION:       ICU Vital Signs Last 24 Hrs  T(C): 38.5 (2025 00:02), Max: 38.8 (2025 23:11)  T(F): 101.3 (2025 00:02), Max: 101.8 (2025 23:11)  HR: 97 (2025 00:02) (89 - 140)  BP: 103/54 (2025 00:02) (103/54 - 146/122)  BP(mean): 70 (2025 00:02) (70 - 75)  ABP: --  ABP(mean): --  RR: 19 (2025 23:11) (16 - 22)  SpO2: 98% (2025 00:02) (88% - 100%)    O2 Parameters below as of 2025 21:09  Patient On (Oxygen Delivery Method): ventilator            ABG - ( 2025 23:06 )  pH, Arterial: 7.47  pH, Blood: x     /  pCO2: 31    /  pO2: 180   / HCO3: 23    / Base Excess: -0.2  /  SaO2: 97                      Mode: AC/ CMV (Assist Control/ Continuous Mandatory Ventilation)  RR (machine): 16  TV (machine): 450  FiO2: 50  PEEP: 5  ITime: 1  MAP: 9  PIP: 23        LABS:  CBC Full  -  ( 2025 16:25 )  WBC Count : 19.76 K/uL  RBC Count : 6.08 M/uL  Hemoglobin : 16.0 g/dL  Hematocrit : 50.2 %  Platelet Count - Automated : 411 K/uL  Mean Cell Volume : 82.6 fl  Mean Cell Hemoglobin : 26.3 pg  Mean Cell Hemoglobin Concentration : 31.9 g/dL  Auto Neutrophil # : x  Auto Lymphocyte # : x  Auto Monocyte # : x  Auto Eosinophil # : x  Auto Basophil # : x  Auto Neutrophil % : x  Auto Lymphocyte % : x  Auto Monocyte % : x  Auto Eosinophil % : x  Auto Basophil % : x        138  |  102  |  18  ----------------------------<  195[H]  4.4   |  18[L]  |  1.04    Ca    9.8      2025 16:25  Phos  5.1       Mg     2.3         TPro  8.6[H]  /  Alb  4.0  /  TBili  0.8  /  DBili  x   /  AST  12  /  ALT  23  /  AlkPhos  146[H]        Urinalysis Basic - ( 2025 17:56 )    Color: Yellow / Appearance: Clear / S.018 / pH: x  Gluc: x / Ketone: Negative mg/dL  / Bili: Negative / Urobili: 0.2 mg/dL   Blood: x / Protein: 30 mg/dL / Nitrite: Negative   Leuk Esterase: Negative / RBC: 6 /HPF / WBC 2 /HPF   Sq Epi: x / Non Sq Epi: x / Bacteria: Occasional /HPF          RADIOLOGY & ADDITIONAL STUDIES REVIEWED DURING TEAM ROUNDS    [ ]GOALS OF CARE DISCUSSION WITH PATIENT/FAMILY/PROXY:    CRITICAL CARE TIME SPENT: 35 minutes   INTERVAL HPI/OVERNIGHT EVENTS: Fever to 101.8 overnight s/p tylenol. Patient sedated and intubated, responsive to painful stimuli.      PRESSORS: [ ] YES [ ] NO  WHICH:    ANTIBIOTICS:                  DATE STARTED:  ANTIBIOTICS:                  DATE STARTED:    Antimicrobial:  cefepime   IVPB      cefepime   IVPB 2000 milliGRAM(s) IV Intermittent every 8 hours  vancomycin  IVPB 1000 milliGRAM(s) IV Intermittent every 24 hours    Cardiovascular:    Pulmonary:    Hematalogic:  heparin   Injectable 5000 Unit(s) SubCutaneous every 8 hours    Other:  chlorhexidine 2% Cloths 1 Application(s) Topical <User Schedule>  lactated ringers. 1000 milliLiter(s) IV Continuous <Continuous>  levETIRAcetam  IVPB 500 milliGRAM(s) IV Intermittent every 12 hours  pantoprazole  Injectable 40 milliGRAM(s) IV Push daily  propofol Infusion 5 MICROgram(s)/kG/Min IV Continuous <Continuous>  propofol Infusion. 5 MICROgram(s)/kG/Min IV Continuous <Continuous>    cefepime   IVPB      cefepime   IVPB 2000 milliGRAM(s) IV Intermittent every 8 hours  chlorhexidine 2% Cloths 1 Application(s) Topical <User Schedule>  heparin   Injectable 5000 Unit(s) SubCutaneous every 8 hours  lactated ringers. 1000 milliLiter(s) IV Continuous <Continuous>  levETIRAcetam  IVPB 500 milliGRAM(s) IV Intermittent every 12 hours  pantoprazole  Injectable 40 milliGRAM(s) IV Push daily  propofol Infusion 5 MICROgram(s)/kG/Min IV Continuous <Continuous>  propofol Infusion. 5 MICROgram(s)/kG/Min IV Continuous <Continuous>  vancomycin  IVPB 1000 milliGRAM(s) IV Intermittent every 24 hours    Drug Dosing Weight  Height (cm): 160 (2025 16:28)  Weight (kg): 63.9 (2025 23:11)  BMI (kg/m2): 25 (2025 23:11)  BSA (m2): 1.67 (2025 23:11)    PHYSICAL EXAM:  GENERAL: NAD, intubated and sedated  EYES: PERRLA  NECK: Supple, No JVD;  NERVOUS SYSTEM: intubated & sedated  CHEST/LUNG: No rales, rhonchi, wheezing, breath sounds present bilaterally  HEART: Regular rate and rhythm; No murmurs, no gallops  ABDOMEN: Soft, Nontender, Nondistended; Bowel sounds present, no pain or masses on palpation  : voiding well, Miller in place  EXTREMITIES:  2+ Peripheral Pulses, No clubbing, cyanosis, or edema  SKIN: warm, intact, no lesions     LINES/DRAINS/DEVICES  CENTRAL LINE: [ ] YES [ ] NO  LOCATION:     MILLER: [ ] YES [ ] NO     A-LINE:  [ ] YES [ ] NO  LOCATION:       ICU Vital Signs Last 24 Hrs  T(C): 38.5 (2025 00:02), Max: 38.8 (2025 23:11)  T(F): 101.3 (2025 00:02), Max: 101.8 (2025 23:11)  HR: 97 (2025 00:02) (89 - 140)  BP: 103/54 (2025 00:02) (103/54 - 146/122)  BP(mean): 70 (2025 00:02) (70 - 75)  ABP: --  ABP(mean): --  RR: 19 (2025 23:11) (16 - 22)  SpO2: 98% (2025 00:02) (88% - 100%)    O2 Parameters below as of 2025 21:09  Patient On (Oxygen Delivery Method): ventilator            ABG - ( 2025 23:06 )  pH, Arterial: 7.47  pH, Blood: x     /  pCO2: 31    /  pO2: 180   / HCO3: 23    / Base Excess: -0.2  /  SaO2: 97                      Mode: AC/ CMV (Assist Control/ Continuous Mandatory Ventilation)  RR (machine): 16  TV (machine): 450  FiO2: 50  PEEP: 5  ITime: 1  MAP: 9  PIP: 23        LABS:  CBC Full  -  ( 2025 16:25 )  WBC Count : 19.76 K/uL  RBC Count : 6.08 M/uL  Hemoglobin : 16.0 g/dL  Hematocrit : 50.2 %  Platelet Count - Automated : 411 K/uL  Mean Cell Volume : 82.6 fl  Mean Cell Hemoglobin : 26.3 pg  Mean Cell Hemoglobin Concentration : 31.9 g/dL  Auto Neutrophil # : x  Auto Lymphocyte # : x  Auto Monocyte # : x  Auto Eosinophil # : x  Auto Basophil # : x  Auto Neutrophil % : x  Auto Lymphocyte % : x  Auto Monocyte % : x  Auto Eosinophil % : x  Auto Basophil % : x        138  |  102  |  18  ----------------------------<  195[H]  4.4   |  18[L]  |  1.04    Ca    9.8      2025 16:25  Phos  5.1       Mg     2.3         TPro  8.6[H]  /  Alb  4.0  /  TBili  0.8  /  DBili  x   /  AST  12  /  ALT  23  /  AlkPhos  146[H]        Urinalysis Basic - ( 2025 17:56 )    Color: Yellow / Appearance: Clear / S.018 / pH: x  Gluc: x / Ketone: Negative mg/dL  / Bili: Negative / Urobili: 0.2 mg/dL   Blood: x / Protein: 30 mg/dL / Nitrite: Negative   Leuk Esterase: Negative / RBC: 6 /HPF / WBC 2 /HPF   Sq Epi: x / Non Sq Epi: x / Bacteria: Occasional /HPF          RADIOLOGY & ADDITIONAL STUDIES REVIEWED DURING TEAM ROUNDS    [ ]GOALS OF CARE DISCUSSION WITH PATIENT/FAMILY/PROXY:    CRITICAL CARE TIME SPENT: 35 minutes

## 2025-02-23 NOTE — CONSULT NOTE ADULT - SUBJECTIVE AND OBJECTIVE BOX
HPI:  59 year old female with PMHx of neurofibromatosis, cerebellar brain tumor s/p resection (2023), s/p  shunt (in [facility]), s/p traumatic fall with head trauma and R SDH, s/p R decompressive hemicraniectomy, ICP monitor and R cranioplasty (2024), s/p L frontal Endoscopic third ventriculostomy and ligation of R  shunt with ligaclips with retention of valve, proximal and distal catheters (2024), Anxiety and depression presents to the ED with CC of witnessed seizure at home. As per partner Ms. Jonelle Douglass at bedside, pt did not experience any seizures since her accident. She states that the pt was complaining of N and weakness today. She noticed the pt to be lethargic, drooling and was dragging her extremities which promoted her to call 911. She found the patient to have a seizure (generalized shakiness of her upper and lower extremities). She denies any incontinence. She had 2 more episodes of seizures when the EMS arrived and once on route to the ED. Each episode lasts around 1-2 minutes. As per ED note, Pt. arrived postictal and had a tonic-clonic seizure with gaze deviation to the left in ED. In ED, pt received versed 5 mg x2, ativan 2 mg x1. Patient intubated by ED for airway protection and status epilepticus. ROS is limited due to pt being intubated and sedated.  Pt is admitted to ICU for status epilepticus.  (2025 20:49)    PAST MEDICAL & SURGICAL HISTORY:  Asthma    Depression    Neoplasm of uncertain behavior of brain    Neurofibromatosis, type 1    History of hydrocephalus    Anxiety    Delivery with history of       S/P tubal ligation      History of arthroscopy of left knee  meniscus     S/P LASIK Surgery      H/O brain tumor    S/P  shunt    ALLERGIES: No Known Allergies    MEDS:  cefepime   IVPB      cefepime   IVPB 2000 milliGRAM(s) IV Intermittent every 8 hours  chlorhexidine 2% Cloths 1 Application(s) Topical <User Schedule>  fentaNYL    Injectable 50 MICROGram(s) IV Push every 6 hours  heparin   Injectable 5000 Unit(s) SubCutaneous every 8 hours  lactated ringers. 1000 milliLiter(s) IV Continuous <Continuous>  levETIRAcetam  IVPB 500 milliGRAM(s) IV Intermittent every 12 hours  pantoprazole  Injectable 40 milliGRAM(s) IV Push daily  propofol Infusion 5 MICROgram(s)/kG/Min IV Continuous <Continuous>  propofol Infusion. 5 MICROgram(s)/kG/Min IV Continuous <Continuous>  vancomycin  IVPB 1500 milliGRAM(s) IV Intermittent every 12 hours    SOCIAL HISTORY:  Smoker:  unable to obtain    FAMILY HISTORY: unable to obtain     REVIEW OF SYSTEMS:  [X  ] Not able to elicit    VITALS:  Vital Signs Last 24 Hrs  T(C): 37.5 (2025 08:00), Max: 38.8 (2025 23:11)  T(F): 99.5 (2025 08:00), Max: 101.8 (2025 23:11)  HR: 74 (2025 08:13) (74 - 140)  BP: 134/61 (2025 08:00) (94/42 - 146/122)  BP(mean): 77 (2025 08:00) (58 - 86)  RR: 19 (2025 23:11) (16 - 22)  SpO2: 100% (2025 08:13) (88% - 100%)    Parameters below as of 2025 21:09  Patient On (Oxygen Delivery Method): ventilator    PHYSICAL EXAM:  General:  HEENT:  Neck:  Respiratory:  Cardiovascular:  Gastrointestinal:  :  Extremities:  Skin:  Ortho:  Neuro:      LABS/DIAGNOSTIC TESTS:                        13.2   12.75 )-----------( 143      ( 2025 03:25 )             40.0     WBC Count: 12.75 K/uL ( @ 03:25)  WBC Count: 11.73 K/uL ( @ 00:35)  WBC Count: 19.76 K/uL ( @ 16:25)        137  |  106  |  13  ----------------------------<  109[H]  4.2   |  23  |  0.75    Ca    8.8      2025 03:25  Phos  3.6       Mg     2.1         TPro  6.6  /  Alb  3.0[L]  /  TBili  0.9  /  DBili  x   /  AST  12  /  ALT  18  /  AlkPhos  107    Urinalysis with Rflx Culture (25 @ 17:56)   Urine Appearance: Clear  Color: Yellow  Specific Gravity: 1.018  pH Urine: 5.0  Protein, Urine: 30 mg/dL  Glucose Qualitative, Urine: 250 mg/dL  Ketone - Urine: Negative mg/dL  Blood, Urine: Small  Bilirubin: Negative  Urobilinogen: 0.2 mg/dL  Leukocyte Esterase Concentration: Negative  Nitrite: Negative  LIVER FUNCTIONS - ( 2025 03:25 )  Alb: 3.0 g/dL / Pro: 6.6 g/dL / ALK PHOS: 107 U/L / ALT: 18 U/L DA / AST: 12 U/L / GGT: x           Lactate, Blood: 1.4 mmol/L ( @ 20:19)  Lactate, Blood: 13.0 mmol/L ( @ 16:30)    ABG - ABG - ( 2025 23:06 )  pH, Arterial: 7.47  pH, Blood: x     /  pCO2: 31    /  pO2: 180   / HCO3: 23    / Base Excess: -0.2  /  SaO2: 97        CULTURES:     RADIOLOGY:  < from: Xray Chest 1 View-PORTABLE IMMEDIATE (Xray Chest 1 View-PORTABLE IMMEDIATE .) (25 @ 17:14) >  ACC: [id number] EXAM:  XR CHEST PORTABLE IMMED 1V   ORDERED BY: APPLE GAR     PROCEDURE DATE:  2025      INTERPRETATION:  TECHNIQUE: Single portable view of the chest.    COMPARISON:  None    CLINICAL HISTORY: post intubation    FINDINGS:    Single frontal view of the chest demonstrates bibasilar atelectasis. The   cardiomediastinal silhouette is unremarkable. No acute osseous   abnormalities. Overlying EKG leads and wires are noted. Endotracheal tube   tip is 1.5 cm above the marcy. Right-sided  shunt tubing.    IMPRESSION: Bibasilar atelectasis.    --- End of Report ---    CARA KAMARA MD; Attending Radiologist  This document has been electronically signed. 2025 10:40PM    < end of copied text >  < from: CT Head No Cont (25 @ 18:21) >  ACC: [id number] EXAM:  CT BRAIN   ORDERED BY:  MAX LAZARUS     PROCEDURE DATE:  2025      INTERPRETATION:  CLINICAL INFORMATION: History of TBI,  shunt, multiple   seizures,    COMPARISON: CT head 2024    CONTRAST:  IV Contrast: None    TECHNIQUE:    CT BRAIN: Serial axial images were obtained from the skull base to the   vertex using multi-slice helical technique. Sagittal and coronal   reformats were obtained.    FINDINGS:    VENTRICLES AND SULCI: Stable moderate ventriculomegaly. Stable right   frontal periventricular shunt catheter terminating in the right lateral   ventricle.  INTRA-AXIAL: Chronic left cerebellar encephalomalacia. Chronic   encephalomalacia in the right inferior frontal lobe. No mass effect,   acute hemorrhage, or midline shift.  There are periventricular and   subcortical white matter hypodensities, consistent with microvascular   type changes. Stable calcifications in the cerebellum bilaterally.  EXTRA-AXIAL: Slight increase in size of extra-axial fluid collection   overlying the right arthroplasty measuring up to 1.5 cm, previously up to   2.6 cm. VISUALIZED SINUSES:  Clear.  TYMPANOMASTOID CAVITIES:  Clear.  VISUALIZED ORBITS: Normal.  CALVARIUM: Intact.    MISCELLANEOUS: None.    IMPRESSION:    No acute intracranial hemorrhage, mass effect, or midline shift.  Stable  shunt and moderate ventriculomegaly.  Slight increase in size of extra-axial CSF collection overlying the right   frontal duraplasty, likely a hygroma. There is no mass effect.    --- End of Report ---    MATTHEW DURANT MD; Attending Radiologist  This document has been electronically signed. 2025  7:25PM    < end of copied text >   HPI:  59 year old female with PMHx of neurofibromatosis, cerebellar brain tumor s/p resection (2023), s/p  shunt (in [facility]), s/p traumatic fall with head trauma and R SDH, s/p R decompressive hemicraniectomy, ICP monitor and R cranioplasty (2024), s/p L frontal Endoscopic third ventriculostomy and ligation of R  shunt with ligaclips with retention of valve, proximal and distal catheters (2024), Anxiety and depression presents to the ED with CC of witnessed seizure at home. As per partner Ms. Jonelle Douglass at bedside, pt did not experience any seizures since her accident. She states that the pt was complaining of N and weakness today. She noticed the pt to be lethargic, drooling and was dragging her extremities which promoted her to call 911. She found the patient to have a seizure (generalized shakiness of her upper and lower extremities). She denies any incontinence. She had 2 more episodes of seizures when the EMS arrived and once on route to the ED. Each episode lasts around 1-2 minutes. As per ED note, Pt. arrived postictal and had a tonic-clonic seizure with gaze deviation to the left in ED. In ED, pt received versed 5 mg x2, ativan 2 mg x1. Patient intubated by ED for airway protection and status epilepticus. ROS is limited due to pt being intubated and sedated.  Pt is admitted to ICU for status epilepticus.  (2025 20:49)    History as above, was called to see this patient from home for r/o meningitis. Presented to the ED with witnessed seizure at home and tonic-clonic seizure in the ED. Remains in the ICU now, she is currently intubated, sedated and not on any pressors, vent dependant on Fio2 40% and PEEP of 5 and responding to painful stimuli.  Her neck is supple with a  negative Kernig's sign, negative Brudzinski's sign and negative babinski, she has no rash or anisocoria, although she does have a sluggish pupil reaction. Also having fevers with an increased WBC count. Blood cultures are testing. Currently on Maxipime and Vancomycin but will DC as there are no signs of meningitis or encephalitis. Will start Zosyn as likely Aspiration Pneumonia.     PAST MEDICAL & SURGICAL HISTORY:  Asthma    Depression    Neoplasm of uncertain behavior of brain    Neurofibromatosis, type 1    History of hydrocephalus    Anxiety    Delivery with history of       S/P tubal ligation      History of arthroscopy of left knee  meniscus     S/P LASIK Surgery      H/O brain tumor    S/P  shunt    ALLERGIES: No Known Allergies    MEDS:  cefepime   IVPB      cefepime   IVPB 2000 milliGRAM(s) IV Intermittent every 8 hours  chlorhexidine 2% Cloths 1 Application(s) Topical <User Schedule>  fentaNYL    Injectable 50 MICROGram(s) IV Push every 6 hours  heparin   Injectable 5000 Unit(s) SubCutaneous every 8 hours  lactated ringers. 1000 milliLiter(s) IV Continuous <Continuous>  levETIRAcetam  IVPB 500 milliGRAM(s) IV Intermittent every 12 hours  pantoprazole  Injectable 40 milliGRAM(s) IV Push daily  propofol Infusion 5 MICROgram(s)/kG/Min IV Continuous <Continuous>  propofol Infusion. 5 MICROgram(s)/kG/Min IV Continuous <Continuous>  vancomycin  IVPB 1500 milliGRAM(s) IV Intermittent every 12 hours    SOCIAL HISTORY:  Smoker:  unable to obtain    FAMILY HISTORY: unable to obtain     REVIEW OF SYSTEMS:  [X  ] Not able to elicit- Sedated     VITALS:  Vital Signs Last 24 Hrs  T(C): 37.5 (2025 08:00), Max: 38.8 (2025 23:11)  T(F): 99.5 (2025 08:00), Max: 101.8 (2025 23:11)  HR: 74 (2025 08:13) (74 - 140)  BP: 134/61 (2025 08:00) (94/42 - 146/122)  BP(mean): 77 (2025 08:00) (58 - 86)  RR: 19 (2025 23:11) (16 - 22)  SpO2: 100% (2025 08:13) (88% - 100%)    Parameters below as of 2025 21:09  Patient On (Oxygen Delivery Method): ventilator    PHYSICAL EXAM:  General: NAD, intubated and sedated   HEENT: normocephalic with dry oral mucosa, , sluggish pupil reaction, no anisocoria, ET tube   Neck: supple no LN's no JVD  Respiratory: few crackles to right base   Cardiovascular: S1 S2 reg no murmurs  Gastrointestinal: +BS with soft, nondistended abdomen; nontender  : Dominguez with clear yellow urine in drainage bag  Extremities: no edema no cyanosis  Skin: no rashes  Ortho: no jt swelling  Neuro: Sedated, spontaneous movements, responds to painful stimuli     LABS/DIAGNOSTIC TESTS:                        13.2   12.75 )-----------( 143      ( 2025 03:25 )             40.0     WBC Count: 12.75 K/uL ( @ 03:25)  WBC Count: 11.73 K/uL ( @ 00:35)  WBC Count: 19.76 K/uL ( @ 16:25)        137  |  106  |  13  ----------------------------<  109[H]  4.2   |  23  |  0.75    Ca    8.8      2025 03:25  Phos  3.6       Mg     2.1         TPro  6.6  /  Alb  3.0[L]  /  TBili  0.9  /  DBili  x   /  AST  12  /  ALT  18  /  AlkPhos  107    Urinalysis with Rflx Culture (25 @ 17:56)   Urine Appearance: Clear  Color: Yellow  Specific Gravity: 1.018  pH Urine: 5.0  Protein, Urine: 30 mg/dL  Glucose Qualitative, Urine: 250 mg/dL  Ketone - Urine: Negative mg/dL  Blood, Urine: Small  Bilirubin: Negative  Urobilinogen: 0.2 mg/dL  Leukocyte Esterase Concentration: Negative  Nitrite: Negative  LIVER FUNCTIONS - ( 2025 03:25 )  Alb: 3.0 g/dL / Pro: 6.6 g/dL / ALK PHOS: 107 U/L / ALT: 18 U/L DA / AST: 12 U/L / GGT: x           Lactate, Blood: 1.4 mmol/L ( @ 20:19)  Lactate, Blood: 13.0 mmol/L ( @ 16:30)    ABG - ABG - ( 2025 23:06 )  pH, Arterial: 7.47  pH, Blood: x     /  pCO2: 31    /  pO2: 180   / HCO3: 23    / Base Excess: -0.2  /  SaO2: 97        CULTURES: Testing     RADIOLOGY:  < from: Xray Chest 1 View-PORTABLE IMMEDIATE (Xray Chest 1 View-PORTABLE IMMEDIATE .) (25 @ 17:14) >  ACC: [id number] EXAM:  XR CHEST PORTABLE IMMED 1V   ORDERED BY: APPLE GAR     PROCEDURE DATE:  2025      INTERPRETATION:  TECHNIQUE: Single portable view of the chest.    COMPARISON:  None    CLINICAL HISTORY: post intubation    FINDINGS:    Single frontal view of the chest demonstrates bibasilar atelectasis. The   cardiomediastinal silhouette is unremarkable. No acute osseous   abnormalities. Overlying EKG leads and wires are noted. Endotracheal tube   tip is 1.5 cm above the marcy. Right-sided  shunt tubing.    IMPRESSION: Bibasilar atelectasis.    --- End of Report ---    CARA KAMARA MD; Attending Radiologist  This document has been electronically signed. 2025 10:40PM    < end of copied text >  < from: CT Head No Cont (25 @ 18:21) >  ACC: [id number] EXAM:  CT BRAIN   ORDERED BY:  MAX LAZARUS     PROCEDURE DATE:  2025      INTERPRETATION:  CLINICAL INFORMATION: History of TBI,  shunt, multiple   seizures,    COMPARISON: CT head 2024    CONTRAST:  IV Contrast: None    TECHNIQUE:    CT BRAIN: Serial axial images were obtained from the skull base to the   vertex using multi-slice helical technique. Sagittal and coronal   reformats were obtained.    FINDINGS:    VENTRICLES AND SULCI: Stable moderate ventriculomegaly. Stable right   frontal periventricular shunt catheter terminating in the right lateral   ventricle.  INTRA-AXIAL: Chronic left cerebellar encephalomalacia. Chronic   encephalomalacia in the right inferior frontal lobe. No mass effect,   acute hemorrhage, or midline shift.  There are periventricular and   subcortical white matter hypodensities, consistent with microvascular   type changes. Stable calcifications in the cerebellum bilaterally.  EXTRA-AXIAL: Slight increase in size of extra-axial fluid collection   overlying the right arthroplasty measuring up to 1.5 cm, previously up to   2.6 cm. VISUALIZED SINUSES:  Clear.  TYMPANOMASTOID CAVITIES:  Clear.  VISUALIZED ORBITS: Normal.  CALVARIUM: Intact.    MISCELLANEOUS: None.    IMPRESSION:    No acute intracranial hemorrhage, mass effect, or midline shift.  Stable  shunt and moderate ventriculomegaly.  Slight increase in size of extra-axial CSF collection overlying the right   frontal duraplasty, likely a hygroma. There is no mass effect.    --- End of Report ---    MATTHEW DURANT MD; Attending Radiologist  This document has been electronically signed. 2025  7:25PM    < end of copied text >   HPI:  59 year old female with PMHx of neurofibromatosis, cerebellar brain tumor s/p resection (2023), s/p  shunt (in [facility]), s/p traumatic fall with head trauma and R SDH, s/p R decompressive hemicraniectomy, ICP monitor and R cranioplasty (2024), s/p L frontal Endoscopic third ventriculostomy and ligation of R  shunt with ligaclips with retention of valve, proximal and distal catheters (2024), Anxiety and depression presents to the ED with CC of witnessed seizure at home. As per partner Ms. Jonelle Douglass at bedside, pt did not experience any seizures since her accident. She states that the pt was complaining of N and weakness today. She noticed the pt to be lethargic, drooling and was dragging her extremities which promoted her to call 911. She found the patient to have a seizure (generalized shakiness of her upper and lower extremities). She denies any incontinence. She had 2 more episodes of seizures when the EMS arrived and once on route to the ED. Each episode lasts around 1-2 minutes. As per ED note, Pt. arrived postictal and had a tonic-clonic seizure with gaze deviation to the left in ED. In ED, pt received versed 5 mg x2, ativan 2 mg x1. Patient intubated by ED for airway protection and status epilepticus. ROS is limited due to pt being intubated and sedated.  Pt is admitted to ICU for status epilepticus.  (2025 20:49)    History as above, was called to see this patient from home for r/o meningitis. Presented to the ED with witnessed seizure at home and tonic-clonic seizure in the ED. Remains in the ICU now, she is currently intubated, sedated and not on any pressors, vent dependant on Fio2 40% and PEEP of 5 and responding to painful stimuli.  Her neck is supple with a  negative Kernig's sign, negative Brudzinski's sign and negative babinski, she has no rash or anisocoria, although she does have a sluggish pupils but reactive to light. Also having fevers with an increased WBC count. Blood cultures are testing. Currently on Maxipime and Vancomycin but will DC as there are no signs of meningitis or encephalitis. Will start Zosyn as likely Aspiration Pneumonia.     PAST MEDICAL & SURGICAL HISTORY:  Asthma    Depression    Neoplasm of uncertain behavior of brain    Neurofibromatosis, type 1    History of hydrocephalus    Anxiety    Delivery with history of       S/P tubal ligation      History of arthroscopy of left knee  meniscus 2010    S/P LASIK Surgery      H/O brain tumor    S/P  shunt    ALLERGIES: No Known Allergies    MEDS:  cefepime   IVPB      cefepime   IVPB 2000 milliGRAM(s) IV Intermittent every 8 hours  chlorhexidine 2% Cloths 1 Application(s) Topical <User Schedule>  fentaNYL    Injectable 50 MICROGram(s) IV Push every 6 hours  heparin   Injectable 5000 Unit(s) SubCutaneous every 8 hours  lactated ringers. 1000 milliLiter(s) IV Continuous <Continuous>  levETIRAcetam  IVPB 500 milliGRAM(s) IV Intermittent every 12 hours  pantoprazole  Injectable 40 milliGRAM(s) IV Push daily  propofol Infusion 5 MICROgram(s)/kG/Min IV Continuous <Continuous>  propofol Infusion. 5 MICROgram(s)/kG/Min IV Continuous <Continuous>  vancomycin  IVPB 1500 milliGRAM(s) IV Intermittent every 12 hours    SOCIAL HISTORY:  Smoker:  unable to obtain    FAMILY HISTORY: unable to obtain     REVIEW OF SYSTEMS:  [X  ] Not able to elicit- Sedated     VITALS:  Vital Signs Last 24 Hrs  T(C): 37.5 (2025 08:00), Max: 38.8 (2025 23:11)  T(F): 99.5 (2025 08:00), Max: 101.8 (2025 23:11)  HR: 74 (2025 08:13) (74 - 140)  BP: 134/61 (2025 08:00) (94/42 - 146/122)  BP(mean): 77 (2025 08:00) (58 - 86)  RR: 19 (2025 23:11) (16 - 22)  SpO2: 100% (2025 08:13) (88% - 100%)    Parameters below as of 2025 21:09  Patient On (Oxygen Delivery Method): ventilator    PHYSICAL EXAM:  General: NAD, intubated and sedated   HEENT: normocephalic with dry oral mucosa, , sluggish pupil reaction, no anisocoria, ET tube   Neck: supple no LN's no JVD  Respiratory: few crackles to right base   Cardiovascular: S1 S2 reg no murmurs  Gastrointestinal: +BS with soft, nondistended abdomen; nontender  : Dominguez with clear yellow urine in drainage bag  Extremities: no edema no cyanosis  Skin: no rashes  Ortho: no jt swelling  Neuro: Sedated, spontaneous movements, responds to painful stimuli     LABS/DIAGNOSTIC TESTS:                        13.2   12.75 )-----------( 143      ( 2025 03:25 )             40.0     WBC Count: 12.75 K/uL ( @ 03:25)  WBC Count: 11.73 K/uL ( @ 00:35)  WBC Count: 19.76 K/uL ( @ 16:25)        137  |  106  |  13  ----------------------------<  109[H]  4.2   |  23  |  0.75    Ca    8.8      2025 03:25  Phos  3.6       Mg     2.1         TPro  6.6  /  Alb  3.0[L]  /  TBili  0.9  /  DBili  x   /  AST  12  /  ALT  18  /  AlkPhos  107    Urinalysis with Rflx Culture (25 @ 17:56)   Urine Appearance: Clear  Color: Yellow  Specific Gravity: 1.018  pH Urine: 5.0  Protein, Urine: 30 mg/dL  Glucose Qualitative, Urine: 250 mg/dL  Ketone - Urine: Negative mg/dL  Blood, Urine: Small  Bilirubin: Negative  Urobilinogen: 0.2 mg/dL  Leukocyte Esterase Concentration: Negative  Nitrite: Negative  LIVER FUNCTIONS - ( 2025 03:25 )  Alb: 3.0 g/dL / Pro: 6.6 g/dL / ALK PHOS: 107 U/L / ALT: 18 U/L DA / AST: 12 U/L / GGT: x           Lactate, Blood: 1.4 mmol/L ( @ 20:19)  Lactate, Blood: 13.0 mmol/L ( @ 16:30)    ABG - ABG - ( 2025 23:06 )  pH, Arterial: 7.47  pH, Blood: x     /  pCO2: 31    /  pO2: 180   / HCO3: 23    / Base Excess: -0.2  /  SaO2: 97        CULTURES: Testing     RADIOLOGY:  < from: Xray Chest 1 View-PORTABLE IMMEDIATE (Xray Chest 1 View-PORTABLE IMMEDIATE .) (25 @ 17:14) >  ACC: [id number] EXAM:  XR CHEST PORTABLE IMMED 1V   ORDERED BY: APPLE GAR     PROCEDURE DATE:  2025      INTERPRETATION:  TECHNIQUE: Single portable view of the chest.    COMPARISON:  None    CLINICAL HISTORY: post intubation    FINDINGS:    Single frontal view of the chest demonstrates bibasilar atelectasis. The   cardiomediastinal silhouette is unremarkable. No acute osseous   abnormalities. Overlying EKG leads and wires are noted. Endotracheal tube   tip is 1.5 cm above the marcy. Right-sided  shunt tubing.    IMPRESSION: Bibasilar atelectasis.    --- End of Report ---    CARA KAMARA MD; Attending Radiologist  This document has been electronically signed. 2025 10:40PM    < end of copied text >  -----------------------------------------------------------------------------------------------------------------------  ACC: [id number] EXAM:  CT BRAIN   ORDERED BY:  MAX LAZARUS     PROCEDURE DATE:  2025      INTERPRETATION:  CLINICAL INFORMATION: History of TBI,  shunt, multiple   seizures,    COMPARISON: CT head 2024    CONTRAST:  IV Contrast: None    TECHNIQUE:    CT BRAIN: Serial axial images were obtained from the skull base to the   vertex using multi-slice helical technique. Sagittal and coronal   reformats were obtained.    FINDINGS:    VENTRICLES AND SULCI: Stable moderate ventriculomegaly. Stable right   frontal periventricular shunt catheter terminating in the right lateral   ventricle.  INTRA-AXIAL: Chronic left cerebellar encephalomalacia. Chronic   encephalomalacia in the right inferior frontal lobe. No mass effect,   acute hemorrhage, or midline shift.  There are periventricular and   subcortical white matter hypodensities, consistent with microvascular   type changes. Stable calcifications in the cerebellum bilaterally.  EXTRA-AXIAL: Slight increase in size of extra-axial fluid collection   overlying the right arthroplasty measuring up to 1.5 cm, previously up to   2.6 cm. VISUALIZED SINUSES:  Clear.  TYMPANOMASTOID CAVITIES:  Clear.  VISUALIZED ORBITS: Normal.  CALVARIUM: Intact.    MISCELLANEOUS: None.    IMPRESSION:    No acute intracranial hemorrhage, mass effect, or midline shift.  Stable  shunt and moderate ventriculomegaly.  Slight increase in size of extra-axial CSF collection overlying the right   frontal duraplasty, likely a hygroma. There is no mass effect.    --- End of Report ---    MATTHEW DURANT MD; Attending Radiologist  This document has been electronically signed. 2025  7:25PM    < end of copied text >

## 2025-02-23 NOTE — H&P ADULT - NSHPLABSRESULTS_GEN_ALL_CORE
[facility] 2/22/25 IMPRESSION:    No acute intracranial hemorrhage, mass effect, or midline shift.  Stable  shunt and moderate ventriculomegaly.  Slight increase in size of extra-axial CSF collection overlying the right   frontal duraplasty, likely a hygroma. There is no mass effect.    --- End of Report ---            MATTHEW DURANT MD; Attending Radiologist  This document has been electronically signed. Feb 22 2025  7:25PM

## 2025-02-23 NOTE — CONSULT NOTE ADULT - SUBJECTIVE AND OBJECTIVE BOX
SICU Consultation Note  =====================================================    HPI:  59 year old female with PMHx of neurofibromatosis, cerebellar brain tumor s/p resection (2023), s/p  shunt (in [facility]), s/p traumatic fall with head trauma and R SDH, s/p R decompressive hemicraniectomy, ICP monitor and R cranioplasty (2024), s/p L frontal Endoscopic third ventriculostomy and ligation of R  shunt with ligaclips with retention of valve, proximal and distal catheters (2024), Anxiety and depression presents to the ED at [place] with CC of witnessed seizure at home. As per ED note, Pt. arrived postictal and had a tonic-clonic seizure with gaze deviation to the left in ED. In ED, pt received versed 5 mg x2, ativan 2 mg x1. Patient intubated by ED for airway protection and status epilepticus. ROS is limited due to pt being intubated and sedated.  Pt is admitted to ICU for status epilepticus. CTH showed stable  shunt and moderate ventriculomegaly, slight increase in size of extra-axial CSF collection overlying the right frontal duraplasty, likely a hygroma. There is no mass effect.        Allergies: No Known Allergies    PAST MEDICAL & SURGICAL HISTORY:  Asthma  Depression  Neoplasm of uncertain behavior of brain  Neurofibromatosis, type 1  History of hydrocephalus  Anxiety  Delivery with history of     S/P tubal ligation    History of arthroscopy of left knee  meniscus   S/P LASIK Surgery    H/O brain tumor  S/P  shunt    FAMILY HISTORY:      SOCIAL HISTORY:  ***    ADVANCE DIRECTIVES: Presumed Full Code  ***    REVIEW OF SYSTEMS:  ***  General: Non-Contributory  Skin/Breast: Non-Contributory  Ophthalmologic: Non-Contributory  ENMT: Non-Contributory  Respiratory and Thorax: Non-Contributory  Cardiovascular: Non-Contributory  Gastrointestinal: Non-Contributory  Genitourinary: Non-Contributory  Musculoskeletal: Non-Contributory  Neurological: Non-Contributory  Psychiatric: Non-Contributory  Hematology/Lymphatics: Non-Contributory  Endocrine: Non-Contributory  Allergic/Immunologic: Non-Contributory    HOME MEDICATIONS:  ***    CURRENT MEDICATIONS:   --------------------------------------------------------------------------------------  Neurologic Medications    Respiratory Medications    Cardiovascular Medications    Gastrointestinal Medications    Genitourinary Medications    Hematologic/Oncologic Medications    Antimicrobial/Immunologic Medications    Endocrine/Metabolic Medications    Topical/Other Medications    --------------------------------------------------------------------------------------    VITAL SIGNS, INS/OUTS (last 24 hours):  --------------------------------------------------------------------------------------  ((Insert SICU Vitals / Is+Os here)) ***  --------------------------------------------------------------------------------------    EXAM  NEUROLOGY  RASS:   	GCS:    Exam: Sedated     HEENT  Exam: Normocephalic, atraumatic.    RESPIRATORY  Mechanical Ventilation: AC 16/450/5/40    CARDIOVASCULAR  Exam: Regular rate and rhythm    GI/NUTRITION  Exam: Abdomen soft, Non-distended  Current Diet:  NPO     VASCULAR  Exam: Extremities warm.    MUSCULOSKELETAL  Exam: Moving b/l LE & RUE, no movement in LUE    SKIN:  Exam: Good skin turgor, no skin breakdown.    METABOLIC/FLUIDS/ELECTROLYTES      HEMATOLOGIC  [x] DVT Prophylaxis:   Transfusions:	[] PRBC	[] Platelets		[] FFP	[] Cryoprecipitate    INFECTIOUS DISEASE  Antimicrobials/Immunologic Medications:    Day #  	of    ***    Tubes/Lines/Drains  ***  [x] Peripheral IV  [] Central Venous Line     	[] R	[] L	[] IJ	[] Fem	[] SC	Date Placed:   [] Arterial Line		[] R	[] L	[] Fem	[] Rad	[] Ax	Date Placed:   [] PICC:         	[] Midline		[] Mediport  [] Urinary Catheter		Date Placed:     LABS  --------------------------------------------------------------------------------------  ((Insert SICU Labs here))***  --------------------------------------------------------------------------------------

## 2025-02-23 NOTE — H&P ADULT - PROBLEM SELECTOR PLAN 1
- MRI brain w/wo w/ CSF flow ordered  - full infectious workup (ESR, CRP, blood cultures x2, RVP, chest xray)  - q1h neuro checks  - neurology consult for vEEG and AED management   - cont. w/ zosyn  - cont. w/ keppra     [id number]    Case discussed with attending neurosurgeon Dr. Brush - MRI brain w/wo w/ CSF flow ordered  - full infectious workup (ESR, CRP, blood cultures x2, RVP, chest xray)  - q1h neuro checks  - neurology consult for vEEG and AED management   - cont. w/ zosyn  - cont. w/ keppra   - wean to extubate when able to/ if stable      [id number]    Case discussed with attending neurosurgeon Dr. Brush

## 2025-02-23 NOTE — H&P ADULT - ASSESSMENT
60 yo F with PMHx of neurofibromatosis, s/p L suboccipital craniectomy for resection of cerebellar brain tumor 12/2023 by Dr Brush, s/p traumatic fall with head trauma in 5/20/2024, at that time sustained a large acute right SDH, and underwent an emergent right decompressive hemicraniectomy on 5/22/24, s/p right cranioplasty on 6/17/24 with custom plate, s/p ETV, ligation of shunt on the right with ligaclips with retention of valve as rescue device, presents as a transfer from [facility] for witnessed seizure at home. CTH obtained at [facility] revealed stable  shunt and moderate ventriculomegaly and slight increase in size of extra-axial CSF collection overlying the right frontal duraplasty, likely a hygroma and no mass effect.

## 2025-02-23 NOTE — ACUTE INTERFACILITY TRANSFER NOTE - PLAN OF CARE
p/w HR >90, WBC 19k, lactate 13 > 1.4  ua neg  received 1 L bolus  likely reactive in the setting of seizure  f/u blood cultures, urine cultures  start zosyn empirically p/w witnessed seizure: tonic clonic seizures  Head CT: No acute intracranial hemorrhage, mass effect, or midline shift. Stable  shunt and moderate ventriculomegaly. Slight increase in size of extra-axial CSF collection overlying the right frontal duraplasty, likely a hygroma. There is no mass effect.  Lactate 13 > 1.4  CK level 33  s/p versed 5 mg x2, ativan 2 mg x1, started on propofol, keppra load 3 g  takes keppra 500 mg bid at home  Seizures/Aspirations/Fall Precautions  c/w propofol drip for sedation  low suspicion for meningitis, LP deferred  f/u 24hr vEEG   f/u S/S once pt is extubated  f/u keppra level r/o noncompliance as potential seizure trigger  increase to Keppra 1000mg BID  Neuro Dr. Lui following  Neurosurge Dr. Bolivar contacted

## 2025-02-23 NOTE — CONSULT NOTE ADULT - ASSESSMENT
Mrs. Tyler is a 59 year old woman with hx of neurofibromatosis, prior cerebellar tumor s/p resection, TBI after fall 5/2024 c/b R SDH, S/P hemicrani in [facility] and VPS shunt placement, since then VPS ligated and now with 3rd ventriculostomy. Follows at [facility]. She is presenting with seemingly breakthrough seizures. CT scan is not remarkable for interval hydrocephalus, shunt function presumed normal. No clear infectious source discovered as yet. I suspect the most likely etiology of her seizure is the R subdural hygroma which is slightly increased in size. Still need to rule out subclinical seizures or non-convulsive status epilepticus. Patient examined off sedation today, though still may have had fentanyl recently, able to demonstrate reassuring exam overall. 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 Semiology in history suggestive of R sided focus. Overall patient following command off sedation and moving all extremities well. Could not further assess language but mental status seems reassuring. Low overall concern for meningitis, but this is also confounded by the fact that patient is already treated empirically wit ABX. Probably okay to hold ABx if no source identified and monitor patient response. In addition to reassuring exam, no nucchal findings and patient no longer febrile.      [ ] Connect to video EEG 24 hr study  [ ] increase Keppra to 1000 mg q12, starting with next scheduled dose  [ ] minimize sedation  [ ] infectious workup per primary team. Likely okay to withhold antibiotic if no clear source and patient afebrile, and then monitor clinically  [ ] if any acute change in exam, STAT NCHCT  [ ] neurocheck per ICU  [ ] NSGY recs appreciated for interval increase in hygroma which may have clinically caused patients seizures / status  [ ] follow up keppra serum level    Stuart Lui M.D.  Neurohospitalist

## 2025-02-23 NOTE — PROGRESS NOTE ADULT - ASSESSMENT
· Assessment	  59 year old female with PMHx of neurofibromatosis, 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), s/p  shunt (in [facility]), s/p traumatic fall with head trauma and R SDH, s/p R decompressive hemicraniectomy, ICP monitor and R cranioplasty (5/2024), s/p L frontal Endoscopic third ventriculostomy and ligation of R  shunt with ligaclips with retention of valve, proximal and distal catheters (08/2024), Anxiety and depression, presents to the ED with CC of witnessed seizure at home. Pt is admitted to ICU for status epilepticus.     Assessment:  #Status Epilepticus  #SIRS  #Leukocytosis  #Elevated Lactate  #Metabolic Acidosis  #LOBO  #Hx of  shunt, anxiety and depression    Plan:    =====================[CNS ]==============================  #Status Epilepticus  p/w witnessed tonic clonic seizures  Head CT: No acute intracranial hemorrhage, mass effect, or midline shift. Stable  shunt and moderate ventriculomegaly. Slight increase in size of extra-axial CSF collection overlying the right frontal duraplasty, likely a hygroma. There is no mass effect.  Lactate 13 > 1.4  CK level 33  s/p versed 5 mg x2, ativan 2 mg x1, started on propofol, keppra load 3 g  takes keppra 500 mg bid at home  c/w home meds  Seizures/Aspirations/Fall Precautions  f/u EEG   f/u S/S once pt is extubated  Consult Neuro   c/w propofol drip for sedation for now    #Neurologic:   - Intubated, sedated   - RASS goal -1   - Propofol for sedation    # shunt  pt with hx of  shunt (in [facility]), s/p traumatic fall with head trauma and R SDH, s/p R decompressive hemicraniectomy, ICP monitor and R cranioplasty (5/2024), s/p L frontal Endoscopic third ventriculostomy and ligation of R  shunt with ligaclips with retention of valve, proximal and distal catheters (08/2024)  continue to monitor    #Anxiety and depression  History of anxiety and depression  takes zoloft 50 mg qd, mirtazapine 7.5 mg qd at home  hold home meds in the setting of intubation    =====================[CVS ]===============================  # No active issues    =====================[RESP ]==============================  # Intubated  due to airway protection in the setting of status epilepticus  SBT/SAT daily    =====================[ GI ]================================  # NPO  start PPI IV for ppx    ====================[ RENAL ]=============================   # LOBO  Pt w/ SCr 1.04 on admission  Baseline SCr - 0.5  likely prerenal  s/p 1 L bolus in the ED  IVF for now  follow BMP daily    #Metabolic acidosis  p/w pH 7.04, SCO2 18, AG 18, lactate 13  likely due to elevated lactate in the setting of seizure  repeat lactate 1.4  f/u ABG    =====================[ ID ]================================  #SIRS  #Leukocytosis  #Elevated Lactate  p/w HR >90, WBC 19k, lactate 13  lactate 1.4  ua neg  received 1 L bolus  likely reactive in the setting of seizure  f/u blood cultures, urine cultures  start CTX empirically    ===================[ HEME/ONC ]===========================  #Polycythemia  p/w hb 16, hct 50, wbc 19  likely hemoconcentration  s/p 1 L bolus in the ED  c/w IVF  f/u repeat CBC    =====================[ ENDO ]=============================  # No active issues  blood glucose q6h while NPO    ================= Skin/Catheters============================  # Peripheral IV lines  #FC 02/22    ==================[ PROPHYLAXIS ]==========================   # DVT ppx: Hep subq  # GI ppx: PPI IV qd     ====================[ DISPO ]==============================   - Monitor in ICU          · Assessment	  59 year old female with PMHx of neurofibromatosis, 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), s/p  shunt (in [facility]), s/p traumatic fall with head trauma and R SDH, s/p R decompressive hemicraniectomy, ICP monitor and R cranioplasty (5/2024), s/p L frontal Endoscopic third ventriculostomy and ligation of R  shunt with ligaclips with retention of valve, proximal and distal catheters (08/2024), Anxiety and depression, presents to the ED with CC of witnessed seizure at home. Pt is admitted to ICU for status epilepticus.     Assessment:  #Status Epilepticus  #SIRS  #Leukocytosis  #Elevated Lactate  #Metabolic Acidosis  #LOBO  #Hx of  shunt, anxiety and depression    Plan:    =====================[CNS ]==============================  #Status Epilepticus  p/w witnessed tonic clonic seizures  Head CT: No acute intracranial hemorrhage, mass effect, or midline shift. Stable  shunt and moderate ventriculomegaly. Slight increase in size of extra-axial CSF collection overlying the right frontal duraplasty, likely a hygroma. There is no mass effect.  Lactate 13 > 1.4  CK level 33  s/p versed 5 mg x2, ativan 2 mg x1, started on propofol, keppra load 3 g  takes keppra 500 mg bid at home  Seizures/Aspirations/Fall Precautions  c/w propofol drip for sedation  low suspicion for meningitis, LP deferred  f/u 24hr vEEG   f/u S/S once pt is extubated  f/u keppra level r/o noncompliance as potential seizure trigger  increase to Keppra 1000mg BID  Neuro Dr. Lui following  Neurosurge Dr. Bolivar contacted    #Neurologic:   - Intubated, sedated with propofol and fentanyl 50mg IVP q4 for pain   - RASS goal -2 to -3   - Propofol for sedation    # shunt  pt with hx of  shunt (in [facility]), s/p traumatic fall with head trauma and R SDH, s/p R decompressive hemicraniectomy, ICP monitor and R cranioplasty (5/2024), s/p L frontal Endoscopic third ventriculostomy and ligation of R  shunt with ligaclips with retention of valve, proximal and distal catheters (08/2024)  continue to monitor  -f/u neurosurge    #Anxiety and depression  History of anxiety and depression  takes zoloft 50 mg qd, mirtazapine 7.5 mg qd at home  hold home meds in the setting of intubation    =====================[CVS ]===============================  # No active issues    =====================[RESP ]==============================  # Intubated  -due to airway protection in the setting of status epilepticus  -SBT/SAT daily  -ABG 4.74/31/180/23  -reduce Vt 400, O2 percent 40%    #?Aspiration PNA  -febrile 101.8 overnight with leukocytosis 12.75<11.73  -lactate cleared   -was on CTX and Vanc empirically  -started on renally dosed zosyn for empiric coverage for possible aspiration PNA  -f/u AM CXR    =====================[ GI ]================================  # NPO  -c/w PPI IV for ppx  -NGT placement for tube feeds and medication administration    ====================[ RENAL ]=============================   # LOBO (RESOLVED)  Pt w/ SCr 1.04 on admission  Baseline SCr - 0.5  likely prerenal  s/p 1 L bolus in the ED  IVF for now  follow BMP daily    #Metabolic acidosis (resolved)  p/w pH 7.04, SCO2 18, AG 18, lactate 13  likely due to elevated lactate in the setting of seizure  repeat lactate 1.4  f/u ABG 7.47/31/180/23 on V-AC    =====================[ ID ]================================  #SIRS  #Leukocytosis  #Elevated Lactate  p/w HR >90, WBC 19k, lactate 13  lactate 1.4  ua neg  received 1 L bolus  likely reactive in the setting of seizure  f/u blood cultures, urine cultures  start CTX empirically    ===================[ HEME/ONC ]===========================  #Polycythemia  p/w hb 16, hct 50, wbc 19  likely hemoconcentration  s/p 1 L bolus in the ED  c/w IVF  f/u repeat CBC    =====================[ ENDO ]=============================  # No active issues  blood glucose q6h while NPO    ================= Skin/Catheters============================  # Peripheral IV lines  #FC 02/22    ==================[ PROPHYLAXIS ]==========================   # DVT ppx: Hep subq  # GI ppx: PPI IV qd     ====================[ DISPO ]==============================   - Monitor in ICU

## 2025-02-24 LAB
ANION GAP SERPL CALC-SCNC: 15 MMOL/L — HIGH (ref 7–14)
APTT BLD: 33.4 SEC — SIGNIFICANT CHANGE UP (ref 24.5–35.6)
BUN SERPL-MCNC: 8 MG/DL — SIGNIFICANT CHANGE UP (ref 7–23)
CALCIUM SERPL-MCNC: 8.5 MG/DL — SIGNIFICANT CHANGE UP (ref 8.4–10.5)
CHLORIDE SERPL-SCNC: 106 MMOL/L — SIGNIFICANT CHANGE UP (ref 98–107)
CO2 SERPL-SCNC: 19 MMOL/L — LOW (ref 22–31)
CREAT SERPL-MCNC: 0.43 MG/DL — LOW (ref 0.5–1.3)
EGFR: 112 ML/MIN/1.73M2 — SIGNIFICANT CHANGE UP
EGFR: 112 ML/MIN/1.73M2 — SIGNIFICANT CHANGE UP
GLUCOSE SERPL-MCNC: 114 MG/DL — HIGH (ref 70–99)
HCT VFR BLD CALC: 34.5 % — SIGNIFICANT CHANGE UP (ref 34.5–45)
HGB BLD-MCNC: 11.6 G/DL — SIGNIFICANT CHANGE UP (ref 11.5–15.5)
INR BLD: 1.16 RATIO — SIGNIFICANT CHANGE UP (ref 0.85–1.16)
MAGNESIUM SERPL-MCNC: 2 MG/DL — SIGNIFICANT CHANGE UP (ref 1.6–2.6)
MCHC RBC-ENTMCNC: 26.2 PG — LOW (ref 27–34)
MCHC RBC-ENTMCNC: 33.6 G/DL — SIGNIFICANT CHANGE UP (ref 32–36)
MCV RBC AUTO: 77.9 FL — LOW (ref 80–100)
NRBC # BLD AUTO: 0 K/UL — SIGNIFICANT CHANGE UP (ref 0–0)
NRBC # FLD: 0 K/UL — SIGNIFICANT CHANGE UP (ref 0–0)
NRBC BLD AUTO-RTO: 0 /100 WBCS — SIGNIFICANT CHANGE UP (ref 0–0)
PHOSPHATE SERPL-MCNC: 2.5 MG/DL — SIGNIFICANT CHANGE UP (ref 2.5–4.5)
PLATELET # BLD AUTO: 163 K/UL — SIGNIFICANT CHANGE UP (ref 150–400)
POTASSIUM SERPL-MCNC: 3.5 MMOL/L — SIGNIFICANT CHANGE UP (ref 3.5–5.3)
POTASSIUM SERPL-SCNC: 3.5 MMOL/L — SIGNIFICANT CHANGE UP (ref 3.5–5.3)
PROTHROM AB SERPL-ACNC: 13.4 SEC — SIGNIFICANT CHANGE UP (ref 9.9–13.4)
RBC # BLD: 4.43 M/UL — SIGNIFICANT CHANGE UP (ref 3.8–5.2)
RBC # FLD: 14 % — SIGNIFICANT CHANGE UP (ref 10.3–14.5)
SODIUM SERPL-SCNC: 140 MMOL/L — SIGNIFICANT CHANGE UP (ref 135–145)
WBC # BLD: 6.77 K/UL — SIGNIFICANT CHANGE UP (ref 3.8–10.5)
WBC # FLD AUTO: 6.77 K/UL — SIGNIFICANT CHANGE UP (ref 3.8–10.5)

## 2025-02-24 PROCEDURE — 70498 CT ANGIOGRAPHY NECK: CPT | Mod: 26

## 2025-02-24 PROCEDURE — 70496 CT ANGIOGRAPHY HEAD: CPT | Mod: 26

## 2025-02-24 PROCEDURE — 99291 CRITICAL CARE FIRST HOUR: CPT

## 2025-02-24 RX ORDER — PROPOFOL 10 MG/ML
10 INJECTION, EMULSION INTRAVENOUS
Qty: 1000 | Refills: 0 | Status: DISCONTINUED | OUTPATIENT
Start: 2025-02-24 | End: 2025-02-25

## 2025-02-24 RX ADMIN — Medication 400 MILLIGRAM(S): at 06:34

## 2025-02-24 RX ADMIN — HEPARIN SODIUM 5000 UNIT(S): 1000 INJECTION INTRAVENOUS; SUBCUTANEOUS at 22:25

## 2025-02-24 RX ADMIN — LEVETIRACETAM 1000 MILLIGRAM(S): 10 INJECTION, SOLUTION INTRAVENOUS at 17:19

## 2025-02-24 RX ADMIN — NOREPINEPHRINE BITARTRATE 6.23 MICROGRAM(S)/KG/MIN: 8 SOLUTION at 00:08

## 2025-02-24 RX ADMIN — Medication 25 GRAM(S): at 00:08

## 2025-02-24 RX ADMIN — LEVETIRACETAM 1000 MILLIGRAM(S): 10 INJECTION, SOLUTION INTRAVENOUS at 06:34

## 2025-02-24 RX ADMIN — SODIUM CHLORIDE 100 MILLILITER(S): 9 INJECTION, SOLUTION INTRAVENOUS at 08:19

## 2025-02-24 RX ADMIN — SODIUM CHLORIDE 100 MILLILITER(S): 9 INJECTION, SOLUTION INTRAVENOUS at 18:37

## 2025-02-24 RX ADMIN — PROPOFOL 3.99 MICROGRAM(S)/KG/MIN: 10 INJECTION, EMULSION INTRAVENOUS at 16:55

## 2025-02-24 RX ADMIN — PROPOFOL 3.99 MICROGRAM(S)/KG/MIN: 10 INJECTION, EMULSION INTRAVENOUS at 10:33

## 2025-02-24 RX ADMIN — Medication 400 MILLIGRAM(S): at 11:51

## 2025-02-24 RX ADMIN — PROPOFOL 3.99 MICROGRAM(S)/KG/MIN: 10 INJECTION, EMULSION INTRAVENOUS at 05:07

## 2025-02-24 RX ADMIN — Medication 15 MILLILITER(S): at 17:20

## 2025-02-24 RX ADMIN — Medication 15 MILLILITER(S): at 06:35

## 2025-02-24 RX ADMIN — HEPARIN SODIUM 5000 UNIT(S): 1000 INJECTION INTRAVENOUS; SUBCUTANEOUS at 06:36

## 2025-02-24 RX ADMIN — PROPOFOL 3.99 MICROGRAM(S)/KG/MIN: 10 INJECTION, EMULSION INTRAVENOUS at 22:25

## 2025-02-24 RX ADMIN — HEPARIN SODIUM 5000 UNIT(S): 1000 INJECTION INTRAVENOUS; SUBCUTANEOUS at 13:05

## 2025-02-24 RX ADMIN — Medication 25 GRAM(S): at 07:59

## 2025-02-24 RX ADMIN — Medication 400 MILLIGRAM(S): at 17:24

## 2025-02-24 RX ADMIN — Medication 1000 MILLIGRAM(S): at 07:00

## 2025-02-24 NOTE — EEG REPORT - NS EEG TEXT BOX
AIXA FULLER [id number]     Study Date: 2/23/25 16:20-16:58 and 17:15-20:29  Duration: 3 hr 27 min  --------------------------------------------------------------------------------------------------  History:  CC/ HPI Patient is a 59y old  Female who presents with a chief complaint of Status Epilepticus (23 Feb 2025 20:01)    MEDICATIONS  (STANDING):    --------------------------------------------------------------------------------------------------  Study Interpretation:    [[[Abbreviation Key:  PDR=alpha rhythm/posterior dominant rhythm. A-P=anterior posterior.  Amplitude: ‘very low’:<20; ‘low’:20-49; ‘medium’:; ‘high’:>150uV.  Persistence for periodic/rhythmic patterns (% of epoch) ‘rare’:<1%; ‘occasional’:1-10%; ‘frequent’:10-50%; ‘abundant’:50-90%; ‘continuous’:>90%.  Persistence for sporadic discharges: ‘rare’:<1/hr; ‘occasional’:1/min-1/hr; ‘frequent’:>1/min; ‘abundant’:>1/10 sec.  RPP=rhythmic and periodic patterns; GRDA=generalized rhythmic delta activity; FIRDA=frontal intermittent GRDA; LRDA=lateralized rhythmic delta activity; TIRDA=temporal intermittent rhythmic delta activity;  LPD=PLED=lateralized periodic discharges; GPD=generalized periodic discharges; BIPDs =bilateral independent periodic discharges; Mf=multifocal; SIRPDs=stimulus induced rhythmic, periodic, or ictal appearing discharges; BIRDs=brief potentially ictal rhythmic discharges >4 Hz, lasting .5-10s; PFA (paroxysmal bursts >13 Hz or =8 Hz <10s).  Modifiers: +F=with fast component; +S=with spike component; +R=with rhythmic component.  S-B=burst suppression pattern.  Max=maximal. N1-drowsy; N2-stage II sleep; N3-slow wave sleep. SSS/BETS=small sharp spikes/benign epileptiform transients of sleep. HV=hyperventilation; PS=photic stimulation]]]    Daily EEG Visual Analysis    FINDINGS:      Background:  The background is continuous and symmetric. During the most wakeful state, the background in the left hemisphere consists of alpha and beta frequencies with intermittent polymorphic delta and no clear posterior dominant rhythm. The background in the right hemisphere consists of polymorphic delta slowing with intermittent overriding alpha and theta frequencies.    Background Slowing:  Generalized slowing: As above  Focal slowing: Continuous right hemispheric focal slowing, as above    State Changes:   A less wakeful state is characterized by fragmentation of the background activity, with 1-2-second bursts of left hemispheric beta and alpha frequencies between polymorphic delta activity and lower-amplitude alpha/beta, with relatively lower amplitudes in the right hemisphere.  No clear normal stage 2 sleep.    Interictal Findings:  Frequent right frontal LRDA at 1-3 Hz with rare intermixed sharply contoured waveforms, fluctuating without evolution.  Occasional left frontal LRDA at 1-2.5 Hz, fluctuating without evolution.    Electrographic and Electroclinical seizures:  None    Other Clinical Events:  None    Activation Procedures:   Hyperventilation is not performed.    Photic stimulation is not performed.    Artifacts:  Intermittent myogenic and movement artifacts are present.    Single-lead EKG: Regular rhythm    EEG Classification / Summary:  Abnormal EEG   -Frequent right frontal LRDA at 1-3 Hz with rare intermixed sharply contoured waveforms, fluctuating without evolution.  -Occasional left frontal LRDA at 1-2.5 Hz, fluctuating without evolution.  -Continuous right hemispheric focal slowing  -Moderate diffuse slowing  -No electrographic seizures    Clinical Impression:  -Risk of focal-onset seizures from the bilateral frontal regions  -Right hemispheric focal cerebral dysfunction can be structural or functional in etiology.  -Moderate diffuse and/or multifocal cerebral dysfunction is nonspecific in etiology.  -No seizures          -------------------------------------------------------------------------------------------------------    Jocy Carrera MD  Attending Physician, [facility] [place]    -------------------------------------------------------------------------------------------------------    To reach EEG technologist:  Please use the pager number for the appropriate hospital or contact the .  At [facility] - Pager #: [phone]    To reach EEG-reading physician:  EEG Reading Room Phone #: [phone]  Epilepsy Answering Service after 5PM and before 8:30AM: Phone #: [phone]

## 2025-02-24 NOTE — PROGRESS NOTE ADULT - ASSESSMENT
58 yo F with PMHx of neurofibromatosis, s/p L suboccipital craniectomy for resection of cerebellar brain tumor 12/2023 by Dr Brush, s/p traumatic fall with head trauma in 5/20/2024, at that time sustained a large acute right SDH, and underwent an emergent right decompressive hemicraniectomy on 5/22/24, s/p right cranioplasty on 6/17/24 with custom plate, s/p ETV, ligation of shunt on the right with ligaclips with retention of valve as rescue device, presents as a transfer from [facility] for witnessed seizure at home. CTH obtained at [facility] revealed stable  shunt and moderate ventriculomegaly and slight increase in size of extra-axial CSF collection overlying the right frontal duraplasty, likely a hygroma and no mass effect.    2/24 Exam with decreased strength on left side, post ictal vs intracranial causes, will get MRI/MRA to assess and CTH/CTA if MRI will be delayed. On zosyn, white count downtrending.

## 2025-02-24 NOTE — PROGRESS NOTE ADULT - SUBJECTIVE AND OBJECTIVE BOX
SICU Daily Progress Note  =====================================================  Interval/Overnight Events:   NAOE    MEDICATIONS:   --------------------------------------------------------------------------------------  acetaminophen   IVPB .. 1000 milliGRAM(s) IV Intermittent every 6 hours  chlorhexidine 0.12% Liquid 15 milliLiter(s) Oral Mucosa every 12 hours  dexMEDEtomidine Infusion 0.2 MICROgram(s)/kG/Hr IV Continuous <Continuous>  heparin   Injectable 5000 Unit(s) SubCutaneous every 8 hours  HYDROmorphone  Injectable 0.2 milliGRAM(s) IV Push every 4 hours PRN  HYDROmorphone  Injectable 0.5 milliGRAM(s) IV Push every 4 hours PRN  lactated ringers. 1000 milliLiter(s) IV Continuous <Continuous>  levETIRAcetam   Injectable 1000 milliGRAM(s) IV Push every 12 hours  norepinephrine Infusion 0.05 MICROgram(s)/kG/Min IV Continuous <Continuous>  piperacillin/tazobactam IVPB.. 3.375 Gram(s) IV Intermittent every 8 hours  propofol Infusion 10 MICROgram(s)/kG/Min IV Continuous <Continuous>    --------------------------------------------------------------------------------------    VITAL SIGNS, INS/OUTS (last 24 hours):  --------------------------------------------------------------------------------------  T(C): 37.1 (02-23-25 @ 21:30), Max: 38 (02-23-25 @ 02:00)  HR: 58 (02-23-25 @ 22:00) (58 - 84)  BP: 127/56 (02-23-25 @ 22:00) (77/52 - 137/70)  RR: 16 (02-23-25 @ 22:00) (16 - 16)  SpO2: 100% (02-23-25 @ 22:00) (97% - 100%)  Mode: AC/ CMV (Assist Control/ Continuous Mandatory Ventilation), RR (machine): 16, TV (machine): 450, FiO2: 40, PEEP: 5, ITime: 0.68, MAP: 8, PIP: 17    --------------------------------------------------------------------------------------  EXAM  NEUROLOGY  RASS:   	GCS:    Exam: Sedated     HEENT  Exam: Normocephalic, atraumatic.    RESPIRATORY  Mechanical Ventilation: AC 16/450/5/40    CARDIOVASCULAR  Exam: Regular rate and rhythm    GI/NUTRITION  Exam: Abdomen soft, Non-distended  Current Diet:  NPO     VASCULAR  Exam: Extremities warm.    MUSCULOSKELETAL  Exam: Moving b/l LE & RUE, no movement in LUE    SKIN:  Exam: Good skin turgor, no skin breakdown.      [x] Peripheral IV  [] Central Venous Line     	[] R	[] L	[] IJ	[] Fem	[] SC	Date Placed:   [] Arterial Line		[] R	[] L	[] Fem	[] Rad	[] Ax	Date Placed:   [] PICC		[] Midline		[] Mediport  [] Urinary Catheter		Date Placed:     LABS  --------------------------------------------------------------------------------------    --------------------------------------------------------------------------------------    IMAGING STUDIES:      SICU Daily Progress Note  =====================================================  Interval/Overnight Events:   remains intubated    MEDICATIONS:   --------------------------------------------------------------------------------------  acetaminophen   IVPB .. 1000 milliGRAM(s) IV Intermittent every 6 hours  chlorhexidine 0.12% Liquid 15 milliLiter(s) Oral Mucosa every 12 hours  dexMEDEtomidine Infusion 0.2 MICROgram(s)/kG/Hr IV Continuous <Continuous>  heparin   Injectable 5000 Unit(s) SubCutaneous every 8 hours  HYDROmorphone  Injectable 0.2 milliGRAM(s) IV Push every 4 hours PRN  HYDROmorphone  Injectable 0.5 milliGRAM(s) IV Push every 4 hours PRN  lactated ringers. 1000 milliLiter(s) IV Continuous <Continuous>  levETIRAcetam   Injectable 1000 milliGRAM(s) IV Push every 12 hours  norepinephrine Infusion 0.05 MICROgram(s)/kG/Min IV Continuous <Continuous>  piperacillin/tazobactam IVPB.. 3.375 Gram(s) IV Intermittent every 8 hours  propofol Infusion 10 MICROgram(s)/kG/Min IV Continuous <Continuous>    --------------------------------------------------------------------------------------    VITAL SIGNS, INS/OUTS (last 24 hours):  --------------------------------------------------------------------------------------  T(C): 37.1 (02-23-25 @ 21:30), Max: 38 (02-23-25 @ 02:00)  HR: 58 (02-23-25 @ 22:00) (58 - 84)  BP: 127/56 (02-23-25 @ 22:00) (77/52 - 137/70)  RR: 16 (02-23-25 @ 22:00) (16 - 16)  SpO2: 100% (02-23-25 @ 22:00) (97% - 100%)  Mode: AC/ CMV (Assist Control/ Continuous Mandatory Ventilation), RR (machine): 16, TV (machine): 450, FiO2: 40, PEEP: 5, ITime: 0.68, MAP: 8, PIP: 17    --------------------------------------------------------------------------------------  EXAM  NEUROLOGY  RASS:   	GCS:    Exam: Sedated     HEENT  Exam: Normocephalic, atraumatic.    RESPIRATORY  Mechanical Ventilation: AC 16/450/5/40    CARDIOVASCULAR  Exam: Regular rate and rhythm    GI/NUTRITION  Exam: Abdomen soft, Non-distended  Current Diet:  NPO     VASCULAR  Exam: Extremities warm.    MUSCULOSKELETAL  Exam: Moving b/l LE & RUE, no movement in LUE    SKIN:  Exam: Good skin turgor, no skin breakdown.      [x] Peripheral IV  [] Central Venous Line     	[] R	[] L	[] IJ	[] Fem	[] SC	Date Placed:   [] Arterial Line		[] R	[] L	[] Fem	[] Rad	[] Ax	Date Placed:   [] PICC		[] Midline		[] Mediport  [] Urinary Catheter		Date Placed:     LABS  --------------------------------------------------------------------------------------    --------------------------------------------------------------------------------------    IMAGING STUDIES:

## 2025-02-24 NOTE — PROGRESS NOTE ADULT - SUBJECTIVE AND OBJECTIVE BOX
OVERNIGHT EVENTS: Pt seen and examined at bedside, currently intubated/sedated. Pending MRI/MRA with flow, vitals stable, afebrile.       Vital Signs Last 24 Hrs  T(C): 36.8 (24 Feb 2025 08:00), Max: 37.9 (23 Feb 2025 17:00)  T(F): 98.2 (24 Feb 2025 08:00), Max: 100.2 (23 Feb 2025 17:00)  HR: 72 (24 Feb 2025 10:00) (48 - 85)  BP: 119/60 (24 Feb 2025 10:00) (77/52 - 137/68)  BP(mean): 78 (24 Feb 2025 10:00) (60 - 109)  RR: 14 (24 Feb 2025 10:00) (14 - 16)  SpO2: 100% (24 Feb 2025 10:00) (97% - 100%)    Parameters below as of 24 Feb 2025 10:00  Patient On (Oxygen Delivery Method): ventilator, AC    O2 Concentration (%): 40    I&O's Summary    23 Feb 2025 07:01  -  24 Feb 2025 07:00  --------------------------------------------------------  IN: 1234.9 mL / OUT: 850 mL / NET: 384.9 mL    24 Feb 2025 07:01  -  24 Feb 2025 10:18  --------------------------------------------------------  IN: 423.2 mL / OUT: 175 mL / NET: 248.2 mL        PHYSICAL EXAM:  Intubated, sedated  Opens eyes to voice, following commands  Able to follow RUE/RLE, limited movement on LUE/LLE 1/5     TUBES/LINES:  [ ] A-line   [ ] Lumbar Drain:   [ ] Ventriculostomy:  [ ] Other    DIET:  [x] NPO  [ ] Regular    LABS:                        11.6   6.77  )-----------( 163      ( 23 Feb 2025 23:50 )             34.5     02-23    140  |  106  |  8   ----------------------------<  114[H]  3.5   |  19[L]  |  0.43[L]    Ca    8.5      23 Feb 2025 23:50  Phos  2.5     02-23  Mg     2.00     02-23    TPro  6.6  /  Alb  3.0[L]  /  TBili  0.9  /  DBili  x   /  AST  12  /  ALT  18  /  AlkPhos  107  02-23    PT/INR - ( 23 Feb 2025 23:50 )   PT: 13.4 sec;   INR: 1.16 ratio         PTT - ( 23 Feb 2025 23:50 )  PTT:33.4 sec  Urinalysis Basic - ( 23 Feb 2025 23:50 )    Color: x / Appearance: x / SG: x / pH: x  Gluc: 114 mg/dL / Ketone: x  / Bili: x / Urobili: x   Blood: x / Protein: x / Nitrite: x   Leuk Esterase: x / RBC: x / WBC x   Sq Epi: x / Non Sq Epi: x / Bacteria: x        CULTURES:    Culture Results:   No growth at 24 hours (02-22 @ 16:30)  Culture Results:   No growth at 24 hours (02-22 @ 16:20)    CSF Analysis:       Drug Levels:   Phenytoin Level, Serum: <0.4 ug/mL (02-22-25 @ 17:39)      Allergies    No Known Allergies    Intolerances        MEDICATIONS:  Antibiotics:  piperacillin/tazobactam IVPB.. 3.375 Gram(s) IV Intermittent every 8 hours    Neuro:  acetaminophen   IVPB .. 1000 milliGRAM(s) IV Intermittent every 6 hours  HYDROmorphone  Injectable 0.2 milliGRAM(s) IV Push every 4 hours PRN  HYDROmorphone  Injectable 0.5 milliGRAM(s) IV Push every 4 hours PRN  levETIRAcetam   Injectable 1000 milliGRAM(s) IV Push every 12 hours  propofol Infusion 10 MICROgram(s)/kG/Min IV Continuous <Continuous>    Anticoagulation  heparin   Injectable 5000 Unit(s) SubCutaneous every 8 hours    OTHER:  chlorhexidine 0.12% Liquid 15 milliLiter(s) Oral Mucosa every 12 hours  norepinephrine Infusion 0.05 MICROgram(s)/kG/Min IV Continuous <Continuous>    IVF:  lactated ringers. 1000 milliLiter(s) IV Continuous <Continuous>      DVT PROPHYLAXIS:  [] Venodynes                                [] Heparin/Lovenox    RADIOLOGY & ADDITIONAL TESTS:

## 2025-02-24 NOTE — PROGRESS NOTE ADULT - ASSESSMENT
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), s/p traumatic fall with head trauma and R SDH, s/p R decompressive hemicraniectomy, ICP monitor and R cranioplasty (5/2024), s/p L frontal Endoscopic third ventriculostomy and ligation of R  shunt with ligaclips with retention of valve, proximal and distal catheters (08/2024), Anxiety and depression presents to the ED at [place] with CC of witnessed seizure at home.Pt is admitted to ICU for status epilepticus. CTH showed stable  shunt and moderate ventriculomegaly, slight increase in size of extra-axial CSF collection overlying the right frontal duraplasty, likely a hygroma. There is no mass effect. Transferred to [facility] for Neurosurgery evaluation, r/o infective etiology. SICU consulted for vent management & Q1 neurochecks.       Neurologic:   Q1 neurochecks  Sedated on precedex/propofol   Keppra 1g BID  Pain control: IV Tylenol, dilaudid PRN   MR head w CSF flow   VEEG     Respiratory:   Intubated 14/420/5/40    Cardiovascular:   MAP > 65  on levo for transfer     Gastrointestinal/Nutrition:   NPO    Renal/Genitourinary:   Monitor electrolytes   LR@100  storey     Hematologic:   Hep sub q     Infectious Disease:   Monitor WBC, fever curves   Zosyn (presumed aspiration pneumonia)   blood cx sent x2 ([place])   Endocrine:   Monitor glucose     Tubes/Lines/Drains:   PIV     Disposition: SICU   59 year old female with PMHx of neurofibromatosis, 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 CTH showed stable  shunt and moderate ventriculomegaly, slight increase in size of extra-axial CSF collection overlying the right frontal duraplasty, likely a hygroma. There is no mass effect. Transferred to [facility] for Neurosurgery evaluation, r/o infective etiology. SICU consulted for vent management & Q1 neurochecks.       Neurologic:   Q1 neurochecks  Sedated on precedex/propofol   Keppra 1g BID  Pain control: IV Tylenol, dilaudid PRN   MR head w CSF flow   VEEG     Respiratory:   Intubated 14/420/5/40    Cardiovascular:   MAP > 65  on levo for transfer     Gastrointestinal/Nutrition:   NPO    Renal/Genitourinary:   Monitor electrolytes   LR@100  storey     Hematologic:   Hep sub q     Infectious Disease:   Monitor WBC, fever curves   Zosyn (presumed aspiration pneumonia)   blood cx sent x2 ([place])   Endocrine:   Monitor glucose     Tubes/Lines/Drains:   PIV     Disposition: SICU

## 2025-02-24 NOTE — PROGRESS NOTE ADULT - PROBLEM SELECTOR PLAN 1
- MRI brain w/wo w/ CSF flow, MRA today   - full infectious workup (ESR, CRP, blood cultures x2, RVP, chest xray)  - q1h neuro checks  - neurology consult for vEEG and AED management   - cont. w/ zosyn  - cont. w/ keppra   - wean to extubate when able to/if stable    [id number]    Case discussed with attending neurosurgeon Dr. Brush

## 2025-02-25 ENCOUNTER — APPOINTMENT (OUTPATIENT)
Dept: NEUROLOGY | Facility: CLINIC | Age: 60
End: 2025-02-25

## 2025-02-25 LAB
ANION GAP SERPL CALC-SCNC: 13 MMOL/L — SIGNIFICANT CHANGE UP (ref 7–14)
ANION GAP SERPL CALC-SCNC: 16 MMOL/L — HIGH (ref 7–14)
ANION GAP SERPL CALC-SCNC: 18 MMOL/L — HIGH (ref 7–14)
BLOOD GAS ARTERIAL COMPREHENSIVE RESULT: SIGNIFICANT CHANGE UP
BUN SERPL-MCNC: 4 MG/DL — LOW (ref 7–23)
CALCIUM SERPL-MCNC: 7.8 MG/DL — LOW (ref 8.4–10.5)
CALCIUM SERPL-MCNC: 8.3 MG/DL — LOW (ref 8.4–10.5)
CALCIUM SERPL-MCNC: 8.4 MG/DL — SIGNIFICANT CHANGE UP (ref 8.4–10.5)
CHLORIDE SERPL-SCNC: 101 MMOL/L — SIGNIFICANT CHANGE UP (ref 98–107)
CHLORIDE SERPL-SCNC: 101 MMOL/L — SIGNIFICANT CHANGE UP (ref 98–107)
CHLORIDE SERPL-SCNC: 104 MMOL/L — SIGNIFICANT CHANGE UP (ref 98–107)
CO2 SERPL-SCNC: 17 MMOL/L — LOW (ref 22–31)
CO2 SERPL-SCNC: 18 MMOL/L — LOW (ref 22–31)
CO2 SERPL-SCNC: 19 MMOL/L — LOW (ref 22–31)
CREAT SERPL-MCNC: 0.4 MG/DL — LOW (ref 0.5–1.3)
CREAT SERPL-MCNC: 0.41 MG/DL — LOW (ref 0.5–1.3)
CREAT SERPL-MCNC: 0.45 MG/DL — LOW (ref 0.5–1.3)
EGFR: 111 ML/MIN/1.73M2 — SIGNIFICANT CHANGE UP
EGFR: 111 ML/MIN/1.73M2 — SIGNIFICANT CHANGE UP
EGFR: 113 ML/MIN/1.73M2 — SIGNIFICANT CHANGE UP
EGFR: 113 ML/MIN/1.73M2 — SIGNIFICANT CHANGE UP
EGFR: 114 ML/MIN/1.73M2 — SIGNIFICANT CHANGE UP
EGFR: 114 ML/MIN/1.73M2 — SIGNIFICANT CHANGE UP
GLUCOSE BLDC GLUCOMTR-MCNC: 73 MG/DL — SIGNIFICANT CHANGE UP (ref 70–99)
GLUCOSE SERPL-MCNC: 70 MG/DL — SIGNIFICANT CHANGE UP (ref 70–99)
GLUCOSE SERPL-MCNC: 75 MG/DL — SIGNIFICANT CHANGE UP (ref 70–99)
GLUCOSE SERPL-MCNC: 80 MG/DL — SIGNIFICANT CHANGE UP (ref 70–99)
HCT VFR BLD CALC: 35.3 % — SIGNIFICANT CHANGE UP (ref 34.5–45)
HGB BLD-MCNC: 11.9 G/DL — SIGNIFICANT CHANGE UP (ref 11.5–15.5)
LEVETIRACETAM SERPL-MCNC: 69.3 UG/ML — HIGH (ref 10–40)
MAGNESIUM SERPL-MCNC: 1.6 MG/DL — SIGNIFICANT CHANGE UP (ref 1.6–2.6)
MAGNESIUM SERPL-MCNC: 1.7 MG/DL — SIGNIFICANT CHANGE UP (ref 1.6–2.6)
MAGNESIUM SERPL-MCNC: 1.7 MG/DL — SIGNIFICANT CHANGE UP (ref 1.6–2.6)
MCHC RBC-ENTMCNC: 26.3 PG — LOW (ref 27–34)
MCHC RBC-ENTMCNC: 33.7 G/DL — SIGNIFICANT CHANGE UP (ref 32–36)
MCV RBC AUTO: 78.1 FL — LOW (ref 80–100)
NRBC # BLD AUTO: 0 K/UL — SIGNIFICANT CHANGE UP (ref 0–0)
NRBC # FLD: 0 K/UL — SIGNIFICANT CHANGE UP (ref 0–0)
NRBC BLD AUTO-RTO: 0 /100 WBCS — SIGNIFICANT CHANGE UP (ref 0–0)
PHOSPHATE SERPL-MCNC: 2.9 MG/DL — SIGNIFICANT CHANGE UP (ref 2.5–4.5)
PHOSPHATE SERPL-MCNC: 3.2 MG/DL — SIGNIFICANT CHANGE UP (ref 2.5–4.5)
PHOSPHATE SERPL-MCNC: 3.2 MG/DL — SIGNIFICANT CHANGE UP (ref 2.5–4.5)
PLATELET # BLD AUTO: 159 K/UL — SIGNIFICANT CHANGE UP (ref 150–400)
POTASSIUM SERPL-MCNC: 3.1 MMOL/L — LOW (ref 3.5–5.3)
POTASSIUM SERPL-MCNC: 3.3 MMOL/L — LOW (ref 3.5–5.3)
POTASSIUM SERPL-MCNC: 8.9 MMOL/L — CRITICAL HIGH (ref 3.5–5.3)
POTASSIUM SERPL-SCNC: 3.1 MMOL/L — LOW (ref 3.5–5.3)
POTASSIUM SERPL-SCNC: 3.3 MMOL/L — LOW (ref 3.5–5.3)
POTASSIUM SERPL-SCNC: 8.9 MMOL/L — CRITICAL HIGH (ref 3.5–5.3)
RBC # BLD: 4.52 M/UL — SIGNIFICANT CHANGE UP (ref 3.8–5.2)
RBC # FLD: 14.2 % — SIGNIFICANT CHANGE UP (ref 10.3–14.5)
SODIUM SERPL-SCNC: 131 MMOL/L — LOW (ref 135–145)
SODIUM SERPL-SCNC: 137 MMOL/L — SIGNIFICANT CHANGE UP (ref 135–145)
SODIUM SERPL-SCNC: 139 MMOL/L — SIGNIFICANT CHANGE UP (ref 135–145)
WBC # BLD: 5.74 K/UL — SIGNIFICANT CHANGE UP (ref 3.8–10.5)
WBC # FLD AUTO: 5.74 K/UL — SIGNIFICANT CHANGE UP (ref 3.8–10.5)

## 2025-02-25 PROCEDURE — 99222 1ST HOSP IP/OBS MODERATE 55: CPT

## 2025-02-25 PROCEDURE — 99231 SBSQ HOSP IP/OBS SF/LOW 25: CPT

## 2025-02-25 PROCEDURE — 71045 X-RAY EXAM CHEST 1 VIEW: CPT | Mod: 26

## 2025-02-25 PROCEDURE — 95720 EEG PHY/QHP EA INCR W/VEEG: CPT

## 2025-02-25 PROCEDURE — 99291 CRITICAL CARE FIRST HOUR: CPT

## 2025-02-25 RX ORDER — SODIUM CHLORIDE 9 G/1000ML
500 INJECTION, SOLUTION INTRAVENOUS ONCE
Refills: 0 | Status: COMPLETED | OUTPATIENT
Start: 2025-02-25 | End: 2025-02-25

## 2025-02-25 RX ORDER — FENTANYL CITRATE-0.9 % NACL/PF 100MCG/2ML
0.5 SYRINGE (ML) INTRAVENOUS
Qty: 2500 | Refills: 0 | Status: DISCONTINUED | OUTPATIENT
Start: 2025-02-25 | End: 2025-02-25

## 2025-02-25 RX ORDER — FENTANYL CITRATE-0.9 % NACL/PF 100MCG/2ML
0.5 SYRINGE (ML) INTRAVENOUS
Qty: 2500 | Refills: 0 | Status: DISCONTINUED | OUTPATIENT
Start: 2025-02-25 | End: 2025-02-26

## 2025-02-25 RX ORDER — POTASSIUM CHLORIDE, DEXTROSE MONOHYDRATE AND SODIUM CHLORIDE 150; 5; 900 MG/100ML; G/100ML; MG/100ML
1000 INJECTION, SOLUTION INTRAVENOUS
Refills: 0 | Status: DISCONTINUED | OUTPATIENT
Start: 2025-02-25 | End: 2025-02-25

## 2025-02-25 RX ORDER — ACETAMINOPHEN 500 MG/5ML
1000 LIQUID (ML) ORAL ONCE
Refills: 0 | Status: COMPLETED | OUTPATIENT
Start: 2025-02-25 | End: 2025-02-25

## 2025-02-25 RX ORDER — DEXMEDETOMIDINE HYDROCHLORIDE IN SODIUM CHLORIDE 4 UG/ML
0.2 INJECTION INTRAVENOUS
Qty: 400 | Refills: 0 | Status: DISCONTINUED | OUTPATIENT
Start: 2025-02-25 | End: 2025-02-27

## 2025-02-25 RX ORDER — PHENYLEPHRINE HCL IN 0.9% NACL 0.5 MG/5ML
0.1 SYRINGE (ML) INTRAVENOUS
Qty: 160 | Refills: 0 | Status: DISCONTINUED | OUTPATIENT
Start: 2025-02-25 | End: 2025-02-28

## 2025-02-25 RX ORDER — MAGNESIUM SULFATE 500 MG/ML
2 SYRINGE (ML) INJECTION ONCE
Refills: 0 | Status: COMPLETED | OUTPATIENT
Start: 2025-02-25 | End: 2025-02-25

## 2025-02-25 RX ORDER — PROPOFOL 10 MG/ML
10 INJECTION, EMULSION INTRAVENOUS
Qty: 1000 | Refills: 0 | Status: DISCONTINUED | OUTPATIENT
Start: 2025-02-25 | End: 2025-02-25

## 2025-02-25 RX ADMIN — LEVETIRACETAM 1000 MILLIGRAM(S): 10 INJECTION, SOLUTION INTRAVENOUS at 17:51

## 2025-02-25 RX ADMIN — SODIUM CHLORIDE 1000 MILLILITER(S): 9 INJECTION, SOLUTION INTRAVENOUS at 19:52

## 2025-02-25 RX ADMIN — Medication 100 MILLIEQUIVALENT(S): at 03:32

## 2025-02-25 RX ADMIN — LEVETIRACETAM 1000 MILLIGRAM(S): 10 INJECTION, SOLUTION INTRAVENOUS at 05:33

## 2025-02-25 RX ADMIN — PROPOFOL 3.99 MICROGRAM(S)/KG/MIN: 10 INJECTION, EMULSION INTRAVENOUS at 08:01

## 2025-02-25 RX ADMIN — POTASSIUM CHLORIDE, DEXTROSE MONOHYDRATE AND SODIUM CHLORIDE 42 MILLILITER(S): 150; 5; 900 INJECTION, SOLUTION INTRAVENOUS at 09:25

## 2025-02-25 RX ADMIN — HEPARIN SODIUM 5000 UNIT(S): 1000 INJECTION INTRAVENOUS; SUBCUTANEOUS at 15:27

## 2025-02-25 RX ADMIN — Medication 0.5 MILLIGRAM(S): at 03:00

## 2025-02-25 RX ADMIN — PROPOFOL 3.99 MICROGRAM(S)/KG/MIN: 10 INJECTION, EMULSION INTRAVENOUS at 12:08

## 2025-02-25 RX ADMIN — Medication 400 MILLIGRAM(S): at 11:40

## 2025-02-25 RX ADMIN — PROPOFOL 3.99 MICROGRAM(S)/KG/MIN: 10 INJECTION, EMULSION INTRAVENOUS at 09:00

## 2025-02-25 RX ADMIN — Medication 15 MILLILITER(S): at 15:50

## 2025-02-25 RX ADMIN — SODIUM CHLORIDE 100 MILLILITER(S): 9 INJECTION, SOLUTION INTRAVENOUS at 08:08

## 2025-02-25 RX ADMIN — DEXMEDETOMIDINE HYDROCHLORIDE IN SODIUM CHLORIDE 3.33 MICROGRAM(S)/KG/HR: 4 INJECTION INTRAVENOUS at 19:53

## 2025-02-25 RX ADMIN — Medication 0.5 MILLIGRAM(S): at 02:37

## 2025-02-25 RX ADMIN — HEPARIN SODIUM 5000 UNIT(S): 1000 INJECTION INTRAVENOUS; SUBCUTANEOUS at 05:33

## 2025-02-25 RX ADMIN — Medication 400 MILLIGRAM(S): at 00:36

## 2025-02-25 RX ADMIN — Medication 40 MILLIGRAM(S): at 15:26

## 2025-02-25 RX ADMIN — Medication 1000 MILLIGRAM(S): at 12:00

## 2025-02-25 RX ADMIN — Medication 3.33 MICROGRAM(S)/KG/HR: at 17:45

## 2025-02-25 RX ADMIN — Medication 1000 MILLIGRAM(S): at 01:00

## 2025-02-25 RX ADMIN — DEXMEDETOMIDINE HYDROCHLORIDE IN SODIUM CHLORIDE 3.33 MICROGRAM(S)/KG/HR: 4 INJECTION INTRAVENOUS at 12:00

## 2025-02-25 RX ADMIN — SODIUM CHLORIDE 100 MILLILITER(S): 9 INJECTION, SOLUTION INTRAVENOUS at 05:34

## 2025-02-25 RX ADMIN — HEPARIN SODIUM 5000 UNIT(S): 1000 INJECTION INTRAVENOUS; SUBCUTANEOUS at 22:11

## 2025-02-25 RX ADMIN — Medication 100 MILLIEQUIVALENT(S): at 02:37

## 2025-02-25 RX ADMIN — Medication 25 GRAM(S): at 04:11

## 2025-02-25 RX ADMIN — Medication 15 MILLILITER(S): at 05:35

## 2025-02-25 RX ADMIN — Medication 1.25 MICROGRAM(S)/KG/MIN: at 20:41

## 2025-02-25 RX ADMIN — Medication 100 MILLIEQUIVALENT(S): at 05:55

## 2025-02-25 RX ADMIN — PROPOFOL 3.99 MICROGRAM(S)/KG/MIN: 10 INJECTION, EMULSION INTRAVENOUS at 03:16

## 2025-02-25 NOTE — DIETITIAN INITIAL EVALUATION ADULT - PERTINENT MEDS FT
MEDICATIONS  (STANDING):  chlorhexidine 0.12% Liquid 15 milliLiter(s) Oral Mucosa every 12 hours  dextrose 5% + lactated ringers with potassium chloride 20 mEq/L 1000 milliLiter(s) (42 mL/Hr) IV Continuous <Continuous>  heparin   Injectable 5000 Unit(s) SubCutaneous every 8 hours  levETIRAcetam   Injectable 1000 milliGRAM(s) IV Push every 12 hours  propofol Infusion 10 MICROgram(s)/kG/Min (3.99 mL/Hr) IV Continuous <Continuous>    MEDICATIONS  (PRN):   dexMEDEtomidine IV Continuous  dextrose 5% + lactated ringers with potassium chloride IV Continuous  propofol IV Continuous

## 2025-02-25 NOTE — DIETITIAN INITIAL EVALUATION ADULT - PROBLEM SELECTOR PLAN 1
- MRI brain w/wo w/ CSF flow ordered  - full infectious workup (ESR, CRP, blood cultures x2, RVP, chest xray)  - q1h neuro checks  - neurology consult for vEEG and AED management   - cont. w/ zosyn  - cont. w/ keppra   - wean to extubate when able to/ if stable      [id number]    Case discussed with attending neurosurgeon Dr. Brush

## 2025-02-25 NOTE — DIETITIAN INITIAL EVALUATION ADULT - OTHER CALCULATIONS
Current weight (2/25) 138.6 lbs / 62.9 kg.  Dosing weight (2/23) 146.6 lbs / 66.5 kg.  Recent chart weight (7/29) 107 lbs.  Ideal Body Weight: 115 lbs / 52.3 kg +/-10%

## 2025-02-25 NOTE — CONSULT NOTE ADULT - SUBJECTIVE AND OBJECTIVE BOX
Neurology - Consult Note    -  Spectra: [phone] ([facility]), [phone] ([facility])  -    HPI:  Per H&P on 2/23/2025:  "Mrs. 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 Per partner Ms. Jonelle Douglass at bedside, pt did not experience any seizures since her accident 5/2024 and has remained compliant with Keppra 500 bid. No seizures since that TBI. On day of admission, patient's first seizure started with staring speel and non responsiveness, eyes remained open and gaze midline initially. Some question of possible head version to Left, partner not very clear. Then patient found with generalized convulsions shortly after, lasting about a minute. Patient had 2-3 more GTC en route and in ER, each lasting 1-2 minutes. As per ED note, Pt. arrived postictal and had a tonic-clonic seizure with gaze deviation to the left in ED. In ED, pt received versed 5 mg x2, ativan 2 mg x1. Patient intubated by ED for airway protection and status epilepticus. ROS is limited due to pt being intubated and sedated.  Pt is admitted to ICU for status epilepticus."    Interval events since patient was admitted:   -  -2/24 EEG report published (capturing 3 hr 27 minute while pt at [place], before transfer):     -Frequent right frontal LRDA at 1-3 Hz with rare intermixed sharply contoured waveforms, fluctuating without evolution.    -Occasional left frontal LRDA at 1-2.5 Hz, fluctuating without evolution.    -Continuous right hemispheric focal slowing    -Moderate diffuse slowing    -No electrographic seizures    Pt unable to provide collateral history during examination because she was intubated.     Allergies:  No Known Allergies      PMHx/PSHx/Family Hx: As above, otherwise see below   Asthma    Depression    Neoplasm of uncertain behavior of brain    Neurofibromatosis, type 1    Neurofibromatosis    History of hydrocephalus    Anxiety        Social Hx:  No current use of tobacco, alcohol, or illicit drugs  Lives with ***    Medications:  MEDICATIONS  (STANDING):  chlorhexidine 0.12% Liquid 15 milliLiter(s) Oral Mucosa every 12 hours  heparin   Injectable 5000 Unit(s) SubCutaneous every 8 hours  lactated ringers. 1000 milliLiter(s) (100 mL/Hr) IV Continuous <Continuous>  levETIRAcetam   Injectable 1000 milliGRAM(s) IV Push every 12 hours  norepinephrine Infusion 0.05 MICROgram(s)/kG/Min (6.23 mL/Hr) IV Continuous <Continuous>  potassium chloride  10 mEq/100 mL IVPB 10 milliEquivalent(s) IV Intermittent every 1 hour  propofol Infusion 10 MICROgram(s)/kG/Min (3.99 mL/Hr) IV Continuous <Continuous>    MEDICATIONS  (PRN):  HYDROmorphone  Injectable 0.2 milliGRAM(s) IV Push every 4 hours PRN Moderate Pain (4 - 6)  HYDROmorphone  Injectable 0.5 milliGRAM(s) IV Push every 4 hours PRN Severe Pain (7 - 10)      Vitals:  T(C): 37.7 (02-25-25 @ 00:00), Max: 37.7 (02-24-25 @ 20:00)  HR: 76 (02-25-25 @ 03:00) (61 - 97)  BP: 118/65 (02-25-25 @ 03:00) (90/49 - 151/76)  RR: 14 (02-25-25 @ 03:00) (14 - 18)  SpO2: 99% (02-25-25 @ 03:00) (99% - 100%)    Neuro Exam (obtained while patient intubated and on Propofol 45 mcg/kg/min):  Mental Status: Awake, alert, unable to speak because intubated, but confirmed orientation to self, location, and year via raising R hand. Following commands.  Cranial Nerves: PERRL, BTT b/l, EOMI, face appears symmetric, corneal and gag deferred for comfort because pt awake  Motor: Motor examination limited by restraints on all four extremities. b/l LE were antigravity symmetrically. RUE antigravity, LUE without movement (either voluntarily or in response to noxious stimuli)  Sensory: SILT x 4  Reflexes: +0 in BR b/l, +1 in biceps b/l, +2 in patellas b/l, +1 in achilles b/l. EMILY triceps due to pt positioning (pt intubated, supine, many tubes and devices attached to b/l arms)  Coordinaton: EMILY  Gait: EMILY    Labs:                        11.9   5.74  )-----------( 159      ( 25 Feb 2025 02:45 )             35.3     02-25    131[L]  |  101  |  4[L]  ----------------------------<  70  8.9[HH]   |  17[L]  |  0.41[L]    Ca    7.8[L]      25 Feb 2025 02:45  Phos  2.9     02-25  Mg     1.60     02-25      CAPILLARY BLOOD GLUCOSE      POCT Blood Glucose.: 115 mg/dL (23 Feb 2025 23:20)        Urinalysis with Rflx Culture (collected 22 Feb 2025 17:56)    Culture - Blood (collected 22 Feb 2025 16:30)  Source: .Blood Blood  Preliminary Report (25 Feb 2025 01:02):    No growth at 48 Hours    Culture - Blood (collected 22 Feb 2025 16:20)  Source: .Blood Blood  Preliminary Report (25 Feb 2025 01:02):    No growth at 48 Hours      PT/INR - ( 23 Feb 2025 23:50 )   PT: 13.4 sec;   INR: 1.16 ratio         PTT - ( 23 Feb 2025 23:50 )  PTT:33.4 sec  CSF:                  Radiology:  CT Head No Cont:  (22 Feb 2025 18:21)     Neurology - Consult Note    -  Spectra: [phone] ([facility]), [phone] ([facility])  -  History obtained primarily from chart review, as patient unable to verbalize during examination  HPI: Pt is 59 year old female pmhx neurofibromatosis, 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),  shunt (in [facility]), traumatic fall with head trauma and R SDH s/p R decompressive hemicraniectomy, ICP monitor and R cranioplasty (5/2024), L frontal Endoscopic third ventriculostomy and ligation of R  shunt with ligaclips with retention of valve, proximal and distal catheters (7/2024), Anxiety and depression, who presented to [facility] on 2/22/2025 after seizure at home witnessed by pt's partner (first seizure at home described as staring spell with nonresposiveness, and then later had GTC), followed by additional seizures in ambulance as well as in [facility] ED (all seizures were noted to be GTCs, but one in ED was noted to have L gaze deviation as well). Pt received Versed 5mg x 2, Ativan 2mg x 1 in the [facility] ED, and was intubated for airway protection, after which pt went to ICU.     Of note, pt had seizures after the head trauma in 5/2024, and patient has been compliant on Keppra 500mg BID ever since. No interval seizures or other TBIs since then.     Upon admission to [facility], pt noted to be febrile to TMax 101.8F, Leukocytosis to 19.76 (which steadily downtrended, unclear if postconvulsive or if infectious), Lactate 13 (which downtrended to 1.4 the next day), pH 7.04 (which corrected later), Prolactin 76.1. ID was consulted, patient initially on Maxipime and Vancomycin but then switched to Zosyn, which has been stopped as of 2/24 PM (of note, blood cultures were negative, initial CXR on 2/22 was negative, but repeat CXR on 2/23 showing new R basilar opacity). Neurology was consulted, recommending increasing Keppra to 1000mg BID.     EEG on 2/23 (although report published on 2/24) showing:    -Frequent right frontal LRDA at 1-3 Hz with rare intermixed sharply contoured waveforms, fluctuating without evolution.    -Occasional left frontal LRDA at 1-2.5 Hz, fluctuating without evolution.    -Continuous right hemispheric focal slowing    -Moderate diffuse slowing    -No electrographic seizures    Decision was made to transfer pt to [facility] on 2/23 for further workup and management, including surgical evaluation (concern for expansion of R hygroma as trigger of seizures, and therefore may be reasonable to surgically intervene).     Pt unable to provide collateral history during examination because she was intubated.     Allergies:  No Known Allergies      PMHx/PSHx/Family Hx: As above, otherwise see below   Asthma    Depression    Neoplasm of uncertain behavior of brain    Neurofibromatosis, type 1    Neurofibromatosis    History of hydrocephalus    Anxiety        Social Hx:  No current use of tobacco, alcohol, or illicit drugs  Lives with ***    Medications:  MEDICATIONS  (STANDING):  chlorhexidine 0.12% Liquid 15 milliLiter(s) Oral Mucosa every 12 hours  heparin   Injectable 5000 Unit(s) SubCutaneous every 8 hours  lactated ringers. 1000 milliLiter(s) (100 mL/Hr) IV Continuous <Continuous>  levETIRAcetam   Injectable 1000 milliGRAM(s) IV Push every 12 hours  norepinephrine Infusion 0.05 MICROgram(s)/kG/Min (6.23 mL/Hr) IV Continuous <Continuous>  potassium chloride  10 mEq/100 mL IVPB 10 milliEquivalent(s) IV Intermittent every 1 hour  propofol Infusion 10 MICROgram(s)/kG/Min (3.99 mL/Hr) IV Continuous <Continuous>    MEDICATIONS  (PRN):  HYDROmorphone  Injectable 0.2 milliGRAM(s) IV Push every 4 hours PRN Moderate Pain (4 - 6)  HYDROmorphone  Injectable 0.5 milliGRAM(s) IV Push every 4 hours PRN Severe Pain (7 - 10)      Vitals:  T(C): 37.7 (02-25-25 @ 00:00), Max: 37.7 (02-24-25 @ 20:00)  HR: 76 (02-25-25 @ 03:00) (61 - 97)  BP: 118/65 (02-25-25 @ 03:00) (90/49 - 151/76)  RR: 14 (02-25-25 @ 03:00) (14 - 18)  SpO2: 99% (02-25-25 @ 03:00) (99% - 100%)    Neuro Exam (obtained while patient intubated and on Propofol 45 mcg/kg/min):  Mental Status: Awake, alert, unable to speak because intubated, but confirmed orientation to self, location, and year via raising R hand. Following commands.  Cranial Nerves: PERRL, BTT b/l, EOMI, face appears symmetric, corneal and gag deferred for comfort because pt awake  Motor: Motor examination limited by restraints on all four extremities. b/l LE were antigravity symmetrically. RUE antigravity, LUE without movement (either voluntarily or in response to noxious stimuli)  Sensory: SILT x 4  Reflexes: +0 in BR b/l, +1 in biceps b/l, +2 in patellas b/l, +1 in achilles b/l. EMILY triceps due to pt positioning (pt intubated, supine, many tubes and devices attached to b/l arms)  Coordinaton: EMILY  Gait: EMILY    Labs:                        11.9   5.74  )-----------( 159      ( 25 Feb 2025 02:45 )             35.3     02-25    131[L]  |  101  |  4[L]  ----------------------------<  70  8.9[HH]   |  17[L]  |  0.41[L]    Ca    7.8[L]      25 Feb 2025 02:45  Phos  2.9     02-25  Mg     1.60     02-25      CAPILLARY BLOOD GLUCOSE      POCT Blood Glucose.: 115 mg/dL (23 Feb 2025 23:20)        Urinalysis with Rflx Culture (collected 22 Feb 2025 17:56)    Culture - Blood (collected 22 Feb 2025 16:30)  Source: .Blood Blood  Preliminary Report (25 Feb 2025 01:02):    No growth at 48 Hours    Culture - Blood (collected 22 Feb 2025 16:20)  Source: .Blood Blood  Preliminary Report (25 Feb 2025 01:02):    No growth at 48 Hours      PT/INR - ( 23 Feb 2025 23:50 )   PT: 13.4 sec;   INR: 1.16 ratio         PTT - ( 23 Feb 2025 23:50 )  PTT:33.4 sec  CSF:                  Radiology:  CT Head No Cont:  (22 Feb 2025 18:21)     Neurology - Consult Note    -  Spectra: [phone] ([facility]), [phone] ([facility])  -  History obtained primarily from chart review, as patient unable to verbalize during examination  HPI: Pt is 59 year old female pmhx neurofibromatosis, 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),  shunt (in [facility]), traumatic fall with head trauma and R SDH s/p R decompressive hemicraniectomy, ICP monitor and R cranioplasty (5/2024), L frontal Endoscopic third ventriculostomy and ligation of R  shunt with ligaclips with retention of valve, proximal and distal catheters (7/2024), Anxiety and depression, who presented to [facility] on 2/22/2025 after seizure at home witnessed by pt's partner (first seizure at home described as staring spell with nonresposiveness, and then later had GTC), followed by additional seizures in ambulance as well as in [facility] ED (all seizures were noted to be GTCs, but one in ED was noted to have L gaze deviation as well). Pt received Versed 5mg x 2, Ativan 2mg x 1 in the [facility] ED, and was intubated for airway protection, after which pt went to ICU.     Of note, pt had seizures after the head trauma in 5/2024, and patient has been compliant on Keppra 500mg BID ever since. No interval seizures or other TBIs since then.     Upon admission to [facility], CTH showing slight expansion of R hygroma, as well as stable  Shunt with moderate ventriculomegaly. Pt noted to be febrile to TMax 101.8F, Leukocytosis to 19.76 (which steadily downtrended, unclear if postconvulsive or if infectious), Lactate 13 (which downtrended to 1.4 the next day), pH 7.04 (which corrected later), Prolactin 76.1.  ID was consulted, patient initially on Maxipime and Vancomycin but then switched to Zosyn, which has been stopped as of 2/24 PM (of note, blood cultures were negative, initial CXR on 2/22 was negative, but repeat CXR on 2/23 showing new R basilar opacity). Neurology was consulted, recommending increasing Keppra to 1000mg BID.     EEG on 2/23 (although report published on 2/24) showing:    -Frequent right frontal LRDA at 1-3 Hz with rare intermixed sharply contoured waveforms, fluctuating without evolution.    -Occasional left frontal LRDA at 1-2.5 Hz, fluctuating without evolution.    -Continuous right hemispheric focal slowing    -Moderate diffuse slowing    -No electrographic seizures    Decision was made to transfer pt to [facility] on 2/23 for further workup and management, including surgical evaluation (concern for expansion of R hygroma as trigger of seizures, and therefore may be reasonable to surgically intervene).     Pt unable to provide collateral history during examination because she was intubated.     Allergies:  No Known Allergies      PMHx/PSHx/Family Hx: As above, otherwise see below   Asthma    Depression    Neoplasm of uncertain behavior of brain    Neurofibromatosis, type 1    Neurofibromatosis    History of hydrocephalus    Anxiety        Social Hx:  No current use of tobacco, alcohol, or illicit drugs  Lives with ***    Medications:  MEDICATIONS  (STANDING):  chlorhexidine 0.12% Liquid 15 milliLiter(s) Oral Mucosa every 12 hours  heparin   Injectable 5000 Unit(s) SubCutaneous every 8 hours  lactated ringers. 1000 milliLiter(s) (100 mL/Hr) IV Continuous <Continuous>  levETIRAcetam   Injectable 1000 milliGRAM(s) IV Push every 12 hours  norepinephrine Infusion 0.05 MICROgram(s)/kG/Min (6.23 mL/Hr) IV Continuous <Continuous>  potassium chloride  10 mEq/100 mL IVPB 10 milliEquivalent(s) IV Intermittent every 1 hour  propofol Infusion 10 MICROgram(s)/kG/Min (3.99 mL/Hr) IV Continuous <Continuous>    MEDICATIONS  (PRN):  HYDROmorphone  Injectable 0.2 milliGRAM(s) IV Push every 4 hours PRN Moderate Pain (4 - 6)  HYDROmorphone  Injectable 0.5 milliGRAM(s) IV Push every 4 hours PRN Severe Pain (7 - 10)      Vitals:  T(C): 37.7 (02-25-25 @ 00:00), Max: 37.7 (02-24-25 @ 20:00)  HR: 76 (02-25-25 @ 03:00) (61 - 97)  BP: 118/65 (02-25-25 @ 03:00) (90/49 - 151/76)  RR: 14 (02-25-25 @ 03:00) (14 - 18)  SpO2: 99% (02-25-25 @ 03:00) (99% - 100%)    Neuro Exam (obtained while patient intubated and on Propofol 45 mcg/kg/min):  Mental Status: Awake, alert, unable to speak because intubated, but confirmed orientation to self, location, and year via raising R hand. Following commands.  Cranial Nerves: PERRL, BTT b/l, EOMI, face appears symmetric, corneal and gag deferred for comfort because pt awake  Motor: Motor examination limited by restraints on all four extremities. b/l LE were antigravity symmetrically. RUE antigravity, LUE without movement (either voluntarily or in response to noxious stimuli)  Sensory: SILT x 4  Reflexes: +0 in BR b/l, +1 in biceps b/l, +2 in patellas b/l, +1 in achilles b/l. EMILY triceps due to pt positioning (pt intubated, supine, many tubes and devices attached to b/l arms)  Coordinaton: EMILY  Gait: EMILY    Labs:                        11.9   5.74  )-----------( 159      ( 25 Feb 2025 02:45 )             35.3     02-25    131[L]  |  101  |  4[L]  ----------------------------<  70  8.9[HH]   |  17[L]  |  0.41[L]    Ca    7.8[L]      25 Feb 2025 02:45  Phos  2.9     02-25  Mg     1.60     02-25      CAPILLARY BLOOD GLUCOSE      POCT Blood Glucose.: 115 mg/dL (23 Feb 2025 23:20)        Urinalysis with Rflx Culture (collected 22 Feb 2025 17:56)    Culture - Blood (collected 22 Feb 2025 16:30)  Source: .Blood Blood  Preliminary Report (25 Feb 2025 01:02):    No growth at 48 Hours    Culture - Blood (collected 22 Feb 2025 16:20)  Source: .Blood Blood  Preliminary Report (25 Feb 2025 01:02):    No growth at 48 Hours      PT/INR - ( 23 Feb 2025 23:50 )   PT: 13.4 sec;   INR: 1.16 ratio         PTT - ( 23 Feb 2025 23:50 )  PTT:33.4 sec  CSF:                  Radiology:  CT Head No Cont:  (22 Feb 2025 18:21)

## 2025-02-25 NOTE — DIETITIAN INITIAL EVALUATION ADULT - ADD RECOMMEND
1) If and when plan to initiate enteral feeds, recommend [employer] Peptide 1.5 via tolerated route at goal rate 20 mL/hr x24 hrs + Liquid Protein Supplement 3x daily to provide a total of 480 mL formula, 1020 kcal (720 kcal from formula, 300 kcal from modular), 80.52 gm protein (35.52 gm protein from formula, 45 gm protein from modular), 336 mL free water daily. With consideration for propofol, this regimen provides ~1810 kcal daily.  2) Obtain daily weights via tared bed scale.  3) Monitor electrolytes (K+, Mg, P) and replete to within desired limits as clinically indicated.

## 2025-02-25 NOTE — PROGRESS NOTE ADULT - SUBJECTIVE AND OBJECTIVE BOX
SICU Daily Progress Note  =====================================================  POD       	SICU Day     Interval/Overnight Events:     MEDICATIONS:   --------------------------------------------------------------------------------------  acetaminophen   IVPB .. 1000 milliGRAM(s) IV Intermittent every 6 hours  chlorhexidine 0.12% Liquid 15 milliLiter(s) Oral Mucosa every 12 hours  heparin   Injectable 5000 Unit(s) SubCutaneous every 8 hours  HYDROmorphone  Injectable 0.2 milliGRAM(s) IV Push every 4 hours PRN  HYDROmorphone  Injectable 0.5 milliGRAM(s) IV Push every 4 hours PRN  lactated ringers. 1000 milliLiter(s) IV Continuous <Continuous>  levETIRAcetam   Injectable 1000 milliGRAM(s) IV Push every 12 hours  norepinephrine Infusion 0.05 MICROgram(s)/kG/Min IV Continuous <Continuous>  propofol Infusion 10 MICROgram(s)/kG/Min IV Continuous <Continuous>    --------------------------------------------------------------------------------------    VITAL SIGNS, INS/OUTS (last 24 hours):  --------------------------------------------------------------------------------------  T(C): 37.7 (02-24-25 @ 20:00), Max: 37.7 (02-24-25 @ 20:00)  HR: 88 (02-25-25 @ 00:00) (48 - 97)  BP: 151/76 (02-25-25 @ 00:00) (90/49 - 151/76)  RR: 18 (02-25-25 @ 00:00) (14 - 18)  SpO2: 100% (02-25-25 @ 00:00) (99% - 100%)  Mode: AC/ CMV (Assist Control/ Continuous Mandatory Ventilation), RR (machine): 14, TV (machine): 450, FiO2: 40, PEEP: 5, ITime: 0.9, MAP: 11, PIP: 28    02-23-25 @ 07:01  -  02-24-25 @ 07:00  --------------------------------------------------------  IN: 1234.9 mL / OUT: 850 mL / NET: 384.9 mL    02-24-25 @ 07:01  -  02-25-25 @ 00:20  --------------------------------------------------------  IN: 2112.4 mL / OUT: 1510 mL / NET: 602.4 mL      --------------------------------------------------------------------------------------  EXAM  NEUROLOGY  RASS:   	GCS:    Exam: Sedated     HEENT  Exam: Normocephalic, atraumatic.    RESPIRATORY  Mechanical Ventilation: AC 16/450/5/40    CARDIOVASCULAR  Exam: Regular rate and rhythm    GI/NUTRITION  Exam: Abdomen soft, Non-distended  Current Diet:  NPO     VASCULAR  Exam: Extremities warm.    MUSCULOSKELETAL  Exam: Moving b/l LE & RUE, no movement in LUE    SKIN:  Exam: Good skin turgor, no skin breakdown.        TUBES/LINES/DRAINS  [x] Peripheral IV  [] Central Venous Line     	[] R	[] L	[] IJ	[] Fem	[] SC	Date Placed:   [] Arterial Line		[] R	[] L	[] Fem	[] Rad	[] Ax	Date Placed:   [] PICC		[] Midline		[] Mediport  [] Urinary Catheter		Date Placed:     LABS  --------------------------------------------------------------------------------------    --------------------------------------------------------------------------------------    IMAGING STUDIES:      SICU Daily Progress Note  =====================================================  POD       	SICU Day     Interval/Overnight Events: repleted potassium    MEDICATIONS:   --------------------------------------------------------------------------------------  acetaminophen   IVPB .. 1000 milliGRAM(s) IV Intermittent every 6 hours  chlorhexidine 0.12% Liquid 15 milliLiter(s) Oral Mucosa every 12 hours  heparin   Injectable 5000 Unit(s) SubCutaneous every 8 hours  HYDROmorphone  Injectable 0.2 milliGRAM(s) IV Push every 4 hours PRN  HYDROmorphone  Injectable 0.5 milliGRAM(s) IV Push every 4 hours PRN  lactated ringers. 1000 milliLiter(s) IV Continuous <Continuous>  levETIRAcetam   Injectable 1000 milliGRAM(s) IV Push every 12 hours  norepinephrine Infusion 0.05 MICROgram(s)/kG/Min IV Continuous <Continuous>  propofol Infusion 10 MICROgram(s)/kG/Min IV Continuous <Continuous>    --------------------------------------------------------------------------------------    VITAL SIGNS, INS/OUTS (last 24 hours):  --------------------------------------------------------------------------------------  T(C): 37.7 (02-24-25 @ 20:00), Max: 37.7 (02-24-25 @ 20:00)  HR: 88 (02-25-25 @ 00:00) (48 - 97)  BP: 151/76 (02-25-25 @ 00:00) (90/49 - 151/76)  RR: 18 (02-25-25 @ 00:00) (14 - 18)  SpO2: 100% (02-25-25 @ 00:00) (99% - 100%)  Mode: AC/ CMV (Assist Control/ Continuous Mandatory Ventilation), RR (machine): 14, TV (machine): 450, FiO2: 40, PEEP: 5, ITime: 0.9, MAP: 11, PIP: 28    02-23-25 @ 07:01  -  02-24-25 @ 07:00  --------------------------------------------------------  IN: 1234.9 mL / OUT: 850 mL / NET: 384.9 mL    02-24-25 @ 07:01  -  02-25-25 @ 00:20  --------------------------------------------------------  IN: 2112.4 mL / OUT: 1510 mL / NET: 602.4 mL      --------------------------------------------------------------------------------------  EXAM  NEUROLOGY  RASS:   	GCS:    Exam: Sedated     HEENT  Exam: Normocephalic, atraumatic.    RESPIRATORY  Mechanical Ventilation: AC 14/420/5/40    CARDIOVASCULAR  Exam: Regular rate and rhythm    GI/NUTRITION  Exam: Abdomen soft, Non-distended  Current Diet:  NPO     VASCULAR  Exam: Extremities warm.    MUSCULOSKELETAL  Exam: Moving b/l LE & RUE, no movement in LUE    SKIN:  Exam: Good skin turgor, no skin breakdown.        TUBES/LINES/DRAINS  [x] Peripheral IV  [] Central Venous Line     	[] R	[] L	[] IJ	[] Fem	[] SC	Date Placed:   [] Arterial Line		[] R	[] L	[] Fem	[] Rad	[] Ax	Date Placed:   [] PICC		[] Midline		[] Mediport  [] Urinary Catheter		Date Placed:     LABS  --------------------------------------------------------------------------------------    --------------------------------------------------------------------------------------    IMAGING STUDIES:      SICU Daily Progress Note  =====================================================  Interval/Overnight Events:   - KO tube placed  - Tube feeds started  - DC IVF  - Weaning propofol -> precedex  - +Protonix 40 qd  - Start VEEG  - CTA head : no intracranial hemorrhage, mass effect or edema    MEDICATIONS:   --------------------------------------------------------------------------------------  acetaminophen   IVPB .. 1000 milliGRAM(s) IV Intermittent every 6 hours  chlorhexidine 0.12% Liquid 15 milliLiter(s) Oral Mucosa every 12 hours  heparin   Injectable 5000 Unit(s) SubCutaneous every 8 hours  HYDROmorphone  Injectable 0.2 milliGRAM(s) IV Push every 4 hours PRN  HYDROmorphone  Injectable 0.5 milliGRAM(s) IV Push every 4 hours PRN  lactated ringers. 1000 milliLiter(s) IV Continuous <Continuous>  levETIRAcetam   Injectable 1000 milliGRAM(s) IV Push every 12 hours  norepinephrine Infusion 0.05 MICROgram(s)/kG/Min IV Continuous <Continuous>  propofol Infusion 10 MICROgram(s)/kG/Min IV Continuous <Continuous>    --------------------------------------------------------------------------------------    VITAL SIGNS, INS/OUTS (last 24 hours):  --------------------------------------------------------------------------------------  T(C): 37.7 (25 @ 20:00), Max: 37.7 (25 @ 20:00)  HR: 88 (25 @ 00:00) (48 - 97)  BP: 151/76 (25 @ 00:00) (90/49 - 151/76)  RR: 18 (25 @ 00:00) (14 - 18)  SpO2: 100% (25 @ 00:00) (99% - 100%)  Mode: AC/ CMV (Assist Control/ Continuous Mandatory Ventilation), RR (machine): 14, TV (machine): 450, FiO2: 40, PEEP: 5, ITime: 0.9, MAP: 11, PIP: 28    25 @ 07:01  -  25 @ 07:00  --------------------------------------------------------  IN: 1234.9 mL / OUT: 850 mL / NET: 384.9 mL    25 @ 07:01  -  25 @ 00:20  --------------------------------------------------------  IN: 2112.4 mL / OUT: 1510 mL / NET: 602.4 mL      --------------------------------------------------------------------------------------  EXAM  NEUROLOGY  Exam: Sedated     HEENT  Exam: Normocephalic, atraumatic.    RESPIRATORY  Mechanical Ventilation: AC 14/420/40    CARDIOVASCULAR  Exam: Regular rate and rhythm    GI/NUTRITION  Exam: Abdomen soft, Non-distended  Current Diet:  NPO     VASCULAR  Exam: Extremities warm.    MUSCULOSKELETAL  Exam: Moving b/l LE & RUE, no movement in LUE    SKIN:  Exam: Good skin turgor, no skin breakdown.        TUBES/LINES/DRAINS  [x] Peripheral IV  [] Central Venous Line     	[] R	[] L	[] IJ	[] Fem	[] SC	Date Placed:   [] Arterial Line		[] R	[] L	[] Fem	[] Rad	[] Ax	Date Placed:   [] PICC		[] Midline		[] Mediport  [x] Urinary Catheter		Date Placed:     LABS  --------------------------------------------------------------------------------------                        11.9   5.74  )-----------( 159      ( 2025 02:45 )             35.3   139  |  104  |  4[L]  ----------------------------(  @ 03:50  3.3[L]   |  19[L]  |  0.45[L]    Ca: 8.4   Phos: 3.2   M.70    TPro: x  / Alb: x  /  TBili x  / DBili x  /  AST x  /  ALT x  /  AlkPhos  x   --------------------------------------------------------------------------------------    IMAGING STUDIES:   CTA Head ():  CT BRAIN:  Redemonstration of right pterional craniectomy and cranioplasty. Similar   low density extra-axial collection subjacent to the right pterional   cranioplasty measures 1.5 cm in greatest depth.  Redemonstration of right frontal approach ventriculostomy catheter in   unchanged position. Pericatheter gliosis is similar. Ventricles are   similar in size and mildly dilated. No significant midline shift at the   level of the foramen of [place].  Encephalomalacia and gliosis in the right lateral frontal lobe is   redemonstrated.  Similar thickening of the septum pellucidum.  No acute intracranial hemorrhage or brain edema.  No abnormal intracranial enhancement.  The visualized paranasal sinuses and mastoid air cells are clear.  CTA BRAIN:  Normal flow-related enhancement of the skull base/intracranial internal   carotid arteries.  Normal flow-related enhancement of the bilateral anterior, middle, and   posterior cerebral arteries.  Normal flow-related enhancement of the bilateral intradural vertebral   arteries and the basilar artery.  No flow-limiting stenosis or vascular aneurysm. No AVM.  CTA NECK:  Normal flow-related enhancement of the bilateral common and internal   carotid arteries.  Normal flow-related enhancement of the bilateral vertebral arteries.  No flow-limiting stenosis, evidence for arterial dissection, or vascular   aneurysm.  Visualized lungs clear. Low-density lesion in the right mesial thyroid   lobe with peripheral calcifications.  IMPRESSION:  CT brain:  No acuteintracranial hemorrhage, mass effect, or brain edema.  Similar mild ventriculomegaly.  Similar low density extra-axial collection subjacent to the right   pterional cranioplasty.  No abnormal enhancement.  CTA brain:  No flow-limiting stenosis or vascular aneurysm. No AVM.  CTA neck:  No flow-limiting stenosis, evidence for arterial dissection, or vascular   aneurysm.

## 2025-02-25 NOTE — CONSULT NOTE ADULT - ASSESSMENT
Impression:  1)  2) L sided weakness, could be related to expansion of R hygroma +/- postictal/ictal     Recommendations:  -vEEG  -Once patient is hooked up to vEEG, would recommend weaning off sedative drips (propofol)  -Recommend continuing Keppra 1g BID at this time. Will discuss if there is a role for withdrawal of ASMs for purposes of event capture and clarification of seizure focus  -MRI Brain w/wo IV contrast  -Appreciate NSGY recs  -Rescue medications for GTCs > 3 minutes:    -First line Ativan 2mg IVP x 1    -Second line Keppra 2000mg x 1  - Telemetry monitoring; Neurochecks/VS per unit protocol  - Seizure, fall and aspiration precautions       Impression:  1) Status epilepticus, now under control. Concern for expansion of R hygroma as possible trigger vs other intracranial processes.  2) L sided weakness, could be related to expansion of R hygroma +/- postictal/ictal     Recommendations:  -vEEG  -Once patient is hooked up to vEEG, would recommend weaning off sedative drips (propofol)  -Recommend continuing Keppra 1g BID at this time. Will discuss if there is a role for withdrawal of ASMs for purposes of event capture and clarification of seizure focus  -MRI Brain w/wo IV contrast  -Will discuss if LP is needed (to evaluate for CNS pathology that could trigger seizures)  -Appreciate NSGY recs  -Rescue medications for GTCs > 3 minutes:    -First line Ativan 2mg IVP x 1    -Second line Keppra 2000mg x 1  - Telemetry monitoring; Neurochecks/VS per unit protocol  - Seizure, fall and aspiration precautions       Impression:  1) Generalized Convulsive Status epilepticus, now under control. Concern for expansion of R hygroma as possible trigger vs other intracranial processes.  2) L sided weakness, could be related to expansion of R hygroma +/- postictal/ictal     Recommendations:  -vEEG  -Once patient is hooked up to vEEG, would recommend weaning off sedative drips (propofol)  -Recommend continuing Keppra 1g BID at this time. Will discuss if there is a role for withdrawal of ASMs for purposes of event capture and clarification of seizure focus  -MRI Brain w/wo IV contrast  -Will discuss if LP is needed (to evaluate for CNS pathology that could trigger seizures)  -Appreciate NSGY recs  -Rescue medications for GTCs > 3 minutes:    -First line Ativan 2mg IVP x 1    -Second line Keppra 2000mg x 1  - Telemetry monitoring; Neurochecks/VS per unit protocol  - Seizure, fall and aspiration precautions       Impression:  1) Generalized Convulsive Status epilepticus, now under control. Concern for expansion of R hygroma as possible trigger vs other intracranial processes vs systemic process (such as infection)  2) L sided weakness, could be related to expansion of R hygroma +/- postictal/ictal     Recommendations:  -vEEG  -Once patient is hooked up to vEEG, would recommend weaning off sedative drips (propofol)  -Recommend continuing Keppra 1g BID at this time. Will discuss if there is a role for withdrawal of ASMs for purposes of event capture and clarification of seizure focus  -MRI Brain w/wo IV contrast  -Will discuss if LP is needed (to evaluate for CNS pathology that could trigger seizures)  -Appreciate NSGY recs  -Rescue medications for GTCs > 3 minutes:    -First line Ativan 2mg IVP x 1    -Second line Keppra 2000mg x 1  - Telemetry monitoring; Neurochecks/VS per unit protocol  - Seizure, fall and aspiration precautions       59F with complex past neurologic/neurosurgical history, initially presented to [facility] with status epilepticus, and who has since been transferred to [facility] for further management. Pt's clinical seizures currently under control on Keppra 1g BID and Propofol drip. Pending further workup and EEG monitoring, unclear at this time if pt is to undergo any neurosurgical interventions.     Impression:  1) Generalized Convulsive Status epilepticus, now under control. Concern for expansion of R hygroma as possible trigger vs other intracranial processes vs systemic process (such as infection). Given L gaze deviation noted during one of patient's seizures, this would be consistent with R frontal involvement (which could be due to hygroma).   2) L sided weakness, could be related to expansion of R hygroma +/- postictal/ictal     Recommendations:  -vEEG  -Once patient is hooked up to vEEG, would recommend weaning off sedative drips (propofol)  -Recommend continuing Keppra 1g BID at this time. Will discuss if there is a role for withdrawal of ASMs for purposes of event capture and clarification of seizure focus  -MRI Brain w/wo IV contrast  -Will discuss if LP is needed (to evaluate for CNS pathology that could trigger seizures)  -Appreciate NSGY recs  -Rescue medications for GTCs > 3 minutes:    -First line Ativan 2mg IVP x 1    -Second line Keppra 2000mg x 1  - Telemetry monitoring; Neurochecks/VS per unit protocol  - Seizure, fall and aspiration precautions      Case to be seen by neuro attending on AM rounds. Recommendations not finalized until after attending attestation.

## 2025-02-25 NOTE — PROGRESS NOTE ADULT - ASSESSMENT
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 CTH showed stable  shunt and moderate ventriculomegaly, slight increase in size of extra-axial CSF collection overlying the right frontal duraplasty, likely a hygroma. There is no mass effect. Transferred to [facility] for Neurosurgery evaluation, r/o infective etiology. SICU consulted for vent management & Q1 neurochecks.       Interval events:   s/p CTA head: no intracranial hemorrhage, mass effect or edema   Pending MRI head   vEEG pending       Neurologic:   Q1 neurochecks  Weaned off precedex Sedated on propofol   Keppra 1g BID  Pain control: IV Tylenol, dilaudid PRN   CTA head 2/24  MR head w CSF flow   VEEG     Respiratory:   Intubated 14/420/5/40    Cardiovascular:   MAP > 65  on levo for transfer. Weaned off.    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 CTH showed stable  shunt and moderate ventriculomegaly, slight increase in size of extra-axial CSF collection overlying the right frontal duraplasty, likely a hygroma. There is no mass effect. Transferred to [facility] for Neurosurgery evaluation, r/o infective etiology. SICU consulted for vent management & Q1 neurochecks.       Interval events:   s/p CTA head: no intracranial hemorrhage, mass effect or edema   Pending MRI head   vEEG pending       Neurologic:   Q1 neurochecks  Will wean off of propofol and start precedex for EEG  Keppra 1g BID  Pain control: IV Tylenol, dilaudid PRN   CTA head 2/24  MR head w CSF flow   VEEG     Respiratory:   Intubated 14/420/5/40    Cardiovascular:   MAP > 65  on levo for transfer. Weaned off.    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 CTH showed stable  shunt and moderate ventriculomegaly, slight increase in size of extra-axial CSF collection overlying the right frontal duraplasty, likely a hygroma. There is no mass effect. Transferred to [facility] for Neurosurgery evaluation, r/o infective etiology. SICU consulted for vent management & Q1 neurochecks.      Neurologic:   - Q1 neurochecks  - VEEG on precedex, d/c prop, can add fent ggt if needed to achieve sedation goal (RASS -4)  - pending MR head w CSF flow   - Keppra 1g BID  - Pain control: IV Tylenol, dilaudid PRN   - CTA head 2/24 wnl       Respiratory:   Intubated 14/450/5/40    Cardiovascular:   - MAP > 65  - Weaned off on levo (started for transfer)    Gastrointestinal/Nutrition:   -  NGT placed (2/25)  - Start tube feeds    Renal/Genitourinary:   - Monitor electrolytes   - LR@100 (d/c at TF initiation)  - storey d/c; start primafit     Hematologic:   - Hep sub q     Infectious Disease:   - Monitor WBC, fever curves   - Zosyn (presumed aspiration pneumonia -> CXR no PNA) (7/23-7/24)  - 2/22 BCx x2 ([place]) -> NGTD      Endocrine:   - Monitor glucose     Tubes/Lines/Drains:   - PIV   - NGT (2/25-)  - Prima fit  - Intubated    Disposition: SICU 59 year old female with PMHx of neurofibromatosis, 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 CTH showed stable  shunt and moderate ventriculomegaly, slight increase in size of extra-axial CSF collection overlying the right frontal duraplasty, likely a hygroma. There is no mass effect. Transferred to [facility] for Neurosurgery evaluation, r/o infective etiology. SICU consulted for vent management & Q1 neurochecks.      Neurologic:   - Q1 neurochecks  - VEEG, started 1630 2/25  - Sedation: precedex  - Propofol DCed  - MR head w CSF flow ordered, pending  - Keppra 1g BID  - Pain control: IV Tylenol, dilaudid PRN   - CTA head 2/24 wnl       Respiratory:   Intubated 14/450/5/40    Cardiovascular:   - MAP > 65  - Weaned off on levo (started for transfer)    Gastrointestinal/Nutrition:   -  NGT placed (2/25)  - Start tube feeds    Renal/Genitourinary:   - Monitor electrolytes   - LR@100 (d/c at TF initiation)  - storey d/c; start primafit     Hematologic:   - Hep sub q     Infectious Disease:   - Monitor WBC, fever curves   - Zosyn (presumed aspiration pneumonia -> CXR no PNA) (7/23-7/24)  - 2/22 BCx x2 ([place]) -> NGTD      Endocrine:   - Monitor glucose     Tubes/Lines/Drains:   - PIV   - NGT (2/25-)  - Prima fit  - Intubated    Disposition: SICU

## 2025-02-25 NOTE — PROGRESS NOTE ADULT - ASSESSMENT
60 yo F with PMHx of neurofibromatosis, s/p L suboccipital craniectomy for resection of cerebellar brain tumor 12/2023 by Dr Brush, s/p traumatic fall with head trauma in 5/20/2024, at that time sustained a large acute right SDH, and underwent an emergent right decompressive hemicraniectomy on 5/22/24, s/p right cranioplasty on 6/17/24 with custom plate, s/p ETV, ligation of shunt on the right with ligaclips with retention of valve as rescue device, presents as a transfer from [facility] for witnessed seizure at home. CTH obtained at [facility] revealed stable  shunt and moderate ventriculomegaly and slight increase in size of extra-axial CSF collection overlying the right frontal duraplasty, likely a hygroma and no mass effect.    2/24 Exam with decreased strength on left side, post ictal vs intracranial causes, will get MRI/MRA to assess and CTH/CTA if MRI will be delayed. On zosyn, white count downtrending.   2/25:  CTH/ CTA done stable , CTA negative, MRIw/flow/MRA pending, intubated /sedated, neurology saw Veeg-O, on keppra, K+3.3 repleted, afbrile, bld cltr NGTD, UA neg, WBC nml

## 2025-02-25 NOTE — PROGRESS NOTE ADULT - SUBJECTIVE AND OBJECTIVE BOX
OVERNIGHT EVENTS: No issues o/n, CTH/ CTA done stable , CTA negative, MRI pending, intubated /sedated, neurology saw Veeg-O, on keppra       ICU Vital Signs Last 24 Hrs  T(C): 37.2 (25 Feb 2025 04:00), Max: 37.7 (24 Feb 2025 20:00)  T(F): 99 (25 Feb 2025 04:00), Max: 99.9 (24 Feb 2025 20:00)  HR: 71 (25 Feb 2025 05:00) (61 - 97)  BP: 118/85 (25 Feb 2025 05:00) (98/58 - 151/76)  BP(mean): 94 (25 Feb 2025 05:00) (64 - 105)  ABP: --  ABP(mean): --  RR: 13 (25 Feb 2025 05:00) (13 - 18)  SpO2: 100% (25 Feb 2025 05:00) (99% - 100%)    O2 Parameters below as of 25 Feb 2025 05:00  Patient On (Oxygen Delivery Method): ventilator, AC    O2 Concentration (%): 40            PHYSICAL EXAM:  Intubated, sedated  Opens eyes to voice, following commands  john  antigravity spontanesously     TUBES/LINES:  [ ] A-line   [ ] Lumbar Drain:   [ ] Ventriculostomy:  [ ] Other    DIET:  [x] NPO  [ ] Regular    LABS:                                   11.9   5.74  )-----------( 159      ( 25 Feb 2025 02:45 )             35.3     02-25    139  |  104  |  4[L]  ----------------------------<  75  3.3[L]   |  19[L]  |  0.45[L]    Ca    8.4      25 Feb 2025 03:50  Phos  3.2     02-25  Mg     1.70     02-25      PT/INR - ( 23 Feb 2025 23:50 )   PT: 13.4 sec;   INR: 1.16 ratio         PTT - ( 23 Feb 2025 23:50 )  PTT:33.4 sec                    CULTURES:    Culture Results:   No growth at 24 hours (02-22 @ 16:30)  Culture Results:   No growth at 24 hours (02-22 @ 16:20)    CSF Analysis:       Drug Levels:   Phenytoin Level, Serum: <0.4 ug/mL (02-22-25 @ 17:39)      Allergies    No Known Allergies    Intolerances        MEDICATIONS:  Antibiotics:  piperacillin/tazobactam IVPB.. 3.375 Gram(s) IV Intermittent every 8 hours    Neuro:  acetaminophen   IVPB .. 1000 milliGRAM(s) IV Intermittent every 6 hours  HYDROmorphone  Injectable 0.2 milliGRAM(s) IV Push every 4 hours PRN  HYDROmorphone  Injectable 0.5 milliGRAM(s) IV Push every 4 hours PRN  levETIRAcetam   Injectable 1000 milliGRAM(s) IV Push every 12 hours  propofol Infusion 10 MICROgram(s)/kG/Min IV Continuous <Continuous>    Anticoagulation  heparin   Injectable 5000 Unit(s) SubCutaneous every 8 hours    OTHER:  chlorhexidine 0.12% Liquid 15 milliLiter(s) Oral Mucosa every 12 hours  norepinephrine Infusion 0.05 MICROgram(s)/kG/Min IV Continuous <Continuous>    IVF:  lactated ringers. 1000 milliLiter(s) IV Continuous <Continuous>      DVT PROPHYLAXIS:  [] Venodynes                                [] Heparin/Lovenox    RADIOLOGY & ADDITIONAL TESTS:  CT brain:  No acuteintracranial hemorrhage, mass effect, or brain edema.  Similar mild ventriculomegaly.  Similar low density extra-axial collection subjacent to the right   pterional cranioplasty.  No abnormal enhancement.    CTA brain:  No flow-limiting stenosis or vascular aneurysm. No AVM.    CTA neck:  No flow-limiting stenosis, evidence for arterial dissection, or vascular   aneurysm.

## 2025-02-25 NOTE — PROGRESS NOTE ADULT - PROBLEM SELECTOR PLAN 1
- MRI brain w/wo w/ CSF flow, MRA today   - q1h neuro checks  - neurology following, on keppra, veeg-O  - cont. w/ zosyn  - cont. w/ keppra   - wean to extubate when off eeg    [id number]    Case discussed with attending neurosurgeon Dr. Brush

## 2025-02-25 NOTE — DIETITIAN INITIAL EVALUATION ADULT - OTHER INFO
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 Intubated for airway protection. CTH showed stable  shunt and moderate ventriculomegaly, slight increase in size of extra-axial CSF collection overlying the right frontal duraplasty, likely a hygroma. Transferred to [facility] for Neurosurgery evaluation. Neurology and Neurosurgery following.    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 Intubated for airway protection. CTH showed stable  shunt and moderate ventriculomegaly, slight increase in size of extra-axial CSF collection overlying the right frontal duraplasty, likely a hygroma. Transferred to [facility] for Neurosurgery evaluation. Neurology and Neurosurgery following.     NKFA. Pt remains intubated and sedated. Propofol at current rate of 29.9 mL/hr which if maintained over 24 hours would provide ~790 non-nutritive lipid kcals. Pt remains NPO since admission (2 days), continues on IVF with D5. Per RN, pt without current enteral access. Labs notable for hypokalemia. No current GI distress, unknown last BM.

## 2025-02-25 NOTE — DIETITIAN INITIAL EVALUATION ADULT - PERTINENT LABORATORY DATA
02-25    139  |  104  |  4[L]  ----------------------------<  75  3.3[L]   |  19[L]  |  0.45[L]    Ca    8.4      25 Feb 2025 03:50  Phos  3.2     02-25  Mg     1.70     02-25    POCT Blood Glucose.: 73 mg/dL (02-25-25 @ 08:14)   (2/25) Na 139, BUN 4[L], Cr 0.45[L], BG 75, K+ 3.3[L], Phos 3.2, Mg 1.70

## 2025-02-26 LAB
ANION GAP SERPL CALC-SCNC: 18 MMOL/L — HIGH (ref 7–14)
APPEARANCE UR: ABNORMAL
BACTERIA # UR AUTO: NEGATIVE /HPF — SIGNIFICANT CHANGE UP
BILIRUB UR-MCNC: NEGATIVE — SIGNIFICANT CHANGE UP
BUN SERPL-MCNC: 7 MG/DL — SIGNIFICANT CHANGE UP (ref 7–23)
CALCIUM SERPL-MCNC: 8.7 MG/DL — SIGNIFICANT CHANGE UP (ref 8.4–10.5)
CAST: 1 /LPF — SIGNIFICANT CHANGE UP (ref 0–4)
CHLORIDE SERPL-SCNC: 104 MMOL/L — SIGNIFICANT CHANGE UP (ref 98–107)
CO2 SERPL-SCNC: 17 MMOL/L — LOW (ref 22–31)
COLOR SPEC: YELLOW — SIGNIFICANT CHANGE UP
CREAT SERPL-MCNC: 0.39 MG/DL — LOW (ref 0.5–1.3)
DIFF PNL FLD: ABNORMAL
EGFR: 115 ML/MIN/1.73M2 — SIGNIFICANT CHANGE UP
EGFR: 115 ML/MIN/1.73M2 — SIGNIFICANT CHANGE UP
FLUAV AG NPH QL: SIGNIFICANT CHANGE UP
FLUBV AG NPH QL: SIGNIFICANT CHANGE UP
GLUCOSE SERPL-MCNC: 90 MG/DL — SIGNIFICANT CHANGE UP (ref 70–99)
GLUCOSE UR QL: NEGATIVE MG/DL — SIGNIFICANT CHANGE UP
HCT VFR BLD CALC: 38.3 % — SIGNIFICANT CHANGE UP (ref 34.5–45)
HGB BLD-MCNC: 12.6 G/DL — SIGNIFICANT CHANGE UP (ref 11.5–15.5)
KETONES UR-MCNC: 80 MG/DL
LEUKOCYTE ESTERASE UR-ACNC: NEGATIVE — SIGNIFICANT CHANGE UP
MAGNESIUM SERPL-MCNC: 2 MG/DL — SIGNIFICANT CHANGE UP (ref 1.6–2.6)
MCHC RBC-ENTMCNC: 25.8 PG — LOW (ref 27–34)
MCHC RBC-ENTMCNC: 32.9 G/DL — SIGNIFICANT CHANGE UP (ref 32–36)
MCV RBC AUTO: 78.3 FL — LOW (ref 80–100)
NITRITE UR-MCNC: NEGATIVE — SIGNIFICANT CHANGE UP
NRBC # BLD AUTO: 0 K/UL — SIGNIFICANT CHANGE UP (ref 0–0)
NRBC # FLD: 0 K/UL — SIGNIFICANT CHANGE UP (ref 0–0)
NRBC BLD AUTO-RTO: 0 /100 WBCS — SIGNIFICANT CHANGE UP (ref 0–0)
PH UR: 6 — SIGNIFICANT CHANGE UP (ref 5–8)
PHOSPHATE SERPL-MCNC: 3.3 MG/DL — SIGNIFICANT CHANGE UP (ref 2.5–4.5)
PLATELET # BLD AUTO: 155 K/UL — SIGNIFICANT CHANGE UP (ref 150–400)
POTASSIUM SERPL-MCNC: 3.7 MMOL/L — SIGNIFICANT CHANGE UP (ref 3.5–5.3)
POTASSIUM SERPL-SCNC: 3.7 MMOL/L — SIGNIFICANT CHANGE UP (ref 3.5–5.3)
PROT UR-MCNC: 30 MG/DL
RBC # BLD: 4.89 M/UL — SIGNIFICANT CHANGE UP (ref 3.8–5.2)
RBC # FLD: 14.2 % — SIGNIFICANT CHANGE UP (ref 10.3–14.5)
RBC CASTS # UR COMP ASSIST: 48 /HPF — HIGH (ref 0–4)
REVIEW: SIGNIFICANT CHANGE UP
RSV RNA NPH QL NAA+NON-PROBE: SIGNIFICANT CHANGE UP
SARS-COV-2 RNA SPEC QL NAA+PROBE: SIGNIFICANT CHANGE UP
SODIUM SERPL-SCNC: 139 MMOL/L — SIGNIFICANT CHANGE UP (ref 135–145)
SP GR SPEC: 1.03 — HIGH (ref 1–1.03)
SQUAMOUS # UR AUTO: 1 /HPF — SIGNIFICANT CHANGE UP (ref 0–5)
UROBILINOGEN FLD QL: 1 MG/DL — SIGNIFICANT CHANGE UP (ref 0.2–1)
WBC # BLD: 6.05 K/UL — SIGNIFICANT CHANGE UP (ref 3.8–10.5)
WBC # FLD AUTO: 6.05 K/UL — SIGNIFICANT CHANGE UP (ref 3.8–10.5)
WBC UR QL: 4 /HPF — SIGNIFICANT CHANGE UP (ref 0–5)

## 2025-02-26 PROCEDURE — 99231 SBSQ HOSP IP/OBS SF/LOW 25: CPT

## 2025-02-26 PROCEDURE — 99291 CRITICAL CARE FIRST HOUR: CPT

## 2025-02-26 PROCEDURE — 71045 X-RAY EXAM CHEST 1 VIEW: CPT | Mod: 26

## 2025-02-26 RX ORDER — POLYETHYLENE GLYCOL 3350 17 G/17G
17 POWDER, FOR SOLUTION ORAL DAILY
Refills: 0 | Status: DISCONTINUED | OUTPATIENT
Start: 2025-02-26 | End: 2025-03-01

## 2025-02-26 RX ORDER — SENNA 187 MG
10 TABLET ORAL AT BEDTIME
Refills: 0 | Status: DISCONTINUED | OUTPATIENT
Start: 2025-02-26 | End: 2025-03-01

## 2025-02-26 RX ORDER — ACETAMINOPHEN 500 MG/5ML
1000 LIQUID (ML) ORAL EVERY 6 HOURS
Refills: 0 | Status: DISCONTINUED | OUTPATIENT
Start: 2025-02-26 | End: 2025-02-27

## 2025-02-26 RX ORDER — ACETAMINOPHEN 500 MG/5ML
1000 LIQUID (ML) ORAL ONCE
Refills: 0 | Status: COMPLETED | OUTPATIENT
Start: 2025-02-26 | End: 2025-02-26

## 2025-02-26 RX ORDER — ACETAMINOPHEN 500 MG/5ML
1000 LIQUID (ML) ORAL EVERY 6 HOURS
Refills: 0 | Status: DISCONTINUED | OUTPATIENT
Start: 2025-02-26 | End: 2025-02-26

## 2025-02-26 RX ORDER — FENTANYL CITRATE-0.9 % NACL/PF 100MCG/2ML
0.5 SYRINGE (ML) INTRAVENOUS
Qty: 2500 | Refills: 0 | Status: DISCONTINUED | OUTPATIENT
Start: 2025-02-26 | End: 2025-02-27

## 2025-02-26 RX ADMIN — DEXMEDETOMIDINE HYDROCHLORIDE IN SODIUM CHLORIDE 3.33 MICROGRAM(S)/KG/HR: 4 INJECTION INTRAVENOUS at 19:31

## 2025-02-26 RX ADMIN — LEVETIRACETAM 1000 MILLIGRAM(S): 10 INJECTION, SOLUTION INTRAVENOUS at 17:53

## 2025-02-26 RX ADMIN — Medication 1000 MILLIGRAM(S): at 17:52

## 2025-02-26 RX ADMIN — Medication 40 MILLIGRAM(S): at 11:28

## 2025-02-26 RX ADMIN — Medication 10 MILLILITER(S): at 22:26

## 2025-02-26 RX ADMIN — DEXMEDETOMIDINE HYDROCHLORIDE IN SODIUM CHLORIDE 3.33 MICROGRAM(S)/KG/HR: 4 INJECTION INTRAVENOUS at 03:03

## 2025-02-26 RX ADMIN — HEPARIN SODIUM 5000 UNIT(S): 1000 INJECTION INTRAVENOUS; SUBCUTANEOUS at 14:45

## 2025-02-26 RX ADMIN — Medication 1000 MILLIGRAM(S): at 18:36

## 2025-02-26 RX ADMIN — LEVETIRACETAM 1000 MILLIGRAM(S): 10 INJECTION, SOLUTION INTRAVENOUS at 06:04

## 2025-02-26 RX ADMIN — Medication 400 MILLIGRAM(S): at 12:27

## 2025-02-26 RX ADMIN — HEPARIN SODIUM 5000 UNIT(S): 1000 INJECTION INTRAVENOUS; SUBCUTANEOUS at 06:04

## 2025-02-26 RX ADMIN — Medication 3.33 MICROGRAM(S)/KG/HR: at 13:56

## 2025-02-26 RX ADMIN — Medication 3.33 MICROGRAM(S)/KG/HR: at 11:28

## 2025-02-26 RX ADMIN — POLYETHYLENE GLYCOL 3350 17 GRAM(S): 17 POWDER, FOR SOLUTION ORAL at 11:28

## 2025-02-26 RX ADMIN — Medication 15 MILLILITER(S): at 06:04

## 2025-02-26 RX ADMIN — HEPARIN SODIUM 5000 UNIT(S): 1000 INJECTION INTRAVENOUS; SUBCUTANEOUS at 22:26

## 2025-02-26 RX ADMIN — Medication 1 APPLICATION(S): at 11:36

## 2025-02-26 RX ADMIN — Medication 15 MILLILITER(S): at 17:53

## 2025-02-26 RX ADMIN — Medication 3.33 MICROGRAM(S)/KG/HR: at 19:31

## 2025-02-26 RX ADMIN — Medication 1.25 MICROGRAM(S)/KG/MIN: at 19:32

## 2025-02-26 NOTE — PROGRESS NOTE ADULT - SUBJECTIVE AND OBJECTIVE BOX
OVERNIGHT EVENTS: No issues o/n, MRI pending, intubated /sedated, neurology saw Veeg on, on keppra       ICU Vital Signs Last 24 Hrs  T(C): 36.8 (25 Feb 2025 20:00), Max: 37.5 (25 Feb 2025 12:00)  T(F): 98.3 (25 Feb 2025 20:00), Max: 99.5 (25 Feb 2025 12:00)  HR: 61 (26 Feb 2025 00:00) (55 - 83)  BP: 94/52 (26 Feb 2025 00:00) (75/47 - 138/66)  BP(mean): 66 (26 Feb 2025 00:00) (57 - 94)  ABP: --  ABP(mean): --  RR: 14 (26 Feb 2025 00:00) (13 - 16)  SpO2: 100% (26 Feb 2025 00:00) (94% - 100%)    O2 Parameters below as of 26 Feb 2025 00:00  Patient On (Oxygen Delivery Method): ventilator, AC    O2 Concentration (%): 40      PHYSICAL EXAM:  Intubated, sedated  Opens eyes to voice, following commands  john antigravity spontaneously     TUBES/LINES:  [ ] A-line   [ ] Lumbar Drain:   [ ] Ventriculostomy:  [ ] Other    DIET:  [x] NPO  [ ] Regular    LABS:                                                   11.9   5.74  )-----------( 159      ( 25 Feb 2025 02:45 )             35.3     02-25    139  |  104  |  4[L]  ----------------------------<  75  3.3[L]   |  19[L]  |  0.45[L]    Ca    8.4      25 Feb 2025 03:50  Phos  3.2     02-25  Mg     1.70     02-25            CULTURES:    Culture Results:   No growth at 24 hours (02-22 @ 16:30)  Culture Results:   No growth at 24 hours (02-22 @ 16:20)    CSF Analysis:       Drug Levels:   Phenytoin Level, Serum: <0.4 ug/mL (02-22-25 @ 17:39)      Allergies    No Known Allergies    Intolerances        MEDICATIONS:  Antibiotics:  piperacillin/tazobactam IVPB.. 3.375 Gram(s) IV Intermittent every 8 hours    Neuro:  acetaminophen   IVPB .. 1000 milliGRAM(s) IV Intermittent every 6 hours  HYDROmorphone  Injectable 0.2 milliGRAM(s) IV Push every 4 hours PRN  HYDROmorphone  Injectable 0.5 milliGRAM(s) IV Push every 4 hours PRN  levETIRAcetam   Injectable 1000 milliGRAM(s) IV Push every 12 hours  propofol Infusion 10 MICROgram(s)/kG/Min IV Continuous <Continuous>    Anticoagulation  heparin   Injectable 5000 Unit(s) SubCutaneous every 8 hours    OTHER:  chlorhexidine 0.12% Liquid 15 milliLiter(s) Oral Mucosa every 12 hours  norepinephrine Infusion 0.05 MICROgram(s)/kG/Min IV Continuous <Continuous>    IVF:  lactated ringers. 1000 milliLiter(s) IV Continuous <Continuous>      DVT PROPHYLAXIS:  [] Venodynes                                [] Heparin/Lovenox    RADIOLOGY & ADDITIONAL TESTS:  CT brain:  No acuteintracranial hemorrhage, mass effect, or brain edema.  Similar mild ventriculomegaly.  Similar low density extra-axial collection subjacent to the right   pterional cranioplasty.  No abnormal enhancement.    CTA brain:  No flow-limiting stenosis or vascular aneurysm. No AVM.    CTA neck:  No flow-limiting stenosis, evidence for arterial dissection, or vascular   aneurysm.

## 2025-02-26 NOTE — PROGRESS NOTE ADULT - SUBJECTIVE AND OBJECTIVE BOX
Neurology Progress Note    SUBJECTIVE/OBJECTIVE/INTERVAL EVENTS: Patient seen and examined at bedside w/ neuro attending and team. No acute events. Obtained EEG. Awaiting MR.    INTERVAL HISTORY: No acute events.    REVIEW OF SYSTEMS: limited as patient intubated    VITALS & EXAMINATION:  Vital Signs Last 24 Hrs  T(C): 36.6 (26 Feb 2025 08:00), Max: 37.5 (25 Feb 2025 12:00)  T(F): 97.9 (26 Feb 2025 08:00), Max: 99.5 (25 Feb 2025 12:00)  HR: 70 (26 Feb 2025 11:00) (51 - 83)  BP: 104/52 (26 Feb 2025 11:00) (75/47 - 131/66)  BP(mean): 69 (26 Feb 2025 11:00) (57 - 87)  RR: 14 (26 Feb 2025 11:00) (13 - 16)  SpO2: 100% (26 Feb 2025 11:00) (94% - 100%)    Parameters below as of 26 Feb 2025 08:00  Patient On (Oxygen Delivery Method): ventilator    O2 Concentration (%): 40    Exam:  Mental Status: Awake, alert, unable to speak because intubated, but nodding and following simple commands appropriately.  Cranial Nerves: PERRL, BTT b/l, EOMI, face appears symmetric, corneal and gag deferred for comfort because pt awake  Motor: Motor examination limited but moves RUE/RLE > LUE/LLE, voluntary movement. Able to maintain LUE/LLE antigravity.  Sensory: SILT x 4  Coordinaton: EMILY  Gait: EMILY    LABORATORY:  CBC                       12.6   6.05  )-----------( 155      ( 26 Feb 2025 00:50 )             38.3     Chem 02-26    139  |  104  |  7   ----------------------------<  90  3.7   |  17[L]  |  0.39[L]    Ca    8.7      26 Feb 2025 00:50  Phos  3.3     02-26  Mg     2.00     02-26      LFTs   Coagulopathy   Lipid Panel   A1c   Cardiac enzymes     U/A Urinalysis Basic - ( 26 Feb 2025 00:50 )    Color: x / Appearance: x / SG: x / pH: x  Gluc: 90 mg/dL / Ketone: x  / Bili: x / Urobili: x   Blood: x / Protein: x / Nitrite: x   Leuk Esterase: x / RBC: x / WBC x   Sq Epi: x / Non Sq Epi: x / Bacteria: x    STUDIES & IMAGING: (EEG, CT, MR, U/S, TTE/RETA):  2/25-2/26 EEG  Summary:  Abnormal  EEG in the awake / drowsy / asleep state(s).  1) Intermittent right frontal focal slowing.  2) Mild-moderate generalized background slowing.    Clinical Impression:  1) Focal cerebral dysfunction in the right frontal region.  2) Mild-moderate degree of diffuse cerebral dysfunction.  3) There were no epileptiform abnormalities recorded.

## 2025-02-26 NOTE — EEG REPORT - NS EEG TEXT BOX
[facility]   COMPREHENSIVE EPILEPSY CENTER   REPORT OF LONG-TERM VIDEO EEG     [facility]: [address], [place] [address], Ph#: [phone]  [facility]: [address] [place] [id number], Ph#: [phone]  [facility]: [address] [place] [address], Ph#: [phone]    Patient Name: AIXA FULLER  Age and : 59y (65)  MRN #: [id number]  Location: [place] [address]  Referring Physician: Arvind Miles    Start Time/Date: 17:23 on 2025  End Time/Date: 08:00 2025  Duration: 14 hours 37 minutes    _____________________________________________________________  STUDY INFORMATION    EEG Recording Technique:  The patient underwent continuous Video-EEG monitoring, using Telemetry System hardware on the XLTek Digital System. EEG and video data were stored on a computer hard drive with important events saved in digital archive files. The material was reviewed by a physician (electroencephalographer / epileptologist) on a daily basis. Corey and seizure detection algorithms were utilized and reviewed. An EEG Technician attended to the patient, and was available throughout daytime work hours.  The epilepsy center neurologist was available in person or on call 24-hours per day.    EEG Placement and Labeling of Electrodes:  The EEG was performed utilizing 20 channel referential EEG connections (coronal over temporal over parasagittal montage) using all standard 10-20 electrode placements with EKG, with additional electrodes placed in the inferior temporal region using the modified 10-10 montage electrode placements for elective admissions, or if deemed necessary. Recording was at a sampling rate of 256 samples per second per channel. Time synchronized digital video recording was done simultaneously with EEG recording. A low light infrared camera was used for low light recording.     _____________________________________________________________  HISTORY    Patient is a 59y old  Female who presents with a chief complaint of seizure (2025 01:45)      PERTINENT MEDICATION:  MEDICATIONS  (STANDING):  chlorhexidine 0.12% Liquid 15 milliLiter(s) Oral Mucosa every 12 hours  chlorhexidine 2% Cloths 1 Application(s) Topical daily  dexMEDEtomidine Infusion 0.2 MICROgram(s)/kG/Hr (3.33 mL/Hr) IV Continuous <Continuous>  fentaNYL   Infusion 0.5 MICROgram(s)/kG/Hr (3.33 mL/Hr) IV Continuous <Continuous>  heparin   Injectable 5000 Unit(s) SubCutaneous every 8 hours  levETIRAcetam   Injectable 1000 milliGRAM(s) IV Push every 12 hours  pantoprazole  Injectable 40 milliGRAM(s) IV Push daily  phenylephrine    Infusion 0.1 MICROgram(s)/kG/Min (1.25 mL/Hr) IV Continuous <Continuous>    _____________________________________________________________  STUDY INTERPRETATION      FINDINGS:      Background:  Continuity: continuous  Symmetry: symmetric  PDR: 9 Hz activity, with amplitude to 40 uV, that attenuated to eye opening.  Low amplitude frontal beta noted in wakefulness.  Reactivity: present  Voltage: normal, mostly 20-150uV  Anterior Posterior Gradient: present  Other background findings: none  Breach: absent    Background Slowing:  Generalized slowing: mild-moderate, with mixed alpha, theta, and delta frequencies during maximal wakefulness.  Focal slowing: intermittent right frontal delta frequency slowing.    State Changes:   Drowsiness noted with increased slowing, attenuation of fast activity, vertex transients.  Present with N2 sleep transients with symmetric spindles and K-complexes.    Sporadic Epileptiform Discharges:    None    Rhythmic and Periodic Patterns (RPPs):  None     Electrographic and Electroclinical seizures:  None    Other Clinical Events:  None    Activation Procedures:   -Hyperventilation was not performed.    -Photic stimulation was not performed.    Artifacts:  Intermittent myogenic and movement artifacts were noted.    ECG:  The heart rate on single channel ECG was predominantly ~60 bpm.    Summary:  Abnormal  EEG in the awake / drowsy / asleep state(s).  1) Intermittent right frontal focal slowing.  2) Mild-moderate generalized background slowing.    Clinical Impression:  1) Focal cerebral dysfunction in the right frontal region.  2) Mild-moderate degree of diffuse cerebral dysfunction.  3) There were no epileptiform abnormalities recorded.        -------------------------------------------------------------------------------------------------------    Afshan Jeronimo MD  Director, Epilepsy - [facility]    Questions please call [phone]  After hours (5 PM - 8:30 AM) please call the epilepsy answering service at [phone]       [facility]   COMPREHENSIVE EPILEPSY CENTER   REPORT OF LONG-TERM VIDEO EEG     [facility]: [address], [place] [address], Ph#: [phone]  [facility]: [address] [place] [address], Ph#: [phone]  [facility]: [address] [place] [address], Ph#: [phone]    Patient Name: AIXA FULLER  Age and : 59y (65)  MRN #: [id number]  Location: [place] [address]  Referring Physician: Arvind Miles    Start Time/Date: 17:23 on 2025  End Time/Date: 11:10 2025  Duration: 17 hours 47 minutes    _____________________________________________________________  STUDY INFORMATION    EEG Recording Technique:  The patient underwent continuous Video-EEG monitoring, using Telemetry System hardware on the XLTek Digital System. EEG and video data were stored on a computer hard drive with important events saved in digital archive files. The material was reviewed by a physician (electroencephalographer / epileptologist) on a daily basis. Corey and seizure detection algorithms were utilized and reviewed. An EEG Technician attended to the patient, and was available throughout daytime work hours.  The epilepsy center neurologist was available in person or on call 24-hours per day.    EEG Placement and Labeling of Electrodes:  The EEG was performed utilizing 20 channel referential EEG connections (coronal over temporal over parasagittal montage) using all standard 10-20 electrode placements with EKG, with additional electrodes placed in the inferior temporal region using the modified 10-10 montage electrode placements for elective admissions, or if deemed necessary. Recording was at a sampling rate of 256 samples per second per channel. Time synchronized digital video recording was done simultaneously with EEG recording. A low light infrared camera was used for low light recording.     _____________________________________________________________  HISTORY    Patient is a 59y old  Female who presents with a chief complaint of seizure (2025 01:45)      PERTINENT MEDICATION:  MEDICATIONS  (STANDING):  chlorhexidine 0.12% Liquid 15 milliLiter(s) Oral Mucosa every 12 hours  chlorhexidine 2% Cloths 1 Application(s) Topical daily  dexMEDEtomidine Infusion 0.2 MICROgram(s)/kG/Hr (3.33 mL/Hr) IV Continuous <Continuous>  fentaNYL   Infusion 0.5 MICROgram(s)/kG/Hr (3.33 mL/Hr) IV Continuous <Continuous>  heparin   Injectable 5000 Unit(s) SubCutaneous every 8 hours  levETIRAcetam   Injectable 1000 milliGRAM(s) IV Push every 12 hours  pantoprazole  Injectable 40 milliGRAM(s) IV Push daily  phenylephrine    Infusion 0.1 MICROgram(s)/kG/Min (1.25 mL/Hr) IV Continuous <Continuous>    _____________________________________________________________  STUDY INTERPRETATION      FINDINGS:      Background:  Continuity: continuous  Symmetry: symmetric  PDR: 9 Hz activity, with amplitude to 40 uV, that attenuated to eye opening.  Low amplitude frontal beta noted in wakefulness.  Reactivity: present  Voltage: normal, mostly 20-150uV  Anterior Posterior Gradient: present  Other background findings: none  Breach: absent    Background Slowing:  Generalized slowing: mild-moderate, with mixed alpha, theta, and delta frequencies during maximal wakefulness.  Focal slowing: intermittent right frontal delta frequency slowing.    State Changes:   Drowsiness noted with increased slowing, attenuation of fast activity, vertex transients.  Present with N2 sleep transients with symmetric spindles and K-complexes.    Sporadic Epileptiform Discharges:    None    Rhythmic and Periodic Patterns (RPPs):  None     Electrographic and Electroclinical seizures:  None    Other Clinical Events:  None    Activation Procedures:   -Hyperventilation was not performed.    -Photic stimulation was not performed.    Artifacts:  Intermittent myogenic and movement artifacts were noted.    ECG:  The heart rate on single channel ECG was predominantly ~60 bpm.    Summary:  Abnormal  EEG in the awake / drowsy / asleep state(s).  1) Intermittent right frontal focal slowing.  2) Mild-moderate generalized background slowing.    Clinical Impression:  1) Focal cerebral dysfunction in the right frontal region.  2) Mild-moderate degree of diffuse cerebral dysfunction.  3) There were no epileptiform abnormalities recorded.        -------------------------------------------------------------------------------------------------------    Afshan Jeronimo MD  Director, Epilepsy - [facility]    Questions please call [phone]  After hours (5 PM - 8:30 AM) please call the epilepsy answering service at [phone]

## 2025-02-26 NOTE — PROGRESS NOTE ADULT - ASSESSMENT
Assessment	  59F with complex past neurologic/neurosurgical history, initially presented to [facility] with status epilepticus, and who has since been transferred to [facility] for further management. EEG 2/25-/26 no seizures.   Impression:  1) Generalized Convulsive Status epilepticus, now under control. Concern for expansion of R hygroma as possible trigger vs other intracranial processes vs systemic process (such as infection). Given L gaze deviation noted during one of patient's seizures, this would be consistent with R frontal involvement (which could be due to hygroma) -> EEG negative for seizures  2) L sided weakness, could be related to expansion of R hygroma.     Recommendations:  [x] vEEG -> can be d/jed  [] continue keppra 1g BID on discharge  [] defer to primary/NSGY team re: MR imaging and management  Seizure precautions: No driving, operating heavy machinery, swimming unsupervised, working or playing at unprotected heights, standing near edge of subway platform, or biking in traffic  [] patient should follow up with their neurologist following hospital discharge.    Neurology team to sign off. For any questions please call [phone]. Thank you.  Finalized upon attestation.

## 2025-02-26 NOTE — PROGRESS NOTE ADULT - ASSESSMENT
58 yo F with PMHx of neurofibromatosis, s/p L suboccipital craniectomy for resection of cerebellar brain tumor 12/2023 by Dr Brush, s/p traumatic fall with head trauma in 5/20/2024, at that time sustained a large acute right SDH, and underwent an emergent right decompressive hemicraniectomy on 5/22/24, s/p right cranioplasty on 6/17/24 with custom plate, s/p ETV, ligation of shunt on the right with ligaclips with retention of valve as rescue device, presents as a transfer from [facility] for witnessed seizure at home. CTH obtained at [facility] revealed stable  shunt and moderate ventriculomegaly and slight increase in size of extra-axial CSF collection overlying the right frontal duraplasty, likely a hygroma and no mass effect.    2/24 Exam with decreased strength on left side, post ictal vs intracranial causes, will get MRI/MRA to assess and CTH/CTA if MRI will be delayed. On zosyn, white count downtrending.   2/25:  CTH/ CTA done stable , CTA negative, MRIw/flow/MRA pending, intubated /sedated, neurology saw Veeg-O, on keppra, K+3.3 repleted, afbrile, bld cltr NGTD, UA neg, WBC nml  2/26: stable exam,  Veeg on, keppra, mri w/flow/ mra pending

## 2025-02-26 NOTE — PROGRESS NOTE ADULT - SUBJECTIVE AND OBJECTIVE BOX
SICU Daily Progress Note  =====================================================  Interval/Overnight Events:   - Pending MRI head, will obtain EEG first  - vEEG started at 1630,   - KO tube placed  - start tube feeds, d/c IVF  - s/p CTA head 2/25: no intracranial hemorrhage, mass effect or edema   -Dominguez out 4:30 pm ==> bladder scan 11 pm 145,  - Hypotensive to SBP 70, 500 cc bolus, started nicki           MEDICATIONS:   --------------------------------------------------------------------------------------  chlorhexidine 0.12% Liquid 15 milliLiter(s) Oral Mucosa every 12 hours  chlorhexidine 2% Cloths 1 Application(s) Topical daily  dexMEDEtomidine Infusion 0.2 MICROgram(s)/kG/Hr IV Continuous <Continuous>  fentaNYL   Infusion 0.5 MICROgram(s)/kG/Hr IV Continuous <Continuous>  heparin   Injectable 5000 Unit(s) SubCutaneous every 8 hours  levETIRAcetam   Injectable 1000 milliGRAM(s) IV Push every 12 hours  pantoprazole  Injectable 40 milliGRAM(s) IV Push daily  phenylephrine    Infusion 0.1 MICROgram(s)/kG/Min IV Continuous <Continuous>    --------------------------------------------------------------------------------------    VITAL SIGNS, INS/OUTS (last 24 hours):  --------------------------------------------------------------------------------------  T(C): 36.8 (02-25-25 @ 20:00), Max: 37.5 (02-25-25 @ 12:00)  HR: 61 (02-26-25 @ 00:00) (55 - 83)  BP: 94/52 (02-26-25 @ 00:00) (75/47 - 138/66)  RR: 14 (02-26-25 @ 00:00) (13 - 16)  SpO2: 100% (02-26-25 @ 00:00) (94% - 100%)  Mode: AC/ CMV (Assist Control/ Continuous Mandatory Ventilation), RR (machine): 24, TV (machine): 500, FiO2: 40, PEEP: 5, ITime: 0.67, MAP: 12, PIP: 25    02-24-25 @ 07:01  -  02-25-25 @ 07:00  --------------------------------------------------------  IN: 3352.4 mL / OUT: 2385 mL / NET: 967.4 mL    02-25-25 @ 07:01  -  02-26-25 @ 00:06  --------------------------------------------------------  IN: 1624.9 mL / OUT: 802 mL / NET: 822.9 mL      --------------------------------------------------------------------------------------    EXAM  NEUROLOGY  Exam: sedated, on precedex/fent  HEENT  Exam: Normocephalic, VEEG    RESPIRATORY  Mechanical Ventilation: Intubated 14/450/5/40    CARDIOVASCULAR  Exam: Regular rate, on nicki     GI/NUTRITION  Exam: Abdomen soft, Non-tender, Non-distended    VASCULAR  Exam: Extremities warm, pink, well-perfused    MUSCULOSKELETAL  Exam: limited due to sedation     SKIN  Exam: Good skin turgor, no skin breakdown    TUBES/LINES/DRAINS  [x] Peripheral IV  [] Central Venous Line     	[] R	[] L	[] IJ	[] Fem	[] SC	Date Placed:   [] Arterial Line		[] R	[] L	[] Fem	[] Rad	[] Ax	Date Placed:   [] PICC		[] Midline		[] Mediport  [] Urinary Catheter		Date Placed:     LABS  --------------------------------------------------------------------------------------    --------------------------------------------------------------------------------------    IMAGING STUDIES:

## 2025-02-26 NOTE — PROGRESS NOTE ADULT - PROBLEM SELECTOR PLAN 1
- MRI brain w/wo w/ CSF flow, MRA today   - q1h neuro checks  - neurology following, on keppra, veeg on  - cont. w/ zosyn  - cont. w/ keppra   - wean to extubate when off eeg    [phone]    Case discussed with attending neurosurgeon Dr. Brush

## 2025-02-26 NOTE — PROGRESS NOTE ADULT - ASSESSMENT
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), s/p traumatic fall with head trauma and R SDH, s/p R decompressive hemicraniectomy, ICP monitor and R cranioplasty (5/2024), s/p L frontal Endoscopic third ventriculostomy and ligation of R  shunt with ligaclips with retention of valve, proximal and distal catheters (08/2024), Anxiety and depression presents to the ED at [place] with CC of witnessed seizure at home.Pt was admitted to ICU for status epilepticus. CTH showed stable  shunt and moderate ventriculomegaly, slight increase in size of extra-axial CSF collection overlying the right frontal duraplasty, likely a hygroma. There is no mass effect. Transferred to [facility] for Neurosurgery evaluation, r/o infective etiology. SICU consulted for vent management & Q1 neurochecks.    Neurologic:   - Q1h neurochecks  - VEEG on precedex, d/c prop, can add fent  - Keppra 1g BID  - Pain control: IV Tylenol, dilaudid PRN   - CTA head 2/24 wnl  - pending MR head w CSF flow     Respiratory:   - Intubated 14/450/5/40    Cardiovascular:   - MAP > 65  - on nicki     Gastrointestinal/Nutrition:   - TF    Renal/Genitourinary:   - Monitor electrolytes   - Dominguez d/c    Hematologic:   - Hep sub q     Infectious Disease:   - Monitor WBC, fever curves   - Zosyn (presumed aspiration pneumonia) 7/23/24 - 7/24/24  - blood cx sent x2 ([place])     Endocrine:   - Monitor glucose     Tubes/Lines/Drains:   - PIV  - Dominguez -> primafit    Disposition: SICU 59 year old female with PMHx of neurofibromatosis, 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 CTH showed stable  shunt and moderate ventriculomegaly, slight increase in size of extra-axial CSF collection overlying the right frontal duraplasty, likely a hygroma. There is no mass effect. Transferred to [facility] for Neurosurgery evaluation, r/o infective etiology. SICU consulted for vent management & Q1 neurochecks.      Neurologic:   - Q1h neurochecks  - VEEG on precedex, d/c prop, can add fent  - Keppra 1g BID  - Pain control: IV Tylenol, dilaudid PRN   - CTA head 2/24 wnl  - pending MR head w CSF flow     Respiratory:   - Intubated 14/450/5/40    Cardiovascular:   - MAP > 65  - on nicki     Gastrointestinal/Nutrition:   - TF    Renal/Genitourinary:   - Monitor electrolytes   - Dominguez d/c    Hematologic:   - Hep sub q     Infectious Disease:   - Monitor WBC, fever curves   - Zosyn (presumed aspiration pneumonia) 7/23/24 - 7/24/24  - blood cx sent x2 ([place])     Endocrine:   - Monitor glucose     Tubes/Lines/Drains:   - PIV  - Dominguez -> primafit    Disposition: SICU

## 2025-02-27 DIAGNOSIS — G40.901 EPILEPSY, UNSPECIFIED, NOT INTRACTABLE, WITH STATUS EPILEPTICUS: ICD-10-CM

## 2025-02-27 LAB
ANION GAP SERPL CALC-SCNC: 13 MMOL/L — SIGNIFICANT CHANGE UP (ref 7–14)
BASOPHILS # BLD AUTO: 0.02 K/UL — SIGNIFICANT CHANGE UP (ref 0–0.2)
BASOPHILS NFR BLD AUTO: 0.2 % — SIGNIFICANT CHANGE UP (ref 0–2)
BUN SERPL-MCNC: 15 MG/DL — SIGNIFICANT CHANGE UP (ref 7–23)
CALCIUM SERPL-MCNC: 8.5 MG/DL — SIGNIFICANT CHANGE UP (ref 8.4–10.5)
CHLORIDE SERPL-SCNC: 106 MMOL/L — SIGNIFICANT CHANGE UP (ref 98–107)
CO2 SERPL-SCNC: 21 MMOL/L — LOW (ref 22–31)
CREAT SERPL-MCNC: 0.46 MG/DL — LOW (ref 0.5–1.3)
CULTURE RESULTS: NO GROWTH — SIGNIFICANT CHANGE UP
EGFR: 110 ML/MIN/1.73M2 — SIGNIFICANT CHANGE UP
EGFR: 110 ML/MIN/1.73M2 — SIGNIFICANT CHANGE UP
EOSINOPHIL # BLD AUTO: 0.19 K/UL — SIGNIFICANT CHANGE UP (ref 0–0.5)
EOSINOPHIL NFR BLD AUTO: 2.2 % — SIGNIFICANT CHANGE UP (ref 0–6)
GLUCOSE SERPL-MCNC: 131 MG/DL — HIGH (ref 70–99)
HCT VFR BLD CALC: 38.3 % — SIGNIFICANT CHANGE UP (ref 34.5–45)
HGB BLD-MCNC: 12.8 G/DL — SIGNIFICANT CHANGE UP (ref 11.5–15.5)
IANC: 6.74 K/UL — SIGNIFICANT CHANGE UP (ref 1.8–7.4)
IMM GRANULOCYTES NFR BLD AUTO: 0.7 % — SIGNIFICANT CHANGE UP (ref 0–0.9)
LYMPHOCYTES # BLD AUTO: 0.87 K/UL — LOW (ref 1–3.3)
LYMPHOCYTES # BLD AUTO: 9.9 % — LOW (ref 13–44)
MAGNESIUM SERPL-MCNC: 1.9 MG/DL — SIGNIFICANT CHANGE UP (ref 1.6–2.6)
MCHC RBC-ENTMCNC: 26.3 PG — LOW (ref 27–34)
MCHC RBC-ENTMCNC: 33.4 G/DL — SIGNIFICANT CHANGE UP (ref 32–36)
MCV RBC AUTO: 78.8 FL — LOW (ref 80–100)
MONOCYTES # BLD AUTO: 0.9 K/UL — SIGNIFICANT CHANGE UP (ref 0–0.9)
MONOCYTES NFR BLD AUTO: 10.3 % — SIGNIFICANT CHANGE UP (ref 2–14)
NEUTROPHILS # BLD AUTO: 6.74 K/UL — SIGNIFICANT CHANGE UP (ref 1.8–7.4)
NEUTROPHILS NFR BLD AUTO: 76.7 % — SIGNIFICANT CHANGE UP (ref 43–77)
NRBC # BLD AUTO: 0 K/UL — SIGNIFICANT CHANGE UP (ref 0–0)
NRBC # FLD: 0 K/UL — SIGNIFICANT CHANGE UP (ref 0–0)
NRBC BLD AUTO-RTO: 0 /100 WBCS — SIGNIFICANT CHANGE UP (ref 0–0)
PHOSPHATE SERPL-MCNC: 2.7 MG/DL — SIGNIFICANT CHANGE UP (ref 2.5–4.5)
PLATELET # BLD AUTO: 215 K/UL — SIGNIFICANT CHANGE UP (ref 150–400)
POTASSIUM SERPL-MCNC: 3.4 MMOL/L — LOW (ref 3.5–5.3)
POTASSIUM SERPL-SCNC: 3.4 MMOL/L — LOW (ref 3.5–5.3)
RBC # BLD: 4.86 M/UL — SIGNIFICANT CHANGE UP (ref 3.8–5.2)
RBC # FLD: 14.3 % — SIGNIFICANT CHANGE UP (ref 10.3–14.5)
SODIUM SERPL-SCNC: 140 MMOL/L — SIGNIFICANT CHANGE UP (ref 135–145)
SPECIMEN SOURCE: SIGNIFICANT CHANGE UP
WBC # BLD: 8.78 K/UL — SIGNIFICANT CHANGE UP (ref 3.8–10.5)
WBC # FLD AUTO: 8.78 K/UL — SIGNIFICANT CHANGE UP (ref 3.8–10.5)

## 2025-02-27 PROCEDURE — 93970 EXTREMITY STUDY: CPT | Mod: 26

## 2025-02-27 PROCEDURE — 71045 X-RAY EXAM CHEST 1 VIEW: CPT | Mod: 26

## 2025-02-27 PROCEDURE — 99291 CRITICAL CARE FIRST HOUR: CPT

## 2025-02-27 RX ORDER — HYDROMORPHONE/SOD CHLOR,ISO/PF 2 MG/10 ML
0.5 SYRINGE (ML) INJECTION ONCE
Refills: 0 | Status: DISCONTINUED | OUTPATIENT
Start: 2025-02-27 | End: 2025-02-27

## 2025-02-27 RX ORDER — SERTRALINE 100 MG/1
50 TABLET, FILM COATED ORAL DAILY
Refills: 0 | Status: DISCONTINUED | OUTPATIENT
Start: 2025-02-27 | End: 2025-03-01

## 2025-02-27 RX ORDER — DEXMEDETOMIDINE HYDROCHLORIDE IN SODIUM CHLORIDE 4 UG/ML
0.2 INJECTION INTRAVENOUS
Qty: 400 | Refills: 0 | Status: DISCONTINUED | OUTPATIENT
Start: 2025-02-27 | End: 2025-02-28

## 2025-02-27 RX ORDER — LIDOCAINE HYDROCHLORIDE 20 MG/ML
1 JELLY TOPICAL ONCE
Refills: 0 | Status: COMPLETED | OUTPATIENT
Start: 2025-02-27 | End: 2025-02-27

## 2025-02-27 RX ORDER — MIRTAZAPINE 30 MG/1
7.5 TABLET, FILM COATED ORAL DAILY
Refills: 0 | Status: DISCONTINUED | OUTPATIENT
Start: 2025-02-27 | End: 2025-03-01

## 2025-02-27 RX ORDER — MELATONIN 5 MG
3 TABLET ORAL AT BEDTIME
Refills: 0 | Status: DISCONTINUED | OUTPATIENT
Start: 2025-02-27 | End: 2025-03-01

## 2025-02-27 RX ORDER — ACETAMINOPHEN 500 MG/5ML
1000 LIQUID (ML) ORAL EVERY 6 HOURS
Refills: 0 | Status: DISCONTINUED | OUTPATIENT
Start: 2025-02-27 | End: 2025-03-01

## 2025-02-27 RX ORDER — IPRATROPIUM BROMIDE AND ALBUTEROL SULFATE .5; 2.5 MG/3ML; MG/3ML
3 SOLUTION RESPIRATORY (INHALATION)
Refills: 0 | Status: DISCONTINUED | OUTPATIENT
Start: 2025-02-27 | End: 2025-03-03

## 2025-02-27 RX ADMIN — IPRATROPIUM BROMIDE AND ALBUTEROL SULFATE 3 MILLILITER(S): .5; 2.5 SOLUTION RESPIRATORY (INHALATION) at 22:18

## 2025-02-27 RX ADMIN — POLYETHYLENE GLYCOL 3350 17 GRAM(S): 17 POWDER, FOR SOLUTION ORAL at 12:37

## 2025-02-27 RX ADMIN — Medication 0.5 MILLIGRAM(S): at 13:07

## 2025-02-27 RX ADMIN — Medication 3 MILLIGRAM(S): at 21:03

## 2025-02-27 RX ADMIN — Medication 15 MILLILITER(S): at 05:48

## 2025-02-27 RX ADMIN — Medication 1000 MILLIGRAM(S): at 00:02

## 2025-02-27 RX ADMIN — Medication 100 MILLIEQUIVALENT(S): at 04:47

## 2025-02-27 RX ADMIN — Medication 100 MILLIEQUIVALENT(S): at 03:47

## 2025-02-27 RX ADMIN — HEPARIN SODIUM 5000 UNIT(S): 1000 INJECTION INTRAVENOUS; SUBCUTANEOUS at 05:48

## 2025-02-27 RX ADMIN — LEVETIRACETAM 1000 MILLIGRAM(S): 10 INJECTION, SOLUTION INTRAVENOUS at 05:47

## 2025-02-27 RX ADMIN — Medication 1000 MILLIGRAM(S): at 22:50

## 2025-02-27 RX ADMIN — Medication 1000 MILLIGRAM(S): at 09:54

## 2025-02-27 RX ADMIN — Medication 1000 MILLIGRAM(S): at 09:30

## 2025-02-27 RX ADMIN — SERTRALINE 50 MILLIGRAM(S): 100 TABLET, FILM COATED ORAL at 12:39

## 2025-02-27 RX ADMIN — Medication 1 APPLICATION(S): at 12:40

## 2025-02-27 RX ADMIN — Medication 1000 MILLIGRAM(S): at 22:18

## 2025-02-27 RX ADMIN — Medication 100 MILLIEQUIVALENT(S): at 02:40

## 2025-02-27 RX ADMIN — MIRTAZAPINE 7.5 MILLIGRAM(S): 30 TABLET, FILM COATED ORAL at 17:18

## 2025-02-27 RX ADMIN — LEVETIRACETAM 1000 MILLIGRAM(S): 10 INJECTION, SOLUTION INTRAVENOUS at 17:16

## 2025-02-27 RX ADMIN — Medication 0.5 MILLIGRAM(S): at 12:37

## 2025-02-27 RX ADMIN — Medication 1000 MILLIGRAM(S): at 00:30

## 2025-02-27 RX ADMIN — Medication 40 MILLIGRAM(S): at 12:39

## 2025-02-27 RX ADMIN — LIDOCAINE HYDROCHLORIDE 1 PATCH: 20 JELLY TOPICAL at 22:18

## 2025-02-27 RX ADMIN — HEPARIN SODIUM 5000 UNIT(S): 1000 INJECTION INTRAVENOUS; SUBCUTANEOUS at 14:15

## 2025-02-27 RX ADMIN — HEPARIN SODIUM 5000 UNIT(S): 1000 INJECTION INTRAVENOUS; SUBCUTANEOUS at 21:03

## 2025-02-27 NOTE — PROGRESS NOTE ADULT - SUBJECTIVE AND OBJECTIVE BOX
Patient remains intubated, on vent  No seizure activity noted  No acute issues overnight  ICU Vital Signs Last 24 Hrs  T(C): 38.6 (27 Feb 2025 00:00), Max: 38.6 (26 Feb 2025 12:00)  T(F): 101.5 (27 Feb 2025 00:00), Max: 101.5 (26 Feb 2025 12:00)  HR: 65 (27 Feb 2025 03:00) (58 - 74)  BP: 103/54 (27 Feb 2025 03:00) (88/56 - 131/66)  BP(mean): 68 (27 Feb 2025 03:00) (62 - 83)  ABP: --  ABP(mean): --  RR: 14 (27 Feb 2025 03:00) (14 - 14)  SpO2: 100% (27 Feb 2025 03:00) (99% - 100%)    O2 Parameters below as of 27 Feb 2025 00:00  Patient On (Oxygen Delivery Method): ventilator    O2 Concentration (%): 40    Awake, eyes open, following commands  PERRL  Face symmetric  CLARK with good strength    MEDICATIONS  (STANDING):  chlorhexidine 0.12% Liquid 15 milliLiter(s) Oral Mucosa every 12 hours  chlorhexidine 2% Cloths 1 Application(s) Topical daily  dexMEDEtomidine Infusion 0.2 MICROgram(s)/kG/Hr (3.33 mL/Hr) IV Continuous <Continuous>  fentaNYL   Infusion 0.501 MICROgram(s)/kG/Hr (3.33 mL/Hr) IV Continuous <Continuous>  heparin   Injectable 5000 Unit(s) SubCutaneous every 8 hours  levETIRAcetam   Injectable 1000 milliGRAM(s) IV Push every 12 hours  pantoprazole  Injectable 40 milliGRAM(s) IV Push daily  phenylephrine    Infusion 0.1 MICROgram(s)/kG/Min (1.25 mL/Hr) IV Continuous <Continuous>  polyethylene glycol 3350 17 Gram(s) Oral daily  potassium chloride  10 mEq/100 mL IVPB 10 milliEquivalent(s) IV Intermittent every 1 hour  senna Syrup 10 milliLiter(s) Oral at bedtime    MEDICATIONS  (PRN):  acetaminophen   Oral Liquid .. 1000 milliGRAM(s) Oral every 6 hours PRN Temp greater or equal to 38C (100.4F)                          12.8   8.78  )-----------( 215      ( 27 Feb 2025 00:50 )             38.3     02-27    140  |  106  |  15  ----------------------------<  131[H]  3.4[L]   |  21[L]  |  0.46[L]    Ca    8.5      27 Feb 2025 00:50  Phos  2.7     02-27  Mg     1.90     02-27

## 2025-02-27 NOTE — PROGRESS NOTE ADULT - ASSESSMENT
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 CTH showed stable  shunt and moderate ventriculomegaly, slight increase in size of extra-axial CSF collection overlying the right frontal duraplasty, likely a hygroma. There is no mass effect. Transferred to [facility] for Neurosurgery evaluation, r/o infective etiology. SICU consulted for vent management & Q1 neurochecks.      Neurologic:   - Q1h neurochecks  - VEEG completed, no seizures   -  precedex/fent  - Keppra 1g BID  - Pain control: IV Tylenol, dilaudid PRN   - CTA head 2/24 wnl  - pending MR head w CSF flow     Respiratory:   - Intubated 14/450/5/40    Cardiovascular:   - MAP > 65  - on nicki     Gastrointestinal/Nutrition:   - TF    Renal/Genitourinary:   - Monitor electrolytes   - Dominguez d/c    Hematologic:   - Hep sub q     Infectious Disease:   - Monitor WBC, fever curves   - Zosyn (presumed aspiration pneumonia) 7/23/24 - 7/24/24  - blood cx sent x2 ([place]) > -ve at 4 days     Endocrine:   - Monitor glucose     Tubes/Lines/Drains:   - PIV  - Dominguez -> primafit    Disposition: SICU 59 year old female with PMHx of neurofibromatosis, 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 CTH showed stable  shunt and moderate ventriculomegaly, slight increase in size of extra-axial CSF collection overlying the right frontal duraplasty, likely a hygroma. There is no mass effect. Transferred to [facility] for Neurosurgery evaluation, r/o infective etiology. SICU consulted for vent management & Q1 neurochecks.      Neurologic:   - Q1h neurochecks  - VEEG completed, no seizures   - Will wean precedex/fent to SBT and plan to extubate  - Keppra 1g BID  - Pain control: IV Tylenol, dilaudid PRN   - CTA head 2/24 wnl  - pending MR head w CSF flow     Respiratory:   - Intubated 14/450/5/40  - Plan to extubate today    Cardiovascular:   - MAP > 65  - on nicki     Gastrointestinal/Nutrition:   - TF (hold for possible extubation)  - Bowel regimen    Renal/Genitourinary:   - Monitor electrolytes   - Dominguez d/c    Hematologic:   - Hep sub q     Infectious Disease:   - Monitor WBC, fever curves   - Zosyn (presumed aspiration pneumonia) 7/23/24 - 7/24/24  - blood cx sent x2 ([place]) > -ve at 4 days   - Febrile overnight on 2/27/24 - sent rpt blood cultures, CXR/UA/COVID/Flu/RSV - negative  - Consider panscan if patient remain febrile and intubated    Endocrine:   - Monitor glucose     Tubes/Lines/Drains:   - PIV  - Dominguez -> primafit    Disposition: SICU 59 year old female with PMHx of neurofibromatosis, 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), s/p  shunt (in [facility]), s/p traumatic fall with head trauma and R SDH, s/p R decompressive hemicraniectomy, ICP monitor and R cranioplasty (5/2024), s/p L frontal Endoscopic third ventriculostomy and ligation of R  shunt with ligaclips with retention of valve, proximal and distal catheters (08/2024), Anxiety and depression presents to the ED at [place] with CC of witnessed seizure at home.Pt was admitted to ICU for status epilepticus. CTH showed stable  shunt and moderate ventriculomegaly, slight increase in size of extra-axial CSF collection overlying the right frontal duraplasty, likely a hygroma. There is no mass effect. Transferred to [facility] for Neurosurgery evaluation, r/o infective etiology. SICU consulted for vent management & Q1 neurochecks.      Interval events:   - Hold tube feeds for extubation  - Fentanyl gtt DCed  - SBT, wean sedation to extubate  - passed TOV    Neurologic:   - Q1h neurochecks  - VEEG completed, no seizures   - wean precedex/fent  - Keppra 1g BID  - Pain control: IV Tylenol, dilaudid PRN   - CTA head 2/24 wnl  - pending MR head w CSF flow     Respiratory:   - Intubated 14/450/5/40 -> SBT 2/27    Cardiovascular:   - MAP > 65  - on nicki    Gastrointestinal/Nutrition:   - TF, hold for extub    Renal/Genitourinary:   - Monitor electrolytes   - Dominguez d/c    Hematologic:   - DVT PPx: SqH    Infectious Disease:   - Monitor WBC, fever curves   - Zosyn (presumed aspiration pneumonia) 7/23/24 - 7/24/24  - blood cx sent x2 ([place]) > NG @ 4d     Endocrine:   - Monitor glucose     Tubes/Lines/Drains:   - PIV  - Dominguez -> primafit    Disposition: SICU

## 2025-02-27 NOTE — PROGRESS NOTE ADULT - SUBJECTIVE AND OBJECTIVE BOX
SICU Daily Progress Note  =====================================================  Interval/Overnight Events:   - Dominguez out, passed ToV  - vEEG completed  - Started bowel reg  - Febrile to Tmax 101, fever work up sent      MEDICATIONS:   --------------------------------------------------------------------------------------  acetaminophen   Oral Liquid .. 1000 milliGRAM(s) Oral every 6 hours PRN  chlorhexidine 0.12% Liquid 15 milliLiter(s) Oral Mucosa every 12 hours  chlorhexidine 2% Cloths 1 Application(s) Topical daily  dexMEDEtomidine Infusion 0.2 MICROgram(s)/kG/Hr IV Continuous <Continuous>  fentaNYL   Infusion 0.501 MICROgram(s)/kG/Hr IV Continuous <Continuous>  heparin   Injectable 5000 Unit(s) SubCutaneous every 8 hours  levETIRAcetam   Injectable 1000 milliGRAM(s) IV Push every 12 hours  pantoprazole  Injectable 40 milliGRAM(s) IV Push daily  phenylephrine    Infusion 0.1 MICROgram(s)/kG/Min IV Continuous <Continuous>  polyethylene glycol 3350 17 Gram(s) Oral daily  senna Syrup 10 milliLiter(s) Oral at bedtime    --------------------------------------------------------------------------------------    VITAL SIGNS, INS/OUTS (last 24 hours):  --------------------------------------------------------------------------------------  T(C): 38.6 (02-27-25 @ 00:00), Max: 38.6 (02-26-25 @ 12:00)  HR: 66 (02-27-25 @ 01:00) (51 - 74)  BP: 92/57 (02-27-25 @ 01:00) (88/56 - 131/66)  RR: 14 (02-27-25 @ 01:00) (14 - 14)  SpO2: 100% (02-27-25 @ 01:00) (99% - 100%)  Mode: AC/ CMV (Assist Control/ Continuous Mandatory Ventilation), RR (machine): 14, TV (machine): 450, FiO2: 40, PEEP: 5, ITime: 1, MAP: 9, PIP: 17    02-25-25 @ 07:01  -  02-26-25 @ 07:00  --------------------------------------------------------  IN: 2110.9 mL / OUT: 1127 mL / NET: 983.9 mL    02-26-25 @ 07:01  -  02-27-25 @ 01:24  --------------------------------------------------------  IN: 1155.7 mL / OUT: 400 mL / NET: 755.7 mL      --------------------------------------------------------------------------------------    EXAM  NEUROLOGY  Exam: sedated, on precedex/fent  HEENT  Exam: Normocephalic, off VEEG    RESPIRATORY  Mechanical Ventilation: Intubated 14/450/5/40    CARDIOVASCULAR  Exam: Regular rate, on nicki     GI/NUTRITION  Exam: Abdomen soft, Non-tender, Non-distended    VASCULAR  Exam: Extremities warm, pink, well-perfused    MUSCULOSKELETAL  Exam: limited due to sedation     SKIN  Exam: Good skin turgor, no skin breakdown      SKIN  Exam: Good skin turgor, no skin breakdown    TUBES/LINES/DRAINS  [x] Peripheral IV  [] Central Venous Line     	[] R	[] L	[] IJ	[] Fem	[] SC	Date Placed:   [] Arterial Line		[] R	[] L	[] Fem	[] Rad	[] Ax	Date Placed:   [] PICC		[] Midline		[] Mediport  [] Urinary Catheter		Date Placed:     LABS  --------------------------------------------------------------------------------------    --------------------------------------------------------------------------------------    IMAGING STUDIES:      SICU Daily Progress Note  =====================================================  Interval/Overnight Events:   - Dominguez out, passed ToV  - vEEG completed  - Started bowel reg  - Febrile to Tmax 101, fever work up sent      MEDICATIONS:   --------------------------------------------------------------------------------------  acetaminophen   Oral Liquid .. 1000 milliGRAM(s) Oral every 6 hours PRN  chlorhexidine 0.12% Liquid 15 milliLiter(s) Oral Mucosa every 12 hours  chlorhexidine 2% Cloths 1 Application(s) Topical daily  dexMEDEtomidine Infusion 0.2 MICROgram(s)/kG/Hr IV Continuous <Continuous>  fentaNYL   Infusion 0.501 MICROgram(s)/kG/Hr IV Continuous <Continuous>  heparin   Injectable 5000 Unit(s) SubCutaneous every 8 hours  levETIRAcetam   Injectable 1000 milliGRAM(s) IV Push every 12 hours  pantoprazole  Injectable 40 milliGRAM(s) IV Push daily  phenylephrine    Infusion 0.1 MICROgram(s)/kG/Min IV Continuous <Continuous>  polyethylene glycol 3350 17 Gram(s) Oral daily  senna Syrup 10 milliLiter(s) Oral at bedtime    --------------------------------------------------------------------------------------    VITAL SIGNS, INS/OUTS (last 24 hours):  --------------------------------------------------------------------------------------  T(C): 38.6 (02-27-25 @ 00:00), Max: 38.6 (02-26-25 @ 12:00)  HR: 66 (02-27-25 @ 01:00) (51 - 74)  BP: 92/57 (02-27-25 @ 01:00) (88/56 - 131/66)  RR: 14 (02-27-25 @ 01:00) (14 - 14)  SpO2: 100% (02-27-25 @ 01:00) (99% - 100%)  Mode: AC/ CMV (Assist Control/ Continuous Mandatory Ventilation), RR (machine): 14, TV (machine): 450, FiO2: 40, PEEP: 5, ITime: 1, MAP: 9, PIP: 17    02-25-25 @ 07:01  -  02-26-25 @ 07:00  --------------------------------------------------------  IN: 2110.9 mL / OUT: 1127 mL / NET: 983.9 mL    02-26-25 @ 07:01  -  02-27-25 @ 01:24  --------------------------------------------------------  IN: 1155.7 mL / OUT: 400 mL / NET: 755.7 mL      --------------------------------------------------------------------------------------    EXAM  NEUROLOGY  Exam: sedated, on precedex/fent  HEENT  Exam: Normocephalic, off VEEG    RESPIRATORY  Mechanical Ventilation: Intubated 14/450/5/40    CARDIOVASCULAR  Exam: Regular rate, on nicki     GI/NUTRITION  Exam: Abdomen soft, Non-tender, Non-distended    VASCULAR  Exam: Extremities warm, pink, well-perfused    MUSCULOSKELETAL  Exam: limited due to sedation     SKIN  Exam: Good skin turgor, no skin breakdown      SKIN  Exam: Good skin turgor, no skin breakdown    TUBES/LINES/DRAINS  [x] Peripheral IV  [] Central Venous Line     	[] R	[] L	[] IJ	[] Fem	[] SC	Date Placed:   [] Arterial Line		[] R	[] L	[] Fem	[] Rad	[] Ax	Date Placed:   [] PICC		[] Midline		[] Mediport  [] Urinary Catheter		Date Placed:     LABS  --------------------------------------------------------------------------------------                        12.8   8.78  )-----------( 215      ( 27 Feb 2025 00:50 )             38.3     02-27    140  |  106  |  15  ----------------------------<  131[H]  3.4[L]   |  21[L]  |  0.46[L]    Ca    8.5      27 Feb 2025 00:50  Phos  2.7     02-27  Mg     1.90     02-27    --------------------------------------------------------------------------------------

## 2025-02-27 NOTE — PROGRESS NOTE ADULT - PROBLEM SELECTOR PLAN 1
Neurologic checks Q1H  Continue keppra  Brain MRI with CSF flow study, MRA ordered  Wean to extubate

## 2025-02-27 NOTE — CHART NOTE - NSCHARTNOTEFT_GEN_A_CORE
CRITICAL CARE NUTRITION FOLLOW UP NOTE    Pt seen for nutrition follow up.     SOURCE: [X] Medical record     Medical Course:  - Per chart, pt is 59 year old female PMH neurofibromatosis, 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), s/p  shunt, s/p traumatic fall with head trauma and R SDH, s/p R decompressive hemicraniectomy, ICP monitor and R cranioplasty (5/2024), s/p L frontal Endoscopic third ventriculostomy and ligation of R  shunt with ligaclips with retention of valve, proximal and distal catheters (8/2024), anxiety/depression who initially presented to [facility] with witnessed seizure. Intubated for airway protection. CTH showed stable  shunt and moderate ventriculomegaly, slight increase in size of extra-axial CSF collection overlying the right frontal duraplasty, likely a hygroma. Transferred to [facility] for Neurosurgery evaluation. Neurosurgery following.     Diet Prescription:   - NPO with Tube Feed: [employer] Peptide 1.5 via Nasoduodenal tube at goal rate 35 mL/hr x24 hrs + Liquid Protein Supplement 3x daily    Nutrition Course:  -   Enteral Nutrition: Current nutrition prescription provides mL formula, kcal, gm protein, mL free water. This meets kcal/kg, gm protein/kg @ dosing/current/ideal weight __ kg.     Food Allergy/Intolerance:  - NKFA    Pertinent Medications:   - dexMEDEtomidine IV Continuous, fentaNYL IV Continuous, pantoprazole Injectable, phenylephrine IV Continuous, polyethylene glycol, senna Syrup     Pertinent Labs:   - (2/27) Na 140 mmol/L Glu 131 mg/dL[H] K+ 3.4 mmol/L[L] Cr 0.46 mg/dL[L] BUN 15 mg/dL Phos 2.7 mg/dL    Weight: (2/25) 138.6 lbs / 62.9 kg, (2/23 dosing) 146.6 lbs / 66.5 kg   Height: 63 in / 160.02 cm  IBW: 115 lbs / 52.3 kg +/-10%  BMI: 24.5 kg/m^2 (at lowest weight)    Physical Assessment, per flowsheets:  Edema: 1+ generalized, 2+ left arm/left wrist/left hand  Pressure Injury: none noted     Estimated Needs:   [X] No change since previous assessment, based on dosing weight  lbs / kg   kcal daily @ kcal/kg,  gm protein daily @ gm/kg   [ ] recalculated, with consideration for, based on weight    Previous Nutrition Diagnosis: [X] Inadequate energy intake, resolved   New Nutrition Diagnosis: [X] not applicable     Education:  [X] not applicable     Interventions:   1)   2)  3)   [] Current medical condition precludes nutrition intervention at this time.     Monitor & Evaluate:  PO intake, tolerance to diet/supplement, nutrition related lab values, weight trends, BMs/GI distress, hydration status, skin integrity.    Constance Weaver RDN, CDN #[id number]  Also available on [employer] Teams CRITICAL CARE NUTRITION FOLLOW UP NOTE    Pt seen for nutrition follow up.     SOURCE: [X] Medical record     Medical Course:  - Per chart, pt is 59 year old female PMH neurofibromatosis, 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), s/p  shunt, s/p traumatic fall with head trauma and R SDH, s/p R decompressive hemicraniectomy, ICP monitor and R cranioplasty (5/2024), s/p L frontal Endoscopic third ventriculostomy and ligation of R  shunt with ligaclips with retention of valve, proximal and distal catheters (8/2024), anxiety/depression who initially presented to [facility] with witnessed seizure. Intubated for airway protection. CTH showed stable  shunt and moderate ventriculomegaly, slight increase in size of extra-axial CSF collection overlying the right frontal duraplasty, likely a hygroma. Transferred to [facility] for Neurosurgery evaluation. Neurosurgery following.     Diet Prescription:   - NPO with Tube Feed: [employer] Peptide 1.5 via Nasoduodenal tube at goal rate 35 mL/hr x24 hrs + Liquid Protein Supplement 3x daily    Nutrition Course:  - EN initiated (2/25), continues at goal rate. Current nutrition prescription provides a total of 840 mL formula, 1560 kcal (1260 kcal from formula, 300 kcal from modular), 107.16 gm protein (62.16 gm protein from formula, 45 gm protein from modular), 588 mL free water daily. No apparent signs of intolerance, last BM 2/23 per flowsheets. Bowel regimen added, currently ordered for senna syrup 10 mL qHS and Miralax 17 gm qD. Labs notable for hypokalemia, repleted.     Food Allergy/Intolerance:  - NKFA    Pertinent Medications:   - dexMEDEtomidine IV Continuous, fentaNYL IV Continuous, pantoprazole Injectable, phenylephrine IV Continuous, polyethylene glycol, senna Syrup     Pertinent Labs:   - (2/27) Na 140 mmol/L Glu 131 mg/dL[H] K+ 3.4 mmol/L[L] Cr 0.46 mg/dL[L] BUN 15 mg/dL Phos 2.7 mg/dL    Weight: (2/25) 138.6 lbs / 62.9 kg, (2/23 dosing) 146.6 lbs / 66.5 kg   Height: 63 in / 160.02 cm  IBW: 115 lbs / 52.3 kg +/-10%  BMI: 24.5 kg/m^2 (at lowest weight)    Physical Assessment, per flowsheets:  Edema: 1+ generalized, 2+ left arm/left wrist/left hand  Pressure Injury: none noted     Estimated Needs:   [X] No change since previous assessment, based on current body weight 138.6 lbs / 62.9 kg  4845-3636 kcal daily @20-25 kcal/kg, 75.48-94.35 gm protein daily @1.2-1.5 gm/kg     Previous Nutrition Diagnosis: [X] Inadequate energy intake, resolved   New Nutrition Diagnosis: [X] not applicable     Education:  [X] not applicable     Interventions:   1) Recommend [employer] Peptide 1.5 via tolerated route at goal rate 35 mL/hr x24 hrs + Liquid Protein Supplement 2x daily to provide a total of 840 mL formula, 1460 kcal (1260 kcal from formula, 200 kcal from modular), 92.16 gm protein (62.16 gm protein from formula, 30 gm protein from modular), 588 mL free water daily. This meets 23 kcal/kg, 1.5 gm protein/kg @ CBW 62.9 kg. Additional provision of free water per medical discretion.   2) Monitor BMs, adjust bowel regimen as appropriate.  3) Monitor electrolytes (K+, Mg, P) and replete to within desired limits as clinically indicated.   4) Obtain daily weights via tared bed scale.     Monitor & Evaluate:  Tolerance to diet/supplement, nutrition related lab values, weight trends, BMs/GI distress, hydration status, skin integrity.    Constance Weaver RDN, CDN #[id number]  Also available on [employer] Teams

## 2025-02-27 NOTE — PROGRESS NOTE ADULT - CRITICAL CARE ATTENDING COMMENT
Neurologic:   Q1 neurochecks  Sedated on precedex/propofol   Keppra 1g BID  Pain control: IV Tylenol, dilaudid PRN   MR head w CSF flow   VEEG     Respiratory:   Intubated 14/420/5/40    Cardiovascular:   MAP > 65  on levo for transfer -->weaned off    Gastrointestinal/Nutrition:   NPO    Renal/Genitourinary:   Monitor electrolytes   LR@100  storey     Hematologic:   Hep sub q     Infectious Disease:   Monitor WBC, fever curves   Zosyn (presumed aspiration pneumonia)   blood cx sent x2 ([place])   Endocrine:   Monitor glucose     Tubes/Lines/Drains:   PIV     Disposition: SICU
Neurologic:   - Q1h neurochecks  - VEEG on precedex, d/c prop, can add fent  - Keppra 1g BID  - Pain control: IV Tylenol, dilaudid PRN   - CTA head 2/24 wnl  - pending MR head w CSF flow     Respiratory:   - Intubated 14/450/5/40    Cardiovascular:   - MAP > 65  - on nicki     Gastrointestinal/Nutrition:   - TF    Renal/Genitourinary:   - Monitor electrolytes   - Dominguez d/c    Hematologic:   - Hep sub q     Infectious Disease:   - Monitor WBC, fever curves   - Zosyn (presumed aspiration pneumonia) 7/23/24 - 7/24/24  - blood cx sent x2 ([place])     Endocrine:   - Monitor glucose     Tubes/Lines/Drains:   - PIV  - Dominguez -> primafit    Disposition: SICU
Neurologic:   - Q1h neurochecks  - VEEG completed, no seizures   - wean precedex/fent  - Keppra 1g BID  - Pain control: IV Tylenol, dilaudid PRN   - CTA head 2/24 wnl  - pending MR head w CSF flow     Respiratory:   - Intubated 14/450/5/40 -> SBT 2/27    Cardiovascular:   - MAP > 65  - on nicki    Gastrointestinal/Nutrition:   - TF, hold for extub    Renal/Genitourinary:   - Monitor electrolytes   - Dominguez d/c    Hematologic:   - DVT PPx: SqH    Infectious Disease:   - Monitor WBC, fever curves   - Zosyn (presumed aspiration pneumonia) 7/23/24 - 7/24/24  - blood cx sent x2 ([place]) > NG @ 4d     Endocrine:   - Monitor glucose     Tubes/Lines/Drains:   - PIV  - Dominguez -> primafit    Disposition: SICU
Neurologic:   - Q1 neurochecks  - VEEG on precedex, d/c prop, can add fent ggt if needed to achieve sedation goal (RASS -4)  - pending MR head w CSF flow   - Keppra 1g BID  - Pain control: IV Tylenol, dilaudid PRN   - CTA head 2/24 wnl       Respiratory:   Intubated 14/450/5/40    Cardiovascular:   - MAP > 65  - Weaned off on levo (started for transfer)    Gastrointestinal/Nutrition:   -  NGT placed (2/25)  - Start tube feeds    Renal/Genitourinary:   - Monitor electrolytes   - LR@100 (d/c at TF initiation)  - storey d/c; start primafit     Hematologic:   - Hep sub q     Infectious Disease:   - Monitor WBC, fever curves   - Zosyn (presumed aspiration pneumonia -> CXR no PNA) (7/23-7/24)  - 2/22 BCx x2 ([place]) -> NGTD      Endocrine:   - Monitor glucose     Tubes/Lines/Drains:   - PIV   - NGT (2/25-)  - Prima fit  - Intubated    Disposition: SICU

## 2025-02-27 NOTE — PROGRESS NOTE ADULT - ASSESSMENT
58 yo F with PMHx of neurofibromatosis, s/p L suboccipital craniectomy for resection of cerebellar brain tumor 12/2023 by Dr Brush, s/p traumatic fall with head trauma in 5/20/2024, at that time sustained a large acute right SDH, and underwent an emergent right decompressive hemicraniectomy on 5/22/24, s/p right cranioplasty on 6/17/24 with custom plate, s/p ETV, ligation of shunt on the right with ligaclips with retention of valve as rescue device, presents as a transfer from [facility] for witnessed seizure at home. CTH obtained at [facility] revealed stable  shunt and moderate ventriculomegaly and slight increase in size of extra-axial CSF collection overlying the right frontal duraplasty, likely a hygroma and no mass effect.    2/24 Exam with decreased strength on left side, post ictal vs intracranial causes, will get MRI/MRA to assess and CTH/CTA if MRI will be delayed. On zosyn, white count downtrending.   2/25:  CTH/ CTA done stable , CTA negative, MRIw/flow/MRA pending, intubated /sedated, neurology saw Veeg-O, on keppra, K+3.3 repleted, afbrile, bld cltr NGTD, UA neg, WBC nml  2/26: stable exam,  Veeg on, keppra, mri w/flow/ mra pending  2/27: Stable exam. No seizures noted on VEEG. On keppra. MRI with CSF flow study and MRA ordered

## 2025-02-28 LAB
ANION GAP SERPL CALC-SCNC: 15 MMOL/L — HIGH (ref 7–14)
APPEARANCE CSF: ABNORMAL
APPEARANCE SPUN FLD: COLORLESS — SIGNIFICANT CHANGE UP
BACTERIAL AG PNL SER: 0 % — SIGNIFICANT CHANGE UP
BUN SERPL-MCNC: 10 MG/DL — SIGNIFICANT CHANGE UP (ref 7–23)
CALCIUM SERPL-MCNC: 9.3 MG/DL — SIGNIFICANT CHANGE UP (ref 8.4–10.5)
CHLORIDE SERPL-SCNC: 102 MMOL/L — SIGNIFICANT CHANGE UP (ref 98–107)
CO2 SERPL-SCNC: 21 MMOL/L — LOW (ref 22–31)
COLOR CSF: COLORLESS — SIGNIFICANT CHANGE UP
CREAT SERPL-MCNC: 0.3 MG/DL — LOW (ref 0.5–1.3)
CULTURE RESULTS: SIGNIFICANT CHANGE UP
CULTURE RESULTS: SIGNIFICANT CHANGE UP
EGFR: 122 ML/MIN/1.73M2 — SIGNIFICANT CHANGE UP
EGFR: 122 ML/MIN/1.73M2 — SIGNIFICANT CHANGE UP
EOSINOPHIL # CSF: 0 % — SIGNIFICANT CHANGE UP
GLUCOSE CSF-MCNC: 57 MG/DL — SIGNIFICANT CHANGE UP (ref 40–70)
GLUCOSE SERPL-MCNC: 101 MG/DL — HIGH (ref 70–99)
GRAM STN FLD: SIGNIFICANT CHANGE UP
HCT VFR BLD CALC: 41.1 % — SIGNIFICANT CHANGE UP (ref 34.5–45)
HGB BLD-MCNC: 13.7 G/DL — SIGNIFICANT CHANGE UP (ref 11.5–15.5)
LYMPHOCYTES # CSF: 60 % — SIGNIFICANT CHANGE UP
MAGNESIUM SERPL-MCNC: 1.8 MG/DL — SIGNIFICANT CHANGE UP (ref 1.6–2.6)
MCHC RBC-ENTMCNC: 26.2 PG — LOW (ref 27–34)
MCHC RBC-ENTMCNC: 33.3 G/DL — SIGNIFICANT CHANGE UP (ref 32–36)
MCV RBC AUTO: 78.7 FL — LOW (ref 80–100)
MONOS+MACROS NFR CSF: 40 % — SIGNIFICANT CHANGE UP
NEUTROPHILS # CSF: 0 % — SIGNIFICANT CHANGE UP
NRBC # BLD AUTO: 0 K/UL — SIGNIFICANT CHANGE UP (ref 0–0)
NRBC # FLD: 0 K/UL — SIGNIFICANT CHANGE UP (ref 0–0)
NRBC BLD AUTO-RTO: 0 /100 WBCS — SIGNIFICANT CHANGE UP (ref 0–0)
NRBC NFR CSF: 1 CELLS/UL — SIGNIFICANT CHANGE UP (ref 0–5)
OTHER CELLS CSF MANUAL: 0 % — SIGNIFICANT CHANGE UP
PHOSPHATE SERPL-MCNC: 2.6 MG/DL — SIGNIFICANT CHANGE UP (ref 2.5–4.5)
PLATELET # BLD AUTO: 149 K/UL — LOW (ref 150–400)
POTASSIUM SERPL-MCNC: 3.6 MMOL/L — SIGNIFICANT CHANGE UP (ref 3.5–5.3)
POTASSIUM SERPL-SCNC: 3.6 MMOL/L — SIGNIFICANT CHANGE UP (ref 3.5–5.3)
PROT CSF-MCNC: 56 MG/DL — HIGH (ref 15–45)
RBC # BLD: 5.22 M/UL — HIGH (ref 3.8–5.2)
RBC # CSF: 1 CELLS/UL — HIGH (ref 0–0)
RBC # FLD: 14 % — SIGNIFICANT CHANGE UP (ref 10.3–14.5)
SODIUM SERPL-SCNC: 138 MMOL/L — SIGNIFICANT CHANGE UP (ref 135–145)
SPECIMEN SOURCE: SIGNIFICANT CHANGE UP
TOTAL CELLS COUNTED, SPINAL FLUID: 5 CELLS — SIGNIFICANT CHANGE UP
TUBE TYPE: SIGNIFICANT CHANGE UP
WBC # BLD: 7.77 K/UL — SIGNIFICANT CHANGE UP (ref 3.8–10.5)
WBC # FLD AUTO: 7.77 K/UL — SIGNIFICANT CHANGE UP (ref 3.8–10.5)

## 2025-02-28 PROCEDURE — 71045 X-RAY EXAM CHEST 1 VIEW: CPT | Mod: 26

## 2025-02-28 PROCEDURE — 99232 SBSQ HOSP IP/OBS MODERATE 35: CPT | Mod: GC

## 2025-02-28 PROCEDURE — 70544 MR ANGIOGRAPHY HEAD W/O DYE: CPT | Mod: 26,59

## 2025-02-28 PROCEDURE — 70553 MRI BRAIN STEM W/O & W/DYE: CPT | Mod: 26

## 2025-02-28 RX ORDER — LIDOCAINE HYDROCHLORIDE 20 MG/ML
1 JELLY TOPICAL DAILY
Refills: 0 | Status: DISCONTINUED | OUTPATIENT
Start: 2025-02-28 | End: 2025-03-02

## 2025-02-28 RX ADMIN — HEPARIN SODIUM 5000 UNIT(S): 1000 INJECTION INTRAVENOUS; SUBCUTANEOUS at 05:19

## 2025-02-28 RX ADMIN — LIDOCAINE HYDROCHLORIDE 1 PATCH: 20 JELLY TOPICAL at 10:55

## 2025-02-28 RX ADMIN — MIRTAZAPINE 7.5 MILLIGRAM(S): 30 TABLET, FILM COATED ORAL at 11:43

## 2025-02-28 RX ADMIN — LIDOCAINE HYDROCHLORIDE 1 PATCH: 20 JELLY TOPICAL at 21:18

## 2025-02-28 RX ADMIN — Medication 3 MILLIGRAM(S): at 21:18

## 2025-02-28 RX ADMIN — Medication 1000 MILLIGRAM(S): at 05:35

## 2025-02-28 RX ADMIN — Medication 10 MILLILITER(S): at 21:18

## 2025-02-28 RX ADMIN — Medication 1000 MILLIGRAM(S): at 17:46

## 2025-02-28 RX ADMIN — Medication 1 APPLICATION(S): at 11:44

## 2025-02-28 RX ADMIN — LIDOCAINE HYDROCHLORIDE 1 PATCH: 20 JELLY TOPICAL at 07:00

## 2025-02-28 RX ADMIN — Medication 40 MILLIGRAM(S): at 11:44

## 2025-02-28 RX ADMIN — Medication 1000 MILLIGRAM(S): at 05:19

## 2025-02-28 RX ADMIN — HEPARIN SODIUM 5000 UNIT(S): 1000 INJECTION INTRAVENOUS; SUBCUTANEOUS at 14:00

## 2025-02-28 RX ADMIN — Medication 1000 MILLIGRAM(S): at 11:43

## 2025-02-28 RX ADMIN — SERTRALINE 50 MILLIGRAM(S): 100 TABLET, FILM COATED ORAL at 11:44

## 2025-02-28 RX ADMIN — LEVETIRACETAM 1000 MILLIGRAM(S): 10 INJECTION, SOLUTION INTRAVENOUS at 17:46

## 2025-02-28 RX ADMIN — LEVETIRACETAM 1000 MILLIGRAM(S): 10 INJECTION, SOLUTION INTRAVENOUS at 05:19

## 2025-02-28 RX ADMIN — Medication 1000 MILLIGRAM(S): at 23:10

## 2025-02-28 NOTE — PROGRESS NOTE ADULT - SUBJECTIVE AND OBJECTIVE BOX
PAST 24HR EVENTS: MRI done -- increase in R ED collection with diffusion restriction, possible infection? Shunt tapped (proximal shunt existing acting as ommaya) CSF sent. Planning for OR tomorrow for evacuation of fluid collection      Vital Signs Last 24 Hrs  T(C): 36.9 (28 Feb 2025 16:00), Max: 37.6 (27 Feb 2025 20:00)  T(F): 98.4 (28 Feb 2025 16:00), Max: 99.6 (27 Feb 2025 20:00)  HR: 98 (28 Feb 2025 16:00) (80 - 113)  BP: 129/68 (28 Feb 2025 16:00) (94/68 - 150/96)  BP(mean): 84 (28 Feb 2025 16:00) (71 - 112)  RR: 23 (28 Feb 2025 16:00) (13 - 24)  SpO2: 92% (28 Feb 2025 16:00) (92% - 100%)    Parameters below as of 28 Feb 2025 12:00  Patient On (Oxygen Delivery Method): room air    MEDS:   acetaminophen     Tablet .. 1000 milliGRAM(s) Oral every 6 hours  albuterol/ipratropium for Nebulization 3 milliLiter(s) Nebulizer every 30 minutes  chlorhexidine 2% Cloths 1 Application(s) Topical daily  heparin   Injectable 5000 Unit(s) SubCutaneous every 8 hours  levETIRAcetam   Injectable 1000 milliGRAM(s) IV Push every 12 hours  melatonin 3 milliGRAM(s) Oral at bedtime  mirtazapine 7.5 milliGRAM(s) Oral daily  pantoprazole  Injectable 40 milliGRAM(s) IV Push daily  polyethylene glycol 3350 17 Gram(s) Oral daily  senna Syrup 10 milliLiter(s) Oral at bedtime  sertraline 50 milliGRAM(s) Oral daily      LABS:                        13.7   7.77  )-----------( 149      ( 28 Feb 2025 01:00 )             41.1     02-28    138  |  102  |  10  ----------------------------<  101[H]  3.6   |  21[L]  |  0.30[L]    Ca    9.3      28 Feb 2025 01:00  Phos  2.6     02-28  Mg     1.80     02-28    RADIOLOGY:   < from: MR Angio Head No Cont (02.28.25 @ 15:38) >  There has been a right frontal craniotomy. There is an epidural fluid   collection beneath the craniotomy flap which is larger when compared with   11/6/2024. This collection measures 17 mm in depth compared to the   previous of 3.4 mm. There is mild peripheral enhancement. The collection   does not suppress with T2 FLAIR images and demonstrates diffusion   restriction. In view of these factors and directed collection should be   considered. A tiny right parietal subdural collection is identified with   mild dural     Encephalomalacia and gliosis in the left frontal cortex is similar   compared to the previous exam. There is less enhancement along the   posterior aspect of the surgical cavity.    A right frontal ventricular catheter is identified with its tip in the   right frontal horn. The ventricles are enlarged but are unchanged since   the priorexam.    Signal dropout overlying the right frontal region is related to the   presence of a programmable shunt. Defect in the floor of the third   ventricle is identified consistent with a third ventriculostomy which is   patent on sagittal cine flow study.    An old left cerebellar infarct is identified. Nonspecific signal   hyperintensity in the cerebellum in the region of the dentate nucleus may   be related to neurofibromatosis hamartomas. No acute infarcts are seen.   No abnormal enhancing lesions.      Cervical, petrous, cavernous and supraclinoid internal carotid arteries,   anterior and middle cerebral artery branches are unremarkable. There is   no evidence of aneurysm, stenosis, or vasculitis. The distal vertebral   arteries, and region of the vertebral basilar confluence are   unremarkable. The basilar artery, superior cerebellar arteries and   posterior cerebral arteries are unremarkable.    IMPRESSION: Right frontal peripherally enhancing epidural collection   beneath the right frontal cranioplasty has reaccumulated since 10/6/2024,   with diffusion restriction suspicious for an infected collection. Right   frontal encephalomalacia and gliosis has evolved since the prior exam.   Old left cerebellar infarct. Patent third ventriculostomy. Right frontal   ventricular catheter.    Normal intracranial MR angiography.      PHYSICAL EXAM:  AAO to self, hospital, month and year  PERRLA, EOMI  CN 2-12 grossly intact  CLARK strength 5/5  SILT

## 2025-02-28 NOTE — PROGRESS NOTE ADULT - PROBLEM SELECTOR PLAN 1
- OR tomorrow NPO after midnight on IVF  - F/U CSF CX   - consented HCP salvador via telephone   - cbc bmp coags type/screen x2  - HOLD SQH TONIGHT   - Q1 hr neuro checks overnight     [id number]    Case discussed with attending neurosurgeon Dr. Brush

## 2025-02-28 NOTE — PROGRESS NOTE ADULT - SUBJECTIVE AND OBJECTIVE BOX
S/P extubation  Patient mildly confused and restless  ICU Vital Signs Last 24 Hrs  T(C): 37.6 (27 Feb 2025 20:00), Max: 39.1 (27 Feb 2025 08:00)  T(F): 99.6 (27 Feb 2025 20:00), Max: 102.3 (27 Feb 2025 08:00)  HR: 92 (28 Feb 2025 02:00) (66 - 113)  BP: 118/63 (28 Feb 2025 02:00) (97/73 - 150/96)  BP(mean): 79 (28 Feb 2025 02:00) (65 - 112)  ABP: --  ABP(mean): --  RR: 16 (28 Feb 2025 02:00) (13 - 20)  SpO2: 99% (28 Feb 2025 02:00) (94% - 100%)    O2 Parameters below as of 28 Feb 2025 00:00  Patient On (Oxygen Delivery Method): nasal cannula  O2 Flow (L/min): 2    AAO to self, hospital, month and year  JESUS SANCHEZ  CN 2-12 grossly intact  CLARK strength 5/5  SILT    MEDICATIONS  (STANDING):  acetaminophen     Tablet .. 1000 milliGRAM(s) Oral every 6 hours  albuterol/ipratropium for Nebulization 3 milliLiter(s) Nebulizer every 30 minutes  chlorhexidine 2% Cloths 1 Application(s) Topical daily  heparin   Injectable 5000 Unit(s) SubCutaneous every 8 hours  levETIRAcetam   Injectable 1000 milliGRAM(s) IV Push every 12 hours  melatonin 3 milliGRAM(s) Oral at bedtime  mirtazapine 7.5 milliGRAM(s) Oral daily  pantoprazole  Injectable 40 milliGRAM(s) IV Push daily  polyethylene glycol 3350 17 Gram(s) Oral daily  senna Syrup 10 milliLiter(s) Oral at bedtime  sertraline 50 milliGRAM(s) Oral daily    MEDICATIONS  (PRN):                          13.7   7.77  )-----------( 149      ( 28 Feb 2025 01:00 )             41.1     02-28    138  |  102  |  10  ----------------------------<  101[H]  3.6   |  21[L]  |  0.30[L]    Ca    9.3      28 Feb 2025 01:00  Phos  2.6     02-28  Mg     1.80     02-28

## 2025-02-28 NOTE — PROGRESS NOTE ADULT - ASSESSMENT
59 year old female with PMHx of neurofibromatosis, 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), s/p  shunt (in [facility]), s/p traumatic fall with head trauma and R SDH, s/p R decompressive hemicraniectomy, ICP monitor and R cranioplasty (5/2024), s/p L frontal Endoscopic third ventriculostomy and ligation of R  shunt with ligaclips with retention of valve, proximal and distal catheters (08/2024), Anxiety and depression presents to the ED at [place] with status epilepticus. CTH showed stable  shunt and moderate ventriculomegaly, slight increase in size of extra-axial CSF collection overlying the right frontal duraplasty, likely a hygroma. There is no mass effect. Transferred to [facility] for Neurosurgery evaluation, r/o infective etiology. SICU consulted for vent management & Q1 neurochecks. VEEG showed no seizure activity.    Neurologic:   - Q1h neurochecks  - VEEG completed, no seizures   - wean precedex/fent  - Keppra 1g BID  - Pain control: IV Tylenol, dilaudid PRN   - CTA head 2/24 wnl  - pending MR head w CSF flow     Respiratory:   - Extubated 2/27  - on RA    Cardiovascular:   - MAP > 65      Gastrointestinal/Nutrition:   - Regular diet    Renal/Genitourinary:   - Monitor electrolytes     Hematologic:   - DVT PPx: SqH    Infectious Disease:   - Monitor WBC, fever curves   - Zosyn (presumed aspiration pneumonia) 7/23/24 - 7/24/24  - blood cx sent x2 ([place]) > NG @ 4d     Endocrine:   - Monitor glucose     Tubes/Lines/Drains:   - PIV  - Dominguez -> primafit    Disposition: SICU   59 year old female with PMHx of neurofibromatosis, 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), s/p  shunt (in [facility]), s/p traumatic fall with head trauma and R SDH, s/p R decompressive hemicraniectomy, ICP monitor and R cranioplasty (5/2024), s/p L frontal Endoscopic third ventriculostomy and ligation of R  shunt with ligaclips with retention of valve, proximal and distal catheters (08/2024), Anxiety and depression presents to the ED at [place] with status epilepticus. CTH showed stable  shunt and moderate ventriculomegaly, slight increase in size of extra-axial CSF collection overlying the right frontal duraplasty, likely a hygroma. There is no mass effect. Transferred to [facility] for Neurosurgery evaluation, r/o infective etiology. SICU consulted for vent management & Q1 neurochecks. VEEG showed no seizure activity.    Neurologic:   - Q2h neurochecks  - VEEG completed, no seizures   - Keppra 1g BID  - Pain control: Tylenol PRN  - CTA head 2/24 wnl  - pending MR head w CSF flow     Respiratory:   - Extubated 2/27  - on RA    Cardiovascular:   - MAP > 65      Gastrointestinal/Nutrition:   - Regular diet    Renal/Genitourinary:   - Monitor electrolytes     Hematologic:   - DVT PPx: SqH    Infectious Disease:   - Monitor WBC, fever curves   - Zosyn (presumed aspiration pneumonia) 7/23/24 - 7/24/24  - blood cx sent x2 ([place]) > NG @ 4d   - Blood clx (7/26/24) - NGTD    Endocrine:   - Monitor glucose     Tubes/Lines/Drains:   - PIV  - Dominguez -> primafit    Disposition: SICU

## 2025-02-28 NOTE — PROGRESS NOTE ADULT - PROBLEM SELECTOR PLAN 1
Neurologic checks Q1H  Optimize electrolytes  Continue keppra  Follow up MRI with CSF flow study and MRA

## 2025-02-28 NOTE — PHYSICAL THERAPY INITIAL EVALUATION ADULT - PERTINENT HX OF CURRENT PROBLEM, REHAB EVAL
60 yo F with PMHx of neurofibromatosis, status post Left suboccipital craniectomy for resection of cerebellar brain tumor 12/2023 by Dr Brush, s/p traumatic fall with head trauma in 5/20/2024, at that time sustained a large acute right SDH, and underwent an emergent right decompressive hemicraniectomy on 5/22/24, status post right cranioplasty on 6/17/24 with custom plate, status post Endoscopic Third Ventriculostomy, ligation of shunt on the right with ligaclips with retention of valve as rescue device, presents as a transfer from [facility] for witnessed seizure at home. CTH obtained at [facility] revealed stable  shunt and moderate ventriculomegaly and slight increase in size of extra-axial CSF collection overlying the right frontal duraplasty, likely a hygroma and no mass effect.

## 2025-02-28 NOTE — PROGRESS NOTE ADULT - ASSESSMENT
60 yo F with PMHx of neurofibromatosis, s/p L suboccipital craniectomy for resection of cerebellar brain tumor 12/2023 by Dr Brush, s/p traumatic fall with head trauma in 5/20/2024, at that time sustained a large acute right SDH, and underwent an emergent right decompressive hemicraniectomy on 5/22/24, s/p right cranioplasty on 6/17/24 with custom plate, s/p ETV, ligation of shunt on the right with ligaclips with retention of valve as rescue device, presents as a transfer from [facility] for witnessed seizure at home. CTH obtained at [facility] revealed stable  shunt and moderate ventriculomegaly and slight increase in size of extra-axial CSF collection overlying the right frontal duraplasty, likely a hygroma and no mass effect.    2/24 Exam with decreased strength on left side, post ictal vs intracranial causes, will get MRI/MRA to assess and CTH/CTA if MRI will be delayed. On zosyn, white count downtrending.   2/25:  CTH/ CTA done stable , CTA negative, MRIw/flow/MRA pending, intubated /sedated, neurology saw Veeg-O, on keppra, K+3.3 repleted, afbrile, bld cltr NGTD, UA neg, WBC nml  2/26: stable exam,  Veeg on, keppra, mri w/flow/ mra pending  2/27: Stable exam. No seizures noted on VEEG. On keppra. MRI with CSF flow study and MRA ordered  2/28: Extubated, alert, mildly confused and restless, nonfocal exam. On keppra. MRI with CSF flow study and MRA ordered

## 2025-02-28 NOTE — PROCEDURE NOTE - ADDITIONAL PROCEDURE DETAILS
Shunt tapped under sterile technique using 23G butterfly needle, CSF aspirated without issue. 5cc sent for routine testing. Patient tolerated procedure well.

## 2025-02-28 NOTE — PROGRESS NOTE ADULT - ASSESSMENT
60 yo F with PMHx of neurofibromatosis, s/p L suboccipital craniectomy for resection of cerebellar brain tumor 12/2023 by Dr Brush, s/p traumatic fall with head trauma in 5/20/2024, at that time sustained a large acute right SDH, and underwent an emergent right decompressive hemicraniectomy on 5/22/24, s/p right cranioplasty on 6/17/24 with custom plate, s/p ETV, ligation of shunt on the right with ligaclips with retention of valve as rescue device, presents as a transfer from [facility] for witnessed seizure at home. CTH obtained at [facility] revealed stable  shunt and moderate ventriculomegaly and slight increase in size of extra-axial CSF collection overlying the right frontal duraplasty, likely a hygroma and no mass effect.    2/24 Exam with decreased strength on left side, post ictal vs intracranial causes, will get MRI/MRA to assess and CTH/CTA if MRI will be delayed. On zosyn, white count downtrending.   2/25:  CTH/ CTA done stable , CTA negative, MRIw/flow/MRA pending, intubated /sedated, neurology saw Veeg-O, on keppra, K+3.3 repleted, afbrile, bld cltr NGTD, UA neg, WBC nml  2/26: stable exam,  Veeg on, keppra, mri w/flow/ mra pending  2/27: Stable exam. No seizures noted on VEEG. On keppra. MRI with CSF flow study and MRA ordered  2/28: Extubated, alert, mildly confused and restless, nonfocal exam. On keppra. MRI done -- increase in R ED collection with diffusion restriction, possible infection? Shunt tapped (proximal shunt existing acting as ommaya) CSF sent. OR tomorrow for ED washout

## 2025-02-28 NOTE — PROGRESS NOTE ADULT - SUBJECTIVE AND OBJECTIVE BOX
SICU Daily Progress Note  =====================================================  Interval/Overnight Events:   -Pass S&S > advanced to regular diet   -Pending MR head w CSF flow   -off nicki       MEDICATIONS:   --------------------------------------------------------------------------------------  acetaminophen     Tablet .. 1000 milliGRAM(s) Oral every 6 hours  albuterol/ipratropium for Nebulization 3 milliLiter(s) Nebulizer every 30 minutes  chlorhexidine 2% Cloths 1 Application(s) Topical daily  dexMEDEtomidine Infusion 0.2 MICROgram(s)/kG/Hr IV Continuous <Continuous>  heparin   Injectable 5000 Unit(s) SubCutaneous every 8 hours  levETIRAcetam   Injectable 1000 milliGRAM(s) IV Push every 12 hours  melatonin 3 milliGRAM(s) Oral at bedtime  mirtazapine 7.5 milliGRAM(s) Oral daily  pantoprazole  Injectable 40 milliGRAM(s) IV Push daily  phenylephrine    Infusion 0.1 MICROgram(s)/kG/Min IV Continuous <Continuous>  polyethylene glycol 3350 17 Gram(s) Oral daily  senna Syrup 10 milliLiter(s) Oral at bedtime  sertraline 50 milliGRAM(s) Oral daily    --------------------------------------------------------------------------------------    VITAL SIGNS, INS/OUTS (last 24 hours):  --------------------------------------------------------------------------------------  T(C): 37.6 (02-27-25 @ 20:00), Max: 39.1 (02-27-25 @ 08:00)  HR: 92 (02-28-25 @ 01:00) (65 - 113)  BP: 111/70 (02-28-25 @ 01:00) (97/73 - 150/96)  RR: 19 (02-28-25 @ 01:00) (13 - 20)  SpO2: 99% (02-28-25 @ 01:00) (94% - 100%)  Mode: CPAP with PS, FiO2: 40, PEEP: 5, PS: 5    02-26-25 @ 07:01  -  02-27-25 @ 07:00  --------------------------------------------------------  IN: 2026.5 mL / OUT: 600 mL / NET: 1426.5 mL    02-27-25 @ 07:01  -  02-28-25 @ 01:50  --------------------------------------------------------  IN: 301.7 mL / OUT: 900 mL / NET: -598.3 mL      --------------------------------------------------------------------------------------    EXAM  NEUROLOGY  Exam: Normal, NAD, alert, oriented x3    HEENT  Exam: Normocephalic, heal craniotomy scar     RESPIRATORY  Exam: Normal expansion/effort    CARDIOVASCULAR  Exam: Regular rate, normotensive    GI/NUTRITION  Exam: Abdomen soft, Non-tender, Non-distended    VASCULAR  Exam: Extremities warm, pink, well-perfused    MUSCULOSKELETAL  Exam: All extremities moving spontaneously, decrease movement in LUE     SKIN  Exam: Good skin turgor, no skin breakdown    TUBES/LINES/DRAINS  [x] Peripheral IV  [] Central Venous Line     	[] R	[] L	[] IJ	[] Fem	[] SC	Date Placed:   [] Arterial Line		[] R	[] L	[] Fem	[] Rad	[] Ax	Date Placed:   [] PICC		[] Midline		[] Mediport  [] Urinary Catheter		Date Placed:     LABS  --------------------------------------------------------------------------------------    --------------------------------------------------------------------------------------    IMAGING STUDIES:

## 2025-03-01 ENCOUNTER — TRANSCRIPTION ENCOUNTER (OUTPATIENT)
Age: 60
End: 2025-03-01

## 2025-03-01 DIAGNOSIS — G06.2 EXTRADURAL AND SUBDURAL ABSCESS, UNSPECIFIED: ICD-10-CM

## 2025-03-01 LAB
ANION GAP SERPL CALC-SCNC: 13 MMOL/L — SIGNIFICANT CHANGE UP (ref 7–14)
ANION GAP SERPL CALC-SCNC: 14 MMOL/L — SIGNIFICANT CHANGE UP (ref 7–14)
ANION GAP SERPL CALC-SCNC: 15 MMOL/L — HIGH (ref 7–14)
APTT BLD: 40.4 SEC — HIGH (ref 24.5–35.6)
APTT BLD: 40.9 SEC — HIGH (ref 24.5–35.6)
B PERT IGG+IGM PNL SER: ABNORMAL
BLOOD GAS ARTERIAL - LYTES,HGB,ICA,LACT RESULT: SIGNIFICANT CHANGE UP
BLOOD GAS ARTERIAL - LYTES,HGB,ICA,LACT RESULT: SIGNIFICANT CHANGE UP
BUN SERPL-MCNC: 11 MG/DL — SIGNIFICANT CHANGE UP (ref 7–23)
BUN SERPL-MCNC: 12 MG/DL — SIGNIFICANT CHANGE UP (ref 7–23)
BUN SERPL-MCNC: 8 MG/DL — SIGNIFICANT CHANGE UP (ref 7–23)
CALCIUM SERPL-MCNC: 9.1 MG/DL — SIGNIFICANT CHANGE UP (ref 8.4–10.5)
CALCIUM SERPL-MCNC: 9.2 MG/DL — SIGNIFICANT CHANGE UP (ref 8.4–10.5)
CALCIUM SERPL-MCNC: 9.7 MG/DL — SIGNIFICANT CHANGE UP (ref 8.4–10.5)
CHLORIDE SERPL-SCNC: 102 MMOL/L — SIGNIFICANT CHANGE UP (ref 98–107)
CO2 SERPL-SCNC: 23 MMOL/L — SIGNIFICANT CHANGE UP (ref 22–31)
CO2 SERPL-SCNC: 25 MMOL/L — SIGNIFICANT CHANGE UP (ref 22–31)
CO2 SERPL-SCNC: 25 MMOL/L — SIGNIFICANT CHANGE UP (ref 22–31)
COLOR FLD: ABNORMAL
CREAT SERPL-MCNC: 0.28 MG/DL — LOW (ref 0.5–1.3)
CREAT SERPL-MCNC: 0.3 MG/DL — LOW (ref 0.5–1.3)
CREAT SERPL-MCNC: 0.36 MG/DL — LOW (ref 0.5–1.3)
EGFR: 117 ML/MIN/1.73M2 — SIGNIFICANT CHANGE UP
EGFR: 117 ML/MIN/1.73M2 — SIGNIFICANT CHANGE UP
EGFR: 122 ML/MIN/1.73M2 — SIGNIFICANT CHANGE UP
EGFR: 122 ML/MIN/1.73M2 — SIGNIFICANT CHANGE UP
EGFR: 124 ML/MIN/1.73M2 — SIGNIFICANT CHANGE UP
EGFR: 124 ML/MIN/1.73M2 — SIGNIFICANT CHANGE UP
EOSINOPHIL # FLD: 0 % — SIGNIFICANT CHANGE UP
FLUAV AG NPH QL: SIGNIFICANT CHANGE UP
FLUBV AG NPH QL: SIGNIFICANT CHANGE UP
FOLATE+VIT B12 SERBLD-IMP: 0 % — SIGNIFICANT CHANGE UP
GLUCOSE FLD-MCNC: <5 MG/DL — SIGNIFICANT CHANGE UP
GLUCOSE SERPL-MCNC: 111 MG/DL — HIGH (ref 70–99)
GLUCOSE SERPL-MCNC: 124 MG/DL — HIGH (ref 70–99)
GLUCOSE SERPL-MCNC: 130 MG/DL — HIGH (ref 70–99)
GRAM STN FLD: SIGNIFICANT CHANGE UP
HCT VFR BLD CALC: 40.6 % — SIGNIFICANT CHANGE UP (ref 34.5–45)
HCT VFR BLD CALC: 44.2 % — SIGNIFICANT CHANGE UP (ref 34.5–45)
HGB BLD-MCNC: 13.8 G/DL — SIGNIFICANT CHANGE UP (ref 11.5–15.5)
HGB BLD-MCNC: 14.3 G/DL — SIGNIFICANT CHANGE UP (ref 11.5–15.5)
INR BLD: 1.05 RATIO — SIGNIFICANT CHANGE UP (ref 0.85–1.16)
INR BLD: 1.12 RATIO — SIGNIFICANT CHANGE UP (ref 0.85–1.16)
LYMPHOCYTES # FLD: 4 % — SIGNIFICANT CHANGE UP
MAGNESIUM SERPL-MCNC: 1.7 MG/DL — SIGNIFICANT CHANGE UP (ref 1.6–2.6)
MAGNESIUM SERPL-MCNC: 1.8 MG/DL — SIGNIFICANT CHANGE UP (ref 1.6–2.6)
MAGNESIUM SERPL-MCNC: 1.9 MG/DL — SIGNIFICANT CHANGE UP (ref 1.6–2.6)
MCHC RBC-ENTMCNC: 25.6 PG — LOW (ref 27–34)
MCHC RBC-ENTMCNC: 26.5 PG — LOW (ref 27–34)
MCHC RBC-ENTMCNC: 32.4 G/DL — SIGNIFICANT CHANGE UP (ref 32–36)
MCHC RBC-ENTMCNC: 34 G/DL — SIGNIFICANT CHANGE UP (ref 32–36)
MCV RBC AUTO: 77.9 FL — LOW (ref 80–100)
MCV RBC AUTO: 79.2 FL — LOW (ref 80–100)
MESOTHL CELL # FLD: 0 % — SIGNIFICANT CHANGE UP
MONOS+MACROS # FLD: 1 % — SIGNIFICANT CHANGE UP
NEUTROPHILS-BODY FLUID: 95 % — SIGNIFICANT CHANGE UP
NIGHT BLUE STAIN TISS: SIGNIFICANT CHANGE UP
NRBC # BLD AUTO: 0 K/UL — SIGNIFICANT CHANGE UP (ref 0–0)
NRBC # BLD AUTO: 0 K/UL — SIGNIFICANT CHANGE UP (ref 0–0)
NRBC # FLD: 0 K/UL — SIGNIFICANT CHANGE UP (ref 0–0)
NRBC # FLD: 0 K/UL — SIGNIFICANT CHANGE UP (ref 0–0)
NRBC BLD AUTO-RTO: 0 /100 WBCS — SIGNIFICANT CHANGE UP (ref 0–0)
NRBC BLD AUTO-RTO: 0 /100 WBCS — SIGNIFICANT CHANGE UP (ref 0–0)
OTHER CELLS FLD MANUAL: 0 % — SIGNIFICANT CHANGE UP
PHOSPHATE SERPL-MCNC: 2.7 MG/DL — SIGNIFICANT CHANGE UP (ref 2.5–4.5)
PHOSPHATE SERPL-MCNC: 3 MG/DL — SIGNIFICANT CHANGE UP (ref 2.5–4.5)
PHOSPHATE SERPL-MCNC: 3.4 MG/DL — SIGNIFICANT CHANGE UP (ref 2.5–4.5)
PLATELET # BLD AUTO: 158 K/UL — SIGNIFICANT CHANGE UP (ref 150–400)
PLATELET # BLD AUTO: 222 K/UL — SIGNIFICANT CHANGE UP (ref 150–400)
POTASSIUM SERPL-MCNC: 3.2 MMOL/L — LOW (ref 3.5–5.3)
POTASSIUM SERPL-MCNC: 3.5 MMOL/L — SIGNIFICANT CHANGE UP (ref 3.5–5.3)
POTASSIUM SERPL-MCNC: 4.3 MMOL/L — SIGNIFICANT CHANGE UP (ref 3.5–5.3)
POTASSIUM SERPL-SCNC: 3.2 MMOL/L — LOW (ref 3.5–5.3)
POTASSIUM SERPL-SCNC: 3.5 MMOL/L — SIGNIFICANT CHANGE UP (ref 3.5–5.3)
POTASSIUM SERPL-SCNC: 4.3 MMOL/L — SIGNIFICANT CHANGE UP (ref 3.5–5.3)
PROT FLD-MCNC: 4.4 G/DL — SIGNIFICANT CHANGE UP
PROTHROM AB SERPL-ACNC: 12.2 SEC — SIGNIFICANT CHANGE UP (ref 9.9–13.4)
PROTHROM AB SERPL-ACNC: 13 SEC — SIGNIFICANT CHANGE UP (ref 9.9–13.4)
RBC # BLD: 5.21 M/UL — HIGH (ref 3.8–5.2)
RBC # BLD: 5.58 M/UL — HIGH (ref 3.8–5.2)
RBC # FLD: 14.1 % — SIGNIFICANT CHANGE UP (ref 10.3–14.5)
RBC # FLD: 14.4 % — SIGNIFICANT CHANGE UP (ref 10.3–14.5)
RCV VOL RI: HIGH CELLS/UL (ref 0–5)
RSV RNA NPH QL NAA+NON-PROBE: SIGNIFICANT CHANGE UP
SARS-COV-2 RNA SPEC QL NAA+PROBE: SIGNIFICANT CHANGE UP
SODIUM SERPL-SCNC: 139 MMOL/L — SIGNIFICANT CHANGE UP (ref 135–145)
SODIUM SERPL-SCNC: 140 MMOL/L — SIGNIFICANT CHANGE UP (ref 135–145)
SODIUM SERPL-SCNC: 142 MMOL/L — SIGNIFICANT CHANGE UP (ref 135–145)
SPECIMEN SOURCE FLD: SIGNIFICANT CHANGE UP
SPECIMEN SOURCE: SIGNIFICANT CHANGE UP
SPECIMEN SOURCE: SIGNIFICANT CHANGE UP
TOTAL CELLS COUNTED, BODY FLUID: 100 CELLS — SIGNIFICANT CHANGE UP
TOTAL NUCLEATED CELL COUNT, BODY FLUID: HIGH CELLS/UL (ref 0–5)
WBC # BLD: 15.64 K/UL — HIGH (ref 3.8–10.5)
WBC # BLD: 8.28 K/UL — SIGNIFICANT CHANGE UP (ref 3.8–10.5)
WBC # FLD AUTO: 15.64 K/UL — HIGH (ref 3.8–10.5)
WBC # FLD AUTO: 8.28 K/UL — SIGNIFICANT CHANGE UP (ref 3.8–10.5)

## 2025-03-01 PROCEDURE — 99223 1ST HOSP IP/OBS HIGH 75: CPT | Mod: GC

## 2025-03-01 PROCEDURE — 71045 X-RAY EXAM CHEST 1 VIEW: CPT | Mod: 26

## 2025-03-01 PROCEDURE — G0545: CPT

## 2025-03-01 PROCEDURE — 99291 CRITICAL CARE FIRST HOUR: CPT | Mod: 25

## 2025-03-01 DEVICE — SURGICEL 2 X 14": Type: IMPLANTABLE DEVICE | Site: RIGHT | Status: FUNCTIONAL

## 2025-03-01 RX ORDER — VANCOMYCIN HCL IN 5 % DEXTROSE 1.5G/250ML
PLASTIC BAG, INJECTION (ML) INTRAVENOUS
Refills: 0 | Status: DISCONTINUED | OUTPATIENT
Start: 2025-03-01 | End: 2025-03-03

## 2025-03-01 RX ORDER — DEXAMETHASONE 0.5 MG/1
10 TABLET ORAL ONCE
Refills: 0 | Status: COMPLETED | OUTPATIENT
Start: 2025-03-01 | End: 2025-03-01

## 2025-03-01 RX ORDER — CEFEPIME 2 G/20ML
2000 INJECTION, POWDER, FOR SOLUTION INTRAVENOUS EVERY 8 HOURS
Refills: 0 | Status: DISCONTINUED | OUTPATIENT
Start: 2025-03-01 | End: 2025-03-18

## 2025-03-01 RX ORDER — EPINEPHRINE 11.25MG/ML
0.5 SOLUTION, NON-ORAL INHALATION ONCE
Refills: 0 | Status: COMPLETED | OUTPATIENT
Start: 2025-03-01 | End: 2025-03-01

## 2025-03-01 RX ORDER — CEFEPIME 2 G/20ML
INJECTION, POWDER, FOR SOLUTION INTRAVENOUS
Refills: 0 | Status: DISCONTINUED | OUTPATIENT
Start: 2025-03-01 | End: 2025-03-18

## 2025-03-01 RX ORDER — HYDROMORPHONE/SOD CHLOR,ISO/PF 2 MG/10 ML
0.2 SYRINGE (ML) INJECTION EVERY 4 HOURS
Refills: 0 | Status: DISCONTINUED | OUTPATIENT
Start: 2025-03-01 | End: 2025-03-02

## 2025-03-01 RX ORDER — MAGNESIUM SULFATE 500 MG/ML
2 SYRINGE (ML) INJECTION ONCE
Refills: 0 | Status: COMPLETED | OUTPATIENT
Start: 2025-03-01 | End: 2025-03-01

## 2025-03-01 RX ORDER — DEXMEDETOMIDINE HYDROCHLORIDE IN SODIUM CHLORIDE 4 UG/ML
0.5 INJECTION INTRAVENOUS
Qty: 400 | Refills: 0 | Status: DISCONTINUED | OUTPATIENT
Start: 2025-03-01 | End: 2025-03-03

## 2025-03-01 RX ORDER — HYDROMORPHONE/SOD CHLOR,ISO/PF 2 MG/10 ML
0.5 SYRINGE (ML) INJECTION EVERY 4 HOURS
Refills: 0 | Status: DISCONTINUED | OUTPATIENT
Start: 2025-03-01 | End: 2025-03-02

## 2025-03-01 RX ORDER — CEFEPIME 2 G/20ML
2000 INJECTION, POWDER, FOR SOLUTION INTRAVENOUS ONCE
Refills: 0 | Status: COMPLETED | OUTPATIENT
Start: 2025-03-01 | End: 2025-03-01

## 2025-03-01 RX ORDER — CEFAZOLIN SODIUM IN 0.9 % NACL 3 G/100 ML
2000 INTRAVENOUS SOLUTION, PIGGYBACK (ML) INTRAVENOUS EVERY 8 HOURS
Refills: 0 | Status: DISCONTINUED | OUTPATIENT
Start: 2025-03-01 | End: 2025-03-01

## 2025-03-01 RX ORDER — SODIUM CHLORIDE 9 G/1000ML
1000 INJECTION, SOLUTION INTRAVENOUS
Refills: 0 | Status: DISCONTINUED | OUTPATIENT
Start: 2025-03-01 | End: 2025-03-02

## 2025-03-01 RX ORDER — DEXAMETHASONE 0.5 MG/1
10 TABLET ORAL EVERY 6 HOURS
Refills: 0 | Status: COMPLETED | OUTPATIENT
Start: 2025-03-01 | End: 2025-03-02

## 2025-03-01 RX ORDER — PROPOFOL 10 MG/ML
50 INJECTION, EMULSION INTRAVENOUS
Qty: 1000 | Refills: 0 | Status: DISCONTINUED | OUTPATIENT
Start: 2025-03-01 | End: 2025-03-01

## 2025-03-01 RX ORDER — VANCOMYCIN HCL IN 5 % DEXTROSE 1.5G/250ML
1000 PLASTIC BAG, INJECTION (ML) INTRAVENOUS ONCE
Refills: 0 | Status: COMPLETED | OUTPATIENT
Start: 2025-03-01 | End: 2025-03-01

## 2025-03-01 RX ORDER — METRONIDAZOLE 250 MG
500 TABLET ORAL EVERY 8 HOURS
Refills: 0 | Status: DISCONTINUED | OUTPATIENT
Start: 2025-03-01 | End: 2025-03-18

## 2025-03-01 RX ORDER — PROPOFOL 10 MG/ML
5 INJECTION, EMULSION INTRAVENOUS
Qty: 1000 | Refills: 0 | Status: DISCONTINUED | OUTPATIENT
Start: 2025-03-01 | End: 2025-03-03

## 2025-03-01 RX ORDER — HYDROMORPHONE/SOD CHLOR,ISO/PF 2 MG/10 ML
0.5 SYRINGE (ML) INJECTION ONCE
Refills: 0 | Status: DISCONTINUED | OUTPATIENT
Start: 2025-03-01 | End: 2025-03-01

## 2025-03-01 RX ORDER — ACETAMINOPHEN 500 MG/5ML
1000 LIQUID (ML) ORAL EVERY 6 HOURS
Refills: 0 | Status: COMPLETED | OUTPATIENT
Start: 2025-03-01 | End: 2025-03-02

## 2025-03-01 RX ORDER — VANCOMYCIN HCL IN 5 % DEXTROSE 1.5G/250ML
1000 PLASTIC BAG, INJECTION (ML) INTRAVENOUS EVERY 12 HOURS
Refills: 0 | Status: DISCONTINUED | OUTPATIENT
Start: 2025-03-02 | End: 2025-03-03

## 2025-03-01 RX ORDER — QUETIAPINE FUMARATE 25 MG/1
25 TABLET ORAL ONCE
Refills: 0 | Status: COMPLETED | OUTPATIENT
Start: 2025-03-01 | End: 2025-03-01

## 2025-03-01 RX ADMIN — Medication 100 MILLIEQUIVALENT(S): at 03:50

## 2025-03-01 RX ADMIN — Medication 100 MILLIGRAM(S): at 14:57

## 2025-03-01 RX ADMIN — CEFEPIME 100 MILLIGRAM(S): 2 INJECTION, POWDER, FOR SOLUTION INTRAVENOUS at 14:57

## 2025-03-01 RX ADMIN — DEXAMETHASONE 102 MILLIGRAM(S): 0.5 TABLET ORAL at 16:25

## 2025-03-01 RX ADMIN — Medication 400 MILLIGRAM(S): at 12:37

## 2025-03-01 RX ADMIN — Medication 40 MILLIGRAM(S): at 12:37

## 2025-03-01 RX ADMIN — Medication 25 GRAM(S): at 14:13

## 2025-03-01 RX ADMIN — Medication 250 MILLIGRAM(S): at 14:57

## 2025-03-01 RX ADMIN — Medication 0.5 MILLIGRAM(S): at 02:37

## 2025-03-01 RX ADMIN — Medication 100 MILLIEQUIVALENT(S): at 04:52

## 2025-03-01 RX ADMIN — QUETIAPINE FUMARATE 25 MILLIGRAM(S): 25 TABLET ORAL at 00:42

## 2025-03-01 RX ADMIN — PROPOFOL 2 MICROGRAM(S)/KG/MIN: 10 INJECTION, EMULSION INTRAVENOUS at 22:12

## 2025-03-01 RX ADMIN — DEXAMETHASONE 102 MILLIGRAM(S): 0.5 TABLET ORAL at 09:03

## 2025-03-01 RX ADMIN — LIDOCAINE HYDROCHLORIDE 1 PATCH: 20 JELLY TOPICAL at 06:38

## 2025-03-01 RX ADMIN — LEVETIRACETAM 1000 MILLIGRAM(S): 10 INJECTION, SOLUTION INTRAVENOUS at 17:18

## 2025-03-01 RX ADMIN — LEVETIRACETAM 1000 MILLIGRAM(S): 10 INJECTION, SOLUTION INTRAVENOUS at 06:16

## 2025-03-01 RX ADMIN — DEXAMETHASONE 102 MILLIGRAM(S): 0.5 TABLET ORAL at 21:00

## 2025-03-01 RX ADMIN — SODIUM CHLORIDE 100 MILLILITER(S): 9 INJECTION, SOLUTION INTRAVENOUS at 22:12

## 2025-03-01 RX ADMIN — Medication 1000 MILLIGRAM(S): at 17:47

## 2025-03-01 RX ADMIN — CEFEPIME 100 MILLIGRAM(S): 2 INJECTION, POWDER, FOR SOLUTION INTRAVENOUS at 22:12

## 2025-03-01 RX ADMIN — PROPOFOL 20 MICROGRAM(S)/KG/MIN: 10 INJECTION, EMULSION INTRAVENOUS at 12:05

## 2025-03-01 RX ADMIN — DEXMEDETOMIDINE HYDROCHLORIDE IN SODIUM CHLORIDE 8.31 MICROGRAM(S)/KG/HR: 4 INJECTION INTRAVENOUS at 12:37

## 2025-03-01 RX ADMIN — Medication 0.2 MILLIGRAM(S): at 19:45

## 2025-03-01 RX ADMIN — SODIUM CHLORIDE 100 MILLILITER(S): 9 INJECTION, SOLUTION INTRAVENOUS at 09:03

## 2025-03-01 RX ADMIN — Medication 400 MILLIGRAM(S): at 17:17

## 2025-03-01 RX ADMIN — Medication 0.2 MILLIGRAM(S): at 20:00

## 2025-03-01 RX ADMIN — Medication 1000 MILLIGRAM(S): at 13:16

## 2025-03-01 RX ADMIN — LIDOCAINE HYDROCHLORIDE 1 PATCH: 20 JELLY TOPICAL at 06:37

## 2025-03-01 RX ADMIN — DEXMEDETOMIDINE HYDROCHLORIDE IN SODIUM CHLORIDE 8.31 MICROGRAM(S)/KG/HR: 4 INJECTION INTRAVENOUS at 13:28

## 2025-03-01 RX ADMIN — Medication 100 MILLIGRAM(S): at 22:12

## 2025-03-01 RX ADMIN — Medication 100 MILLIEQUIVALENT(S): at 06:16

## 2025-03-01 RX ADMIN — Medication 1 APPLICATION(S): at 12:37

## 2025-03-01 RX ADMIN — DEXMEDETOMIDINE HYDROCHLORIDE IN SODIUM CHLORIDE 8.31 MICROGRAM(S)/KG/HR: 4 INJECTION INTRAVENOUS at 22:12

## 2025-03-01 RX ADMIN — Medication 0.5 MILLIGRAM(S): at 02:07

## 2025-03-01 RX ADMIN — SODIUM CHLORIDE 100 MILLILITER(S): 9 INJECTION, SOLUTION INTRAVENOUS at 04:03

## 2025-03-01 NOTE — CONSULT NOTE ADULT - ASSESSMENT
Patient is a 59 year old female with PMHx of neurofibromatosis, 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), s/p  shunt (in [facility]), s/p traumatic fall with head trauma and R SDH, s/p R decompressive hemicraniectomy, ICP monitor and R cranioplasty (5/2024), s/p L frontal Endoscopic third ventriculostomy and ligation of R  shunt with ligaclips with retention of valve, proximal and distal catheters (08/2024), Anxiety, depression transferred from [facility] for possible surgical intervention. Patient initially presented to [facility] on 2/22 after she had witnessed seizure episode. Patient intubated on presentation for airway protection. Labs on presentation notable for leukocytosis to 19.76. Initial brain imaging showed a slight increase in extra axial CSF collection overlying the right frontal duraplasty thought to represent hygroma. After transfer to [facility], follow up MRI of the brain on 2/28 showed concern for infected epidural collection in right frontal region just beneath cranioplasty. Patient was taken to the OR on 3/1 for clarissa hole for evaluation of epidural collection and patient noted to have purulent fluid evacuated in the OR. Epidural drain placed and drainage sent for cultures and gram stain.  Infectious workup:   2/22 U/A with 2 WBCs   2/22 blood cultures NGTD   2/23 partial RVP negative   2/26 blood cultures NGTD   2/26 RVP negative   2/26 U/A with 4 WBCs  with urine culture with no growth   2/28 CSF studies with TNC of 1 with hazy appearance of CSF. CSF culture with no growth on gram stain   3/1 RVP negative     Abx:   Zosyn ( 2/23-2/24)   Cefazolin ordered to start today     #hx of multiple neurosurgical interventions with  shunt and cranioplasty   #status epilepticus   #epidural abscess   -stio Cefazolin and start Cefepime 2 g IVPB q12h, Flagyl 500 mg IVPB q8h, and Vancomycin 1 g IVPB q12h (monitor Vancomycin troughs)   -f/u OR culture data   -as per neurosurgery team, plan to take patient back to the OR for removal of  shunt and cranial plate from prior cranioplasty. Agree with plan for source control.   -f/u all culture data  -monitor WBC and fever curve     Case seen and discussed with Dr. Mcintosh who agrees with assessment and plan. Note not final until attending addendum.

## 2025-03-01 NOTE — CONSULT NOTE ADULT - SUBJECTIVE AND OBJECTIVE BOX
Patient is a 59 year old female with PMHx of neurofibromatosis, cerebellar brain tumor s/p resection (2023), s/p  shunt (in [facility]), s/p traumatic fall with head trauma and R SDH, s/p R decompressive hemicraniectomy, ICP monitor and R cranioplasty (2024), s/p L frontal Endoscopic third ventriculostomy and ligation of R  shunt with ligaclips with retention of valve, proximal and distal catheters (2024), Anxiety, depression transferred from [facility] for possible surgoical intervention. Patient initially presented to [facility] on  after she had witnessed seizure episode at home. Also noted to have seizure episodes in the ambulance as well as in [place] ED.     On presentation to ER at [facility]:   Vitals notable for hypoxia to 88% on RA and tachycardic to 140s.. Patient intubated in the ER for airway protection.   Labs on presentation: CBC with leukocytosis to 19.76. CMP with creatinine 1.04.   Initial CT head imaging on :  No acute intracranial hemorrhage, mass effect, or midline shift. Stable  shunt and moderate ventriculomegaly. Slight increase in size of extra-axial CSF collection overlying the right frontal duraplasty, likely a hygroma. There is no mass effect.    Patient was started on empiric abx for meningitis but switched to Zosyn for empiric coverage for aspitation pneumoani after ID evaluated the patient and low suspicion for meningitis.     Seizure activity suspected in setting of hygroma      shunt tapped on  - CSF studies with TNC of 1 with hazy appearance of CSF. CSF culture with no growth on gram stain     Patient extubated on .     Follup up MRI of the brain on   showing Right frontal peripherally enhancing epidural collection beneath the right frontal cranioplasty has reaccumulated since 10/6/2024, with diffusion restriction suspicious for an infected collection. Right frontal encephalomalacia and gliosis has evolved since the prior exam. Old left cerebellar infarct. Patent third ventriculostomy. Right frontal ventricular catheter. Normal intracranial MR angiography.    Patient taken to the OR on 3/1 for clarissa hole for evaluation of epidural collection and patient noted to have purulen fluid. Epidural drain placed. Drainage sent for cultures and gram stain. Plan  to go back to OR for better source control and removal of cranioplasty next week.     ID called for abx management:     Infectious workup:    U/A with 2 WBCs    blood cultures NGTD    partial RVP negative    blood cultures NGTD    RVP negative    U/A with 4 WBCs  with urine culture with no growth    CSF studies with TNC of 1 with hazy appearance of CSF. CSF culture with no growth on gram stain   3/1 RVP negative       Abx:   Zosyn ( -)   Cefazolin ordered to start today       REVIEW OF SYSTEMS  pending full examination    prior hospital charts reviewed [V]  primary team notes reviewed [V]  other consultant notes reviewed [V]    PAST MEDICAL & SURGICAL HISTORY:  Asthma      Depression      Neoplasm of uncertain behavior of brain      Neurofibromatosis, type 1      History of hydrocephalus      Anxiety      Delivery with history of         S/P tubal ligation        History of arthroscopy of left knee  meniscus       S/P LASIK Surgery        H/O brain tumor      S/P  shunt          SOCIAL HISTORY:  Denied smoking/vaping/alcohol/recreational drug use    FAMILY HISTORY:      Allergies  No Known Allergies        ANTIMICROBIALS:  ceFAZolin   IVPB 2000 every 8 hours      ANTIMICROBIALS (past 90 days):  MEDICATIONS  (STANDING):    piperacillin/tazobactam IVPB..   25 mL/Hr IV Intermittent (25 @ 07:59)   25 mL/Hr IV Intermittent (25 @ 00:08)        OTHER MEDS:   MEDICATIONS  (STANDING):  acetaminophen   IVPB .. 1000 every 6 hours  albuterol/ipratropium for Nebulization 3 every 30 minutes  dexAMETHasone  IVPB 10 every 6 hours  dexMEDEtomidine Infusion 0.5 <Continuous>  HYDROmorphone  Injectable 0.2 every 4 hours PRN  HYDROmorphone  Injectable 0.5 every 4 hours PRN  levETIRAcetam   Injectable 1000 every 12 hours  pantoprazole  Injectable 40 daily      VITALS:  Vital Signs Last 24 Hrs  T(F): 98.4 (25 @ 08:00), Max: 102.3 (25 @ 08:00)    Vital Signs Last 24 Hrs  HR: 120 (25 @ 09:10) (98 - 131)  BP: 129/68 (25 @ 09:10) (125/67 - 163/89)  RR: 14 (25 @ 09:10)  SpO2: 97% (25 @ 09:10) (92% - 100%)  Wt(kg): --    EXAM:  pending full examination      Labs:                        13.8   15.64 )-----------( 222      ( 01 Mar 2025 12:15 )             40.6         139  |  102  |  8   ----------------------------<  130[H]  4.3   |  23  |  0.28[L]    Ca    9.1      01 Mar 2025 12:15  Phos  2.7       Mg     1.70             WBC Trend:  WBC Count: 15.64 (25 @ 12:15)  WBC Count: 8.28 (25 @ 01:54)  WBC Count: 7.77 (25 @ 01:00)  WBC Count: 8.78 (25 @ 00:50)      Auto Neutrophil #: 3.40 K/uL (24 @ 05:22)  Auto Neutrophil #: 10.74 K/uL (24 @ 11:30)  Auto Neutrophil #: 13.06 K/uL (24 @ 06:50)      Creatine Trend:  Creatinine: 0.28 ()  Creatinine: 0.30 ()  Creatinine: 0.36 ()  Creatinine: 0.30 ()      Liver Biochemical Testing Trend:  Alanine Aminotransferase (ALT/SGPT): 18 ()  Alanine Aminotransferase (ALT/SGPT): 23 ()  Alanine Aminotransferase (ALT/SGPT): 23 ()  Alanine Aminotransferase (ALT/SGPT): 15 ()  Alanine Aminotransferase (ALT/SGPT): 16 ()  Aspartate Aminotransferase (AST/SGOT): 12 (25 @ 03:25)  Aspartate Aminotransferase (AST/SGOT): 41 (25 @ 00:35)  Aspartate Aminotransferase (AST/SGOT): 12 (25 @ 16:25)  Aspartate Aminotransferase (AST/SGOT): 6 (24 @ 07:22)  Aspartate Aminotransferase (AST/SGOT): <5 (24 @ 06:10)  Bilirubin Total: 0.9 (-)  Bilirubin Total: 0.8 (-)  Bilirubin Total: 0.8 (-)  Bilirubin Total: 0.6 (-12)  Bilirubin Total: 0.6 (-)      Trend LDH  24 @ 05:54  176          MICROBIOLOGY:    MRSA PCR Result.: NotDetec (25 @ 00:35)  MRSA PCR Result.: NotDetec (24 @ 13:45)      Culture - CSF with Gram Stain (collected 2025 17:14)  Source: CSF CSF    Culture - Urine (collected 2025 19:00)  Source: Catheterized Catheterized  Final Report:    No growth    Culture - Blood (collected 2025 14:20)  Source: .Blood Blood-Peripheral  Preliminary Report:    No growth at 48 Hours    Culture - Blood (collected 2025 14:00)  Source: .Blood Blood-Peripheral  Preliminary Report:    No growth at 48 Hours    Urinalysis with Rflx Culture (collected 2025 17:56)    Culture - Blood (collected 2025 16:30)  Source: .Blood Blood  Final Report:    No growth at 5 days    Culture - Blood (collected 2025 16:20)  Source: .Blood Blood  Final Report:    No growth at 5 days    Culture - Fungal, CSF (collected 2024 14:00)  Source: .CSF CSF  Final Report:    No fungus isolated at 4 weeks.    Culture - CSF with Gram Stain (collected 2024 14:00)  Source: .CSF CSF  Final Report:    No growth at 14 days.    Culture - CSF with Gram Stain (collected 2024 11:11)  Source: .CSF CSF LUMBAR  Final Report:    No growth at 5 days                                              Blood Gas Arterial, Lactate: 1.6 ( @ 12:15)  Blood Gas Arterial, Lactate: 1.1 ( @ 08:10)        RADIOLOGY:  imaging below personally reviewed   Patient is a 59 year old female with PMHx of neurofibromatosis, cerebellar brain tumor s/p resection (2023), s/p  shunt (in [facility]), s/p traumatic fall with head trauma and R SDH, s/p R decompressive hemicraniectomy, ICP monitor and R cranioplasty (2024), s/p L frontal Endoscopic third ventriculostomy and ligation of R  shunt with ligaclips with retention of valve, proximal and distal catheters (2024), Anxiety, depression transferred from [facility] for possible surgoical intervention. Patient initially presented to [facility] on  after she had witnessed seizure episode at home. Also noted to have seizure episodes in the ambulance as well as in [place] ED.     On presentation to ER at [facility]:   Vitals notable for hypoxia to 88% on RA and tachycardic to 140s.. Patient intubated in the ER for airway protection.   Labs on presentation: CBC with leukocytosis to 19.76. CMP with creatinine 1.04.   Initial CT head imaging on :  No acute intracranial hemorrhage, mass effect, or midline shift. Stable  shunt and moderate ventriculomegaly. Slight increase in size of extra-axial CSF collection overlying the right frontal duraplasty, likely a hygroma. There is no mass effect.    Patient was started on empiric abx for meningitis but switched to Zosyn for empiric coverage for aspitation pneumoani after ID evaluated the patient and low suspicion for meningitis.     Seizure activity suspected in setting of hygroma      shunt tapped on  - CSF studies with TNC of 1 with hazy appearance of CSF. CSF culture with no growth on gram stain     Patient extubated on .     Follup up MRI of the brain on   showing Right frontal peripherally enhancing epidural collection beneath the right frontal cranioplasty has reaccumulated since 10/6/2024, with diffusion restriction suspicious for an infected collection. Right frontal encephalomalacia and gliosis has evolved since the prior exam. Old left cerebellar infarct. Patent third ventriculostomy. Right frontal ventricular catheter. Normal intracranial MR angiography.    Patient taken to the OR on 3/1 for clarissa hole for evaluation of epidural collection and patient noted to have purulen fluid. Epidural drain placed. Drainage sent for cultures and gram stain. Plan  to go back to OR for better source control and removal of cranioplasty next week.     ID called for abx management    Of note, ENT called today for concern for stridor. Bedside scope showed subglottic edema- Likely airway edema / recent intubation.    Infectious workup:    U/A with 2 WBCs    blood cultures NGTD    partial RVP negative    blood cultures NGTD    RVP negative    U/A with 4 WBCs  with urine culture with no growth    CSF studies with TNC of 1 with hazy appearance of CSF. CSF culture with no growth on gram stain   3/1 RVP negative       Abx:   Zosyn ( -)   Cefazolin ordered to start today       REVIEW OF SYSTEMS  pending full examination    prior hospital charts reviewed [V]  primary team notes reviewed [V]  other consultant notes reviewed [V]    PAST MEDICAL & SURGICAL HISTORY:  Asthma      Depression      Neoplasm of uncertain behavior of brain      Neurofibromatosis, type 1      History of hydrocephalus      Anxiety      Delivery with history of         S/P tubal ligation        History of arthroscopy of left knee  meniscus       S/P LASIK Surgery        H/O brain tumor      S/P  shunt          SOCIAL HISTORY:  Denied smoking/vaping/alcohol/recreational drug use    FAMILY HISTORY:      Allergies  No Known Allergies        ANTIMICROBIALS:  ceFAZolin   IVPB 2000 every 8 hours      ANTIMICROBIALS (past 90 days):  MEDICATIONS  (STANDING):    piperacillin/tazobactam IVPB..   25 mL/Hr IV Intermittent (25 @ 07:59)   25 mL/Hr IV Intermittent (25 @ 00:08)        OTHER MEDS:   MEDICATIONS  (STANDING):  acetaminophen   IVPB .. 1000 every 6 hours  albuterol/ipratropium for Nebulization 3 every 30 minutes  dexAMETHasone  IVPB 10 every 6 hours  dexMEDEtomidine Infusion 0.5 <Continuous>  HYDROmorphone  Injectable 0.2 every 4 hours PRN  HYDROmorphone  Injectable 0.5 every 4 hours PRN  levETIRAcetam   Injectable 1000 every 12 hours  pantoprazole  Injectable 40 daily      VITALS:  Vital Signs Last 24 Hrs  T(F): 98.4 (25 @ 08:00), Max: 102.3 (25 @ 08:00)    Vital Signs Last 24 Hrs  HR: 120 (25 @ 09:10) (98 - 131)  BP: 129/68 (25 @ 09:10) (125/67 - 163/89)  RR: 14 (25 @ 09:10)  SpO2: 97% (25 @ 09:10) (92% - 100%)  Wt(kg): --    EXAM:  pending full examination      Labs:                        13.8   15.64 )-----------( 222      ( 01 Mar 2025 12:15 )             40.6         139  |  102  |  8   ----------------------------<  130[H]  4.3   |  23  |  0.28[L]    Ca    9.1      01 Mar 2025 12:15  Phos  2.7       Mg     1.70             WBC Trend:  WBC Count: 15.64 (25 @ 12:15)  WBC Count: 8.28 (25 @ 01:54)  WBC Count: 7.77 (25 @ 01:00)  WBC Count: 8.78 (25 @ 00:50)      Auto Neutrophil #: 3.40 K/uL (24 @ 05:22)  Auto Neutrophil #: 10.74 K/uL (24 @ 11:30)  Auto Neutrophil #: 13.06 K/uL (24 @ 06:50)      Creatine Trend:  Creatinine: 0.28 ()  Creatinine: 0.30 ()  Creatinine: 0.36 ()  Creatinine: 0.30 ()      Liver Biochemical Testing Trend:  Alanine Aminotransferase (ALT/SGPT): 18 ()  Alanine Aminotransferase (ALT/SGPT): 23 ()  Alanine Aminotransferase (ALT/SGPT): 23 ()  Alanine Aminotransferase (ALT/SGPT): 15 ()  Alanine Aminotransferase (ALT/SGPT): 16 (08)  Aspartate Aminotransferase (AST/SGOT): 12 (25 @ 03:25)  Aspartate Aminotransferase (AST/SGOT): 41 (25 @ 00:35)  Aspartate Aminotransferase (AST/SGOT): 12 (25 @ 16:25)  Aspartate Aminotransferase (AST/SGOT): 6 (24 @ 07:22)  Aspartate Aminotransferase (AST/SGOT): <5 (24 @ 06:10)  Bilirubin Total: 0.9 (-)  Bilirubin Total: 0.8 ()  Bilirubin Total: 0.8 (-)  Bilirubin Total: 0.6 (-)  Bilirubin Total: 0.6 (-)      Trend LDH  24 @ 05:54  176          MICROBIOLOGY:    MRSA PCR Result.: NotDetec (25 @ 00:35)  MRSA PCR Result.: NotDetec (24 @ 13:45)      Culture - CSF with Gram Stain (collected 2025 17:14)  Source: CSF CSF    Culture - Urine (collected 2025 19:00)  Source: Catheterized Catheterized  Final Report:    No growth    Culture - Blood (collected 2025 14:20)  Source: .Blood Blood-Peripheral  Preliminary Report:    No growth at 48 Hours    Culture - Blood (collected 2025 14:00)  Source: .Blood Blood-Peripheral  Preliminary Report:    No growth at 48 Hours    Urinalysis with Rflx Culture (collected 2025 17:56)    Culture - Blood (collected 2025 16:30)  Source: .Blood Blood  Final Report:    No growth at 5 days    Culture - Blood (collected 2025 16:20)  Source: .Blood Blood  Final Report:    No growth at 5 days    Culture - Fungal, CSF (collected 2024 14:00)  Source: .CSF CSF  Final Report:    No fungus isolated at 4 weeks.    Culture - CSF with Gram Stain (collected 2024 14:00)  Source: .CSF CSF  Final Report:    No growth at 14 days.    Culture - CSF with Gram Stain (collected 2024 11:11)  Source: .CSF CSF LUMBAR  Final Report:    No growth at 5 days                                              Blood Gas Arterial, Lactate: 1.6 ( @ 12:15)  Blood Gas Arterial, Lactate: 1.1 ( @ 08:10)        RADIOLOGY:  imaging below personally reviewed   Patient is a 59 year old female with PMHx of neurofibromatosis, cerebellar brain tumor s/p resection (2023), s/p  shunt (in [facility]), s/p traumatic fall with head trauma and R SDH, s/p R decompressive hemicraniectomy, ICP monitor and R cranioplasty (2024), s/p L frontal Endoscopic third ventriculostomy and ligation of R  shunt with ligaclips with retention of valve, proximal and distal catheters (2024), Anxiety, depression transferred from [facility] for possible surgoical intervention. Patient initially presented to [facility] on  after she had witnessed seizure episode at home. Also noted to have seizure episodes in the ambulance as well as in [place] ED.     On presentation to ER at [facility]:   Vitals notable for hypoxia to 88% on RA and tachycardic to 140s.. Patient intubated in the ER for airway protection.   Labs on presentation: CBC with leukocytosis to 19.76. CMP with creatinine 1.04.   Initial CT head imaging on :  No acute intracranial hemorrhage, mass effect, or midline shift. Stable  shunt and moderate ventriculomegaly. Slight increase in size of extra-axial CSF collection overlying the right frontal duraplasty, likely a hygroma. There is no mass effect.    Patient was started on empiric abx for meningitis but switched to Zosyn for empiric coverage for aspitation pneumoani after ID evaluated the patient and low suspicion for meningitis.     Seizure activity suspected in setting of hygroma      shunt tapped on  - CSF studies with TNC of 1 with hazy appearance of CSF. CSF culture with no growth on gram stain     Patient extubated on  however reintubated on 3/1 after patient became tachypneic and noted to have stridor. ENT noted subglottic edema on bedside scope.     Of note, Follup up MRI of the brain on   showing Right frontal peripherally enhancing epidural collection beneath the right frontal cranioplasty has reaccumulated since 10/6/2024, with diffusion restriction suspicious for an infected collection. Right frontal encephalomalacia and gliosis has evolved since the prior exam. Old left cerebellar infarct. Patent third ventriculostomy. Right frontal ventricular catheter. Normal intracranial MR angiography.    Patient taken to the OR on 3/1 for clarissa hole for evaluation of epidural collection and patient noted to have purulen fluid. Epidural drain placed. Drainage sent for cultures and gram stain. Plan  to go back to OR for better source control with plan to remove both  shunt and metal plate from cranioplasty next week.     ID called for abx management    Infectious workup:    U/A with 2 WBCs    blood cultures NGTD    partial RVP negative    blood cultures NGTD    RVP negative    U/A with 4 WBCs  with urine culture with no growth    CSF studies with TNC of 1 with hazy appearance of CSF. CSF culture with no growth on gram stain   3/1 RVP negative       Abx:   Zosyn ( -)   Cefazolin ordered to start today       REVIEW OF SYSTEMS  ANU carrington     prior hospital charts reviewed [V]  primary team notes reviewed [V]  other consultant notes reviewed [V]    PAST MEDICAL & SURGICAL HISTORY:  Asthma      Depression      Neoplasm of uncertain behavior of brain      Neurofibromatosis, type 1      History of hydrocephalus      Anxiety      Delivery with history of         S/P tubal ligation        History of arthroscopy of left knee  meniscus       S/P LASIK Surgery        H/O brain tumor      S/P  shunt          SOCIAL HISTORY:  Denied smoking/vaping/alcohol/recreational drug use    FAMILY HISTORY:      Allergies  No Known Allergies        ANTIMICROBIALS:  ceFAZolin   IVPB 2000 every 8 hours      ANTIMICROBIALS (past 90 days):  MEDICATIONS  (STANDING):    piperacillin/tazobactam IVPB..   25 mL/Hr IV Intermittent (25 @ 07:59)   25 mL/Hr IV Intermittent (25 @ 00:08)        OTHER MEDS:   MEDICATIONS  (STANDING):  acetaminophen   IVPB .. 1000 every 6 hours  albuterol/ipratropium for Nebulization 3 every 30 minutes  dexAMETHasone  IVPB 10 every 6 hours  dexMEDEtomidine Infusion 0.5 <Continuous>  HYDROmorphone  Injectable 0.2 every 4 hours PRN  HYDROmorphone  Injectable 0.5 every 4 hours PRN  levETIRAcetam   Injectable 1000 every 12 hours  pantoprazole  Injectable 40 daily      VITALS:  Vital Signs Last 24 Hrs  T(F): 98.4 (25 @ 08:00), Max: 102.3 (25 @ 08:00)    Vital Signs Last 24 Hrs  HR: 120 (25 @ 09:10) (98 - 131)  BP: 129/68 (25 @ 09:10) (125/67 - 163/89)  RR: 14 (25 @ 09:10)  SpO2: 97% (25 @ 09:10) (92% - 100%)  Wt(kg): --    EXAM:  General: intubated and sedated   HEENT: +SHAYY drain in place to right frotnal region with serosanguinous ouput   CV: +S1/S2, RRR  Lungs: +mechanical breath sounds bilaterally   Abd:  BS4+, Soft, NTND, no guarding  : No suprapubic tenderness  Neuro: intubated and sedated   Ext: No cyanosis, no edema  Skin: No rash, no phlebitis, No erythema       Labs:                        13.8   15.64 )-----------( 222      ( 01 Mar 2025 12:15 )             40.6         139  |  102  |  8   ----------------------------<  130[H]  4.3   |  23  |  0.28[L]    Ca    9.1      01 Mar 2025 12:15  Phos  2.7       Mg     1.70             WBC Trend:  WBC Count: 15.64 (25 @ 12:15)  WBC Count: 8.28 (25 @ 01:54)  WBC Count: 7.77 (25 @ 01:00)  WBC Count: 8.78 (25 @ 00:50)      Auto Neutrophil #: 3.40 K/uL (24 @ 05:22)  Auto Neutrophil #: 10.74 K/uL (24 @ 11:30)  Auto Neutrophil #: 13.06 K/uL (24 @ 06:50)      Creatine Trend:  Creatinine: 0.28 ()  Creatinine: 0.30 ()  Creatinine: 0.36 ()  Creatinine: 0.30 ()      Liver Biochemical Testing Trend:  Alanine Aminotransferase (ALT/SGPT): 18 ()  Alanine Aminotransferase (ALT/SGPT): 23 ()  Alanine Aminotransferase (ALT/SGPT): 23 (-)  Alanine Aminotransferase (ALT/SGPT): 15 (-12)  Alanine Aminotransferase (ALT/SGPT): 16 (-08)  Aspartate Aminotransferase (AST/SGOT): 12 (25 @ 03:25)  Aspartate Aminotransferase (AST/SGOT): 41 (25 @ 00:35)  Aspartate Aminotransferase (AST/SGOT): 12 (25 @ 16:25)  Aspartate Aminotransferase (AST/SGOT): 6 (24 @ 07:22)  Aspartate Aminotransferase (AST/SGOT): <5 (24 @ 06:10)  Bilirubin Total: 0.9 ()  Bilirubin Total: 0.8 ()  Bilirubin Total: 0.8 ()  Bilirubin Total: 0.6 ()  Bilirubin Total: 0.6 (-08)      Trend LDH  24 @ 05:54  176          MICROBIOLOGY:    MRSA PCR Result.: NotDetec (25 @ 00:35)  MRSA PCR Result.: NotDetec (24 @ 13:45)      Culture - CSF with Gram Stain (collected 2025 17:14)  Source: CSF CSF    Culture - Urine (collected 2025 19:00)  Source: Catheterized Catheterized  Final Report:    No growth    Culture - Blood (collected 2025 14:20)  Source: .Blood Blood-Peripheral  Preliminary Report:    No growth at 48 Hours    Culture - Blood (collected 2025 14:00)  Source: .Blood Blood-Peripheral  Preliminary Report:    No growth at 48 Hours    Urinalysis with Rflx Culture (collected 2025 17:56)    Culture - Blood (collected 2025 16:30)  Source: .Blood Blood  Final Report:    No growth at 5 days    Culture - Blood (collected 2025 16:20)  Source: .Blood Blood  Final Report:    No growth at 5 days    Culture - Fungal, CSF (collected 2024 14:00)  Source: .CSF CSF  Final Report:    No fungus isolated at 4 weeks.    Culture - CSF with Gram Stain (collected 2024 14:00)  Source: .CSF CSF  Final Report:    No growth at 14 days.    Culture - CSF with Gram Stain (collected 2024 11:11)  Source: .CSF CSF LUMBAR  Final Report:    No growth at 5 days                                              Blood Gas Arterial, Lactate: 1.6 ( @ 12:15)  Blood Gas Arterial, Lactate: 1.1 ( @ 08:10)        RADIOLOGY:    ACC: [id number] EXAM:  MR BRAIN WAW IC W CSF FLOW   ORDERED BY: MARYBETH TABOR     ACC: [id number] EXAM:  MR ANGIO BRAIN   ORDERED BY: ZACHERY PAINTER     PROCEDURE DATE:  2025          INTERPRETATION:  CLINICAL INDICATION: Recurrent epidural collection   evaluate for infection, and S1      Magnetic resonance imaging of the brain was carried out with transaxial   SPGR, FLAIR, fast spin echo T2 weighted images, axial susceptibility   weighted series, diffusion weighted series andsagittal T1 weighted   series on a 1.5 Ev magnet. Post contrast axial, coronal and sagittal   T1 weighted images were obtained. 6.5 cc of Gadavist were intravenously   injected, 1.0 cc were discarded. Sagittal cine flow and sagittal CISS   study. 3-D time-of-flight MR angiography was obtained.        Comparison is made with the prior brain CT of 2025 and the MRI of   2024.    There has been a right frontal craniotomy. There is an epidural fluid   collection beneath the craniotomy flap which is larger when compared with   2024. This collection measures 17 mm in depth compared to the   previous of 3.4 mm. There is mild peripheral enhancement. The collection   does not suppress with T2 FLAIR images and demonstrates diffusion   restriction. In view of these factors and directed collection should be   considered. A tiny right parietal subdural collection is identified with   mild dural     Encephalomalacia and gliosis in the left frontal cortex is similar   compared to the previous exam. There is less enhancement along the   posterior aspect of the surgical cavity.    A right frontal ventricular catheter is identified with its tip in the   right frontal horn. The ventricles are enlarged but are unchanged since   the priorexam.    Signal dropout overlying the right frontal region is related to the   presence of a programmable shunt. Defect in the floor of the third   ventricle is identified consistent with a third ventriculostomy which is   patent on sagittal cine flow study.    An old left cerebellar infarct is identified. Nonspecific signal   hyperintensity in the cerebellum in the region of the dentate nucleus may   be related to neurofibromatosis hamartomas. No acute infarcts are seen.   No abnormal enhancing lesions.      Cervical, petrous, cavernous and supraclinoid internal carotid arteries,   anterior and middle cerebral artery branches are unremarkable. There is   no evidence of aneurysm, stenosis, or vasculitis. The distal vertebral   arteries, and region of the vertebral basilar confluence are   unremarkable. The basilar artery, superior cerebellar arteries and   posterior cerebral arteries are unremarkable.    IMPRESSION: Right frontal peripherally enhancing epidural collection   beneath the right frontal cranioplasty has reaccumulated since 10/6/2024,   with diffusion restriction suspicious for an infected collection. Right   frontal encephalomalacia and gliosis has evolved since the prior exam.   Old left cerebellar infarct. Patent third ventriculostomy. Right frontal   ventricular catheter.    Normal intracranial MR angiography.    Dr. Knox discussed these findings with Dr. Brush on 2025 4:16   PM with read back.    --- End of Report ---            JANUSZ KNOX MD; AttendingRadiologist  This document has been electronically signed. 2025  4:18PM      ACC: [id number] EXAM:  CT ANGIO BRAIN (W)AW IC   ORDERED BY: ZACHERY PAINTER     ACC: [id number] EXAM:  CT ANGIO NECK (W)AW IC   ORDERED BY: ZACHERY PAINTER     PROCEDURE DATE:  2025          INTERPRETATION:  CT angiography of the brain and neck    CLINICAL INDICATION: Left hemiparesis    TECHNIQUE: Direct axial CT scanning of the brain and neck was obtained   from the vertex to the level of the clavicular heads after the dynamic   intravenous injection of 90 cc of Omnipaque 350. 10 cc discarded.   Sagittal and coronal maximum intensity projection reformats were   provided.  Three-dimensional reconstructions were performed by the   radiologist using the [employer] workstation.    Additionally, precontrast and postcontrast axial CT scanning of the brain   was obtained from the skull base to the vertex. Sagittal and coronal   reformats were provided.    COMPARISON: CT brain 2025. MRI brain 2024.    FINDINGS:    CT BRAIN:    Redemonstration of right pterional craniectomy and cranioplasty. Similar   low density extra-axial collection subjacent to the right pterional   cranioplasty measures 1.5 cm in greatest depth.    Redemonstration of right frontal approach ventriculostomy catheter in   unchanged position. Pericatheter gliosis is similar. Ventricles are   similar in size and mildly dilated. No significant midline shift at the   level of the foramen of De La Paz.    Encephalomalacia and gliosis in the right lateral frontal lobe is   redemonstrated.    Similar thickening of the septum pellucidum.    No acute intracranial hemorrhage or brain edema.    No abnormal intracranial enhancement.    The visualized paranasal sinuses and mastoid air cells are clear.    CTA BRAIN:    Normal flow-related enhancement of the skull base/intracranial internal   carotid arteries.    Normal flow-related enhancement of the bilateral anterior, middle, and   posterior cerebral arteries.    Normal flow-related enhancement of the bilateral intradural vertebral   arteries and the basilar artery.    No flow-limiting stenosis or vascular aneurysm. No AVM.    CTA NECK:    Normal flow-related enhancement of the bilateral common and internal   carotid arteries.    Normal flow-related enhancement of the bilateral vertebral arteries.    No flow-limiting stenosis, evidence for arterial dissection, or vascular   aneurysm.    Visualized lungs clear. Low-density lesion in the right mesial thyroid   lobe with peripheral calcifications.    IMPRESSION:    CT brain:  No acuteintracranial hemorrhage, mass effect, or brain edema.  Similar mild ventriculomegaly.  Similar low density extra-axial collection subjacent to the right   pterional cranioplasty.  No abnormal enhancement.    CTA brain:  No flow-limiting stenosis or vascular aneurysm. No AVM.    CTA neck:  No flow-limiting stenosis, evidence for arterial dissection, or vascular   aneurysm.    --- End of Report ---            SATURNINO MORTON MD; Attending Radiologist  This document has been electronically signed. 2420  6:35PM     Patient is a 59 year old female with PMHx of neurofibromatosis, cerebellar brain tumor s/p resection (2023), s/p  shunt (in [facility]), s/p traumatic fall with head trauma and R SDH, s/p R decompressive hemicraniectomy, ICP monitor and R cranioplasty (2024), s/p L frontal Endoscopic third ventriculostomy and ligation of R  shunt with ligaclips with retention of valve, proximal and distal catheters (2024), Anxiety, depression transferred from [facility] for possible surgoical intervention. Patient initially presented to [facility] on  after she had witnessed seizure episode at home. Also noted to have seizure episodes in the ambulance as well as in [place] ED.     On presentation to ER at [facility]:   Vitals notable for hypoxia to 88% on RA and tachycardic to 140s.. Patient intubated in the ER for airway protection.   Labs on presentation: CBC with leukocytosis to 19.76. CMP with creatinine 1.04.   Initial CT head imaging on :  No acute intracranial hemorrhage, mass effect, or midline shift. Stable  shunt and moderate ventriculomegaly. Slight increase in size of extra-axial CSF collection overlying the right frontal duraplasty, likely a hygroma. There is no mass effect.    Patient was started on empiric abx for meningitis but switched to Zosyn for empiric coverage for aspitation pneumoani after ID evaluated the patient and low suspicion for meningitis.     Seizure activity suspected in setting of hygroma      shunt tapped on  - CSF studies with TNC of 1 with hazy appearance of CSF. CSF culture with no growth on gram stain     Patient extubated on  however reintubated on 3/1 after patient became tachypneic and noted to have stridor. ENT noted subglottic edema on bedside scope.     Of note, Follow up up MRI of the brain on   showing Right frontal peripherally enhancing epidural collection beneath the right frontal cranioplasty has reaccumulated since 10/6/2024, with diffusion restriction suspicious for an infected collection. Right frontal encephalomalacia and gliosis has evolved since the prior exam. Old left cerebellar infarct. Patent third ventriculostomy. Right frontal ventricular catheter. Normal intracranial MR angiography.    Patient taken to the OR on 3/1 for clarissa hole for evaluation of epidural collection and patient noted to have purulen fluid. Epidural drain placed. Drainage sent for cultures and gram stain. Plan  to go back to OR for better source control with plan to remove both  shunt and metal plate from cranioplasty next week.     ID called for abx management    Infectious workup:    U/A with 2 WBCs    blood cultures NGTD    partial RVP negative    blood cultures NGTD    RVP negative    U/A with 4 WBCs  with urine culture with no growth    CSF studies with TNC of 1 with hazy appearance of CSF. CSF culture with no growth on gram stain   3/1 RVP negative       Abx:   Zosyn ( -)   Cefazolin ordered to start today       REVIEW OF SYSTEMS  ANU carrington     prior hospital charts reviewed [V]  primary team notes reviewed [V]  other consultant notes reviewed [V]    PAST MEDICAL & SURGICAL HISTORY:  Asthma      Depression      Neoplasm of uncertain behavior of brain      Neurofibromatosis, type 1      History of hydrocephalus      Anxiety      Delivery with history of         S/P tubal ligation        History of arthroscopy of left knee  meniscus       S/P LASIK Surgery        H/O brain tumor      S/P  shunt          SOCIAL HISTORY:  Denied smoking/vaping/alcohol/recreational drug use    FAMILY HISTORY:      Allergies  No Known Allergies        ANTIMICROBIALS:  ceFAZolin   IVPB 2000 every 8 hours      ANTIMICROBIALS (past 90 days):  MEDICATIONS  (STANDING):    piperacillin/tazobactam IVPB..   25 mL/Hr IV Intermittent (25 @ 07:59)   25 mL/Hr IV Intermittent (25 @ 00:08)        OTHER MEDS:   MEDICATIONS  (STANDING):  acetaminophen   IVPB .. 1000 every 6 hours  albuterol/ipratropium for Nebulization 3 every 30 minutes  dexAMETHasone  IVPB 10 every 6 hours  dexMEDEtomidine Infusion 0.5 <Continuous>  HYDROmorphone  Injectable 0.2 every 4 hours PRN  HYDROmorphone  Injectable 0.5 every 4 hours PRN  levETIRAcetam   Injectable 1000 every 12 hours  pantoprazole  Injectable 40 daily      VITALS:  Vital Signs Last 24 Hrs  T(F): 98.4 (25 @ 08:00), Max: 102.3 (25 @ 08:00)    Vital Signs Last 24 Hrs  HR: 120 (25 @ 09:10) (98 - 131)  BP: 129/68 (25 @ 09:10) (125/67 - 163/89)  RR: 14 (25 @ 09:10)  SpO2: 97% (25 @ 09:10) (92% - 100%)  Wt(kg): --    EXAM:  General: intubated and sedated   HEENT: +SHAYY drain in place to right frotnal region with serosanguinous ouput   CV: +S1/S2, RRR  Lungs: +mechanical breath sounds bilaterally   Abd:  BS4+, Soft, NTND, no guarding  : No suprapubic tenderness  Neuro: intubated and sedated   Ext: No cyanosis, no edema  Skin: No rash, no phlebitis, No erythema       Labs:                        13.8   15.64 )-----------( 222      ( 01 Mar 2025 12:15 )             40.6         139  |  102  |  8   ----------------------------<  130[H]  4.3   |  23  |  0.28[L]    Ca    9.1      01 Mar 2025 12:15  Phos  2.7       Mg     1.70             WBC Trend:  WBC Count: 15.64 (25 @ 12:15)  WBC Count: 8.28 (25 @ 01:54)  WBC Count: 7.77 (25 @ 01:00)  WBC Count: 8.78 (25 @ 00:50)      Auto Neutrophil #: 3.40 K/uL (24 @ 05:22)  Auto Neutrophil #: 10.74 K/uL (24 @ 11:30)  Auto Neutrophil #: 13.06 K/uL (24 @ 06:50)      Creatine Trend:  Creatinine: 0.28 ()  Creatinine: 0.30 ()  Creatinine: 0.36 ()  Creatinine: 0.30 ()      Liver Biochemical Testing Trend:  Alanine Aminotransferase (ALT/SGPT): 18 ()  Alanine Aminotransferase (ALT/SGPT): 23 ()  Alanine Aminotransferase (ALT/SGPT): 23 (-)  Alanine Aminotransferase (ALT/SGPT): 15 (-12)  Alanine Aminotransferase (ALT/SGPT): 16 (-08)  Aspartate Aminotransferase (AST/SGOT): 12 (25 @ 03:25)  Aspartate Aminotransferase (AST/SGOT): 41 (25 @ 00:35)  Aspartate Aminotransferase (AST/SGOT): 12 (25 @ 16:25)  Aspartate Aminotransferase (AST/SGOT): 6 (24 @ 07:22)  Aspartate Aminotransferase (AST/SGOT): <5 (24 @ 06:10)  Bilirubin Total: 0.9 ()  Bilirubin Total: 0.8 ()  Bilirubin Total: 0.8 ()  Bilirubin Total: 0.6 ()  Bilirubin Total: 0.6 (-08)      Trend LDH  24 @ 05:54  176          MICROBIOLOGY:    MRSA PCR Result.: NotDetec (25 @ 00:35)  MRSA PCR Result.: NotDetec (24 @ 13:45)      Culture - CSF with Gram Stain (collected 2025 17:14)  Source: CSF CSF    Culture - Urine (collected 2025 19:00)  Source: Catheterized Catheterized  Final Report:    No growth    Culture - Blood (collected 2025 14:20)  Source: .Blood Blood-Peripheral  Preliminary Report:    No growth at 48 Hours    Culture - Blood (collected 2025 14:00)  Source: .Blood Blood-Peripheral  Preliminary Report:    No growth at 48 Hours    Urinalysis with Rflx Culture (collected 2025 17:56)    Culture - Blood (collected 2025 16:30)  Source: .Blood Blood  Final Report:    No growth at 5 days    Culture - Blood (collected 2025 16:20)  Source: .Blood Blood  Final Report:    No growth at 5 days    Culture - Fungal, CSF (collected 2024 14:00)  Source: .CSF CSF  Final Report:    No fungus isolated at 4 weeks.    Culture - CSF with Gram Stain (collected 2024 14:00)  Source: .CSF CSF  Final Report:    No growth at 14 days.    Culture - CSF with Gram Stain (collected 2024 11:11)  Source: .CSF CSF LUMBAR  Final Report:    No growth at 5 days                                              Blood Gas Arterial, Lactate: 1.6 ( @ 12:15)  Blood Gas Arterial, Lactate: 1.1 ( @ 08:10)        RADIOLOGY:    ACC: [id number] EXAM:  MR BRAIN WAW IC W CSF FLOW   ORDERED BY: MARYBETH TABOR     ACC: [id number] EXAM:  MR ANGIO BRAIN   ORDERED BY: ZACHERY PAINTER     PROCEDURE DATE:  2025          INTERPRETATION:  CLINICAL INDICATION: Recurrent epidural collection   evaluate for infection, and S1      Magnetic resonance imaging of the brain was carried out with transaxial   SPGR, FLAIR, fast spin echo T2 weighted images, axial susceptibility   weighted series, diffusion weighted series andsagittal T1 weighted   series on a 1.5 Ev magnet. Post contrast axial, coronal and sagittal   T1 weighted images were obtained. 6.5 cc of Gadavist were intravenously   injected, 1.0 cc were discarded. Sagittal cine flow and sagittal CISS   study. 3-D time-of-flight MR angiography was obtained.        Comparison is made with the prior brain CT of 2025 and the MRI of   2024.    There has been a right frontal craniotomy. There is an epidural fluid   collection beneath the craniotomy flap which is larger when compared with   2024. This collection measures 17 mm in depth compared to the   previous of 3.4 mm. There is mild peripheral enhancement. The collection   does not suppress with T2 FLAIR images and demonstrates diffusion   restriction. In view of these factors and directed collection should be   considered. A tiny right parietal subdural collection is identified with   mild dural     Encephalomalacia and gliosis in the left frontal cortex is similar   compared to the previous exam. There is less enhancement along the   posterior aspect of the surgical cavity.    A right frontal ventricular catheter is identified with its tip in the   right frontal horn. The ventricles are enlarged but are unchanged since   the priorexam.    Signal dropout overlying the right frontal region is related to the   presence of a programmable shunt. Defect in the floor of the third   ventricle is identified consistent with a third ventriculostomy which is   patent on sagittal cine flow study.    An old left cerebellar infarct is identified. Nonspecific signal   hyperintensity in the cerebellum in the region of the dentate nucleus may   be related to neurofibromatosis hamartomas. No acute infarcts are seen.   No abnormal enhancing lesions.      Cervical, petrous, cavernous and supraclinoid internal carotid arteries,   anterior and middle cerebral artery branches are unremarkable. There is   no evidence of aneurysm, stenosis, or vasculitis. The distal vertebral   arteries, and region of the vertebral basilar confluence are   unremarkable. The basilar artery, superior cerebellar arteries and   posterior cerebral arteries are unremarkable.    IMPRESSION: Right frontal peripherally enhancing epidural collection   beneath the right frontal cranioplasty has reaccumulated since 10/6/2024,   with diffusion restriction suspicious for an infected collection. Right   frontal encephalomalacia and gliosis has evolved since the prior exam.   Old left cerebellar infarct. Patent third ventriculostomy. Right frontal   ventricular catheter.    Normal intracranial MR angiography.    Dr. Knox discussed these findings with Dr. Brush on 2025 4:16   PM with read back.    --- End of Report ---            JANUSZ KNOX MD; AttendingRadiologist  This document has been electronically signed. 2025  4:18PM      ACC: [id number] EXAM:  CT ANGIO BRAIN (W)AW IC   ORDERED BY: ZACHERY PAINTER     ACC: [id number] EXAM:  CT ANGIO NECK (W)AW IC   ORDERED BY: ZACHERY PAINTER     PROCEDURE DATE:  2025          INTERPRETATION:  CT angiography of the brain and neck    CLINICAL INDICATION: Left hemiparesis    TECHNIQUE: Direct axial CT scanning of the brain and neck was obtained   from the vertex to the level of the clavicular heads after the dynamic   intravenous injection of 90 cc of Omnipaque 350. 10 cc discarded.   Sagittal and coronal maximum intensity projection reformats were   provided.  Three-dimensional reconstructions were performed by the   radiologist using the [employer] workstation.    Additionally, precontrast and postcontrast axial CT scanning of the brain   was obtained from the skull base to the vertex. Sagittal and coronal   reformats were provided.    COMPARISON: CT brain 2025. MRI brain 2024.    FINDINGS:    CT BRAIN:    Redemonstration of right pterional craniectomy and cranioplasty. Similar   low density extra-axial collection subjacent to the right pterional   cranioplasty measures 1.5 cm in greatest depth.    Redemonstration of right frontal approach ventriculostomy catheter in   unchanged position. Pericatheter gliosis is similar. Ventricles are   similar in size and mildly dilated. No significant midline shift at the   level of the foramen of De La Paz.    Encephalomalacia and gliosis in the right lateral frontal lobe is   redemonstrated.    Similar thickening of the septum pellucidum.    No acute intracranial hemorrhage or brain edema.    No abnormal intracranial enhancement.    The visualized paranasal sinuses and mastoid air cells are clear.    CTA BRAIN:    Normal flow-related enhancement of the skull base/intracranial internal   carotid arteries.    Normal flow-related enhancement of the bilateral anterior, middle, and   posterior cerebral arteries.    Normal flow-related enhancement of the bilateral intradural vertebral   arteries and the basilar artery.    No flow-limiting stenosis or vascular aneurysm. No AVM.    CTA NECK:    Normal flow-related enhancement of the bilateral common and internal   carotid arteries.    Normal flow-related enhancement of the bilateral vertebral arteries.    No flow-limiting stenosis, evidence for arterial dissection, or vascular   aneurysm.    Visualized lungs clear. Low-density lesion in the right mesial thyroid   lobe with peripheral calcifications.    IMPRESSION:    CT brain:  No acuteintracranial hemorrhage, mass effect, or brain edema.  Similar mild ventriculomegaly.  Similar low density extra-axial collection subjacent to the right   pterional cranioplasty.  No abnormal enhancement.    CTA brain:  No flow-limiting stenosis or vascular aneurysm. No AVM.    CTA neck:  No flow-limiting stenosis, evidence for arterial dissection, or vascular   aneurysm.    --- End of Report ---            SATURNINO MORTON MD; Attending Radiologist  This document has been electronically signed. 2420  6:35PM

## 2025-03-01 NOTE — PROGRESS NOTE ADULT - PROBLEM SELECTOR PLAN 1
- neurochecks Q1H  - ID consult, needs broader spectrum coverage  - ween to extubate  - con't AED- keppra  - f/u OR cultures and CSF cultures  - record SHAYY output Q shift  - DVT ppx- venodynes BLE  - re- examine in a few hours

## 2025-03-01 NOTE — PROGRESS NOTE ADULT - SUBJECTIVE AND OBJECTIVE BOX
POC- s/p right clarissa hole  for evacuation of epidural collection    Patient intubated/sedated on precedex, No significant events since arrival to SICU, post op CT head shows epidural fluid collection decreased in size, no ICH, will f/u with official read.  SHAYY drain in place, approx 10cc output.     HPI:  58 yo F with PMHx of neurofibromatosis, s/p L suboccipital craniectomy for resection of cerebellar brain tumor 2023 by Dr Brush, s/p traumatic fall with head trauma in 2024, at that time sustained a large acute right SDH, and underwent an emergent right decompressive hemicraniectomy on 24, s/p right cranioplasty on 24 with custom plate, s/p ETV, ligation of shunt on the right with ligaclips with retention of valve as rescue device, presents as a transfer from [facility] for witnessed seizure at home. CTH obtained at [facility] revealed stable  shunt and moderate ventriculomegaly and slight increase in size of extra-axial CSF collection overlying the right frontal duraplasty, likely a hygroma and no mass effect.     PAST MEDICAL & SURGICAL HISTORY:  Asthma  Depression  neoplasm of uncertain behavior of brain  Neurofibromatosis, type 1  History of hydrocephalus  Anxiety  delivery with history of ,   S/P tubal ligation,   History of arthroscopy of left knee, meniscus   S/P LASIK Surgery,   H/O brain tumor  S/P  shunt    Allergies  No Known Allergies    Vital Signs Last 24 Hrs  T(C): 36.9 (01 Mar 2025 08:00), Max: 36.9 (2025 16:00)  T(F): 98.4 (01 Mar 2025 08:00), Max: 98.5 (2025 19:00)  HR: 120 (01 Mar 2025 09:10) (98 - 131)  BP: 129/68 (01 Mar 2025 09:10) (125/67 - 163/89)  BP(mean): 86 (01 Mar 2025 09:10) (81 - 113)  RR: 14 (01 Mar 2025 09:10) (11 - 23)  SpO2: 97% (01 Mar 2025 09:10) (92% - 100%)    Parameters below as of 01 Mar 2025 09:00  Patient On (Oxygen Delivery Method): ventilator    O2 Concentration (%): 40  I&O's Summary    2025 07:  -  01 Mar 2025 07:00  --------------------------------------------------------  IN: 1200 mL / OUT: 200 mL / NET: 1000 mL    01 Mar 2025 07:01  -  01 Mar 2025 13:38  --------------------------------------------------------  IN: 250 mL / OUT: 0 mL / NET: 250 mL    PHYSICAL EXAM: intubated/sedated  opens eys to verbal, PERRL, not following commands  motor-withdraws in all extremities to light noxious, antigravity  incision site: C/D/I      MEDICATIONS  (STANDING):  acetaminophen   IVPB .. 1000 milliGRAM(s) IV Intermittent every 6 hours  albuterol/ipratropium for Nebulization 3 milliLiter(s) Nebulizer every 30 minutes  ceFAZolin   IVPB 2000 milliGRAM(s) IV Intermittent every 8 hours  chlorhexidine 2% Cloths 1 Application(s) Topical daily  dexAMETHasone  IVPB 10 milliGRAM(s) IV Intermittent every 6 hours  dexMEDEtomidine Infusion 0.5 MICROgram(s)/kG/Hr (8.31 mL/Hr) IV Continuous <Continuous>  lactated ringers. 1000 milliLiter(s) (100 mL/Hr) IV Continuous <Continuous>  levETIRAcetam   Injectable 1000 milliGRAM(s) IV Push every 12 hours  pantoprazole  Injectable 40 milliGRAM(s) IV Push daily    MEDICATIONS  (PRN):  HYDROmorphone  Injectable 0.2 milliGRAM(s) IV Push every 4 hours PRN Moderate Pain (4 - 6)  HYDROmorphone  Injectable 0.5 milliGRAM(s) IV Push every 4 hours PRN Severe Pain (7 - 10)  lidocaine   4% Patch 1 Patch Transdermal daily PRN back pain    LABS:                        13.8   15.64 )-----------( 222      ( 01 Mar 2025 12:15 )             40.6     03-01    139  |  102  |  8   ----------------------------<  130[H]  4.3   |  23  |  0.28[L]    Ca    9.1      01 Mar 2025 12:15  Phos  2.7     03-01  Mg     1.70     03-01      PT/INR - ( 01 Mar 2025 12:15 )   PT: 13.0 sec;   INR: 1.12 ratio         PTT - ( 01 Mar 2025 12:15 )  PTT:40.4 sec  Urinalysis Basic - ( 01 Mar 2025 12:15 )    Color: x / Appearance: x / SG: x / pH: x  Gluc: 130 mg/dL / Ketone: x  / Bili: x / Urobili: x   Blood: x / Protein: x / Nitrite: x   Leuk Esterase: x / RBC: x / WBC x   Sq Epi: x / Non Sq Epi: x / Bacteria: x

## 2025-03-01 NOTE — PROGRESS NOTE ADULT - ASSESSMENT
59 year old female with PMHx of neurofibromatosis, 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), s/p  shunt (in [facility]), s/p traumatic fall with head trauma and R SDH, s/p R decompressive hemicraniectomy, ICP monitor and R cranioplasty (5/2024), s/p L frontal Endoscopic third ventriculostomy and ligation of R  shunt with ligaclips with retention of valve, proximal and distal catheters (08/2024), Anxiety and depression presents to the ED at [place] with status epilepticus. CTH showed stable  shunt and moderate ventriculomegaly, slight increase in size of extra-axial CSF collection overlying the right frontal duraplasty, likely a hygroma. There is no mass effect. Transferred to [facility] for Neurosurgery evaluation, r/o infective etiology. SICU consulted for vent management & Q1 neurochecks. VEEG showed no seizure activity. MRI head showed R frontal epidural collection. Plan for OR with neurosurgery 3/1.      Neurologic:   - Q1h neurochecks  - VEEG completed, no seizures   - wean precedex/fent  - Keppra 1g BID  - Pain control: IV Tylenol, dilaudid PRN   - CTA head 2/24 wnl  - MR head w CSF flow 2/28    Respiratory:   - Extubated 2/27    Cardiovascular:   - MAP > 65    Gastrointestinal/Nutrition:   - Regular diet    Renal/Genitourinary:   - Monitor electrolytes     Hematologic:   - DVT PPx: SqH (held for OR)    Infectious Disease:   - Monitor WBC, fever curves   - Zosyn (presumed aspiration pneumonia) 7/23/24 - 7/24/24  - blood cx sent x2 ([place]) > NG @ 5d    Endocrine:   - Monitor glucose     Tubes/Lines/Drains:   - PIV  - Dominguez -> primafit    Disposition: SICU 59 year old female with PMHx of neurofibromatosis, 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), s/p  shunt (in [facility]), s/p traumatic fall with head trauma and R SDH, s/p R decompressive hemicraniectomy, ICP monitor and R cranioplasty (5/2024), s/p L frontal Endoscopic third ventriculostomy and ligation of R  shunt with ligaclips with retention of valve, proximal and distal catheters (08/2024), Anxiety and depression presents to the ED at [place] with status epilepticus. CTH showed stable  shunt and moderate ventriculomegaly, slight increase in size of extra-axial CSF collection overlying the right frontal duraplasty, likely a hygroma. There is no mass effect. Transferred to [facility] for Neurosurgery evaluation, r/o infective etiology. SICU consulted for vent management & Q1 neurochecks. VEEG showed no seizure activity. MRI head showed R frontal epidural collection. Plan for OR with neurosurgery 3/1.      Interval events:  - MR right frontal epidural collection -> OR 3/1 with neurosurgery  - Prior to OR, pt with stridor, increased WOB -> seen by ENT, intubated by anesthesia SICU immediately prior to OR, started on decadron 10  - ABG, CXR ordered for stridor, RVP sent    Neurologic:   - Q1h neurochecks  - VEEG completed, no seizures   - wean precedex/fent  - Keppra 1g BID  - Pain control: IV Tylenol, dilaudid PRN   - CTA head 2/24 wnl  - MR head w CSF flow 2/28 -> OR 3/1    Respiratory:   - Extubated 2/27  - Intubated 3/1 in SICU for stridor prior to OR  - Decadron 10mg q6h for upper airway edema    Cardiovascular:   - MAP > 65    Gastrointestinal/Nutrition:   - Regular diet (NPO for OR)    Renal/Genitourinary:   - Monitor electrolytes    Hematologic:   - DVT PPx: SqH (held for OR)    Infectious Disease:   - Monitor WBC, fever curves   - Zosyn (presumed aspiration pneumonia) 7/23/24 - 7/24/24  - blood cx sent x2 ([place]) > NG @ 5d  - f/u 3/1 RVP    Endocrine:   - Monitor glucose  - Decadron 10mg q6h    Tubes/Lines/Drains:   - PIV  - Dominguez -> primafit    Disposition: SICU

## 2025-03-01 NOTE — PROGRESS NOTE ADULT - PROBLEM SELECTOR PLAN 1
Neurologic checks Q1H  Continue keppra  Preop for evacuation of epidural collection today  Keep NPO  Follow up confirmatory type and screen

## 2025-03-01 NOTE — CONSULT NOTE ADULT - ATTENDING COMMENTS
I agree with the history, physical, and plan, which I have reviewed and edited where appropriate.  I agree with notes/assessment of health care providers on my service.  I have personally examined the patient.  I was physically present for the key portions of the evaluation and management (E/M) service provided.  I reviewed data and laboratory tests/x-rays and all pertinent electronic images.  The patient is a critical care patient with life threatening hemodynamic and metabolic instability in SICU.  Risk benefit analyses discussed.    The patient is in SICU with diagnosis mentioned in the note.    The plan is specified below.      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 CTH showed stable  shunt and moderate ventriculomegaly, slight increase in size of extra-axial CSF collection overlying the right frontal duraplasty, likely a hygroma. There is no mass effect. Transferred to [facility] for Neurosurgery evaluation, r/o infective etiology. SICU consulted for vent management & Q1 neurochecks.       PLAN:      Neurologic: seizure disorder   - Q1 neurochecks  - Sedated on precedex, transition to propofol   - Keppra 1g BID  - Pain control: IV Tylenol, dilaudid PRN   - MR head w CSF flow   - VEEG     Respiratory: intubated for airway protection   - Intubated 16/450/5/40  - check ABG, adjust vent as needed     Cardiovascular:   - on levo     Gastrointestinal/Nutrition:   - NPO    Renal/Genitourinary:   - LR@100  - storey     Hematologic:   - Hep sub q     Infectious Disease:   - Zosyn (presumed aspiration pneumonia)   - blood cx sent x2 ([place])     Endocrine:   - Monitor glucose
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  Initially CTH w/ slight increase in extra axial fluid collection measuring up to 1.5 cm. Pt was transferred to [facility] on 2/23 for neurosurgery eval, given decreased L sided strength, MRI done with increase in R sided collection.  Pt extubated on 2/28, CSF reservoir tapped, no pleocytosis.    Pt went to the OR on 3/1 for R clarissa pole with evacuation of epidural collection, with purulent drainage.     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), s/p  shunt (in [facility]), s/p traumatic fall w/ R SDH, s/p R decompressive hemicraniectomy, ICP monitor and R cranioplasty (5/2024), s/p L frontal Endoscopic third ventriculostomy and ligation of R  shunt with ligaclips with retention of valve, proximal and distal catheters (08/2024) admitted initially to [facility] on 2/22 for seizure, intubated for airway protection and status epilepticus, initially on Zosyn for aspiration PNA, transferred to [facility] on 2/23 for neurosurgery evaluation, now found to with have epidural abscess s/p R Clarissa w/ purulent drainage.    Recommendations:   1. Vancomycin, -600, Cefepime 2 g q8, Flagyl 500 mg q8   2. F/u OR Cx   3. F/u neurosurgery plan for further surgery     D/w partner at bedside     Thank you for consulting us and involving us in the management of this patient's case. In addition to reviewing history, imaging, documents, labs, microbiology, and infection control strategies and potential issues.     ID will continue to follow    Inder Mcintosh M.D.  Attending Physician  Division of Infectious Diseases  Department of Medicine    Please contact through MS Teams message.  Office: [phone] (after 5 PM or weekend)
59-year-old woman with complex neurosurgical history, admitted following multiple seizures.  On exam despite propofol she is awake and able to follow some simple commands, more briskly in right upper and lower extremity compared to left.  EEG is pending.  Continue Keppra 1g BID for now, will adjust as needed pending EEG result.  Rest of plan per neurosurgery.

## 2025-03-01 NOTE — PROGRESS NOTE ADULT - SUBJECTIVE AND OBJECTIVE BOX
SICU Daily Progress Note  =====================================================  Interval/Overnight Events:   - Hold subq hep for OR tomorrow   - Q1 neurochecks, vitals in anticipation for OR   - Seroquel 25x1 for agitation     MEDICATIONS:   --------------------------------------------------------------------------------------  acetaminophen     Tablet .. 1000 milliGRAM(s) Oral every 6 hours  albuterol/ipratropium for Nebulization 3 milliLiter(s) Nebulizer every 30 minutes  chlorhexidine 2% Cloths 1 Application(s) Topical daily  levETIRAcetam   Injectable 1000 milliGRAM(s) IV Push every 12 hours  lidocaine   4% Patch 1 Patch Transdermal daily PRN  melatonin 3 milliGRAM(s) Oral at bedtime  mirtazapine 7.5 milliGRAM(s) Oral daily  pantoprazole  Injectable 40 milliGRAM(s) IV Push daily  polyethylene glycol 3350 17 Gram(s) Oral daily  senna Syrup 10 milliLiter(s) Oral at bedtime  sertraline 50 milliGRAM(s) Oral daily    --------------------------------------------------------------------------------------    VITAL SIGNS, INS/OUTS (last 24 hours):  --------------------------------------------------------------------------------------  T(C): 36.9 (02-28-25 @ 19:00), Max: 37.1 (02-28-25 @ 04:00)  HR: 124 (02-28-25 @ 23:00) (80 - 124)  BP: 150/77 (02-28-25 @ 23:00) (94/68 - 158/105)  RR: 15 (02-28-25 @ 23:00) (13 - 24)  SpO2: 98% (02-28-25 @ 23:00) (92% - 100%)      02-27-25 @ 07:01  -  02-28-25 @ 07:00  --------------------------------------------------------  IN: 301.7 mL / OUT: 1200 mL / NET: -898.3 mL    02-28-25 @ 07:01  -  03-01-25 @ 01:47  --------------------------------------------------------  IN: 300 mL / OUT: 200 mL / NET: 100 mL      --------------------------------------------------------------------------------------    EXAM  NEUROLOGY  Exam: Normal, NAD, alert    HEENT  Exam: Normocephalic, prior craniotomy scar     RESPIRATORY  Exam: Normal expansion/effort      CARDIOVASCULAR  Exam: Regular rate, normotensive    GI/NUTRITION  Exam: Abdomen soft, Non-tender, Non-distended    VASCULAR  Exam: Extremities warm, pink, well-perfused    MUSCULOSKELETAL  Exam: All extremities moving spontaneously without limitations    SKIN  Exam: Good skin turgor, no skin breakdown    TUBES/LINES/DRAINS  [x] Peripheral IV  [] Central Venous Line     	[] R	[] L	[] IJ	[] Fem	[] SC	Date Placed:   [] Arterial Line		[] R	[] L	[] Fem	[] Rad	[] Ax	Date Placed:   [] PICC		[] Midline		[] Mediport  [] Urinary Catheter		Date Placed:     LABS  --------------------------------------------------------------------------------------    --------------------------------------------------------------------------------------    IMAGING STUDIES:      SICU Daily Progress Note  =====================================================  Interval/Overnight Events:   - Hold subq hep for OR tomorrow   - Q1 neurochecks, vitals in anticipation for OR   - Seroquel 25x1 for agitation     MEDICATIONS:   --------------------------------------------------------------------------------------  acetaminophen     Tablet .. 1000 milliGRAM(s) Oral every 6 hours  albuterol/ipratropium for Nebulization 3 milliLiter(s) Nebulizer every 30 minutes  chlorhexidine 2% Cloths 1 Application(s) Topical daily  levETIRAcetam   Injectable 1000 milliGRAM(s) IV Push every 12 hours  lidocaine   4% Patch 1 Patch Transdermal daily PRN  melatonin 3 milliGRAM(s) Oral at bedtime  mirtazapine 7.5 milliGRAM(s) Oral daily  pantoprazole  Injectable 40 milliGRAM(s) IV Push daily  polyethylene glycol 3350 17 Gram(s) Oral daily  senna Syrup 10 milliLiter(s) Oral at bedtime  sertraline 50 milliGRAM(s) Oral daily    --------------------------------------------------------------------------------------    VITAL SIGNS, INS/OUTS (last 24 hours):  --------------------------------------------------------------------------------------  T(C): 36.9 (02-28-25 @ 19:00), Max: 37.1 (02-28-25 @ 04:00)  HR: 124 (02-28-25 @ 23:00) (80 - 124)  BP: 150/77 (02-28-25 @ 23:00) (94/68 - 158/105)  RR: 15 (02-28-25 @ 23:00) (13 - 24)  SpO2: 98% (02-28-25 @ 23:00) (92% - 100%)      02-27-25 @ 07:01  -  02-28-25 @ 07:00  --------------------------------------------------------  IN: 301.7 mL / OUT: 1200 mL / NET: -898.3 mL    02-28-25 @ 07:01  -  03-01-25 @ 01:47  --------------------------------------------------------  IN: 300 mL / OUT: 200 mL / NET: 100 mL      --------------------------------------------------------------------------------------    EXAM  NEUROLOGY  Exam: Normal, NAD, alert    HEENT  Exam: Normocephalic, prior craniotomy scar     RESPIRATORY  Exam: Increased WOB, worsening stridor    CARDIOVASCULAR  Exam: Tachycardic, normotensive    GI/NUTRITION  Exam: Abdomen soft, Non-tender, Non-distended    VASCULAR  Exam: Extremities warm, pink, well-perfused    MUSCULOSKELETAL  Exam: All extremities moving spontaneously without limitations    SKIN  Exam: Good skin turgor, no skin breakdown    TUBES/LINES/DRAINS  [x] Peripheral IV  [] Central Venous Line     	[] R	[] L	[] IJ	[] Fem	[] SC	Date Placed:   [] Arterial Line		[] R	[] L	[] Fem	[] Rad	[] Ax	Date Placed:   [] PICC		[] Midline		[] Mediport  [] Urinary Catheter		Date Placed:     LABS  --------------------------------------------------------------------------------------    --------------------------------------------------------------------------------------    IMAGING STUDIES:

## 2025-03-01 NOTE — CONSULT NOTE ADULT - TIME BILLING
Reviewing the chart, interpreting lab data, discussing case with fellow, interview and examination of patient, and documentation. Pt is at high risk of mortality due to her disease.

## 2025-03-01 NOTE — PROGRESS NOTE ADULT - SUBJECTIVE AND OBJECTIVE BOX
Patient calmer  MRI performed, showing increase in right epidural collection with diffusion restriction  Patient for evacuation today  ICU Vital Signs Last 24 Hrs  T(C): 36.9 (01 Mar 2025 00:00), Max: 36.9 (28 Feb 2025 16:00)  T(F): 98.4 (01 Mar 2025 00:00), Max: 98.5 (28 Feb 2025 19:00)  HR: 122 (01 Mar 2025 03:00) (82 - 129)  BP: 155/85 (01 Mar 2025 03:00) (103/58 - 158/105)  BP(mean): 105 (01 Mar 2025 03:00) (71 - 113)  ABP: --  ABP(mean): --  RR: 11 (01 Mar 2025 03:00) (11 - 24)  SpO2: 97% (01 Mar 2025 03:00) (92% - 100%)    O2 Parameters below as of 01 Mar 2025 00:00  Patient On (Oxygen Delivery Method): room air    AAO X 3  PERRLA, EOMI  CN 2-12 grossly intact  CLARK strength 5/5  SILT    MEDICATIONS  (STANDING):  acetaminophen     Tablet .. 1000 milliGRAM(s) Oral every 6 hours  albuterol/ipratropium for Nebulization 3 milliLiter(s) Nebulizer every 30 minutes  chlorhexidine 2% Cloths 1 Application(s) Topical daily  lactated ringers. 1000 milliLiter(s) (100 mL/Hr) IV Continuous <Continuous>  levETIRAcetam   Injectable 1000 milliGRAM(s) IV Push every 12 hours  melatonin 3 milliGRAM(s) Oral at bedtime  mirtazapine 7.5 milliGRAM(s) Oral daily  pantoprazole  Injectable 40 milliGRAM(s) IV Push daily  polyethylene glycol 3350 17 Gram(s) Oral daily  potassium chloride  10 mEq/100 mL IVPB 10 milliEquivalent(s) IV Intermittent every 1 hour  senna Syrup 10 milliLiter(s) Oral at bedtime  sertraline 50 milliGRAM(s) Oral daily    MEDICATIONS  (PRN):  lidocaine   4% Patch 1 Patch Transdermal daily PRN back pain                          14.3   8.28  )-----------( 158      ( 01 Mar 2025 01:54 )             44.2       03-01    142  |  102  |  12  ----------------------------<  111[H]  3.2[L]   |  25  |  0.36[L]    Ca    9.7      01 Mar 2025 01:54  Phos  3.4     03-01  Mg     1.90     03-01    PT/INR - ( 01 Mar 2025 01:54 )   PT: 12.2 sec;   INR: 1.05 ratio         PTT - ( 01 Mar 2025 01:54 )  PTT:40.9 sec    Type + Screen (07.19.24 @ 06:56)    ABO Interpretation: O   Rh Interpretation: Negative   Antibody Screen: Negative    Culture - CSF with Gram Stain . (02.28.25 @ 17:14)    Gram Stain:   No polymorphonuclear cells seen  No organisms seen  by cytocentrifuge   Specimen Source: CSF CSF

## 2025-03-01 NOTE — CONSULT NOTE ADULT - SUBJECTIVE AND OBJECTIVE BOX
OTOLARYNGOLOGY (ENT) CONSULTATION NOTE    PATIENT: AIXA FULLER     MRN: [id number]       : 65  DATE OF ADMISSION:25  DATE OF SERVICE:  25 @ 09:13    HISTORY OF PRESENT ILLNESS:  AIXA FULLER  is a 59y Female with hx of neurofibromatosis and multiple neurosurgical procedures, most recently two days ago and required intubation. She was extubated two days ago, and now presents with stridor since 1 AM, ENT consulted for further recs. She has no history of smoking, COPD, or asthma. She was recently intubated 2 days ago. No CXR or ABG from today. She is requiring 3L of oxygen and endorses dyspnea. Has been NPO.     PAST MEDICAL HISTORY:  Asthma    Depression    Neoplasm of uncertain behavior of brain    Neurofibromatosis, type 1    Neurofibromatosis    History of hydrocephalus    Anxiety        Vital Signs:  T(C): 36.7 (25 @ 04:00), Max: 36.9 (25 @ 16:00)  HR: 115 (25 @ 06:00) (86 - 129)  BP: 137/80 (25 @ 06:00) (111/56 - 163/89)  RR: 15 (25 @ 06:00) (11 - 24)  SpO2: 94% (25 @ 06:00) (92% - 100%)                        14.3   8.28  )-----------( 158      ( 01 Mar 2025 01:54 )             44.2    -    140  |  102  |  11  ----------------------------<  124[H]  3.5   |  25  |  0.30[L]    Ca    9.2      01 Mar 2025 05:00  Phos  3.0     03-  Mg     1.80     -     PT/INR - ( 01 Mar 2025 01:54 )   PT: 12.2 sec;   INR: 1.05 ratio         PTT - ( 01 Mar 2025 01:54 )  PTT:40.9 [id number]    REVIEW OF SYSTEMS: The patient was asked and responded to a review of systems regarding the following symptoms: constitutional, eye, ears, nose, mouth, throat, cardiovascular, respiratory. Pertinent factors have been included in the HPI.     PHYSICAL EXAMINATION:  Awake and alert  + inspiratory stridor on 3L NC   OC/OP: tongue wnl, OC/OP wnl, uvula midline   Neck: soft, flat, not tender and no lymphadenopathy or masses   NC: clear anteriorly   Scope: Nasal cavities and nasopharynx clear. BOT, vallecula WNL, epiglottis sharp. Arytenoids, hypopharynx without swelling or edema. VCs visualized from apex to base, no masses or lesions, b/l mobile. Subglottis with edema.   RADIOLOGY    ASSESSMENT/PLAN:    IMPRESSION: AIXA FULLER  is a 59y Female with hx of neurofibromatosis and multiple neurosurgical procedures, most recently two days ago and required intubation. She was extubated two days ago, and now presents with stridor since 1 AM, ENT consulted for further recs. She has no history of smoking, COPD, or asthma. She was recently intubated 2 days ago. No CXR or ABG from today. She is requiring 3L of oxygen and endorses dyspnea. On exam, she has inspiratory stridor and appears slightly uncomfortable. Scope shows subglottic edema, but airway patent and intubatable with smaller ETT or MLT. Likely airway edema 2/2 recent intubation.    - decadron 10 q6 for 24-48 hrs   -  if requires increasing oxygen, can first try bipap or heliox (these are better in narrowed upper airway)  - scope indicates airway patent and intubatable from aboe, try MLT or smaller ETT  - keep NPO while patient has resp complaints and requiring oxygen   - CXR, ABG, RPP

## 2025-03-01 NOTE — PROGRESS NOTE ADULT - ASSESSMENT
58 yo F with PMHx of neurofibromatosis, s/p L suboccipital craniectomy for resection of cerebellar brain tumor 12/2023 by Dr Brush, s/p traumatic fall with head trauma in 5/20/2024, at that time sustained a large acute right SDH, and underwent an emergent right decompressive hemicraniectomy on 5/22/24, s/p right cranioplasty on 6/17/24 with custom plate, s/p ETV, ligation of shunt on the right with ligaclips with retention of valve as rescue device, presents as a transfer from [facility] for witnessed seizure at home. CTH obtained at [facility] revealed stable  shunt and moderate ventriculomegaly and slight increase in size of extra-axial CSF collection overlying the right frontal duraplasty, likely a hygroma and no mass effect.    2/24 Exam with decreased strength on left side, post ictal vs intracranial causes, will get MRI/MRA to assess and CTH/CTA if MRI will be delayed. On zosyn, white count downtrending.   2/25:  CTH/ CTA done stable , CTA negative, MRIw/flow/MRA pending, intubated /sedated, neurology saw Veeg-O, on keppra, K+3.3 repleted, afbrile, bld cltr NGTD, UA neg, WBC nml  2/26: stable exam,  Veeg on, keppra, mri w/flow/ mra pending  2/27: Stable exam. No seizures noted on VEEG. On keppra. MRI with CSF flow study and MRA ordered  2/28: Extubated, alert, mildly confused and restless, nonfocal exam. On keppra. MRI with CSF flow study and MRA ordered  3/1: Alert, neurologically intact. On keppra. MRI showed increase in right epidural collection with diffusion restriction. Preop for evacuation today

## 2025-03-01 NOTE — PROGRESS NOTE ADULT - ASSESSMENT
58 y/o F, with NF1, h/o craniectomy/ cranioplasty for SDH, s/p right clarissa hole for evacuation of epidural collection (purulent fluid)    2/24 Exam with decreased strength on left side, post ictal vs intracranial causes, will get MRI/MRA to assess and CTH/CTA if MRI will be delayed. On zosyn, white count downtrending.   2/25:  CTH/ CTA done stable , CTA negative, MRIw/flow/MRA pending, intubated /sedated, neurology saw Veeg-O, on keppra, K+3.3 repleted, afbrile, bld cltr NGTD, UA neg, WBC nml  2/26: stable exam,  Veeg on, keppra, mri w/flow/ mra pending  2/27: Stable exam. No seizures noted on VEEG. On keppra. MRI with CSF flow study and MRA ordered  2/28: Extubated, alert, mildly confused and restless, nonfocal exam. On keppra. MRI with CSF flow study and MRA ordered  3/1: Alert, neurologically intact. On keppra. MRI showed increase in right epidural collection with diffusion restriction. Preop for evacuation today

## 2025-03-02 LAB
ALBUMIN SERPL ELPH-MCNC: 3.2 G/DL — LOW (ref 3.3–5)
ALP SERPL-CCNC: 74 U/L — SIGNIFICANT CHANGE UP (ref 40–120)
ALT FLD-CCNC: 9 U/L — SIGNIFICANT CHANGE UP (ref 4–33)
ANION GAP SERPL CALC-SCNC: 14 MMOL/L — SIGNIFICANT CHANGE UP (ref 7–14)
AST SERPL-CCNC: 10 U/L — SIGNIFICANT CHANGE UP (ref 4–32)
BILIRUB SERPL-MCNC: 0.6 MG/DL — SIGNIFICANT CHANGE UP (ref 0.2–1.2)
BLOOD GAS ARTERIAL - LYTES,HGB,ICA,LACT RESULT: SIGNIFICANT CHANGE UP
BLOOD GAS ARTERIAL COMPREHENSIVE RESULT: SIGNIFICANT CHANGE UP
BUN SERPL-MCNC: 12 MG/DL — SIGNIFICANT CHANGE UP (ref 7–23)
CALCIUM SERPL-MCNC: 9 MG/DL — SIGNIFICANT CHANGE UP (ref 8.4–10.5)
CHLORIDE SERPL-SCNC: 101 MMOL/L — SIGNIFICANT CHANGE UP (ref 98–107)
CO2 SERPL-SCNC: 20 MMOL/L — LOW (ref 22–31)
CREAT SERPL-MCNC: 0.35 MG/DL — LOW (ref 0.5–1.3)
EGFR: 118 ML/MIN/1.73M2 — SIGNIFICANT CHANGE UP
EGFR: 118 ML/MIN/1.73M2 — SIGNIFICANT CHANGE UP
GLUCOSE BLDC GLUCOMTR-MCNC: 112 MG/DL — HIGH (ref 70–99)
GLUCOSE BLDC GLUCOMTR-MCNC: 115 MG/DL — HIGH (ref 70–99)
GLUCOSE BLDC GLUCOMTR-MCNC: 124 MG/DL — HIGH (ref 70–99)
GLUCOSE BLDC GLUCOMTR-MCNC: 149 MG/DL — HIGH (ref 70–99)
GLUCOSE SERPL-MCNC: 152 MG/DL — HIGH (ref 70–99)
HCT VFR BLD CALC: 32.9 % — LOW (ref 34.5–45)
HGB BLD-MCNC: 11.1 G/DL — LOW (ref 11.5–15.5)
MAGNESIUM SERPL-MCNC: 2.2 MG/DL — SIGNIFICANT CHANGE UP (ref 1.6–2.6)
MCHC RBC-ENTMCNC: 25.8 PG — LOW (ref 27–34)
MCHC RBC-ENTMCNC: 33.7 G/DL — SIGNIFICANT CHANGE UP (ref 32–36)
MCV RBC AUTO: 76.5 FL — LOW (ref 80–100)
NRBC # BLD AUTO: 0 K/UL — SIGNIFICANT CHANGE UP (ref 0–0)
NRBC # FLD: 0 K/UL — SIGNIFICANT CHANGE UP (ref 0–0)
NRBC BLD AUTO-RTO: 0 /100 WBCS — SIGNIFICANT CHANGE UP (ref 0–0)
PHOSPHATE SERPL-MCNC: 3 MG/DL — SIGNIFICANT CHANGE UP (ref 2.5–4.5)
PLATELET # BLD AUTO: 230 K/UL — SIGNIFICANT CHANGE UP (ref 150–400)
POTASSIUM SERPL-MCNC: 4.2 MMOL/L — SIGNIFICANT CHANGE UP (ref 3.5–5.3)
POTASSIUM SERPL-SCNC: 4.2 MMOL/L — SIGNIFICANT CHANGE UP (ref 3.5–5.3)
PROT SERPL-MCNC: 5.8 G/DL — LOW (ref 6–8.3)
RBC # BLD: 4.3 M/UL — SIGNIFICANT CHANGE UP (ref 3.8–5.2)
RBC # FLD: 14.2 % — SIGNIFICANT CHANGE UP (ref 10.3–14.5)
SODIUM SERPL-SCNC: 135 MMOL/L — SIGNIFICANT CHANGE UP (ref 135–145)
WBC # BLD: 7.97 K/UL — SIGNIFICANT CHANGE UP (ref 3.8–10.5)
WBC # FLD AUTO: 7.97 K/UL — SIGNIFICANT CHANGE UP (ref 3.8–10.5)

## 2025-03-02 PROCEDURE — 99291 CRITICAL CARE FIRST HOUR: CPT | Mod: 25

## 2025-03-02 PROCEDURE — 99232 SBSQ HOSP IP/OBS MODERATE 35: CPT

## 2025-03-02 PROCEDURE — 71045 X-RAY EXAM CHEST 1 VIEW: CPT | Mod: 26

## 2025-03-02 PROCEDURE — G0545: CPT

## 2025-03-02 RX ORDER — HYDROMORPHONE/SOD CHLOR,ISO/PF 2 MG/10 ML
1 SYRINGE (ML) INJECTION
Refills: 0 | Status: DISCONTINUED | OUTPATIENT
Start: 2025-03-02 | End: 2025-03-07

## 2025-03-02 RX ORDER — ACETAMINOPHEN 500 MG/5ML
1000 LIQUID (ML) ORAL EVERY 6 HOURS
Refills: 0 | Status: DISCONTINUED | OUTPATIENT
Start: 2025-03-02 | End: 2025-03-07

## 2025-03-02 RX ORDER — SODIUM CHLORIDE 3 G/100ML
500 INJECTION, SOLUTION INTRAVENOUS
Refills: 0 | Status: DISCONTINUED | OUTPATIENT
Start: 2025-03-02 | End: 2025-03-03

## 2025-03-02 RX ORDER — SODIUM CHLORIDE 9 G/1000ML
500 INJECTION, SOLUTION INTRAVENOUS ONCE
Refills: 0 | Status: COMPLETED | OUTPATIENT
Start: 2025-03-02 | End: 2025-03-02

## 2025-03-02 RX ADMIN — Medication 250 MILLIGRAM(S): at 17:31

## 2025-03-02 RX ADMIN — CEFEPIME 100 MILLIGRAM(S): 2 INJECTION, POWDER, FOR SOLUTION INTRAVENOUS at 05:23

## 2025-03-02 RX ADMIN — DEXAMETHASONE 102 MILLIGRAM(S): 0.5 TABLET ORAL at 03:07

## 2025-03-02 RX ADMIN — CEFEPIME 100 MILLIGRAM(S): 2 INJECTION, POWDER, FOR SOLUTION INTRAVENOUS at 21:03

## 2025-03-02 RX ADMIN — SODIUM CHLORIDE 100 MILLILITER(S): 9 INJECTION, SOLUTION INTRAVENOUS at 08:18

## 2025-03-02 RX ADMIN — SODIUM CHLORIDE 30 MILLILITER(S): 3 INJECTION, SOLUTION INTRAVENOUS at 21:03

## 2025-03-02 RX ADMIN — LEVETIRACETAM 1000 MILLIGRAM(S): 10 INJECTION, SOLUTION INTRAVENOUS at 17:31

## 2025-03-02 RX ADMIN — Medication 1 APPLICATION(S): at 12:11

## 2025-03-02 RX ADMIN — Medication 40 MILLIGRAM(S): at 12:10

## 2025-03-02 RX ADMIN — CEFEPIME 100 MILLIGRAM(S): 2 INJECTION, POWDER, FOR SOLUTION INTRAVENOUS at 13:20

## 2025-03-02 RX ADMIN — SODIUM CHLORIDE 30 MILLILITER(S): 3 INJECTION, SOLUTION INTRAVENOUS at 17:44

## 2025-03-02 RX ADMIN — DEXMEDETOMIDINE HYDROCHLORIDE IN SODIUM CHLORIDE 8.31 MICROGRAM(S)/KG/HR: 4 INJECTION INTRAVENOUS at 08:18

## 2025-03-02 RX ADMIN — DEXMEDETOMIDINE HYDROCHLORIDE IN SODIUM CHLORIDE 8.31 MICROGRAM(S)/KG/HR: 4 INJECTION INTRAVENOUS at 21:03

## 2025-03-02 RX ADMIN — PROPOFOL 2 MICROGRAM(S)/KG/MIN: 10 INJECTION, EMULSION INTRAVENOUS at 17:33

## 2025-03-02 RX ADMIN — Medication 100 MILLIGRAM(S): at 13:19

## 2025-03-02 RX ADMIN — PROPOFOL 2 MICROGRAM(S)/KG/MIN: 10 INJECTION, EMULSION INTRAVENOUS at 21:03

## 2025-03-02 RX ADMIN — Medication 400 MILLIGRAM(S): at 05:21

## 2025-03-02 RX ADMIN — Medication 1 MILLIGRAM(S): at 10:14

## 2025-03-02 RX ADMIN — LEVETIRACETAM 1000 MILLIGRAM(S): 10 INJECTION, SOLUTION INTRAVENOUS at 05:22

## 2025-03-02 RX ADMIN — Medication 400 MILLIGRAM(S): at 00:15

## 2025-03-02 RX ADMIN — SODIUM CHLORIDE 1000 MILLILITER(S): 9 INJECTION, SOLUTION INTRAVENOUS at 03:07

## 2025-03-02 RX ADMIN — Medication 0.5 MILLIGRAM(S): at 05:50

## 2025-03-02 RX ADMIN — Medication 100 MILLIGRAM(S): at 05:23

## 2025-03-02 RX ADMIN — Medication 1 MILLIGRAM(S): at 10:40

## 2025-03-02 RX ADMIN — PROPOFOL 2 MICROGRAM(S)/KG/MIN: 10 INJECTION, EMULSION INTRAVENOUS at 08:18

## 2025-03-02 RX ADMIN — Medication 100 MILLIGRAM(S): at 21:03

## 2025-03-02 RX ADMIN — Medication 250 MILLIGRAM(S): at 06:09

## 2025-03-02 RX ADMIN — PROPOFOL 2 MICROGRAM(S)/KG/MIN: 10 INJECTION, EMULSION INTRAVENOUS at 09:17

## 2025-03-02 RX ADMIN — Medication 0.5 MILLIGRAM(S): at 05:20

## 2025-03-02 NOTE — PROGRESS NOTE ADULT - PROBLEM SELECTOR PLAN 1
- neurochecks Q1H  - ID consult, needs broader spectrum coverage  - ween to extubate  - con't AED- keppra  - f/u OR cultures and CSF cultures  - record SHAYY output Q shift  - DVT ppx- venodynes BLE

## 2025-03-02 NOTE — PROGRESS NOTE ADULT - SUBJECTIVE AND OBJECTIVE BOX
Patient intubated/sedated on precedex, EOV, PERRL, EOMI, FC, CLARK symetrically/AG, dressing c/d/i    HPI:  60 yo F with PMHx of neurofibromatosis, s/p L suboccipital craniectomy for resection of cerebellar brain tumor 2023 by Dr Brush, s/p traumatic fall with head trauma in 2024, at that time sustained a large acute right SDH, and underwent an emergent right decompressive hemicraniectomy on 24, s/p right cranioplasty on 24 with custom plate, s/p ETV, ligation of shunt on the right with ligaclips with retention of valve as rescue device, presents as a transfer from [facility] for witnessed seizure at home. CTH obtained at [facility] revealed stable  shunt and moderate ventriculomegaly and slight increase in size of extra-axial CSF collection overlying the right frontal duraplasty,     PAST MEDICAL & SURGICAL HISTORY:  Asthma  Depression  neoplasm of uncertain behavior of brain  Neurofibromatosis, type 1  History of hydrocephalus  Anxiety  delivery with history of ,   S/P tubal ligation,   History of arthroscopy of left knee, meniscus   S/P LASIK Surgery,   H/O brain tumor  S/P  shunt    Allergies  No Known Allergies    Vital Signs Last 24 Hrs  T(C): 36.9 (01 Mar 2025 08:00), Max: 36.9 (2025 16:00)  T(F): 98.4 (01 Mar 2025 08:00), Max: 98.5 (2025 19:00)  HR: 120 (01 Mar 2025 09:10) (98 - 131)  BP: 129/68 (01 Mar 2025 09:10) (125/67 - 163/89)  BP(mean): 86 (01 Mar 2025 09:10) (81 - 113)  RR: 14 (01 Mar 2025 09:10) (11 - 23)  SpO2: 97% (01 Mar 2025 09:10) (92% - 100%)    Parameters below as of 01 Mar 2025 09:00  Patient On (Oxygen Delivery Method): ventilator    O2 Concentration (%): 40  I&O's Summary    2025 07:01  -  01 Mar 2025 07:00  --------------------------------------------------------  IN: 1200 mL / OUT: 200 mL / NET: 1000 mL    01 Mar 2025 07:01  -  01 Mar 2025 13:38  --------------------------------------------------------  IN: 250 mL / OUT: 0 mL / NET: 250 mL    PHYSICAL EXAM: intubated/sedated  opens eys to verbal, PERRL, not following commands  motor-withdraws in all extremities to light noxious, antigravity  incision site: C/D/I      MEDICATIONS  (STANDING):  acetaminophen   IVPB .. 1000 milliGRAM(s) IV Intermittent every 6 hours  albuterol/ipratropium for Nebulization 3 milliLiter(s) Nebulizer every 30 minutes  ceFAZolin   IVPB 2000 milliGRAM(s) IV Intermittent every 8 hours  chlorhexidine 2% Cloths 1 Application(s) Topical daily  dexAMETHasone  IVPB 10 milliGRAM(s) IV Intermittent every 6 hours  dexMEDEtomidine Infusion 0.5 MICROgram(s)/kG/Hr (8.31 mL/Hr) IV Continuous <Continuous>  lactated ringers. 1000 milliLiter(s) (100 mL/Hr) IV Continuous <Continuous>  levETIRAcetam   Injectable 1000 milliGRAM(s) IV Push every 12 hours  pantoprazole  Injectable 40 milliGRAM(s) IV Push daily    MEDICATIONS  (PRN):  HYDROmorphone  Injectable 0.2 milliGRAM(s) IV Push every 4 hours PRN Moderate Pain (4 - 6)  HYDROmorphone  Injectable 0.5 milliGRAM(s) IV Push every 4 hours PRN Severe Pain (7 - 10)  lidocaine   4% Patch 1 Patch Transdermal daily PRN back pain    LABS:                        13.8   15.64 )-----------( 222      ( 01 Mar 2025 12:15 )             40.6     03-    139  |  102  |  8   ----------------------------<  130[H]  4.3   |  23  |  0.28[L]    Ca    9.1      01 Mar 2025 12:15  Phos  2.7     03-  Mg     1.70     03-      PT/INR - ( 01 Mar 2025 12:15 )   PT: 13.0 sec;   INR: 1.12 ratio         PTT - ( 01 Mar 2025 12:15 )  PTT:40.4 sec  Urinalysis Basic - ( 01 Mar 2025 12:15 )    Color: x / Appearance: x / SG: x / pH: x  Gluc: 130 mg/dL / Ketone: x  / Bili: x / Urobili: x   Blood: x / Protein: x / Nitrite: x   Leuk Esterase: x / RBC: x / WBC x   Sq Epi: x / Non Sq Epi: x / Bacteria: x

## 2025-03-02 NOTE — PROGRESS NOTE ADULT - SUBJECTIVE AND OBJECTIVE BOX
Infectious Diseases Follow Up:    Patient is a 59y old  Female who presents with a chief complaint of seizure (02 Mar 2025 00:13)      Interval History/ROS:  Remains intubated, pt alert and awake, following commands,   Afebrile     Allergies  No Known Allergies        ANTIMICROBIALS:  cefepime   IVPB 2000 every 8 hours  cefepime   IVPB    metroNIDAZOLE  IVPB 500 every 8 hours  vancomycin  IVPB    vancomycin  IVPB 1000 every 12 hours      Current Abx:     Previous Abx     OTHER MEDS:  MEDICATIONS  (STANDING):  albuterol/ipratropium for Nebulization 3 every 30 minutes  dexMEDEtomidine Infusion 0.5 <Continuous>  HYDROmorphone  Injectable 1 every 2 hours PRN  levETIRAcetam   Injectable 1000 every 12 hours  pantoprazole  Injectable 40 daily  propofol Infusion 5 <Continuous>      Vital Signs Last 24 Hrs  T(C): 37.4 (02 Mar 2025 08:00), Max: 37.6 (01 Mar 2025 16:00)  T(F): 99.3 (02 Mar 2025 08:00), Max: 99.7 (01 Mar 2025 16:00)  HR: 72 (02 Mar 2025 08:00) (60 - 103)  BP: 117/85 (02 Mar 2025 08:00) (78/46 - 140/79)  BP(mean): 97 (02 Mar 2025 08:00) (57 - 99)  RR: 19 (02 Mar 2025 08:00) (13 - 21)  SpO2: 99% (02 Mar 2025 08:00) (99% - 100%)    Parameters below as of 02 Mar 2025 08:00  Patient On (Oxygen Delivery Method): ventilator    O2 Concentration (%): 40    PHYSICAL EXAM:  GENERAL: NAD, well-developed  HEAD:  R sided dressing   EYES: EOMI, conjunctiva and sclera clear  CHEST/LUNG: Intubated, coarse breath sounds   HEART: Regular rate and rhythm; No murmurs, rubs, or gallops  ABDOMEN: Soft, Nontender, Nondistended; Bowel sounds present  PSYCH: AAO                        11.1   7.97  )-----------( 230      ( 02 Mar 2025 01:12 )             32.9       03-02    135  |  101  |  12  ----------------------------<  152[H]  4.2   |  20[L]  |  0.35[L]    Ca    9.0      02 Mar 2025 01:12  Phos  3.0     03-02  Mg     2.20     03-02    TPro  5.8[L]  /  Alb  3.2[L]  /  TBili  0.6  /  DBili  x   /  AST  10  /  ALT  9   /  AlkPhos  74  03-02      Urinalysis Basic - ( 02 Mar 2025 01:12 )    Color: x / Appearance: x / SG: x / pH: x  Gluc: 152 mg/dL / Ketone: x  / Bili: x / Urobili: x   Blood: x / Protein: x / Nitrite: x   Leuk Esterase: x / RBC: x / WBC x   Sq Epi: x / Non Sq Epi: x / Bacteria: x        MICROBIOLOGY:  v  Smear  03-01-25 --  --    Few polymorphonuclear leukocytes per low power field  No organisms seen per oil power field      CSF CSF  02-28-25   No growth  --    No polymorphonuclear cells seen  No organisms seen  by cytocentrifuge      Catheterized Catheterized  02-26-25   No growth  --  --      .Blood Blood-Peripheral  02-26-25   No growth at 72 Hours  --  --      .Blood Blood-Peripheral  02-26-25   No growth at 72 Hours  --  --      .Blood Blood  02-22-25   No growth at 5 days  --  --      .Blood Blood  02-22-25   No growth at 5 days  --  --                RADIOLOGY:

## 2025-03-02 NOTE — PROGRESS NOTE ADULT - ASSESSMENT
60 y/o F, with NF1, h/o craniectomy/ cranioplasty for SDH, s/p right clarissa hole for evacuation of epidural collection (purulent fluid)    2/24 Exam with decreased strength on left side, post ictal vs intracranial causes, will get MRI/MRA to assess and CTH/CTA if MRI will be delayed. On zosyn, white count downtrending.   2/25:  CTH/ CTA done stable , CTA negative, MRIw/flow/MRA pending, intubated /sedated, neurology saw Veeg-O, on keppra, K+3.3 repleted, afbrile, bld cltr NGTD, UA neg, WBC nml  2/26: stable exam,  Veeg on, keppra, mri w/flow/ mra pending  2/27: Stable exam. No seizures noted on VEEG. On keppra. MRI with CSF flow study and MRA ordered  2/28: Extubated, alert, mildly confused and restless, nonfocal exam. On keppra. MRI with CSF flow study and MRA ordered  3/1: Alert, neurologically intact. On keppra. MRI showed increase in right epidural collection with diffusion restriction. Preop for evacuation today, was intubated preop for stridor, underwent clarissa hole for drainage of epidural phlegmon  3/2 Intubated, ENT to scope to evaluate airway edema, ID-F initiated on broad spectrum abx, GSN Cx pend

## 2025-03-02 NOTE — PROGRESS NOTE ADULT - ASSESSMENT
59 year old female with PMHx of neurofibromatosis, 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), s/p  shunt (in [facility]), s/p traumatic fall with head trauma and R SDH, s/p R decompressive hemicraniectomy, ICP monitor and R cranioplasty (5/2024), s/p L frontal Endoscopic third ventriculostomy and ligation of R  shunt with ligaclips with retention of valve, proximal and distal catheters (08/2024), Anxiety and depression presents to the ED at [place] with status epilepticus. CTH showed stable  shunt and moderate ventriculomegaly, slight increase in size of extra-axial CSF collection overlying the right frontal duraplasty, likely a hygroma. There is no mass effect. Transferred to [facility] for Neurosurgery evaluation, r/o infective etiology. SICU consulted for vent management & Q1 neurochecks. VEEG showed no seizure activity. MRI head showed R frontal epidural collection. OR with neurosurgery 3/1 clarissa hole with purulent fluid.     Neurologic:   - Q1h neurochecks  - VEEG completed, no seizures   - wean precedex/fent  - Keppra 1g BID  - Pain control: IV Tylenol, dilaudid PRN   - CTA head 2/24 wnl  - MR head with epidural empyema   - OR (03/01) - clarissa hole with purulent fluid   - Sedated with prop/precedex     Respiratory:   - Returned for OR on 3/1 intubated for stridor   - 14 450 6 40%  - Decadron 10mg q6h for upper airway edema    Cardiovascular:   - MAP > 65    Gastrointestinal/Nutrition:   - NPO    Renal/Genitourinary:   - Monitor electrolytes    Hematologic:   - DVT PPx: SqH (held post op)    Infectious Disease:   - Monitor WBC, fever curves   - Zosyn (presumed aspiration pneumonia) 7/23/24 - 7/24/24  - blood cx sent x2 ([place]) > NG @ 5d  - f/u RVP 3/1  - Cefepime, flagyl, vanco for empyema     Endocrine:   - Monitor glucose  - Decadron 10mg q6h for airway edema    Tubes/Lines/Drains:   - PIV  - Dominguez -> primafit  - epidural drain (3/1-)    Disposition: SICU

## 2025-03-02 NOTE — PROGRESS NOTE ADULT - SUBJECTIVE AND OBJECTIVE BOX
SICU PROGRESS NOTE:    24 HOUR INTERVAL EVENTS:  - MR right frontal epidural collection -> OR 3/1  - R clarissa hole for evacuation of epidural collection; purulent fluid. Epidural drain placed.  - Prior to OR, pt with stridor, increased WOB -> seen by ENT, intubated by anesthesia in SICU immediately prior to OR, started on decadron 10  - post op started on vanc, cefepime, metronidazole per ID  - returned to unit intubated    SUBJECTIVE/ROS:  [ ] A ten-point review of systems was otherwise negative except as noted.  [ ] Due to altered mental status/intubation, subjective information were not able to be obtained from the patient. History was obtained, to the extent possible, from review of the chart and collateral sources of information.    NEURO  RASS:     GCS:     CAM ICU:  Exam: awake, alert, oriented  Meds: acetaminophen   IVPB .. 1000 milliGRAM(s) IV Intermittent every 6 hours  dexMEDEtomidine Infusion 0.5 MICROgram(s)/kG/Hr IV Continuous <Continuous>  HYDROmorphone  Injectable 0.2 milliGRAM(s) IV Push every 4 hours PRN Moderate Pain (4 - 6)  HYDROmorphone  Injectable 0.5 milliGRAM(s) IV Push every 4 hours PRN Severe Pain (7 - 10)  levETIRAcetam   Injectable 1000 milliGRAM(s) IV Push every 12 hours  propofol Infusion 5 MICROgram(s)/kG/Min IV Continuous <Continuous>    [x] Adequacy of sedation and pain control has been assessed and adjusted    RESPIRATORY  RR: 17 (03-01-25 @ 23:45) (11 - 23)  SpO2: 100% (03-01-25 @ 23:45) (94% - 100%)  Wt(kg): --  Exam: unlabored, clear to auscultation bilaterally  Mechanical Ventilation: Mode: AC/ CMV (Assist Control/ Continuous Mandatory Ventilation), RR (machine): 14, RR (patient): 18, TV (machine): 450, FiO2: 40, PEEP: 6, ITime: 1, MAP: 8, PIP: 18  ABG - ( 01 Mar 2025 12:15 )  pH: 7.39  /  pCO2: 43    /  pO2: 190   / HCO3: 26    / Base Excess: 0.7   /  SaO2: 99.5    Lactate: x                [N/A] Extubation Readiness Assessed  Meds: albuterol/ipratropium for Nebulization 3 milliLiter(s) Nebulizer every 30 minutes      CARDIOVASCULAR  HR: 65 (03-01-25 @ 23:45) (61 - 131)  BP: 113/69 (03-01-25 @ 23:45) (78/46 - 163/89)  BP(mean): 82 (03-01-25 @ 23:45) (57 - 112)  ABP: --  ABP(mean): --  Wt(kg): --  CVP(cm H2O): --      Exam: regular rate and rhythm  Cardiac Rhythm: sinus  Perfusion     [x]Adequate   [ ]Inadequate  Mentation   [x]Normal       [ ]Reduced  Extremities  [x]Warm         [ ]Cool  Volume Status [ ]Hypervolemic [x]Euvolemic [ ]Hypovolemic  Meds:     GI/NUTRITION  Exam: soft, nontender, nondistended, incision C/D/I  Diet:  Meds: pantoprazole  Injectable 40 milliGRAM(s) IV Push daily      GENITOURINARY  I&O's Detail    02-28 @ 07:01  -  03-01 @ 07:00  --------------------------------------------------------  IN:    IV PiggyBack: 300 mL    Lactated Ringers: 400 mL    Oral Fluid: 500 mL  Total IN: 1200 mL    OUT:    Voided (mL): 200 mL  Total OUT: 200 mL    Total NET: 1000 mL      03-01 @ 07:01  -  03-02 @ 00:05  --------------------------------------------------------  IN:    Dexmedetomidine: 104.7 mL    IV PiggyBack: 900 mL    Lactated Ringers: 1500 mL    Propofol: 10 mL  Total IN: 2514.7 mL    OUT:    Bulb (mL): 70 mL    Indwelling Catheter - Urethral (mL): 620 mL  Total OUT: 690 mL    Total NET: 1824.7 mL          03-01    139  |  102  |  8   ----------------------------<  130[H]  4.3   |  23  |  0.28[L]    Ca    9.1      01 Mar 2025 12:15  Phos  2.7     03-01  Mg     1.70     03-01      [ ] Dominguez catheter, indication: N/A  Meds: lactated ringers. 1000 milliLiter(s) IV Continuous <Continuous>      HEMATOLOGIC  Meds:   [x] VTE Prophylaxis                        13.8   15.64 )-----------( 222      ( 01 Mar 2025 12:15 )             40.6     PT/INR - ( 01 Mar 2025 12:15 )   PT: 13.0 sec;   INR: 1.12 ratio         PTT - ( 01 Mar 2025 12:15 )  PTT:40.4 sec  Transfusion     [ ] PRBC   [ ] Platelets   [ ] FFP   [ ] Cryoprecipitate    INFECTIOUS DISEASES  WBC Count: 15.64 K/uL (03-01 @ 12:15)  WBC Count: 8.28 K/uL (03-01 @ 01:54)    RECENT CULTURES:  Specimen Source: Smear  Date/Time: 03-01 @ 12:15  Culture Results: --  Gram Stain:   Few polymorphonuclear leukocytes per low power field  No organisms seen per oil power field  Organism: --  Specimen Source: CSF CSF  Date/Time: 02-28 @ 17:14  Culture Results:   No growth  Gram Stain:   No polymorphonuclear cells seen  No organisms seen  by cytocentrifuge  Organism: --  Specimen Source: Catheterized Catheterized  Date/Time: 02-26 @ 19:00  Culture Results:   No growth  Gram Stain: --  Organism: --  Specimen Source: .Blood Blood-Peripheral  Date/Time: 02-26 @ 14:20  Culture Results:   No growth at 72 Hours  Gram Stain: --  Organism: --  Specimen Source: .Blood Blood-Peripheral  Date/Time: 02-26 @ 14:00  Culture Results:   No growth at 72 Hours  Gram Stain: --  Organism: --    Meds: cefepime   IVPB 2000 milliGRAM(s) IV Intermittent every 8 hours  cefepime   IVPB      metroNIDAZOLE  IVPB 500 milliGRAM(s) IV Intermittent every 8 hours  vancomycin  IVPB      vancomycin  IVPB 1000 milliGRAM(s) IV Intermittent every 12 hours      ENDOCRINE  CAPILLARY BLOOD GLUCOSE        Meds: dexAMETHasone  IVPB 10 milliGRAM(s) IV Intermittent every 6 hours      ACCESS DEVICES:  [ ] Peripheral IV  [ ] Central Venous Line	[ ] R	[ ] L	[ ] IJ	[ ] Fem	[ ] SC	Placed:   [ ] Arterial Line		[ ] R	[ ] L	[ ] Fem	[ ] Rad	[ ] Ax	Placed:   [ ] PICC:					[ ] Mediport  [ ] Urinary Catheter, Date Placed:   [x] Necessity of urinary, arterial, and venous catheters discussed    OTHER MEDICATIONS:  chlorhexidine 2% Cloths 1 Application(s) Topical daily  lidocaine   4% Patch 1 Patch Transdermal daily PRN

## 2025-03-02 NOTE — PROGRESS NOTE ADULT - ASSESSMENT
This is a 60 y/o F w/ very extensive PMHx of NF, 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), s/p  shunt (in [facility]), s/p traumatic fall w/ R SDH, s/p R decompressive hemicraniectomy, ICP monitor and R cranioplasty (5/2024), s/p L frontal Endoscopic third ventriculostomy and ligation of R  shunt with Ligaclips with retention of valve, proximal and distal catheters (08/2024) admitted initially to [facility] on 2/22 for seizure, intubated for airway protection and status epilepticus, initially on Zosyn for aspiration PNA.  Initially CTH w/ slight increase in extra axial fluid collection measuring up to 1.5 cm. Pt was transferred to [facility] on 2/23 for neurosurgery eval, given decreased L sided strength, MRI done with increase in R sided collection.  Pt extubated on 2/28, CSF reservoir tapped, no pleocytosis.    Pt went to the OR on 3/1 for R clarissa pole with evacuation of epidural collection, with purulent drainage.     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), s/p  shunt (in [facility]), s/p traumatic fall w/ R SDH, s/p R decompressive hemicraniectomy, ICP monitor and R cranioplasty (5/2024), s/p L frontal Endoscopic third ventriculostomy and ligation of R  shunt with ligaclips with retention of valve, proximal and distal catheters (08/2024) admitted initially to [facility] on 2/22 for seizure, intubated for airway protection and status epilepticus, initially on Zosyn for aspiration PNA, transferred to [facility] on 2/23 for neurosurgery evaluation, now found to with have epidural abscess s/p R Clarissa w/ purulent drainage.    Recommendations:   1. Vancomycin, -600, Cefepime 2 g q8, Flagyl 500 mg q8   2. F/u OR Cx   3. F/u neurosurgery plan for further surgery       Thank you for consulting us and involving us in the management of this patient's case. In addition to reviewing history, imaging, documents, labs, microbiology, and infection control strategies and potential issues.     ID will continue to follow    Inder Mcintosh M.D.  Attending Physician  Division of Infectious Diseases  Department of Medicine

## 2025-03-03 LAB
ANION GAP SERPL CALC-SCNC: 12 MMOL/L — SIGNIFICANT CHANGE UP (ref 7–14)
ANION GAP SERPL CALC-SCNC: 12 MMOL/L — SIGNIFICANT CHANGE UP (ref 7–14)
APTT BLD: 37 SEC — HIGH (ref 24.5–35.6)
BLD GP AB SCN SERPL QL: NEGATIVE — SIGNIFICANT CHANGE UP
BLOOD GAS ARTERIAL COMPREHENSIVE RESULT: SIGNIFICANT CHANGE UP
BLOOD GAS VENOUS COMPREHENSIVE RESULT: SIGNIFICANT CHANGE UP
BUN SERPL-MCNC: 13 MG/DL — SIGNIFICANT CHANGE UP (ref 7–23)
BUN SERPL-MCNC: 9 MG/DL — SIGNIFICANT CHANGE UP (ref 7–23)
CA-I BLD-SCNC: 1.13 MMOL/L — LOW (ref 1.15–1.29)
CALCIUM SERPL-MCNC: 8.9 MG/DL — SIGNIFICANT CHANGE UP (ref 8.4–10.5)
CALCIUM SERPL-MCNC: 9.2 MG/DL — SIGNIFICANT CHANGE UP (ref 8.4–10.5)
CHLORIDE SERPL-SCNC: 104 MMOL/L — SIGNIFICANT CHANGE UP (ref 98–107)
CHLORIDE SERPL-SCNC: 107 MMOL/L — SIGNIFICANT CHANGE UP (ref 98–107)
CO2 SERPL-SCNC: 21 MMOL/L — LOW (ref 22–31)
CO2 SERPL-SCNC: 24 MMOL/L — SIGNIFICANT CHANGE UP (ref 22–31)
CREAT SERPL-MCNC: 0.41 MG/DL — LOW (ref 0.5–1.3)
CREAT SERPL-MCNC: 0.41 MG/DL — LOW (ref 0.5–1.3)
CULTURE RESULTS: SIGNIFICANT CHANGE UP
CULTURE RESULTS: SIGNIFICANT CHANGE UP
EGFR: 113 ML/MIN/1.73M2 — SIGNIFICANT CHANGE UP
GLUCOSE SERPL-MCNC: 114 MG/DL — HIGH (ref 70–99)
GLUCOSE SERPL-MCNC: 119 MG/DL — HIGH (ref 70–99)
HCT VFR BLD CALC: 31.4 % — LOW (ref 34.5–45)
HCT VFR BLD CALC: 32.4 % — LOW (ref 34.5–45)
HCT VFR BLD CALC: 44.6 % — SIGNIFICANT CHANGE UP (ref 34.5–45)
HGB BLD-MCNC: 10.5 G/DL — LOW (ref 11.5–15.5)
HGB BLD-MCNC: 10.6 G/DL — LOW (ref 11.5–15.5)
HGB BLD-MCNC: 14.2 G/DL — SIGNIFICANT CHANGE UP (ref 11.5–15.5)
INR BLD: 1.03 RATIO — SIGNIFICANT CHANGE UP (ref 0.85–1.16)
MAGNESIUM SERPL-MCNC: 1.9 MG/DL — SIGNIFICANT CHANGE UP (ref 1.6–2.6)
MAGNESIUM SERPL-MCNC: 2 MG/DL — SIGNIFICANT CHANGE UP (ref 1.6–2.6)
MCHC RBC-ENTMCNC: 25.7 PG — LOW (ref 27–34)
MCHC RBC-ENTMCNC: 25.9 PG — LOW (ref 27–34)
MCHC RBC-ENTMCNC: 26.4 PG — LOW (ref 27–34)
MCHC RBC-ENTMCNC: 31.8 G/DL — LOW (ref 32–36)
MCHC RBC-ENTMCNC: 32.4 G/DL — SIGNIFICANT CHANGE UP (ref 32–36)
MCHC RBC-ENTMCNC: 33.8 G/DL — SIGNIFICANT CHANGE UP (ref 32–36)
MCV RBC AUTO: 78.3 FL — LOW (ref 80–100)
MCV RBC AUTO: 79.8 FL — LOW (ref 80–100)
MCV RBC AUTO: 80.8 FL — SIGNIFICANT CHANGE UP (ref 80–100)
NRBC # BLD AUTO: 0 K/UL — SIGNIFICANT CHANGE UP (ref 0–0)
NRBC # FLD: 0 K/UL — SIGNIFICANT CHANGE UP (ref 0–0)
NRBC BLD AUTO-RTO: 0 /100 WBCS — SIGNIFICANT CHANGE UP (ref 0–0)
PHOSPHATE SERPL-MCNC: 3 MG/DL — SIGNIFICANT CHANGE UP (ref 2.5–4.5)
PHOSPHATE SERPL-MCNC: 3.5 MG/DL — SIGNIFICANT CHANGE UP (ref 2.5–4.5)
PLATELET # BLD AUTO: 160 K/UL — SIGNIFICANT CHANGE UP (ref 150–400)
PLATELET # BLD AUTO: 218 K/UL — SIGNIFICANT CHANGE UP (ref 150–400)
PLATELET # BLD AUTO: 289 K/UL — SIGNIFICANT CHANGE UP (ref 150–400)
POTASSIUM SERPL-MCNC: 3.1 MMOL/L — LOW (ref 3.5–5.3)
POTASSIUM SERPL-MCNC: 3.4 MMOL/L — LOW (ref 3.5–5.3)
POTASSIUM SERPL-SCNC: 3.1 MMOL/L — LOW (ref 3.5–5.3)
POTASSIUM SERPL-SCNC: 3.4 MMOL/L — LOW (ref 3.5–5.3)
PROTHROM AB SERPL-ACNC: 11.9 SEC — SIGNIFICANT CHANGE UP (ref 9.9–13.4)
RBC # BLD: 4.01 M/UL — SIGNIFICANT CHANGE UP (ref 3.8–5.2)
RBC # BLD: 4.06 M/UL — SIGNIFICANT CHANGE UP (ref 3.8–5.2)
RBC # BLD: 5.52 M/UL — HIGH (ref 3.8–5.2)
RBC # FLD: 14.6 % — HIGH (ref 10.3–14.5)
RBC # FLD: 14.6 % — HIGH (ref 10.3–14.5)
RBC # FLD: 14.7 % — HIGH (ref 10.3–14.5)
RH IG SCN BLD-IMP: NEGATIVE — SIGNIFICANT CHANGE UP
SODIUM SERPL-SCNC: 137 MMOL/L — SIGNIFICANT CHANGE UP (ref 135–145)
SODIUM SERPL-SCNC: 143 MMOL/L — SIGNIFICANT CHANGE UP (ref 135–145)
SPECIMEN SOURCE: SIGNIFICANT CHANGE UP
SPECIMEN SOURCE: SIGNIFICANT CHANGE UP
VANCOMYCIN TROUGH SERPL-MCNC: 9.5 UG/ML — LOW (ref 10–20)
WBC # BLD: 12.85 K/UL — HIGH (ref 3.8–10.5)
WBC # BLD: 5.79 K/UL — SIGNIFICANT CHANGE UP (ref 3.8–10.5)
WBC # BLD: 7.26 K/UL — SIGNIFICANT CHANGE UP (ref 3.8–10.5)
WBC # FLD AUTO: 12.85 K/UL — HIGH (ref 3.8–10.5)
WBC # FLD AUTO: 5.79 K/UL — SIGNIFICANT CHANGE UP (ref 3.8–10.5)
WBC # FLD AUTO: 7.26 K/UL — SIGNIFICANT CHANGE UP (ref 3.8–10.5)

## 2025-03-03 PROCEDURE — G0545: CPT

## 2025-03-03 PROCEDURE — 99291 CRITICAL CARE FIRST HOUR: CPT

## 2025-03-03 PROCEDURE — 71045 X-RAY EXAM CHEST 1 VIEW: CPT | Mod: 26

## 2025-03-03 PROCEDURE — 70450 CT HEAD/BRAIN W/O DYE: CPT | Mod: 26,GC

## 2025-03-03 PROCEDURE — 99232 SBSQ HOSP IP/OBS MODERATE 35: CPT

## 2025-03-03 PROCEDURE — 99292 CRITICAL CARE ADDL 30 MIN: CPT

## 2025-03-03 RX ORDER — DEXAMETHASONE 0.5 MG/1
10 TABLET ORAL EVERY 6 HOURS
Refills: 0 | Status: DISCONTINUED | OUTPATIENT
Start: 2025-03-03 | End: 2025-03-06

## 2025-03-03 RX ORDER — DEXMEDETOMIDINE HYDROCHLORIDE IN SODIUM CHLORIDE 4 UG/ML
0.5 INJECTION INTRAVENOUS
Qty: 400 | Refills: 0 | Status: DISCONTINUED | OUTPATIENT
Start: 2025-03-03 | End: 2025-03-03

## 2025-03-03 RX ORDER — VANCOMYCIN HCL IN 5 % DEXTROSE 1.5G/250ML
1250 PLASTIC BAG, INJECTION (ML) INTRAVENOUS EVERY 12 HOURS
Refills: 0 | Status: DISCONTINUED | OUTPATIENT
Start: 2025-03-03 | End: 2025-03-03

## 2025-03-03 RX ORDER — DEXMEDETOMIDINE HYDROCHLORIDE IN SODIUM CHLORIDE 4 UG/ML
1 INJECTION INTRAVENOUS
Qty: 400 | Refills: 0 | Status: DISCONTINUED | OUTPATIENT
Start: 2025-03-03 | End: 2025-03-03

## 2025-03-03 RX ORDER — VANCOMYCIN HCL IN 5 % DEXTROSE 1.5G/250ML
1250 PLASTIC BAG, INJECTION (ML) INTRAVENOUS EVERY 12 HOURS
Refills: 0 | Status: DISCONTINUED | OUTPATIENT
Start: 2025-03-03 | End: 2025-03-05

## 2025-03-03 RX ORDER — PROPOFOL 10 MG/ML
10 INJECTION, EMULSION INTRAVENOUS
Qty: 1000 | Refills: 0 | Status: DISCONTINUED | OUTPATIENT
Start: 2025-03-03 | End: 2025-03-05

## 2025-03-03 RX ORDER — FUROSEMIDE 10 MG/ML
20 INJECTION INTRAMUSCULAR; INTRAVENOUS ONCE
Refills: 0 | Status: COMPLETED | OUTPATIENT
Start: 2025-03-03 | End: 2025-03-03

## 2025-03-03 RX ORDER — EPINEPHRINE 11.25MG/ML
0.5 SOLUTION, NON-ORAL INHALATION ONCE
Refills: 0 | Status: COMPLETED | OUTPATIENT
Start: 2025-03-03 | End: 2025-03-03

## 2025-03-03 RX ADMIN — Medication 1 MILLIGRAM(S): at 20:11

## 2025-03-03 RX ADMIN — LEVETIRACETAM 1000 MILLIGRAM(S): 10 INJECTION, SOLUTION INTRAVENOUS at 06:39

## 2025-03-03 RX ADMIN — Medication 166.67 MILLIGRAM(S): at 18:03

## 2025-03-03 RX ADMIN — CEFEPIME 100 MILLIGRAM(S): 2 INJECTION, POWDER, FOR SOLUTION INTRAVENOUS at 06:20

## 2025-03-03 RX ADMIN — DEXMEDETOMIDINE HYDROCHLORIDE IN SODIUM CHLORIDE 16.6 MICROGRAM(S)/KG/HR: 4 INJECTION INTRAVENOUS at 20:11

## 2025-03-03 RX ADMIN — Medication 1 MILLIGRAM(S): at 13:00

## 2025-03-03 RX ADMIN — Medication 0.5 MILLILITER(S): at 12:20

## 2025-03-03 RX ADMIN — CEFEPIME 100 MILLIGRAM(S): 2 INJECTION, POWDER, FOR SOLUTION INTRAVENOUS at 13:36

## 2025-03-03 RX ADMIN — Medication 1 APPLICATION(S): at 13:28

## 2025-03-03 RX ADMIN — Medication 100 MILLIGRAM(S): at 13:36

## 2025-03-03 RX ADMIN — FUROSEMIDE 20 MILLIGRAM(S): 10 INJECTION INTRAMUSCULAR; INTRAVENOUS at 13:28

## 2025-03-03 RX ADMIN — Medication 40 MILLIGRAM(S): at 13:28

## 2025-03-03 RX ADMIN — Medication 40 MILLIEQUIVALENT(S): at 17:39

## 2025-03-03 RX ADMIN — DEXAMETHASONE 102 MILLIGRAM(S): 0.5 TABLET ORAL at 18:02

## 2025-03-03 RX ADMIN — DEXAMETHASONE 102 MILLIGRAM(S): 0.5 TABLET ORAL at 13:37

## 2025-03-03 RX ADMIN — LEVETIRACETAM 1000 MILLIGRAM(S): 10 INJECTION, SOLUTION INTRAVENOUS at 17:40

## 2025-03-03 RX ADMIN — Medication 100 MILLIGRAM(S): at 21:11

## 2025-03-03 RX ADMIN — Medication 1 MILLIGRAM(S): at 17:54

## 2025-03-03 RX ADMIN — Medication 100 MILLIGRAM(S): at 06:21

## 2025-03-03 RX ADMIN — PROPOFOL 3.99 MICROGRAM(S)/KG/MIN: 10 INJECTION, EMULSION INTRAVENOUS at 20:11

## 2025-03-03 RX ADMIN — Medication 40 MILLIEQUIVALENT(S): at 03:41

## 2025-03-03 RX ADMIN — CEFEPIME 100 MILLIGRAM(S): 2 INJECTION, POWDER, FOR SOLUTION INTRAVENOUS at 21:11

## 2025-03-03 RX ADMIN — Medication 1 MILLIGRAM(S): at 18:24

## 2025-03-03 RX ADMIN — Medication 1000 MILLIGRAM(S): at 15:13

## 2025-03-03 RX ADMIN — Medication 1 MILLIGRAM(S): at 21:00

## 2025-03-03 RX ADMIN — Medication 166.67 MILLIGRAM(S): at 12:00

## 2025-03-03 RX ADMIN — Medication 1 MILLIGRAM(S): at 12:30

## 2025-03-03 RX ADMIN — PROPOFOL 3.99 MICROGRAM(S)/KG/MIN: 10 INJECTION, EMULSION INTRAVENOUS at 18:03

## 2025-03-03 NOTE — PROGRESS NOTE ADULT - SUBJECTIVE AND OBJECTIVE BOX
PAST 24HR EVENTS:  No issues overnight. Pt intubated per ENT reccs 2/2 stridor, however briskly follows commands.     Vital Signs Last 24 Hrs  T(C): 37.4 (03 Mar 2025 00:00), Max: 37.4 (02 Mar 2025 08:00)  T(F): 99.3 (03 Mar 2025 00:00), Max: 99.3 (02 Mar 2025 08:00)  HR: 65 (03 Mar 2025 02:00) (54 - 97)  BP: 95/53 (03 Mar 2025 02:00) (83/47 - 129/88)  BP(mean): 65 (03 Mar 2025 02:00) (59 - 99)  RR: 14 (03 Mar 2025 02:00) (11 - 22)  SpO2: 100% (03 Mar 2025 02:00) (99% - 100%)    Parameters below as of 03 Mar 2025 02:00  Patient On (Oxygen Delivery Method): ventilator    O2 Concentration (%): 40    MEDS:   acetaminophen   Oral Liquid .. 1000 milliGRAM(s) Oral every 6 hours PRN  albuterol/ipratropium for Nebulization 3 milliLiter(s) Nebulizer every 30 minutes  cefepime   IVPB 2000 milliGRAM(s) IV Intermittent every 8 hours  cefepime   IVPB      chlorhexidine 2% Cloths 1 Application(s) Topical daily  dexMEDEtomidine Infusion 0.5 MICROgram(s)/kG/Hr IV Continuous <Continuous>  HYDROmorphone  Injectable 1 milliGRAM(s) IV Push every 2 hours PRN  levETIRAcetam   Injectable 1000 milliGRAM(s) IV Push every 12 hours  metroNIDAZOLE  IVPB 500 milliGRAM(s) IV Intermittent every 8 hours  pantoprazole  Injectable 40 milliGRAM(s) IV Push daily  potassium chloride   Powder 40 milliEquivalent(s) Oral once  propofol Infusion 5 MICROgram(s)/kG/Min IV Continuous <Continuous>  sodium chloride 1.5%. 500 milliLiter(s) IV Continuous <Continuous>  vancomycin  IVPB      vancomycin  IVPB 1000 milliGRAM(s) IV Intermittent every 12 hours      LABS:                        10.5   7.26  )-----------( 218      ( 03 Mar 2025 01:15 )             32.4     03-03    137  |  104  |  13  ----------------------------<  119[H]  3.1[L]   |  21[L]  |  0.41[L]    Ca    8.9      03 Mar 2025 01:15  Phos  3.0     03-03  Mg     1.90     03-03    TPro  5.8[L]  /  Alb  3.2[L]  /  TBili  0.6  /  DBili  x   /  AST  10  /  ALT  9   /  AlkPhos  74  03-02    PT/INR - ( 01 Mar 2025 12:15 )   PT: 13.0 sec;   INR: 1.12 ratio         PTT - ( 01 Mar 2025 12:15 )  PTT:40.4 sec    RADIOLOGY:   < from: MR Angio Head No Cont (02.28.25 @ 15:38) >  IMPRESSION: Right frontal peripherally enhancing epidural collection   beneath the right frontal cranioplasty has reaccumulated since 10/6/2024,   with diffusion restriction suspicious for an infected collection. Right   frontal encephalomalacia and gliosis has evolved since the prior exam.   Old left cerebellar infarct. Patent third ventriculostomy. Right frontal   ventricular catheter.    Normal intracranial MR angiography.    Dr. Salgado discussed these findings with Dr. Brush on 2/28/2025 4:16   PM with read back.    --- End of Report ---    < end of copied text >      PHYSICAL EXAM:  Awake, alert, intubated, fc  EOS, EOMI, PERRL  MAEx4 with good strength  Incision c/d/i   Right EDJP in place, mostly clear, pus tinged, 85cc/24h PAST 24HR EVENTS:  No issues overnight. Pt remained intubated per ENT reccs 2/2 stridor    Vital Signs Last 24 Hrs  T(C): 37.4 (03 Mar 2025 00:00), Max: 37.4 (02 Mar 2025 08:00)  T(F): 99.3 (03 Mar 2025 00:00), Max: 99.3 (02 Mar 2025 08:00)  HR: 65 (03 Mar 2025 02:00) (54 - 97)  BP: 95/53 (03 Mar 2025 02:00) (83/47 - 129/88)  BP(mean): 65 (03 Mar 2025 02:00) (59 - 99)  RR: 14 (03 Mar 2025 02:00) (11 - 22)  SpO2: 100% (03 Mar 2025 02:00) (99% - 100%)    Parameters below as of 03 Mar 2025 02:00  Patient On (Oxygen Delivery Method): ventilator    O2 Concentration (%): 40    MEDS:   acetaminophen   Oral Liquid .. 1000 milliGRAM(s) Oral every 6 hours PRN  albuterol/ipratropium for Nebulization 3 milliLiter(s) Nebulizer every 30 minutes  cefepime   IVPB 2000 milliGRAM(s) IV Intermittent every 8 hours  cefepime   IVPB      chlorhexidine 2% Cloths 1 Application(s) Topical daily  dexMEDEtomidine Infusion 0.5 MICROgram(s)/kG/Hr IV Continuous <Continuous>  HYDROmorphone  Injectable 1 milliGRAM(s) IV Push every 2 hours PRN  levETIRAcetam   Injectable 1000 milliGRAM(s) IV Push every 12 hours  metroNIDAZOLE  IVPB 500 milliGRAM(s) IV Intermittent every 8 hours  pantoprazole  Injectable 40 milliGRAM(s) IV Push daily  potassium chloride   Powder 40 milliEquivalent(s) Oral once  propofol Infusion 5 MICROgram(s)/kG/Min IV Continuous <Continuous>  sodium chloride 1.5%. 500 milliLiter(s) IV Continuous <Continuous>  vancomycin  IVPB      vancomycin  IVPB 1000 milliGRAM(s) IV Intermittent every 12 hours      LABS:                        10.5   7.26  )-----------( 218      ( 03 Mar 2025 01:15 )             32.4     03-03    137  |  104  |  13  ----------------------------<  119[H]  3.1[L]   |  21[L]  |  0.41[L]    Ca    8.9      03 Mar 2025 01:15  Phos  3.0     03-03  Mg     1.90     03-03    TPro  5.8[L]  /  Alb  3.2[L]  /  TBili  0.6  /  DBili  x   /  AST  10  /  ALT  9   /  AlkPhos  74  03-02    PT/INR - ( 01 Mar 2025 12:15 )   PT: 13.0 sec;   INR: 1.12 ratio         PTT - ( 01 Mar 2025 12:15 )  PTT:40.4 sec    RADIOLOGY:   < from: MR Angio Head No Cont (02.28.25 @ 15:38) >  IMPRESSION: Right frontal peripherally enhancing epidural collection   beneath the right frontal cranioplasty has reaccumulated since 10/6/2024,   with diffusion restriction suspicious for an infected collection. Right   frontal encephalomalacia and gliosis has evolved since the prior exam.   Old left cerebellar infarct. Patent third ventriculostomy. Right frontal   ventricular catheter.    Normal intracranial MR angiography.    Dr. Salgado discussed these findings with Dr. Brush on 2/28/2025 4:16   PM with read back.    --- End of Report ---    < end of copied text >      PHYSICAL EXAM:  Awake, alert, intubated, fc  EOS, EOMI, PERRL  MAEx4 with good strength  Incision c/d/i   Right EDJP in place, mostly clear, pus tinged, 85cc/24h

## 2025-03-03 NOTE — PROGRESS NOTE ADULT - ASSESSMENT
60 y/o F, with NF1, h/o craniectomy/ cranioplasty for SDH, s/p right clarissa hole for evacuation of epidural collection (purulent fluid)    2/24 Exam with decreased strength on left side, post ictal vs intracranial causes, will get MRI/MRA to assess and CTH/CTA if MRI will be delayed. On zosyn, white count downtrending.   2/25:  CTH/ CTA done stable , CTA negative, MRIw/flow/MRA pending, intubated /sedated, neurology saw Veeg-O, on keppra, K+3.3 repleted, afbrile, bld cltr NGTD, UA neg, WBC nml  2/26: stable exam,  Veeg on, keppra, mri w/flow/ mra pending  2/27: Stable exam. No seizures noted on VEEG. On keppra. MRI with CSF flow study and MRA ordered  2/28: Extubated, alert, mildly confused and restless, nonfocal exam. On keppra. MRI with CSF flow study and MRA ordered  3/1: Alert, neurologically intact. On keppra. MRI showed increase in right epidural collection with diffusion restriction. Preop for evacuation today, was intubated preop for stridor, underwent clarissa hole for drainage of epidural phlegmon  3/2 Intubated, ENT scoped determined airway patent, ID-Following initiated on broad spectrum abx, GSN, AFB neg, Cx pending   58 yo F with PMHx of neurofibromatosis, s/p L suboccipital craniectomy for resection of cerebellar brain tumor 12/2023 by Dr Brush, s/p traumatic fall with head trauma in 5/20/2024 with large acute right SDH, s/p emergent right decompressive hemicraniectomy on 5/22/24, s/p right cranioplasty on 6/17/24 with custom plate, s/p ETV, ligation of shunt on the right with ligaclips with retention of valve as rescue device, presents as a transfer from [facility] for witnessed seizure at home. [facility] moderate ventriculomegaly and slight increase in size of extra-axial CSF collection overlying the right frontal duraplasty    2/24 Exam with decreased strength on left side, post ictal vs intracranial causes, will get MRI/MRA to assess and CTH/CTA if MRI will be delayed. On zosyn, white count downtrending.   2/25:  CTH/ CTA done stable , CTA negative, MRIw/flow/MRA ordered, intubated /sedated, neurology saw Veeg-O/ neg, on keppra, K+3.3 repleted, afbrile, bld cltr NGTD, UA neg, WBC nml  2/26: stable exam,  Veeg on, keppra, mri w/flow/ mra pending  2/27: Stable exam. No seizures noted on VEEG. On keppra. MRI with CSF flow study and MRA ordered  2/28: Extubated, alert, mildly confused and restless, nonfocal exam. On keppra. MRI showed increase in right epidural collection with diffusion restriction.   3/1: Alert, neurologically intact. On keppra. OR for clarissa hold for drainage of epidural abscess. Cx sent, NGTD. EDJPx1  Was intubated preop for stridor.  3/2: POD1: Intubated, ENT scoped determined airway patent, ID-Following initiated on broad spectrum abx, OR culutres GSN, AFB neg, Cx pending  3/3 POD2, extubated, on flagyl/vanc/cefipime, K 3.1- repletion ordered. EDJPx1 105cc/24hrs   58 yo F with PMHx of neurofibromatosis, s/p L suboccipital craniectomy for resection of cerebellar brain tumor 2023 by Dr Brush, s/p traumatic fall with head trauma in 2024 with large acute right SDH, s/p emergent right decompressive hemicraniectomy on 24, s/p right cranioplasty on 24 with custom plate, s/p ETV, ligation of shunt on the right with ligaclips with retention of valve as rescue device, presents as a transfer from [facility] for witnessed seizure at home. [facility] obtained at [facility] revealed moderate ventriculomegaly and slight increase in size of extra-axial CSF collection overlying the right frontal duraplasty     Exam with decreased strength on left side, post ictal vs intracranial causes, will get MRI/MRA to assess and CTH/CTA if MRI will be delayed. On zosyn, white count downtrending.   :  CTH/ CTA done stable , CTA negative, MRIw/flow/MRA ordered, intubated /sedated, neurology saw Veeg-O/ neg, on keppra, K+3.3 repleted, afbrile, bld cltr NGTD, UA neg, WBC nml  : stable exam,  Veeg on, keppra, mri w/flow/ mra pending  : Stable exam. No seizures noted on VEEG. On keppra. MRI with CSF flow study and MRA ordered  : Extubated, alert, mildly confused and restless, nonfocal exam. On keppra. MRI showed increase in right epidural collection with diffusion restriction. Shunt (as reservoir) tapped, W1 R1  GSN  3/1: Alert, neurologically intact. On keppra. OR for clarissa hold for drainage of epidural abscess. Cx sent, NGTD. EDJPx1  Was intubated preop for stridor, dex x24hrs  3/2: POD1: Intubated, ENT scoped determined airway patent, ID-Following initiated on broad spectrum abx, OR culutres GSN, AFB neg, Cx pending. Dex discontinued.  3/3 POD2, extubated, on flagyl/vanc/cefipime, K 3.1- repletion ordered. EDJPx1 105cc/24hrs, PT recc LOUIS

## 2025-03-03 NOTE — PROGRESS NOTE ADULT - SUBJECTIVE AND OBJECTIVE BOX
Infectious Diseases Follow Up:    Patient is a 59y old  Female who presents with a chief complaint of seizure (03 Mar 2025 09:42)      Interval History/ROS:  Intaubted, CPAPing well, pt alert and awake.     Allergies  No Known Allergies        ANTIMICROBIALS:  cefepime   IVPB 2000 every 8 hours  cefepime   IVPB    metroNIDAZOLE  IVPB 500 every 8 hours  vancomycin  IVPB 1250 every 12 hours      Current Abx:     Previous Abx     OTHER MEDS:  MEDICATIONS  (STANDING):  acetaminophen   Oral Liquid .. 1000 every 6 hours PRN  albuterol/ipratropium for Nebulization 3 every 30 minutes  dexAMETHasone  IVPB 10 every 6 hours  dexMEDEtomidine Infusion 0.5 <Continuous>  dexMEDEtomidine Infusion 0.5 <Continuous>  HYDROmorphone  Injectable 1 every 2 hours PRN  levETIRAcetam   Injectable 1000 every 12 hours  pantoprazole  Injectable 40 daily  racepinephrine  2.25% Inhalation 0.5 once      Vital Signs Last 24 Hrs  T(C): 37.5 (03 Mar 2025 04:00), Max: 37.5 (03 Mar 2025 04:00)  T(F): 99.5 (03 Mar 2025 04:00), Max: 99.5 (03 Mar 2025 04:00)  HR: 106 (03 Mar 2025 11:33) (54 - 106)  BP: 138/75 (03 Mar 2025 10:00) (82/46 - 138/75)  BP(mean): 91 (03 Mar 2025 10:00) (53 - 91)  RR: 14 (03 Mar 2025 10:00) (11 - 22)  SpO2: 100% (03 Mar 2025 11:33) (99% - 100%)    Parameters below as of 03 Mar 2025 11:34    O2 Flow (L/min): 10  O2 Concentration (%): 50    PHYSICAL EXAM:  GENERAL: NAD, well-developed  HEAD:  R sided dressing   EYES: EOMI, conjunctiva and sclera clear  CHEST/LUNG: Intubated, coarse breath sounds   HEART: Regular rate and rhythm; No murmurs, rubs, or gallops  ABDOMEN: Soft, Nontender, Nondistended; Bowel sounds present  PSYCH: AAO                          10.6   5.79  )-----------( 160      ( 03 Mar 2025 06:18 )             31.4       03-03    137  |  104  |  13  ----------------------------<  119[H]  3.1[L]   |  21[L]  |  0.41[L]    Ca    8.9      03 Mar 2025 01:15  Phos  3.0     03-03  Mg     1.90     03-03    TPro  5.8[L]  /  Alb  3.2[L]  /  TBili  0.6  /  DBili  x   /  AST  10  /  ALT  9   /  AlkPhos  74  03-02      Urinalysis Basic - ( 03 Mar 2025 01:15 )    Color: x / Appearance: x / SG: x / pH: x  Gluc: 119 mg/dL / Ketone: x  / Bili: x / Urobili: x   Blood: x / Protein: x / Nitrite: x   Leuk Esterase: x / RBC: x / WBC x   Sq Epi: x / Non Sq Epi: x / Bacteria: x        MICROBIOLOGY:  Vancomycin Level, Trough: 9.5 ug/mL (03-03-25 @ 05:25)  v  Surgical Swab  03-01-25   No growth to date  --    Few polymorphonuclear leukocytes per low power field  No organisms seen per oil power field      CSF CSF  02-28-25   No growth  --    No polymorphonuclear cells seen  No organisms seen  by cytocentrifuge      Catheterized Catheterized  02-26-25   No growth  --  --      .Blood Blood-Peripheral  02-26-25   No growth at 4 days  --  --      .Blood Blood-Peripheral  02-26-25   No growth at 4 days  --  --      .Blood Blood  02-22-25   No growth at 5 days  --  --      .Blood Blood  02-22-25   No growth at 5 days  --  --                RADIOLOGY:     Improved

## 2025-03-03 NOTE — PROGRESS NOTE ADULT - PROBLEM SELECTOR PLAN 1
- neurochecks Q1H  - cont abx per ID, vanco, cefepime, flagyl  - wean to extubate  - con't AED- keppra  - f/u OR culture  - record SHAYY output Q shift  - DVT ppx- venodynes BLE  - pain control prn    [id number]    Case discussed with attending neurosurgeon Dr. Brush

## 2025-03-03 NOTE — PROGRESS NOTE ADULT - SUBJECTIVE AND OBJECTIVE BOX
POST ANESTHESIA EVALUATION    59y Female POSTOP DAY 1   MENTAL STATUS: Patient participation [  ] Awake     [ x ] Arousable     [  ] Sedated    AIRWAY PATENCY: [  ] Satisfactory  [ x ] Other: intubated    Vital Signs Last 24 Hrs  T(C): 37.5 (03 Mar 2025 04:00), Max: 37.5 (03 Mar 2025 04:00)  T(F): 99.5 (03 Mar 2025 04:00), Max: 99.5 (03 Mar 2025 04:00)  HR: 74 (03 Mar 2025 09:00) (54 - 91)  BP: 115/61 (03 Mar 2025 09:00) (82/46 - 130/74)  BP(mean): 77 (03 Mar 2025 09:00) (53 - 90)  RR: 12 (03 Mar 2025 09:00) (11 - 22)  SpO2: 100% (03 Mar 2025 09:00) (99% - 100%)    Parameters below as of 03 Mar 2025 06:00  Patient On (Oxygen Delivery Method): BiPAP/CPAP, 10/5      I&O's Summary    02 Mar 2025 07:01  -  03 Mar 2025 07:00  --------------------------------------------------------  IN: 2264.9 mL / OUT: 1350 mL / NET: 914.9 mL    03 Mar 2025 07:01  -  03 Mar 2025 09:42  --------------------------------------------------------  IN: 60 mL / OUT: 325 mL / NET: -265 mL          HYDRATION STATUS:  [ x ] SATISFACTORY   [  ] OTHER    NAUSEA/ VOMITTING:  [ x ] NONE  [  ] CONTROLLED [  ] OTHER     PAIN: [ x ] CONTROLLED WITH CURRENT REGIMEN  [  ] OTHER    [ x ] NO APPARENT ANESTHESIA COMPLICATIONS      Comments: wean to extubate today is icu plasn POST ANESTHESIA EVALUATION    59y Female POSTOP DAY 2  MENTAL STATUS: Patient participation [  ] Awake     [ x ] Arousable     [  ] Sedated    AIRWAY PATENCY: [  ] Satisfactory  [ x ] Other: intubated    Vital Signs Last 24 Hrs  T(C): 37.5 (03 Mar 2025 04:00), Max: 37.5 (03 Mar 2025 04:00)  T(F): 99.5 (03 Mar 2025 04:00), Max: 99.5 (03 Mar 2025 04:00)  HR: 74 (03 Mar 2025 09:00) (54 - 91)  BP: 115/61 (03 Mar 2025 09:00) (82/46 - 130/74)  BP(mean): 77 (03 Mar 2025 09:00) (53 - 90)  RR: 12 (03 Mar 2025 09:00) (11 - 22)  SpO2: 100% (03 Mar 2025 09:00) (99% - 100%)    Parameters below as of 03 Mar 2025 06:00  Patient On (Oxygen Delivery Method): BiPAP/CPAP, 10/5      I&O's Summary    02 Mar 2025 07:01  -  03 Mar 2025 07:00  --------------------------------------------------------  IN: 2264.9 mL / OUT: 1350 mL / NET: 914.9 mL    03 Mar 2025 07:01  -  03 Mar 2025 09:42  --------------------------------------------------------  IN: 60 mL / OUT: 325 mL / NET: -265 mL          HYDRATION STATUS:  [ x ] SATISFACTORY   [  ] OTHER    NAUSEA/ VOMITTING:  [ x ] NONE  [  ] CONTROLLED [  ] OTHER     PAIN: [ x ] CONTROLLED WITH CURRENT REGIMEN  [  ] OTHER    [ x ] NO APPARENT ANESTHESIA COMPLICATIONS      Comments: wean to extubate today is icu plasn

## 2025-03-03 NOTE — PROGRESS NOTE ADULT - PROBLEM SELECTOR PLAN 1
- neurochecks Q1H  - cont abx per ID, vanco, cefepime, flagyl  - wean to extubate  - con't AED- keppra  - f/u OR culture  - record SHAYY output Q shift  - DVT ppx- venodynes BLE  - pain control prn    [id number]    Case discussed with attending neurosurgeon Dr. Brush

## 2025-03-03 NOTE — PROGRESS NOTE ADULT - ASSESSMENT
This is a 58 y/o F w/ very extensive PMHx of NF, 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), s/p  shunt (in [facility]), s/p traumatic fall w/ R SDH, s/p R decompressive hemicraniectomy, ICP monitor and R cranioplasty (5/2024), s/p L frontal Endoscopic third ventriculostomy and ligation of R  shunt with Ligaclips with retention of valve, proximal and distal catheters (08/2024) admitted initially to [facility] on 2/22 for seizure, intubated for airway protection and status epilepticus, initially on Zosyn for aspiration PNA.  Initially CTH w/ slight increase in extra axial fluid collection measuring up to 1.5 cm. Pt was transferred to [facility] on 2/23 for neurosurgery eval, given decreased L sided strength, MRI done with increase in R sided collection.  Pt extubated on 2/28, CSF reservoir tapped, no pleocytosis.    Pt went to the OR on 3/1 for R clarissa pole with evacuation of epidural collection, with purulent drainage.     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), s/p  shunt (in [facility]), s/p traumatic fall w/ R SDH, s/p R decompressive hemicraniectomy, ICP monitor and R cranioplasty (5/2024), s/p L frontal Endoscopic third ventriculostomy and ligation of R  shunt with ligaclips with retention of valve, proximal and distal catheters (08/2024) admitted initially to [facility] on 2/22 for seizure, intubated for airway protection and status epilepticus, initially on Zosyn for aspiration PNA, transferred to [facility] on 2/23 for neurosurgery evaluation, now found to with have epidural abscess s/p R Clarissa w/ purulent drainage.    Recommendations:   1. Vancomycin 1.25 g q12, goal -600, Cefepime 2 g q8, Flagyl 500 mg q8   2. F/u OR Cx, NGTD  3. F/u neurosurgery plan for further surgery       Thank you for consulting us and involving us in the management of this patient's case. In addition to reviewing history, imaging, documents, labs, microbiology, and infection control strategies and potential issues.     ID will continue to follow    Inder Mcintosh M.D.  Attending Physician  Division of Infectious Diseases  Department of Medicine

## 2025-03-03 NOTE — PROGRESS NOTE ADULT - SUBJECTIVE AND OBJECTIVE BOX
PAST 24HR EVENTS:  No issues overnight. Pt intubated per ENT reccs 2/2 stridor, however briskly follows commands.     Vital Signs Last 24 Hrs  T(C): 37.4 (03 Mar 2025 00:00), Max: 37.4 (02 Mar 2025 08:00)  T(F): 99.3 (03 Mar 2025 00:00), Max: 99.3 (02 Mar 2025 08:00)  HR: 66 (03 Mar 2025 00:00) (54 - 97)  BP: 106/55 (03 Mar 2025 00:00) (83/47 - 129/88)  BP(mean): 69 (03 Mar 2025 00:00) (59 - 99)  RR: 14 (03 Mar 2025 00:00) (11 - 22)  SpO2: 100% (03 Mar 2025 00:00) (99% - 100%)    Parameters below as of 03 Mar 2025 00:00  Patient On (Oxygen Delivery Method): ventilator    O2 Concentration (%): 40    MEDS:   acetaminophen   Oral Liquid .. 1000 milliGRAM(s) Oral every 6 hours PRN  albuterol/ipratropium for Nebulization 3 milliLiter(s) Nebulizer every 30 minutes  cefepime   IVPB 2000 milliGRAM(s) IV Intermittent every 8 hours  cefepime   IVPB      chlorhexidine 2% Cloths 1 Application(s) Topical daily  dexMEDEtomidine Infusion 0.5 MICROgram(s)/kG/Hr IV Continuous <Continuous>  HYDROmorphone  Injectable 1 milliGRAM(s) IV Push every 2 hours PRN  levETIRAcetam   Injectable 1000 milliGRAM(s) IV Push every 12 hours  metroNIDAZOLE  IVPB 500 milliGRAM(s) IV Intermittent every 8 hours  pantoprazole  Injectable 40 milliGRAM(s) IV Push daily  propofol Infusion 5 MICROgram(s)/kG/Min IV Continuous <Continuous>  sodium chloride 1.5%. 500 milliLiter(s) IV Continuous <Continuous>  vancomycin  IVPB      vancomycin  IVPB 1000 milliGRAM(s) IV Intermittent every 12 hours      LABS:                        11.1   7.97  )-----------( 230      ( 02 Mar 2025 01:12 )             32.9     03-02    135  |  101  |  12  ----------------------------<  152[H]  4.2   |  20[L]  |  0.35[L]    Ca    9.0      02 Mar 2025 01:12  Phos  3.0     03-02  Mg     2.20     03-02    TPro  5.8[L]  /  Alb  3.2[L]  /  TBili  0.6  /  DBili  x   /  AST  10  /  ALT  9   /  AlkPhos  74  03-02    PT/INR - ( 01 Mar 2025 12:15 )   PT: 13.0 sec;   INR: 1.12 ratio         PTT - ( 01 Mar 2025 12:15 )  PTT:40.4 sec    RADIOLOGY:   < from: MR Angio Head No Cont (02.28.25 @ 15:38) >  IMPRESSION: Right frontal peripherally enhancing epidural collection   beneath the right frontal cranioplasty has reaccumulated since 10/6/2024,   with diffusion restriction suspicious for an infected collection. Right   frontal encephalomalacia and gliosis has evolved since the prior exam.   Old left cerebellar infarct. Patent third ventriculostomy. Right frontal   ventricular catheter.    Normal intracranial MR angiography.    Dr. Salgado discussed these findings with Dr. Brush on 2/28/2025 4:16   PM with read back.    --- End of Report ---      < end of copied text >      PHYSICAL EXAM:  Awake, alert, intubated, fc  EOS, EOMI, PERRL  MAEx4 with good strength  Incision c/d/i   Right EDJP in place, mostly clear, 85cc/24h PAST 24HR EVENTS:  No issues overnight. Pt intubated per ENT reccs 2/2 stridor, however briskly follows commands.     Vital Signs Last 24 Hrs  T(C): 37.4 (03 Mar 2025 00:00), Max: 37.4 (02 Mar 2025 08:00)  T(F): 99.3 (03 Mar 2025 00:00), Max: 99.3 (02 Mar 2025 08:00)  HR: 66 (03 Mar 2025 00:00) (54 - 97)  BP: 106/55 (03 Mar 2025 00:00) (83/47 - 129/88)  BP(mean): 69 (03 Mar 2025 00:00) (59 - 99)  RR: 14 (03 Mar 2025 00:00) (11 - 22)  SpO2: 100% (03 Mar 2025 00:00) (99% - 100%)    Parameters below as of 03 Mar 2025 00:00  Patient On (Oxygen Delivery Method): ventilator    O2 Concentration (%): 40    MEDS:   acetaminophen   Oral Liquid .. 1000 milliGRAM(s) Oral every 6 hours PRN  albuterol/ipratropium for Nebulization 3 milliLiter(s) Nebulizer every 30 minutes  cefepime   IVPB 2000 milliGRAM(s) IV Intermittent every 8 hours  cefepime   IVPB      chlorhexidine 2% Cloths 1 Application(s) Topical daily  dexMEDEtomidine Infusion 0.5 MICROgram(s)/kG/Hr IV Continuous <Continuous>  HYDROmorphone  Injectable 1 milliGRAM(s) IV Push every 2 hours PRN  levETIRAcetam   Injectable 1000 milliGRAM(s) IV Push every 12 hours  metroNIDAZOLE  IVPB 500 milliGRAM(s) IV Intermittent every 8 hours  pantoprazole  Injectable 40 milliGRAM(s) IV Push daily  propofol Infusion 5 MICROgram(s)/kG/Min IV Continuous <Continuous>  sodium chloride 1.5%. 500 milliLiter(s) IV Continuous <Continuous>  vancomycin  IVPB      vancomycin  IVPB 1000 milliGRAM(s) IV Intermittent every 12 hours      LABS:                        11.1   7.97  )-----------( 230      ( 02 Mar 2025 01:12 )             32.9     03-02    135  |  101  |  12  ----------------------------<  152[H]  4.2   |  20[L]  |  0.35[L]    Ca    9.0      02 Mar 2025 01:12  Phos  3.0     03-02  Mg     2.20     03-02    TPro  5.8[L]  /  Alb  3.2[L]  /  TBili  0.6  /  DBili  x   /  AST  10  /  ALT  9   /  AlkPhos  74  03-02    PT/INR - ( 01 Mar 2025 12:15 )   PT: 13.0 sec;   INR: 1.12 ratio         PTT - ( 01 Mar 2025 12:15 )  PTT:40.4 sec    RADIOLOGY:   < from: MR Angio Head No Cont (02.28.25 @ 15:38) >  IMPRESSION: Right frontal peripherally enhancing epidural collection   beneath the right frontal cranioplasty has reaccumulated since 10/6/2024,   with diffusion restriction suspicious for an infected collection. Right   frontal encephalomalacia and gliosis has evolved since the prior exam.   Old left cerebellar infarct. Patent third ventriculostomy. Right frontal   ventricular catheter.    Normal intracranial MR angiography.    Dr. Salgado discussed these findings with Dr. Brush on 2/28/2025 4:16   PM with read back.    --- End of Report ---      < end of copied text >      PHYSICAL EXAM:  Awake, alert, intubated, fc  EOS, EOMI, PERRL  MAEx4 with good strength  Incision c/d/i   Right EDJP in place, mostly clear, pus tinged, 85cc/24h

## 2025-03-03 NOTE — PROGRESS NOTE ADULT - SUBJECTIVE AND OBJECTIVE BOX
SICU Daily Progress Note  =====================================================  Interval/Overnight Events:      - Continue steroids  x48 hrs --> likely plan to extubate in am  - wean prop in am, cont precedex/dilaudid prn q2-4  - KO tube > started TF, held at 5am for poss extubation   - 1.5 % NS for 6 hrs, starting at 6pm      HPI:   60 y/o F w/ very extensive PMHx of NF, 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), s/p  shunt (in [facility]), s/p traumatic fall w/ R SDH, s/p R decompressive hemicraniectomy, ICP monitor and R cranioplasty (5/2024), s/p L frontal Endoscopic third ventriculostomy and ligation of R  shunt with ligaclips with retention of valve, proximal and distal catheters (08/2024) admitted initially to [facility] on 2/22 for seizure, intubated for airway protection and status epilepticus, initially on Zosyn for aspiration PNA, transferred to [facility] on 2/23 for neurosurgery evaluation, now found to with have epidural abscess s/p R Clarissa w/ purulent drainage.    Allergies: No Known Allergies      MEDICATIONS:   --------------------------------------------------------------------------------------  Neurologic Medications  acetaminophen   Oral Liquid .. 1000 milliGRAM(s) Oral every 6 hours PRN Temp greater or equal to 38C (100.4F), Mild Pain (1 - 3)  dexMEDEtomidine Infusion 0.5 MICROgram(s)/kG/Hr IV Continuous <Continuous>  HYDROmorphone  Injectable 1 milliGRAM(s) IV Push every 2 hours PRN moderate to severe pain  levETIRAcetam   Injectable 1000 milliGRAM(s) IV Push every 12 hours  propofol Infusion 5 MICROgram(s)/kG/Min IV Continuous <Continuous>    Respiratory Medications  albuterol/ipratropium for Nebulization 3 milliLiter(s) Nebulizer every 30 minutes    Cardiovascular Medications    Gastrointestinal Medications  pantoprazole  Injectable 40 milliGRAM(s) IV Push daily  sodium chloride 1.5%. 500 milliLiter(s) IV Continuous <Continuous>    Genitourinary Medications    Hematologic/Oncologic Medications    Antimicrobial/Immunologic Medications  cefepime   IVPB 2000 milliGRAM(s) IV Intermittent every 8 hours  cefepime   IVPB      metroNIDAZOLE  IVPB 500 milliGRAM(s) IV Intermittent every 8 hours  vancomycin  IVPB      vancomycin  IVPB 1000 milliGRAM(s) IV Intermittent every 12 hours    Endocrine/Metabolic Medications    Topical/Other Medications  chlorhexidine 2% Cloths 1 Application(s) Topical daily    --------------------------------------------------------------------------------------    VITAL SIGNS, INS/OUTS (last 24 hours):  --------------------------------------------------------------------------------------  T(C): 37.4 (03-03-25 @ 00:00), Max: 37.4 (03-02-25 @ 08:00)  HR: 66 (03-03-25 @ 00:00) (54 - 97)  BP: 106/55 (03-03-25 @ 00:00) (83/47 - 129/88)  BP(mean): 69 (03-03-25 @ 00:00) (59 - 99)  ABP: --  ABP(mean): --  RR: 14 (03-03-25 @ 00:00) (11 - 22)  SpO2: 100% (03-03-25 @ 00:00) (99% - 100%)  Wt(kg): --  CVP(mm Hg): --  CI: --  CAPILLARY BLOOD GLUCOSE      POCT Blood Glucose.: 112 mg/dL (02 Mar 2025 17:48)  POCT Blood Glucose.: 124 mg/dL (02 Mar 2025 12:24)  POCT Blood Glucose.: 115 mg/dL (02 Mar 2025 05:37)  POCT Blood Glucose.: 149 mg/dL (02 Mar 2025 00:56)   N/A      03-01 @ 07:01  -  03-02 @ 07:00  --------------------------------------------------------  IN:    Dexmedetomidine: 136.2 mL    IV PiggyBack: 1550 mL    Lactated Ringers: 2200 mL    Lactated Ringers Bolus: 500 mL    Propofol: 50 mL  Total IN: 4436.2 mL    OUT:    Bulb (mL): 95 mL    Indwelling Catheter - Urethral (mL): 1165 mL  Total OUT: 1260 mL    Total NET: 3176.2 mL      03-02 @ 07:01  -  03-03 @ 00:36  --------------------------------------------------------  IN:    Dexmedetomidine: 149.4 mL    IV PiggyBack: 550 mL    Lactated Ringers: 300 mL    Peptamen A.F.: 316 mL    Propofol: 136 mL    sodium chloride 1.5%: 270 mL  Total IN: 1721.4 mL    OUT:    Bulb (mL): 85 mL    Indwelling Catheter - Urethral (mL): 990 mL  Total OUT: 1075 mL    Total NET: 646.4 mL        --------------------------------------------------------------------------------------    PHYSICAL EXAM:  GENERAL: NAD, well-developed  HEAD:  R sided dressing   EYES: EOMI, conjunctiva and sclera clear  CHEST/LUNG: Intubated, coarse breath sounds   HEART: Regular rate and rhythm; No murmurs, rubs, or gallops  ABDOMEN: Soft, Nontender, Nondistended; Bowel sounds present  PSYCH: AAO    METABOLIC/FLUIDS/ELECTROLYTES  sodium chloride 1.5%. 500 milliLiter(s) IV Continuous <Continuous>      HEMATOLOGIC  [x] VTE Prophylaxis:   Transfusions:	[] PRBC	[] Platelets		[] FFP	[] Cryoprecipitate    INFECTIOUS DISEASE  Antimicrobials/Immunologic Medications:  cefepime   IVPB 2000 milliGRAM(s) IV Intermittent every 8 hours  cefepime   IVPB      metroNIDAZOLE  IVPB 500 milliGRAM(s) IV Intermittent every 8 hours  vancomycin  IVPB      vancomycin  IVPB 1000 milliGRAM(s) IV Intermittent every 12 hours    Day #      of     ***    Tubes/Lines/Drains  ***  [x] Peripheral IV  [] Central Venous Line     	[] R	[] L	[] IJ	[] Fem	[] SC	Date Placed:   [] Arterial Line		[] R	[] L	[] Fem	[] Rad	[] Ax	Date Placed:   [] PICC		[] Midline		[] Mediport  [] Urinary Catheter		Date Placed:   [x] Necessity of urinary, arterial, and venous catheters discussed    LABS  --------------------------------------------------------------------------------------  ((Insert SICU Labs here))***  --------------------------------------------------------------------------------------    OTHER LABORATORY:     IMAGING STUDIES:   CXR:     ASSESSMENT:  59 year old female with PMHx of neurofibromatosis, 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), s/p  shunt (in [facility]), s/p traumatic fall with head trauma and R SDH, s/p R decompressive hemicraniectomy, ICP monitor and R cranioplasty (5/2024), s/p L frontal Endoscopic third ventriculostomy and ligation of R  shunt with ligaclips with retention of valve, proximal and distal catheters (08/2024), Anxiety and depression presents to the ED at [place] with status epilepticus. CTH showed stable  shunt and moderate ventriculomegaly, slight increase in size of extra-axial CSF collection overlying the right frontal duraplasty, likely a hygroma. There is no mass effect. Transferred to [facility] for Neurosurgery evaluation, r/o infective etiology. SICU consulted for vent management & Q1 neurochecks. VEEG showed no seizure activity. MRI head showed R frontal epidural collection. 3/1 pt developed stridor for which ENT intubated, noted to have subglottic edema. Taken to OR with neurosurgery for clarissa hole with drainage of epidural collection    PLAN:  Neurologic:   - Q1h neurochecks  - VEEG completed, no seizures   - Sedation: wean prop for extubation, cont precedex  - Keppra 1g BID  - Pain control: IV Tylenol, dilaudid PRN   - CTA head 2/24 wnl  - MR head with epidural empyema   - OR (03/01) - clarissa hole with purulent fluid. Epidural drain in place    Respiratory:   - 3/1 intubated for stridor, upper airway edema, prior to OR  - 12 350 5 40%  - Decadron 10mg q6h for 48hrs for upper airway edema    Cardiovascular:   - MAP > 65    Gastrointestinal/Nutrition:   - NGT with TFs, held @5am for poss extubation     Renal/Genitourinary:   - Monitor electrolytes  - 1.5% sodium chloride x6hrs  - Dominguez    Hematologic:   - DVT PPx: SqH (held post op)    Infectious Disease:   - Monitor WBC, fever curves  - Cefepime, flagyl, vanco for empyema    - s/p Zosyn (presumed aspiration pneumonia) 7/23/24 - 7/24/24  - blood cx sent x2 ([facility]ard) > NG @ 5d  - RVP 3/1 negative    Endocrine:   - Monitor glucose  - Decadron 10mg q6h for 48 hrs for airway edema    Tubes/Lines/Drains:   - PIV  - Angelica  - GLADIST  - epidural drain (3/1-)    Disposition: SICU    --------------------------------------------------------------------------------------    Critical Care Diagnoses:

## 2025-03-03 NOTE — PROGRESS NOTE ADULT - ASSESSMENT
60 y/o F, with NF1, h/o craniectomy/ cranioplasty for SDH, s/p right clarissa hole for evacuation of epidural collection (purulent fluid)    2/24 Exam with decreased strength on left side, post ictal vs intracranial causes, will get MRI/MRA to assess and CTH/CTA if MRI will be delayed. On zosyn, white count downtrending.   2/25:  CTH/ CTA done stable , CTA negative, MRIw/flow/MRA pending, intubated /sedated, neurology saw Veeg-O, on keppra, K+3.3 repleted, afbrile, bld cltr NGTD, UA neg, WBC nml  2/26: stable exam,  Veeg on, keppra, mri w/flow/ mra pending  2/27: Stable exam. No seizures noted on VEEG. On keppra. MRI with CSF flow study and MRA ordered  2/28: Extubated, alert, mildly confused and restless, nonfocal exam. On keppra. MRI with CSF flow study and MRA ordered  3/1: Alert, neurologically intact. On keppra. MRI showed increase in right epidural collection with diffusion restriction. Preop for evacuation today, was intubated preop for stridor, underwent clarissa hole for drainage of epidural phlegmon  3/2 Intubated, ENT to scope to evaluate airway edema, ID-F initiated on broad spectrum abx, GSN, AFB neg, Cx pending   58 y/o F, with NF1, h/o craniectomy/ cranioplasty for SDH, s/p right clarissa hole for evacuation of epidural collection (purulent fluid)    2/24 Exam with decreased strength on left side, post ictal vs intracranial causes, will get MRI/MRA to assess and CTH/CTA if MRI will be delayed. On zosyn, white count downtrending.   2/25:  CTH/ CTA done stable , CTA negative, MRIw/flow/MRA pending, intubated /sedated, neurology saw Veeg-O, on keppra, K+3.3 repleted, afbrile, bld cltr NGTD, UA neg, WBC nml  2/26: stable exam,  Veeg on, keppra, mri w/flow/ mra pending  2/27: Stable exam. No seizures noted on VEEG. On keppra. MRI with CSF flow study and MRA ordered  2/28: Extubated, alert, mildly confused and restless, nonfocal exam. On keppra. MRI with CSF flow study and MRA ordered  3/1: Alert, neurologically intact. On keppra. MRI showed increase in right epidural collection with diffusion restriction. Preop for evacuation today, was intubated preop for stridor, underwent clarissa hole for drainage of epidural phlegmon  3/2 Intubated, ENT scoped determined airway patent, ID-Following initiated on broad spectrum abx, GSN, AFB neg, Cx pending

## 2025-03-04 LAB
ANION GAP SERPL CALC-SCNC: 13 MMOL/L — SIGNIFICANT CHANGE UP (ref 7–14)
BLOOD GAS ARTERIAL - LYTES,HGB,ICA,LACT RESULT: SIGNIFICANT CHANGE UP
BUN SERPL-MCNC: 11 MG/DL — SIGNIFICANT CHANGE UP (ref 7–23)
CALCIUM SERPL-MCNC: 9.2 MG/DL — SIGNIFICANT CHANGE UP (ref 8.4–10.5)
CHLORIDE SERPL-SCNC: 102 MMOL/L — SIGNIFICANT CHANGE UP (ref 98–107)
CO2 SERPL-SCNC: 22 MMOL/L — SIGNIFICANT CHANGE UP (ref 22–31)
CREAT SERPL-MCNC: 0.35 MG/DL — LOW (ref 0.5–1.3)
CRP SERPL-MCNC: 14.3 MG/L — HIGH
EGFR: 118 ML/MIN/1.73M2 — SIGNIFICANT CHANGE UP
EGFR: 118 ML/MIN/1.73M2 — SIGNIFICANT CHANGE UP
ERYTHROCYTE [SEDIMENTATION RATE] IN BLOOD: 34 MM/HR — HIGH (ref 4–25)
GLUCOSE SERPL-MCNC: 139 MG/DL — HIGH (ref 70–99)
HCT VFR BLD CALC: 40 % — SIGNIFICANT CHANGE UP (ref 34.5–45)
HGB BLD-MCNC: 13 G/DL — SIGNIFICANT CHANGE UP (ref 11.5–15.5)
MAGNESIUM SERPL-MCNC: 1.9 MG/DL — SIGNIFICANT CHANGE UP (ref 1.6–2.6)
MCHC RBC-ENTMCNC: 25.7 PG — LOW (ref 27–34)
MCHC RBC-ENTMCNC: 32.5 G/DL — SIGNIFICANT CHANGE UP (ref 32–36)
MCV RBC AUTO: 79.2 FL — LOW (ref 80–100)
NRBC # BLD AUTO: 0 K/UL — SIGNIFICANT CHANGE UP (ref 0–0)
NRBC # FLD: 0 K/UL — SIGNIFICANT CHANGE UP (ref 0–0)
NRBC BLD AUTO-RTO: 0 /100 WBCS — SIGNIFICANT CHANGE UP (ref 0–0)
PHOSPHATE SERPL-MCNC: 3.2 MG/DL — SIGNIFICANT CHANGE UP (ref 2.5–4.5)
PLATELET # BLD AUTO: 244 K/UL — SIGNIFICANT CHANGE UP (ref 150–400)
POTASSIUM SERPL-MCNC: 4 MMOL/L — SIGNIFICANT CHANGE UP (ref 3.5–5.3)
POTASSIUM SERPL-SCNC: 4 MMOL/L — SIGNIFICANT CHANGE UP (ref 3.5–5.3)
RBC # BLD: 5.05 M/UL — SIGNIFICANT CHANGE UP (ref 3.8–5.2)
RBC # FLD: 14.6 % — HIGH (ref 10.3–14.5)
SODIUM SERPL-SCNC: 137 MMOL/L — SIGNIFICANT CHANGE UP (ref 135–145)
VANCOMYCIN TROUGH SERPL-MCNC: 17.2 UG/ML — SIGNIFICANT CHANGE UP (ref 10–20)
WBC # BLD: 8.42 K/UL — SIGNIFICANT CHANGE UP (ref 3.8–10.5)
WBC # FLD AUTO: 8.42 K/UL — SIGNIFICANT CHANGE UP (ref 3.8–10.5)

## 2025-03-04 PROCEDURE — 71045 X-RAY EXAM CHEST 1 VIEW: CPT | Mod: 26

## 2025-03-04 PROCEDURE — 99291 CRITICAL CARE FIRST HOUR: CPT

## 2025-03-04 PROCEDURE — G0545: CPT

## 2025-03-04 PROCEDURE — 99232 SBSQ HOSP IP/OBS MODERATE 35: CPT

## 2025-03-04 PROCEDURE — 99292 CRITICAL CARE ADDL 30 MIN: CPT

## 2025-03-04 RX ORDER — FUROSEMIDE 10 MG/ML
20 INJECTION INTRAMUSCULAR; INTRAVENOUS ONCE
Refills: 0 | Status: COMPLETED | OUTPATIENT
Start: 2025-03-04 | End: 2025-03-04

## 2025-03-04 RX ORDER — DEXMEDETOMIDINE HYDROCHLORIDE IN SODIUM CHLORIDE 4 UG/ML
0.5 INJECTION INTRAVENOUS
Qty: 400 | Refills: 0 | Status: DISCONTINUED | OUTPATIENT
Start: 2025-03-04 | End: 2025-03-06

## 2025-03-04 RX ORDER — DEXMEDETOMIDINE HYDROCHLORIDE IN SODIUM CHLORIDE 4 UG/ML
0.05 INJECTION INTRAVENOUS
Qty: 400 | Refills: 0 | Status: DISCONTINUED | OUTPATIENT
Start: 2025-03-04 | End: 2025-03-04

## 2025-03-04 RX ADMIN — FUROSEMIDE 20 MILLIGRAM(S): 10 INJECTION INTRAMUSCULAR; INTRAVENOUS at 10:35

## 2025-03-04 RX ADMIN — Medication 1 MILLIGRAM(S): at 06:08

## 2025-03-04 RX ADMIN — DEXMEDETOMIDINE HYDROCHLORIDE IN SODIUM CHLORIDE 8.31 MICROGRAM(S)/KG/HR: 4 INJECTION INTRAVENOUS at 20:26

## 2025-03-04 RX ADMIN — Medication 1 MILLIGRAM(S): at 08:06

## 2025-03-04 RX ADMIN — DEXAMETHASONE 102 MILLIGRAM(S): 0.5 TABLET ORAL at 17:43

## 2025-03-04 RX ADMIN — Medication 1000 MILLIGRAM(S): at 22:25

## 2025-03-04 RX ADMIN — CEFEPIME 100 MILLIGRAM(S): 2 INJECTION, POWDER, FOR SOLUTION INTRAVENOUS at 14:39

## 2025-03-04 RX ADMIN — CEFEPIME 100 MILLIGRAM(S): 2 INJECTION, POWDER, FOR SOLUTION INTRAVENOUS at 05:24

## 2025-03-04 RX ADMIN — Medication 1000 MILLIGRAM(S): at 21:55

## 2025-03-04 RX ADMIN — Medication 1 MILLIGRAM(S): at 05:24

## 2025-03-04 RX ADMIN — Medication 1 MILLIGRAM(S): at 03:00

## 2025-03-04 RX ADMIN — Medication 166.67 MILLIGRAM(S): at 20:25

## 2025-03-04 RX ADMIN — Medication 100 MILLIGRAM(S): at 21:44

## 2025-03-04 RX ADMIN — DEXAMETHASONE 102 MILLIGRAM(S): 0.5 TABLET ORAL at 00:15

## 2025-03-04 RX ADMIN — DEXAMETHASONE 102 MILLIGRAM(S): 0.5 TABLET ORAL at 05:24

## 2025-03-04 RX ADMIN — DEXAMETHASONE 102 MILLIGRAM(S): 0.5 TABLET ORAL at 23:15

## 2025-03-04 RX ADMIN — Medication 1 APPLICATION(S): at 14:39

## 2025-03-04 RX ADMIN — Medication 1 MILLIGRAM(S): at 08:30

## 2025-03-04 RX ADMIN — Medication 100 MILLIGRAM(S): at 14:39

## 2025-03-04 RX ADMIN — DEXMEDETOMIDINE HYDROCHLORIDE IN SODIUM CHLORIDE 0.83 MICROGRAM(S)/KG/HR: 4 INJECTION INTRAVENOUS at 10:35

## 2025-03-04 RX ADMIN — CEFEPIME 100 MILLIGRAM(S): 2 INJECTION, POWDER, FOR SOLUTION INTRAVENOUS at 21:43

## 2025-03-04 RX ADMIN — Medication 100 MILLIGRAM(S): at 05:24

## 2025-03-04 RX ADMIN — Medication 40 MILLIGRAM(S): at 11:43

## 2025-03-04 RX ADMIN — LEVETIRACETAM 1000 MILLIGRAM(S): 10 INJECTION, SOLUTION INTRAVENOUS at 05:28

## 2025-03-04 RX ADMIN — PROPOFOL 3.99 MICROGRAM(S)/KG/MIN: 10 INJECTION, EMULSION INTRAVENOUS at 05:23

## 2025-03-04 RX ADMIN — LEVETIRACETAM 1000 MILLIGRAM(S): 10 INJECTION, SOLUTION INTRAVENOUS at 18:31

## 2025-03-04 RX ADMIN — Medication 15 MILLILITER(S): at 18:31

## 2025-03-04 RX ADMIN — Medication 1 MILLIGRAM(S): at 02:18

## 2025-03-04 RX ADMIN — DEXAMETHASONE 102 MILLIGRAM(S): 0.5 TABLET ORAL at 11:42

## 2025-03-04 RX ADMIN — Medication 166.67 MILLIGRAM(S): at 05:24

## 2025-03-04 NOTE — PROGRESS NOTE ADULT - ASSESSMENT
58 yo F with PMHx of neurofibromatosis, s/p L suboccipital craniectomy for resection of cerebellar brain tumor 2023 by Dr Brush, s/p traumatic fall with head trauma in 2024 with large acute right SDH, s/p emergent right decompressive hemicraniectomy on 24, s/p right cranioplasty on 24 with custom plate, s/p ETV, ligation of shunt on the right with ligaclips with retention of valve as rescue device, presents as a transfer from [facility] for witnessed seizure at home. CTH obtained at [facility] revealed moderate ventriculomegaly and slight increase in size of extra-axial CSF collection overlying the right frontal duraplasty     Exam with decreased strength on left side, post ictal vs intracranial causes, will get MRI/MRA to assess and CTH/CTA if MRI will be delayed. On zosyn, white count downtrending.   :  CTH/ CTA done stable , CTA negative, MRIw/flow/MRA ordered, intubated /sedated, neurology saw Veeg-O/ neg, on keppra, K+3.3 repleted, afbrile, bld cltr NGTD, UA neg, WBC nml  : stable exam,  Veeg on, keppra, mri w/flow/ mra pending  : Stable exam. No seizures noted on VEEG. On keppra. MRI with CSF flow study and MRA ordered  : Extubated, alert, mildly confused and restless, nonfocal exam. On keppra. MRI showed increase in right epidural collection with diffusion restriction. Shunt (as reservoir) tapped, W1 R1  GSN  3/1: Alert, neurologically intact. On keppra. OR for clarissa hold for drainage of epidural abscess. Cx sent, NGTD. EDJPx1  Was intubated preop for stridor, dex x24hrs  3/2: POD1: Intubated, ENT scoped determined airway patent, ID-Following initiated on broad spectrum abx, OR culutres GSN, AFB neg, Cx pending. Dex discontinued.  3/3 POD2, intubated, on flagyl/vanc/cefipime, K 3.1- repletion ordered. EDJPx1 105cc/24hrs, PT recc LOUIS  3/4: pod 3, intubated for respiratory distress, on tripple abx, ED SHAYY to suction, f/u OR cltrs NGTD

## 2025-03-04 NOTE — PROGRESS NOTE ADULT - SUBJECTIVE AND OBJECTIVE BOX
SICU Daily Progress Note  =====================================================  Interval/Overnight Events:   - Patient self-DCed Kenisha -> tube replaced and TF restarted    MEDICATIONS:   --------------------------------------------------------------------------------------  acetaminophen   Oral Liquid .. 1000 milliGRAM(s) Oral every 6 hours PRN  benzocaine/butamben/tetracaine Spray 1 Spray(s) Topical four times a day PRN  cefepime   IVPB 2000 milliGRAM(s) IV Intermittent every 8 hours  cefepime   IVPB      chlorhexidine 2% Cloths 1 Application(s) Topical daily  dexAMETHasone  IVPB 10 milliGRAM(s) IV Intermittent every 6 hours  HYDROmorphone  Injectable 1 milliGRAM(s) IV Push every 2 hours PRN  levETIRAcetam   Injectable 1000 milliGRAM(s) IV Push every 12 hours  metroNIDAZOLE  IVPB 500 milliGRAM(s) IV Intermittent every 8 hours  pantoprazole  Injectable 40 milliGRAM(s) IV Push daily  propofol Infusion 10 MICROgram(s)/kG/Min IV Continuous <Continuous>  vancomycin  IVPB 1250 milliGRAM(s) IV Intermittent every 12 hours    --------------------------------------------------------------------------------------    VITAL SIGNS, INS/OUTS (last 24 hours):  --------------------------------------------------------------------------------------  T(C): 37.3 (03-03-25 @ 20:00), Max: 38.2 (03-03-25 @ 12:00)  HR: 90 (03-03-25 @ 23:18) (57 - 140)  BP: 118/59 (03-03-25 @ 23:00) (82/46 - 159/119)  RR: 14 (03-03-25 @ 23:00) (12 - 29)  SpO2: 100% (03-03-25 @ 23:18) (99% - 100%)  Mode: AC/ CMV (Assist Control/ Continuous Mandatory Ventilation), RR (machine): 16, TV (machine): 350, FiO2: 40, PEEP: 5, ITime: 1, MAP: 13, PIP: 17    03-02-25 @ 07:01  -  03-03-25 @ 07:00  --------------------------------------------------------  IN: 2264.9 mL / OUT: 1350 mL / NET: 914.9 mL    03-03-25 @ 07:01  -  03-04-25 @ 00:36  --------------------------------------------------------  IN: 1341.8 mL / OUT: 3125 mL / NET: -1783.2 mL      --------------------------------------------------------------------------------------    EXAM  NEUROLOGY  Exam: Normal, NAD, alert, no focal deficits, following commands, on propofol    HEENT  Exam: Normocephalic, atraumatic, EOMI, no scleral icterus or conjunctival injection, SHAYY to self suction w/ serous output    RESPIRATORY  Exam: Lungs clear to auscultation, Normal expansion/effort  Mechanical Ventilation: Mode: AC/ CMV (Assist Control/ Continuous Mandatory Ventilation), RR (machine): 16, TV (machine): 350, FiO2: 40, PEEP: 5, ITime: 1, MAP: 13, PIP: 17    CARDIOVASCULAR  Exam: Regular rate, normotensive, off pressors    VASCULAR  Exam: Extremities warm, well-perfused    MUSCULOSKELETAL  Exam: All extremities moving spontaneously without limitations    SKIN  Exam: Good skin turgor, no skin breakdown    TUBES/LINES/DRAINS  [x] Peripheral IV  [x] Central Venous Line     	[x] R	[] L	[x] IJ	[] Fem	[] SC	Date Placed:   [] Arterial Line		[] R	[] L	[] Fem	[] Rad	[] Ax	Date Placed:   [] PICC		[] Midline		[] Mediport  [x] Urinary Catheter		Date Placed:   [x] SHAYY x1, R epidural    LABS  --------------------------------------------------------------------------------------    --------------------------------------------------------------------------------------    IMAGING STUDIES:

## 2025-03-04 NOTE — PROGRESS NOTE ADULT - ASSESSMENT
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  Initially CTH w/ slight increase in extra axial fluid collection measuring up to 1.5 cm. Pt was transferred to [facility] on 2/23 for neurosurgery eval, given decreased L sided strength, MRI done with increase in R sided collection.  Pt extubated on 2/28, CSF reservoir tapped, no pleocytosis.    Pt went to the OR on 3/1 for R clarissa pole with evacuation of epidural collection, with purulent drainage.     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), s/p  shunt (in [facility]), s/p traumatic fall w/ R SDH, s/p R decompressive hemicraniectomy, ICP monitor and R cranioplasty (5/2024), s/p L frontal Endoscopic third ventriculostomy and ligation of R  shunt with ligaclips with retention of valve, proximal and distal catheters (08/2024) admitted initially to [facility] on 2/22 for seizure, intubated for airway protection and status epilepticus, initially on Zosyn for aspiration PNA, transferred to [facility] on 2/23 for neurosurgery evaluation, now found to with have epidural abscess s/p R Clarissa w/ purulent drainage.  Extubated on 3/3, reintubated for stridor/work of breaking     Recommendations:   1. Vancomycin 1.25 g q12, goal -600, Cefepime 2 g q8, Flagyl 500 mg q8   2. F/u OR Cx, NGTD  3. F/u neurosurgery plan for further surgery       Thank you for consulting us and involving us in the management of this patient's case. In addition to reviewing history, imaging, documents, labs, microbiology, and infection control strategies and potential issues.     ID will continue to follow    Inder Mcintosh M.D.  Attending Physician  Division of Infectious Diseases  Department of Medicine

## 2025-03-04 NOTE — PROGRESS NOTE ADULT - PROBLEM SELECTOR PLAN 1
- neurochecks Q1H  - cont abx per ID, vanco, cefepime, flagyl  - wean to extubate  - con't AED- keppra  - f/u OR culture  - record SHAYY output Q shift  - DVT ppx- venodynes BLE  - pain control prn    [id number]    Case discussed with attending neurosurgeon Dr. Brush

## 2025-03-04 NOTE — CHART NOTE - NSCHARTNOTEFT_GEN_A_CORE
CRITICAL CARE NUTRITION FOLLOW-UP NOTE    Nutrition Follow Up for critical care length of stay.     Medical Course: PER CHART REVIEW:   60 yo F with PMHx of neurofibromatosis, s/p L suboccipital craniectomy for resection of cerebellar brain tumor 12/2023 by Dr Brush, s/p traumatic fall with head trauma in 5/20/2024 with large acute right SDH, s/p emergent right decompressive hemicraniectomy on 5/22/24, s/p right cranioplasty on 6/17/24 with custom plate, s/p ETV, ligation of shunt on the right with ligaclips with retention of valve as rescue device, presents as a transfer from [facility] for witnessed seizure at home. CTH obtained at [facility] revealed moderate ventriculomegaly and slight increase in size of extra-axial CSF collection overlying the right frontal duraplasty.  3/1: Alert, neurologically intact. On keppra. OR for clarissa hold for drainage of epidural abscess. Cx sent, NGTD. EDJPx1  Was intubated preop for stridor, dex x24hrs  3/2: POD1: Intubated, ENT scoped determined airway patent, ID-Following initiated on broad spectrum abx, OR culutres GSN, AFB neg, Cx pending. Dex discontinued.  3/3 POD2, intubated, on flagyl/vanc/cefipime, K 3.1- repletion ordered. EDJPx1 105cc/24hrs, PT recc LOUIS  3/4: pod 3, intubated for respiratory distress, on tripple abx, ED SHAYY to suction, f/u OR cltrs NGTD     Diet Order: NPO:   Tube Feeding Modality: Nasogastric  Peptamen 1.5 (PEPTAMEN1.5RTH)  Total Volume for 24 Hours (mL): 864  Continuous  Starting Tube Feed Rate {mL per Hour}: 25  Increase Tube Feed Rate by (mL): 25     Every 4 hours  Until Goal Tube Feed Rate (mL per Hour): 36  Tube Feed Duration (in Hours): 24  Tube Feed Start Time: 00:00 (03-03-25 @ 20:53) [Active]    EN PROVIDES:  1296 kcal, 58 gm pro    Nutrition Course:    Pt is intubated and sedated. NG tube placed for feeding.   EN initiated 3/2, Peptamen 1.5 is currently at goal rate 36 mL/hr.  Noted feeds previously held for difficulty flushing tube and instance of Pt pulled out NG tube per Nursing. Feeding is tolerated without any reports of nausea/vomiting. On Propofol 3.99 mL/Hr. Weight noted to be down trending, however unable to accurately assess in the setting of edema. Last BM ? 2/28. Not ordered for bowel regimen.   Based on IBW = 52.2 kg, current order providing 24.8 kcal and 1.1 gm pro. Will suggest add modular supplement to increase pro intakes.      Pertinent Medications:   MEDICATIONS  (STANDING):  cefepime   IVPB 2000 milliGRAM(s) IV Intermittent every 8 hours  cefepime   IVPB      chlorhexidine 2% Cloths 1 Application(s) Topical daily  dexAMETHasone  IVPB 10 milliGRAM(s) IV Intermittent every 6 hours  dexMEDEtomidine Infusion 0.05 MICROgram(s)/kG/Hr (0.83 mL/Hr) IV Continuous <Continuous>  levETIRAcetam   Injectable 1000 milliGRAM(s) IV Push every 12 hours  metroNIDAZOLE  IVPB 500 milliGRAM(s) IV Intermittent every 8 hours  pantoprazole  Injectable 40 milliGRAM(s) IV Push daily  propofol Infusion 10 MICROgram(s)/kG/Min (3.99 mL/Hr) IV Continuous <Continuous>  vancomycin  IVPB 1250 milliGRAM(s) IV Intermittent every 12 hours    MEDICATIONS  (PRN):  acetaminophen   Oral Liquid .. 1000 milliGRAM(s) Oral every 6 hours PRN Temp greater or equal to 38C (100.4F), Mild Pain (1 - 3)  benzocaine/butamben/tetracaine Spray 1 Spray(s) Topical four times a day PRN Sore throat  HYDROmorphone  Injectable 1 milliGRAM(s) IV Push every 2 hours PRN moderate to severe pain    Pertinent Labs:  03-04 Na137 mmol/L Glu 139 mg/dL[H] K+ 4.0 mmol/L Cr  0.35 mg/dL[L] BUN 11 mg/dL 03-04 Phos 3.2 mg/dL     Weight: (3/4) 61.6  (3/2) 61.6  (3/1) 61.9  (2/28) 63.9  (2/27) 64.6  (2/25) 62.9  (2/23) 66.5 kg   Height: 63 in / 160.02 cm   IBW: 115 lbs / 52.2 kg    BMI: 24.5 kg/m^2 (at lowest weight)    Physical Assessment:   Edema: 1+ generalized edema  Skin: Moisture Associated Dermatitis          Incontinence Associated Dermatitis     Estimated Energy Needs (based on IBW = 52.2 kg )  [X] re assessed in critical care  1,044 - 1,305 kcal (20-25 kcal/kg)   67 - 83 gm (1.3 - 1.6 gm/kg)      New Nutrition Diagnosis:   Increased nutrient needs.....protein  Related to critical care, inadequate nutrient intakes     Education:   [x] Not applicable secondary to medical acuity     Interventions:      1. Suggest  continue current EN regimen as ordered.      Add Liquid Protein Supplement (LPS) 1 Packet 2x daily = 60 mL, 200 kcal, 30 gm pro. FOR TOTAL DAILY PRO. 88 gm.   2. Please adjust rate/volume/duration/free water provision of enteral feeds in accordance with patient tolerance and needs.   3. Bowel Regimen per SICU.     Monitor and Evaluate:   EN tolerance, nutrition related lab values, weight trends, BMs/GI distress, hydration status, skin integrity.        Tamika Vazquez, FLOYDN, CDN, CDCES   Pager [phone] or MS Teams CRITICAL CARE NUTRITION FOLLOW-UP NOTE    Nutrition Follow Up for critical care length of stay.     Medical Course: PER CHART REVIEW:   58 yo F with PMHx of neurofibromatosis, s/p L suboccipital craniectomy for resection of cerebellar brain tumor 12/2023 by Dr Brush, s/p traumatic fall with head trauma in 5/20/2024 with large acute right SDH, s/p emergent right decompressive hemicraniectomy on 5/22/24, s/p right cranioplasty on 6/17/24 with custom plate, s/p ETV, ligation of shunt on the right with ligaclips with retention of valve as rescue device, presents as a transfer from [facility] for witnessed seizure at home. CTH obtained at [facility] revealed moderate ventriculomegaly and slight increase in size of extra-axial CSF collection overlying the right frontal duraplasty.  3/1: Alert, neurologically intact. On keppra. OR for clarissa hold for drainage of epidural abscess. Cx sent, NGTD. EDJPx1  Was intubated preop for stridor, dex x24hrs  3/2: POD1: Intubated, ENT scoped determined airway patent, ID-Following initiated on broad spectrum abx, OR culutres GSN, AFB neg, Cx pending. Dex discontinued.  3/3 POD2, intubated, on flagyl/vanc/cefipime, K 3.1- repletion ordered. EDJPx1 105cc/24hrs, PT recc LOUIS  3/4: pod 3, intubated for respiratory distress, on tripple abx, ED SHAYY to suction, f/u OR cltrs NGTD     Diet Order: NPO:   Tube Feeding Modality: Nasogastric  Peptamen 1.5 (PEPTAMEN1.5RTH)  Total Volume for 24 Hours (mL): 864  Continuous  Starting Tube Feed Rate {mL per Hour}: 25  Increase Tube Feed Rate by (mL): 25     Every 4 hours  Until Goal Tube Feed Rate (mL per Hour): 36  Tube Feed Duration (in Hours): 24  Tube Feed Start Time: 00:00 (03-03-25 @ 20:53) [Active]    EN PROVIDES:  1296 kcal, 58 gm pro    Nutrition Course:    Pt is intubated and sedated. NG tube placed for feeding.   EN initiated 3/2, Peptamen 1.5 is currently at goal rate 36 mL/hr.  Noted feeds previously held for difficulty flushing tube and instance of Pt pulled out NG tube per Nursing. Feeding is tolerated without any reports of nausea/vomiting. On Propofol 3.99 mL/Hr. Weight noted to be down trending, however unable to accurately assess in the setting of edema. Last BM 3/3.   Will note pt with likely inadequate nutrient intakes for ~4 days prior to EN initiation 2/2 NPO status + variable/poor meal consumption on PO diet.   Based on IBW = 52.2 kg, current order providing 24.8 kcal and 1.1 gm pro. Will suggest add modular supplement to increase pro intakes.      Pertinent Medications:   MEDICATIONS  (STANDING):  cefepime   IVPB 2000 milliGRAM(s) IV Intermittent every 8 hours  cefepime   IVPB      chlorhexidine 2% Cloths 1 Application(s) Topical daily  dexAMETHasone  IVPB 10 milliGRAM(s) IV Intermittent every 6 hours  dexMEDEtomidine Infusion 0.05 MICROgram(s)/kG/Hr (0.83 mL/Hr) IV Continuous <Continuous>  levETIRAcetam   Injectable 1000 milliGRAM(s) IV Push every 12 hours  metroNIDAZOLE  IVPB 500 milliGRAM(s) IV Intermittent every 8 hours  pantoprazole  Injectable 40 milliGRAM(s) IV Push daily  propofol Infusion 10 MICROgram(s)/kG/Min (3.99 mL/Hr) IV Continuous <Continuous>  vancomycin  IVPB 1250 milliGRAM(s) IV Intermittent every 12 hours    MEDICATIONS  (PRN):  acetaminophen   Oral Liquid .. 1000 milliGRAM(s) Oral every 6 hours PRN Temp greater or equal to 38C (100.4F), Mild Pain (1 - 3)  benzocaine/butamben/tetracaine Spray 1 Spray(s) Topical four times a day PRN Sore throat  HYDROmorphone  Injectable 1 milliGRAM(s) IV Push every 2 hours PRN moderate to severe pain    Pertinent Labs:  03-04 Na137 mmol/L Glu 139 mg/dL[H] K+ 4.0 mmol/L Cr  0.35 mg/dL[L] BUN 11 mg/dL 03-04 Phos 3.2 mg/dL     Weight: (3/4) 61.6  (3/2) 61.6  (3/1) 61.9  (2/28) 63.9  (2/27) 64.6  (2/25) 62.9  (2/23) 66.5 kg   Height: 63 in / 160.02 cm   IBW: 115 lbs / 52.2 kg    BMI: 24.5 kg/m^2 (at lowest weight)    Physical Assessment:   Edema: 1+ generalized edema  Skin: Moisture Associated Dermatitis          Incontinence Associated Dermatitis     Estimated Energy Needs (based on IBW = 52.2 kg )  [X] re assessed in critical care  1,044 - 1,305 kcal (20-25 kcal/kg)   67 - 83 gm (1.3 - 1.6 gm/kg)      New Nutrition Diagnosis:   Increased nutrient needs.....protein  Related to critical care, inadequate nutrient intakes     Education:   [x] Not applicable secondary to medical acuity     Interventions:      1. Suggest  continue current EN regimen as ordered.      Add Liquid Protein Supplement (LPS) 1 Packet 2x daily = 60 mL, 200 kcal, 30 gm pro. FOR TOTAL DAILY PRO. 88 gm.   2. Please adjust rate/volume/duration/free water provision of enteral feeds in accordance with patient tolerance and needs.      Monitor and Evaluate:   EN tolerance, nutrition related lab values, weight trends, BMs/GI distress, hydration status, skin integrity.        Tamika Vazquez RDN, CDN, CDCES   Pager [phone] or MS Teams

## 2025-03-04 NOTE — PROGRESS NOTE ADULT - SUBJECTIVE AND OBJECTIVE BOX
PAST 24HR EVENTS:  No issues overnight. Pt remained intubated per ENT reccs 2/2 stridor    ICU Vital Signs Last 24 Hrs  T(C): 37.3 (04 Mar 2025 00:00), Max: 38.2 (03 Mar 2025 12:00)  T(F): 99.2 (04 Mar 2025 00:00), Max: 100.8 (03 Mar 2025 12:00)  HR: 86 (04 Mar 2025 04:00) (61 - 140)  BP: 131/84 (04 Mar 2025 04:00) (82/46 - 159/119)  BP(mean): 95 (04 Mar 2025 04:00) (53 - 131)  ABP: --  ABP(mean): --  RR: 16 (04 Mar 2025 04:00) (12 - 29)  SpO2: 100% (04 Mar 2025 04:00) (99% - 100%)    O2 Parameters below as of 04 Mar 2025 04:00  Patient On (Oxygen Delivery Method): ventilator    O2 Concentration (%): 40        MEDS:   acetaminophen   Oral Liquid .. 1000 milliGRAM(s) Oral every 6 hours PRN  albuterol/ipratropium for Nebulization 3 milliLiter(s) Nebulizer every 30 minutes  cefepime   IVPB 2000 milliGRAM(s) IV Intermittent every 8 hours  cefepime   IVPB      chlorhexidine 2% Cloths 1 Application(s) Topical daily  dexMEDEtomidine Infusion 0.5 MICROgram(s)/kG/Hr IV Continuous <Continuous>  HYDROmorphone  Injectable 1 milliGRAM(s) IV Push every 2 hours PRN  levETIRAcetam   Injectable 1000 milliGRAM(s) IV Push every 12 hours  metroNIDAZOLE  IVPB 500 milliGRAM(s) IV Intermittent every 8 hours  pantoprazole  Injectable 40 milliGRAM(s) IV Push daily  potassium chloride   Powder 40 milliEquivalent(s) Oral once  propofol Infusion 5 MICROgram(s)/kG/Min IV Continuous <Continuous>  sodium chloride 1.5%. 500 milliLiter(s) IV Continuous <Continuous>  vancomycin  IVPB      vancomycin  IVPB 1000 milliGRAM(s) IV Intermittent every 12 hours      LABS:                                       13.0   8.42  )-----------( 244      ( 04 Mar 2025 02:14 )             40.0     03-04    137  |  102  |  11  ----------------------------<  139[H]  4.0   |  22  |  0.35[L]    Ca    9.2      04 Mar 2025 02:14  Phos  3.2     03-04  Mg     1.90     03-04    PT/INR - ( 03 Mar 2025 13:00 )   PT: 11.9 sec;   INR: 1.03 ratio         PTT - ( 03 Mar 2025 13:00 )  PTT:37.0 sec    RADIOLOGY:   < from: MR Angio Head No Cont (02.28.25 @ 15:38) >  IMPRESSION: Right frontal peripherally enhancing epidural collection   beneath the right frontal cranioplasty has reaccumulated since 10/6/2024,   with diffusion restriction suspicious for an infected collection. Right   frontal encephalomalacia and gliosis has evolved since the prior exam.   Old left cerebellar infarct. Patent third ventriculostomy. Right frontal   ventricular catheter.    Normal intracranial MR angiography.    Dr. Salgado discussed these findings with Dr. Brush on 2/28/2025 4:16   PM with read back.    --- End of Report ---    < end of copied text >      PHYSICAL EXAM:  Awake, alert, intubated, fc  EOS, EOMI, PERRL  MAEx4 with good strength  Incision c/d/i   Right EDJP in place, mostly clear, pus tinged

## 2025-03-04 NOTE — PROGRESS NOTE ADULT - SUBJECTIVE AND OBJECTIVE BOX
Infectious Diseases Follow Up:    Patient is a 59y old  Female who presents with a chief complaint of seizure (04 Mar 2025 05:30)      Interval History/ROS:  Pt extubated yesterday, however needed to be reintubated for stridor/work of breathing    Allergies  No Known Allergies        ANTIMICROBIALS:  cefepime   IVPB 2000 every 8 hours  cefepime   IVPB    metroNIDAZOLE  IVPB 500 every 8 hours  vancomycin  IVPB 1250 every 12 hours      Current Abx:     Previous Abx     OTHER MEDS:  MEDICATIONS  (STANDING):  acetaminophen   Oral Liquid .. 1000 every 6 hours PRN  dexAMETHasone  IVPB 10 every 6 hours  dexMEDEtomidine Infusion 0.05 <Continuous>  HYDROmorphone  Injectable 1 every 2 hours PRN  levETIRAcetam   Injectable 1000 every 12 hours  pantoprazole  Injectable 40 daily  propofol Infusion 10 <Continuous>      Vital Signs Last 24 Hrs  T(C): 37.1 (04 Mar 2025 12:00), Max: 37.8 (03 Mar 2025 16:00)  T(F): 98.8 (04 Mar 2025 12:00), Max: 100.1 (03 Mar 2025 16:00)  HR: 71 (04 Mar 2025 12:00) (71 - 140)  BP: 98/55 (04 Mar 2025 12:00) (94/53 - 159/119)  BP(mean): 67 (04 Mar 2025 12:00) (66 - 131)  RR: 13 (04 Mar 2025 12:00) (13 - 23)  SpO2: 99% (04 Mar 2025 12:00) (99% - 100%)    Parameters below as of 04 Mar 2025 12:00  Patient On (Oxygen Delivery Method): ventilator    O2 Concentration (%): 40      PHYSICAL EXAM:  GENERAL: intubated/sedated  HEAD:  R sided dressing   EYES: EOMI, conjunctiva and sclera clear  CHEST/LUNG: Intubated, coarse breath sounds   HEART: Regular rate and rhythm; No murmurs, rubs, or gallops  ABDOMEN: Soft, Nontender, Nondistended; Bowel sounds present  PSYCH: intubated/sedated                          13.0   8.42  )-----------( 244      ( 04 Mar 2025 02:14 )             40.0       03-04    137  |  102  |  11  ----------------------------<  139[H]  4.0   |  22  |  0.35[L]    Ca    9.2      04 Mar 2025 02:14  Phos  3.2     03-04  Mg     1.90     03-04        Urinalysis Basic - ( 04 Mar 2025 02:14 )    Color: x / Appearance: x / SG: x / pH: x  Gluc: 139 mg/dL / Ketone: x  / Bili: x / Urobili: x   Blood: x / Protein: x / Nitrite: x   Leuk Esterase: x / RBC: x / WBC x   Sq Epi: x / Non Sq Epi: x / Bacteria: x        MICROBIOLOGY:  v  Surgical Swab  03-01-25   No growth to date  --    Few polymorphonuclear leukocytes per low power field  No organisms seen per oil power field      CSF CSF  02-28-25   No growth  --    No polymorphonuclear cells seen  No organisms seen  by cytocentrifuge      Catheterized Catheterized  02-26-25   No growth  --  --      .Blood Blood-Peripheral  02-26-25   No growth at 5 days  --  --      .Blood Blood-Peripheral  02-26-25   No growth at 5 days  --  --      .Blood Blood  02-22-25   No growth at 5 days  --  --      .Blood Blood  02-22-25   No growth at 5 days  --  --                RADIOLOGY:

## 2025-03-04 NOTE — PROGRESS NOTE ADULT - ASSESSMENT
59F PMH hydrocephalus s/p  shunt (10/4/2013), neurofibromatosis, s/p left retrosigmoid craniotomy for resection of cerebellar brain tumor (12/2023 Dr Brush), traumatic acute right SDH s/p emergent right decompressive hemicraniectomy (5/22/24) followed by right cranioplasty with custom plate (6/17/24),  shunt malfunction s/p left third ventriculostomy and ligation of  shunt with retention of valve (7/19/24) presented as a transfer from [facility] for witnessed seizure at home. vEEG performed with no seizures/seizure-like activity. MR Head 2/28 demonstrated R frontal epidural collection. Patient intubated in SICU 3/1 for stridor prior to OR, and then taken for R craniotomy and evacuation of epidural empyema w/ drain left in place. Patient extubated POD2 following 48h decadron w/ appropriate cuff leak but was immediately reintubated for severe stridor and work of breathing.     Neurologic:   - Sedation: Propofol (did not achieve appropriate RASS w/ max precedex)  - Pain control: Tylenol PO, dilaudid PRN  - Keppra 1g BID  - Q1h neurochecks  - Epidural drain to self suction  - VEEG completed, no seizures     Respiratory:   - VC 12 350 5 40%  - 3/1 intubated for stridor, 3/3 reintubated for worse stridor  - Decadron 10mg q6h for upper airway edema    Cardiovascular:   - MAP > 65    Gastrointestinal/Nutrition:   - NGT with TF Peptamen 1.5 @ 36  - Protonix 40 qd    Renal/Genitourinary:   - IVL  - Strict I/Os  - Dominguez    Hematologic:   - DVT PPx: SCDs  - Holding chem DVT ppx    Infectious Disease:   - Cefepime, flagyl, vanco for empyema    - s/p Zosyn (presumed aspiration pneumonia) 7/23/24 - 7/24/24  - blood cx sent x2 ([place]) > NG @ 5d  - RVP 3/1 negative    Endocrine:   - Monitor glucose  - Decadron 10mg q6h for 48 hrs for airway edema    Tubes/Lines/Drains:   - PIV  - Dominguez  - Kenisha  - epidural drain (3/1-)    Disposition: SICU

## 2025-03-05 LAB
ANION GAP SERPL CALC-SCNC: 15 MMOL/L — HIGH (ref 7–14)
APTT BLD: 31.1 SEC — SIGNIFICANT CHANGE UP (ref 24.5–35.6)
BLOOD GAS ARTERIAL COMPREHENSIVE RESULT: SIGNIFICANT CHANGE UP
BUN SERPL-MCNC: 30 MG/DL — HIGH (ref 7–23)
CALCIUM SERPL-MCNC: 9 MG/DL — SIGNIFICANT CHANGE UP (ref 8.4–10.5)
CHLORIDE SERPL-SCNC: 97 MMOL/L — LOW (ref 98–107)
CO2 SERPL-SCNC: 21 MMOL/L — LOW (ref 22–31)
CREAT SERPL-MCNC: 0.47 MG/DL — LOW (ref 0.5–1.3)
EGFR: 110 ML/MIN/1.73M2 — SIGNIFICANT CHANGE UP
EGFR: 110 ML/MIN/1.73M2 — SIGNIFICANT CHANGE UP
GLUCOSE SERPL-MCNC: 166 MG/DL — HIGH (ref 70–99)
HCT VFR BLD CALC: 35.7 % — SIGNIFICANT CHANGE UP (ref 34.5–45)
HGB BLD-MCNC: 12.1 G/DL — SIGNIFICANT CHANGE UP (ref 11.5–15.5)
INR BLD: 1.08 RATIO — SIGNIFICANT CHANGE UP (ref 0.85–1.16)
MAGNESIUM SERPL-MCNC: 1.9 MG/DL — SIGNIFICANT CHANGE UP (ref 1.6–2.6)
MCHC RBC-ENTMCNC: 26.4 PG — LOW (ref 27–34)
MCHC RBC-ENTMCNC: 33.9 G/DL — SIGNIFICANT CHANGE UP (ref 32–36)
MCV RBC AUTO: 77.8 FL — LOW (ref 80–100)
NRBC # BLD AUTO: 0 K/UL — SIGNIFICANT CHANGE UP (ref 0–0)
NRBC # FLD: 0 K/UL — SIGNIFICANT CHANGE UP (ref 0–0)
NRBC BLD AUTO-RTO: 0 /100 WBCS — SIGNIFICANT CHANGE UP (ref 0–0)
PHOSPHATE SERPL-MCNC: 2.7 MG/DL — SIGNIFICANT CHANGE UP (ref 2.5–4.5)
PLATELET # BLD AUTO: 360 K/UL — SIGNIFICANT CHANGE UP (ref 150–400)
POTASSIUM SERPL-MCNC: 4 MMOL/L — SIGNIFICANT CHANGE UP (ref 3.5–5.3)
POTASSIUM SERPL-SCNC: 4 MMOL/L — SIGNIFICANT CHANGE UP (ref 3.5–5.3)
PROTHROM AB SERPL-ACNC: 12.8 SEC — SIGNIFICANT CHANGE UP (ref 9.9–13.4)
RBC # BLD: 4.59 M/UL — SIGNIFICANT CHANGE UP (ref 3.8–5.2)
RBC # FLD: 14.4 % — SIGNIFICANT CHANGE UP (ref 10.3–14.5)
SODIUM SERPL-SCNC: 133 MMOL/L — LOW (ref 135–145)
VANCOMYCIN TROUGH SERPL-MCNC: 26 UG/ML — CRITICAL HIGH (ref 10–20)
WBC # BLD: 12.07 K/UL — HIGH (ref 3.8–10.5)
WBC # FLD AUTO: 12.07 K/UL — HIGH (ref 3.8–10.5)

## 2025-03-05 PROCEDURE — 71045 X-RAY EXAM CHEST 1 VIEW: CPT | Mod: 26

## 2025-03-05 PROCEDURE — 70450 CT HEAD/BRAIN W/O DYE: CPT | Mod: 26

## 2025-03-05 PROCEDURE — 99291 CRITICAL CARE FIRST HOUR: CPT

## 2025-03-05 RX ORDER — VANCOMYCIN HCL IN 5 % DEXTROSE 1.5G/250ML
750 PLASTIC BAG, INJECTION (ML) INTRAVENOUS EVERY 12 HOURS
Refills: 0 | Status: DISCONTINUED | OUTPATIENT
Start: 2025-03-05 | End: 2025-03-06

## 2025-03-05 RX ORDER — HEPARIN SODIUM 1000 [USP'U]/ML
5000 INJECTION INTRAVENOUS; SUBCUTANEOUS EVERY 8 HOURS
Refills: 0 | Status: DISCONTINUED | OUTPATIENT
Start: 2025-03-05 | End: 2025-03-18

## 2025-03-05 RX ORDER — SENNA 187 MG
2 TABLET ORAL AT BEDTIME
Refills: 0 | Status: DISCONTINUED | OUTPATIENT
Start: 2025-03-05 | End: 2025-03-17

## 2025-03-05 RX ORDER — POLYETHYLENE GLYCOL 3350 17 G/17G
17 POWDER, FOR SOLUTION ORAL DAILY
Refills: 0 | Status: DISCONTINUED | OUTPATIENT
Start: 2025-03-05 | End: 2025-03-19

## 2025-03-05 RX ADMIN — Medication 1 MILLIGRAM(S): at 03:21

## 2025-03-05 RX ADMIN — Medication 100 MILLIGRAM(S): at 14:35

## 2025-03-05 RX ADMIN — DEXAMETHASONE 102 MILLIGRAM(S): 0.5 TABLET ORAL at 17:51

## 2025-03-05 RX ADMIN — DEXAMETHASONE 102 MILLIGRAM(S): 0.5 TABLET ORAL at 05:34

## 2025-03-05 RX ADMIN — POLYETHYLENE GLYCOL 3350 17 GRAM(S): 17 POWDER, FOR SOLUTION ORAL at 17:51

## 2025-03-05 RX ADMIN — Medication 1 MILLIGRAM(S): at 11:50

## 2025-03-05 RX ADMIN — DEXMEDETOMIDINE HYDROCHLORIDE IN SODIUM CHLORIDE 8.31 MICROGRAM(S)/KG/HR: 4 INJECTION INTRAVENOUS at 23:10

## 2025-03-05 RX ADMIN — CEFEPIME 100 MILLIGRAM(S): 2 INJECTION, POWDER, FOR SOLUTION INTRAVENOUS at 05:34

## 2025-03-05 RX ADMIN — DEXAMETHASONE 102 MILLIGRAM(S): 0.5 TABLET ORAL at 23:05

## 2025-03-05 RX ADMIN — CEFEPIME 100 MILLIGRAM(S): 2 INJECTION, POWDER, FOR SOLUTION INTRAVENOUS at 14:35

## 2025-03-05 RX ADMIN — Medication 100 MILLIGRAM(S): at 05:34

## 2025-03-05 RX ADMIN — Medication 100 MILLIGRAM(S): at 21:44

## 2025-03-05 RX ADMIN — LEVETIRACETAM 1000 MILLIGRAM(S): 10 INJECTION, SOLUTION INTRAVENOUS at 05:34

## 2025-03-05 RX ADMIN — HEPARIN SODIUM 5000 UNIT(S): 1000 INJECTION INTRAVENOUS; SUBCUTANEOUS at 21:44

## 2025-03-05 RX ADMIN — Medication 1 MILLIGRAM(S): at 03:36

## 2025-03-05 RX ADMIN — DEXAMETHASONE 102 MILLIGRAM(S): 0.5 TABLET ORAL at 11:20

## 2025-03-05 RX ADMIN — LEVETIRACETAM 1000 MILLIGRAM(S): 10 INJECTION, SOLUTION INTRAVENOUS at 17:51

## 2025-03-05 RX ADMIN — Medication 2 TABLET(S): at 21:44

## 2025-03-05 RX ADMIN — Medication 1 MILLIGRAM(S): at 11:20

## 2025-03-05 RX ADMIN — Medication 250 MILLIGRAM(S): at 14:35

## 2025-03-05 RX ADMIN — DEXMEDETOMIDINE HYDROCHLORIDE IN SODIUM CHLORIDE 8.31 MICROGRAM(S)/KG/HR: 4 INJECTION INTRAVENOUS at 11:19

## 2025-03-05 RX ADMIN — CEFEPIME 100 MILLIGRAM(S): 2 INJECTION, POWDER, FOR SOLUTION INTRAVENOUS at 21:44

## 2025-03-05 RX ADMIN — Medication 250 MILLIGRAM(S): at 23:23

## 2025-03-05 RX ADMIN — Medication 1 APPLICATION(S): at 11:20

## 2025-03-05 RX ADMIN — Medication 40 MILLIGRAM(S): at 11:20

## 2025-03-05 NOTE — PROGRESS NOTE ADULT - ASSESSMENT
59F PMH hydrocephalus s/p  shunt (10/4/2013), neurofibromatosis, s/p left retrosigmoid craniotomy for resection of cerebellar brain tumor (12/2023 Dr Brush), traumatic acute right SDH s/p emergent right decompressive hemicraniectomy (5/22/24) followed by right cranioplasty with custom plate (6/17/24),  shunt malfunction s/p left third ventriculostomy and ligation of  shunt with retention of valve (7/19/24) presented as a transfer from [facility] for witnessed seizure at home. vEEG performed with no seizures/seizure-like activity. MR Head 2/28 demonstrated R frontal epidural collection. Patient intubated in SICU 3/1 for stridor prior to OR, and then taken for R craniotomy and evacuation of epidural empyema w/ drain left in place. Patient extubated POD2 following 48h decadron w/ appropriate cuff leak but was immediately reintubated for severe stridor and work of breathing.       ON Events  -Soft BP, reduced UOP, POCUS: plethoric IVC    Neurologic:   - Q1h neurochecks  - VEEG completed, no seizures   - Sedation: wean prop for extubation, cont precedex  - Keppra 1g BID  - Pain control: IV Tylenol, dilaudid PRN   - CTA head 2/24 wnl  - MR head with epidural empyema   - OR (03/01) - clraissa hole with purulent fluid. Epidural drain in place    Respiratory:   - 3/1 intubated for stridor, upper airway edema, prior to OR  - Failed extubation on 3/3  - 12 350 5 40%  - Decadron 10mg q6h for 48hrs for upper airway edema    Cardiovascular:   - MAP > 65    Gastrointestinal/Nutrition:   - NGT with TFs    Renal/Genitourinary:   - Monitor electrolytes  - IVL  - Dominguez    Hematologic:   - DVT PPx: SqH (held post op)    Infectious Disease:   - Monitor WBC, fever curves  - Cefepime, flagyl, vanco for empyema    - s/p Zosyn (presumed aspiration pneumonia) 7/23/24 - 7/24/24  - blood cx sent x2 ([place]) > NG @ 5d  - RVP 3/1 negative  - OR cultures 3/1: No growth to date    Endocrine:   - Monitor glucose  - Decadron 10mg q6h for 48 hrs for airway edema    Tubes/Lines/Drains:   - PIV  - Dominguez  - NGT  - epidural drain (3/1-)    Disposition: SICU

## 2025-03-05 NOTE — PROGRESS NOTE ADULT - SUBJECTIVE AND OBJECTIVE BOX
SICU Daily Progress Note  =====================================================  Interval/Overnight Events:        Interval events:  - Wean prop, continue precedex  - CPAP ==>- Continue wean Vent. requirement.   - Lasix 20  - IVL  -Continue following NeuroSx recommendation for neurocheck .         ALLERGIES:  No Known Allergies      --------------------------------------------------------------------------------------    MEDICATIONS:    Neurologic Medications  acetaminophen   Oral Liquid .. 1000 milliGRAM(s) Oral every 6 hours PRN Temp greater or equal to 38C (100.4F), Mild Pain (1 - 3)  dexMEDEtomidine Infusion 0.5 MICROgram(s)/kG/Hr IV Continuous <Continuous>  HYDROmorphone  Injectable 1 milliGRAM(s) IV Push every 2 hours PRN moderate to severe pain  levETIRAcetam   Injectable 1000 milliGRAM(s) IV Push every 12 hours  propofol Infusion 10 MICROgram(s)/kG/Min IV Continuous <Continuous>    Respiratory Medications    Cardiovascular Medications    Gastrointestinal Medications  pantoprazole  Injectable 40 milliGRAM(s) IV Push daily    Genitourinary Medications    Hematologic/Oncologic Medications    Antimicrobial/Immunologic Medications  cefepime   IVPB 2000 milliGRAM(s) IV Intermittent every 8 hours  cefepime   IVPB      metroNIDAZOLE  IVPB 500 milliGRAM(s) IV Intermittent every 8 hours  vancomycin  IVPB 1250 milliGRAM(s) IV Intermittent every 12 hours    Endocrine/Metabolic Medications  dexAMETHasone  IVPB 10 milliGRAM(s) IV Intermittent every 6 hours    Topical/Other Medications  benzocaine/butamben/tetracaine Spray 1 Spray(s) Topical four times a day PRN Sore throat  chlorhexidine 0.12% Liquid 15 milliLiter(s) Oral Mucosa every 12 hours  chlorhexidine 2% Cloths 1 Application(s) Topical daily    --------------------------------------------------------------------------------------    VITAL SIGNS:  T(C): 37.8 (03-05-25 @ 00:00), Max: 38 (03-04-25 @ 20:00)  HR: 74 (03-05-25 @ 00:00) (63 - 99)  BP: 113/60 (03-05-25 @ 00:00) (82/54 - 140/78)  RR: 13 (03-05-25 @ 00:00) (10 - 18)  SpO2: 99% (03-05-25 @ 00:00) (98% - 100%)  --------------------------------------------------------------------------------------    INS AND OUTS:    03-03-25 @ 07:01  -  03-04-25 @ 07:00  --------------------------------------------------------  IN: 2207.8 mL / OUT: 3470 mL / NET: -1262.2 mL    03-04-25 @ 07:01  -  03-05-25 @ 00:06  --------------------------------------------------------  IN: 955 mL / OUT: 1255 mL / NET: -300 mL      --------------------------------------------------------------------------------------    EXAM    NEURO: NAD, resting quietly   HEENT: NC/AT  RESPIRATORY:  normal CW expansion, CPAP / PSV tolerating   CARDIO: RRR, S1S2  ABDOMEN: soft, nontender, nondistended  EXTREMITIES: normal strength, no deformities    --------------------------------------------------------------------------------------    LABS                          13.0   8.42  )-----------( 244      ( 04 Mar 2025 02:14 )             40.0   03-04    137  |  102  |  11  ----------------------------<  139[H]  4.0   |  22  |  0.35[L]    Ca    9.2      04 Mar 2025 02:14  Phos  3.2     03-04  Mg     1.90     03-04      --------------------------------------------------------------------------------------

## 2025-03-05 NOTE — PROGRESS NOTE ADULT - PROBLEM SELECTOR PLAN 1
- neurochecks Q1H  - cont abx per ID, vanco, cefepime, flagyl  - wean to extubate  - con't AED- keppra  - f/u OR culture  - record SHAYY output Q shift  - DVT ppx- venodynes BLE  - pain control prn  - will liely need to RTOR for cranioplasty and ligated shunt catheter removal date TBD.    [id number]    Case discussed with attending neurosurgeon Dr. Brush

## 2025-03-05 NOTE — PROGRESS NOTE ADULT - ASSESSMENT
60 yo F with PMHx of neurofibromatosis, s/p L suboccipital craniectomy for resection of cerebellar brain tumor 2023 by Dr Brush, s/p traumatic fall with head trauma in 2024 with large acute right SDH, s/p emergent right decompressive hemicraniectomy on 24, s/p right cranioplasty on 24 with custom plate, s/p ETV, ligation of shunt on the right with ligaclips with retention of valve as rescue device, presents as a transfer from [facility] for witnessed seizure at home. CTH obtained at [facility] revealed moderate ventriculomegaly and slight increase in size of extra-axial CSF collection overlying the right frontal duraplasty     Exam with decreased strength on left side, post ictal vs intracranial causes, will get MRI/MRA to assess and CTH/CTA if MRI will be delayed. On zosyn, white count downtrending.   :  CTH/ CTA done stable , CTA negative, MRIw/flow/MRA ordered, intubated /sedated, neurology saw Veeg-O/ neg, on keppra, K+3.3 repleted, afbrile, bld cltr NGTD, UA neg, WBC nml  : stable exam,  Veeg on, keppra, mri w/flow/ mra pending  : Stable exam. No seizures noted on VEEG. On keppra. MRI with CSF flow study and MRA ordered  : Extubated, alert, mildly confused and restless, nonfocal exam. On keppra. MRI showed increase in right epidural collection with diffusion restriction. Shunt (as reservoir) tapped, W1 R1  GSN  3/1: Alert, neurologically intact. On keppra. OR for clarissa hold for drainage of epidural abscess. Cx sent, NGTD. EDJPx1  Was intubated preop for stridor, dex x24hrs  3/2: POD1: Intubated, ENT scoped determined airway patent, ID-Following initiated on broad spectrum abx, OR culutres GSN, AFB neg, Cx pending. Dex discontinued.  3/3 POD2, intubated, on flagyl/vanc/cefipime, K 3.1- repletion ordered. EDJPx1 105cc/24hrs, PT recc LOUIS  3/4: pod 3, intubated for respiratory distress, on tripple abx, ED SHAYY to suction, f/u OR cltrs NGTD  3/5 pod 4, remains intubated, neuro exam stable, ED SHAYY drain pus tinged 30cc, on tripple Abx, OR cltrs NGTD, ID following, likely needs RTOR for cranioplasty and ligated catheter removal date TBD

## 2025-03-05 NOTE — PROGRESS NOTE ADULT - SUBJECTIVE AND OBJECTIVE BOX
PAST 24HR EVENTS:  No issues overnight. Pt remained intubated, on tripple abx, OR cltrs NGTD, ED SHAYY drain    ICU Vital Signs Last 24 Hrs  T(C): 37.8 (05 Mar 2025 00:00), Max: 38 (04 Mar 2025 20:00)  T(F): 100 (05 Mar 2025 00:00), Max: 100.4 (04 Mar 2025 20:00)  HR: 66 (05 Mar 2025 04:00) (63 - 99)  BP: 99/56 (05 Mar 2025 04:00) (82/54 - 140/78)  BP(mean): 68 (05 Mar 2025 04:00) (64 - 93)  ABP: --  ABP(mean): --  RR: 14 (05 Mar 2025 04:00) (10 - 23)  SpO2: 100% (05 Mar 2025 04:00) (98% - 100%)    O2 Parameters below as of 05 Mar 2025 04:00  Patient On (Oxygen Delivery Method): ventilator    O2 Concentration (%): 40            MEDS:   acetaminophen   Oral Liquid .. 1000 milliGRAM(s) Oral every 6 hours PRN  albuterol/ipratropium for Nebulization 3 milliLiter(s) Nebulizer every 30 minutes  cefepime   IVPB 2000 milliGRAM(s) IV Intermittent every 8 hours  cefepime   IVPB      chlorhexidine 2% Cloths 1 Application(s) Topical daily  dexMEDEtomidine Infusion 0.5 MICROgram(s)/kG/Hr IV Continuous <Continuous>  HYDROmorphone  Injectable 1 milliGRAM(s) IV Push every 2 hours PRN  levETIRAcetam   Injectable 1000 milliGRAM(s) IV Push every 12 hours  metroNIDAZOLE  IVPB 500 milliGRAM(s) IV Intermittent every 8 hours  pantoprazole  Injectable 40 milliGRAM(s) IV Push daily  potassium chloride   Powder 40 milliEquivalent(s) Oral once  propofol Infusion 5 MICROgram(s)/kG/Min IV Continuous <Continuous>  sodium chloride 1.5%. 500 milliLiter(s) IV Continuous <Continuous>  vancomycin  IVPB      vancomycin  IVPB 1000 milliGRAM(s) IV Intermittent every 12 hours      LABS:                                                     12.1   12.07 )-----------( 360      ( 05 Mar 2025 01:20 )             35.7     03-05    133[L]  |  97[L]  |  30[H]  ----------------------------<  166[H]  4.0   |  21[L]  |  0.47[L]    Ca    9.0      05 Mar 2025 01:20  Phos  2.7     03-05  Mg     1.90     03-05        RADIOLOGY:   < from: MR Angio Head No Cont (02.28.25 @ 15:38) >  IMPRESSION: Right frontal peripherally enhancing epidural collection   beneath the right frontal cranioplasty has reaccumulated since 10/6/2024,   with diffusion restriction suspicious for an infected collection. Right   frontal encephalomalacia and gliosis has evolved since the prior exam.   Old left cerebellar infarct. Patent third ventriculostomy. Right frontal   ventricular catheter.    Normal intracranial MR angiography.    Dr. Salgado discussed these findings with Dr. Brush on 2/28/2025 4:16   PM with read back.    --- End of Report ---    < end of copied text >      PHYSICAL EXAM:  Awake, alert, intubated, fc  EOS, EOMI, PERRL  MAEx4 with good strength  Incision c/d/i   Right EDJP in place, mostly clear, pus tinged ouput 30cc

## 2025-03-06 DIAGNOSIS — Z98.890 OTHER SPECIFIED POSTPROCEDURAL STATES: ICD-10-CM

## 2025-03-06 LAB
ANION GAP SERPL CALC-SCNC: 13 MMOL/L — SIGNIFICANT CHANGE UP (ref 7–14)
ANION GAP SERPL CALC-SCNC: 15 MMOL/L — HIGH (ref 7–14)
APTT BLD: 29.1 SEC — SIGNIFICANT CHANGE UP (ref 24.5–35.6)
BLOOD GAS ARTERIAL - LYTES,HGB,ICA,LACT RESULT: SIGNIFICANT CHANGE UP
BLOOD GAS ARTERIAL COMPREHENSIVE RESULT: SIGNIFICANT CHANGE UP
BLOOD GAS ARTERIAL COMPREHENSIVE RESULT: SIGNIFICANT CHANGE UP
BUN SERPL-MCNC: 18 MG/DL — SIGNIFICANT CHANGE UP (ref 7–23)
BUN SERPL-MCNC: 22 MG/DL — SIGNIFICANT CHANGE UP (ref 7–23)
CALCIUM SERPL-MCNC: 8.7 MG/DL — SIGNIFICANT CHANGE UP (ref 8.4–10.5)
CALCIUM SERPL-MCNC: 9.1 MG/DL — SIGNIFICANT CHANGE UP (ref 8.4–10.5)
CHLORIDE SERPL-SCNC: 96 MMOL/L — LOW (ref 98–107)
CHLORIDE SERPL-SCNC: 97 MMOL/L — LOW (ref 98–107)
CO2 SERPL-SCNC: 20 MMOL/L — LOW (ref 22–31)
CO2 SERPL-SCNC: 22 MMOL/L — SIGNIFICANT CHANGE UP (ref 22–31)
CREAT SERPL-MCNC: 0.32 MG/DL — LOW (ref 0.5–1.3)
CREAT SERPL-MCNC: 0.32 MG/DL — LOW (ref 0.5–1.3)
EGFR: 119 ML/MIN/1.73M2 — SIGNIFICANT CHANGE UP
GLUCOSE SERPL-MCNC: 105 MG/DL — HIGH (ref 70–99)
GLUCOSE SERPL-MCNC: 165 MG/DL — HIGH (ref 70–99)
HCT VFR BLD CALC: 36 % — SIGNIFICANT CHANGE UP (ref 34.5–45)
HGB BLD-MCNC: 12.2 G/DL — SIGNIFICANT CHANGE UP (ref 11.5–15.5)
INR BLD: 1.1 RATIO — SIGNIFICANT CHANGE UP (ref 0.85–1.16)
MAGNESIUM SERPL-MCNC: 1.8 MG/DL — SIGNIFICANT CHANGE UP (ref 1.6–2.6)
MAGNESIUM SERPL-MCNC: 1.9 MG/DL — SIGNIFICANT CHANGE UP (ref 1.6–2.6)
MCHC RBC-ENTMCNC: 26.7 PG — LOW (ref 27–34)
MCHC RBC-ENTMCNC: 33.9 G/DL — SIGNIFICANT CHANGE UP (ref 32–36)
MCV RBC AUTO: 78.8 FL — LOW (ref 80–100)
NRBC # BLD AUTO: 0 K/UL — SIGNIFICANT CHANGE UP (ref 0–0)
NRBC # FLD: 0 K/UL — SIGNIFICANT CHANGE UP (ref 0–0)
NRBC BLD AUTO-RTO: 0 /100 WBCS — SIGNIFICANT CHANGE UP (ref 0–0)
PHOSPHATE SERPL-MCNC: 2.2 MG/DL — LOW (ref 2.5–4.5)
PHOSPHATE SERPL-MCNC: 2.7 MG/DL — SIGNIFICANT CHANGE UP (ref 2.5–4.5)
PLATELET # BLD AUTO: 266 K/UL — SIGNIFICANT CHANGE UP (ref 150–400)
POTASSIUM SERPL-MCNC: 2.9 MMOL/L — CRITICAL LOW (ref 3.5–5.3)
POTASSIUM SERPL-MCNC: 4 MMOL/L — SIGNIFICANT CHANGE UP (ref 3.5–5.3)
POTASSIUM SERPL-SCNC: 2.9 MMOL/L — CRITICAL LOW (ref 3.5–5.3)
POTASSIUM SERPL-SCNC: 4 MMOL/L — SIGNIFICANT CHANGE UP (ref 3.5–5.3)
PROTHROM AB SERPL-ACNC: 12.8 SEC — SIGNIFICANT CHANGE UP (ref 9.9–13.4)
RBC # BLD: 4.57 M/UL — SIGNIFICANT CHANGE UP (ref 3.8–5.2)
RBC # FLD: 14.3 % — SIGNIFICANT CHANGE UP (ref 10.3–14.5)
SODIUM SERPL-SCNC: 130 MMOL/L — LOW (ref 135–145)
SODIUM SERPL-SCNC: 133 MMOL/L — LOW (ref 135–145)
WBC # BLD: 9.66 K/UL — SIGNIFICANT CHANGE UP (ref 3.8–10.5)
WBC # FLD AUTO: 9.66 K/UL — SIGNIFICANT CHANGE UP (ref 3.8–10.5)

## 2025-03-06 PROCEDURE — 99232 SBSQ HOSP IP/OBS MODERATE 35: CPT

## 2025-03-06 PROCEDURE — 71045 X-RAY EXAM CHEST 1 VIEW: CPT | Mod: 26

## 2025-03-06 PROCEDURE — G0545: CPT

## 2025-03-06 PROCEDURE — 99291 CRITICAL CARE FIRST HOUR: CPT

## 2025-03-06 RX ORDER — EPINEPHRINE 11.25MG/ML
0.5 SOLUTION, NON-ORAL INHALATION ONCE
Refills: 0 | Status: COMPLETED | OUTPATIENT
Start: 2025-03-06 | End: 2025-03-06

## 2025-03-06 RX ORDER — DEXMEDETOMIDINE HYDROCHLORIDE IN SODIUM CHLORIDE 4 UG/ML
0.5 INJECTION INTRAVENOUS
Qty: 400 | Refills: 0 | Status: DISCONTINUED | OUTPATIENT
Start: 2025-03-06 | End: 2025-03-06

## 2025-03-06 RX ORDER — DIPHENHYDRAMINE HCL 12.5MG/5ML
25 ELIXIR ORAL ONCE
Refills: 0 | Status: COMPLETED | OUTPATIENT
Start: 2025-03-06 | End: 2025-03-06

## 2025-03-06 RX ORDER — VANCOMYCIN HCL IN 5 % DEXTROSE 1.5G/250ML
750 PLASTIC BAG, INJECTION (ML) INTRAVENOUS EVERY 12 HOURS
Refills: 0 | Status: DISCONTINUED | OUTPATIENT
Start: 2025-03-06 | End: 2025-03-06

## 2025-03-06 RX ORDER — VANCOMYCIN HCL IN 5 % DEXTROSE 1.5G/250ML
750 PLASTIC BAG, INJECTION (ML) INTRAVENOUS EVERY 12 HOURS
Refills: 0 | Status: DISCONTINUED | OUTPATIENT
Start: 2025-03-06 | End: 2025-03-09

## 2025-03-06 RX ORDER — ALBUTEROL SULFATE 2.5 MG/3ML
2 VIAL, NEBULIZER (ML) INHALATION EVERY 4 HOURS
Refills: 0 | Status: DISCONTINUED | OUTPATIENT
Start: 2025-03-06 | End: 2025-03-19

## 2025-03-06 RX ORDER — IPRATROPIUM BROMIDE AND ALBUTEROL SULFATE .5; 2.5 MG/3ML; MG/3ML
3 SOLUTION RESPIRATORY (INHALATION) EVERY 6 HOURS
Refills: 0 | Status: DISCONTINUED | OUTPATIENT
Start: 2025-03-06 | End: 2025-03-07

## 2025-03-06 RX ORDER — POTASSIUM PHOSPHATE, MONOBASIC POTASSIUM PHOSPHATE, DIBASIC INJECTION, 236; 224 MG/ML; MG/ML
15 SOLUTION, CONCENTRATE INTRAVENOUS ONCE
Refills: 0 | Status: COMPLETED | OUTPATIENT
Start: 2025-03-06 | End: 2025-03-06

## 2025-03-06 RX ORDER — FUROSEMIDE 10 MG/ML
40 INJECTION INTRAMUSCULAR; INTRAVENOUS ONCE
Refills: 0 | Status: COMPLETED | OUTPATIENT
Start: 2025-03-06 | End: 2025-03-06

## 2025-03-06 RX ADMIN — Medication 100 MILLIEQUIVALENT(S): at 17:16

## 2025-03-06 RX ADMIN — Medication 1 MILLIGRAM(S): at 03:27

## 2025-03-06 RX ADMIN — POTASSIUM PHOSPHATE, MONOBASIC POTASSIUM PHOSPHATE, DIBASIC INJECTION, 62.5 MILLIMOLE(S): 236; 224 SOLUTION, CONCENTRATE INTRAVENOUS at 19:29

## 2025-03-06 RX ADMIN — Medication 100 MILLIGRAM(S): at 14:15

## 2025-03-06 RX ADMIN — DEXMEDETOMIDINE HYDROCHLORIDE IN SODIUM CHLORIDE 8.31 MICROGRAM(S)/KG/HR: 4 INJECTION INTRAVENOUS at 07:31

## 2025-03-06 RX ADMIN — Medication 25 MILLIGRAM(S): at 15:27

## 2025-03-06 RX ADMIN — IPRATROPIUM BROMIDE AND ALBUTEROL SULFATE 3 MILLILITER(S): .5; 2.5 SOLUTION RESPIRATORY (INHALATION) at 15:29

## 2025-03-06 RX ADMIN — HEPARIN SODIUM 5000 UNIT(S): 1000 INJECTION INTRAVENOUS; SUBCUTANEOUS at 22:21

## 2025-03-06 RX ADMIN — CEFEPIME 100 MILLIGRAM(S): 2 INJECTION, POWDER, FOR SOLUTION INTRAVENOUS at 14:16

## 2025-03-06 RX ADMIN — IPRATROPIUM BROMIDE AND ALBUTEROL SULFATE 3 MILLILITER(S): .5; 2.5 SOLUTION RESPIRATORY (INHALATION) at 22:50

## 2025-03-06 RX ADMIN — CEFEPIME 100 MILLIGRAM(S): 2 INJECTION, POWDER, FOR SOLUTION INTRAVENOUS at 05:20

## 2025-03-06 RX ADMIN — Medication 1 APPLICATION(S): at 15:31

## 2025-03-06 RX ADMIN — Medication 1 MILLIGRAM(S): at 12:16

## 2025-03-06 RX ADMIN — Medication 1 MILLIGRAM(S): at 14:13

## 2025-03-06 RX ADMIN — Medication 100 MILLIGRAM(S): at 05:20

## 2025-03-06 RX ADMIN — LEVETIRACETAM 1000 MILLIGRAM(S): 10 INJECTION, SOLUTION INTRAVENOUS at 05:24

## 2025-03-06 RX ADMIN — HEPARIN SODIUM 5000 UNIT(S): 1000 INJECTION INTRAVENOUS; SUBCUTANEOUS at 14:15

## 2025-03-06 RX ADMIN — Medication 40 MILLIEQUIVALENT(S): at 16:00

## 2025-03-06 RX ADMIN — FUROSEMIDE 40 MILLIGRAM(S): 10 INJECTION INTRAMUSCULAR; INTRAVENOUS at 09:25

## 2025-03-06 RX ADMIN — DEXAMETHASONE 102 MILLIGRAM(S): 0.5 TABLET ORAL at 05:20

## 2025-03-06 RX ADMIN — POLYETHYLENE GLYCOL 3350 17 GRAM(S): 17 POWDER, FOR SOLUTION ORAL at 11:15

## 2025-03-06 RX ADMIN — Medication 1000 MILLIGRAM(S): at 22:26

## 2025-03-06 RX ADMIN — Medication 250 MILLIGRAM(S): at 12:41

## 2025-03-06 RX ADMIN — HEPARIN SODIUM 5000 UNIT(S): 1000 INJECTION INTRAVENOUS; SUBCUTANEOUS at 05:24

## 2025-03-06 RX ADMIN — Medication 100 MILLIGRAM(S): at 22:21

## 2025-03-06 RX ADMIN — Medication 15 MILLILITER(S): at 05:24

## 2025-03-06 RX ADMIN — Medication 100 MILLIEQUIVALENT(S): at 16:00

## 2025-03-06 RX ADMIN — Medication 100 MILLIEQUIVALENT(S): at 18:35

## 2025-03-06 RX ADMIN — CEFEPIME 100 MILLIGRAM(S): 2 INJECTION, POWDER, FOR SOLUTION INTRAVENOUS at 22:21

## 2025-03-06 RX ADMIN — Medication 1000 MILLIGRAM(S): at 23:00

## 2025-03-06 RX ADMIN — Medication 40 MILLIGRAM(S): at 11:15

## 2025-03-06 RX ADMIN — LEVETIRACETAM 1000 MILLIGRAM(S): 10 INJECTION, SOLUTION INTRAVENOUS at 17:46

## 2025-03-06 RX ADMIN — Medication 1 MILLIGRAM(S): at 03:42

## 2025-03-06 NOTE — PROGRESS NOTE ADULT - SUBJECTIVE AND OBJECTIVE BOX
SICU Daily Progress Note  =====================================================  Interval/Overnight Events:   - Vanc dosing per ID pharmacy 750 mg q 12   - SHAYY removed by NSGY > CT head non con   - Plan to extubate tomorrow 3/6  - Bowel regimen  - Ok to restart DVT ppx per NSGY    Overnight:  - NAOE  - CT head - drain removed, collection decreased, increase in lateral ventricle size    MEDICATIONS:   --------------------------------------------------------------------------------------  acetaminophen   Oral Liquid .. 1000 milliGRAM(s) Oral every 6 hours PRN  benzocaine/butamben/tetracaine Spray 1 Spray(s) Topical four times a day PRN  cefepime   IVPB      cefepime   IVPB 2000 milliGRAM(s) IV Intermittent every 8 hours  chlorhexidine 2% Cloths 1 Application(s) Topical daily  dexAMETHasone  IVPB 10 milliGRAM(s) IV Intermittent every 6 hours  dexMEDEtomidine Infusion 0.5 MICROgram(s)/kG/Hr IV Continuous <Continuous>  heparin   Injectable 5000 Unit(s) SubCutaneous every 8 hours  HYDROmorphone  Injectable 1 milliGRAM(s) IV Push every 2 hours PRN  levETIRAcetam   Injectable 1000 milliGRAM(s) IV Push every 12 hours  metroNIDAZOLE  IVPB 500 milliGRAM(s) IV Intermittent every 8 hours  pantoprazole  Injectable 40 milliGRAM(s) IV Push daily  polyethylene glycol 3350 17 Gram(s) Oral daily  senna 2 Tablet(s) Oral at bedtime  vancomycin  IVPB 750 milliGRAM(s) IV Intermittent every 12 hours    --------------------------------------------------------------------------------------    VITAL SIGNS, INS/OUTS (last 24 hours):  --------------------------------------------------------------------------------------  T(C): 37.5 (03-06-25 @ 00:00), Max: 37.9 (03-05-25 @ 04:00)  HR: 59 (03-06-25 @ 01:00) (54 - 82)  BP: 98/55 (03-06-25 @ 01:00) (92/51 - 128/68)  RR: 14 (03-06-25 @ 01:00) (8 - 23)  SpO2: 100% (03-06-25 @ 01:00) (100% - 100%)  Mode: AC/ CMV (Assist Control/ Continuous Mandatory Ventilation), RR (machine): 16, TV (machine): 350, FiO2: 40, PEEP: 6, ITime: 1, MAP: 12, PIP: 15    03-04-25 @ 07:01  -  03-05-25 @ 07:00  --------------------------------------------------------  IN: 1218.8 mL / OUT: 1575 mL / NET: -356.2 mL    03-05-25 @ 07:01  -  03-06-25 @ 01:57  --------------------------------------------------------  IN: 1617.6 mL / OUT: 645 mL / NET: 972.6 mL      --------------------------------------------------------------------------------------    EXAM  NEURO: NAD, resting quietly   HEENT: NC/AT  RESPIRATORY:  normal CW expansion, CPAP / PSV tolerating   CARDIO: RRR, S1S2  ABDOMEN: soft, nontender, nondistended  EXTREMITIES: normal strength, no deformities    TUBES/LINES/DRAINS  [x] Peripheral IV  [] Central Venous Line     	[] R	[] L	[] IJ	[] Fem	[] SC	Date Placed:   [] Arterial Line		[] R	[] L	[] Fem	[] Rad	[] Ax	Date Placed:   [] PICC		[] Midline		[] Mediport  [x] Urinary Catheter		Date Placed:     LABS  --------------------------------------------------------------------------------------  pending am labs  --------------------------------------------------------------------------------------    IMAGING STUDIES:      SICU Daily Progress Note  =====================================================  Interval/Overnight Events:   - Vanc dosing per ID pharmacy 750 mg q 12   - SHAYY removed by NSGY > CT head non con   - Plan to extubate tomorrow 3/6  - Bowel regimen  - Ok to restart DVT ppx per NSGY  - NAOE  - CT Head -drain removed, collection decreased, increase in lateral ventricle size  - Q4h Nxcheck  - Lasix 40  - Extubated 3/6  - Morpine given for anxiety/tachypnea  - bedside S+S, IV lock  - Rash -- benadryl      Overnight:  - NAOE  - CT head - drain removed, collection decreased, increase in lateral ventricle size    MEDICATIONS:   --------------------------------------------------------------------------------------  acetaminophen   Oral Liquid .. 1000 milliGRAM(s) Oral every 6 hours PRN  benzocaine/butamben/tetracaine Spray 1 Spray(s) Topical four times a day PRN  cefepime   IVPB      cefepime   IVPB 2000 milliGRAM(s) IV Intermittent every 8 hours  chlorhexidine 2% Cloths 1 Application(s) Topical daily  dexAMETHasone  IVPB 10 milliGRAM(s) IV Intermittent every 6 hours  dexMEDEtomidine Infusion 0.5 MICROgram(s)/kG/Hr IV Continuous <Continuous>  heparin   Injectable 5000 Unit(s) SubCutaneous every 8 hours  HYDROmorphone  Injectable 1 milliGRAM(s) IV Push every 2 hours PRN  levETIRAcetam   Injectable 1000 milliGRAM(s) IV Push every 12 hours  metroNIDAZOLE  IVPB 500 milliGRAM(s) IV Intermittent every 8 hours  pantoprazole  Injectable 40 milliGRAM(s) IV Push daily  polyethylene glycol 3350 17 Gram(s) Oral daily  senna 2 Tablet(s) Oral at bedtime  vancomycin  IVPB 750 milliGRAM(s) IV Intermittent every 12 hours    --------------------------------------------------------------------------------------    VITAL SIGNS, INS/OUTS (last 24 hours):  --------------------------------------------------------------------------------------  T(C): 37.5 (03-06-25 @ 00:00), Max: 37.9 (03-05-25 @ 04:00)  HR: 59 (03-06-25 @ 01:00) (54 - 82)  BP: 98/55 (03-06-25 @ 01:00) (92/51 - 128/68)  RR: 14 (03-06-25 @ 01:00) (8 - 23)  SpO2: 100% (03-06-25 @ 01:00) (100% - 100%)  Mode: AC/ CMV (Assist Control/ Continuous Mandatory Ventilation), RR (machine): 16, TV (machine): 350, FiO2: 40, PEEP: 6, ITime: 1, MAP: 12, PIP: 15    03-04-25 @ 07:01  -  03-05-25 @ 07:00  --------------------------------------------------------  IN: 1218.8 mL / OUT: 1575 mL / NET: -356.2 mL    03-05-25 @ 07:01  -  03-06-25 @ 01:57  --------------------------------------------------------  IN: 1617.6 mL / OUT: 645 mL / NET: 972.6 mL      --------------------------------------------------------------------------------------    EXAM  NEURO: NAD, resting quietly   HEENT: NC/AT  RESPIRATORY:  normal CW expansion, CPAP / PSV tolerating   CARDIO: RRR, S1S2  ABDOMEN: soft, nontender, nondistended  EXTREMITIES: normal strength, no deformities    TUBES/LINES/DRAINS  [x] Peripheral IV  [] Central Venous Line     	[] R	[] L	[] IJ	[] Fem	[] SC	Date Placed:   [] Arterial Line		[] R	[] L	[] Fem	[] Rad	[] Ax	Date Placed:   [] PICC		[] Midline		[] Mediport  [x] Urinary Catheter		Date Placed:     LABS  --------------------------------------------------------------------------------------  pending am labs  --------------------------------------------------------------------------------------    IMAGING STUDIES:

## 2025-03-06 NOTE — PROGRESS NOTE ADULT - SUBJECTIVE AND OBJECTIVE BOX
Infectious Diseases Follow Up:    Patient is a 60y old  Female who presents with a chief complaint of seizure (06 Mar 2025 03:46)      Interval History/ROS:  Remains afebrile, intubated. Following commands     Allergies  No Known Allergies        ANTIMICROBIALS:  cefepime   IVPB    cefepime   IVPB 2000 every 8 hours  metroNIDAZOLE  IVPB 500 every 8 hours  vancomycin  IVPB 750 every 12 hours      Current Abx:     Previous Abx     OTHER MEDS:  MEDICATIONS  (STANDING):  acetaminophen   Oral Liquid .. 1000 every 6 hours PRN  albuterol    90 MICROgram(s) HFA Inhaler 2 every 4 hours PRN  albuterol/ipratropium for Nebulization 3 every 6 hours  heparin   Injectable 5000 every 8 hours  HYDROmorphone  Injectable 1 every 2 hours PRN  levETIRAcetam   Injectable 1000 every 12 hours  pantoprazole  Injectable 40 daily  polyethylene glycol 3350 17 daily  racepinephrine  2.25% Inhalation 0.5 once  senna 2 at bedtime      Vital Signs Last 24 Hrs  T(C): 37.4 (06 Mar 2025 08:00), Max: 37.9 (05 Mar 2025 16:00)  T(F): 99.4 (06 Mar 2025 08:00), Max: 100.2 (05 Mar 2025 16:00)  HR: 93 (06 Mar 2025 12:00) (54 - 93)  BP: 114/74 (06 Mar 2025 12:00) (87/53 - 129/62)  BP(mean): 86 (06 Mar 2025 12:00) (55 - 88)  RR: 17 (06 Mar 2025 12:00) (8 - 22)  SpO2: 100% (06 Mar 2025 12:00) (98% - 100%)    Parameters below as of 06 Mar 2025 10:05  Patient On (Oxygen Delivery Method): face tent    O2 Concentration (%): 40    PHYSICAL EXAM:  GENERAL: NAD, well-developed  HEAD:  R sided dressing   EYES: EOMI, conjunctiva and sclera clear  CHEST/LUNG: Intubated, coarse breath sounds   HEART: Regular rate and rhythm; No murmurs, rubs, or gallops  ABDOMEN: Soft, Nontender, Nondistended; Bowel sounds present  PSYCH: AAO, following commands                             12.2   9.66  )-----------( 266      ( 06 Mar 2025 01:50 )             36.0       03-06    130[L]  |  97[L]  |  22  ----------------------------<  165[H]  4.0   |  20[L]  |  0.32[L]    Ca    8.7      06 Mar 2025 01:50  Phos  2.7     03-06  Mg     1.90     03-06        Urinalysis Basic - ( 06 Mar 2025 01:50 )    Color: x / Appearance: x / SG: x / pH: x  Gluc: 165 mg/dL / Ketone: x  / Bili: x / Urobili: x   Blood: x / Protein: x / Nitrite: x   Leuk Esterase: x / RBC: x / WBC x   Sq Epi: x / Non Sq Epi: x / Bacteria: x        MICROBIOLOGY:  v  Surgical Swab  03-01-25   No growth to date  --    Few polymorphonuclear leukocytes per low power field  No organisms seen per oil power field      CSF CSF  02-28-25   No growth at 5 days  --    No polymorphonuclear cells seen  No organisms seen  by cytocentrifuge      Catheterized Catheterized  02-26-25   No growth  --  --      .Blood Blood-Peripheral  02-26-25   No growth at 5 days  --  --      .Blood Blood-Peripheral  02-26-25   No growth at 5 days  --  --      .Blood Blood  02-22-25   No growth at 5 days  --  --      .Blood Blood  02-22-25   No growth at 5 days  --  --                RADIOLOGY:

## 2025-03-06 NOTE — PROGRESS NOTE ADULT - SUBJECTIVE AND OBJECTIVE BOX
Patient remains intubated, on vent  Subdural SHAYY removed  No acute issues overnight  ICU Vital Signs Last 24 Hrs  T(C): 37.5 (06 Mar 2025 00:00), Max: 37.9 (05 Mar 2025 04:00)  T(F): 99.5 (06 Mar 2025 00:00), Max: 100.3 (05 Mar 2025 04:00)  HR: 68 (06 Mar 2025 03:02) (54 - 73)  BP: 106/56 (06 Mar 2025 03:00) (92/51 - 128/68)  BP(mean): 71 (06 Mar 2025 03:00) (55 - 88)  ABP: --  ABP(mean): --  RR: 16 (06 Mar 2025 03:00) (8 - 20)  SpO2: 100% (06 Mar 2025 03:02) (100% - 100%)    O2 Parameters below as of 06 Mar 2025 03:00  Patient On (Oxygen Delivery Method): ventilator    Awake and alert  Following commands  PERRLA, EOMI  Face symmetric  CLARK with good strength    MEDICATIONS  (STANDING):  cefepime   IVPB      cefepime   IVPB 2000 milliGRAM(s) IV Intermittent every 8 hours  chlorhexidine 2% Cloths 1 Application(s) Topical daily  dexAMETHasone  IVPB 10 milliGRAM(s) IV Intermittent every 6 hours  dexMEDEtomidine Infusion 0.5 MICROgram(s)/kG/Hr (8.31 mL/Hr) IV Continuous <Continuous>  heparin   Injectable 5000 Unit(s) SubCutaneous every 8 hours  levETIRAcetam   Injectable 1000 milliGRAM(s) IV Push every 12 hours  metroNIDAZOLE  IVPB 500 milliGRAM(s) IV Intermittent every 8 hours  pantoprazole  Injectable 40 milliGRAM(s) IV Push daily  polyethylene glycol 3350 17 Gram(s) Oral daily  senna 2 Tablet(s) Oral at bedtime  vancomycin  IVPB 750 milliGRAM(s) IV Intermittent every 12 hours    MEDICATIONS  (PRN):  acetaminophen   Oral Liquid .. 1000 milliGRAM(s) Oral every 6 hours PRN Temp greater or equal to 38C (100.4F), Mild Pain (1 - 3)  benzocaine/butamben/tetracaine Spray 1 Spray(s) Topical four times a day PRN Sore throat  HYDROmorphone  Injectable 1 milliGRAM(s) IV Push every 2 hours PRN moderate to severe pain                          12.2   9.66  )-----------( 266      ( 06 Mar 2025 01:50 )             36.0     03-06    130[L]  |  97[L]  |  22  ----------------------------<  165[H]  4.0   |  20[L]  |  0.32[L]    Ca    8.7      06 Mar 2025 01:50  Phos  2.7     03-06  Mg     1.90     03-06    All cultures showing NGTD    < from: CT Head No Cont (03.05.25 @ 12:35) >  Postoperative changes compatible with a right temporal frontal   cranioplasty is again seen. There is an area of encephalomalacia and   gliosis again seen involving the right frontal region which appears   unchanged. High right frontal clarissa hole and shunt catheter is again seen   and unchanged.    Increase in size of the lateral ventricles are seen when compared with   the prior exam.    Abnormal low-attenuation along the shunt catheter tract is again seen and   unchanged. This is likely compatible with an area of gliosis.    Previously noted subdural drain in the right frontal region has been   removed. There is evidence of extra-axial fluid and air identified   subadjacent to the postop region which measures approximately 1.1 cm in   widest diameter. This finding has slightly increased in size with   decreased fluid and increased air when compared to prior exam. This   finding previously measured approximately 0.7 in widest diameter.    Postop changes compatible with a left suboccipital craniotomy is again   seen with adjacent area and encephalization and gliosis which appears   unchanged.    Stable area of high attenuation with surrounding low-attenuation is again   identified involving the right cerebellar region.This finding corresponds   to area of abnormal susceptibility seen on the prior brain MRI.    Left side NG tube is seen.    The paranasal sinuses appear clear.    Both mastoid and middle ear regions appear clear.    IMPRESSION: Extra-axial air subadjacent to the right frontal postop   region has increased in size with small amount of adjacent fluid   identified.    Previously noted right frontal subdural drain has been removed.    Increase in size of lateral ventricle are seen compared with the prior   exam.    --- End of Report ---    < end of copied text >

## 2025-03-06 NOTE — PROGRESS NOTE ADULT - ASSESSMENT
This is a 60 y/o F w/ very extensive PMHx of NF, 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), s/p  shunt (in [facility]), s/p traumatic fall w/ R SDH, s/p R decompressive hemicraniectomy, ICP monitor and R cranioplasty (5/2024), s/p L frontal Endoscopic third ventriculostomy and ligation of R  shunt with Ligaclips with retention of valve, proximal and distal catheters (08/2024) admitted initially to [facility] on 2/22 for seizure, intubated for airway protection and status epilepticus, initially on Zosyn for aspiration PNA.  Initially CTH w/ slight increase in extra axial fluid collection measuring up to 1.5 cm. Pt was transferred to [facility] on 2/23 for neurosurgery eval, given decreased L sided strength, MRI done with increase in R sided collection.  Pt extubated on 2/28, CSF reservoir tapped, no pleocytosis.    Pt went to the OR on 3/1 for R clarissa pole with evacuation of epidural collection, with purulent drainage.     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), s/p  shunt (in [facility]), s/p traumatic fall w/ R SDH, s/p R decompressive hemicraniectomy, ICP monitor and R cranioplasty (5/2024), s/p L frontal Endoscopic third ventriculostomy and ligation of R  shunt with ligaclips with retention of valve, proximal and distal catheters (08/2024) admitted initially to [facility] on 2/22 for seizure, intubated for airway protection and status epilepticus, initially on Zosyn for aspiration PNA, transferred to [facility] on 2/23 for neurosurgery evaluation, now found to with have epidural abscess s/p R Clarissa w/ purulent drainage.  Extubated on 3/3, reintubated for stridor/work of breaking     Recommendations:   1. Vancomycin 750 g q12, f/u level goal -600, Cefepime 2 g q8, Flagyl 500 mg q8   2. F/u OR Cx, NG  3. F/u neurosurgery plan for cranioplasty and ligated shunt removal    Thank you for consulting us and involving us in the management of this patient's case. In addition to reviewing history, imaging, documents, labs, microbiology, and infection control strategies and potential issues.     ID will continue to follow    Inder Mcintosh M.D.  Attending Physician  Division of Infectious Diseases  Department of Medicine

## 2025-03-06 NOTE — PROGRESS NOTE ADULT - PROBLEM SELECTOR PLAN 1
Neurologic checks Q4H  Wean to extubate  Continue Abx  Follow up cultures  Follow up with ID  Cranioplasty and ligated shunt removal to be shceduled

## 2025-03-06 NOTE — PROGRESS NOTE ADULT - ASSESSMENT
60 yo F with PMHx of neurofibromatosis, s/p L suboccipital craniectomy for resection of cerebellar brain tumor 2023 by Dr Brush, s/p traumatic fall with head trauma in 2024 with large acute right SDH, s/p emergent right decompressive hemicraniectomy on 24, s/p right cranioplasty on 24 with custom plate, s/p ETV, ligation of shunt on the right with ligaclips with retention of valve as rescue device, presents as a transfer from [facility] for witnessed seizure at home. CTH obtained at [facility] revealed moderate ventriculomegaly and slight increase in size of extra-axial CSF collection overlying the right frontal duraplasty     Exam with decreased strength on left side, post ictal vs intracranial causes, will get MRI/MRA to assess and CTH/CTA if MRI will be delayed. On zosyn, white count downtrending.   :  CTH/ CTA done stable , CTA negative, MRIw/flow/MRA ordered, intubated /sedated, neurology saw Veeg-O/ neg, on keppra, K+3.3 repleted, afbrile, bld cltr NGTD, UA neg, WBC nml  : stable exam,  Veeg on, keppra, mri w/flow/ mra pending  : Stable exam. No seizures noted on VEEG. On keppra. MRI with CSF flow study and MRA ordered  : Extubated, alert, mildly confused and restless, nonfocal exam. On keppra. MRI showed increase in right epidural collection with diffusion restriction. Shunt (as reservoir) tapped, W1 R1  GSN  3/1: Alert, neurologically intact. On keppra. OR for clarissa hold for drainage of epidural abscess. Cx sent, NGTD. EDJPx1  Was intubated preop for stridor, dex x24hrs  3/2: POD1: Intubated, ENT scoped determined airway patent, ID-Following initiated on broad spectrum abx, OR culutres GSN, AFB neg, Cx pending. Dex discontinued.  3/3 POD2, intubated, on flagyl/vanc/cefipime, K 3.1- repletion ordered. EDJPx1 105cc/24hrs, PT recc LOUIS  3/4: pod 3, intubated for respiratory distress, on tripple abx, ED SHAYY to suction, f/u OR cltrs NGTD  3/5 pod 4, remains intubated, neuro exam stable, ED SHAYY drain pus tinged 30cc, on triple Abx, OR cltrs NGTD, ID following, likely needs RTOR for cranioplasty and ligated catheter removal date TBD   3: POD # 5 stable exam. Patient remains intubated. On triple antibiotics, decadron for stridor. RTOR for cranioplasty and ligated catheter removal date TBD

## 2025-03-06 NOTE — PROGRESS NOTE ADULT - ASSESSMENT
59F PMH hydrocephalus s/p  shunt (10/4/2013), neurofibromatosis, s/p left retrosigmoid craniotomy for resection of cerebellar brain tumor (12/2023 Dr Brush), traumatic acute right SDH s/p emergent right decompressive hemicraniectomy (5/22/24) followed by right cranioplasty with custom plate (6/17/24),  shunt malfunction s/p left third ventriculostomy and ligation of  shunt with retention of valve (7/19/24) presented as a transfer from [facility] for witnessed seizure at home. vEEG performed with no seizures/seizure-like activity. MR Head 2/28 demonstrated R frontal epidural collection. Patient intubated in SICU 3/1 for stridor prior to OR, and then taken for R craniotomy and evacuation of epidural empyema w/ drain left in place. Patient extubated POD2 following 48h decadron w/ appropriate cuff leak but was immediately reintubated for severe stridor and work of breathing.     Neurologic:   - Q1h neurochecks  - VEEG completed, no seizures   - Sedation: wean prop for extubation, cont precedex  - Keppra 1g BID  - Pain control: IV Tylenol, dilaudid PRN   - CTA head 2/24 wnl  - MR head with epidural empyema   - OR (03/01) - clarissa hole with purulent fluid. Epidural drain (3/3 - 3/5)    Respiratory:   - 3/1 intubated for stridor, upper airway edema, prior to OR  - Failed extubation on 3/3  - 12 350 5 40%  - Decadron 10mg q6h for 48hrs for upper airway edema    Cardiovascular:   - MAP > 65    Gastrointestinal/Nutrition:   - NGT with TFs    Renal/Genitourinary:   - Monitor electrolytes  - IVL  - Dominguez    Hematologic:   - DVT PPx: SqH     Infectious Disease:   - Monitor WBC, fever curves  - Cefepime, flagyl, vanco for empyema    - s/p Zosyn (presumed aspiration pneumonia) 7/23/24 - 7/24/24  - blood cx sent x2 ([place]) > NG @ 5d  - RVP 3/1 negative  - OR cultures 3/1: No growth to date    Endocrine:   - Monitor glucose  - Decadron 10mg q6h for 48 hrs for airway edema (3/3-3/5)    Tubes/Lines/Drains:   - PIV  - Dominguez  - NGT  - epidural drain (3/1-3/5)    Disposition: SICU

## 2025-03-07 LAB
ANION GAP SERPL CALC-SCNC: 14 MMOL/L — SIGNIFICANT CHANGE UP (ref 7–14)
BUN SERPL-MCNC: 17 MG/DL — SIGNIFICANT CHANGE UP (ref 7–23)
CALCIUM SERPL-MCNC: 9.1 MG/DL — SIGNIFICANT CHANGE UP (ref 8.4–10.5)
CHLORIDE SERPL-SCNC: 99 MMOL/L — SIGNIFICANT CHANGE UP (ref 98–107)
CO2 SERPL-SCNC: 19 MMOL/L — LOW (ref 22–31)
CREAT SERPL-MCNC: 0.43 MG/DL — LOW (ref 0.5–1.3)
EGFR: 111 ML/MIN/1.73M2 — SIGNIFICANT CHANGE UP
EGFR: 111 ML/MIN/1.73M2 — SIGNIFICANT CHANGE UP
GLUCOSE SERPL-MCNC: 112 MG/DL — HIGH (ref 70–99)
HCT VFR BLD CALC: 41.8 % — SIGNIFICANT CHANGE UP (ref 34.5–45)
HGB BLD-MCNC: 14.3 G/DL — SIGNIFICANT CHANGE UP (ref 11.5–15.5)
MAGNESIUM SERPL-MCNC: 1.9 MG/DL — SIGNIFICANT CHANGE UP (ref 1.6–2.6)
MCHC RBC-ENTMCNC: 26.8 PG — LOW (ref 27–34)
MCHC RBC-ENTMCNC: 34.2 G/DL — SIGNIFICANT CHANGE UP (ref 32–36)
MCV RBC AUTO: 78.3 FL — LOW (ref 80–100)
NRBC # BLD AUTO: 0 K/UL — SIGNIFICANT CHANGE UP (ref 0–0)
NRBC # FLD: 0 K/UL — SIGNIFICANT CHANGE UP (ref 0–0)
NRBC BLD AUTO-RTO: 0 /100 WBCS — SIGNIFICANT CHANGE UP (ref 0–0)
PHOSPHATE SERPL-MCNC: 3.2 MG/DL — SIGNIFICANT CHANGE UP (ref 2.5–4.5)
PLATELET # BLD AUTO: 393 K/UL — SIGNIFICANT CHANGE UP (ref 150–400)
POTASSIUM SERPL-MCNC: 3.6 MMOL/L — SIGNIFICANT CHANGE UP (ref 3.5–5.3)
POTASSIUM SERPL-SCNC: 3.6 MMOL/L — SIGNIFICANT CHANGE UP (ref 3.5–5.3)
RBC # BLD: 5.34 M/UL — HIGH (ref 3.8–5.2)
RBC # FLD: 14.7 % — HIGH (ref 10.3–14.5)
SODIUM SERPL-SCNC: 132 MMOL/L — LOW (ref 135–145)
VANCOMYCIN FLD-MCNC: 9.3 UG/ML — SIGNIFICANT CHANGE UP
WBC # BLD: 13.65 K/UL — HIGH (ref 3.8–10.5)
WBC # FLD AUTO: 13.65 K/UL — HIGH (ref 3.8–10.5)

## 2025-03-07 PROCEDURE — 99232 SBSQ HOSP IP/OBS MODERATE 35: CPT

## 2025-03-07 PROCEDURE — 99232 SBSQ HOSP IP/OBS MODERATE 35: CPT | Mod: GC

## 2025-03-07 PROCEDURE — G0545: CPT

## 2025-03-07 PROCEDURE — 70450 CT HEAD/BRAIN W/O DYE: CPT | Mod: 26

## 2025-03-07 RX ORDER — SERTRALINE 100 MG/1
50 TABLET, FILM COATED ORAL DAILY
Refills: 0 | Status: DISCONTINUED | OUTPATIENT
Start: 2025-03-07 | End: 2025-03-19

## 2025-03-07 RX ORDER — LEVETIRACETAM 10 MG/ML
1000 INJECTION, SOLUTION INTRAVENOUS
Refills: 0 | Status: DISCONTINUED | OUTPATIENT
Start: 2025-03-07 | End: 2025-03-19

## 2025-03-07 RX ORDER — MIRTAZAPINE 30 MG/1
7.5 TABLET, FILM COATED ORAL AT BEDTIME
Refills: 0 | Status: DISCONTINUED | OUTPATIENT
Start: 2025-03-07 | End: 2025-03-19

## 2025-03-07 RX ORDER — ACETAMINOPHEN 500 MG/5ML
975 LIQUID (ML) ORAL EVERY 6 HOURS
Refills: 0 | Status: COMPLETED | OUTPATIENT
Start: 2025-03-07 | End: 2025-03-10

## 2025-03-07 RX ADMIN — LEVETIRACETAM 1000 MILLIGRAM(S): 10 INJECTION, SOLUTION INTRAVENOUS at 05:37

## 2025-03-07 RX ADMIN — Medication 100 MILLIGRAM(S): at 05:37

## 2025-03-07 RX ADMIN — Medication 125 MILLIGRAM(S): at 11:54

## 2025-03-07 RX ADMIN — HEPARIN SODIUM 5000 UNIT(S): 1000 INJECTION INTRAVENOUS; SUBCUTANEOUS at 05:37

## 2025-03-07 RX ADMIN — Medication 125 MILLIGRAM(S): at 23:51

## 2025-03-07 RX ADMIN — Medication 975 MILLIGRAM(S): at 11:10

## 2025-03-07 RX ADMIN — Medication 100 MILLIGRAM(S): at 21:20

## 2025-03-07 RX ADMIN — Medication 1000 MILLIGRAM(S): at 07:00

## 2025-03-07 RX ADMIN — Medication 1 APPLICATION(S): at 11:10

## 2025-03-07 RX ADMIN — CEFEPIME 100 MILLIGRAM(S): 2 INJECTION, POWDER, FOR SOLUTION INTRAVENOUS at 21:20

## 2025-03-07 RX ADMIN — IPRATROPIUM BROMIDE AND ALBUTEROL SULFATE 3 MILLILITER(S): .5; 2.5 SOLUTION RESPIRATORY (INHALATION) at 08:42

## 2025-03-07 RX ADMIN — IPRATROPIUM BROMIDE AND ALBUTEROL SULFATE 3 MILLILITER(S): .5; 2.5 SOLUTION RESPIRATORY (INHALATION) at 04:49

## 2025-03-07 RX ADMIN — Medication 100 MILLIGRAM(S): at 13:35

## 2025-03-07 RX ADMIN — MIRTAZAPINE 7.5 MILLIGRAM(S): 30 TABLET, FILM COATED ORAL at 21:21

## 2025-03-07 RX ADMIN — CEFEPIME 100 MILLIGRAM(S): 2 INJECTION, POWDER, FOR SOLUTION INTRAVENOUS at 13:35

## 2025-03-07 RX ADMIN — HEPARIN SODIUM 5000 UNIT(S): 1000 INJECTION INTRAVENOUS; SUBCUTANEOUS at 21:20

## 2025-03-07 RX ADMIN — LEVETIRACETAM 1000 MILLIGRAM(S): 10 INJECTION, SOLUTION INTRAVENOUS at 20:34

## 2025-03-07 RX ADMIN — Medication 125 MILLIGRAM(S): at 00:01

## 2025-03-07 RX ADMIN — Medication 975 MILLIGRAM(S): at 17:03

## 2025-03-07 RX ADMIN — HEPARIN SODIUM 5000 UNIT(S): 1000 INJECTION INTRAVENOUS; SUBCUTANEOUS at 13:35

## 2025-03-07 RX ADMIN — Medication 1000 MILLIGRAM(S): at 06:07

## 2025-03-07 RX ADMIN — CEFEPIME 100 MILLIGRAM(S): 2 INJECTION, POWDER, FOR SOLUTION INTRAVENOUS at 05:36

## 2025-03-07 RX ADMIN — SERTRALINE 50 MILLIGRAM(S): 100 TABLET, FILM COATED ORAL at 11:10

## 2025-03-07 RX ADMIN — Medication 2 TABLET(S): at 21:20

## 2025-03-07 NOTE — PROGRESS NOTE ADULT - SUBJECTIVE AND OBJECTIVE BOX
patient tolerating extubation  Comfortable  Minimal stridor  ICU Vital Signs Last 24 Hrs  T(C): 36.9 (07 Mar 2025 00:00), Max: 37.6 (06 Mar 2025 04:00)  T(F): 98.5 (07 Mar 2025 00:00), Max: 99.6 (06 Mar 2025 04:00)  HR: 95 (07 Mar 2025 02:00) (54 - 112)  BP: 121/76 (07 Mar 2025 02:00) (87/53 - 137/50)  BP(mean): 86 (07 Mar 2025 02:00) (65 - 104)  ABP: --  ABP(mean): --  RR: 20 (07 Mar 2025 02:00) (8 - 22)  SpO2: 100% (07 Mar 2025 02:00) (98% - 100%)    O2 Parameters below as of 07 Mar 2025 02:00  Patient On (Oxygen Delivery Method): room air    AAO X 3  PERRLA, EOMI  CN 2-12 grossly intact  CLARK strength 5/5  No dysmetria or drift  SILT    MEDICATIONS  (STANDING):  albuterol/ipratropium for Nebulization 3 milliLiter(s) Nebulizer every 6 hours  cefepime   IVPB 2000 milliGRAM(s) IV Intermittent every 8 hours  cefepime   IVPB      chlorhexidine 2% Cloths 1 Application(s) Topical daily  heparin   Injectable 5000 Unit(s) SubCutaneous every 8 hours  levETIRAcetam   Injectable 1000 milliGRAM(s) IV Push every 12 hours  metroNIDAZOLE  IVPB 500 milliGRAM(s) IV Intermittent every 8 hours  pantoprazole  Injectable 40 milliGRAM(s) IV Push daily  polyethylene glycol 3350 17 Gram(s) Oral daily  racepinephrine  2.25% Inhalation 0.5 milliLiter(s) Inhalation once  senna 2 Tablet(s) Oral at bedtime  vancomycin  IVPB 750 milliGRAM(s) IV Intermittent every 12 hours    MEDICATIONS  (PRN):  acetaminophen   Oral Liquid .. 1000 milliGRAM(s) Oral every 6 hours PRN Temp greater or equal to 38C (100.4F), Mild Pain (1 - 3)  albuterol    90 MICROgram(s) HFA Inhaler 2 Puff(s) Inhalation every 4 hours PRN Shortness of Breath and/or Wheezing  benzocaine/butamben/tetracaine Spray 1 Spray(s) Topical four times a day PRN Sore throat  HYDROmorphone  Injectable 1 milliGRAM(s) IV Push every 2 hours PRN moderate to severe pain                          14.3   13.65 )-----------( 393      ( 07 Mar 2025 02:10 )             41.8     03-07    132[L]  |  99  |  17  ----------------------------<  112[H]  3.6   |  19[L]  |  0.43[L]    Ca    9.1      07 Mar 2025 02:10  Phos  3.2     03-07  Mg     1.90     03-07    All cultures show NGTD

## 2025-03-07 NOTE — PROGRESS NOTE ADULT - ASSESSMENT
60 yo F with PMHx of neurofibromatosis, s/p L suboccipital craniectomy for resection of cerebellar brain tumor 2023 by Dr Brush, s/p traumatic fall with head trauma in 2024 with large acute right SDH, s/p emergent right decompressive hemicraniectomy on 24, s/p right cranioplasty on 24 with custom plate, s/p ETV, ligation of shunt on the right with ligaclips with retention of valve as rescue device, presents as a transfer from [facility] for witnessed seizure at home. CTH obtained at [facility] revealed moderate ventriculomegaly and slight increase in size of extra-axial CSF collection overlying the right frontal duraplasty     Exam with decreased strength on left side, post ictal vs intracranial causes, will get MRI/MRA to assess and CTH/CTA if MRI will be delayed. On zosyn, white count downtrending.   :  CTH/ CTA done stable , CTA negative, MRIw/flow/MRA ordered, intubated /sedated, neurology saw Veeg-O/ neg, on keppra, K+3.3 repleted, afbrile, bld cltr NGTD, UA neg, WBC nml  : stable exam,  Veeg on, keppra, mri w/flow/ mra pending  : Stable exam. No seizures noted on VEEG. On keppra. MRI with CSF flow study and MRA ordered  : Extubated, alert, mildly confused and restless, nonfocal exam. On keppra. MRI showed increase in right epidural collection with diffusion restriction. Shunt (as reservoir) tapped, W1 R1  GSN  3/1: Alert, neurologically intact. On keppra. OR for clarissa hold for drainage of epidural abscess. Cx sent, NGTD. EDJPx1  Was intubated preop for stridor, dex x24hrs  3/2: POD1: Intubated, ENT scoped determined airway patent, ID-Following initiated on broad spectrum abx, OR culutres GSN, AFB neg, Cx pending. Dex discontinued.  3/3 POD2, intubated, on flagyl/vanc/cefipime, K 3.1- repletion ordered. EDJPx1 105cc/24hrs, PT recc LOUIS  3/4: pod 3, intubated for respiratory distress, on tripple abx, ED SHAYY to suction, f/u OR cltrs NGTD  3/5 pod 4, remains intubated, neuro exam stable, ED SHAYY drain pus tinged 30cc, on triple Abx, OR cltrs NGTD, ID following, likely needs RTOR for cranioplasty and ligated catheter removal date TBD   36: POD # 5 stable exam. Patient remains intubated. On triple antibiotics, decadron for stridor. RTOR for cranioplasty and ligated catheter removal date TBD   3: POD # 6 Neurologically intact. Patient tolerating extubation. On triple antibiotics. RTOR for cranioplasty and ligated catheter removal date TBD

## 2025-03-07 NOTE — PROGRESS NOTE ADULT - SUBJECTIVE AND OBJECTIVE BOX
Infectious Diseases Follow Up:    Patient is a 60y old  Female who presents with a chief complaint of seizure (07 Mar 2025 03:23)      Interval History/ROS:  No acute events, pt now extubated, eating, doing well, no acute complaints     Allergies  No Known Allergies        ANTIMICROBIALS:  cefepime   IVPB    cefepime   IVPB 2000 every 8 hours  metroNIDAZOLE  IVPB 500 every 8 hours  vancomycin  IVPB 750 every 12 hours      Current Abx:     Previous Abx     OTHER MEDS:  MEDICATIONS  (STANDING):  acetaminophen   Oral Liquid .. 1000 every 6 hours PRN  albuterol    90 MICROgram(s) HFA Inhaler 2 every 4 hours PRN  albuterol/ipratropium for Nebulization 3 every 6 hours  heparin   Injectable 5000 every 8 hours  HYDROmorphone  Injectable 1 every 2 hours PRN  levETIRAcetam   Injectable 1000 every 12 hours  pantoprazole  Injectable 40 daily  polyethylene glycol 3350 17 daily  senna 2 at bedtime      Vital Signs Last 24 Hrs  T(C): 36.6 (07 Mar 2025 08:00), Max: 37.2 (06 Mar 2025 12:00)  T(F): 97.8 (07 Mar 2025 08:00), Max: 99 (06 Mar 2025 12:00)  HR: 108 (07 Mar 2025 09:00) (68 - 112)  BP: 106/71 (07 Mar 2025 08:00) (102/51 - 137/50)  BP(mean): 84 (07 Mar 2025 08:00) (61 - 104)  RR: 22 (07 Mar 2025 09:00) (10 - 22)  SpO2: 98% (07 Mar 2025 09:00) (97% - 100%)    Parameters below as of 07 Mar 2025 08:00  Patient On (Oxygen Delivery Method): room air        PHYSICAL EXAM:  GENERAL: NAD, well-developed  HEAD:  R sided dressing   EYES: EOMI, conjunctiva and sclera clear  CHEST/LUNG: On RA, CTAB  HEART: Regular rate and rhythm; No murmurs, rubs, or gallops  ABDOMEN: Soft, Nontender, Nondistended; Bowel sounds present  PSYCH: AAO x3                        14.3   13.65 )-----------( 393      ( 07 Mar 2025 02:10 )             41.8       03-07    132[L]  |  99  |  17  ----------------------------<  112[H]  3.6   |  19[L]  |  0.43[L]    Ca    9.1      07 Mar 2025 02:10  Phos  3.2     03-07  Mg     1.90     03-07        Urinalysis Basic - ( 07 Mar 2025 02:10 )    Color: x / Appearance: x / SG: x / pH: x  Gluc: 112 mg/dL / Ketone: x  / Bili: x / Urobili: x   Blood: x / Protein: x / Nitrite: x   Leuk Esterase: x / RBC: x / WBC x   Sq Epi: x / Non Sq Epi: x / Bacteria: x        MICROBIOLOGY:  v  Surgical Swab  03-01-25   No growth to date  --    Few polymorphonuclear leukocytes per low power field  No organisms seen per oil power field      CSF CSF  02-28-25   No growth at 5 days  --    No polymorphonuclear cells seen  No organisms seen  by cytocentrifuge      Catheterized Catheterized  02-26-25   No growth  --  --      .Blood Blood-Peripheral  02-26-25   No growth at 5 days  --  --      .Blood Blood-Peripheral  02-26-25   No growth at 5 days  --  --      .Blood Blood  02-22-25   No growth at 5 days  --  --      .Blood Blood  02-22-25   No growth at 5 days  --  --                RADIOLOGY:

## 2025-03-07 NOTE — PROGRESS NOTE ADULT - SUBJECTIVE AND OBJECTIVE BOX
SICU Daily Progress Note  =====================================================  Interval/Overnight Events:   - Reduced Vanc rate for rash (flushing from morphine vs vanc)    MEDICATIONS:   --------------------------------------------------------------------------------------  acetaminophen   Oral Liquid .. 1000 milliGRAM(s) Oral every 6 hours PRN  albuterol    90 MICROgram(s) HFA Inhaler 2 Puff(s) Inhalation every 4 hours PRN  albuterol/ipratropium for Nebulization 3 milliLiter(s) Nebulizer every 6 hours  benzocaine/butamben/tetracaine Spray 1 Spray(s) Topical four times a day PRN  cefepime   IVPB      cefepime   IVPB 2000 milliGRAM(s) IV Intermittent every 8 hours  chlorhexidine 2% Cloths 1 Application(s) Topical daily  heparin   Injectable 5000 Unit(s) SubCutaneous every 8 hours  HYDROmorphone  Injectable 1 milliGRAM(s) IV Push every 2 hours PRN  levETIRAcetam   Injectable 1000 milliGRAM(s) IV Push every 12 hours  metroNIDAZOLE  IVPB 500 milliGRAM(s) IV Intermittent every 8 hours  pantoprazole  Injectable 40 milliGRAM(s) IV Push daily  polyethylene glycol 3350 17 Gram(s) Oral daily  racepinephrine  2.25% Inhalation 0.5 milliLiter(s) Inhalation once  senna 2 Tablet(s) Oral at bedtime  vancomycin  IVPB 750 milliGRAM(s) IV Intermittent every 12 hours    --------------------------------------------------------------------------------------    VITAL SIGNS, INS/OUTS (last 24 hours):  --------------------------------------------------------------------------------------  T(C): 36.8 (03-06-25 @ 20:00), Max: 37.6 (03-06-25 @ 04:00)  HR: 99 (03-06-25 @ 23:00) (54 - 112)  BP: 103/54 (03-06-25 @ 23:00) (87/53 - 137/50)  RR: 21 (03-06-25 @ 23:00) (8 - 22)  SpO2: 100% (03-06-25 @ 23:00) (98% - 100%)  Mode: standby    03-05-25 @ 07:01  -  03-06-25 @ 07:00  --------------------------------------------------------  IN: 1780.4 mL / OUT: 885 mL / NET: 895.4 mL    03-06-25 @ 07:01  -  03-07-25 @ 00:06  --------------------------------------------------------  IN: 830 mL / OUT: 2720 mL / NET: -1890 mL      --------------------------------------------------------------------------------------    EXAM  NEUROLOGY  Exam: Normal, NAD, alert, oriented x3, no focal deficits    HEENT  Exam: Normocephalic, EOMI, no scleral icterus or conjunctival injection    RESPIRATORY  Exam: On RA, Normal expansion/effort  Mechanical Ventilation: n/a    CARDIOVASCULAR  Exam: Regular rate, normotensive, off pressors    GI/NUTRITION  Exam:  Non-distended    VASCULAR  Exam: Extremities warm, pink, well-perfused    MUSCULOSKELETAL  Exam: All extremities moving spontaneously without limitations    SKIN  Exam: Good skin turgor, no skin breakdown. Red flushing on face and hands    TUBES/LINES/DRAINS  [x] Peripheral IV  [] Central Venous Line     	[] R	[] L	[] IJ	[] Fem	[] SC	Date Placed:   [] Arterial Line		[] R	[] L	[] Fem	[] Rad	[] Ax	Date Placed:   [] PICC		[] Midline		[] Mediport  [x] Urinary Catheter		Date Placed:       --------------------------------------------------------------------------------------  LABS:                        12.2   9.66  )-----------( 266      ( 06 Mar 2025 01:50 )             36.0     Auto Eosinophil # x     / Auto Eosinophil % x     / Auto Neutrophil # x     / Auto Neutrophil % x     / BANDS % x                            12.1   12.07 )-----------( 360      ( 05 Mar 2025 01:20 )             35.7     Auto Eosinophil # x     / Auto Eosinophil % x     / Auto Neutrophil # x     / Auto Neutrophil % x     / BANDS % x        03-06    133[L]  |  96[L]  |  18  ----------------------------<  105[H]  2.9[LL]   |  22  |  0.32[L]  03-06    130[L]  |  97[L]  |  22  ----------------------------<  165[H]  4.0   |  20[L]  |  0.32[L]  03-05    133[L]  |  97[L]  |  30[H]  ----------------------------<  166[H]  4.0   |  21[L]  |  0.47[L]    Ca    9.1      06 Mar 2025 14:53  Ca    8.7      06 Mar 2025 01:50  Ca    9.0      05 Mar 2025 01:20  Phos  2.2     03-06  Mg     1.80     03-06      PT/INR - ( 06 Mar 2025 01:50 )   PT: 12.8 sec;   INR: 1.10 ratio    PTT - ( 06 Mar 2025 01:50 )  PTT:29.1 sec    ABG - ( 06 Mar 2025 14:53 )  pH, Arterial: 7.54  pH, Blood: x     /  pCO2: 28    /  pO2: 85    / HCO3: 24    / Base Excess: 2.4   /  SaO2: 98.0        --------------------------------------------------------------------------------------    IMAGING STUDIES:

## 2025-03-07 NOTE — PROGRESS NOTE ADULT - ASSESSMENT
This is a 60 y/o F w/ very extensive PMHx of NF, 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), s/p  shunt (in [facility]), s/p traumatic fall w/ R SDH, s/p R decompressive hemicraniectomy, ICP monitor and R cranioplasty (5/2024), s/p L frontal Endoscopic third ventriculostomy and ligation of R  shunt with Ligaclips with retention of valve, proximal and distal catheters (08/2024) admitted initially to [facility] on 2/22 for seizure, intubated for airway protection and status epilepticus, initially on Zosyn for aspiration PNA.  Initially CTH w/ slight increase in extra axial fluid collection measuring up to 1.5 cm. Pt was transferred to [facility] on 2/23 for neurosurgery eval, given decreased L sided strength, MRI done with increase in R sided collection.  Pt extubated on 2/28, CSF reservoir tapped, no pleocytosis.    Pt went to the OR on 3/1 for R clarissa pole with evacuation of epidural collection, with purulent drainage.     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), s/p  shunt (in [facility]), s/p traumatic fall w/ R SDH, s/p R decompressive hemicraniectomy, ICP monitor and R cranioplasty (5/2024), s/p L frontal Endoscopic third ventriculostomy and ligation of R  shunt with ligaclips with retention of valve, proximal and distal catheters (08/2024) admitted initially to [facility] on 2/22 for seizure, intubated for airway protection and status epilepticus, initially on Zosyn for aspiration PNA, transferred to [facility] on 2/23 for neurosurgery evaluation, now found to with have epidural abscess s/p R Clarissa w/ purulent drainage.  Extubated on 3/3, reintubated for stridor/work of breaking, now extubated on 3/6    Recommendations:   1. Vancomycin 750 g q12, f/u level goal -600, Cefepime 2 g q8, Flagyl 500 mg q8   2. F/u OR Cx, NG  3. F/u neurosurgery plan for cranioplasty and ligated shunt removal    Thank you for consulting us and involving us in the management of this patient's case. In addition to reviewing history, imaging, documents, labs, microbiology, and infection control strategies and potential issues.     ID will continue to follow    Inder Mcintosh M.D.  Attending Physician  Division of Infectious Diseases  Department of Medicine

## 2025-03-07 NOTE — PROGRESS NOTE ADULT - ASSESSMENT
59F PMH hydrocephalus s/p  shunt (10/4/2013), neurofibromatosis, s/p left retrosigmoid craniotomy for resection of cerebellar brain tumor (12/2023 Dr Brush), traumatic acute right SDH s/p emergent right decompressive hemicraniectomy (5/22/24) followed by right cranioplasty with custom plate (6/17/24),  shunt malfunction s/p left third ventriculostomy and ligation of  shunt with retention of valve (7/19/24) presented as a transfer from [facility] for witnessed seizure at home. vEEG performed with no seizures/seizure-like activity. MR Head 2/28 demonstrated R frontal epidural collection. Patient intubated in SICU 3/1 for stridor prior to OR, and then taken for R craniotomy and evacuation of epidural empyema w/ drain left in place. Patient extubated POD2 following 48h decadron w/ appropriate cuff leak but was immediately reintubated for severe stridor and work of breathing. Extubated on 3/6 with overall clinical improvement    Neurologic:   - Q4h neurochecks  - VEEG completed, no seizures   - Sedation: wean prop for extubation, cont precedex  - Keppra 1g BID  - Pain control: IV Tylenol, dilaudid PRN   - CTA head 2/24 wnl  - MR head with epidural empyema   - OR (03/01) - clarissa hole with purulent fluid. Epidural drain (3/3 - 3/5)    Respiratory:   - 3/1 intubated for stridor, upper airway edema, prior to OR  - Failed extubation on 3/3; Successful extubation on 3/7/25  - s/p Decadron 10mg q6h for 48hrs for upper airway edema  - On RA    Cardiovascular:   - MAP > 65    Gastrointestinal/Nutrition:   - Regular Diet    Renal/Genitourinary:   - Monitor electrolytes  - IVL  - Dominguez    Hematologic:   - DVT PPx: SqH     Infectious Disease:   - Monitor WBC, fever curves  - Cefepime, flagyl, vanco for empyema    - s/p Zosyn (presumed aspiration pneumonia) 7/23/24 - 7/24/24  - blood cx sent x2 ([place]) > NG @ 5d  - RVP 3/1 negative  - OR cultures 3/1: No growth to date    Endocrine:   - Monitor glucose  - s/p Decadron 10mg q6h for 48 hrs for airway edema (3/3-3/5)    Tubes/Lines/Drains:   - PIV  - Dominguez  - epidural drain (3/1-3/5)    Disposition: SICU

## 2025-03-07 NOTE — PROGRESS NOTE ADULT - PROBLEM SELECTOR PLAN 1
Neurologic checks Q4H  Continue Abx  Follow up cultures  Follow up with ID  Cranioplasty and ligated shunt removal to be scheduled Neurologic checks Q4H  Continue Abx  Follow up cultures  Follow up with ID  Cranioplasty and ligated shunt removal to be scheduled  Repeat CTH in AM

## 2025-03-08 LAB
ANION GAP SERPL CALC-SCNC: 13 MMOL/L — SIGNIFICANT CHANGE UP (ref 7–14)
BASOPHILS # BLD AUTO: 0.19 K/UL — SIGNIFICANT CHANGE UP (ref 0–0.2)
BASOPHILS NFR BLD AUTO: 1.8 % — SIGNIFICANT CHANGE UP (ref 0–2)
BUN SERPL-MCNC: 10 MG/DL — SIGNIFICANT CHANGE UP (ref 7–23)
CALCIUM SERPL-MCNC: 9 MG/DL — SIGNIFICANT CHANGE UP (ref 8.4–10.5)
CHLORIDE SERPL-SCNC: 104 MMOL/L — SIGNIFICANT CHANGE UP (ref 98–107)
CO2 SERPL-SCNC: 20 MMOL/L — LOW (ref 22–31)
CREAT SERPL-MCNC: 0.38 MG/DL — LOW (ref 0.5–1.3)
EGFR: 115 ML/MIN/1.73M2 — SIGNIFICANT CHANGE UP
EGFR: 115 ML/MIN/1.73M2 — SIGNIFICANT CHANGE UP
EOSINOPHIL # BLD AUTO: 0.19 K/UL — SIGNIFICANT CHANGE UP (ref 0–0.5)
EOSINOPHIL NFR BLD AUTO: 1.8 % — SIGNIFICANT CHANGE UP (ref 0–6)
GLUCOSE SERPL-MCNC: 108 MG/DL — HIGH (ref 70–99)
HCT VFR BLD CALC: 38.1 % — SIGNIFICANT CHANGE UP (ref 34.5–45)
HGB BLD-MCNC: 13.1 G/DL — SIGNIFICANT CHANGE UP (ref 11.5–15.5)
IANC: 7.59 K/UL — HIGH (ref 1.8–7.4)
LYMPHOCYTES # BLD AUTO: 1.05 K/UL — SIGNIFICANT CHANGE UP (ref 1–3.3)
LYMPHOCYTES # BLD AUTO: 9.7 % — LOW (ref 13–44)
MAGNESIUM SERPL-MCNC: 2.3 MG/DL — SIGNIFICANT CHANGE UP (ref 1.6–2.6)
MCHC RBC-ENTMCNC: 26.7 PG — LOW (ref 27–34)
MCHC RBC-ENTMCNC: 34.4 G/DL — SIGNIFICANT CHANGE UP (ref 32–36)
MCV RBC AUTO: 77.8 FL — LOW (ref 80–100)
MONOCYTES # BLD AUTO: 1.24 K/UL — HIGH (ref 0–0.9)
MONOCYTES NFR BLD AUTO: 11.5 % — SIGNIFICANT CHANGE UP (ref 2–14)
NEUTROPHILS # BLD AUTO: 7.91 K/UL — HIGH (ref 1.8–7.4)
NEUTROPHILS NFR BLD AUTO: 73.4 % — SIGNIFICANT CHANGE UP (ref 43–77)
PHOSPHATE SERPL-MCNC: 2.8 MG/DL — SIGNIFICANT CHANGE UP (ref 2.5–4.5)
PLATELET # BLD AUTO: 293 K/UL — SIGNIFICANT CHANGE UP (ref 150–400)
POTASSIUM SERPL-MCNC: 3.3 MMOL/L — LOW (ref 3.5–5.3)
POTASSIUM SERPL-SCNC: 3.3 MMOL/L — LOW (ref 3.5–5.3)
RBC # BLD: 4.9 M/UL — SIGNIFICANT CHANGE UP (ref 3.8–5.2)
RBC # FLD: 14.7 % — HIGH (ref 10.3–14.5)
SODIUM SERPL-SCNC: 137 MMOL/L — SIGNIFICANT CHANGE UP (ref 135–145)
WBC # BLD: 10.78 K/UL — HIGH (ref 3.8–10.5)
WBC # FLD AUTO: 10.78 K/UL — HIGH (ref 3.8–10.5)

## 2025-03-08 RX ADMIN — Medication 975 MILLIGRAM(S): at 12:23

## 2025-03-08 RX ADMIN — CEFEPIME 100 MILLIGRAM(S): 2 INJECTION, POWDER, FOR SOLUTION INTRAVENOUS at 14:59

## 2025-03-08 RX ADMIN — Medication 100 MILLIGRAM(S): at 23:56

## 2025-03-08 RX ADMIN — Medication 975 MILLIGRAM(S): at 06:50

## 2025-03-08 RX ADMIN — HEPARIN SODIUM 5000 UNIT(S): 1000 INJECTION INTRAVENOUS; SUBCUTANEOUS at 16:48

## 2025-03-08 RX ADMIN — Medication 40 MILLIEQUIVALENT(S): at 23:17

## 2025-03-08 RX ADMIN — SERTRALINE 50 MILLIGRAM(S): 100 TABLET, FILM COATED ORAL at 12:22

## 2025-03-08 RX ADMIN — Medication 100 MILLIGRAM(S): at 06:17

## 2025-03-08 RX ADMIN — LEVETIRACETAM 1000 MILLIGRAM(S): 10 INJECTION, SOLUTION INTRAVENOUS at 06:19

## 2025-03-08 RX ADMIN — Medication 100 MILLIGRAM(S): at 15:00

## 2025-03-08 RX ADMIN — HEPARIN SODIUM 5000 UNIT(S): 1000 INJECTION INTRAVENOUS; SUBCUTANEOUS at 06:19

## 2025-03-08 RX ADMIN — Medication 2 TABLET(S): at 23:13

## 2025-03-08 RX ADMIN — Medication 975 MILLIGRAM(S): at 06:26

## 2025-03-08 RX ADMIN — LEVETIRACETAM 1000 MILLIGRAM(S): 10 INJECTION, SOLUTION INTRAVENOUS at 17:31

## 2025-03-08 RX ADMIN — CEFEPIME 100 MILLIGRAM(S): 2 INJECTION, POWDER, FOR SOLUTION INTRAVENOUS at 23:14

## 2025-03-08 RX ADMIN — Medication 975 MILLIGRAM(S): at 17:32

## 2025-03-08 RX ADMIN — Medication 125 MILLIGRAM(S): at 12:22

## 2025-03-08 RX ADMIN — MIRTAZAPINE 7.5 MILLIGRAM(S): 30 TABLET, FILM COATED ORAL at 23:13

## 2025-03-08 RX ADMIN — HEPARIN SODIUM 5000 UNIT(S): 1000 INJECTION INTRAVENOUS; SUBCUTANEOUS at 23:13

## 2025-03-08 RX ADMIN — CEFEPIME 100 MILLIGRAM(S): 2 INJECTION, POWDER, FOR SOLUTION INTRAVENOUS at 06:18

## 2025-03-08 NOTE — PROGRESS NOTE ADULT - PROBLEM SELECTOR PLAN 1
Neurologic checks Q4H  Continue Abx  Follow up cultures  Follow up with ID  Cranioplasty and ligated shunt removal to be scheduled

## 2025-03-08 NOTE — PROGRESS NOTE ADULT - ASSESSMENT
60 yo F with PMHx of neurofibromatosis, s/p L suboccipital craniectomy for resection of cerebellar brain tumor 2023 by Dr Brush, s/p traumatic fall with head trauma in 2024 with large acute right SDH, s/p emergent right decompressive hemicraniectomy on 24, s/p right cranioplasty on 24 with custom plate, s/p ETV, ligation of shunt on the right with ligaclips with retention of valve as rescue device, presents as a transfer from [place] [facility] for witnessed seizure at home. CTH obtained at [facility] revealed moderate ventriculomegaly and slight increase in size of extra-axial CSF collection overlying the right frontal duraplasty     Exam with decreased strength on left side, post ictal vs intracranial causes, will get MRI/MRA to assess and CTH/CTA if MRI will be delayed. On zosyn, white count downtrending.   :  CTH/ CTA done stable , CTA negative, MRIw/flow/MRA ordered, intubated /sedated, neurology saw Veeg-O/ neg, on keppra, K+3.3 repleted, afbrile, bld cltr NGTD, UA neg, WBC nml  : stable exam,  Veeg on, keppra, mri w/flow/ mra pending  : Stable exam. No seizures noted on VEEG. On keppra. MRI with CSF flow study and MRA ordered  : Extubated, alert, mildly confused and restless, nonfocal exam. On keppra. MRI showed increase in right epidural collection with diffusion restriction. Shunt (as reservoir) tapped, W1 R1  GSN  3/1: Alert, neurologically intact. On keppra. OR for clarissa hold for drainage of epidural abscess. Cx sent, NGTD. EDJPx1  Was intubated preop for stridor, dex x24hrs  3: POD1: Intubated, ENT scoped determined airway patent, ID-Following initiated on broad spectrum abx, OR cultures GSN, AFB neg, Cx pending. Dex discontinued.  3/3 POD2, intubated, on flagyl/vanc/cefipime, K 3.1- repletion ordered. EDJPx1 105cc/24hrs, PT recc LOUIS  3/4: pod 3, intubated for respiratory distress, on tripple abx, ED SHAYY to suction, f/u OR cltrs NGTD  3/5 pod 4, remains intubated, neuro exam stable, ED SHAYY drain pus tinged 30cc, on triple Abx, OR cltrs NGTD, ID following, likely needs RTOR for cranioplasty and ligated catheter removal date TBD   3/6: POD # 5 stable exam. Patient remains intubated. On triple antibiotics, decadron for stridor. RTOR for cranioplasty and ligated catheter removal date TBD   3/7-8: POD # 7 Neurologically intact. Patient tolerating extubation. On triple antibiotics. RTOR for cranioplasty and ligated catheter removal date TBD  verbal cues/1 person assist

## 2025-03-08 NOTE — PROGRESS NOTE ADULT - SUBJECTIVE AND OBJECTIVE BOX
No issues overnight  Vital Signs Last 24 Hrs  T(C): 36.8 (08 Mar 2025 01:00), Max: 37.3 (07 Mar 2025 20:00)  T(F): 98.2 (08 Mar 2025 01:00), Max: 99.1 (07 Mar 2025 20:00)  HR: 99 (08 Mar 2025 01:00) (80 - 116)  BP: 128/68 (08 Mar 2025 01:00) (103/53 - 141/72)  BP(mean): 87 (07 Mar 2025 22:00) (61 - 94)  RR: 17 (08 Mar 2025 01:00) (14 - 27)  SpO2: 99% (08 Mar 2025 01:00) (97% - 100%)    Parameters below as of 08 Mar 2025 01:00  Patient On (Oxygen Delivery Method): room air    AAO X 3  PERRLA, EOMI  CN 2-12 grossly intact  CLARK strength 5/5  No dysmetria or drift  SILT    MEDICATIONS  (STANDING):  acetaminophen     Tablet .. 975 milliGRAM(s) Oral every 6 hours  cefepime   IVPB 2000 milliGRAM(s) IV Intermittent every 8 hours  cefepime   IVPB      chlorhexidine 2% Cloths 1 Application(s) Topical daily  heparin   Injectable 5000 Unit(s) SubCutaneous every 8 hours  levETIRAcetam 1000 milliGRAM(s) Oral two times a day  metroNIDAZOLE  IVPB 500 milliGRAM(s) IV Intermittent every 8 hours  mirtazapine 7.5 milliGRAM(s) Oral at bedtime  polyethylene glycol 3350 17 Gram(s) Oral daily  senna 2 Tablet(s) Oral at bedtime  sertraline 50 milliGRAM(s) Oral daily  vancomycin  IVPB 750 milliGRAM(s) IV Intermittent every 12 hours    MEDICATIONS  (PRN):  albuterol    90 MICROgram(s) HFA Inhaler 2 Puff(s) Inhalation every 4 hours PRN Shortness of Breath and/or Wheezing                          14.3   13.65 )-----------( 393      ( 07 Mar 2025 02:10 )             41.8     03-07    132[L]  |  99  |  17  ----------------------------<  112[H]  3.6   |  19[L]  |  0.43[L]    Ca    9.1      07 Mar 2025 02:10  Phos  3.2     03-07  Mg     1.90     03-07    < from: CT Head No Cont (03.07.25 @ 10:35) >  Postoperative changes compatible with left suboccipital craniotomy is   seen. Stable area of low-attenuation in the postop region is again seen.    Postop changes compatible with a right temporal and frontal cranioplasty   is again identified. There is evidence of extra-axial air and fluid   identified postop region which measures approximately 0.6 cm widest   diameter and previously measured approximately 1.0 cm in widest diameter.   Stable area of low-attenuation is seen involving the adjacent brain   parenchyma.    The size and configuration of the ventricles appear unchanged from    High right frontal clarissa hole and shunt catheter is again seen and   unchanged    Abnormal low-attenuation with associated dystrophic calcification   involving the right cerebellar region is again seen and unchanged    No new areas acute hemorrhage is seen    Extracalvarial lesion involving the high left parietal region is again   seen and unchanged. 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 This finding appears unchanged.    The paranasal sinuses mastoid and middle ear regions appear clear.    IMPRESSION: Decreased extra-axial fluid and air in the right temporal   frontal postop region otherwise stable exam.    < end of copied text >

## 2025-03-09 LAB
ANION GAP SERPL CALC-SCNC: 10 MMOL/L — SIGNIFICANT CHANGE UP (ref 7–14)
BASOPHILS # BLD AUTO: 0.06 K/UL — SIGNIFICANT CHANGE UP (ref 0–0.2)
BASOPHILS NFR BLD AUTO: 0.6 % — SIGNIFICANT CHANGE UP (ref 0–2)
BUN SERPL-MCNC: 10 MG/DL — SIGNIFICANT CHANGE UP (ref 7–23)
CALCIUM SERPL-MCNC: 9.1 MG/DL — SIGNIFICANT CHANGE UP (ref 8.4–10.5)
CHLORIDE SERPL-SCNC: 105 MMOL/L — SIGNIFICANT CHANGE UP (ref 98–107)
CO2 SERPL-SCNC: 23 MMOL/L — SIGNIFICANT CHANGE UP (ref 22–31)
CREAT SERPL-MCNC: 0.42 MG/DL — LOW (ref 0.5–1.3)
CULTURE RESULTS: SIGNIFICANT CHANGE UP
EGFR: 112 ML/MIN/1.73M2 — SIGNIFICANT CHANGE UP
EGFR: 112 ML/MIN/1.73M2 — SIGNIFICANT CHANGE UP
EOSINOPHIL # BLD AUTO: 0.19 K/UL — SIGNIFICANT CHANGE UP (ref 0–0.5)
EOSINOPHIL NFR BLD AUTO: 1.8 % — SIGNIFICANT CHANGE UP (ref 0–6)
GLUCOSE SERPL-MCNC: 97 MG/DL — SIGNIFICANT CHANGE UP (ref 70–99)
HCT VFR BLD CALC: 41.2 % — SIGNIFICANT CHANGE UP (ref 34.5–45)
HGB BLD-MCNC: 13.7 G/DL — SIGNIFICANT CHANGE UP (ref 11.5–15.5)
IANC: 7.69 K/UL — HIGH (ref 1.8–7.4)
IMM GRANULOCYTES NFR BLD AUTO: 3.7 % — HIGH (ref 0–0.9)
LYMPHOCYTES # BLD AUTO: 0.95 K/UL — LOW (ref 1–3.3)
LYMPHOCYTES # BLD AUTO: 8.9 % — LOW (ref 13–44)
MAGNESIUM SERPL-MCNC: 2.3 MG/DL — SIGNIFICANT CHANGE UP (ref 1.6–2.6)
MCHC RBC-ENTMCNC: 26.3 PG — LOW (ref 27–34)
MCHC RBC-ENTMCNC: 33.3 G/DL — SIGNIFICANT CHANGE UP (ref 32–36)
MCV RBC AUTO: 79.2 FL — LOW (ref 80–100)
MONOCYTES # BLD AUTO: 1.43 K/UL — HIGH (ref 0–0.9)
MONOCYTES NFR BLD AUTO: 13.3 % — SIGNIFICANT CHANGE UP (ref 2–14)
NEUTROPHILS # BLD AUTO: 7.69 K/UL — HIGH (ref 1.8–7.4)
NEUTROPHILS NFR BLD AUTO: 71.7 % — SIGNIFICANT CHANGE UP (ref 43–77)
NRBC # BLD AUTO: 0 K/UL — SIGNIFICANT CHANGE UP (ref 0–0)
NRBC # FLD: 0 K/UL — SIGNIFICANT CHANGE UP (ref 0–0)
NRBC BLD AUTO-RTO: 0 /100 WBCS — SIGNIFICANT CHANGE UP (ref 0–0)
PHOSPHATE SERPL-MCNC: 2.4 MG/DL — LOW (ref 2.5–4.5)
PLATELET # BLD AUTO: 310 K/UL — SIGNIFICANT CHANGE UP (ref 150–400)
POTASSIUM SERPL-MCNC: 4.2 MMOL/L — SIGNIFICANT CHANGE UP (ref 3.5–5.3)
POTASSIUM SERPL-SCNC: 4.2 MMOL/L — SIGNIFICANT CHANGE UP (ref 3.5–5.3)
RBC # BLD: 5.2 M/UL — SIGNIFICANT CHANGE UP (ref 3.8–5.2)
RBC # FLD: 14.7 % — HIGH (ref 10.3–14.5)
SODIUM SERPL-SCNC: 138 MMOL/L — SIGNIFICANT CHANGE UP (ref 135–145)
SPECIMEN SOURCE: SIGNIFICANT CHANGE UP
VANCOMYCIN TROUGH SERPL-MCNC: 7 UG/ML — LOW (ref 10–20)
WBC # BLD: 10.72 K/UL — HIGH (ref 3.8–10.5)
WBC # FLD AUTO: 10.72 K/UL — HIGH (ref 3.8–10.5)

## 2025-03-09 RX ORDER — VANCOMYCIN HCL IN 5 % DEXTROSE 1.5G/250ML
1000 PLASTIC BAG, INJECTION (ML) INTRAVENOUS EVERY 12 HOURS
Refills: 0 | Status: DISCONTINUED | OUTPATIENT
Start: 2025-03-09 | End: 2025-03-11

## 2025-03-09 RX ORDER — VANCOMYCIN HCL IN 5 % DEXTROSE 1.5G/250ML
750 PLASTIC BAG, INJECTION (ML) INTRAVENOUS EVERY 12 HOURS
Refills: 0 | Status: DISCONTINUED | OUTPATIENT
Start: 2025-03-09 | End: 2025-03-09

## 2025-03-09 RX ADMIN — Medication 975 MILLIGRAM(S): at 18:57

## 2025-03-09 RX ADMIN — Medication 975 MILLIGRAM(S): at 07:43

## 2025-03-09 RX ADMIN — HEPARIN SODIUM 5000 UNIT(S): 1000 INJECTION INTRAVENOUS; SUBCUTANEOUS at 06:48

## 2025-03-09 RX ADMIN — Medication 250 MILLIGRAM(S): at 18:02

## 2025-03-09 RX ADMIN — LEVETIRACETAM 1000 MILLIGRAM(S): 10 INJECTION, SOLUTION INTRAVENOUS at 17:57

## 2025-03-09 RX ADMIN — Medication 975 MILLIGRAM(S): at 12:14

## 2025-03-09 RX ADMIN — Medication 975 MILLIGRAM(S): at 17:57

## 2025-03-09 RX ADMIN — Medication 100 MILLIGRAM(S): at 14:44

## 2025-03-09 RX ADMIN — CEFEPIME 100 MILLIGRAM(S): 2 INJECTION, POWDER, FOR SOLUTION INTRAVENOUS at 14:07

## 2025-03-09 RX ADMIN — HEPARIN SODIUM 5000 UNIT(S): 1000 INJECTION INTRAVENOUS; SUBCUTANEOUS at 21:37

## 2025-03-09 RX ADMIN — MIRTAZAPINE 7.5 MILLIGRAM(S): 30 TABLET, FILM COATED ORAL at 21:37

## 2025-03-09 RX ADMIN — CEFEPIME 100 MILLIGRAM(S): 2 INJECTION, POWDER, FOR SOLUTION INTRAVENOUS at 21:36

## 2025-03-09 RX ADMIN — SERTRALINE 50 MILLIGRAM(S): 100 TABLET, FILM COATED ORAL at 12:14

## 2025-03-09 RX ADMIN — Medication 975 MILLIGRAM(S): at 13:14

## 2025-03-09 RX ADMIN — POLYETHYLENE GLYCOL 3350 17 GRAM(S): 17 POWDER, FOR SOLUTION ORAL at 12:14

## 2025-03-09 RX ADMIN — Medication 2 TABLET(S): at 21:37

## 2025-03-09 RX ADMIN — Medication 40 MILLIEQUIVALENT(S): at 03:02

## 2025-03-09 RX ADMIN — Medication 975 MILLIGRAM(S): at 06:43

## 2025-03-09 RX ADMIN — Medication 100 MILLIGRAM(S): at 07:18

## 2025-03-09 RX ADMIN — Medication 100 MILLIGRAM(S): at 21:36

## 2025-03-09 RX ADMIN — CEFEPIME 100 MILLIGRAM(S): 2 INJECTION, POWDER, FOR SOLUTION INTRAVENOUS at 06:48

## 2025-03-09 RX ADMIN — Medication 125 MILLIGRAM(S): at 01:42

## 2025-03-09 RX ADMIN — HEPARIN SODIUM 5000 UNIT(S): 1000 INJECTION INTRAVENOUS; SUBCUTANEOUS at 14:08

## 2025-03-09 RX ADMIN — LEVETIRACETAM 1000 MILLIGRAM(S): 10 INJECTION, SOLUTION INTRAVENOUS at 06:43

## 2025-03-09 NOTE — PROGRESS NOTE ADULT - ASSESSMENT
58 yo F with PMHx of neurofibromatosis, s/p L suboccipital craniectomy for resection of cerebellar brain tumor 2023 by Dr Brush, s/p traumatic fall with head trauma in 2024 with large acute right SDH, s/p emergent right decompressive hemicraniectomy on 24, s/p right cranioplasty on 24 with custom plate, s/p ETV, ligation of shunt on the right with ligaclips with retention of valve as rescue device, presents as a transfer from [facility] for witnessed seizure at [facility]. [facility] ventriculomegaly and slight increase in size of extra-axial CSF collection overlying the right frontal duraplasty     Exam with decreased strength on left side, post ictal vs intracranial causes, will get MRI/MRA to assess and CTH/CTA if MRI will be delayed. On zosyn, white count downtrending.   :  CTH/ CTA done stable , CTA negative, MRIw/flow/MRA ordered, intubated /sedated, neurology saw Veeg-O/ neg, on keppra, K+3.3 repleted, afbrile, bld cltr NGTD, UA neg, WBC nml  : stable exam,  Veeg on, keppra, mri w/flow/ mra pending  : Stable exam. No seizures noted on VEEG. On keppra. MRI with CSF flow study and MRA ordered  : Extubated, alert, mildly confused and restless, nonfocal exam. On keppra. MRI showed increase in right epidural collection with diffusion restriction. Shunt (as reservoir) tapped, W1 R1  GSN  3/1: Alert, neurologically intact. On keppra. OR for clarissa hold for drainage of epidural abscess. Cx sent, NGTD. EDJPx1  Was intubated preop for stridor, dex x24hrs  3: POD1: Intubated, ENT scoped determined airway patent, ID-Following initiated on broad spectrum abx, OR cultures GSN, AFB neg, Cx pending. Dex discontinued.  3/3 POD2, intubated, on flagyl/vanc/cefipime, K 3.1- repletion ordered. EDJPx1 105cc/24hrs, PT recc LOUIS  3/4: pod 3, intubated for respiratory distress, on tripple abx, ED HSAYY to suction, f/u OR cltrs NGTD  3/5 pod 4, remains intubated, neuro exam stable, ED SHAYY drain pus tinged 30cc, on triple Abx, OR cltrs NGTD, ID following,    3/6: POD # 5 stable exam. Patient remains intubated. On triple antibiotics, decadron for stridor.    3-9: POD # 6-8 Neurologically intact. Patient tolerating extubation. On triple antibiotics

## 2025-03-10 LAB
ANION GAP SERPL CALC-SCNC: 14 MMOL/L — SIGNIFICANT CHANGE UP (ref 7–14)
BASOPHILS # BLD AUTO: 0.04 K/UL — SIGNIFICANT CHANGE UP (ref 0–0.2)
BASOPHILS NFR BLD AUTO: 0.3 % — SIGNIFICANT CHANGE UP (ref 0–2)
BUN SERPL-MCNC: 15 MG/DL — SIGNIFICANT CHANGE UP (ref 7–23)
CALCIUM SERPL-MCNC: 9.4 MG/DL — SIGNIFICANT CHANGE UP (ref 8.4–10.5)
CHLORIDE SERPL-SCNC: 102 MMOL/L — SIGNIFICANT CHANGE UP (ref 98–107)
CO2 SERPL-SCNC: 19 MMOL/L — LOW (ref 22–31)
CREAT SERPL-MCNC: 0.42 MG/DL — LOW (ref 0.5–1.3)
CRP SERPL-MCNC: 4.3 MG/L — SIGNIFICANT CHANGE UP
EGFR: 112 ML/MIN/1.73M2 — SIGNIFICANT CHANGE UP
EGFR: 112 ML/MIN/1.73M2 — SIGNIFICANT CHANGE UP
EOSINOPHIL # BLD AUTO: 0.18 K/UL — SIGNIFICANT CHANGE UP (ref 0–0.5)
EOSINOPHIL NFR BLD AUTO: 1.4 % — SIGNIFICANT CHANGE UP (ref 0–6)
ERYTHROCYTE [SEDIMENTATION RATE] IN BLOOD: 23 MM/HR — SIGNIFICANT CHANGE UP (ref 4–25)
GLUCOSE SERPL-MCNC: 114 MG/DL — HIGH (ref 70–99)
HCT VFR BLD CALC: 41.1 % — SIGNIFICANT CHANGE UP (ref 34.5–45)
HGB BLD-MCNC: 14.2 G/DL — SIGNIFICANT CHANGE UP (ref 11.5–15.5)
IANC: 10.68 K/UL — HIGH (ref 1.8–7.4)
IMM GRANULOCYTES NFR BLD AUTO: 1.6 % — HIGH (ref 0–0.9)
LYMPHOCYTES # BLD AUTO: 0.96 K/UL — LOW (ref 1–3.3)
LYMPHOCYTES # BLD AUTO: 7.3 % — LOW (ref 13–44)
MAGNESIUM SERPL-MCNC: 2.1 MG/DL — SIGNIFICANT CHANGE UP (ref 1.6–2.6)
MCHC RBC-ENTMCNC: 27.4 PG — SIGNIFICANT CHANGE UP (ref 27–34)
MCHC RBC-ENTMCNC: 34.5 G/DL — SIGNIFICANT CHANGE UP (ref 32–36)
MCV RBC AUTO: 79.3 FL — LOW (ref 80–100)
MONOCYTES # BLD AUTO: 1.15 K/UL — HIGH (ref 0–0.9)
MONOCYTES NFR BLD AUTO: 8.7 % — SIGNIFICANT CHANGE UP (ref 2–14)
NEUTROPHILS # BLD AUTO: 10.68 K/UL — HIGH (ref 1.8–7.4)
NEUTROPHILS NFR BLD AUTO: 80.7 % — HIGH (ref 43–77)
NRBC # BLD AUTO: 0 K/UL — SIGNIFICANT CHANGE UP (ref 0–0)
NRBC # FLD: 0 K/UL — SIGNIFICANT CHANGE UP (ref 0–0)
NRBC BLD AUTO-RTO: 0 /100 WBCS — SIGNIFICANT CHANGE UP (ref 0–0)
PHOSPHATE SERPL-MCNC: 3.2 MG/DL — SIGNIFICANT CHANGE UP (ref 2.5–4.5)
PLATELET # BLD AUTO: 353 K/UL — SIGNIFICANT CHANGE UP (ref 150–400)
POTASSIUM SERPL-MCNC: 4.2 MMOL/L — SIGNIFICANT CHANGE UP (ref 3.5–5.3)
POTASSIUM SERPL-SCNC: 4.2 MMOL/L — SIGNIFICANT CHANGE UP (ref 3.5–5.3)
RBC # BLD: 5.18 M/UL — SIGNIFICANT CHANGE UP (ref 3.8–5.2)
RBC # FLD: 15.1 % — HIGH (ref 10.3–14.5)
SODIUM SERPL-SCNC: 135 MMOL/L — SIGNIFICANT CHANGE UP (ref 135–145)
WBC # BLD: 13.22 K/UL — HIGH (ref 3.8–10.5)
WBC # FLD AUTO: 13.22 K/UL — HIGH (ref 3.8–10.5)

## 2025-03-10 PROCEDURE — G0545: CPT

## 2025-03-10 PROCEDURE — 99232 SBSQ HOSP IP/OBS MODERATE 35: CPT

## 2025-03-10 RX ORDER — ACETAMINOPHEN 500 MG/5ML
650 LIQUID (ML) ORAL EVERY 6 HOURS
Refills: 0 | Status: DISCONTINUED | OUTPATIENT
Start: 2025-03-10 | End: 2025-03-19

## 2025-03-10 RX ADMIN — Medication 100 MILLIGRAM(S): at 05:50

## 2025-03-10 RX ADMIN — CEFEPIME 100 MILLIGRAM(S): 2 INJECTION, POWDER, FOR SOLUTION INTRAVENOUS at 21:43

## 2025-03-10 RX ADMIN — Medication 2 TABLET(S): at 21:39

## 2025-03-10 RX ADMIN — Medication 250 MILLIGRAM(S): at 18:10

## 2025-03-10 RX ADMIN — Medication 975 MILLIGRAM(S): at 01:39

## 2025-03-10 RX ADMIN — LEVETIRACETAM 1000 MILLIGRAM(S): 10 INJECTION, SOLUTION INTRAVENOUS at 18:10

## 2025-03-10 RX ADMIN — CEFEPIME 100 MILLIGRAM(S): 2 INJECTION, POWDER, FOR SOLUTION INTRAVENOUS at 05:50

## 2025-03-10 RX ADMIN — Medication 650 MILLIGRAM(S): at 12:45

## 2025-03-10 RX ADMIN — Medication 100 MILLIGRAM(S): at 21:43

## 2025-03-10 RX ADMIN — HEPARIN SODIUM 5000 UNIT(S): 1000 INJECTION INTRAVENOUS; SUBCUTANEOUS at 21:41

## 2025-03-10 RX ADMIN — Medication 100 MILLIGRAM(S): at 15:10

## 2025-03-10 RX ADMIN — MIRTAZAPINE 7.5 MILLIGRAM(S): 30 TABLET, FILM COATED ORAL at 21:40

## 2025-03-10 RX ADMIN — CEFEPIME 100 MILLIGRAM(S): 2 INJECTION, POWDER, FOR SOLUTION INTRAVENOUS at 15:10

## 2025-03-10 RX ADMIN — LEVETIRACETAM 1000 MILLIGRAM(S): 10 INJECTION, SOLUTION INTRAVENOUS at 05:51

## 2025-03-10 RX ADMIN — HEPARIN SODIUM 5000 UNIT(S): 1000 INJECTION INTRAVENOUS; SUBCUTANEOUS at 15:11

## 2025-03-10 RX ADMIN — Medication 250 MILLIGRAM(S): at 06:28

## 2025-03-10 RX ADMIN — Medication 650 MILLIGRAM(S): at 11:45

## 2025-03-10 RX ADMIN — SERTRALINE 50 MILLIGRAM(S): 100 TABLET, FILM COATED ORAL at 11:45

## 2025-03-10 RX ADMIN — HEPARIN SODIUM 5000 UNIT(S): 1000 INJECTION INTRAVENOUS; SUBCUTANEOUS at 05:50

## 2025-03-10 RX ADMIN — Medication 975 MILLIGRAM(S): at 00:19

## 2025-03-10 NOTE — PHYSICAL THERAPY INITIAL EVALUATION ADULT - PERTINENT HX OF CURRENT PROBLEM, REHAB EVAL
59 year old Female status post Left suboccipital craniectomy for resection of cerebellar brain tumor 12/2023 by Dr Brush, status post traumatic fall with head trauma in 5/20/2024 with large acute right SDH, status post emergent right decompressive hemicraniectomy on 5/22/24, status post right cranioplasty on 6/17/24 with custom plate, status post ETV, ligation of shunt on the right with ligaclips with retention of valve as rescue device, presents as a transfer from [facility] for witnessed seizure at home. CTH obtained at [facility] revealed moderate ventriculomegaly and slight increase in size of extra-axial CSF collection overlying the right frontal duraplasty. 3/7-10: POD # 6-9 Neurologically intact. Patient tolerating extubation. On triple antibiotics.

## 2025-03-10 NOTE — PROGRESS NOTE ADULT - SUBJECTIVE AND OBJECTIVE BOX
Infectious Diseases Follow Up:    Patient is a 60y old  Female who presents with a chief complaint of seizure (10 Mar 2025 01:43)      Interval History/ROS:  No acute events, pt is doing well.     Allergies  No Known Allergies        ANTIMICROBIALS:  cefepime   IVPB 2000 every 8 hours  cefepime   IVPB    metroNIDAZOLE  IVPB 500 every 8 hours  vancomycin  IVPB 1000 every 12 hours      Current Abx:     Previous Abx     OTHER MEDS:  MEDICATIONS  (STANDING):  acetaminophen     Tablet .. 650 every 6 hours PRN  albuterol    90 MICROgram(s) HFA Inhaler 2 every 4 hours PRN  heparin   Injectable 5000 every 8 hours  levETIRAcetam 1000 two times a day  mirtazapine 7.5 at bedtime  polyethylene glycol 3350 17 daily  senna 2 at bedtime  sertraline 50 daily      Vital Signs Last 24 Hrs  T(C): 36.8 (10 Mar 2025 10:00), Max: 37.2 (09 Mar 2025 17:15)  T(F): 98.3 (10 Mar 2025 10:00), Max: 98.9 (09 Mar 2025 17:15)  HR: 96 (10 Mar 2025 10:00) (91 - 103)  BP: 105/54 (10 Mar 2025 10:00) (105/54 - 128/70)  BP(mean): --  RR: 18 (10 Mar 2025 10:00) (18 - 18)  SpO2: 99% (10 Mar 2025 10:00) (96% - 99%)    Parameters below as of 10 Mar 2025 10:00  Patient On (Oxygen Delivery Method): room air          PHYSICAL EXAM:  GENERAL: NAD, well-developed  HEAD:  R sided dressing   EYES: EOMI, conjunctiva and sclera clear  CHEST/LUNG: On RA, CTAB  HEART: Regular rate and rhythm; No murmurs, rubs, or gallops  ABDOMEN: Soft, Nontender, Nondistended; Bowel sounds present  PSYCH: AAO x3                          13.7   10.72 )-----------( 310      ( 09 Mar 2025 06:39 )             41.2       03-09    138  |  105  |  10  ----------------------------<  97  4.2   |  23  |  0.42[L]    Ca    9.1      09 Mar 2025 06:39  Phos  2.4     03-09  Mg     2.30     03-09        Urinalysis Basic - ( 09 Mar 2025 06:39 )    Color: x / Appearance: x / SG: x / pH: x  Gluc: 97 mg/dL / Ketone: x  / Bili: x / Urobili: x   Blood: x / Protein: x / Nitrite: x   Leuk Esterase: x / RBC: x / WBC x   Sq Epi: x / Non Sq Epi: x / Bacteria: x        MICROBIOLOGY:  Vancomycin Level, Trough: 7.0 ug/mL (03-09-25 @ 14:00)  v  Surgical Swab  03-01-25   No growth at 5 days  --    Few polymorphonuclear leukocytes per low power field  No organisms seen per oil power field      CSF CSF  02-28-25   No growth at 5 days  --    No polymorphonuclear cells seen  No organisms seen  by cytocentrifuge      Catheterized Catheterized  02-26-25   No growth  --  --      .Blood Blood-Peripheral  02-26-25   No growth at 5 days  --  --      .Blood Blood-Peripheral  02-26-25   No growth at 5 days  --  --      .Blood Blood  02-22-25   No growth at 5 days  --  --      .Blood Blood  02-22-25   No growth at 5 days  --  --                RADIOLOGY:

## 2025-03-10 NOTE — PHYSICAL THERAPY INITIAL EVALUATION ADULT - PHYSICAL ASSIST/NONPHYSICAL ASSIST: SIT/SUPINE, REHAB EVAL
set-up required/verbal cues/nonverbal cues (demo/gestures)/1 person assist
verbal cues/1 person assist

## 2025-03-10 NOTE — PROGRESS NOTE ADULT - ASSESSMENT
60 yo F with PMHx of neurofibromatosis, s/p L suboccipital craniectomy for resection of cerebellar brain tumor 2023 by Dr Brush, s/p traumatic fall with head trauma in 2024 with large acute right SDH, s/p emergent right decompressive hemicraniectomy on 24, s/p right cranioplasty on 24 with custom plate, s/p ETV, ligation of shunt on the right with ligaclips with retention of valve as rescue device, presents as a transfer from [facility] for witnessed seizure at home. CTH obtained at [facility] revealed moderate ventriculomegaly and slight increase in size of extra-axial CSF collection overlying the right frontal duraplasty     Exam with decreased strength on left side, post ictal vs intracranial causes, will get MRI/MRA to assess and CTH/CTA if MRI will be delayed. On zosyn, white count downtrending.   :  CTH/ CTA done stable , CTA negative, MRIw/flow/MRA ordered, intubated /sedated, neurology saw Veeg-O/ neg, on keppra, K+3.3 repleted, afbrile, bld cltr NGTD, UA neg, WBC nml  : stable exam,  Veeg on, keppra, mri w/flow/ mra pending  : Stable exam. No seizures noted on VEEG. On keppra. MRI with CSF flow study and MRA ordered  : Extubated, alert, mildly confused and restless, nonfocal exam. On keppra. MRI showed increase in right epidural collection with diffusion restriction. Shunt (as reservoir) tapped, W1 R1  GSN  3/1: Alert, neurologically intact. On keppra. OR for clarissa hold for drainage of epidural abscess. Cx sent, NGTD. EDJPx1  Was intubated preop for stridor, dex x24hrs  3: POD1: Intubated, ENT scoped determined airway patent, ID-Following initiated on broad spectrum abx, OR cultures GSN, AFB neg, Cx pending. Dex discontinued.  3/3 POD2, intubated, on flagyl/vanc/cefipime, K 3.1- repletion ordered. EDJPx1 105cc/24hrs, PT recc LOUIS  3/4: pod 3, intubated for respiratory distress, on tripple abx, ED SHAYY to suction, f/u OR cltrs NGTD  3/5 pod 4, remains intubated, neuro exam stable, ED SHAYY drain pus tinged 30cc, on triple Abx, OR cltrs NGTD, ID following,    3/6: POD # 5 stable exam. Patient remains intubated. On triple antibiotics, decadron for stridor.    3-10: POD # 6-9 Neurologically intact. Patient tolerating extubation. On triple antibiotics

## 2025-03-10 NOTE — PROGRESS NOTE ADULT - SUBJECTIVE AND OBJECTIVE BOX
No issues overnight  Vital Signs Last 24 Hrs  T(C): 36.6 (10 Mar 2025 01:00), Max: 37.2 (09 Mar 2025 17:15)  T(F): 97.9 (10 Mar 2025 01:00), Max: 98.9 (09 Mar 2025 17:15)  HR: 100 (10 Mar 2025 01:00) (86 - 103)  BP: 111/64 (10 Mar 2025 01:00) (102/61 - 124/85)  BP(mean): --  RR: 18 (10 Mar 2025 01:00) (18 - 18)  SpO2: 97% (10 Mar 2025 01:00) (96% - 98%)    Parameters below as of 10 Mar 2025 01:00  Patient On (Oxygen Delivery Method): room air    AAO X 3  PERRLA, EOMI  CN 2-12 grossly intact  CLARK strength 5/5  No dysmetria or drift  SILT    MEDICATIONS  (STANDING):  acetaminophen     Tablet .. 975 milliGRAM(s) Oral every 6 hours  cefepime   IVPB 2000 milliGRAM(s) IV Intermittent every 8 hours  cefepime   IVPB      heparin   Injectable 5000 Unit(s) SubCutaneous every 8 hours  levETIRAcetam 1000 milliGRAM(s) Oral two times a day  metroNIDAZOLE  IVPB 500 milliGRAM(s) IV Intermittent every 8 hours  mirtazapine 7.5 milliGRAM(s) Oral at bedtime  polyethylene glycol 3350 17 Gram(s) Oral daily  senna 2 Tablet(s) Oral at bedtime  sertraline 50 milliGRAM(s) Oral daily  vancomycin  IVPB 1000 milliGRAM(s) IV Intermittent every 12 hours    MEDICATIONS  (PRN):  albuterol    90 MICROgram(s) HFA Inhaler 2 Puff(s) Inhalation every 4 hours PRN Shortness of Breath and/or Wheezing                          13.7   10.72 )-----------( 310      ( 09 Mar 2025 06:39 )             41.2     03-09    138  |  105  |  10  ----------------------------<  97  4.2   |  23  |  0.42[L]    Ca    9.1      09 Mar 2025 06:39  Phos  2.4     03-09  Mg     2.30     03-09    Vancomycin Level, Trough - Prior to Next Dose (03.09.25 @ 14:00)    Vancomycin Level, Trough: 7.0: Vancomycin trough levels should be rapidly reached and maintained at  15-20 ug/mL for life threatening MRSA infections such as sepsis,  endocarditis, osteomyelitis and pneumonia. A first trough level should be  drawn before the 3rd or 4th dose. Risk of renal toxicity is increased for  levels >15 ug/mL, in patients on other nephrotoxic drugs, who are  hemodynamically unstable, have unstable renal function, or are on  vancomycin therapy for >14 days. Renal function with creatinine levels  should bemonitored for those patients. ug/mL    Cultures show NGTD

## 2025-03-10 NOTE — PROGRESS NOTE ADULT - ASSESSMENT
This is a 58 y/o F w/ very extensive PMHx of NF, 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), s/p  shunt (in [facility]), s/p traumatic fall w/ R SDH, s/p R decompressive hemicraniectomy, ICP monitor and R cranioplasty (5/2024), s/p L frontal Endoscopic third ventriculostomy and ligation of R  shunt with Ligaclips with retention of valve, proximal and distal catheters (08/2024) admitted initially to [facility] on 2/22 for seizure, intubated for airway protection and status epilepticus, initially on Zosyn for aspiration PNA.  Initially CTH w/ slight increase in extra axial fluid collection measuring up to 1.5 cm. Pt was transferred to [facility] on 2/23 for neurosurgery eval, given decreased L sided strength, MRI done with increase in R sided collection.  Pt extubated on 2/28, CSF reservoir tapped, no pleocytosis.    Pt went to the OR on 3/1 for R clarissa pole with evacuation of epidural collection, with purulent drainage.     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), s/p  shunt (in [facility]), s/p traumatic fall w/ R SDH, s/p R decompressive hemicraniectomy, ICP monitor and R cranioplasty (5/2024), s/p L frontal Endoscopic third ventriculostomy and ligation of R  shunt with ligaclips with retention of valve, proximal and distal catheters (08/2024) admitted initially to [facility] on 2/22 for seizure, intubated for airway protection and status epilepticus, initially on Zosyn for aspiration PNA, transferred to [facility] on 2/23 for neurosurgery evaluation, now found to with have epidural abscess s/p R Clarissa w/ purulent drainage.  Extubated on 3/3, reintubated for stridor/work of breaking, now extubated on 3/6    Recommendations:   1. Vancomycin, f/u level goal -600, Cefepime 2 g q8, Flagyl 500 mg q8   2. F/u OR Cx, NG  3. F/u neurosurgery plan for cranioplasty and ligated shunt removal    Thank you for consulting us and involving us in the management of this patient's case. In addition to reviewing history, imaging, documents, labs, microbiology, and infection control strategies and potential issues.     ID will continue to follow    Inder Mcintosh M.D.  Attending Physician  Division of Infectious Diseases  Department of Medicine

## 2025-03-10 NOTE — CHART NOTE - NSCHARTNOTEFT_GEN_A_CORE
NUTRITION FOLLOW UP NOTE    Pt seen for nutrition follow-up note, ICU transfer.    SOURCE: [X] Patient [X] Medical record [X] RN/PCA     Medical Course: Per chart review, 58 yo F with PMHx of neurofibromatosis, s/p L suboccipital craniectomy for resection of cerebellar brain tumor 12/2023 by Dr Brush, s/p traumatic fall with head trauma in 5/20/2024 with large acute right SDH, s/p emergent right decompressive hemicraniectomy on 5/22/24, s/p right cranioplasty on 6/17/24 with custom plate, s/p ETV, ligation of shunt on the right with ligaclips with retention of valve as rescue device, presents as a transfer from [facility] for witnessed seizure at home. CTH obtained at [facility] revealed moderate ventriculomegaly and slight increase in size of extra-axial CSF collection overlying the right frontal duraplasty. Extubated 3/6, started on Regular diet per team.       Diet Prescription: Diet, Regular (03-06-25 @ 18:23)      Food Allergy/Intolerance: NKFA    Nutrition Course:  Patient with good appetite and po intake. Observed >90% po intake completed at bedside. No nausea/vomiting/diarrhea/constipation or difficulty chewing and swallowing. Continue to provide mealtime assistance and encouragement as needed.     Pertinent Medications: MEDICATIONS  (STANDING):  cefepime   IVPB 2000 milliGRAM(s) IV Intermittent every 8 hours  cefepime   IVPB      heparin   Injectable 5000 Unit(s) SubCutaneous every 8 hours  levETIRAcetam 1000 milliGRAM(s) Oral two times a day  metroNIDAZOLE  IVPB 500 milliGRAM(s) IV Intermittent every 8 hours  mirtazapine 7.5 milliGRAM(s) Oral at bedtime  polyethylene glycol 3350 17 Gram(s) Oral daily  senna 2 Tablet(s) Oral at bedtime  sertraline 50 milliGRAM(s) Oral daily  vancomycin  IVPB 1000 milliGRAM(s) IV Intermittent every 12 hours    MEDICATIONS  (PRN):  acetaminophen     Tablet .. 650 milliGRAM(s) Oral every 6 hours PRN Temp greater or equal to 38C (100.4F), Mild Pain (1 - 3)  albuterol    90 MICROgram(s) HFA Inhaler 2 Puff(s) Inhalation every 4 hours PRN Shortness of Breath and/or Wheezing        Pertinent Labs: 03-10 Na135 mmol/L Glu 114 mg/dL[H] K+ 4.2 mmol/L Cr  0.42 mg/dL[L] BUN 15 mg/dL 03-10 Phos 3.2 mg/dL         Weight: (3/4) 61.6  (3/2) 61.6  (3/1) 61.9  (2/28) 63.9  (2/27) 64.6  (2/25) 62.9  (2/23) 66.5 kg   Height: 63 in / 160.02 cm   IBW: 115 lbs / 52.2 kg    BMI: 24.5 kg/m^2 (at lowest weight)      Physical Assessment, per nursing flowsheets:  Edema: none  Pressure Injury: No pressure ulcers/DTI noted in flowsheets.     Estimated Needs:     [X] recalculated, based on IBW-52.2kg  1305-1566kcal daily @ 25-30kcal/kg,  68-84gm protein daily @ 1.3-1.6gm/kg       Previous Nutrition Diagnosis:   [X ] Inadequate Energy Intake[ X] Increased Nutrient Needs   Nutrition Diagnosis is [ X] ongoing  [ ] resolved [ ] not applicable   New Nutrition Diagnosis: [X ] not applicable     Interventions:   1) Continue current diet order, which remains appropriate at this time.   2) Monitor weights, labs, BM's, skin integrity, p.o. intake.   3) Please document % PO intake in nursing flowsheet.   4) Honor food and beverage preferences within diet restriction of patient in an effort to maximize level of nutrient intake.   5) Please Encourage po intake, assist with meals and menu selections, provide alternatives PRN.     Gui Emanuel RD, CDN, MS pager [phone]  Also available on [employer] Teams

## 2025-03-11 LAB
HCT VFR BLD CALC: 38.5 % — SIGNIFICANT CHANGE UP (ref 34.5–45)
HGB BLD-MCNC: 13.2 G/DL — SIGNIFICANT CHANGE UP (ref 11.5–15.5)
MCHC RBC-ENTMCNC: 26.6 PG — LOW (ref 27–34)
MCHC RBC-ENTMCNC: 34.3 G/DL — SIGNIFICANT CHANGE UP (ref 32–36)
MCV RBC AUTO: 77.5 FL — LOW (ref 80–100)
NRBC # BLD AUTO: 0 K/UL — SIGNIFICANT CHANGE UP (ref 0–0)
NRBC # FLD: 0 K/UL — SIGNIFICANT CHANGE UP (ref 0–0)
NRBC BLD AUTO-RTO: 0 /100 WBCS — SIGNIFICANT CHANGE UP (ref 0–0)
PLATELET # BLD AUTO: 278 K/UL — SIGNIFICANT CHANGE UP (ref 150–400)
RBC # BLD: 4.97 M/UL — SIGNIFICANT CHANGE UP (ref 3.8–5.2)
RBC # FLD: 14.7 % — HIGH (ref 10.3–14.5)
VANCOMYCIN TROUGH SERPL-MCNC: 8.3 UG/ML — LOW (ref 10–20)
WBC # BLD: 8.3 K/UL — SIGNIFICANT CHANGE UP (ref 3.8–10.5)
WBC # FLD AUTO: 8.3 K/UL — SIGNIFICANT CHANGE UP (ref 3.8–10.5)

## 2025-03-11 PROCEDURE — 99232 SBSQ HOSP IP/OBS MODERATE 35: CPT

## 2025-03-11 PROCEDURE — 99222 1ST HOSP IP/OBS MODERATE 55: CPT

## 2025-03-11 PROCEDURE — 70553 MRI BRAIN STEM W/O & W/DYE: CPT | Mod: 26

## 2025-03-11 PROCEDURE — G0545: CPT

## 2025-03-11 RX ORDER — VANCOMYCIN HCL IN 5 % DEXTROSE 1.5G/250ML
1250 PLASTIC BAG, INJECTION (ML) INTRAVENOUS EVERY 12 HOURS
Refills: 0 | Status: DISCONTINUED | OUTPATIENT
Start: 2025-03-11 | End: 2025-03-18

## 2025-03-11 RX ORDER — VANCOMYCIN HCL IN 5 % DEXTROSE 1.5G/250ML
1250 PLASTIC BAG, INJECTION (ML) INTRAVENOUS EVERY 24 HOURS
Refills: 0 | Status: DISCONTINUED | OUTPATIENT
Start: 2025-03-11 | End: 2025-03-11

## 2025-03-11 RX ADMIN — LEVETIRACETAM 1000 MILLIGRAM(S): 10 INJECTION, SOLUTION INTRAVENOUS at 05:48

## 2025-03-11 RX ADMIN — CEFEPIME 100 MILLIGRAM(S): 2 INJECTION, POWDER, FOR SOLUTION INTRAVENOUS at 05:44

## 2025-03-11 RX ADMIN — LEVETIRACETAM 1000 MILLIGRAM(S): 10 INJECTION, SOLUTION INTRAVENOUS at 18:31

## 2025-03-11 RX ADMIN — Medication 100 MILLIGRAM(S): at 05:44

## 2025-03-11 RX ADMIN — Medication 2 TABLET(S): at 21:17

## 2025-03-11 RX ADMIN — HEPARIN SODIUM 5000 UNIT(S): 1000 INJECTION INTRAVENOUS; SUBCUTANEOUS at 05:45

## 2025-03-11 RX ADMIN — Medication 250 MILLIGRAM(S): at 07:28

## 2025-03-11 RX ADMIN — MIRTAZAPINE 7.5 MILLIGRAM(S): 30 TABLET, FILM COATED ORAL at 21:17

## 2025-03-11 RX ADMIN — POLYETHYLENE GLYCOL 3350 17 GRAM(S): 17 POWDER, FOR SOLUTION ORAL at 11:50

## 2025-03-11 RX ADMIN — CEFEPIME 100 MILLIGRAM(S): 2 INJECTION, POWDER, FOR SOLUTION INTRAVENOUS at 23:16

## 2025-03-11 RX ADMIN — HEPARIN SODIUM 5000 UNIT(S): 1000 INJECTION INTRAVENOUS; SUBCUTANEOUS at 16:10

## 2025-03-11 RX ADMIN — SERTRALINE 50 MILLIGRAM(S): 100 TABLET, FILM COATED ORAL at 11:50

## 2025-03-11 RX ADMIN — Medication 166.67 MILLIGRAM(S): at 18:30

## 2025-03-11 RX ADMIN — Medication 100 MILLIGRAM(S): at 16:11

## 2025-03-11 RX ADMIN — CEFEPIME 100 MILLIGRAM(S): 2 INJECTION, POWDER, FOR SOLUTION INTRAVENOUS at 15:12

## 2025-03-11 NOTE — PROGRESS NOTE ADULT - SUBJECTIVE AND OBJECTIVE BOX
No issues overnight  PT evaluation performed  Vital Signs Last 24 Hrs  T(C): 37.1 (11 Mar 2025 01:00), Max: 37.3 (10 Mar 2025 13:00)  T(F): 98.8 (11 Mar 2025 01:00), Max: 99.2 (10 Mar 2025 13:00)  HR: 93 (11 Mar 2025 01:00) (93 - 104)  BP: 101/67 (11 Mar 2025 01:00) (101/67 - 128/70)  BP(mean): --  RR: 17 (11 Mar 2025 01:00) (16 - 18)  SpO2: 97% (11 Mar 2025 01:00) (97% - 99%)    Parameters below as of 11 Mar 2025 01:00  Patient On (Oxygen Delivery Method): room air    AAO X 3  PERRLA, EOMI  CN 2-12 grossly intact  CLARK strength 5/5  No dysmetria or drift  SILT    MEDICATIONS  (STANDING):  cefepime   IVPB 2000 milliGRAM(s) IV Intermittent every 8 hours  cefepime   IVPB      heparin   Injectable 5000 Unit(s) SubCutaneous every 8 hours  levETIRAcetam 1000 milliGRAM(s) Oral two times a day  metroNIDAZOLE  IVPB 500 milliGRAM(s) IV Intermittent every 8 hours  mirtazapine 7.5 milliGRAM(s) Oral at bedtime  polyethylene glycol 3350 17 Gram(s) Oral daily  senna 2 Tablet(s) Oral at bedtime  sertraline 50 milliGRAM(s) Oral daily  vancomycin  IVPB 1000 milliGRAM(s) IV Intermittent every 12 hours    MEDICATIONS  (PRN):  acetaminophen     Tablet .. 650 milliGRAM(s) Oral every 6 hours PRN Temp greater or equal to 38C (100.4F), Mild Pain (1 - 3)  albuterol    90 MICROgram(s) HFA Inhaler 2 Puff(s) Inhalation every 4 hours PRN Shortness of Breath and/or Wheezing                          14.2   13.22 )-----------( 353      ( 10 Mar 2025 12:45 )             41.1     03-10    135  |  102  |  15  ----------------------------<  114[H]  4.2   |  19[L]  |  0.42[L]    Ca    9.4      10 Mar 2025 12:45  Phos  3.2     03-10  Mg     2.10     03-10

## 2025-03-11 NOTE — PROGRESS NOTE ADULT - PROBLEM SELECTOR PLAN 1
Neurologic checks Q4H  Continue Abx, vanco trough prior to 4th dose  Follow up cultures  Follow up with ID  Continue PT

## 2025-03-11 NOTE — PROGRESS NOTE ADULT - SUBJECTIVE AND OBJECTIVE BOX
Infectious Diseases Follow Up:    Patient is a 60y old  Female who presents with a chief complaint of seizure (11 Mar 2025 01:10)      Interval History/ROS:  No acute events, pt feeling okay     Allergies  No Known Allergies        ANTIMICROBIALS:  cefepime   IVPB 2000 every 8 hours  cefepime   IVPB    metroNIDAZOLE  IVPB 500 every 8 hours  vancomycin  IVPB 1250 every 24 hours      Current Abx:     Previous Abx     OTHER MEDS:  MEDICATIONS  (STANDING):  acetaminophen     Tablet .. 650 every 6 hours PRN  albuterol    90 MICROgram(s) HFA Inhaler 2 every 4 hours PRN  heparin   Injectable 5000 every 8 hours  levETIRAcetam 1000 two times a day  mirtazapine 7.5 at bedtime  polyethylene glycol 3350 17 daily  senna 2 at bedtime  sertraline 50 daily      Vital Signs Last 24 Hrs  T(C): 36.3 (11 Mar 2025 09:44), Max: 37.3 (10 Mar 2025 13:00)  T(F): 97.4 (11 Mar 2025 09:44), Max: 99.2 (10 Mar 2025 13:00)  HR: 97 (11 Mar 2025 09:44) (93 - 104)  BP: 103/61 (11 Mar 2025 09:44) (101/67 - 130/58)  BP(mean): --  RR: 18 (11 Mar 2025 09:44) (16 - 18)  SpO2: 100% (11 Mar 2025 09:44) (97% - 100%)    Parameters below as of 11 Mar 2025 09:44  Patient On (Oxygen Delivery Method): room air        PHYSICAL EXAM:  GENERAL: NAD, well-developed  HEAD:  R sided dressing   EYES: EOMI, conjunctiva and sclera clear  CHEST/LUNG: On RA, CTAB  HEART: Regular rate and rhythm; No murmurs, rubs, or gallops  ABDOMEN: Soft, Nontender, Nondistended; Bowel sounds present  PSYCH: AAO x3                            13.2   8.30  )-----------( 278      ( 11 Mar 2025 04:37 )             38.5       03-10    135  |  102  |  15  ----------------------------<  114[H]  4.2   |  19[L]  |  0.42[L]    Ca    9.4      10 Mar 2025 12:45  Phos  3.2     03-10  Mg     2.10     03-10        Urinalysis Basic - ( 10 Mar 2025 12:45 )    Color: x / Appearance: x / SG: x / pH: x  Gluc: 114 mg/dL / Ketone: x  / Bili: x / Urobili: x   Blood: x / Protein: x / Nitrite: x   Leuk Esterase: x / RBC: x / WBC x   Sq Epi: x / Non Sq Epi: x / Bacteria: x        MICROBIOLOGY:  Vancomycin Level, Trough: 8.3 ug/mL (03-11-25 @ 05:38)  v  Surgical Swab  03-01-25   No growth at 5 days  --    Few polymorphonuclear leukocytes per low power field  No organisms seen per oil power field      CSF CSF  02-28-25   No Growth at 10 Days  --    No polymorphonuclear cells seen  No organisms seen  by cytocentrifuge      Catheterized Catheterized  02-26-25   No growth  --  --      .Blood Blood-Peripheral  02-26-25   No growth at 5 days  --  --      .Blood Blood-Peripheral  02-26-25   No growth at 5 days  --  --      .Blood Blood  02-22-25   No growth at 5 days  --  --      .Blood Blood  02-22-25   No growth at 5 days  --  --                RADIOLOGY:

## 2025-03-11 NOTE — PROGRESS NOTE ADULT - ASSESSMENT
This is a 58 y/o F w/ very extensive PMHx of NF, 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), s/p  shunt (in [facility]), s/p traumatic fall w/ R SDH, s/p R decompressive hemicraniectomy, ICP monitor and R cranioplasty (5/2024), s/p L frontal Endoscopic third ventriculostomy and ligation of R  shunt with Ligaclips with retention of valve, proximal and distal catheters (08/2024) admitted initially to [facility] on 2/22 for seizure, intubated for airway protection and status epilepticus, initially on Zosyn for aspiration PNA.  Initially CTH w/ slight increase in extra axial fluid collection measuring up to 1.5 cm. Pt was transferred to [facility] on 2/23 for neurosurgery eval, given decreased L sided strength, MRI done with increase in R sided collection.  Pt extubated on 2/28, CSF reservoir tapped, no pleocytosis.    Pt went to the OR on 3/1 for R clarissa pole with evacuation of epidural collection, with purulent drainage.     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), s/p  shunt (in [facility]), s/p traumatic fall w/ R SDH, s/p R decompressive hemicraniectomy, ICP monitor and R cranioplasty (5/2024), s/p L frontal Endoscopic third ventriculostomy and ligation of R  shunt with ligaclips with retention of valve, proximal and distal catheters (08/2024) admitted initially to [facility] on 2/22 for seizure, intubated for airway protection and status epilepticus, initially on Zosyn for aspiration PNA, transferred to [facility] on 2/23 for neurosurgery evaluation, now found to with have epidural abscess s/p R Clarissa w/ purulent/milky drainage.  Extubated on 3/3, reintubated for stridor/work of breaking, now extubated on 3/6.    D/w neurosurgery, Cx all negative, CRP low, consider allergic reaction to prosthetic cranioplasty. Would likely treat w/ abx for 6 weeks     Recommendations:   1. Vancomycin, f/u level goal -600, Cefepime 2 g q8, Flagyl 500 mg q8   2. F/u OR Cx, NG  3. Likely removal of cranioplasty and Ommaya next week      Thank you for consulting us and involving us in the management of this patient's case. In addition to reviewing history, imaging, documents, labs, microbiology, and infection control strategies and potential issues.     ID will continue to follow    Inder Mcintosh M.D.  Attending Physician  Division of Infectious Diseases  Department of Medicine

## 2025-03-11 NOTE — PROGRESS NOTE ADULT - ASSESSMENT
58 yo F with PMHx of neurofibromatosis, s/p L suboccipital craniectomy for resection of cerebellar brain tumor 2023 by Dr Brush, s/p traumatic fall with head trauma in 2024 with large acute right SDH, s/p emergent right decompressive hemicraniectomy on 24, s/p right cranioplasty on 24 with custom plate, s/p ETV, ligation of shunt on the right with ligaclips with retention of valve as rescue device, presents as a transfer from [facility] for witnessed seizure at home. [facility]ulomegaly and slight increase in size of extra-axial CSF collection overlying the right frontal duraplasty     Exam with decreased strength on left side, post ictal vs intracranial causes, will get MRI/MRA to assess and CTH/CTA if MRI will be delayed. On zosyn, white count downtrending.   :  CTH/ CTA done stable , CTA negative, MRIw/flow/MRA ordered, intubated /sedated, neurology saw Veeg-O/ neg, on keppra, K+3.3 repleted, afbrile, bld cltr NGTD, UA neg, WBC nml  : stable exam,  Veeg on, keppra, mri w/flow/ mra pending  : Stable exam. No seizures noted on VEEG. On keppra. MRI with CSF flow study and MRA ordered  : Extubated, alert, mildly confused and restless, nonfocal exam. On keppra. MRI showed increase in right epidural collection with diffusion restriction. Shunt (as reservoir) tapped, W1 R1  GSN  3/1: Alert, neurologically intact. On keppra. OR for clarissa hold for drainage of epidural abscess. Cx sent, NGTD. EDJPx1  Was intubated preop for stridor, dex x24hrs  3: POD1: Intubated, ENT scoped determined airway patent, ID-Following initiated on broad spectrum abx, OR cultures GSN, AFB neg, Cx pending. Dex discontinued.  3/3 POD2, intubated, on flagyl/vanc/cefipime, K 3.1- repletion ordered. EDJPx1 105cc/24hrs, PT recc LOUIS  3/4: pod 3, intubated for respiratory distress, on tripple abx, ED SHAYY to suction, f/u OR cltrs NGTD  3/5 pod 4, remains intubated, neuro exam stable, ED SHAYY drain pus tinged 30cc, on triple Abx, OR cltrs NGTD, ID following,    3/6: POD # 5 stable exam. Patient remains intubated. On triple antibiotics, decadron for stridor.    3-10: POD # 6-9 Neurologically intact. Patient tolerating extubation. On triple antibiotics  3: POD # 10  Neurologically intact. On triple antibiotics. Plan for removal of ommaya and cranioplasty next week

## 2025-03-11 NOTE — OCCUPATIONAL THERAPY INITIAL EVALUATION ADULT - MD ORDER
Occupational Therapy (OT) to evaluate and treat. Out of bed with assistance. Per NOAH Gaspar, pt is okay to participate in OT evaluation and perform activity as tolerated.

## 2025-03-11 NOTE — OCCUPATIONAL THERAPY INITIAL EVALUATION ADULT - LIVES WITH, PROFILE
Pt. reports she lives with her significant other in a house with 3 steps to enter. Once inside, pt. reports she has full flight of steps to negotiate to 2nd floor where main bedroom and bathroom are located. Per pt., she has a bathtub in her bathroom.

## 2025-03-11 NOTE — CONSULT NOTE ADULT - SUBJECTIVE AND OBJECTIVE BOX
Patient is a 60y old  Female who presents with a chief complaint of seizure (11 Mar 2025 11:34)      HPI:  60 yo F with PMHx of neurofibromatosis, s/p L suboccipital craniectomy for resection of cerebellar brain tumor 2023 by Dr Brush, s/p traumatic fall with head trauma in 2024, at that time sustained a large acute right SDH, and underwent an emergent right decompressive hemicraniectomy on 24, s/p right cranioplasty on 24 with custom plate, s/p ETV, ligation of shunt on the right with ligaclips with retention of valve as rescue device, presents as a transfer from [facility] for witnessed seizure at home. CTH obtained at [facility] revealed stable  shunt and moderate ventriculomegaly and slight increase in size of extra-axial CSF collection overlying the right frontal duraplasty, likely a hygroma and no mass effect.    (2025 23:29)  sent to OR 3/1 for R clarissa hole with evacuation of epidural collection, with purulent drainage  pt also had status epilepticus s/p intubation      REVIEW OF SYSTEMS  Constitutional - No fever, No weight loss, No fatigue  HEENT - No eye pain, No visual disturbances, No difficulty hearing, No tinnitus, No vertigo, No neck pain  Respiratory - No cough, No wheezing, No shortness of breath  Cardiovascular - No chest pain, No palpitations  Gastrointestinal - No abdominal pain, No nausea, No vomiting, No diarrhea, No constipation  Genitourinary - No dysuria, No frequency, No hematuria, No incontinence  Neurological - No headaches, No memory loss, No loss of strength, No numbness, No tremors  Skin - No itching, No rashes, No lesions   Endocrine - No temperature intolerance  Musculoskeletal - No joint pain, No joint swelling, No muscle pain  Psychiatric - No depression, No anxiety    PAST MEDICAL & SURGICAL HISTORY  Asthma    Depression    Neoplasm of uncertain behavior of brain    Neurofibromatosis, type 1    Neurofibromatosis    History of hydrocephalus    Anxiety    Delivery with history of     S/P tubal ligation    History of arthroscopy of left knee    S/P LASIK Surgery    H/O brain tumor    S/P  shunt        SOCIAL HISTORY  Smoking - Denied  EtOH - Denied   Drugs - Denied    FUNCTIONAL HISTORY  Lives in private house with significant other, home has 3 steps to enter, 1 flight to bedroom  Independent at baseline without device, owns a cane    CURRENT FUNCTIONAL STATUS  3/10  Bed Mobility: Scooting/Bridging:     · Level of Castro	contact guard  · Physical Assist/Nonphysical Assist	verbal cues; nonverbal cues (demo/gestures); 1 person assist; set-up required    Bed Mobility: Sit to Supine:     · Level of Castro	contact guard  · Physical Assist/Nonphysical Assist	verbal cues; nonverbal cues (demo/gestures); 1 person assist; set-up required  · Assistive Device	bed rails    Bed Mobility: Supine to Sit:     · Level of [place]	contact guard  · Physical Assist/Nonphysical Assist	verbal cues; nonverbal cues (demo/gestures); 1 person assist; set-up required  · Assistive Device	bed rails    Bed Mobility Analysis:     · Bed Mobility Limitations	decreased ability to use legs for bridging/pushing; impaired ability to control trunk for mobility  · Impairments Contributing to Impaired Bed Mobility	impaired balance; impaired postural control; decreased strength    Transfer: Sit to Stand:     · Level of [place]	moderate assist (50% patients effort)  · Physical Assist/Nonphysical Assist	1 person assist; nonverbal cues (demo/gestures); verbal cues; set-up required  · Weight-Bearing Restrictions	full weight-bearing  · Assistive Device	UE support    Transfer: Stand to Sit:     · Level of Castro	moderate assist (50% patients effort)  · Physical Assist/Nonphysical Assist	1 person assist; nonverbal cues (demo/gestures); verbal cues; set-up required  · Weight-Bearing Restrictions	full weight-bearing  · Assistive Device	UE support    Sit/Stand Transfer Safety Analysis:     · Transfer Safety Concerns Noted	decreased step length  · Impairments Contributing to Impaired Transfers	impaired balance; impaired postural control; decreased strength    Gait Skills:     · Level of [place]	unable to perform; patient deferred at this time despite maximum encouragement    Balance Skills Assessment:     · Sitting Balance: Static	fair balance  · Sitting Balance: Dynamic	fair balance  · Sit-to-Stand Balance	fair minus  · Systems Impairment Contributing to Balance Disturbance	musculoskeletal  · Identified Impairments Contributing to Balance Disturbance	impaired postural control; decreased strength    Sensory Examination:    Sensory Examination:    Grossly Intact:   · Gross Sensory Examination	Grossly Intact      Light Touch Sensation:   · Left UE	within normal limits  · Right UE	within normal limits  · Left LE	within normal limits  · Right LE	within normal limits        RECENT LABS/IMAGING  CBC Full  -  ( 11 Mar 2025 04:37 )  WBC Count : 8.30 K/uL  RBC Count : 4.97 M/uL  Hemoglobin : 13.2 g/dL  Hematocrit : 38.5 %  Platelet Count - Automated : 278 K/uL  Mean Cell Volume : 77.5 fL  Mean Cell Hemoglobin : 26.6 pg  Mean Cell Hemoglobin Concentration : 34.3 g/dL  Auto Neutrophil # : x  Auto Lymphocyte # : x  Auto Monocyte # : x  Auto Eosinophil # : x  Auto Basophil # : x  Auto Neutrophil % : x  Auto Lymphocyte % : x  Auto Monocyte % : x  Auto Eosinophil % : x  Auto Basophil % : x    03-10    135  |  102  |  15  ----------------------------<  114[H]  4.2   |  19[L]  |  0.42[L]    Ca    9.4      10 Mar 2025 12:45  Phos  3.2     03-10  Mg     2.10     10      Urinalysis Basic - ( 10 Mar 2025 12:45 )    Color: x / Appearance: x / SG: x / pH: x  Gluc: 114 mg/dL / Ketone: x  / Bili: x / Urobili: x   Blood: x / Protein: x / Nitrite: x   Leuk Esterase: x / RBC: x / WBC x   Sq Epi: x / Non Sq Epi: x / Bacteria: x        VITALS  T(C): 36.3 (25 @ 09:44), Max: 37.3 (03-10-25 @ 21:07)  HR: 97 (25 @ 09:44) (93 - 104)  BP: 103/61 (25 @ 09:44) (101/67 - 130/58)  RR: 18 (25 @ 09:44) (16 - 18)  SpO2: 100% (25 @ 09:44) (97% - 100%)  Wt(kg): --    ALLERGIES  No Known Allergies      MEDICATIONS   acetaminophen     Tablet .. 650 milliGRAM(s) Oral every 6 hours PRN  albuterol    90 MICROgram(s) HFA Inhaler 2 Puff(s) Inhalation every 4 hours PRN  cefepime   IVPB 2000 milliGRAM(s) IV Intermittent every 8 hours  cefepime   IVPB      heparin   Injectable 5000 Unit(s) SubCutaneous every 8 hours  levETIRAcetam 1000 milliGRAM(s) Oral two times a day  metroNIDAZOLE  IVPB 500 milliGRAM(s) IV Intermittent every 8 hours  mirtazapine 7.5 milliGRAM(s) Oral at bedtime  polyethylene glycol 3350 17 Gram(s) Oral daily  senna 2 Tablet(s) Oral at bedtime  sertraline 50 milliGRAM(s) Oral daily  vancomycin  IVPB 1250 milliGRAM(s) IV Intermittent every 24 hours      ----------------------------------------------------------------------------------------  PHYSICAL EXAM  Constitutional - NAD, Comfortable  HEENT - NCAT, EOMI  Neck - Supple, No limited ROM  Chest - CTA bilaterally, No wheeze, No rhonchi, No crackles  Cardiovascular - RRR, S1S2, No murmurs  Abdomen - BS+, Soft, NTND  Extremities - No C/C/E, No calf tenderness   Neurologic Exam -                    Cognitive - Awake, Alert, AAO to self, place, date, year, situation     Communication - Fluent, No dysarthria     Cranial Nerves - CN 2-12 intact     Motor - No focal deficits                    LEFT    UE - ShAB 5/5, EF 5/5, EE 5/5, WE 5/5,  5/5                    RIGHT UE - ShAB 5/5, EF 5/5, EE 5/5, WE 5/5,  5/5                    LEFT    LE - HF 5/5, KE 5/5, DF 5/5, PF 5/5                    RIGHT LE - HF 5/5, KE 5/5, DF 5/5, PF 5/5        Sensory - Intact to LT     Reflexes - DTR Intact, No primitive reflexive     Coordination - FTN intact     OculoVestibular - No saccades, No nystagmus, VOR         Balance - WNL Static  Psychiatric - Mood stable, Affect WNL  ----------------------------------------------------------------------------------------  ASSESSMENT/PLAN   59 year old Female status post Left suboccipital craniectomy for resection of cerebellar brain tumor 2023 by Dr Brush, status post traumatic fall with head trauma in 2024 with large acute right SDH, status post emergent right decompressive hemicraniectomy on 24, status post right cranioplasty on 24 with custom plate, status post ETV, ligation of shunt on the right with ligaclips with retention of valve as rescue device, presents as a transfer from [facility] for witnessed seizure at home. CTH obtained at [facility] revealed moderate ventriculomegaly and slight increase in size of extra-axial CSF collection overlying the right frontal duraplasty.  s/p clarissa hole 3/1 with purulent drainage  intubated during admission  now extubated  Pain -  DVT PPX -   Rehab - incomplete note, consult in progress Patient is a 60y old  Female who presents with a chief complaint of seizure (11 Mar 2025 11:34)      HPI:  60 yo F with PMHx of neurofibromatosis, s/p L suboccipital craniectomy for resection of cerebellar brain tumor 2023 by Dr Brush, s/p traumatic fall with head trauma in 2024, at that time sustained a large acute right SDH, and underwent an emergent right decompressive hemicraniectomy on 24, s/p right cranioplasty on 24 with custom plate, s/p ETV, ligation of shunt on the right with ligaclips with retention of valve as rescue device, presents as a transfer from [facility] for witnessed seizure at home. CTH obtained at [facility] revealed stable  shunt and moderate ventriculomegaly and slight increase in size of extra-axial CSF collection overlying the right frontal duraplasty, likely a hygroma and no mass effect.    (2025 23:29)  sent to OR 3/1 for R clarissa hole with evacuation of epidural collection, with purulent drainage  pt also had status epilepticus s/p intubation    just returned to room after brain MRI. Denies pain.    REVIEW OF SYSTEMS  Constitutional - No fever, No weight loss, No fatigue  HEENT - No eye pain, No visual disturbances, No difficulty hearing, No tinnitus, No vertigo, No neck pain  Respiratory - No cough, No wheezing, No shortness of breath  Cardiovascular - No chest pain, No palpitations  Gastrointestinal - No abdominal pain, No nausea, No vomiting, No diarrhea, No constipation  Genitourinary - No dysuria, No frequency, No hematuria, No incontinence  Neurological - No headaches, No memory loss, + mild loss of strength LLE, No numbness, No tremors  Skin - No itching, No rashes, No lesions   Endocrine - No temperature intolerance  Musculoskeletal - No joint pain, No joint swelling, No muscle pain  Psychiatric - No depression, No anxiety    PAST MEDICAL & SURGICAL HISTORY  Asthma    Depression    Neoplasm of uncertain behavior of brain    Neurofibromatosis, type 1    Neurofibromatosis    History of hydrocephalus    Anxiety    Delivery with history of     S/P tubal ligation    History of arthroscopy of left knee    S/P LASIK Surgery    H/O brain tumor    S/P  shunt        SOCIAL HISTORY  Smoking - Denied  EtOH - Denied   Drugs - Denied    FUNCTIONAL HISTORY  Lives in private house with significant other, home has 3 steps to enter, 1 flight to bedroom  Independent at baseline without device, owns a cane    CURRENT FUNCTIONAL STATUS  3/10  Bed Mobility: Scooting/Bridging:     · Level of [place]	contact guard  · Physical Assist/Nonphysical Assist	verbal cues; nonverbal cues (demo/gestures); 1 person assist; set-up required    Bed Mobility: Sit to Supine:     · Level of [place]	contact guard  · Physical Assist/Nonphysical Assist	verbal cues; nonverbal cues (demo/gestures); 1 person assist; set-up required  · Assistive Device	bed rails    Bed Mobility: Supine to Sit:     · Level of [place]	contact guard  · Physical Assist/Nonphysical Assist	verbal cues; nonverbal cues (demo/gestures); 1 person assist; set-up required  · Assistive Device	bed rails    Bed Mobility Analysis:     · Bed Mobility Limitations	decreased ability to use legs for bridging/pushing; impaired ability to control trunk for mobility  · Impairments Contributing to Impaired Bed Mobility	impaired balance; impaired postural control; decreased strength    Transfer: Sit to Stand:     · Level of [place]	moderate assist (50% patients effort)  · Physical Assist/Nonphysical Assist	1 person assist; nonverbal cues (demo/gestures); verbal cues; set-up required  · Weight-Bearing Restrictions	full weight-bearing  · Assistive Device	UE support    Transfer: Stand to Sit:     · Level of [place]	moderate assist (50% patients effort)  · Physical Assist/Nonphysical Assist	1 person assist; nonverbal cues (demo/gestures); verbal cues; set-up required  · Weight-Bearing Restrictions	full weight-bearing  · Assistive Device	UE support    Sit/Stand Transfer Safety Analysis:     · Transfer Safety Concerns Noted	decreased step length  · Impairments Contributing to Impaired Transfers	impaired balance; impaired postural control; decreased strength    Gait Skills:     · Level of [place]	unable to perform; patient deferred at this time despite maximum encouragement    Balance Skills Assessment:     · Sitting Balance: Static	fair balance  · Sitting Balance: Dynamic	fair balance  · Sit-to-Stand Balance	fair minus  · Systems Impairment Contributing to Balance Disturbance	musculoskeletal  · Identified Impairments Contributing to Balance Disturbance	impaired postural control; decreased strength    Sensory Examination:    Sensory Examination:    Grossly Intact:   · Gross Sensory Examination	Grossly Intact      Light Touch Sensation:   · Left UE	within normal limits  · Right UE	within normal limits  · Left LE	within normal limits  · Right LE	within normal limits        RECENT LABS/IMAGING    EEG Report:   _____________________________________________________________  STUDY INTERPRETATION     Sporadic Epileptiform Discharges:    None    Rhythmic and Periodic Patterns (RPPs):  None     Electrographic and Electroclinical seizures:  None    Other Clinical Events:  None    Activation Procedures:   -Hyperventilation was not performed.    -Photic stimulation was not performed.    Artifacts:  Intermittent myogenic and movement artifacts were noted.    ECG:  The heart rate on single channel ECG was predominantly ~60 bpm.    Summary:  Abnormal  EEG in the awake / drowsy / asleep state(s).  1) Intermittent right frontal focal slowing.  2) Mild-moderate generalized background slowing.    Clinical Impression:  1) Focal cerebral dysfunction in the right frontal region.  2) Mild-moderate degree of diffuse cerebral dysfunction.  3) There were no epileptiform abnormalities recorded.           < from: MR Angio Head No Cont (25 @ 15:38) >      < from: MR Angio Head No Cont (25 @ 15:38) >    ACC: [id number] EXAM:  MR BRAIN WAW IC W CSF FLOW   ORDERED BY: MARYBETH TABOR     ACC: [id number] EXAM:  MR ANGIO BRAIN   ORDERED BY: ZACHERY PAINTER     PROCEDURE DATE:  2025          INTERPRETATION:  CLINICAL INDICATION: Recurrent epidural collection   evaluate for infection, and S1      Magnetic resonance imaging of the brain was carried out with transaxial   SPGR, FLAIR, fast spin echo T2 weighted images, axial susceptibility   weighted series, diffusion weighted series andsagittal T1 weighted   series on a 1.5 Ev magnet. Post contrast axial, coronal and sagittal   T1 weighted images were obtained. 6.5 cc of Gadavist were intravenously   injected, 1.0 cc were discarded. Sagittal cine flow and sagittal CISS   study. 3-D time-of-flight MR angiography was obtained.        Comparison is made with the prior brain CT of 2025 and the MRI of   2024.    There has been a right frontal craniotomy. There is an epidural fluid   collection beneath the craniotomy flap which is larger when compared with   2024. This collection measures 17 mm in depth compared to the   previous of 3.4 mm. There is mild peripheral enhancement. The collection   does not suppress with T2 FLAIR images and demonstrates diffusion   restriction. In view of these factors and directed collection should be   considered. A tiny right parietal subdural collection is identified with   mild dural     Encephalomalacia and gliosis in the left frontal cortex is similar   compared to the previous exam. There is less enhancement along the   posterior aspect of the surgical cavity.    A right frontal ventricular catheter is identified with its tip in the   right frontal horn. The ventricles are enlarged but are unchanged since   the priorexam.    Signal dropout overlying the right frontal region is related to the   presence of a programmable shunt. Defect in the floor of the third   ventricle is identified consistent with a third ventriculostomy which is   patent on sagittal cine flow study.    An old left cerebellar infarct is identified. Nonspecific signal   hyperintensity in the cerebellum in the region of the dentate nucleus may   be related to neurofibromatosis hamartomas. No acute infarcts are seen.   No abnormal enhancing lesions.      Cervical, petrous, cavernous and supraclinoid internal carotid arteries,   anterior and middle cerebral artery branches are unremarkable. There is   no evidence of aneurysm, stenosis, or vasculitis. The distal vertebral   arteries, and region of the vertebral basilar confluence are   unremarkable. The basilar artery, superior cerebellar arteries and   posterior cerebral arteries are unremarkable.    IMPRESSION: Right frontal peripherally enhancing epidural collection   beneath the right frontal cranioplasty has reaccumulated since 10/6/2024,   with diffusion restriction suspicious for an infected collection. Right   frontal encephalomalacia and gliosis has evolved since the prior exam.   Old left cerebellar infarct. Patent third ventriculostomy. Right frontal   ventricular catheter.    Normal intracranial MR angiography.    Dr. Knox discussed these findings with Dr. Brush on 2025 4:16   PM with read back.    --- End of Report ---            JANUSZ KNOX MD; AttendingRadiologist  This document has been electronically signed. 2025  4:18PM    < end of copied text >    CBC Full  -  ( 11 Mar 2025 04:37 )  WBC Count : 8.30 K/uL  RBC Count : 4.97 M/uL  Hemoglobin : 13.2 g/dL  Hematocrit : 38.5 %  Platelet Count - Automated : 278 K/uL  Mean Cell Volume : 77.5 fL  Mean Cell Hemoglobin : 26.6 pg  Mean Cell Hemoglobin Concentration : 34.3 g/dL  Auto Neutrophil # : x  Auto Lymphocyte # : x  Auto Monocyte # : x  Auto Eosinophil # : x  Auto Basophil # : x  Auto Neutrophil % : x  Auto Lymphocyte % : x  Auto Monocyte % : x  Auto Eosinophil % : x  Auto Basophil % : x    03-10    135  |  102  |  15  ----------------------------<  114[H]  4.2   |  19[L]  |  0.42[L]    Ca    9.4      10 Mar 2025 12:45  Phos  3.2     03-10  Mg     2.10     03-10      Urinalysis Basic - ( 10 Mar 2025 12:45 )    Color: x / Appearance: x / SG: x / pH: x  Gluc: 114 mg/dL / Ketone: x  / Bili: x / Urobili: x   Blood: x / Protein: x / Nitrite: x   Leuk Esterase: x / RBC: x / WBC x   Sq Epi: x / Non Sq Epi: x / Bacteria: x        VITALS  T(C): 36.3 (25 @ 09:44), Max: 37.3 (03-10-25 @ 21:07)  HR: 97 (25 @ 09:44) (93 - 104)  BP: 103/61 (25 @ 09:44) (101/67 - 130/58)  RR: 18 (25 @ 09:44) (16 - 18)  SpO2: 100% (25 @ 09:44) (97% - 100%)  Wt(kg): --    ALLERGIES  No Known Allergies      MEDICATIONS   acetaminophen     Tablet .. 650 milliGRAM(s) Oral every 6 hours PRN  albuterol    90 MICROgram(s) HFA Inhaler 2 Puff(s) Inhalation every 4 hours PRN  cefepime   IVPB 2000 milliGRAM(s) IV Intermittent every 8 hours  cefepime   IVPB      heparin   Injectable 5000 Unit(s) SubCutaneous every 8 hours  levETIRAcetam 1000 milliGRAM(s) Oral two times a day  metroNIDAZOLE  IVPB 500 milliGRAM(s) IV Intermittent every 8 hours  mirtazapine 7.5 milliGRAM(s) Oral at bedtime  polyethylene glycol 3350 17 Gram(s) Oral daily  senna 2 Tablet(s) Oral at bedtime  sertraline 50 milliGRAM(s) Oral daily  vancomycin  IVPB 1250 milliGRAM(s) IV Intermittent every 24 hours      ----------------------------------------------------------------------------------------  PHYSICAL EXAM  Constitutional - NAD, Comfortable  HEENT - + R scalp staples   Chest - no respiratory distress  Cardiovascular - RRR, S1S2   Abdomen -  Soft, NTND  Extremities - No C/C/E, No calf tenderness   Neurologic Exam -                    Cognitive - Awake, Alert, AAO to self, place, date, year, situation     Communication - Fluent, No dysarthria     Cranial Nerves - CN 2-12 intact     Motor -                      LEFT    UE - ShAB 5/5, EF 5/5, EE 5/5, WE 5/5,  5/5                    RIGHT UE - ShAB 5/5, EF 5/5, EE 5/5, WE 5/5,  5/5                    LEFT    LE - HF 4+/5, KE 5/5, DF 5/5, PF 5/5                    RIGHT LE - HF 5/5, KE 5/5, DF 5/5, PF 5/5        Sensory - Intact to LT      OculoVestibular - No saccades, No nystagmus, VOR         Balance - WNL Static  Psychiatric - Mood stable, Affect WNL  ----------------------------------------------------------------------------------------  ASSESSMENT/PLAN   59 year old Female status post Left suboccipital craniectomy for resection of cerebellar brain tumor 2023 by Dr Brush, status post traumatic fall with head trauma in 2024 with large acute right SDH, status post emergent right decompressive hemicraniectomy on 24, status post right cranioplasty on 24 with custom plate, status post ETV, ligation of shunt on the right with ligaclips with retention of valve as rescue device, presents as a transfer from [facility] for witnessed seizure at home. CTH obtained at [facility] revealed moderate ventriculomegaly and slight increase in size of extra-axial CSF collection overlying the right frontal duraplasty.  s/p clarissa hole 3/1 with purulent drainage  intubated during admission  now extubated  on keppra  abx per ID  Pain -denies pain  DVT PPX - heparin  continue bedside PT and OT  Rehab - will monitor for improvement and progress with bedside therapy, as well as medical course. On antibiotics, plan for possible additional neurosurgery.  Anticipate inpatient rehab (acute vs jade), goal is for acute rehab if improving and requires assistance for mobility.

## 2025-03-12 LAB
HCT VFR BLD CALC: 36.8 % — SIGNIFICANT CHANGE UP (ref 34.5–45)
HGB BLD-MCNC: 12.7 G/DL — SIGNIFICANT CHANGE UP (ref 11.5–15.5)
MCHC RBC-ENTMCNC: 26.8 PG — LOW (ref 27–34)
MCHC RBC-ENTMCNC: 34.5 G/DL — SIGNIFICANT CHANGE UP (ref 32–36)
MCV RBC AUTO: 77.8 FL — LOW (ref 80–100)
NRBC # BLD AUTO: 0 K/UL — SIGNIFICANT CHANGE UP (ref 0–0)
NRBC # FLD: 0 K/UL — SIGNIFICANT CHANGE UP (ref 0–0)
NRBC BLD AUTO-RTO: 0 /100 WBCS — SIGNIFICANT CHANGE UP (ref 0–0)
PLATELET # BLD AUTO: 273 K/UL — SIGNIFICANT CHANGE UP (ref 150–400)
RBC # BLD: 4.73 M/UL — SIGNIFICANT CHANGE UP (ref 3.8–5.2)
RBC # FLD: 14.7 % — HIGH (ref 10.3–14.5)
WBC # BLD: 7.98 K/UL — SIGNIFICANT CHANGE UP (ref 3.8–10.5)
WBC # FLD AUTO: 7.98 K/UL — SIGNIFICANT CHANGE UP (ref 3.8–10.5)

## 2025-03-12 PROCEDURE — 99232 SBSQ HOSP IP/OBS MODERATE 35: CPT

## 2025-03-12 PROCEDURE — G0545: CPT

## 2025-03-12 RX ADMIN — Medication 100 MILLIGRAM(S): at 15:18

## 2025-03-12 RX ADMIN — Medication 100 MILLIGRAM(S): at 00:19

## 2025-03-12 RX ADMIN — CEFEPIME 100 MILLIGRAM(S): 2 INJECTION, POWDER, FOR SOLUTION INTRAVENOUS at 06:24

## 2025-03-12 RX ADMIN — CEFEPIME 100 MILLIGRAM(S): 2 INJECTION, POWDER, FOR SOLUTION INTRAVENOUS at 14:16

## 2025-03-12 RX ADMIN — LEVETIRACETAM 1000 MILLIGRAM(S): 10 INJECTION, SOLUTION INTRAVENOUS at 05:27

## 2025-03-12 RX ADMIN — LEVETIRACETAM 1000 MILLIGRAM(S): 10 INJECTION, SOLUTION INTRAVENOUS at 18:20

## 2025-03-12 RX ADMIN — Medication 650 MILLIGRAM(S): at 14:45

## 2025-03-12 RX ADMIN — HEPARIN SODIUM 5000 UNIT(S): 1000 INJECTION INTRAVENOUS; SUBCUTANEOUS at 15:19

## 2025-03-12 RX ADMIN — SERTRALINE 50 MILLIGRAM(S): 100 TABLET, FILM COATED ORAL at 13:48

## 2025-03-12 RX ADMIN — MIRTAZAPINE 7.5 MILLIGRAM(S): 30 TABLET, FILM COATED ORAL at 22:16

## 2025-03-12 RX ADMIN — CEFEPIME 100 MILLIGRAM(S): 2 INJECTION, POWDER, FOR SOLUTION INTRAVENOUS at 22:17

## 2025-03-12 RX ADMIN — HEPARIN SODIUM 5000 UNIT(S): 1000 INJECTION INTRAVENOUS; SUBCUTANEOUS at 08:32

## 2025-03-12 RX ADMIN — Medication 166.67 MILLIGRAM(S): at 19:26

## 2025-03-12 RX ADMIN — Medication 100 MILLIGRAM(S): at 08:32

## 2025-03-12 RX ADMIN — HEPARIN SODIUM 5000 UNIT(S): 1000 INJECTION INTRAVENOUS; SUBCUTANEOUS at 00:21

## 2025-03-12 RX ADMIN — Medication 166.67 MILLIGRAM(S): at 06:24

## 2025-03-12 RX ADMIN — Medication 650 MILLIGRAM(S): at 13:49

## 2025-03-12 NOTE — PROGRESS NOTE ADULT - SUBJECTIVE AND OBJECTIVE BOX
Infectious Diseases Follow Up:    Patient is a 60y old  Female who presents with a chief complaint of seizure (12 Mar 2025 01:01)      Interval History/ROS:  No acute events    Allergies  No Known Allergies        ANTIMICROBIALS:  cefepime   IVPB 2000 every 8 hours  cefepime   IVPB    metroNIDAZOLE  IVPB 500 every 8 hours  vancomycin  IVPB 1250 every 12 hours      Current Abx:     Previous Abx     OTHER MEDS:  MEDICATIONS  (STANDING):  acetaminophen     Tablet .. 650 every 6 hours PRN  albuterol    90 MICROgram(s) HFA Inhaler 2 every 4 hours PRN  heparin   Injectable 5000 every 8 hours  levETIRAcetam 1000 two times a day  mirtazapine 7.5 at bedtime  polyethylene glycol 3350 17 daily  senna 2 at bedtime  sertraline 50 daily      Vital Signs Last 24 Hrs  T(C): 37 (12 Mar 2025 04:57), Max: 37 (11 Mar 2025 17:27)  T(F): 98.6 (12 Mar 2025 04:57), Max: 98.6 (11 Mar 2025 17:27)  HR: 85 (12 Mar 2025 04:57) (85 - 100)  BP: 113/56 (12 Mar 2025 04:57) (109/54 - 120/60)  BP(mean): --  RR: 17 (12 Mar 2025 04:57) (16 - 17)  SpO2: 95% (12 Mar 2025 04:57) (95% - 97%)    Parameters below as of 12 Mar 2025 04:57  Patient On (Oxygen Delivery Method): room air      PHYSICAL EXAM:  GENERAL: NAD, well-developed  HEAD:  R sided dressing   EYES: EOMI, conjunctiva and sclera clear  CHEST/LUNG: On RA, CTAB  HEART: Regular rate and rhythm; No murmurs, rubs, or gallops  ABDOMEN: Soft, Nontender, Nondistended; Bowel sounds present  PSYCH: AAO x3                          12.7   7.98  )-----------( 273      ( 12 Mar 2025 04:10 )             36.8       03-10    135  |  102  |  15  ----------------------------<  114[H]  4.2   |  19[L]  |  0.42[L]    Ca    9.4      10 Mar 2025 12:45  Phos  3.2     03-10  Mg     2.10     03-10        Urinalysis Basic - ( 10 Mar 2025 12:45 )    Color: x / Appearance: x / SG: x / pH: x  Gluc: 114 mg/dL / Ketone: x  / Bili: x / Urobili: x   Blood: x / Protein: x / Nitrite: x   Leuk Esterase: x / RBC: x / WBC x   Sq Epi: x / Non Sq Epi: x / Bacteria: x        MICROBIOLOGY:  v  Surgical Swab  03-01-25   No growth at 5 days  --    Few polymorphonuclear leukocytes per low power field  No organisms seen per oil power field      CSF CSF  02-28-25   No Growth at 10 Days  --    No polymorphonuclear cells seen  No organisms seen  by cytocentrifuge      Catheterized Catheterized  02-26-25   No growth  --  --      .Blood Blood-Peripheral  02-26-25   No growth at 5 days  --  --      .Blood Blood-Peripheral  02-26-25   No growth at 5 days  --  --      .Blood Blood  02-22-25   No growth at 5 days  --  --      .Blood Blood  02-22-25   No growth at 5 days  --  --                RADIOLOGY:

## 2025-03-12 NOTE — PROGRESS NOTE ADULT - SUBJECTIVE AND OBJECTIVE BOX
Patient is a 60y old  Female who presents with a chief complaint of seizure (12 Mar 2025 10:13)      HPI:  60 yo F with PMHx of neurofibromatosis, s/p L suboccipital craniectomy for resection of cerebellar brain tumor 2023 by Dr Brush, s/p traumatic fall with head trauma in 2024, at that time sustained a large acute right SDH, and underwent an emergent right decompressive hemicraniectomy on 24, s/p right cranioplasty on 24 with custom plate, s/p ETV, ligation of shunt on the right with ligaclips with retention of valve as rescue device, presents as a transfer from [facility] for witnessed seizure at home. CTH obtained at [facility] revealed stable  shunt and moderate ventriculomegaly and slight increase in size of extra-axial CSF collection overlying the right frontal duraplasty, likely a hygroma and no mass effect.    (2025 23:29)      REVIEW OF SYSTEMS  Constitutional - No fever, No weight loss, No fatigue  HEENT - No eye pain, No visual disturbances, No difficulty hearing, No tinnitus, No vertigo, No neck pain  Respiratory - No cough, No wheezing, No shortness of breath  Cardiovascular - No chest pain, No palpitations  Gastrointestinal - No abdominal pain, No nausea, No vomiting, No diarrhea, No constipation  Genitourinary - No dysuria, No frequency, No hematuria, No incontinence  Neurological - No headaches, No memory loss, + loss of strength, No numbness, No tremors  Skin - No itching, No rashes, No lesions   Endocrine - No temperature intolerance  Musculoskeletal - No joint pain, No joint swelling, No muscle pain  Psychiatric - No depression, No anxiety    PAST MEDICAL & SURGICAL HISTORY  Asthma    Depression    Neoplasm of uncertain behavior of brain    Neurofibromatosis, type 1    Neurofibromatosis    History of hydrocephalus    Anxiety    Delivery with history of     S/P tubal ligation    History of arthroscopy of left knee    S/P LASIK Surgery    H/O brain tumor    S/P  shunt         CURRENT FUNCTIONAL STATUS  Bed Mobility: Scooting/Bridging:     · Level of Zebulon	contact guard  · Physical Assist/Nonphysical Assist	verbal cues; nonverbal cues (demo/gestures); 1 person assist; set-up required    Bed Mobility: Sit to Supine:     · Level of Zebulon	contact guard  · Physical Assist/Nonphysical Assist	verbal cues; nonverbal cues (demo/gestures); 1 person assist; set-up required  · Assistive Device	bed rails    Bed Mobility: Supine to Sit:     · Level of Zebulon	contact guard  · Physical Assist/Nonphysical Assist	verbal cues; nonverbal cues (demo/gestures); 1 person assist; set-up required  · Assistive Device	bed rails    Bed Mobility Analysis:     · Bed Mobility Limitations	decreased ability to use legs for bridging/pushing; impaired ability to control trunk for mobility  · Impairments Contributing to Impaired Bed Mobility	impaired balance; impaired postural control; decreased strength    Transfer: Sit to Stand:     · Level of Zebulon	moderate assist (50% patients effort)  · Physical Assist/Nonphysical Assist	1 person assist; nonverbal cues (demo/gestures); verbal cues; set-up required  · Weight-Bearing Restrictions	full weight-bearing  · Assistive Device	UE support    Transfer: Stand to Sit:     · Level of Zebulon	moderate assist (50% patients effort)  · Physical Assist/Nonphysical Assist	1 person assist; nonverbal cues (demo/gestures); verbal cues; set-up required  · Weight-Bearing Restrictions	full weight-bearing  · Assistive Device	UE support    Sit/Stand Transfer Safety Analysis:     · Transfer Safety Concerns Noted	decreased step length  · Impairments Contributing to Impaired Transfers	impaired balance; impaired postural control; decreased strength    Gait Skills:     · Level of Zebulon	unable to perform; patient deferred at this time despite maximum encouragement          RECENT LABS/IMAGING  CBC Full  -  ( 12 Mar 2025 04:10 )  WBC Count : 7.98 K/uL  RBC Count : 4.73 M/uL  Hemoglobin : 12.7 g/dL  Hematocrit : 36.8 %  Platelet Count - Automated : 273 K/uL  Mean Cell Volume : 77.8 fL  Mean Cell Hemoglobin : 26.8 pg  Mean Cell Hemoglobin Concentration : 34.5 g/dL  Auto Neutrophil # : x  Auto Lymphocyte # : x  Auto Monocyte # : x  Auto Eosinophil # : x  Auto Basophil # : x  Auto Neutrophil % : x  Auto Lymphocyte % : x  Auto Monocyte % : x  Auto Eosinophil % : x  Auto Basophil % : x    03-10    135  |  102  |  15  ----------------------------<  114[H]  4.2   |  19[L]  |  0.42[L]    Ca    9.4      10 Mar 2025 12:45  Phos  3.2     03-10  Mg     2.10     03-10      Urinalysis Basic - ( 10 Mar 2025 12:45 )    Color: x / Appearance: x / SG: x / pH: x  Gluc: 114 mg/dL / Ketone: x  / Bili: x / Urobili: x   Blood: x / Protein: x / Nitrite: x   Leuk Esterase: x / RBC: x / WBC x   Sq Epi: x / Non Sq Epi: x / Bacteria: x        VITALS  T(C): 36.3 (25 @ 09:00), Max: 37 (25 @ 17:27)  HR: 90 (25 @ 09:00) (85 - 100)  BP: 109/61 (25 @ 09:00) (109/54 - 120/60)  RR: 18 (25 @ 09:00) (16 - 18)  SpO2: 97% (25 @ 09:00) (95% - 97%)  Wt(kg): --    ALLERGIES  No Known Allergies      MEDICATIONS   acetaminophen     Tablet .. 650 milliGRAM(s) Oral every 6 hours PRN  albuterol    90 MICROgram(s) HFA Inhaler 2 Puff(s) Inhalation every 4 hours PRN  cefepime   IVPB 2000 milliGRAM(s) IV Intermittent every 8 hours  cefepime   IVPB      heparin   Injectable 5000 Unit(s) SubCutaneous every 8 hours  levETIRAcetam 1000 milliGRAM(s) Oral two times a day  metroNIDAZOLE  IVPB 500 milliGRAM(s) IV Intermittent every 8 hours  mirtazapine 7.5 milliGRAM(s) Oral at bedtime  polyethylene glycol 3350 17 Gram(s) Oral daily  senna 2 Tablet(s) Oral at bedtime  sertraline 50 milliGRAM(s) Oral daily  vancomycin  IVPB 1250 milliGRAM(s) IV Intermittent every 12 hours      ----------------------------------------------------------------------------------------   PHYSICAL EXAM  Constitutional - NAD, Comfortable  HEENT - + R scalp staples   Chest - no respiratory distress  Cardiovascular - RRR, S1S2   Abdomen -  Soft, NTND  Extremities - No C/C/E, No calf tenderness   Neurologic Exam -                    Cognitive - Awake, Alert, AAO to self, place, date, year, situation     Communication - Fluent, No dysarthria     Cranial Nerves - CN 2-12 intact     Motor -                      LEFT    UE - ShAB 5/5, EF 5/5, EE 5/5, WE 5/5,  5/5                    RIGHT UE - ShAB 5/5, EF 5/5, EE 5/5, WE 5/5,  5/5                    LEFT    LE - HF 4+/5, KE 5/5, DF 5/5, PF 5/5                    RIGHT LE - HF 5/5, KE 5/5, DF 5/5, PF 5/5        Sensory - Intact to LT      OculoVestibular - No saccades, No nystagmus, VOR         Balance - WNL Static  Psychiatric - Mood stable, Affect WNL  ----------------------------------------------------------------------------------------  ASSESSMENT/PLAN   59 year old Female status post Left suboccipital craniectomy for resection of cerebellar brain tumor 2023 by Dr Brush, status post traumatic fall with head trauma in 2024 with large acute right SDH, status post emergent right decompressive hemicraniectomy on 24, status post right cranioplasty on 24 with custom plate, status post ETV, ligation of shunt on the right with ligaclips with retention of valve as rescue device, presents as a transfer from [facility] for witnessed seizure at home. CTH obtained at [facility] revealed moderate ventriculomegaly and slight increase in size of extra-axial CSF collection overlying the right frontal duraplasty.  s/p clarissa hole 3/1 with purulent drainage  on keppra  abx per ID, planning for additional neurosurgery 3/20  Pain -denies pain  DVT PPX - heparin  continue bedside PT and OT  Rehab - will monitor for improvement and progress with bedside therapy, as well as medical course.  Anticipate inpatient rehab (acute vs jade), goal is for acute rehab if improving and requires assistance for mobility.

## 2025-03-12 NOTE — PROGRESS NOTE ADULT - ASSESSMENT
58 yo F with PMHx of neurofibromatosis, s/p L suboccipital craniectomy for resection of cerebellar brain tumor 2023 by Dr Brush, s/p traumatic fall with head trauma in 2024 with large acute right SDH, s/p emergent right decompressive hemicraniectomy on 24, s/p right cranioplasty on 24 with custom plate, s/p ETV, ligation of shunt on the right with ligaclips with retention of valve as rescue device, presents as a transfer from [facility] for witnessed seizure at home. CTH obtained at [facility] revealed moderate ventriculomegaly and slight increase in size of extra-axial CSF collection overlying the right frontal duraplasty     Exam with decreased strength on left side, post ictal vs intracranial causes, will get MRI/MRA to assess and CTH/CTA if MRI will be delayed. On zosyn, white count downtrending.   :  CTH/ CTA done stable , CTA negative, MRIw/flow/MRA ordered, intubated /sedated, neurology saw Veeg-O/ neg, on keppra, K+3.3 repleted, afbrile, bld cltr NGTD, UA neg, WBC nml  : stable exam,  Veeg on, keppra, mri w/flow/ mra pending  : Stable exam. No seizures noted on VEEG. On keppra. MRI with CSF flow study and MRA ordered  : Extubated, alert, mildly confused and restless, nonfocal exam. On keppra. MRI showed increase in right epidural collection with diffusion restriction. Shunt (as reservoir) tapped, W1 R1  GSN  3/1: Alert, neurologically intact. On keppra. OR for clarissa hold for drainage of epidural abscess. Cx sent, NGTD. EDJPx1  Was intubated preop for stridor, dex x24hrs  3: POD1: Intubated, ENT scoped determined airway patent, ID-Following initiated on broad spectrum abx, OR cultures GSN, AFB neg, Cx pending. Dex discontinued.  3/3 POD2, intubated, on flagyl/vanc/cefipime, K 3.1- repletion ordered. EDJPx1 105cc/24hrs, PT recc LOUIS  3/4: pod 3, intubated for respiratory distress, on tripple abx, ED SHAYY to suction, f/u OR cltrs NGTD  3 pod 4, remains intubated, neuro exam stable, ED SHAYY drain pus tinged 30cc, on triple Abx, OR cltrs NGTD, ID following,    3/6: POD # 5 stable exam. Patient remains intubated. On triple antibiotics, decadron for stridor.    3/7-10: POD # 6-9 Neurologically intact. Patient tolerating extubation. On triple antibiotics  311: POD # 10  Neurologically intact. On triple antibiotics. Plan for removal of ommaya and cranioplasty next week  3/12: pod 11, on tripple abx per ID, neuro exam stable, plan for replacement of cranioplasty and removal of ligated shunt on 3/20, PT following.

## 2025-03-12 NOTE — PROGRESS NOTE ADULT - SUBJECTIVE AND OBJECTIVE BOX
No issues overnight    Vital Signs Last 24 Hrs  T(C): 36.7 (11 Mar 2025 20:51), Max: 37.3 (11 Mar 2025 05:03)  T(F): 98.1 (11 Mar 2025 20:51), Max: 99.2 (11 Mar 2025 05:03)  HR: 100 (11 Mar 2025 20:51) (97 - 100)  BP: 120/60 (11 Mar 2025 20:51) (103/61 - 130/58)  BP(mean): --  ABP: --  ABP(mean): --  RR: 17 (11 Mar 2025 20:51) (16 - 18)  SpO2: 97% (11 Mar 2025 20:51) (97% - 100%)    O2 Parameters below as of 11 Mar 2025 20:51  Patient On (Oxygen Delivery Method): room air      AAO X 3  PERRLA, EOMI  CN 2-12 grossly intact  CLARK strength 5/5  No dysmetria or drift  SILT    MEDICATIONS  (STANDING):  cefepime   IVPB 2000 milliGRAM(s) IV Intermittent every 8 hours  cefepime   IVPB      heparin   Injectable 5000 Unit(s) SubCutaneous every 8 hours  levETIRAcetam 1000 milliGRAM(s) Oral two times a day  metroNIDAZOLE  IVPB 500 milliGRAM(s) IV Intermittent every 8 hours  mirtazapine 7.5 milliGRAM(s) Oral at bedtime  polyethylene glycol 3350 17 Gram(s) Oral daily  senna 2 Tablet(s) Oral at bedtime  sertraline 50 milliGRAM(s) Oral daily  vancomycin  IVPB 1000 milliGRAM(s) IV Intermittent every 12 hours    MEDICATIONS  (PRN):  acetaminophen     Tablet .. 650 milliGRAM(s) Oral every 6 hours PRN Temp greater or equal to 38C (100.4F), Mild Pain (1 - 3)  albuterol    90 MICROgram(s) HFA Inhaler 2 Puff(s) Inhalation every 4 hours PRN Shortness of Breath and/or Wheezing                                           13.2   8.30  )-----------( 278      ( 11 Mar 2025 04:37 )             38.5     03-10    135  |  102  |  15  ----------------------------<  114[H]  4.2   |  19[L]  |  0.42[L]    Ca    9.4      10 Mar 2025 12:45  Phos  3.2     03-10  Mg     2.10     03-10

## 2025-03-12 NOTE — PROGRESS NOTE ADULT - PROBLEM SELECTOR PLAN 1
Neurologic checks Q4H  Continue Abx, vanco trough prior to 4th dose  Follow up cultures  Follow up with ID  Continue PT  preop for OR in 3/20

## 2025-03-13 LAB
ANION GAP SERPL CALC-SCNC: 12 MMOL/L — SIGNIFICANT CHANGE UP (ref 7–14)
BUN SERPL-MCNC: 11 MG/DL — SIGNIFICANT CHANGE UP (ref 7–23)
CALCIUM SERPL-MCNC: 9.3 MG/DL — SIGNIFICANT CHANGE UP (ref 8.4–10.5)
CHLORIDE SERPL-SCNC: 105 MMOL/L — SIGNIFICANT CHANGE UP (ref 98–107)
CO2 SERPL-SCNC: 21 MMOL/L — LOW (ref 22–31)
CREAT SERPL-MCNC: 0.39 MG/DL — LOW (ref 0.5–1.3)
EGFR: 114 ML/MIN/1.73M2 — SIGNIFICANT CHANGE UP
EGFR: 114 ML/MIN/1.73M2 — SIGNIFICANT CHANGE UP
GLUCOSE SERPL-MCNC: 99 MG/DL — SIGNIFICANT CHANGE UP (ref 70–99)
HCT VFR BLD CALC: 38.5 % — SIGNIFICANT CHANGE UP (ref 34.5–45)
HGB BLD-MCNC: 12.9 G/DL — SIGNIFICANT CHANGE UP (ref 11.5–15.5)
MAGNESIUM SERPL-MCNC: 2 MG/DL — SIGNIFICANT CHANGE UP (ref 1.6–2.6)
MCHC RBC-ENTMCNC: 26.9 PG — LOW (ref 27–34)
MCHC RBC-ENTMCNC: 33.5 G/DL — SIGNIFICANT CHANGE UP (ref 32–36)
MCV RBC AUTO: 80.2 FL — SIGNIFICANT CHANGE UP (ref 80–100)
NRBC # BLD AUTO: 0 K/UL — SIGNIFICANT CHANGE UP (ref 0–0)
NRBC # FLD: 0 K/UL — SIGNIFICANT CHANGE UP (ref 0–0)
NRBC BLD AUTO-RTO: 0 /100 WBCS — SIGNIFICANT CHANGE UP (ref 0–0)
PHOSPHATE SERPL-MCNC: 3.4 MG/DL — SIGNIFICANT CHANGE UP (ref 2.5–4.5)
PLATELET # BLD AUTO: 274 K/UL — SIGNIFICANT CHANGE UP (ref 150–400)
POTASSIUM SERPL-MCNC: 3.7 MMOL/L — SIGNIFICANT CHANGE UP (ref 3.5–5.3)
POTASSIUM SERPL-SCNC: 3.7 MMOL/L — SIGNIFICANT CHANGE UP (ref 3.5–5.3)
RBC # BLD: 4.8 M/UL — SIGNIFICANT CHANGE UP (ref 3.8–5.2)
RBC # FLD: 14.8 % — HIGH (ref 10.3–14.5)
SODIUM SERPL-SCNC: 138 MMOL/L — SIGNIFICANT CHANGE UP (ref 135–145)
VANCOMYCIN TROUGH SERPL-MCNC: 16.7 UG/ML — SIGNIFICANT CHANGE UP (ref 10–20)
WBC # BLD: 8.13 K/UL — SIGNIFICANT CHANGE UP (ref 3.8–10.5)
WBC # FLD AUTO: 8.13 K/UL — SIGNIFICANT CHANGE UP (ref 3.8–10.5)

## 2025-03-13 RX ADMIN — LEVETIRACETAM 1000 MILLIGRAM(S): 10 INJECTION, SOLUTION INTRAVENOUS at 06:08

## 2025-03-13 RX ADMIN — SERTRALINE 50 MILLIGRAM(S): 100 TABLET, FILM COATED ORAL at 12:09

## 2025-03-13 RX ADMIN — Medication 2 TABLET(S): at 21:34

## 2025-03-13 RX ADMIN — HEPARIN SODIUM 5000 UNIT(S): 1000 INJECTION INTRAVENOUS; SUBCUTANEOUS at 08:57

## 2025-03-13 RX ADMIN — Medication 166.67 MILLIGRAM(S): at 18:34

## 2025-03-13 RX ADMIN — CEFEPIME 100 MILLIGRAM(S): 2 INJECTION, POWDER, FOR SOLUTION INTRAVENOUS at 14:32

## 2025-03-13 RX ADMIN — CEFEPIME 100 MILLIGRAM(S): 2 INJECTION, POWDER, FOR SOLUTION INTRAVENOUS at 06:08

## 2025-03-13 RX ADMIN — LEVETIRACETAM 1000 MILLIGRAM(S): 10 INJECTION, SOLUTION INTRAVENOUS at 17:26

## 2025-03-13 RX ADMIN — HEPARIN SODIUM 5000 UNIT(S): 1000 INJECTION INTRAVENOUS; SUBCUTANEOUS at 16:05

## 2025-03-13 RX ADMIN — MIRTAZAPINE 7.5 MILLIGRAM(S): 30 TABLET, FILM COATED ORAL at 21:34

## 2025-03-13 RX ADMIN — Medication 100 MILLIGRAM(S): at 16:04

## 2025-03-13 RX ADMIN — Medication 166.67 MILLIGRAM(S): at 07:51

## 2025-03-13 RX ADMIN — Medication 100 MILLIGRAM(S): at 08:57

## 2025-03-13 RX ADMIN — HEPARIN SODIUM 5000 UNIT(S): 1000 INJECTION INTRAVENOUS; SUBCUTANEOUS at 00:39

## 2025-03-13 RX ADMIN — Medication 100 MILLIGRAM(S): at 00:39

## 2025-03-13 RX ADMIN — CEFEPIME 100 MILLIGRAM(S): 2 INJECTION, POWDER, FOR SOLUTION INTRAVENOUS at 21:33

## 2025-03-13 NOTE — PROGRESS NOTE ADULT - PROBLEM SELECTOR PLAN 1
- cont Neurologic checks Q4H  - Continue Abx per ID, vanco trough prior to 4th dose  - OR Cx NGTD  - Continue PT/OT  - preop for OR in 3/20.    [id number]    Case discussed with attending neurosurgeon Dr. Brush

## 2025-03-13 NOTE — PROGRESS NOTE ADULT - ASSESSMENT
58 yo F with PMHx of neurofibromatosis, s/p L suboccipital craniectomy for resection of cerebellar brain tumor 2023 by Dr Brush, s/p traumatic fall with head trauma in 2024 with large acute right SDH, s/p emergent right decompressive hemicraniectomy on 24, s/p right cranioplasty on 24 with custom plate, s/p ETV, ligation of shunt on the right with ligaclips with retention of valve as rescue device, presents as a transfer from [facility] for witnessed seizure at home. CTH obtained at [facility] revealed moderate ventriculomegaly and slight increase in size of extra-axial CSF collection overlying the right frontal duraplasty     Exam with decreased strength on left side, post ictal vs intracranial causes, will get MRI/MRA to assess and CTH/CTA if MRI will be delayed. On zosyn, white count downtrending.   :  CTH/ CTA done stable , CTA negative, MRIw/flow/MRA ordered, intubated /sedated, neurology saw Veeg-O/ neg, on keppra, K+3.3 repleted, afbrile, bld cltr NGTD, UA neg, WBC nml  : stable exam,  Veeg on, keppra, mri w/flow/ mra pending  : Stable exam. No seizures noted on VEEG. On keppra. MRI with CSF flow study and MRA ordered  : Extubated, alert, mildly confused and restless, nonfocal exam. On keppra. MRI showed increase in right epidural collection with diffusion restriction. Shunt (as reservoir) tapped, W1 R1  GSN  3/1: Alert, neurologically intact. On keppra. OR for clarissa hold for drainage of epidural abscess. Cx sent, NGTD. EDJPx1  Was intubated preop for stridor, dex x24hrs  3: POD1: Intubated, ENT scoped determined airway patent, ID-Following initiated on broad spectrum abx, OR cultures GSN, AFB neg, Cx pending. Dex discontinued.  3/3 POD2, intubated, on flagyl/vanc/cefipime, K 3.1- repletion ordered. EDJPx1 105cc/24hrs, PT recc LOUIS  3/4: pod 3, intubated for respiratory distress, on tripple abx, ED SHAYY to suction, f/u OR cltrs NGTD  3 pod 4, remains intubated, neuro exam stable, ED SHAYY drain pus tinged 30cc, on triple Abx, OR cltrs NGTD, ID following,    3/6: POD # 5 stable exam. Patient remains intubated. On triple antibiotics, decadron for stridor.    3/7-10: POD # 6-9 Neurologically intact. Patient tolerating extubation. On triple antibiotics  3: POD # 10  Neurologically intact. On triple antibiotics. Plan for removal of ommaya and cranioplasty next week  3/12: pod 11, on tripple abx per ID, neuro exam stable, plan for replacement of cranioplasty and removal of ligated shunt on 3/20, PT following.  3/13 POD 12, on vanc, cefepime and metronidazole, neuro exam stable, plan for replacement of cranioplasty and removal of ligated shunt on 3/20, PT, OT, PMR following.

## 2025-03-13 NOTE — PROGRESS NOTE ADULT - SUBJECTIVE AND OBJECTIVE BOX
PAST 24HR EVENTS: no acute events reported overnight.     Vital Signs Last 24 Hrs  T(C): 37.6 (13 Mar 2025 00:29), Max: 37.6 (13 Mar 2025 00:29)  T(F): 99.6 (13 Mar 2025 00:29), Max: 99.6 (13 Mar 2025 00:29)  HR: 88 (13 Mar 2025 00:29) (85 - 102)  BP: 115/56 (13 Mar 2025 00:29) (100/51 - 119/75)  BP(mean): --  RR: 18 (13 Mar 2025 00:29) (17 - 19)  SpO2: 99% (13 Mar 2025 00:29) (95% - 99%)    Parameters below as of 13 Mar 2025 00:29  Patient On (Oxygen Delivery Method): room air        MEDS:   acetaminophen     Tablet .. 650 milliGRAM(s) Oral every 6 hours PRN  albuterol    90 MICROgram(s) HFA Inhaler 2 Puff(s) Inhalation every 4 hours PRN  cefepime   IVPB 2000 milliGRAM(s) IV Intermittent every 8 hours  cefepime   IVPB      heparin   Injectable 5000 Unit(s) SubCutaneous every 8 hours  levETIRAcetam 1000 milliGRAM(s) Oral two times a day  metroNIDAZOLE  IVPB 500 milliGRAM(s) IV Intermittent every 8 hours  mirtazapine 7.5 milliGRAM(s) Oral at bedtime  polyethylene glycol 3350 17 Gram(s) Oral daily  senna 2 Tablet(s) Oral at bedtime  sertraline 50 milliGRAM(s) Oral daily  vancomycin  IVPB 1250 milliGRAM(s) IV Intermittent every 12 hours      LABS:                        12.7   7.98  )-----------( 273      ( 12 Mar 2025 04:10 )             36.8         PHYSICAL EXAM:    AAO X 3  PERRLA, EOMI  CN 2-12 grossly intact  CLARK strength 5/5  No dysmetria or drift  SILT

## 2025-03-14 LAB
ANION GAP SERPL CALC-SCNC: 16 MMOL/L — HIGH (ref 7–14)
BUN SERPL-MCNC: 11 MG/DL — SIGNIFICANT CHANGE UP (ref 7–23)
CALCIUM SERPL-MCNC: 9 MG/DL — SIGNIFICANT CHANGE UP (ref 8.4–10.5)
CHLORIDE SERPL-SCNC: 103 MMOL/L — SIGNIFICANT CHANGE UP (ref 98–107)
CO2 SERPL-SCNC: 18 MMOL/L — LOW (ref 22–31)
CREAT SERPL-MCNC: 0.45 MG/DL — LOW (ref 0.5–1.3)
CULTURE RESULTS: SIGNIFICANT CHANGE UP
EGFR: 110 ML/MIN/1.73M2 — SIGNIFICANT CHANGE UP
EGFR: 110 ML/MIN/1.73M2 — SIGNIFICANT CHANGE UP
GLUCOSE SERPL-MCNC: 101 MG/DL — HIGH (ref 70–99)
MAGNESIUM SERPL-MCNC: 1.9 MG/DL — SIGNIFICANT CHANGE UP (ref 1.6–2.6)
PHOSPHATE SERPL-MCNC: 3.7 MG/DL — SIGNIFICANT CHANGE UP (ref 2.5–4.5)
POTASSIUM SERPL-MCNC: 3.6 MMOL/L — SIGNIFICANT CHANGE UP (ref 3.5–5.3)
POTASSIUM SERPL-SCNC: 3.6 MMOL/L — SIGNIFICANT CHANGE UP (ref 3.5–5.3)
SODIUM SERPL-SCNC: 137 MMOL/L — SIGNIFICANT CHANGE UP (ref 135–145)
SPECIMEN SOURCE: SIGNIFICANT CHANGE UP

## 2025-03-14 PROCEDURE — G0545: CPT

## 2025-03-14 PROCEDURE — 99232 SBSQ HOSP IP/OBS MODERATE 35: CPT

## 2025-03-14 RX ORDER — MAGNESIUM, ALUMINUM HYDROXIDE 200-200 MG
30 TABLET,CHEWABLE ORAL ONCE
Refills: 0 | Status: COMPLETED | OUTPATIENT
Start: 2025-03-14 | End: 2025-03-14

## 2025-03-14 RX ADMIN — SERTRALINE 50 MILLIGRAM(S): 100 TABLET, FILM COATED ORAL at 11:48

## 2025-03-14 RX ADMIN — Medication 100 MILLIGRAM(S): at 00:58

## 2025-03-14 RX ADMIN — LEVETIRACETAM 1000 MILLIGRAM(S): 10 INJECTION, SOLUTION INTRAVENOUS at 19:07

## 2025-03-14 RX ADMIN — Medication 30 MILLILITER(S): at 15:23

## 2025-03-14 RX ADMIN — Medication 2 TABLET(S): at 22:46

## 2025-03-14 RX ADMIN — MIRTAZAPINE 7.5 MILLIGRAM(S): 30 TABLET, FILM COATED ORAL at 22:46

## 2025-03-14 RX ADMIN — Medication 166.67 MILLIGRAM(S): at 06:10

## 2025-03-14 RX ADMIN — Medication 100 MILLIGRAM(S): at 11:03

## 2025-03-14 RX ADMIN — LEVETIRACETAM 1000 MILLIGRAM(S): 10 INJECTION, SOLUTION INTRAVENOUS at 05:34

## 2025-03-14 RX ADMIN — Medication 100 MILLIGRAM(S): at 19:07

## 2025-03-14 RX ADMIN — CEFEPIME 100 MILLIGRAM(S): 2 INJECTION, POWDER, FOR SOLUTION INTRAVENOUS at 05:33

## 2025-03-14 RX ADMIN — CEFEPIME 100 MILLIGRAM(S): 2 INJECTION, POWDER, FOR SOLUTION INTRAVENOUS at 13:54

## 2025-03-14 RX ADMIN — Medication 166.67 MILLIGRAM(S): at 20:07

## 2025-03-14 RX ADMIN — HEPARIN SODIUM 5000 UNIT(S): 1000 INJECTION INTRAVENOUS; SUBCUTANEOUS at 00:58

## 2025-03-14 RX ADMIN — HEPARIN SODIUM 5000 UNIT(S): 1000 INJECTION INTRAVENOUS; SUBCUTANEOUS at 15:27

## 2025-03-14 RX ADMIN — HEPARIN SODIUM 5000 UNIT(S): 1000 INJECTION INTRAVENOUS; SUBCUTANEOUS at 08:48

## 2025-03-14 RX ADMIN — CEFEPIME 100 MILLIGRAM(S): 2 INJECTION, POWDER, FOR SOLUTION INTRAVENOUS at 22:45

## 2025-03-14 NOTE — PROGRESS NOTE ADULT - ASSESSMENT
This is a 58 y/o F w/ very extensive PMHx of NF, 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), s/p  shunt (in [facility]), s/p traumatic fall w/ R SDH, s/p R decompressive hemicraniectomy, ICP monitor and R cranioplasty (5/2024), s/p L frontal Endoscopic third ventriculostomy and ligation of R  shunt with Ligaclips with retention of valve, proximal and distal catheters (08/2024) admitted initially to [facility] on 2/22 for seizure, intubated for airway protection and status epilepticus, initially on Zosyn for aspiration PNA.  Initially CTH w/ slight increase in extra axial fluid collection measuring up to 1.5 cm. Pt was transferred to [facility] on 2/23 for neurosurgery eval, given decreased L sided strength, MRI done with increase in R sided collection.  Pt extubated on 2/28, CSF reservoir tapped, no pleocytosis.    Pt went to the OR on 3/1 for R clarissa pole with evacuation of epidural collection, with purulent drainage.     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), s/p  shunt (in [facility]), s/p traumatic fall w/ R SDH, s/p R decompressive hemicraniectomy, ICP monitor and R cranioplasty (5/2024), s/p L frontal Endoscopic third ventriculostomy and ligation of R  shunt with ligaclips with retention of valve, proximal and distal catheters (08/2024) admitted initially to [facility] on 2/22 for seizure, intubated for airway protection and status epilepticus, initially on Zosyn for aspiration PNA, transferred to [facility] on 2/23 for neurosurgery evaluation, now found to with have epidural abscess s/p R Clarissa w/ purulent/milky drainage.  Extubated on 3/3, reintubated for stridor/work of breaking, now extubated on 3/6.    D/w neurosurgery, Cx all negative, CRP low, consider allergic reaction to prosthetic cranioplasty. Would likely treat w/ abx for 6 weeks     Recommendations:   1. Vancomycin, f/u level goal -600, Cefepime 2 g q8, Flagyl 500 mg q8   2. F/u OR Cx, NG  3. Likely removal of cranioplasty and Ommaya on 3/20    Thank you for consulting us and involving us in the management of this patient's case. In addition to reviewing history, imaging, documents, labs, microbiology, and infection control strategies and potential issues.     ID will continue to follow peripherally     Inder Mcintosh M.D.  Attending Physician  Division of Infectious Diseases  Department of Medicine

## 2025-03-14 NOTE — PROGRESS NOTE ADULT - ASSESSMENT
58 yo F with PMHx of neurofibromatosis, s/p L suboccipital craniectomy for resection of cerebellar brain tumor 2023 by Dr Brush, s/p traumatic fall with head trauma in 2024 with large acute right SDH, s/p emergent right decompressive hemicraniectomy on 24, s/p right cranioplasty on 24 with custom plate, s/p ETV, ligation of shunt on the right with ligaclips with retention of valve as rescue device, presents as a transfer from [facility] for witnessed seizure at home. CTH obtained at [facility] revealed moderate ventriculomegaly and slight increase in size of extra-axial CSF collection overlying the right frontal duraplasty     Exam with decreased strength on left side, post ictal vs intracranial causes, will get MRI/MRA to assess and CTH/CTA if MRI will be delayed. On zosyn, white count downtrending.   :  CTH/ CTA done stable , CTA negative, MRIw/flow/MRA ordered, intubated /sedated, neurology saw Veeg-O/ neg, on keppra, K+3.3 repleted, afbrile, bld cltr NGTD, UA neg, WBC nml  : stable exam,  Veeg on, keppra, mri w/flow/ mra pending  : Stable exam. No seizures noted on VEEG. On keppra. MRI with CSF flow study and MRA ordered  : Extubated, alert, mildly confused and restless, nonfocal exam. On keppra. MRI showed increase in right epidural collection with diffusion restriction. Shunt (as reservoir) tapped, W1 R1  GSN  3/1: Alert, neurologically intact. On keppra. OR for clarissa hold for drainage of epidural abscess. Cx sent, NGTD. EDJPx1  Was intubated preop for stridor, dex x24hrs  3: POD1: Intubated, ENT scoped determined airway patent, ID-Following initiated on broad spectrum abx, OR cultures GSN, AFB neg, Cx pending. Dex discontinued.  3/3 POD2, intubated, on flagyl/vanc/cefipime, K 3.1- repletion ordered. EDJPx1 105cc/24hrs, PT recc LOUIS  3/4: pod 3, intubated for respiratory distress, on tripple abx, ED SHAYY to suction, f/u OR cltrs NGTD  3 pod 4, remains intubated, neuro exam stable, ED SHAYY drain pus tinged 30cc, on triple Abx, OR cltrs NGTD, ID following,    3/6: POD # 5 stable exam. Patient remains intubated. On triple antibiotics, decadron for stridor.    3/7-10: POD # 6-9 Neurologically intact. Patient tolerating extubation. On triple antibiotics  3: POD # 10  Neurologically intact. On triple antibiotics. Plan for removal of ommaya and cranioplasty next week  3/12: pod 11, on tripple abx per ID, neuro exam stable, plan for replacement of cranioplasty and removal of ligated shunt on 3/20, PT following.  3/13 POD 12, on vanc, cefepime and metronidazole, neuro exam stable, plan for replacement of cranioplasty and removal of ligated shunt on 3/20, PT, OT, PMR following.  3/14 POD 13, on vanc, cefepime, and metronidazole, neuro exam stable, plan for replacement of cranioplasty and removal of ligated shunt on 3/20. PT, OT, PMR following. Goal for acute rehab if continues to improve.

## 2025-03-14 NOTE — PROGRESS NOTE ADULT - PROBLEM SELECTOR PLAN 1
- cont Neurologic checks Q4H  - Continue Abx per ID, vanco trough 3/13 16.7  - OR Cx NGTD  - Continue PT/OT  - preop for OR on 3/20    [id number]    Case discussed with attending neurosurgeon Dr. Brush

## 2025-03-14 NOTE — PROGRESS NOTE ADULT - SUBJECTIVE AND OBJECTIVE BOX
PAST 24HR EVENTS:  No issues overnight.     Vital Signs Last 24 Hrs  T(C): 36.7 (14 Mar 2025 00:49), Max: 37 (13 Mar 2025 05:37)  T(F): 98.1 (14 Mar 2025 00:49), Max: 98.6 (13 Mar 2025 05:37)  HR: 104 (14 Mar 2025 00:49) (79 - 104)  BP: 107/82 (14 Mar 2025 00:49) (103/60 - 119/66)  BP(mean): --  RR: 18 (14 Mar 2025 00:49) (16 - 18)  SpO2: 99% (14 Mar 2025 00:49) (97% - 99%)    Parameters below as of 14 Mar 2025 00:49  Patient On (Oxygen Delivery Method): room air        MEDS:   acetaminophen     Tablet .. 650 milliGRAM(s) Oral every 6 hours PRN  albuterol    90 MICROgram(s) HFA Inhaler 2 Puff(s) Inhalation every 4 hours PRN  cefepime   IVPB      cefepime   IVPB 2000 milliGRAM(s) IV Intermittent every 8 hours  heparin   Injectable 5000 Unit(s) SubCutaneous every 8 hours  levETIRAcetam 1000 milliGRAM(s) Oral two times a day  metroNIDAZOLE  IVPB 500 milliGRAM(s) IV Intermittent every 8 hours  mirtazapine 7.5 milliGRAM(s) Oral at bedtime  polyethylene glycol 3350 17 Gram(s) Oral daily  senna 2 Tablet(s) Oral at bedtime  sertraline 50 milliGRAM(s) Oral daily  vancomycin  IVPB 1250 milliGRAM(s) IV Intermittent every 12 hours      LABS:                        12.9   8.13  )-----------( 274      ( 13 Mar 2025 05:32 )             38.5     03-13    138  |  105  |  11  ----------------------------<  99  3.7   |  21[L]  |  0.39[L]    Ca    9.3      13 Mar 2025 05:51  Phos  3.4     03-13  Mg     2.00     03-13          RADIOLOGY:   < from: MR Brain Stereotactic w/wo IV Cont (03.11.25 @ 14:07) >  IMPRESSION: Previously noted epidural collection subjacent to the postop   region has decreased in size.    --- End of Report ---    < end of copied text >      PHYSICAL EXAM:  Aox3, fc  EOS, PERRL, EOMI  MAEx4 with good strength  Neg PD

## 2025-03-14 NOTE — PROGRESS NOTE ADULT - SUBJECTIVE AND OBJECTIVE BOX
Infectious Diseases Follow Up:    Patient is a 60y old  Female who presents with a chief complaint of seizure (14 Mar 2025 01:08)      Interval History/ROS:  No acute events, no fevers, chills    Allergies  No Known Allergies        ANTIMICROBIALS:  cefepime   IVPB 2000 every 8 hours  cefepime   IVPB    metroNIDAZOLE  IVPB 500 every 8 hours  vancomycin  IVPB 1250 every 12 hours      Current Abx:     Previous Abx     OTHER MEDS:  MEDICATIONS  (STANDING):  acetaminophen     Tablet .. 650 every 6 hours PRN  albuterol    90 MICROgram(s) HFA Inhaler 2 every 4 hours PRN  heparin   Injectable 5000 every 8 hours  levETIRAcetam 1000 two times a day  mirtazapine 7.5 at bedtime  polyethylene glycol 3350 17 daily  senna 2 at bedtime  sertraline 50 daily      Vital Signs Last 24 Hrs  T(C): 36.9 (14 Mar 2025 12:12), Max: 36.9 (14 Mar 2025 12:12)  T(F): 98.5 (14 Mar 2025 12:12), Max: 98.5 (14 Mar 2025 12:12)  HR: 91 (14 Mar 2025 12:12) (91 - 104)  BP: 123/49 (14 Mar 2025 12:12) (103/60 - 123/49)  BP(mean): --  RR: 18 (14 Mar 2025 12:12) (17 - 18)  SpO2: 100% (14 Mar 2025 12:12) (97% - 100%)    Parameters below as of 14 Mar 2025 12:12  Patient On (Oxygen Delivery Method): room air        PHYSICAL EXAM:  GENERAL: NAD, well-developed  HEAD:  Atraumatic, Normocephalic  EYES: EOMI, conjunctiva and sclera clear  CHEST/LUNG: On RA, not in respiratory distress   PSYCH: AAOx3                            12.9   8.13  )-----------( 274      ( 13 Mar 2025 05:32 )             38.5       03-14    137  |  103  |  11  ----------------------------<  101[H]  3.6   |  18[L]  |  0.45[L]    Ca    9.0      14 Mar 2025 04:23  Phos  3.7     03-14  Mg     1.90     03-14        Urinalysis Basic - ( 14 Mar 2025 04:23 )    Color: x / Appearance: x / SG: x / pH: x  Gluc: 101 mg/dL / Ketone: x  / Bili: x / Urobili: x   Blood: x / Protein: x / Nitrite: x   Leuk Esterase: x / RBC: x / WBC x   Sq Epi: x / Non Sq Epi: x / Bacteria: x        MICROBIOLOGY:  v  Surgical Swab  03-01-25   No growth at 5 days  --    Few polymorphonuclear leukocytes per low power field  No organisms seen per oil power field      CSF CSF  02-28-25   No Growth at 10 Days  --    No polymorphonuclear cells seen  No organisms seen  by cytocentrifuge      Catheterized Catheterized  02-26-25   No growth  --  --      .Blood Blood-Peripheral  02-26-25   No growth at 5 days  --  --      .Blood Blood-Peripheral  02-26-25   No growth at 5 days  --  --      .Blood Blood  02-22-25   No growth at 5 days  --  --      .Blood Blood  02-22-25   No growth at 5 days  --  --                RADIOLOGY:

## 2025-03-15 LAB
ANION GAP SERPL CALC-SCNC: 14 MMOL/L — SIGNIFICANT CHANGE UP (ref 7–14)
BUN SERPL-MCNC: 9 MG/DL — SIGNIFICANT CHANGE UP (ref 7–23)
CALCIUM SERPL-MCNC: 9.1 MG/DL — SIGNIFICANT CHANGE UP (ref 8.4–10.5)
CHLORIDE SERPL-SCNC: 102 MMOL/L — SIGNIFICANT CHANGE UP (ref 98–107)
CO2 SERPL-SCNC: 20 MMOL/L — LOW (ref 22–31)
CREAT SERPL-MCNC: 0.38 MG/DL — LOW (ref 0.5–1.3)
EGFR: 115 ML/MIN/1.73M2 — SIGNIFICANT CHANGE UP
EGFR: 115 ML/MIN/1.73M2 — SIGNIFICANT CHANGE UP
GLUCOSE SERPL-MCNC: 101 MG/DL — HIGH (ref 70–99)
HCT VFR BLD CALC: 36.1 % — SIGNIFICANT CHANGE UP (ref 34.5–45)
HGB BLD-MCNC: 12.1 G/DL — SIGNIFICANT CHANGE UP (ref 11.5–15.5)
MAGNESIUM SERPL-MCNC: 2 MG/DL — SIGNIFICANT CHANGE UP (ref 1.6–2.6)
MCHC RBC-ENTMCNC: 26.5 PG — LOW (ref 27–34)
MCHC RBC-ENTMCNC: 33.5 G/DL — SIGNIFICANT CHANGE UP (ref 32–36)
MCV RBC AUTO: 79.2 FL — LOW (ref 80–100)
NRBC # BLD AUTO: 0 K/UL — SIGNIFICANT CHANGE UP (ref 0–0)
NRBC # FLD: 0 K/UL — SIGNIFICANT CHANGE UP (ref 0–0)
NRBC BLD AUTO-RTO: 0 /100 WBCS — SIGNIFICANT CHANGE UP (ref 0–0)
PHOSPHATE SERPL-MCNC: 3 MG/DL — SIGNIFICANT CHANGE UP (ref 2.5–4.5)
PLATELET # BLD AUTO: 188 K/UL — SIGNIFICANT CHANGE UP (ref 150–400)
POTASSIUM SERPL-MCNC: 3.2 MMOL/L — LOW (ref 3.5–5.3)
POTASSIUM SERPL-SCNC: 3.2 MMOL/L — LOW (ref 3.5–5.3)
RBC # BLD: 4.56 M/UL — SIGNIFICANT CHANGE UP (ref 3.8–5.2)
RBC # FLD: 15.1 % — HIGH (ref 10.3–14.5)
SODIUM SERPL-SCNC: 136 MMOL/L — SIGNIFICANT CHANGE UP (ref 135–145)
VANCOMYCIN TROUGH SERPL-MCNC: 13.6 UG/ML — SIGNIFICANT CHANGE UP (ref 10–20)
WBC # BLD: 7.54 K/UL — SIGNIFICANT CHANGE UP (ref 3.8–10.5)
WBC # FLD AUTO: 7.54 K/UL — SIGNIFICANT CHANGE UP (ref 3.8–10.5)

## 2025-03-15 RX ADMIN — Medication 650 MILLIGRAM(S): at 16:57

## 2025-03-15 RX ADMIN — LEVETIRACETAM 1000 MILLIGRAM(S): 10 INJECTION, SOLUTION INTRAVENOUS at 06:44

## 2025-03-15 RX ADMIN — CEFEPIME 100 MILLIGRAM(S): 2 INJECTION, POWDER, FOR SOLUTION INTRAVENOUS at 21:06

## 2025-03-15 RX ADMIN — CEFEPIME 100 MILLIGRAM(S): 2 INJECTION, POWDER, FOR SOLUTION INTRAVENOUS at 14:14

## 2025-03-15 RX ADMIN — Medication 100 MILLIGRAM(S): at 11:44

## 2025-03-15 RX ADMIN — CEFEPIME 100 MILLIGRAM(S): 2 INJECTION, POWDER, FOR SOLUTION INTRAVENOUS at 06:12

## 2025-03-15 RX ADMIN — HEPARIN SODIUM 5000 UNIT(S): 1000 INJECTION INTRAVENOUS; SUBCUTANEOUS at 09:06

## 2025-03-15 RX ADMIN — Medication 100 MILLIGRAM(S): at 03:14

## 2025-03-15 RX ADMIN — Medication 166.67 MILLIGRAM(S): at 09:26

## 2025-03-15 RX ADMIN — Medication 100 MILLIGRAM(S): at 18:33

## 2025-03-15 RX ADMIN — HEPARIN SODIUM 5000 UNIT(S): 1000 INJECTION INTRAVENOUS; SUBCUTANEOUS at 16:56

## 2025-03-15 RX ADMIN — SERTRALINE 50 MILLIGRAM(S): 100 TABLET, FILM COATED ORAL at 13:30

## 2025-03-15 RX ADMIN — LEVETIRACETAM 1000 MILLIGRAM(S): 10 INJECTION, SOLUTION INTRAVENOUS at 18:34

## 2025-03-15 RX ADMIN — Medication 166.67 MILLIGRAM(S): at 21:06

## 2025-03-15 RX ADMIN — HEPARIN SODIUM 5000 UNIT(S): 1000 INJECTION INTRAVENOUS; SUBCUTANEOUS at 01:26

## 2025-03-15 RX ADMIN — MIRTAZAPINE 7.5 MILLIGRAM(S): 30 TABLET, FILM COATED ORAL at 21:07

## 2025-03-15 RX ADMIN — Medication 650 MILLIGRAM(S): at 17:30

## 2025-03-15 NOTE — PROGRESS NOTE ADULT - PROBLEM SELECTOR PLAN 1
- Q4hr neuro checks  - Abx per ID  - Cont PT/OT  - OR 3/20 for removal of ligated shunt and cranioplasty    ally peacock

## 2025-03-15 NOTE — PROGRESS NOTE ADULT - ASSESSMENT
58 yo F with PMHx of neurofibromatosis, s/p L suboccipital craniectomy for resection of cerebellar brain tumor 2023 by Dr Brush, s/p traumatic fall with head trauma in 2024 with large acute right SDH, s/p emergent right decompressive hemicraniectomy on 24, s/p right cranioplasty on 24 with custom plate, s/p ETV, ligation of shunt on the right with ligaclips with retention of valve as rescue device, presents as a transfer from [facility] for witnessed seizure at home. CTH obtained at [facility] revealed moderate ventriculomegaly and slight increase in size of extra-axial CSF collection overlying the right frontal duraplasty     Exam with decreased strength on left side, post ictal vs intracranial causes, will get MRI/MRA to assess and CTH/CTA if MRI will be delayed. On zosyn, white count downtrending.   :  CTH/ CTA done stable , CTA negative, MRIw/flow/MRA ordered, intubated /sedated, neurology saw Veeg-O/ neg, on keppra, K+3.3 repleted, afbrile, bld cltr NGTD, UA neg, WBC nml  : stable exam,  Veeg on, keppra, mri w/flow/ mra pending  : Stable exam. No seizures noted on VEEG. On keppra. MRI with CSF flow study and MRA ordered  : Extubated, alert, mildly confused and restless, nonfocal exam. On keppra. MRI showed increase in right epidural collection with diffusion restriction. Shunt (as reservoir) tapped, W1 R1  GSN  3/1: Alert, neurologically intact. On keppra. OR for clarissa hold for drainage of epidural abscess. Cx sent, NGTD. EDJPx1  Was intubated preop for stridor, dex x24hrs  3: POD1: Intubated, ENT scoped determined airway patent, ID-Following initiated on broad spectrum abx, OR cultures GSN, AFB neg, Cx pending. Dex discontinued.  3/3 POD2, intubated, on flagyl/vanc/cefipime, K 3.1- repletion ordered. EDJPx1 105cc/24hrs, PT recc LOUIS  3/4: pod 3, intubated for respiratory distress, on tripple abx, ED SHAYY to suction, f/u OR cltrs NGTD  3 pod 4, remains intubated, neuro exam stable, ED SHAYY drain pus tinged 30cc, on triple Abx, OR cltrs NGTD, ID following,    3/6: POD # 5 stable exam. Patient remains intubated. On triple antibiotics, decadron for stridor.    3/7-10: POD # 6-9 Neurologically intact. Patient tolerating extubation. On triple antibiotics  3: POD # 10  Neurologically intact. On triple antibiotics. Plan for removal of ommaya and cranioplasty next week  3/12-15 on vanc, cefepime, and metronidazole, neuro exam stable, plan for replacement of cranioplasty and removal of ligated shunt on 3/20. PT, OT, PMR following.

## 2025-03-16 LAB
ANION GAP SERPL CALC-SCNC: 14 MMOL/L — SIGNIFICANT CHANGE UP (ref 7–14)
BUN SERPL-MCNC: 8 MG/DL — SIGNIFICANT CHANGE UP (ref 7–23)
CALCIUM SERPL-MCNC: 9.2 MG/DL — SIGNIFICANT CHANGE UP (ref 8.4–10.5)
CHLORIDE SERPL-SCNC: 103 MMOL/L — SIGNIFICANT CHANGE UP (ref 98–107)
CO2 SERPL-SCNC: 21 MMOL/L — LOW (ref 22–31)
CREAT SERPL-MCNC: 0.38 MG/DL — LOW (ref 0.5–1.3)
EGFR: 115 ML/MIN/1.73M2 — SIGNIFICANT CHANGE UP
EGFR: 115 ML/MIN/1.73M2 — SIGNIFICANT CHANGE UP
GLUCOSE SERPL-MCNC: 92 MG/DL — SIGNIFICANT CHANGE UP (ref 70–99)
HCT VFR BLD CALC: 37.2 % — SIGNIFICANT CHANGE UP (ref 34.5–45)
HGB BLD-MCNC: 12.7 G/DL — SIGNIFICANT CHANGE UP (ref 11.5–15.5)
MAGNESIUM SERPL-MCNC: 2.1 MG/DL — SIGNIFICANT CHANGE UP (ref 1.6–2.6)
MCHC RBC-ENTMCNC: 26.9 PG — LOW (ref 27–34)
MCHC RBC-ENTMCNC: 34.1 G/DL — SIGNIFICANT CHANGE UP (ref 32–36)
MCV RBC AUTO: 78.8 FL — LOW (ref 80–100)
NRBC # BLD AUTO: 0 K/UL — SIGNIFICANT CHANGE UP (ref 0–0)
NRBC # FLD: 0 K/UL — SIGNIFICANT CHANGE UP (ref 0–0)
NRBC BLD AUTO-RTO: 0 /100 WBCS — SIGNIFICANT CHANGE UP (ref 0–0)
PHOSPHATE SERPL-MCNC: 3.4 MG/DL — SIGNIFICANT CHANGE UP (ref 2.5–4.5)
PLATELET # BLD AUTO: 201 K/UL — SIGNIFICANT CHANGE UP (ref 150–400)
POTASSIUM SERPL-MCNC: 3.5 MMOL/L — SIGNIFICANT CHANGE UP (ref 3.5–5.3)
POTASSIUM SERPL-SCNC: 3.5 MMOL/L — SIGNIFICANT CHANGE UP (ref 3.5–5.3)
RBC # BLD: 4.72 M/UL — SIGNIFICANT CHANGE UP (ref 3.8–5.2)
RBC # FLD: 14.8 % — HIGH (ref 10.3–14.5)
SODIUM SERPL-SCNC: 138 MMOL/L — SIGNIFICANT CHANGE UP (ref 135–145)
VANCOMYCIN TROUGH SERPL-MCNC: 12.6 UG/ML — SIGNIFICANT CHANGE UP (ref 10–20)
WBC # BLD: 6.78 K/UL — SIGNIFICANT CHANGE UP (ref 3.8–10.5)
WBC # FLD AUTO: 6.78 K/UL — SIGNIFICANT CHANGE UP (ref 3.8–10.5)

## 2025-03-16 RX ADMIN — MIRTAZAPINE 7.5 MILLIGRAM(S): 30 TABLET, FILM COATED ORAL at 21:55

## 2025-03-16 RX ADMIN — Medication 100 MILLIGRAM(S): at 12:20

## 2025-03-16 RX ADMIN — Medication 100 MILLIGRAM(S): at 03:53

## 2025-03-16 RX ADMIN — HEPARIN SODIUM 5000 UNIT(S): 1000 INJECTION INTRAVENOUS; SUBCUTANEOUS at 01:34

## 2025-03-16 RX ADMIN — Medication 100 MILLIGRAM(S): at 19:41

## 2025-03-16 RX ADMIN — LEVETIRACETAM 1000 MILLIGRAM(S): 10 INJECTION, SOLUTION INTRAVENOUS at 06:22

## 2025-03-16 RX ADMIN — CEFEPIME 100 MILLIGRAM(S): 2 INJECTION, POWDER, FOR SOLUTION INTRAVENOUS at 21:55

## 2025-03-16 RX ADMIN — HEPARIN SODIUM 5000 UNIT(S): 1000 INJECTION INTRAVENOUS; SUBCUTANEOUS at 08:29

## 2025-03-16 RX ADMIN — CEFEPIME 100 MILLIGRAM(S): 2 INJECTION, POWDER, FOR SOLUTION INTRAVENOUS at 06:23

## 2025-03-16 RX ADMIN — Medication 166.67 MILLIGRAM(S): at 08:29

## 2025-03-16 RX ADMIN — Medication 166.67 MILLIGRAM(S): at 22:32

## 2025-03-16 RX ADMIN — HEPARIN SODIUM 5000 UNIT(S): 1000 INJECTION INTRAVENOUS; SUBCUTANEOUS at 18:10

## 2025-03-16 RX ADMIN — SERTRALINE 50 MILLIGRAM(S): 100 TABLET, FILM COATED ORAL at 12:24

## 2025-03-16 RX ADMIN — LEVETIRACETAM 1000 MILLIGRAM(S): 10 INJECTION, SOLUTION INTRAVENOUS at 18:10

## 2025-03-16 RX ADMIN — CEFEPIME 100 MILLIGRAM(S): 2 INJECTION, POWDER, FOR SOLUTION INTRAVENOUS at 13:24

## 2025-03-16 NOTE — PROGRESS NOTE ADULT - ASSESSMENT
60 yo F with PMHx of neurofibromatosis, s/p L suboccipital craniectomy for resection of cerebellar brain tumor 2023 by Dr Brush, s/p traumatic fall with head trauma in 2024 with large acute right SDH, s/p emergent right decompressive hemicraniectomy on 24, s/p right cranioplasty on 24 with custom plate, s/p ETV, ligation of shunt on the right with ligaclips with retention of valve as rescue device, presents as a transfer from [facility] for witnessed seizure at home. CTH obtained at [facility] revealed moderate ventriculomegaly and slight increase in size of extra-axial CSF collection overlying the right frontal duraplasty     Exam with decreased strength on left side, post ictal vs intracranial causes, will get MRI/MRA to assess and CTH/CTA if MRI will be delayed. On zosyn, white count downtrending.   :  CTH/ CTA done stable , CTA negative, MRIw/flow/MRA ordered, intubated /sedated, neurology saw Veeg-O/ neg, on keppra, K+3.3 repleted, afbrile, bld cltr NGTD, UA neg, WBC nml  : stable exam,  Veeg on, keppra, mri w/flow/ mra pending  : Stable exam. No seizures noted on VEEG. On keppra. MRI with CSF flow study and MRA ordered  : Extubated, alert, mildly confused and restless, nonfocal exam. On keppra. MRI showed increase in right epidural collection with diffusion restriction. Shunt (as reservoir) tapped, W1 R1  GSN  3/1: Alert, neurologically intact. On keppra. OR for clarissa hold for drainage of epidural abscess. Cx sent, NGTD. EDJPx1  Was intubated preop for stridor, dex x24hrs  3: POD1: Intubated, ENT scoped determined airway patent, ID-Following initiated on broad spectrum abx, OR cultures GSN, AFB neg, Cx pending. Dex discontinued.  3/3 POD2, intubated, on flagyl/vanc/cefipime, K 3.1- repletion ordered. EDJPx1 105cc/24hrs, PT recc LOUIS  3: pod 3, intubated for respiratory distress, on tripple abx, ED SHAYY to suction, f/u OR cltrs NGTD  3 pod 4, remains intubated, neuro exam stable, ED SHAYY drain pus tinged 30cc, on triple Abx, OR cltrs NGTD, ID following,    3/6: POD # 5 stable exam. Patient remains intubated. On triple antibiotics, decadron for stridor.    3/7-10: POD # 6-9 Neurologically intact. Patient tolerating extubation. On triple antibiotics  3: POD # 10  Neurologically intact. On triple antibiotics. Plan for removal of ommaya and cranioplasty next week  3/12- on vanc, cefepime, and metronidazole, neuro exam stable, plan for replacement of cranioplasty and removal of ligated shunt on 3/20. PT, OT, PMR following.

## 2025-03-16 NOTE — PROGRESS NOTE ADULT - PROBLEM SELECTOR PLAN 1
- Q4hr neuro checks  - Abx per ID  - Cont PT/OT  - OR 3/20 for removal of ligated shunt and cranioplasty    d/w attending.

## 2025-03-16 NOTE — PROGRESS NOTE ADULT - SUBJECTIVE AND OBJECTIVE BOX
PAST 24HR EVENTS: no new complaints, afebrile overnight    Vital Signs Last 24 Hrs  T(C): 37.2 (16 Mar 2025 01:30), Max: 37.2 (16 Mar 2025 01:30)  T(F): 99 (16 Mar 2025 01:30), Max: 99 (16 Mar 2025 01:30)  HR: 92 (16 Mar 2025 01:30) (92 - 100)  BP: 113/62 (16 Mar 2025 01:30) (100/59 - 120/62)  BP(mean): --  RR: 17 (16 Mar 2025 01:30) (16 - 18)  SpO2: 96% (16 Mar 2025 01:30) (96% - 99%)    Parameters below as of 16 Mar 2025 01:30  Patient On (Oxygen Delivery Method): room air        MEDS:   acetaminophen     Tablet .. 650 milliGRAM(s) Oral every 6 hours PRN  albuterol    90 MICROgram(s) HFA Inhaler 2 Puff(s) Inhalation every 4 hours PRN  cefepime   IVPB 2000 milliGRAM(s) IV Intermittent every 8 hours  cefepime   IVPB      heparin   Injectable 5000 Unit(s) SubCutaneous every 8 hours  levETIRAcetam 1000 milliGRAM(s) Oral two times a day  metroNIDAZOLE  IVPB 500 milliGRAM(s) IV Intermittent every 8 hours  mirtazapine 7.5 milliGRAM(s) Oral at bedtime  polyethylene glycol 3350 17 Gram(s) Oral daily  senna 2 Tablet(s) Oral at bedtime  sertraline 50 milliGRAM(s) Oral daily  vancomycin  IVPB 1250 milliGRAM(s) IV Intermittent every 12 hours      LABS:                        12.1   7.54  )-----------( 188      ( 15 Mar 2025 06:03 )             36.1     03-15    136  |  102  |  9   ----------------------------<  101[H]  3.2[L]   |  20[L]  |  0.38[L]    Ca    9.1      15 Mar 2025 06:03  Phos  3.0     03-15  Mg     2.00     03-15          RADIOLOGY:     PHYSICAL EXAM:  AOx3, appropriate, follows commands  PERRL, EOMI, face symmetrical   CLARK x 4 with good strength   Sensation intact to light touch   No pronator drift   Incision C/D/I

## 2025-03-17 LAB
GLUCOSE BLDC GLUCOMTR-MCNC: 108 MG/DL — HIGH (ref 70–99)
HCT VFR BLD CALC: 34.8 % — SIGNIFICANT CHANGE UP (ref 34.5–45)
HGB BLD-MCNC: 11.9 G/DL — SIGNIFICANT CHANGE UP (ref 11.5–15.5)
MCHC RBC-ENTMCNC: 27.2 PG — SIGNIFICANT CHANGE UP (ref 27–34)
MCHC RBC-ENTMCNC: 34.2 G/DL — SIGNIFICANT CHANGE UP (ref 32–36)
MCV RBC AUTO: 79.6 FL — LOW (ref 80–100)
NRBC # BLD AUTO: 0 K/UL — SIGNIFICANT CHANGE UP (ref 0–0)
NRBC # FLD: 0 K/UL — SIGNIFICANT CHANGE UP (ref 0–0)
NRBC BLD AUTO-RTO: 0 /100 WBCS — SIGNIFICANT CHANGE UP (ref 0–0)
PLATELET # BLD AUTO: 218 K/UL — SIGNIFICANT CHANGE UP (ref 150–400)
RBC # BLD: 4.37 M/UL — SIGNIFICANT CHANGE UP (ref 3.8–5.2)
RBC # FLD: 14.7 % — HIGH (ref 10.3–14.5)
WBC # BLD: 6.03 K/UL — SIGNIFICANT CHANGE UP (ref 3.8–10.5)
WBC # FLD AUTO: 6.03 K/UL — SIGNIFICANT CHANGE UP (ref 3.8–10.5)

## 2025-03-17 PROCEDURE — G0545: CPT

## 2025-03-17 PROCEDURE — 99232 SBSQ HOSP IP/OBS MODERATE 35: CPT

## 2025-03-17 RX ORDER — MELATONIN 5 MG
3 TABLET ORAL AT BEDTIME
Refills: 0 | Status: DISCONTINUED | OUTPATIENT
Start: 2025-03-17 | End: 2025-03-19

## 2025-03-17 RX ORDER — SENNA 187 MG
2 TABLET ORAL AT BEDTIME
Refills: 0 | Status: DISCONTINUED | OUTPATIENT
Start: 2025-03-17 | End: 2025-03-19

## 2025-03-17 RX ADMIN — Medication 166.67 MILLIGRAM(S): at 08:08

## 2025-03-17 RX ADMIN — Medication 100 MILLIGRAM(S): at 20:43

## 2025-03-17 RX ADMIN — Medication 100 MILLIGRAM(S): at 03:33

## 2025-03-17 RX ADMIN — Medication 650 MILLIGRAM(S): at 13:31

## 2025-03-17 RX ADMIN — HEPARIN SODIUM 5000 UNIT(S): 1000 INJECTION INTRAVENOUS; SUBCUTANEOUS at 08:11

## 2025-03-17 RX ADMIN — Medication 650 MILLIGRAM(S): at 14:31

## 2025-03-17 RX ADMIN — LEVETIRACETAM 1000 MILLIGRAM(S): 10 INJECTION, SOLUTION INTRAVENOUS at 05:26

## 2025-03-17 RX ADMIN — LEVETIRACETAM 1000 MILLIGRAM(S): 10 INJECTION, SOLUTION INTRAVENOUS at 17:28

## 2025-03-17 RX ADMIN — Medication 3 MILLIGRAM(S): at 20:43

## 2025-03-17 RX ADMIN — MIRTAZAPINE 7.5 MILLIGRAM(S): 30 TABLET, FILM COATED ORAL at 21:53

## 2025-03-17 RX ADMIN — Medication 166.67 MILLIGRAM(S): at 21:53

## 2025-03-17 RX ADMIN — SERTRALINE 50 MILLIGRAM(S): 100 TABLET, FILM COATED ORAL at 11:25

## 2025-03-17 RX ADMIN — CEFEPIME 100 MILLIGRAM(S): 2 INJECTION, POWDER, FOR SOLUTION INTRAVENOUS at 21:52

## 2025-03-17 RX ADMIN — CEFEPIME 100 MILLIGRAM(S): 2 INJECTION, POWDER, FOR SOLUTION INTRAVENOUS at 13:30

## 2025-03-17 RX ADMIN — HEPARIN SODIUM 5000 UNIT(S): 1000 INJECTION INTRAVENOUS; SUBCUTANEOUS at 00:16

## 2025-03-17 RX ADMIN — HEPARIN SODIUM 5000 UNIT(S): 1000 INJECTION INTRAVENOUS; SUBCUTANEOUS at 17:28

## 2025-03-17 RX ADMIN — CEFEPIME 100 MILLIGRAM(S): 2 INJECTION, POWDER, FOR SOLUTION INTRAVENOUS at 05:26

## 2025-03-17 RX ADMIN — Medication 100 MILLIGRAM(S): at 10:41

## 2025-03-17 NOTE — PROGRESS NOTE ADULT - SUBJECTIVE AND OBJECTIVE BOX
Patient is a 60y old  Female who presents with a chief complaint of seizure (17 Mar 2025 05:22)      HPI:  60 yo F with PMHx of neurofibromatosis, s/p L suboccipital craniectomy for resection of cerebellar brain tumor 2023 by Dr Brush, s/p traumatic fall with head trauma in 2024, at that time sustained a large acute right SDH, and underwent an emergent right decompressive hemicraniectomy on 24, s/p right cranioplasty on 24 with custom plate, s/p ETV, ligation of shunt on the right with ligaclips with retention of valve as rescue device, presents as a transfer from [facility] for witnessed seizure at home. CTH obtained at [facility] revealed stable  shunt and moderate ventriculomegaly and slight increase in size of extra-axial CSF collection overlying the right frontal duraplasty, likely a hygroma and no mass effect.    (2025 23:29)    Patient planned for neurosurgery 3/20  denies complaint today    REVIEW OF SYSTEMS  Constitutional - No fever, No weight loss, No fatigue  HEENT - No eye pain, No visual disturbances, No difficulty hearing, No tinnitus, No vertigo, No neck pain  Respiratory - No cough, No wheezing, No shortness of breath  Cardiovascular - No chest pain, No palpitations  Gastrointestinal - No abdominal pain, No nausea, No vomiting, No diarrhea, No constipation  Genitourinary - No dysuria, No frequency, No hematuria, No incontinence  Neurological - No headaches, No memory loss, +loss of strength, No numbness, No tremors  Skin - No itching, No rashes, No lesions   Endocrine - No temperature intolerance  Musculoskeletal - No joint pain, No joint swelling, No muscle pain  Psychiatric - No depression, No anxiety    PAST MEDICAL & SURGICAL HISTORY  Asthma    Depression    Neoplasm of uncertain behavior of brain    Neurofibromatosis, type 1    Neurofibromatosis    History of hydrocephalus    Anxiety    Delivery with history of     S/P tubal ligation    History of arthroscopy of left knee    S/P LASIK Surgery    H/O brain tumor    S/P  shunt         CURRENT FUNCTIONAL STATUS  T: cg  ambulated 50' cg with PT      RECENT LABS/IMAGING  < from: MR Brain Stereotactic w/wo IV Cont (25 @ 14:07) >    ACC: [id number] EXAM:  MR BRAIN WAW IC FOR SRS   ORDERED BY: MAGALY BARR     PROCEDURE DATE:  2025          INTERPRETATION:  Clinical indication: Follow-up epidural collection.    MRI of the brain was from sagittal T1 axial T1 and T2 T2 FLAIR diffusion   and susceptibility sequence. The patient was injected with approximately   6.5 cc of gadavist IV with 1 cc of contrast discarded. Sagittal coronal   and axial T1-weighted sequence was performed    This exam is compared prior head CT performed on 2025 and prior   brain MRI performed on 2025    Postoperative changes compatible with a right temporal frontal   cranioplasty is again identified. Previously noted subadjacent epidural   collection is again seen though decreased in size. This collection   measures approximately 0.7 cm in widest diameter and previously measured   approximately 1.4 cm in widest diameter. There is enhancement of the   adjacent dura again identified. Evaluation of the collection is somewhat   limited on the diffusion-weighted sequence due to artifact though   restricted diffusion involving the collection is again suspected. This   decrease collection is worrisome for improving underlying infected   collection. Continued close follow-upis recommended until resolution.   Encephalomalacia and gliosis is seen involving the right frontal region.   Just dorsal to this area of cephalization and gliosis is a thickened   linear area of enhancement which was present on the prior contrast   enhanced brain MRI.    Dilated lateral third ventricles are again seen with a normal-appearing   fourth ventricle.    Encephalomalacia and gliosis involving the left cerebellar region is   again seen    High right frontal clarissa hole and shunt catheter is again seen and   unchanged    The paranasal sinuses appear clear.    Mild inflammatory change involving the right mastoid region.    Extra calvarial lesion involving the left parietal region is again seen   which is compatible with a sebaceous cyst. This finding appears stable.    IMPRESSION: Previously noted epidural collection subjacent to the postop   region has decreased in size.    --- End of Report ---            ADA GALAN MD; Attending Radiologist  This document has been electronically signed. Mar 11 2025  2:44PM    < end of copied text >    < from: Xray Chest 1 View- PORTABLE-Routine (Xray Chest 1 View- PORTABLE-Routine in AM.) (25 @ 00:30) >    ACC: [id number] EXAM:  XR CHEST PORTABLE ROUTINE 1V   ORDERED BY: ANGELA MARAVILLA     PROCEDURE DATE:  2025          INTERPRETATION:  CLINICAL INFORMATION: Intubated    TIME OF EXAMINATION:  at 12:18 AM    EXAM: Portable chest    FINDINGS:    The endotracheal and enteric tubes are unchanged.  New, linear atelectasis at the right lung base.  Remainder of the lungs are clear.   shunting catheter.  Normal heart size.  No effusions or pneumothorax.          COMPARISON:         IMPRESSION: Endotracheal tube in place with no focal consolidations.    --- End of Report ---            CARLOS PAULINO MD; Attending Radiologist  This document has been electronically signed. Mar  6 2025  2:31PM    < end of copied text >    CBC Full  -  ( 17 Mar 2025 04:20 )  WBC Count : 6.03 K/uL  RBC Count : 4.37 M/uL  Hemoglobin : 11.9 g/dL  Hematocrit : 34.8 %  Platelet Count - Automated : 218 K/uL  Mean Cell Volume : 79.6 fL  Mean Cell Hemoglobin : 27.2 pg  Mean Cell Hemoglobin Concentration : 34.2 g/dL  Auto Neutrophil # : x  Auto Lymphocyte # : x  Auto Monocyte # : x  Auto Eosinophil # : x  Auto Basophil # : x  Auto Neutrophil % : x  Auto Lymphocyte % : x  Auto Monocyte % : x  Auto Eosinophil % : x  Auto Basophil % : x        138  |  103  |  8   ----------------------------<  92  3.5   |  21[L]  |  0.38[L]    Ca    9.2      16 Mar 2025 03:59  Phos  3.4       Mg     2.10           Urinalysis Basic - ( 16 Mar 2025 03:59 )    Color: x / Appearance: x / SG: x / pH: x  Gluc: 92 mg/dL / Ketone: x  / Bili: x / Urobili: x   Blood: x / Protein: x / Nitrite: x   Leuk Esterase: x / RBC: x / WBC x   Sq Epi: x / Non Sq Epi: x / Bacteria: x        VITALS  T(C): 36.8 (25 @ 09:00), Max: 36.8 (25 @ 01:01)  HR: 92 (25 @ 09:00) (83 - 99)  BP: 101/43 (25 @ 09:00) (91/51 - 114/56)  RR: 17 (25 @ 09:00) (17 - 18)  SpO2: 99% (25 @ 09:00) (96% - 99%)  Wt(kg): --    ALLERGIES  No Known Allergies      MEDICATIONS   acetaminophen     Tablet .. 650 milliGRAM(s) Oral every 6 hours PRN  albuterol    90 MICROgram(s) HFA Inhaler 2 Puff(s) Inhalation every 4 hours PRN  cefepime   IVPB      cefepime   IVPB 2000 milliGRAM(s) IV Intermittent every 8 hours  heparin   Injectable 5000 Unit(s) SubCutaneous every 8 hours  levETIRAcetam 1000 milliGRAM(s) Oral two times a day  metroNIDAZOLE  IVPB 500 milliGRAM(s) IV Intermittent every 8 hours  mirtazapine 7.5 milliGRAM(s) Oral at bedtime  polyethylene glycol 3350 17 Gram(s) Oral daily  senna 2 Tablet(s) Oral at bedtime PRN  sertraline 50 milliGRAM(s) Oral daily  vancomycin  IVPB 1250 milliGRAM(s) IV Intermittent every 12 hours      ----------------------------------------------------------------------------------------     PHYSICAL EXAM  Constitutional - NAD, Comfortable  HEENT - + R scalp staples intact  Chest - no respiratory distress  Cardiovascular - RRR, S1S2   Abdomen -  Soft, NTND  Extremities - No C/C/E, No calf tenderness   Neurologic Exam -                    Cognitive - Awake, Alert, AAO to self, place, date, year, situation     Communication - Fluent, No dysarthria     Cranial Nerves - CN 2-12 intact     Motor -                      LEFT    UE - ShAB 5/5, EF 5/5, EE 5/5, WE 5/5,  5/5                    RIGHT UE - ShAB 5/5, EF 5/5, EE 5/5, WE 5/5,  5/5                    LEFT    LE - HF 4+/5, KE 5/5, DF 5/5, PF 5/5                    RIGHT LE - HF 5/5, KE 5/5, DF 5/5, PF 5/5        Sensory - Intact to LT      OculoVestibular - No saccades, No nystagmus, VOR         Balance - WNL Static  Psychiatric - Mood stable, Affect WNL  ----------------------------------------------------------------------------------------  ASSESSMENT/PLAN   60 year old Female status post Left suboccipital craniectomy for resection of cerebellar brain tumor 2023 by Dr Brush, status post traumatic fall with head trauma in 2024 with large acute right SDH, status post emergent right decompressive hemicraniectomy on 24, status post right cranioplasty on 24 with custom plate, status post ETV, ligation of shunt on the right with ligaclips with retention of valve as rescue device, presents as a transfer from [facility] for witnessed seizure at home. CTH obtained at [facility] revealed moderate ventriculomegaly and slight increase in size of extra-axial CSF collection overlying the right frontal duraplasty.  s/p clarissa hole 3/1 with purulent drainage  on keppra  abx per ID, planning for additional neurosurgery 3/20  Pain -denies pain  DVT PPX - heparin  continue bedside PT and OT  Rehab - will monitor for improvement and progress with bedside therapy, as well as medical course.  Anticipate inpatient rehab (acute vs jade), however function improving with bedside therapy.    Total time taken to review relevant records and imaging results, examine patient, write note, and when applicable discuss the case with patient, family, , , and medical providers:  35 minutes

## 2025-03-17 NOTE — PROGRESS NOTE ADULT - ASSESSMENT
This is a 58 y/o F w/ very extensive PMHx of NF, 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), s/p  shunt (in [facility]), s/p traumatic fall w/ R SDH, s/p R decompressive hemicraniectomy, ICP monitor and R cranioplasty (5/2024), s/p L frontal Endoscopic third ventriculostomy and ligation of R  shunt with Ligaclips with retention of valve, proximal and distal catheters (08/2024) admitted initially to [facility] on 2/22 for seizure, intubated for airway protection and status epilepticus, initially on Zosyn for aspiration PNA.  Initially CTH w/ slight increase in extra axial fluid collection measuring up to 1.5 cm. Pt was transferred to [facility] on 2/23 for neurosurgery eval, given decreased L sided strength, MRI done with increase in R sided collection.  Pt extubated on 2/28, CSF reservoir tapped, no pleocytosis.    Pt went to the OR on 3/1 for R clarissa pole with evacuation of epidural collection, with purulent drainage.     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), s/p  shunt (in [facility]), s/p traumatic fall w/ R SDH, s/p R decompressive hemicraniectomy, ICP monitor and R cranioplasty (5/2024), s/p L frontal Endoscopic third ventriculostomy and ligation of R  shunt with ligaclips with retention of valve, proximal and distal catheters (08/2024) admitted initially to [facility] on 2/22 for seizure, intubated for airway protection and status epilepticus, initially on Zosyn for aspiration PNA, transferred to [facility] on 2/23 for neurosurgery evaluation, now found to with have epidural abscess s/p R Clarissa w/ purulent/milky drainage.  Extubated on 3/3, reintubated for stridor/work of breaking, now extubated on 3/6.    D/w neurosurgery, Cx all negative, CRP low, consider allergic reaction to prosthetic cranioplasty. Would likely treat w/ abx for 6 weeks     Recommendations:   1. Vancomycin, f/u level goal -600, Cefepime 2 g q8, Flagyl 500 mg q8   2. F/u OR Cx, NG  3. Likely removal of cranioplasty and Omm[place] on 3/20    Thank you for consulting us and involving us in the management of this patient's case. In addition to reviewing history, imaging, documents, labs, microbiology, and infection control strategies and potential issues.     ID will continue to follow peripherally     Inder Mcintosh M.D.  Attending Physician  Division of Infectious Diseases  Department of Medicine

## 2025-03-17 NOTE — PROGRESS NOTE ADULT - SUBJECTIVE AND OBJECTIVE BOX
Infectious Diseases Follow Up:    Patient is a 60y old  Female who presents with a chief complaint of seizure (17 Mar 2025 09:38)      Interval History/ROS:  No acute events     Allergies  No Known Allergies        ANTIMICROBIALS:  cefepime   IVPB 2000 every 8 hours  cefepime   IVPB    metroNIDAZOLE  IVPB 500 every 8 hours  vancomycin  IVPB 1250 every 12 hours      Current Abx:     Previous Abx     OTHER MEDS:  MEDICATIONS  (STANDING):  acetaminophen     Tablet .. 650 every 6 hours PRN  albuterol    90 MICROgram(s) HFA Inhaler 2 every 4 hours PRN  heparin   Injectable 5000 every 8 hours  levETIRAcetam 1000 two times a day  mirtazapine 7.5 at bedtime  polyethylene glycol 3350 17 daily  senna 2 at bedtime PRN  sertraline 50 daily      Vital Signs Last 24 Hrs  T(C): 36.8 (17 Mar 2025 09:00), Max: 36.8 (17 Mar 2025 01:01)  T(F): 98.3 (17 Mar 2025 09:00), Max: 98.3 (17 Mar 2025 09:00)  HR: 92 (17 Mar 2025 09:00) (83 - 99)  BP: 101/43 (17 Mar 2025 09:00) (91/51 - 114/56)  BP(mean): --  RR: 17 (17 Mar 2025 09:00) (17 - 18)  SpO2: 99% (17 Mar 2025 09:00) (96% - 99%)    Parameters below as of 17 Mar 2025 09:00  Patient On (Oxygen Delivery Method): room air      PHYSICAL EXAM:  GENERAL: NAD, well-developed  HEAD:  Atraumatic, Normocephalic  EYES: EOMI, conjunctiva and sclera clear  CHEST/LUNG: On RA, not in respiratory distress   PSYCH: AAOx3                            11.9   6.03  )-----------( 218      ( 17 Mar 2025 04:20 )             34.8       03-16    138  |  103  |  8   ----------------------------<  92  3.5   |  21[L]  |  0.38[L]    Ca    9.2      16 Mar 2025 03:59  Phos  3.4     03-16  Mg     2.10     03-16        Urinalysis Basic - ( 16 Mar 2025 03:59 )    Color: x / Appearance: x / SG: x / pH: x  Gluc: 92 mg/dL / Ketone: x  / Bili: x / Urobili: x   Blood: x / Protein: x / Nitrite: x   Leuk Esterase: x / RBC: x / WBC x   Sq Epi: x / Non Sq Epi: x / Bacteria: x        MICROBIOLOGY:  Vancomycin Level, Trough: 12.6 ug/mL (03-16-25 @ 20:53)  v  Surgical Swab  03-01-25   No growth at 5 days  --    Few polymorphonuclear leukocytes per low power field  No organisms seen per oil power field      CSF CSF  02-28-25   No growth at 14 days.  --    No polymorphonuclear cells seen  No organisms seen  by cytocentrifuge      Catheterized Catheterized  02-26-25   No growth  --  --      .Blood Blood-Peripheral  02-26-25   No growth at 5 days  --  --      .Blood Blood-Peripheral  02-26-25   No growth at 5 days  --  --      .Blood Blood  02-22-25   No growth at 5 days  --  --      .Blood Blood  02-22-25   No growth at 5 days  --  --                RADIOLOGY:

## 2025-03-17 NOTE — PROGRESS NOTE ADULT - SUBJECTIVE AND OBJECTIVE BOX
PAST 24HR EVENTS: no new complaints, afebrile overnight    Vital Signs Last 24 Hrs  T(C): 36.6 (17 Mar 2025 05:00), Max: 36.8 (17 Mar 2025 01:01)  T(F): 97.9 (17 Mar 2025 05:00), Max: 98.2 (17 Mar 2025 01:01)  HR: 83 (17 Mar 2025 05:00) (83 - 99)  BP: 105/52 (17 Mar 2025 05:00) (91/51 - 114/56)  BP(mean): --  ABP: --  ABP(mean): --  RR: 17 (17 Mar 2025 05:00) (17 - 18)  SpO2: 96% (17 Mar 2025 05:00) (96% - 99%)    O2 Parameters below as of 17 Mar 2025 05:00  Patient On (Oxygen Delivery Method): room air    MEDS:   acetaminophen     Tablet .. 650 milliGRAM(s) Oral every 6 hours PRN  albuterol    90 MICROgram(s) HFA Inhaler 2 Puff(s) Inhalation every 4 hours PRN  cefepime   IVPB 2000 milliGRAM(s) IV Intermittent every 8 hours  cefepime   IVPB      heparin   Injectable 5000 Unit(s) SubCutaneous every 8 hours  levETIRAcetam 1000 milliGRAM(s) Oral two times a day  metroNIDAZOLE  IVPB 500 milliGRAM(s) IV Intermittent every 8 hours  mirtazapine 7.5 milliGRAM(s) Oral at bedtime  polyethylene glycol 3350 17 Gram(s) Oral daily  senna 2 Tablet(s) Oral at bedtime  sertraline 50 milliGRAM(s) Oral daily  vancomycin  IVPB 1250 milliGRAM(s) IV Intermittent every 12 hours      LABS:                                   11.9   6.03  )-----------( 218      ( 17 Mar 2025 04:20 )             34.8     03-16    138  |  103  |  8   ----------------------------<  92  3.5   |  21[L]  |  0.38[L]    Ca    9.2      16 Mar 2025 03:59  Phos  3.4     03-16  Mg     2.10     03-16              RADIOLOGY:     PHYSICAL EXAM:  AOx3, appropriate, follows commands  PERRL, EOMI, face symmetrical   CLARK x 4 with good strength   Sensation intact to light touch   No pronator drift   Incision C/D/I

## 2025-03-17 NOTE — CHART NOTE - NSCHARTNOTEFT_GEN_A_CORE
NUTRITION FOLLOW UP NOTE    Pt seen for nutrition follow-up note.     SOURCE: [X] Patient [X] Medical record [X] RN/PCA at bedside     Medical Course:  61 y/o F w/ very extensive PMHx of NF, 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), s/p  shunt (in [facility]), s/p traumatic fall w/ R SDH, s/p R decompressive hemicraniectomy, ICP monitor and R cranioplasty (5/2024), s/p L frontal Endoscopic third ventriculostomy and ligation of R  shunt with ligaclips with retention of valve, proximal and distal catheters (08/2024) admitted initially to [facility] on 2/22 for seizure, intubated for airway protection and status epilepticus, initially on Zosyn for aspiration PNA, transferred to [facility] on 2/23 for neurosurgery evaluation, now found to with have epidural abscess s/p R [place] w/ purulent/milky drainage.  Extubated on 3/3, reintubated for stridor/work of breaking, now extubated on 3/6 and started on Regular diet per team.       Diet Prescription: Diet, Regular (03-06-25 @ 18:23)      Food Allergy/Intolerance: [facility]    Nutrition Course: Met with patient at bedside. Reports good appetite and po intake. % of most meals recorded in nursing flowsheets. Does not like all food per patient. Patient denies any nausea/vomiting/diarrhea/constipation or difficulty chewing and swallowing. +BM on 3/17. Declines oral nutrition supplement. Continue good po intake of all meals encouraged.        Pertinent Medications: MEDICATIONS  (STANDING):  cefepime   IVPB 2000 milliGRAM(s) IV Intermittent every 8 hours  cefepime   IVPB      heparin   Injectable 5000 Unit(s) SubCutaneous every 8 hours  levETIRAcetam 1000 milliGRAM(s) Oral two times a day  metroNIDAZOLE  IVPB 500 milliGRAM(s) IV Intermittent every 8 hours  mirtazapine 7.5 milliGRAM(s) Oral at bedtime  polyethylene glycol 3350 17 Gram(s) Oral daily  sertraline 50 milliGRAM(s) Oral daily  vancomycin  IVPB 1250 milliGRAM(s) IV Intermittent every 12 hours    MEDICATIONS  (PRN):  acetaminophen     Tablet .. 650 milliGRAM(s) Oral every 6 hours PRN Temp greater or equal to 38C (100.4F), Mild Pain (1 - 3)  albuterol    90 MICROgram(s) HFA Inhaler 2 Puff(s) Inhalation every 4 hours PRN Shortness of Breath and/or Wheezing  senna 2 Tablet(s) Oral at bedtime PRN Constipation        Pertinent Labs: 03-16 Na138 mmol/L Glu 92 mg/dL K+ 3.5 mmol/L Cr  0.38 mg/dL[L] BUN 8 mg/dL 03-16 Phos 3.4 mg/dL      POCT Blood Glucose.: 108 mg/dL (17 Mar 2025 08:02)      Weight (Kg): (3/12) 66.1  (3/4) 61.6  (3/2) 61.6  (3/1) 61.9  (2/28) 63.9  (2/27) 64.6 (2/25) 62.9  (2/23) 66.5 kg   Height: 63 in / 160.02 cm   IBW: 115 lbs / 52.2 kg    BMI: 24kg/m^2 (at lowest weight-61.6kg)    Physical Assessment, per nursing flowsheets:  Edema: none  Pressure Injury: none      Estimated Needs:   [X] No change since previous assessment, based on IBW-52.2kg  1305-1566kcal daily @ 25-30kcal/kg,  68-84gm protein daily @ 1.3-1.6gm/kg     Previous Nutrition Diagnosis:   [X ] Inadequate Energy Intake[ X] Increased Nutrient Needs   Nutrition Diagnosis is [ X] ongoing  [ ] resolved [ ] not applicable   New Nutrition Diagnosis: [X ] not applicable       Interventions:   1) Continue current diet order, which remains appropriate at this time.   2) Monitor weights, labs, BM's, skin integrity, p.o. intake.   3) Please document % PO intake in nursing flowsheet.   4) Honor food and beverage preferences within diet restriction of patient in an effort to maximize level of nutrient intake.   5) Please Encourage po intake, assist with meals and menu selections, provide alternatives PRN.       Gui Emanuel RD, CDN, MS pager [phone]  Also available on [employer] Teams

## 2025-03-18 LAB
ANION GAP SERPL CALC-SCNC: 15 MMOL/L — HIGH (ref 7–14)
BUN SERPL-MCNC: 12 MG/DL — SIGNIFICANT CHANGE UP (ref 7–23)
CALCIUM SERPL-MCNC: 9.1 MG/DL — SIGNIFICANT CHANGE UP (ref 8.4–10.5)
CHLORIDE SERPL-SCNC: 103 MMOL/L — SIGNIFICANT CHANGE UP (ref 98–107)
CO2 SERPL-SCNC: 21 MMOL/L — LOW (ref 22–31)
CREAT SERPL-MCNC: 0.44 MG/DL — LOW (ref 0.5–1.3)
EGFR: 111 ML/MIN/1.73M2 — SIGNIFICANT CHANGE UP
EGFR: 111 ML/MIN/1.73M2 — SIGNIFICANT CHANGE UP
GLUCOSE SERPL-MCNC: 102 MG/DL — HIGH (ref 70–99)
HCT VFR BLD CALC: 33.9 % — LOW (ref 34.5–45)
HGB BLD-MCNC: 11.6 G/DL — SIGNIFICANT CHANGE UP (ref 11.5–15.5)
MAGNESIUM SERPL-MCNC: 2 MG/DL — SIGNIFICANT CHANGE UP (ref 1.6–2.6)
MCHC RBC-ENTMCNC: 27.2 PG — SIGNIFICANT CHANGE UP (ref 27–34)
MCHC RBC-ENTMCNC: 34.2 G/DL — SIGNIFICANT CHANGE UP (ref 32–36)
MCV RBC AUTO: 79.4 FL — LOW (ref 80–100)
NRBC # BLD AUTO: 0 K/UL — SIGNIFICANT CHANGE UP (ref 0–0)
NRBC # FLD: 0 K/UL — SIGNIFICANT CHANGE UP (ref 0–0)
NRBC BLD AUTO-RTO: 0 /100 WBCS — SIGNIFICANT CHANGE UP (ref 0–0)
PHOSPHATE SERPL-MCNC: 3.7 MG/DL — SIGNIFICANT CHANGE UP (ref 2.5–4.5)
PLATELET # BLD AUTO: 195 K/UL — SIGNIFICANT CHANGE UP (ref 150–400)
POTASSIUM SERPL-MCNC: 3.7 MMOL/L — SIGNIFICANT CHANGE UP (ref 3.5–5.3)
POTASSIUM SERPL-SCNC: 3.7 MMOL/L — SIGNIFICANT CHANGE UP (ref 3.5–5.3)
RBC # BLD: 4.27 M/UL — SIGNIFICANT CHANGE UP (ref 3.8–5.2)
RBC # FLD: 14.8 % — HIGH (ref 10.3–14.5)
SODIUM SERPL-SCNC: 139 MMOL/L — SIGNIFICANT CHANGE UP (ref 135–145)
VANCOMYCIN TROUGH SERPL-MCNC: 13.5 UG/ML — SIGNIFICANT CHANGE UP (ref 10–20)
WBC # BLD: 5.72 K/UL — SIGNIFICANT CHANGE UP (ref 3.8–10.5)
WBC # FLD AUTO: 5.72 K/UL — SIGNIFICANT CHANGE UP (ref 3.8–10.5)

## 2025-03-18 RX ORDER — HEPARIN SODIUM 1000 [USP'U]/ML
5000 INJECTION INTRAVENOUS; SUBCUTANEOUS EVERY 8 HOURS
Refills: 0 | Status: DISCONTINUED | OUTPATIENT
Start: 2025-03-18 | End: 2025-03-18

## 2025-03-18 RX ORDER — VANCOMYCIN HCL IN 5 % DEXTROSE 1.5G/250ML
1250 PLASTIC BAG, INJECTION (ML) INTRAVENOUS ONCE
Refills: 0 | Status: DISCONTINUED | OUTPATIENT
Start: 2025-03-18 | End: 2025-03-18

## 2025-03-18 RX ORDER — CEFEPIME 2 G/20ML
2000 INJECTION, POWDER, FOR SOLUTION INTRAVENOUS EVERY 8 HOURS
Refills: 0 | Status: DISCONTINUED | OUTPATIENT
Start: 2025-03-18 | End: 2025-03-19

## 2025-03-18 RX ORDER — METRONIDAZOLE 250 MG
500 TABLET ORAL EVERY 8 HOURS
Refills: 0 | Status: DISCONTINUED | OUTPATIENT
Start: 2025-03-18 | End: 2025-03-19

## 2025-03-18 RX ORDER — VANCOMYCIN HCL IN 5 % DEXTROSE 1.5G/250ML
1000 PLASTIC BAG, INJECTION (ML) INTRAVENOUS EVERY 12 HOURS
Refills: 0 | Status: DISCONTINUED | OUTPATIENT
Start: 2025-03-18 | End: 2025-03-19

## 2025-03-18 RX ORDER — VANCOMYCIN HCL IN 5 % DEXTROSE 1.5G/250ML
1250 PLASTIC BAG, INJECTION (ML) INTRAVENOUS EVERY 12 HOURS
Refills: 0 | Status: DISCONTINUED | OUTPATIENT
Start: 2025-03-18 | End: 2025-03-18

## 2025-03-18 RX ADMIN — CEFEPIME 100 MILLIGRAM(S): 2 INJECTION, POWDER, FOR SOLUTION INTRAVENOUS at 13:27

## 2025-03-18 RX ADMIN — HEPARIN SODIUM 5000 UNIT(S): 1000 INJECTION INTRAVENOUS; SUBCUTANEOUS at 13:27

## 2025-03-18 RX ADMIN — Medication 250 MILLIGRAM(S): at 18:01

## 2025-03-18 RX ADMIN — Medication 100 MILLIGRAM(S): at 22:05

## 2025-03-18 RX ADMIN — CEFEPIME 100 MILLIGRAM(S): 2 INJECTION, POWDER, FOR SOLUTION INTRAVENOUS at 05:17

## 2025-03-18 RX ADMIN — SERTRALINE 50 MILLIGRAM(S): 100 TABLET, FILM COATED ORAL at 12:24

## 2025-03-18 RX ADMIN — LEVETIRACETAM 1000 MILLIGRAM(S): 10 INJECTION, SOLUTION INTRAVENOUS at 18:03

## 2025-03-18 RX ADMIN — POLYETHYLENE GLYCOL 3350 17 GRAM(S): 17 POWDER, FOR SOLUTION ORAL at 12:24

## 2025-03-18 RX ADMIN — Medication 3 MILLIGRAM(S): at 21:34

## 2025-03-18 RX ADMIN — Medication 100 MILLIGRAM(S): at 14:12

## 2025-03-18 RX ADMIN — LEVETIRACETAM 1000 MILLIGRAM(S): 10 INJECTION, SOLUTION INTRAVENOUS at 05:17

## 2025-03-18 RX ADMIN — MIRTAZAPINE 7.5 MILLIGRAM(S): 30 TABLET, FILM COATED ORAL at 21:32

## 2025-03-18 RX ADMIN — CEFEPIME 100 MILLIGRAM(S): 2 INJECTION, POWDER, FOR SOLUTION INTRAVENOUS at 21:32

## 2025-03-18 RX ADMIN — HEPARIN SODIUM 5000 UNIT(S): 1000 INJECTION INTRAVENOUS; SUBCUTANEOUS at 00:55

## 2025-03-18 RX ADMIN — Medication 100 MILLIGRAM(S): at 03:04

## 2025-03-18 NOTE — PROGRESS NOTE ADULT - PROBLEM SELECTOR PLAN 1
- Q4hr neuro checks  - Abx per ID  - Cont PT/OT  - OR 3/20 for removal of ligated shunt and cranioplasty    [id number]    Case discussed with attending neurosurgeon Dr. Brush

## 2025-03-18 NOTE — PROGRESS NOTE ADULT - SUBJECTIVE AND OBJECTIVE BOX
PAST 24HR EVENTS:  No issues overnight. Afebrile.     Vital Signs Last 24 Hrs  T(C): 36.6 (17 Mar 2025 20:37), Max: 36.8 (17 Mar 2025 01:01)  T(F): 97.8 (17 Mar 2025 20:37), Max: 98.3 (17 Mar 2025 09:00)  HR: 86 (17 Mar 2025 20:37) (83 - 92)  BP: 101/49 (17 Mar 2025 20:37) (95/62 - 109/47)  BP(mean): --  RR: 17 (17 Mar 2025 20:37) (17 - 18)  SpO2: 97% (17 Mar 2025 20:37) (96% - 99%)    Parameters below as of 17 Mar 2025 20:37  Patient On (Oxygen Delivery Method): room air        MEDS:   acetaminophen     Tablet .. 650 milliGRAM(s) Oral every 6 hours PRN  albuterol    90 MICROgram(s) HFA Inhaler 2 Puff(s) Inhalation every 4 hours PRN  cefepime   IVPB 2000 milliGRAM(s) IV Intermittent every 8 hours  cefepime   IVPB      heparin   Injectable 5000 Unit(s) SubCutaneous every 8 hours  levETIRAcetam 1000 milliGRAM(s) Oral two times a day  melatonin 3 milliGRAM(s) Oral at bedtime PRN  metroNIDAZOLE  IVPB 500 milliGRAM(s) IV Intermittent every 8 hours  mirtazapine 7.5 milliGRAM(s) Oral at bedtime  polyethylene glycol 3350 17 Gram(s) Oral daily  senna 2 Tablet(s) Oral at bedtime PRN  sertraline 50 milliGRAM(s) Oral daily  vancomycin  IVPB 1250 milliGRAM(s) IV Intermittent every 12 hours      LABS:                        11.9   6.03  )-----------( 218      ( 17 Mar 2025 04:20 )             34.8     03-16    138  |  103  |  8   ----------------------------<  92  3.5   |  21[L]  |  0.38[L]    Ca    9.2      16 Mar 2025 03:59  Phos  3.4     03-16  Mg     2.10     03-16          RADIOLOGY:       PHYSICAL EXAM:  Aox3, fc  EOS, EOMI, PERRL  MAEx4 with good strength  Incision c/d/i, staples removed

## 2025-03-18 NOTE — PROGRESS NOTE ADULT - ASSESSMENT
58 yo F with PMHx of neurofibromatosis, s/p L suboccipital craniectomy for resection of cerebellar brain tumor 2023 by Dr Brush, s/p traumatic fall with head trauma in 2024 with large acute right SDH, s/p emergent right decompressive hemicraniectomy on 24, s/p right cranioplasty on 24 with custom plate, s/p ETV, ligation of shunt on the right with ligaclips with retention of valve as rescue device, presents as a transfer from [facility] for witnessed seizure at home. [facility] revealed moderate ventriculomegaly and slight increase in size of extra-axial CSF collection overlying the right frontal duraplasty     Exam with decreased strength on left side, post ictal vs intracranial causes, will get MRI/MRA to assess and CTH/CTA if MRI will be delayed. On zosyn, white count downtrending.   :  CTH/ CTA done stable , CTA negative, MRIw/flow/MRA ordered, intubated /sedated, neurology saw Veeg-O/ neg, on keppra, K+3.3 repleted, afbrile, bld cltr NGTD, UA neg, WBC nml  : stable exam,  Veeg on, keppra, mri w/flow/ mra pending  : Stable exam. No seizures noted on VEEG. On keppra. MRI with CSF flow study and MRA ordered  : Extubated, alert, mildly confused and restless, nonfocal exam. On keppra. MRI showed increase in right epidural collection with diffusion restriction. Shunt (as reservoir) tapped, W1 R1  GSN  3/1: Alert, neurologically intact. On keppra. OR for clarissa hold for drainage of epidural abscess. Cx sent, NGTD. EDJPx1  Was intubated preop for stridor, dex x24hrs  3: POD1: Intubated, ENT scoped determined airway patent, ID-Following initiated on broad spectrum abx, OR cultures GSN, AFB neg, Cx pending. Dex discontinued.  3/3 POD2, intubated, on flagyl/vanc/cefipime, K 3.1- repletion ordered. EDJPx1 105cc/24hrs, PT recc LOUIS  3: pod 3, intubated for respiratory distress, on tripple abx, ED SHAYY to suction, f/u OR cltrs NGTD  3 pod 4, remains intubated, neuro exam stable, ED SHAYY drain pus tinged 30cc, on triple Abx, OR cltrs NGTD, ID following,    3/6: POD # 5 stable exam. Patient remains intubated. On triple antibiotics, decadron for stridor.    3-10: POD # 6-9 Neurologically intact. Patient tolerating extubation. On triple antibiotics  3: POD # 10  Neurologically intact. On triple antibiotics. Plan for removal of ommaya and cranioplasty next week  3/12- POD # 11-17: on vanc, cefepime, and metronidazole, neuro exam stable, plan for replacement of cranioplasty and removal of ligated shunt on 3/20. PT, OT, PMR following.

## 2025-03-19 ENCOUNTER — TRANSCRIPTION ENCOUNTER (OUTPATIENT)
Age: 60
End: 2025-03-19

## 2025-03-19 LAB
ANION GAP SERPL CALC-SCNC: 13 MMOL/L — SIGNIFICANT CHANGE UP (ref 7–14)
APTT BLD: 33.4 SEC — SIGNIFICANT CHANGE UP (ref 24.5–35.6)
BLD GP AB SCN SERPL QL: NEGATIVE — SIGNIFICANT CHANGE UP
BUN SERPL-MCNC: 11 MG/DL — SIGNIFICANT CHANGE UP (ref 7–23)
CALCIUM SERPL-MCNC: 9.2 MG/DL — SIGNIFICANT CHANGE UP (ref 8.4–10.5)
CHLORIDE SERPL-SCNC: 102 MMOL/L — SIGNIFICANT CHANGE UP (ref 98–107)
CO2 SERPL-SCNC: 22 MMOL/L — SIGNIFICANT CHANGE UP (ref 22–31)
CREAT SERPL-MCNC: 0.4 MG/DL — LOW (ref 0.5–1.3)
EGFR: 113 ML/MIN/1.73M2 — SIGNIFICANT CHANGE UP
EGFR: 113 ML/MIN/1.73M2 — SIGNIFICANT CHANGE UP
GLUCOSE SERPL-MCNC: 100 MG/DL — HIGH (ref 70–99)
GRAM STN FLD: SIGNIFICANT CHANGE UP
HCT VFR BLD CALC: 34.6 % — SIGNIFICANT CHANGE UP (ref 34.5–45)
HGB BLD-MCNC: 11.8 G/DL — SIGNIFICANT CHANGE UP (ref 11.5–15.5)
INR BLD: 1.13 RATIO — SIGNIFICANT CHANGE UP (ref 0.85–1.16)
MAGNESIUM SERPL-MCNC: 2 MG/DL — SIGNIFICANT CHANGE UP (ref 1.6–2.6)
MCHC RBC-ENTMCNC: 27.1 PG — SIGNIFICANT CHANGE UP (ref 27–34)
MCHC RBC-ENTMCNC: 34.1 G/DL — SIGNIFICANT CHANGE UP (ref 32–36)
MCV RBC AUTO: 79.5 FL — LOW (ref 80–100)
NIGHT BLUE STAIN TISS: SIGNIFICANT CHANGE UP
NRBC # BLD AUTO: 0 K/UL — SIGNIFICANT CHANGE UP (ref 0–0)
NRBC # FLD: 0 K/UL — SIGNIFICANT CHANGE UP (ref 0–0)
NRBC BLD AUTO-RTO: 0 /100 WBCS — SIGNIFICANT CHANGE UP (ref 0–0)
PHOSPHATE SERPL-MCNC: 3.9 MG/DL — SIGNIFICANT CHANGE UP (ref 2.5–4.5)
PLATELET # BLD AUTO: 192 K/UL — SIGNIFICANT CHANGE UP (ref 150–400)
POTASSIUM SERPL-MCNC: 3.8 MMOL/L — SIGNIFICANT CHANGE UP (ref 3.5–5.3)
POTASSIUM SERPL-SCNC: 3.8 MMOL/L — SIGNIFICANT CHANGE UP (ref 3.5–5.3)
PROTHROM AB SERPL-ACNC: 13.4 SEC — SIGNIFICANT CHANGE UP (ref 9.9–13.4)
RBC # BLD: 4.35 M/UL — SIGNIFICANT CHANGE UP (ref 3.8–5.2)
RBC # FLD: 14.6 % — HIGH (ref 10.3–14.5)
RH IG SCN BLD-IMP: NEGATIVE — SIGNIFICANT CHANGE UP
SODIUM SERPL-SCNC: 137 MMOL/L — SIGNIFICANT CHANGE UP (ref 135–145)
SPECIMEN SOURCE: SIGNIFICANT CHANGE UP
SPECIMEN SOURCE: SIGNIFICANT CHANGE UP
WBC # BLD: 4.71 K/UL — SIGNIFICANT CHANGE UP (ref 3.8–10.5)
WBC # FLD AUTO: 4.71 K/UL — SIGNIFICANT CHANGE UP (ref 3.8–10.5)

## 2025-03-19 PROCEDURE — 88305 TISSUE EXAM BY PATHOLOGIST: CPT | Mod: 26

## 2025-03-19 PROCEDURE — 70450 CT HEAD/BRAIN W/O DYE: CPT | Mod: 26

## 2025-03-19 PROCEDURE — 88312 SPECIAL STAINS GROUP 1: CPT | Mod: 26

## 2025-03-19 PROCEDURE — 99232 SBSQ HOSP IP/OBS MODERATE 35: CPT

## 2025-03-19 PROCEDURE — G0545: CPT

## 2025-03-19 DEVICE — SURGIFLO MATRIX WITH THROMBIN KIT: Type: IMPLANTABLE DEVICE | Status: FUNCTIONAL

## 2025-03-19 DEVICE — SCREW UN3 AXS SELF DRILL 1.5X4MM: Type: IMPLANTABLE DEVICE | Status: FUNCTIONAL

## 2025-03-19 DEVICE — DURAGEN PLUS MATRIX 1 X 3": Type: IMPLANTABLE DEVICE | Status: FUNCTIONAL

## 2025-03-19 DEVICE — BONE WAX 2.5GM: Type: IMPLANTABLE DEVICE | Status: FUNCTIONAL

## 2025-03-19 DEVICE — IMPLANTABLE DEVICE: Type: IMPLANTABLE DEVICE | Status: FUNCTIONAL

## 2025-03-19 DEVICE — CLIP APPLIER COVIDIEN SURGICLIP 11.5" MEDIUM: Type: IMPLANTABLE DEVICE | Status: FUNCTIONAL

## 2025-03-19 RX ORDER — ONDANSETRON HCL/PF 4 MG/2 ML
4 VIAL (ML) INJECTION ONCE
Refills: 0 | Status: DISCONTINUED | OUTPATIENT
Start: 2025-03-19 | End: 2025-03-19

## 2025-03-19 RX ORDER — OXYCODONE HYDROCHLORIDE 30 MG/1
2.5 TABLET ORAL EVERY 6 HOURS
Refills: 0 | Status: DISCONTINUED | OUTPATIENT
Start: 2025-03-19 | End: 2025-03-20

## 2025-03-19 RX ORDER — LEVETIRACETAM 10 MG/ML
1000 INJECTION, SOLUTION INTRAVENOUS
Refills: 0 | Status: DISCONTINUED | OUTPATIENT
Start: 2025-03-19 | End: 2025-04-03

## 2025-03-19 RX ORDER — FENTANYL CITRATE-0.9 % NACL/PF 100MCG/2ML
25 SYRINGE (ML) INTRAVENOUS
Refills: 0 | Status: DISCONTINUED | OUTPATIENT
Start: 2025-03-19 | End: 2025-03-19

## 2025-03-19 RX ORDER — ACETAMINOPHEN 500 MG/5ML
1000 LIQUID (ML) ORAL EVERY 6 HOURS
Refills: 0 | Status: COMPLETED | OUTPATIENT
Start: 2025-03-19 | End: 2025-03-20

## 2025-03-19 RX ORDER — POLYETHYLENE GLYCOL 3350 17 G/17G
17 POWDER, FOR SOLUTION ORAL DAILY
Refills: 0 | Status: DISCONTINUED | OUTPATIENT
Start: 2025-03-19 | End: 2025-04-03

## 2025-03-19 RX ORDER — ALBUTEROL SULFATE 2.5 MG/3ML
2 VIAL, NEBULIZER (ML) INHALATION EVERY 4 HOURS
Refills: 0 | Status: DISCONTINUED | OUTPATIENT
Start: 2025-03-19 | End: 2025-04-03

## 2025-03-19 RX ORDER — CEFEPIME 2 G/20ML
2000 INJECTION, POWDER, FOR SOLUTION INTRAVENOUS EVERY 8 HOURS
Refills: 0 | Status: DISCONTINUED | OUTPATIENT
Start: 2025-03-19 | End: 2025-04-03

## 2025-03-19 RX ORDER — HYDROMORPHONE/SOD CHLOR,ISO/PF 2 MG/10 ML
0.5 SYRINGE (ML) INJECTION
Refills: 0 | Status: DISCONTINUED | OUTPATIENT
Start: 2025-03-19 | End: 2025-03-19

## 2025-03-19 RX ORDER — SENNA 187 MG
2 TABLET ORAL AT BEDTIME
Refills: 0 | Status: DISCONTINUED | OUTPATIENT
Start: 2025-03-19 | End: 2025-04-03

## 2025-03-19 RX ORDER — OXYCODONE HYDROCHLORIDE 30 MG/1
5 TABLET ORAL EVERY 6 HOURS
Refills: 0 | Status: DISCONTINUED | OUTPATIENT
Start: 2025-03-19 | End: 2025-03-20

## 2025-03-19 RX ORDER — SERTRALINE 100 MG/1
50 TABLET, FILM COATED ORAL DAILY
Refills: 0 | Status: DISCONTINUED | OUTPATIENT
Start: 2025-03-19 | End: 2025-04-03

## 2025-03-19 RX ORDER — VANCOMYCIN HCL IN 5 % DEXTROSE 1.5G/250ML
1000 PLASTIC BAG, INJECTION (ML) INTRAVENOUS EVERY 12 HOURS
Refills: 0 | Status: DISCONTINUED | OUTPATIENT
Start: 2025-03-19 | End: 2025-04-03

## 2025-03-19 RX ORDER — MELATONIN 5 MG
3 TABLET ORAL AT BEDTIME
Refills: 0 | Status: DISCONTINUED | OUTPATIENT
Start: 2025-03-19 | End: 2025-04-03

## 2025-03-19 RX ORDER — METRONIDAZOLE 250 MG
500 TABLET ORAL EVERY 8 HOURS
Refills: 0 | Status: DISCONTINUED | OUTPATIENT
Start: 2025-03-19 | End: 2025-03-28

## 2025-03-19 RX ADMIN — Medication 75 MILLILITER(S): at 05:07

## 2025-03-19 RX ADMIN — LEVETIRACETAM 1000 MILLIGRAM(S): 10 INJECTION, SOLUTION INTRAVENOUS at 05:08

## 2025-03-19 RX ADMIN — CEFEPIME 100 MILLIGRAM(S): 2 INJECTION, POWDER, FOR SOLUTION INTRAVENOUS at 21:12

## 2025-03-19 RX ADMIN — Medication 250 MILLIGRAM(S): at 18:39

## 2025-03-19 RX ADMIN — Medication 100 MILLIGRAM(S): at 05:40

## 2025-03-19 RX ADMIN — CEFEPIME 100 MILLIGRAM(S): 2 INJECTION, POWDER, FOR SOLUTION INTRAVENOUS at 13:17

## 2025-03-19 RX ADMIN — Medication 100 MILLIGRAM(S): at 21:12

## 2025-03-19 RX ADMIN — LEVETIRACETAM 1000 MILLIGRAM(S): 10 INJECTION, SOLUTION INTRAVENOUS at 20:35

## 2025-03-19 RX ADMIN — Medication 100 MILLIGRAM(S): at 13:18

## 2025-03-19 RX ADMIN — CEFEPIME 100 MILLIGRAM(S): 2 INJECTION, POWDER, FOR SOLUTION INTRAVENOUS at 05:07

## 2025-03-19 RX ADMIN — Medication 400 MILLIGRAM(S): at 23:31

## 2025-03-19 RX ADMIN — Medication 250 MILLIGRAM(S): at 06:56

## 2025-03-19 RX ADMIN — Medication 400 MILLIGRAM(S): at 18:39

## 2025-03-19 NOTE — PROGRESS NOTE ADULT - SUBJECTIVE AND OBJECTIVE BOX
PAST 24HR EVENTS: NPO after midnight for cranioplasty with Dr. Brush today. Patient had one episode of epistaxis from the L. nostril overnight, but no significant blood loss. Exam stable. ID following, pt. on vanc, cefepime and flagyl. DVT prophylaxis held for OR.     Vital Signs Last 24 Hrs  T(C): 36.7 (19 Mar 2025 01:00), Max: 36.8 (18 Mar 2025 17:00)  T(F): 98 (19 Mar 2025 01:00), Max: 98.2 (18 Mar 2025 17:00)  HR: 78 (19 Mar 2025 01:00) (78 - 96)  BP: 109/52 (19 Mar 2025 01:00) (107/54 - 117/71)  BP(mean): --  RR: 18 (19 Mar 2025 01:00) (17 - 19)  SpO2: 98% (19 Mar 2025 01:00) (96% - 99%)    Parameters below as of 19 Mar 2025 01:00  Patient On (Oxygen Delivery Method): room air        MEDS:   acetaminophen     Tablet .. 650 milliGRAM(s) Oral every 6 hours PRN  albuterol    90 MICROgram(s) HFA Inhaler 2 Puff(s) Inhalation every 4 hours PRN  cefepime   IVPB 2000 milliGRAM(s) IV Intermittent every 8 hours  levETIRAcetam 1000 milliGRAM(s) Oral two times a day  melatonin 3 milliGRAM(s) Oral at bedtime PRN  metroNIDAZOLE  IVPB 500 milliGRAM(s) IV Intermittent every 8 hours  mirtazapine 7.5 milliGRAM(s) Oral at bedtime  polyethylene glycol 3350 17 Gram(s) Oral daily  senna 2 Tablet(s) Oral at bedtime PRN  sertraline 50 milliGRAM(s) Oral daily  sodium chloride 0.9%. 1000 milliLiter(s) IV Continuous <Continuous>  vancomycin  IVPB 1000 milliGRAM(s) IV Intermittent every 12 hours      LABS:                        11.6   5.72  )-----------( 195      ( 18 Mar 2025 04:18 )             33.9     03-18    139  |  103  |  12  ----------------------------<  102[H]  3.7   |  21[L]  |  0.44[L]    Ca    9.1      18 Mar 2025 04:18  Phos  3.7     03-18  Mg     2.00     03-18          PHYSICAL EXAM:  Aox3, fc  EOS, EOMI, PERRL  no epistaxis noted on exam, very minimal dry blood in L. nostril   MAEx4 with good strength  Incision c/d/i, staples removed

## 2025-03-19 NOTE — PROGRESS NOTE ADULT - SUBJECTIVE AND OBJECTIVE BOX
Infectious Diseases Follow Up:    Patient is a 60y old  Female who presents with a chief complaint of seizure (19 Mar 2025 03:27)      Interval History/ROS:  No acute events, plan for OR today     Allergies  No Known Allergies        ANTIMICROBIALS:      Current Abx:     Previous Abx     OTHER MEDS:  MEDICATIONS  (STANDING):      Vital Signs Last 24 Hrs  T(C): 36.7 (19 Mar 2025 11:55), Max: 36.8 (18 Mar 2025 17:00)  T(F): 98 (19 Mar 2025 11:55), Max: 98.2 (18 Mar 2025 17:00)  HR: 89 (19 Mar 2025 11:55) (74 - 89)  BP: 106/65 (19 Mar 2025 11:55) (105/60 - 121/61)  BP(mean): 79 (19 Mar 2025 11:55) (79 - 79)  RR: 17 (19 Mar 2025 11:55) (17 - 18)  SpO2: 98% (19 Mar 2025 11:55) (96% - 100%)    Parameters below as of 19 Mar 2025 11:55  Patient On (Oxygen Delivery Method): room air      PHYSICAL EXAM:  GENERAL: NAD, well-developed  HEAD:  Atraumatic, Normocephalic  EYES: EOMI, conjunctiva and sclera clear  CHEST/LUNG: On RA, not in respiratory distress   PSYCH: AAOx3                          11.8   4.71  )-----------( 192      ( 19 Mar 2025 05:07 )             34.6       03-19    137  |  102  |  11  ----------------------------<  100[H]  3.8   |  22  |  0.40[L]    Ca    9.2      19 Mar 2025 05:07  Phos  3.9     03-19  Mg     2.00     03-19        Urinalysis Basic - ( 19 Mar 2025 05:07 )    Color: x / Appearance: x / SG: x / pH: x  Gluc: 100 mg/dL / Ketone: x  / Bili: x / Urobili: x   Blood: x / Protein: x / Nitrite: x   Leuk Esterase: x / RBC: x / WBC x   Sq Epi: x / Non Sq Epi: x / Bacteria: x        MICROBIOLOGY:  v  Surgical Swab  03-01-25   No growth at 5 days  --    Few polymorphonuclear leukocytes per low power field  No organisms seen per oil power field      CSF CSF  02-28-25   No growth at 14 days.  --    No polymorphonuclear cells seen  No organisms seen  by cytocentrifuge      Catheterized Catheterized  02-26-25   No growth  --  --      .Blood Blood-Peripheral  02-26-25   No growth at 5 days  --  --      .Blood Blood-Peripheral  02-26-25   No growth at 5 days  --  --      .Blood Blood  02-22-25   No growth at 5 days  --  --      .Blood Blood  02-22-25   No growth at 5 days  --  --                RADIOLOGY:

## 2025-03-19 NOTE — PROGRESS NOTE ADULT - SUBJECTIVE AND OBJECTIVE BOX
SICU Daily Progress Note  =====================================================  HPI:       ALLERGIES:  No Known Allergies  --------------------------------------------------------------------------------------    MEDICATIONS:    Neurologic Medications  fentaNYL    Injectable 25 MICROGram(s) IV Push every 5 minutes PRN Moderate Pain (4 - 6)  HYDROmorphone  Injectable 0.5 milliGRAM(s) IV Push every 15 minutes PRN Severe Pain (7 - 10)  ondansetron Injectable 4 milliGRAM(s) IV Push once PRN Nausea and/or Vomiting    Respiratory Medications    Cardiovascular Medications    Gastrointestinal Medications    Genitourinary Medications    Hematologic/Oncologic Medications    Antimicrobial/Immunologic Medications    Endocrine/Metabolic Medications    Topical/Other Medications    --------------------------------------------------------------------------------------    VITAL SIGNS:    --------------------------------------------------------------------------------------    INS AND OUTS:  ((Insert SICU Vitals/Is+Os here))***  --------------------------------------------------------------------------------------    EXAM  NEUROLOGY  RASS:   	GCS:    Exam: Normal, in no acute distress.  Alert and oriented x4.  No focal neurologic deficits. ***    HEENT  Exam: Normocephalic, atraumatic, EOMI.  ***    RESPIRATORY  Exam: Lungs clear to auscultation, Normal expansion/effort. ***  Mechanical Ventilation:     CARDIOVASCULAR  Exam: S1, S2.  Regular rate and rhythm.   ***    GI/NUTRITION  Exam: Abdomen soft, Non-tender, Non-distended.  ***  Wound:  ***  Current Diet: NPO***    VASCULAR  Exam: Extremities warm, pink, well-perfused. ***    MUSCULOSKELETAL  Exam: All extremities moving spontaneously without limitations. ***    SKIN  Exam: Good skin turgor, no skin breakdown. ***    METABOLIC / FLUIDS / ELECTROLYTES      HEMATOLOGIC  [x] VTE Prophylaxis:   Transfusions:	[] PRBC	[] Platelets		[] FFP	[] Cryoprecipitate    INFECTIOUS DISEASE  Antimicrobials/Immunologic Medications:    Day #      of     ***    TUBES / LINES / DRAINS  ***  [x] Peripheral IV  [] Central Venous Line     	[] R	[] L	[] IJ	[] Fem	[] SC	Date Placed:   [] Arterial Line		[] R	[] L	[] Fem	[] Rad	[] Ax	Date Placed:   [] PICC		[] Midline		[] Mediport  [] Urinary Catheter		Date Placed:   [x] Necessity of urinary, arterial, and venous catheters discussed    --------------------------------------------------------------------------------------    LABS    ((Insert SICU Labs here))***  --------------------------------------------------------------------------------------    OTHER LABORATORY:     IMAGING STUDIES:   CXR:     ASSESSMENT:  60y Female    ((insert from previous))***    PLAN:   ***  NEUROLOGY:    RESPIRATORY:  - Maintain O2 saturation >92%    CARDIOVASCULAR:    GI / NUTRITION:    RENAL / GENITOURINARY:  - Indwelling storey catheter  - Monitor electrolytes and replete PRN  - Continue to monitor strict ins and outs q1 hour    HEMATOLOGIC:    INFECTIOUS DISEASE:  - Currently afebrile with no leukocytosis  - Continue to monitor off antibiotics    ENDOCRINOLOGY:  - No active issues  - Continue to monitor glucose on BMP (goal 140-180)    Disposition: SICU    --------------------------------------------------------------------------------------    Critical Care Diagnoses: SICU Daily Progress Note  =====================================================  HPI: 60F PMH hydrocephalus s/p  shunt (10/4/2013), neurofibromatosis, s/p left retrosigmoid craniotomy for resection of cerebellar brain tumor (12/2023 Dr Brush), traumatic acute right SDH s/p emergent right decompressive hemicraniectomy (5/22/24) followed by right cranioplasty with custom plate (6/17/24),  shunt malfunction s/p left third ventriculostomy and ligation of  shunt with retention of valve (7/19/24) presented as a transfer from [facility] for witnessed seizure at home found to have R frontal epidural collection with previous SICU stay for intubation; s/p R craniotomy evacuation of epidural empyema (3/1/25, Gonsalo), c/b upper airway edema requiring re-intubation, with floor course w/ continued abx now s/p RTOR (03/19, Gonsalo) for explantation of cranial plate, removal of  shunt valve+distal tubing with residual catheter left, and mesh plate cranioplasty with closure. SICU re-consulted for q1 hour neurochecks.     ALLERGIES:  No Known Allergies  --------------------------------------------------------------------------------------    MEDICATIONS:    Neurologic Medications  fentaNYL    Injectable 25 MICROGram(s) IV Push every 5 minutes PRN Moderate Pain (4 - 6)  HYDROmorphone  Injectable 0.5 milliGRAM(s) IV Push every 15 minutes PRN Severe Pain (7 - 10)  ondansetron Injectable 4 milliGRAM(s) IV Push once PRN Nausea and/or Vomiting    Respiratory Medications    Cardiovascular Medications    Gastrointestinal Medications    Genitourinary Medications    Hematologic/Oncologic Medications    Antimicrobial/Immunologic Medications    Endocrine/Metabolic Medications    03-19    137  |  102  |  11  ----------------------------<  100[H]  3.8   |  22  |  0.40[L]    Ca    9.2      19 Mar 2025 05:07  Phos  3.9     03-19  Mg     2.00     03-19             11.8   4.71  )-----------( 192      ( 19 Mar 2025 05:07 )             34.6   Topical/Other Medications  --------------------------------------------------------------------------------------  VITAL SIGNS:  Vital Signs Last 24 Hrs  T(C): 36.1 (19 Mar 2025 16:00), Max: 36.9 (19 Mar 2025 15:46)  T(F): 97 (19 Mar 2025 16:00), Max: 98.4 (19 Mar 2025 15:46)  HR: 77 (19 Mar 2025 16:45) (74 - 91)  BP: 113/61 (19 Mar 2025 16:30) (100/61 - 121/61)  BP(mean): 76 (19 Mar 2025 16:30) (70 - 79)  RR: 13 (19 Mar 2025 16:45) (10 - 18)  SpO2: 97% (19 Mar 2025 16:45) (94% - 100%)    Parameters below as of 19 Mar 2025 16:45  Patient On (Oxygen Delivery Method): room air  --------------------------------------------------------------------------------------  INS AND OUTS:  I&O's Summary    18 Mar 2025 07:01  -  19 Mar 2025 07:00  --------------------------------------------------------  IN: 1795 mL / OUT: 0 mL / NET: 1795 mL    19 Mar 2025 07:01  -  19 Mar 2025 18:52  --------------------------------------------------------  IN: 150 mL / OUT: 120 mL / NET: 30 mL  --------------------------------------------------------------------------------------  EXAM  NEUROLOGY  Exam: Alert and orientedx3, no acute distress, no focal neurologic deficits R sided incision c/d/i with minimal strikethrough. subgaleal SHAYY drain with sanguinous output in bulb. Defect in skull.     ENT  Exam: EOMI. Pupils equal, sluggishly reactive b/l, at baseline    RESPIRATORY  Exam: No increased WOB, on RA; saturating well    CARDIOVASCULAR  Exam: VSS, NSR, no pressors    GI/NUTRITION  Exam: Abdomen soft, Non-tender, Non-distended    VASCULAR  Exam: Extremities warm, pink, well-perfused.    HEMATOLOGIC  [x] VTE Prophylaxis: Mechanical only; no chem    TUBES / LINES / DRAINS  [x] Peripheral IVs  [x] Necessity of urinary, arterial, and venous catheters discussed    --------------------------------------------------------------------------------------

## 2025-03-19 NOTE — BRIEF OPERATIVE NOTE - NSICDXBRIEFPREOP_GEN_ALL_CORE_FT
PRE-OP DIAGNOSIS:  H/O craniotomy 01-Mar-2025 11:56:39  Jerzy Dowling  
PRE-OP DIAGNOSIS:  H/O craniotomy 01-Mar-2025 11:56:39  Jerzy Dowling

## 2025-03-19 NOTE — PROGRESS NOTE ADULT - PROBLEM SELECTOR PLAN 1
- adv diet as tolerated, ambulate as tolerated   - q1 neuro checks  - cont. with ID recommended abx regimen (vanc, cefepime, flaglyl)  - appreciate ID abx recommendations postop   - PT/OT to eval post procedure  - pain control prn w/ BM regimen   - ancef 24h     [id number]    Case discussed with attending neurosurgeon Dr. Brush

## 2025-03-19 NOTE — PROGRESS NOTE ADULT - ASSESSMENT
60 yo F with PMHx of neurofibromatosis, s/p L suboccipital craniectomy for resection of cerebellar brain tumor 2023 by Dr Brush, s/p traumatic fall with head trauma in 2024 with large acute right SDH, s/p emergent right decompressive hemicraniectomy on 24, s/p right cranioplasty on 24 with custom plate, s/p ETV, ligation of shunt on the right with ligaclips with retention of valve as rescue device, presents as a transfer from [facility] for witnessed seizure at home. CTH obtained at [facility] revealed moderate ventriculomegaly and slight increase in size of extra-axial CSF collection overlying the right frontal duraplasty     Exam with decreased strength on left side, post ictal vs intracranial causes, will get MRI/MRA to assess and CTH/CTA if MRI will be delayed. On zosyn, white count downtrending.   :  CTH/ CTA done stable , CTA negative, MRIw/flow/MRA ordered, intubated /sedated, neurology saw Veeg-O/ neg, on keppra, K+3.3 repleted, afbrile, bld cltr NGTD, UA neg, WBC nml  : stable exam,  Veeg on, keppra, mri w/flow/ mra pending  : Stable exam. No seizures noted on VEEG. On keppra. MRI with CSF flow study and MRA ordered  : Extubated, alert, mildly confused and restless, nonfocal exam. On keppra. MRI showed increase in right epidural collection with diffusion restriction. Shunt (as reservoir) tapped, W1 R1  GSN  3/1: Alert, neurologically intact. On keppra. OR for clarissa hold for drainage of epidural abscess. Cx sent, NGTD. EDJPx1  Was intubated preop for stridor, dex x24hrs  3: POD1: Intubated, ENT scoped determined airway patent, ID-Following initiated on broad spectrum abx, OR cultures GSN, AFB neg, Cx pending. Dex discontinued.  3/3 POD2, intubated, on flagyl/vanc/cefipime, K 3.1- repletion ordered. EDJPx1 105cc/24hrs, PT recc LOUIS  3: pod 3, intubated for respiratory distress, on tripple abx, ED SHAYY to suction, f/u OR cltrs NGTD  3 pod 4, remains intubated, neuro exam stable, ED SHAYY drain pus tinged 30cc, on triple Abx, OR cltrs NGTD, ID following,    36: POD # 5 stable exam. Patient remains intubated. On triple antibiotics, decadron for stridor.    3-10: POD # 6-9 Neurologically intact. Patient tolerating extubation. On triple antibiotics  3: POD # 10  Neurologically intact. On triple antibiotics. Plan for removal of ommaya and cranioplasty next week  3/12- POD # 11-17: on vanc, cefepime, and metronidazole, neuro exam stable, plan for replacement of cranioplasty and removal of ligated shunt on 3/20. PT, OT, PMR following.  3/19 NPO after midnight for cranioplasty with Dr. Brush today. Patient had one episode of epistaxis from the L. nostril overnight, but no significant blood loss. ID following, pt. on vanc, cefepime and flagyl. Dvt prophylaxis held for OR.

## 2025-03-19 NOTE — PROGRESS NOTE ADULT - PROBLEM SELECTOR PLAN 1
- NPO after midnight for cranioplasty on 3/19  - IVF maintenance fluids while NPO  - preop labs cbc, bmp, type and screen x 2, coags ordered for AM   - cont with q4h neuro checks  - cont. with ID recommended abx regimen (vanc, cefepime, flaglyl)   - monitor VS routine  - PT/OT to eval post procedure    [id number]    Case discussed with attending neurosurgeon Dr. Brush

## 2025-03-19 NOTE — PROGRESS NOTE ADULT - SUBJECTIVE AND OBJECTIVE BOX
NEUROSURGERY POST OP CHECK   03-19-25 @ 17:46    Dx: 60y Female s/p Explanation of cranial plate, VPS valve and distal tubing removal, retained proximal catheter ligated with 2 clips, and placement of mesh cranioplasty w/ Dr. Brush, closed with staples. Pt doing well, no pain complaints. Denies dizziness, nausea, vomiting, blurry vision. Has not voided, on primafit.     MEDICATIONS  (STANDING):  acetaminophen   IVPB .. 1000 milliGRAM(s) IV Intermittent every 6 hours  cefepime   IVPB 2000 milliGRAM(s) IV Intermittent every 8 hours  chlorhexidine 2% Cloths 1 Application(s) Topical daily  levETIRAcetam 1000 milliGRAM(s) Oral two times a day  metroNIDAZOLE  IVPB 500 milliGRAM(s) IV Intermittent every 8 hours  polyethylene glycol 3350 17 Gram(s) Oral daily  sertraline 50 milliGRAM(s) Oral daily  vancomycin  IVPB 1000 milliGRAM(s) IV Intermittent every 12 hours    MEDICATIONS  (PRN):  albuterol    90 MICROgram(s) HFA Inhaler 2 Puff(s) Inhalation every 4 hours PRN Shortness of Breath and/or Wheezing  melatonin 3 milliGRAM(s) Oral at bedtime PRN Insomnia  oxyCODONE    IR 2.5 milliGRAM(s) Oral every 6 hours PRN Moderate Pain (4 - 6)  oxyCODONE    IR 5 milliGRAM(s) Oral every 6 hours PRN Severe Pain (7 - 10)  senna 2 Tablet(s) Oral at bedtime PRN Constipation                          11.8   4.71  )-----------( 192      ( 19 Mar 2025 05:07 )             34.6     03-19    137  |  102  |  11  ----------------------------<  100[H]  3.8   |  22  |  0.40[L]    Ca    9.2      19 Mar 2025 05:07  Phos  3.9     03-19  Mg     2.00     03-19      I&O's Summary    18 Mar 2025 07:01  -  19 Mar 2025 07:00  --------------------------------------------------------  IN: 1795 mL / OUT: 0 mL / NET: 1795 mL    19 Mar 2025 07:01  -  19 Mar 2025 17:46  --------------------------------------------------------  IN: 0 mL / OUT: 20 mL / NET: -20 mL        T(C): 36.1 (03-19-25 @ 16:00), Max: 36.2 (03-19-25 @ 15:05)  HR: 77 (03-19-25 @ 16:45) (76 - 91)  BP: 113/61 (03-19-25 @ 16:30) (100/61 - 116/65)  RR: 13 (03-19-25 @ 16:45) (11 - 18)  SpO2: 97% (03-19-25 @ 16:45) (94% - 98%)    PHYSICAL EXAM:  Aox3, fc  b/l pupils slow to light, but equal, round, reactive, EOMI   MAEx4 with good strength  Neg PD   Neg dysmetria b/l  Incision c/d/i  R SGJP x1 bloody drainage

## 2025-03-19 NOTE — BRIEF OPERATIVE NOTE - NSICDXBRIEFPOSTOP_GEN_ALL_CORE_FT
POST-OP DIAGNOSIS:  H/O craniotomy 01-Mar-2025 11:56:47  Jerzy Dowling  
POST-OP DIAGNOSIS:  H/O craniotomy 01-Mar-2025 11:56:47  Jerzy Dowling

## 2025-03-19 NOTE — PROGRESS NOTE ADULT - ASSESSMENT
58 yo F with PMHx of neurofibromatosis, s/p L suboccipital craniectomy for resection of cerebellar brain tumor 2023 by Dr Brush, s/p traumatic fall with head trauma in 2024 with large acute right SDH, s/p emergent right decompressive hemicraniectomy on 24, s/p right cranioplasty on 24 with custom plate, s/p ETV, ligation of shunt on the right with ligaclips with retention of valve as rescue device, presents as a transfer from [facility] for witnessed seizure at home. CTH obtained at [facility] revealed moderate ventriculomegaly and slight increase in size of extra-axial CSF collection overlying the right frontal duraplasty     Exam with decreased strength on left side, post ictal vs intracranial causes, will get MRI/MRA to assess and CTH/CTA if MRI will be delayed. On zosyn, white count downtrending.   :  CTH/ CTA done stable , CTA negative, MRIw/flow/MRA ordered, intubated /sedated, neurology saw Veeg-O/ neg, on keppra, K+3.3 repleted, afbrile, bld cltr NGTD, UA neg, WBC nml  : stable exam,  Veeg on, keppra, mri w/flow/ mra pending  : Stable exam. No seizures noted on VEEG. On keppra. MRI with CSF flow study and MRA ordered  : Extubated, alert, mildly confused and restless, nonfocal exam. On keppra. MRI showed increase in right epidural collection with diffusion restriction. Shunt (as reservoir) tapped, W1 R1  GSN  3/1: Alert, neurologically intact. On keppra. OR for clarissa hold for drainage of epidural abscess. Cx sent, NGTD. EDJPx1  Was intubated preop for stridor, dex x24hrs  3: POD1: Intubated, ENT scoped determined airway patent, ID-Following initiated on broad spectrum abx, OR cultures GSN, AFB neg, Cx pending. Dex discontinued.  3/3 POD2, intubated, on flagyl/vanc/cefipime, K 3.1- repletion ordered. EDJPx1 105cc/24hrs, PT recc LOUIS  3/: pod 3, intubated for respiratory distress, on tripple abx, ED SHAYY to suction, f/u OR cltrs NGTD  3 pod 4, remains intubated, neuro exam stable, ED SHAYY drain pus tinged 30cc, on triple Abx, OR cltrs NGTD, ID following,    3/6: POD # 5 stable exam. Patient remains intubated. On triple antibiotics, decadron for stridor.    3-10: POD # 6-9 Neurologically intact. Patient tolerating extubation. On triple antibiotics  3: POD # 10  Neurologically intact. On triple antibiotics. Plan for removal of ommaya and cranioplasty next week  3/12-18 POD # 11-17: on vanc, cefepime, and metronidazole, neuro exam stable, plan for replacement of cranioplasty and removal of ligated shunt on 3/20. PT, OT, PMR following.  3/19 NPO after midnight for cranioplasty with Dr. Brush today. Patient had one episode of epistaxis from the L. nostril overnight, but no significant blood loss. ID following, pt. on vanc, cefepime and flagyl. Dvt prophylaxis held for OR.   3/20 OR for Explanation of cranial plate, VPS valve and distal tubing removal, retained proximal catheter ligated with 2 clips, and placement of mesh cranioplasty w/ Dr. Brush, closed with staples. PT/OT/PMR to see postop.

## 2025-03-19 NOTE — PROGRESS NOTE ADULT - ASSESSMENT
60F PMH hydrocephalus s/p  shunt (10/4/2013), neurofibromatosis, s/p left retrosigmoid craniotomy for resection of cerebellar brain tumor (12/2023 Dr Brush), traumatic acute right SDH s/p emergent right decompressive hemicraniectomy (5/22/24) followed by right cranioplasty with custom plate (6/17/24),  shunt malfunction s/p left third ventriculostomy and ligation of  shunt with retention of valve (7/19/24) presented as a transfer from [facility] for witnessed seizure at home found to have R frontal epidural collection with previous SICU stay for intubation; s/p R craniotomy evacuation of epidural empyema (3/1/25, Gonsalo), c/b upper airway edema requiring re-intubation, with floor course w/ continued abx now s/p RTOR (03/19, Gonsalo) for explantation of cranial plate, removal of  shunt valve+distal tubing with residual catheter left, and mesh plate cranioplasty with closure. SICU re-consulted for q1 hour neurochecks.     NEUROLOGIC   - Pain control: IV tylenol and oxycodone PRN  - A&Ox3  - q1 hour neurochecks per nsgy  - SHAYY drain in subgaleal space  - Keppra PO 1g BID per nsgy  - open R sided head without bone flap. Cushion appropriately  - F/u postop cth read; f/u if nsgy wants MR brain/CTH in AM    RESPIRATORY   - Monitor SpO2 goal >92%  - Incentive spirometry     CARDIOVASCULAR   - Monitor hemodynamics   - MAP > 65    GASTROINTESTINAL   - Diet: Regular diet  - Senna and miralax for bowel regimen    /RENAL   - IVL  - Strict I/Os  - Monitor BMP qd  - Monitor electrolytes, replete PRN    HEMATOLOGIC  - Monitor H/H   - DVT ppx: Holding chemical DVT ppx per nsgy; SCDs    INFECTIOUS DISEASE  - Cefepime 2gq8; flagyl 500 q8 and vanc 1g q12 for total of 6 weeks (until 4/11/25)  - Dr Mcintosh following  - Monitor fever / WBC    ENDOCRINE  - Monitor glucose    LINES  - PIVs    DISPO: SICU  --------------------------------------------------------------------------------------

## 2025-03-19 NOTE — PROGRESS NOTE ADULT - ASSESSMENT
This is a 60 y/o F w/ very extensive PMHx of NF, 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), s/p  shunt (in [facility]), s/p traumatic fall w/ R SDH, s/p R decompressive hemicraniectomy, ICP monitor and R cranioplasty (5/2024), s/p L frontal Endoscopic third ventriculostomy and ligation of R  shunt with Ligaclips with retention of valve, proximal and distal catheters (08/2024) admitted initially to [facility] on 2/22 for seizure, intubated for airway protection and status epilepticus, initially on Zosyn for aspiration PNA.  Initially CTH w/ slight increase in extra axial fluid collection measuring up to 1.5 cm. Pt was transferred to [facility] on 2/23 for neurosurgery eval, given decreased L sided strength, MRI done with increase in R sided collection.  Pt extubated on 2/28, CSF reservoir tapped, no pleocytosis.    Pt went to the OR on 3/1 for R clarissa pole with evacuation of epidural collection, with purulent drainage.     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), s/p  shunt (in [facility]), s/p traumatic fall w/ R SDH, s/p R decompressive hemicraniectomy, ICP monitor and R cranioplasty (5/2024), s/p L frontal Endoscopic third ventriculostomy and ligation of R  shunt with ligaclips with retention of valve, proximal and distal catheters (08/2024) admitted initially to [facility] on 2/22 for seizure, intubated for airway protection and status epilepticus, initially on Zosyn for aspiration PNA, transferred to [facility] on 2/23 for neurosurgery evaluation, now found to with have epidural abscess s/p R Clarissa w/ purulent/milky drainage.  Extubated on 3/3, reintubated for stridor/work of breaking, now extubated on 3/6.    D/w neurosurgery, Cx all negative, CRP low, consider allergic reaction to prosthetic cranioplasty. Would likely treat w/ abx for 6 weeks     Recommendations:   1. Vancomycin 1g q12, f/u level goal -600, Cefepime 2 g q8, Flagyl 500 mg q8, plan for total 6 week course (4/11/25)  2. F/u OR Cx, NG  3. Removal of cranioplasty and Ommaya today    Thank you for consulting us and involving us in the management of this patient's case. In addition to reviewing history, imaging, documents, labs, microbiology, and infection control strategies and potential issues.     ID will continue to follow peripherally     Inder Mcintosh M.D.  Attending Physician  Division of Infectious Diseases  Department of Medicine

## 2025-03-20 LAB
ANION GAP SERPL CALC-SCNC: 14 MMOL/L — SIGNIFICANT CHANGE UP (ref 7–14)
APTT BLD: 32.4 SEC — SIGNIFICANT CHANGE UP (ref 24.5–35.6)
BUN SERPL-MCNC: 9 MG/DL — SIGNIFICANT CHANGE UP (ref 7–23)
CALCIUM SERPL-MCNC: 9.1 MG/DL — SIGNIFICANT CHANGE UP (ref 8.4–10.5)
CHLORIDE SERPL-SCNC: 100 MMOL/L — SIGNIFICANT CHANGE UP (ref 98–107)
CO2 SERPL-SCNC: 20 MMOL/L — LOW (ref 22–31)
CREAT SERPL-MCNC: 0.39 MG/DL — LOW (ref 0.5–1.3)
EGFR: 114 ML/MIN/1.73M2 — SIGNIFICANT CHANGE UP
EGFR: 114 ML/MIN/1.73M2 — SIGNIFICANT CHANGE UP
GLUCOSE SERPL-MCNC: 110 MG/DL — HIGH (ref 70–99)
HCT VFR BLD CALC: 35.2 % — SIGNIFICANT CHANGE UP (ref 34.5–45)
HGB BLD-MCNC: 12.1 G/DL — SIGNIFICANT CHANGE UP (ref 11.5–15.5)
INR BLD: 1.11 RATIO — SIGNIFICANT CHANGE UP (ref 0.85–1.16)
MAGNESIUM SERPL-MCNC: 1.8 MG/DL — SIGNIFICANT CHANGE UP (ref 1.6–2.6)
MCHC RBC-ENTMCNC: 27.2 PG — SIGNIFICANT CHANGE UP (ref 27–34)
MCHC RBC-ENTMCNC: 34.4 G/DL — SIGNIFICANT CHANGE UP (ref 32–36)
MCV RBC AUTO: 79.1 FL — LOW (ref 80–100)
NRBC # BLD AUTO: 0 K/UL — SIGNIFICANT CHANGE UP (ref 0–0)
NRBC # FLD: 0 K/UL — SIGNIFICANT CHANGE UP (ref 0–0)
NRBC BLD AUTO-RTO: 0 /100 WBCS — SIGNIFICANT CHANGE UP (ref 0–0)
PHOSPHATE SERPL-MCNC: 3.8 MG/DL — SIGNIFICANT CHANGE UP (ref 2.5–4.5)
PLATELET # BLD AUTO: 195 K/UL — SIGNIFICANT CHANGE UP (ref 150–400)
POTASSIUM SERPL-MCNC: 3.7 MMOL/L — SIGNIFICANT CHANGE UP (ref 3.5–5.3)
POTASSIUM SERPL-SCNC: 3.7 MMOL/L — SIGNIFICANT CHANGE UP (ref 3.5–5.3)
PROTHROM AB SERPL-ACNC: 13.2 SEC — SIGNIFICANT CHANGE UP (ref 9.9–13.4)
RBC # BLD: 4.45 M/UL — SIGNIFICANT CHANGE UP (ref 3.8–5.2)
RBC # FLD: 14.6 % — HIGH (ref 10.3–14.5)
SODIUM SERPL-SCNC: 134 MMOL/L — LOW (ref 135–145)
VANCOMYCIN TROUGH SERPL-MCNC: 11.9 UG/ML — SIGNIFICANT CHANGE UP (ref 10–20)
WBC # BLD: 7.13 K/UL — SIGNIFICANT CHANGE UP (ref 3.8–10.5)
WBC # FLD AUTO: 7.13 K/UL — SIGNIFICANT CHANGE UP (ref 3.8–10.5)

## 2025-03-20 PROCEDURE — 99232 SBSQ HOSP IP/OBS MODERATE 35: CPT

## 2025-03-20 PROCEDURE — 99291 CRITICAL CARE FIRST HOUR: CPT

## 2025-03-20 PROCEDURE — G0545: CPT

## 2025-03-20 RX ORDER — HEPARIN SODIUM 1000 [USP'U]/ML
5000 INJECTION INTRAVENOUS; SUBCUTANEOUS EVERY 8 HOURS
Refills: 0 | Status: DISCONTINUED | OUTPATIENT
Start: 2025-03-21 | End: 2025-04-03

## 2025-03-20 RX ORDER — MAGNESIUM SULFATE 500 MG/ML
2 SYRINGE (ML) INJECTION ONCE
Refills: 0 | Status: COMPLETED | OUTPATIENT
Start: 2025-03-20 | End: 2025-03-20

## 2025-03-20 RX ORDER — ACETAMINOPHEN 500 MG/5ML
650 LIQUID (ML) ORAL EVERY 6 HOURS
Refills: 0 | Status: DISCONTINUED | OUTPATIENT
Start: 2025-03-20 | End: 2025-04-03

## 2025-03-20 RX ORDER — OXYCODONE HYDROCHLORIDE 30 MG/1
2.5 TABLET ORAL EVERY 4 HOURS
Refills: 0 | Status: DISCONTINUED | OUTPATIENT
Start: 2025-03-20 | End: 2025-03-20

## 2025-03-20 RX ORDER — OXYCODONE HYDROCHLORIDE 30 MG/1
5 TABLET ORAL EVERY 4 HOURS
Refills: 0 | Status: DISCONTINUED | OUTPATIENT
Start: 2025-03-20 | End: 2025-03-22

## 2025-03-20 RX ADMIN — Medication 100 MILLIGRAM(S): at 05:05

## 2025-03-20 RX ADMIN — Medication 100 MILLIGRAM(S): at 22:22

## 2025-03-20 RX ADMIN — OXYCODONE HYDROCHLORIDE 5 MILLIGRAM(S): 30 TABLET ORAL at 09:09

## 2025-03-20 RX ADMIN — Medication 1000 MILLIGRAM(S): at 05:30

## 2025-03-20 RX ADMIN — OXYCODONE HYDROCHLORIDE 5 MILLIGRAM(S): 30 TABLET ORAL at 08:39

## 2025-03-20 RX ADMIN — CEFEPIME 100 MILLIGRAM(S): 2 INJECTION, POWDER, FOR SOLUTION INTRAVENOUS at 14:02

## 2025-03-20 RX ADMIN — LEVETIRACETAM 1000 MILLIGRAM(S): 10 INJECTION, SOLUTION INTRAVENOUS at 18:07

## 2025-03-20 RX ADMIN — Medication 650 MILLIGRAM(S): at 19:19

## 2025-03-20 RX ADMIN — Medication 400 MILLIGRAM(S): at 05:04

## 2025-03-20 RX ADMIN — LEVETIRACETAM 1000 MILLIGRAM(S): 10 INJECTION, SOLUTION INTRAVENOUS at 06:11

## 2025-03-20 RX ADMIN — Medication 250 MILLIGRAM(S): at 19:33

## 2025-03-20 RX ADMIN — Medication 250 MILLIGRAM(S): at 06:10

## 2025-03-20 RX ADMIN — Medication 3 MILLIGRAM(S): at 01:47

## 2025-03-20 RX ADMIN — OXYCODONE HYDROCHLORIDE 5 MILLIGRAM(S): 30 TABLET ORAL at 19:51

## 2025-03-20 RX ADMIN — Medication 650 MILLIGRAM(S): at 18:19

## 2025-03-20 RX ADMIN — CEFEPIME 100 MILLIGRAM(S): 2 INJECTION, POWDER, FOR SOLUTION INTRAVENOUS at 05:04

## 2025-03-20 RX ADMIN — POLYETHYLENE GLYCOL 3350 17 GRAM(S): 17 POWDER, FOR SOLUTION ORAL at 11:33

## 2025-03-20 RX ADMIN — Medication 25 GRAM(S): at 04:09

## 2025-03-20 RX ADMIN — OXYCODONE HYDROCHLORIDE 5 MILLIGRAM(S): 30 TABLET ORAL at 20:50

## 2025-03-20 RX ADMIN — Medication 1 APPLICATION(S): at 11:35

## 2025-03-20 RX ADMIN — OXYCODONE HYDROCHLORIDE 5 MILLIGRAM(S): 30 TABLET ORAL at 16:04

## 2025-03-20 RX ADMIN — SERTRALINE 50 MILLIGRAM(S): 100 TABLET, FILM COATED ORAL at 11:34

## 2025-03-20 RX ADMIN — Medication 100 MILLIGRAM(S): at 14:43

## 2025-03-20 RX ADMIN — CEFEPIME 100 MILLIGRAM(S): 2 INJECTION, POWDER, FOR SOLUTION INTRAVENOUS at 21:47

## 2025-03-20 RX ADMIN — Medication 400 MILLIGRAM(S): at 11:31

## 2025-03-20 RX ADMIN — OXYCODONE HYDROCHLORIDE 5 MILLIGRAM(S): 30 TABLET ORAL at 01:15

## 2025-03-20 RX ADMIN — OXYCODONE HYDROCHLORIDE 5 MILLIGRAM(S): 30 TABLET ORAL at 02:00

## 2025-03-20 RX ADMIN — Medication 1000 MILLIGRAM(S): at 00:00

## 2025-03-20 NOTE — PROGRESS NOTE ADULT - ASSESSMENT
60F PMH hydrocephalus s/p  shunt (10/4/2013), neurofibromatosis, s/p left retrosigmoid craniotomy for resection of cerebellar brain tumor (12/2023 Dr Brush), traumatic acute right SDH s/p emergent right decompressive hemicraniectomy (5/22/24) followed by right cranioplasty with custom plate (6/17/24),  shunt malfunction s/p left third ventriculostomy and ligation of  shunt with retention of valve (7/19/24) presented as a transfer from [facility] for witnessed seizure at home found to have R frontal epidural collection with previous SICU stay for intubation; s/p R craniotomy evacuation of epidural empyema (3/1/25, Gonsalo), c/b upper airway edema requiring re-intubation, with floor course w/ continued abx now s/p RTOR (03/19, Gonsalo) for explantation of cranial plate, removal of  shunt valve+distal tubing with residual catheter left, and mesh plate cranioplasty with closure. SICU re-consulted for q1 hour neurochecks.     NEUROLOGIC   - Pain control: IV tylenol and oxycodone PRN  - A&Ox3  - q1 hour neurochecks per nsgy  - SHAYY drain in subgaleal space  - Keppra PO 1g BID per nsgy  - open R sided head without bone flap. Cushion appropriately  - Post-op CT without acute abnormality    RESPIRATORY   - Monitor SpO2 goal >92%  - Incentive spirometry     CARDIOVASCULAR   - Monitor hemodynamics   - MAP > 65    GASTROINTESTINAL   - Diet: Regular diet  - Senna and miralax for bowel regimen    /RENAL   - IVL  - Strict I/Os  - Monitor BMP qd  - Monitor electrolytes, replete PRN    HEMATOLOGIC  - Monitor H/H   - DVT ppx: Holding chemical DVT ppx per nsgy; SCDs    INFECTIOUS DISEASE  - Cefepime 2g q8; flagyl 500 q8 and vanc 1g q12 for total of 6 weeks (until 4/11/25)  - Dr Mcintosh following  - Monitor fever / WBC    ENDOCRINE  - Glucose at goal, not on insulin    LINES  - PIVs

## 2025-03-20 NOTE — PHYSICAL THERAPY INITIAL EVALUATION ADULT - PERTINENT HX OF CURRENT PROBLEM, REHAB EVAL
As per chart, patient is a 60 year old female with history of hydrocephalus s/p  shunt (10/4/2013), neurofibromatosis, s/p left retrosigmoid craniotomy for resection of cerebellar brain tumor (12/2023 Dr Brush), traumatic acute right SDH s/p emergent right decompressive hemicraniectomy (5/22/24) followed by right cranioplasty with custom plate (6/17/24),  shunt malfunction s/p left third ventriculostomy and ligation of  shunt with retention of valve (7/19/24) presented as a transfer from [facility] for witnessed seizure at home. Found to have right frontal epidural collection with previous SICU stay for intubation; s/p right craniotomy evacuation of epidural empyema (3/1/25, Gonsalo), c/b upper airway edema requiring re-intubation now s/p RTOR (03/19, Gonsalo) for explantation of cranial plate, removal of  shunt valve+distal tubing with residual catheter left, and mesh plate cranioplasty with closure. As per chart, patient is a 60 year old female with history of hydrocephalus s/p  shunt (10/4/2013), neurofibromatosis, s/p left retrosigmoid craniotomy for resection of cerebellar brain tumor (12/2023 Dr Brush), traumatic acute right SDH s/p emergent right decompressive hemicraniectomy (5/22/24) followed by right cranioplasty with custom plate (6/17/24),  shunt malfunction s/p left third ventriculostomy and ligation of  shunt with retention of valve (7/19/24) presented as a transfer from [facility] for witnessed seizure at home. Found to have right frontal epidural collection with previous SICU stay for intubation; s/p right craniotomy evacuation of epidural empyema (3/1/25, Gonsalo), c/b upper airway edema requiring re-intubation now s/p RTOR (03/19, Gonsalo) for explantation of cranial plate, removal of  shunt valve and distal tubing with residual catheter left, and mesh plate cranioplasty with closure.

## 2025-03-20 NOTE — PROGRESS NOTE ADULT - PROBLEM SELECTOR PLAN 1
- - adv diet as tolerated, ambulate as tolerated   - q1 neuro checks  - cont. with ID recommended abx regimen (vanc, cefepime, flagyl)  - PT/OT to eval   - pain control prn w/ BM regimen   - ancef 24h   - monitor drain output     [id number]    Case discussed with attending neurosurgeon Dr. Brush. - adv diet as tolerated, ambulate as tolerated   - q1 neuro checks  - cont. with ID recommended abx regimen (vanc, cefepime, flagyl)  - PT/OT to eval   - pain control prn w/ BM regimen   - ancef 24h   - monitor drain output     [id number]    Case discussed with attending neurosurgeon Dr. Brush.

## 2025-03-20 NOTE — PHYSICAL THERAPY INITIAL EVALUATION ADULT - PHYSICAL ASSIST/NONPHYSICAL ASSIST: STAND/SIT, REHAB EVAL
set-up required/verbal cues/nonverbal cues (demo/gestures)/1 person assist
verbal cues/1 person assist
verbal cues/nonverbal cues (demo/gestures)/1 person assist

## 2025-03-20 NOTE — OCCUPATIONAL THERAPY INITIAL EVALUATION ADULT - MD/RN NOTIFIED
Date of Service: 11/07/2022    The patient returns following up for lateral facet, fracture of patella.    PHYSICAL EXAMINATION:    She can perform straight leg raise.  She has good knee flexion.    X-rays show what looks like satisfactory healing of the fracture.    RECOMMENDATION:    She is going to do exercises on her own.  She can discontinue the knee immobilizer.  She is having a little sciatic nerve issue for which she may reconnect with her chiropractor.  I think she can probably go back to work in about 4 weeks which involves some heavy lifting.      Dictated By: Trevin Bartlett MD  Signing Provider: Trevin Bartlett MD    ORODERICK/guy ([id number])   DD: 11/07/2022 2:51:24 PM TD: 11/09/2022 3:49:21 PM      
yes
yes

## 2025-03-20 NOTE — OCCUPATIONAL THERAPY INITIAL EVALUATION ADULT - PHYSICAL ASSIST/NONPHYSICAL ASSIST:DRESS UPPER BODY, OT EVAL
verbal cues/nonverbal cues (demo/gestures)/1 person assist
set-up required/verbal cues/nonverbal cues (demo/gestures)/1 person assist

## 2025-03-20 NOTE — OCCUPATIONAL THERAPY INITIAL EVALUATION ADULT - ADDITIONAL COMMENTS
Patient reports living in a house with her partner with "a few steps to enter". Reports being independent with ADLs and using a cane for functional mobility.

## 2025-03-20 NOTE — PROGRESS NOTE ADULT - ASSESSMENT
This is a 60 y/o F w/ very extensive PMHx of NF, 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), s/p  shunt (in [facility]), s/p traumatic fall w/ R SDH, s/p R decompressive hemicraniectomy, ICP monitor and R cranioplasty (5/2024), s/p L frontal Endoscopic third ventriculostomy and ligation of R  shunt with Ligaclips with retention of valve, proximal and distal catheters (08/2024) admitted initially to [facility] on 2/22 for seizure, intubated for airway protection and status epilepticus, initially on Zosyn for aspiration PNA.  Initially CTH w/ slight increase in extra axial fluid collection measuring up to 1.5 cm. Pt was transferred to [facility] on 2/23 for neurosurgery eval, given decreased L sided strength, MRI done with increase in R sided collection.  Pt extubated on 2/28, CSF reservoir tapped, no pleocytosis.    Pt went to the OR on 3/1 for R clarissa pole with evacuation of epidural collection, with purulent drainage.     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), s/p  shunt (in [facility]), s/p traumatic fall w/ R SDH, s/p R decompressive hemicraniectomy, ICP monitor and R cranioplasty (5/2024), s/p L frontal Endoscopic third ventriculostomy and ligation of R  shunt with ligaclips with retention of valve, proximal and distal catheters (08/2024) admitted initially to [facility] on 2/22 for seizure, intubated for airway protection and status epilepticus, initially on Zosyn for aspiration PNA, transferred to [facility] on 2/23 for neurosurgery evaluation, now found to with have epidural abscess s/p R Clarissa w/ purulent/milky drainage.  Extubated on 3/3, reintubated for stridor/work of breaking, now extubated on 3/6.    D/w neurosurgery, Cx all negative, CRP low, consider allergic reaction to prosthetic cranioplasty. Would likely treat w/ abx for 6 weeks   OR on 3/19: Explantation of cranial plate, removal of ventriculoperitoneal shunt valve + distal tubing, proximal catheter ligated with 2 clips and left behind, cranioplasty with mesh plate place    Recommendations:   1. Vancomycin 1g q12, f/u level goal -600, Cefepime 2 g q8, Flagyl 500 mg q8, plan for total 6 week course (4/11/25)  2. F/u OR Cx 3/19    Thank you for consulting us and involving us in the management of this patient's case. In addition to reviewing history, imaging, documents, labs, microbiology, and infection control strategies and potential issues.     ID will continue to follow peripherally     Inder Mcintosh M.D.  Attending Physician  Division of Infectious Diseases  Department of Medicine

## 2025-03-20 NOTE — PROGRESS NOTE ADULT - ATTENDING COMMENTS
I have examined this patient, reviewed pertinent labs and imaging on SICU rounds.    55   minutes in total were spent in providing direct critical care for the diagnoses, assessment and plan outlined below.  This patient suffers from a critical illness that acutely impairs one or more vital organ systems such that there is a high probability of life threatening or imminent deterioration of the patient's condition.  These diagnoses are unrelated to the surgical procedure.    Additionally, time spent in teaching or the performance of separately billable procedures was not counted toward my critical care time.  There is no overlap.  Time included review of vitals, labs, imaging, discussion with consultants/surrogate decision-makers.    59 year old female with PMHx of neurofibromatosis, 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), s/p  shunt (in [facility]), s/p traumatic fall with head trauma and R SDH, s/p R decompressive hemicraniectomy, ICP monitor and R cranioplasty (5/2024), s/p L frontal Endoscopic third ventriculostomy and ligation of R  shunt with ligaclips with retention of valve, proximal and distal catheters (08/2024), Anxiety and depression presents to the ED at [place] with CC of witnessed seizure at home.Pt is admitted to ICU for status epilepticus. CTH showed stable  shunt and moderate ventriculomegaly, slight increase in size of extra-axial CSF collection overlying the right frontal duraplasty, likely a hygroma. There is no mass effect. Transferred to [facility] for Neurosurgery evaluation, r/o infective etiology. SICU consulted for vent management & Q1 neurochecks.     ICU Vital Signs Last 24 Hrs  T(C): 36.8 (24 Feb 2025 08:00), Max: 37.2 (23 Feb 2025 20:40)  T(F): 98.2 (24 Feb 2025 08:00), Max: 99 (23 Feb 2025 20:40)  HR: 82 (24 Feb 2025 19:00) (48 - 85)  BP: 128/62 (24 Feb 2025 19:00) (77/52 - 130/57)  BP(mean): 80 (24 Feb 2025 19:00) (60 - 105)  RR: 14 (24 Feb 2025 19:00) (14 - 17)  SpO2: 100% (24 Feb 2025 19:00) (99% - 100%)    O2 Parameters below as of 24 Feb 2025 19:00  Patient On (Oxygen Delivery Method): ventilator, AC    O2 Concentration (%): 40    intubated  mechanical BS bilat  RRR  abd nonfocal                          11.6   6.77  )-----------( 163      ( 23 Feb 2025 23:50 )             34.5   02-23    140  |  106  |  8   ----------------------------<  114[H]  3.5   |  19[L]  |  0.43[L]    Ca    8.5      23 Feb 2025 23:50  Phos  2.5     02-23  Mg     2.00     02-23    TPro  6.6  /  Alb  3.0[L]  /  TBili  0.9  /  DBili  x   /  AST  12  /  ALT  18  /  AlkPhos  107  02-23  MEDICATIONS  (STANDING):  acetaminophen   IVPB .. 1000 milliGRAM(s) IV Intermittent every 6 hours  chlorhexidine 0.12% Liquid 15 milliLiter(s) Oral Mucosa every 12 hours  heparin   Injectable 5000 Unit(s) SubCutaneous every 8 hours  lactated ringers. 1000 milliLiter(s) (100 mL/Hr) IV Continuous <Continuous>  levETIRAcetam   Injectable 1000 milliGRAM(s) IV Push every 12 hours  norepinephrine Infusion 0.05 MICROgram(s)/kG/Min (6.23 mL/Hr) IV Continuous <Continuous>  propofol Infusion 10 MICROgram(s)/kG/Min (3.99 mL/Hr) IV Continuous <Continuous>
Patient overnight s/p cranio for epidural abscess, left intubated postoperatively 2/2 to stridor requiring reintubation for airway edema.  N mentating, on propofol and precedex, dilaudid prn for pain  resp on mechanical ventilation, adequate gas exchange, leak test positive 75% leak. plan for 48 hrs steroids then extubate tomorrow  cv hemodynamically stable, perfusing adequately  gi npo, ivf, ngt tube feeds, gi ppx  gu/renal adequate uop  heme vte ppx held  id on vanc cefepime flagyl, awaiting cultures  endo steroids, 48hrs on then d/c    I have personally interviewed when possible and examined the patient, reviewed data and laboratory tests/x-rays and all pertinent electronic images.  I was physically present for the key portions of the evaluation and management (E/M) service provided.   The SICU team has a constant risk benefit analyzes discussion with the primary team, all consultants, House Staff and PA's on all decisions.  These diagnoses are unrelated to the surgical procedure noted above.  I meet with family if needed to get further history, discuss the case and make care decisions for this patient who might not be able to participate.  Time involved in performance of separately billable procedures was not counted toward my critical care time. There is no overlap.  I spent 70 minutes ( 0800Hrs-0915Hrs in AM/ 1600Hrs-1715Hrs in PM, or other time indicated) of critical care time for the diagnoses, assessment, plan and interventions.  This time excludes time spent on separate procedures and teaching.
The patient was seen and examined, chart and notes reviewed.  The current diagnosis, plan of care and alternatives have been discussed with the patient.  All questions have been answered and updates have been discussed.  The case was discussed with B team residents/PA's and medical students at morning B surgical rounds and throughout the course of the day.    Epidural abscess  a.  S/P clarissa hole craniotomy with drainage  b.  With agitation overnight, Wean propofol gtt to precedex  c.  On Cefepime, flagyl and vancomycin.  Cultures NGTD  d.  Neurologically stable.  NVS Q 1.  Keppra for sz    Postoperative respiratory insufficiency  a.  With respiratory alkalosis  b.  Change from Volume AC to CPAP/PSV  c.  Obtain ABG, CXR reviewed  d.  Continue decadron to decrease airway edema.  Trial of extubation in 48hrs    Malnutrition, severe  a.  Remains NPO  b.  Continue TF to goal via ASHLEIGH FT  C. Daily BMP    At risk for DVT  a.  SCDs in place  b.  Chemical DVT
59F PMH hydrocephalus s/p  shunt (10/4/2013), neurofibromatosis, s/p left retrosigmoid craniotomy for resection of cerebellar brain tumor (12/2023 Dr Brush), traumatic acute right SDH s/p emergent right decompressive hemicraniectomy (5/22/24) followed by right cranioplasty with custom plate (6/17/24),  shunt malfunction s/p left third ventriculostomy and ligation of  shunt with retention of valve (7/19/24) presented as a transfer from [facility] for witnessed seizure at home. vEEG performed with no seizures/seizure-like activity. MR Head 2/28 demonstrated R frontal epidural collection. Patient intubated in SICU 3/1 for stridor prior to OR, and then taken for R craniotomy and evacuation of epidural empyema w/ drain left in place. Patient extubated POD2 following 48h decadron w/ appropriate cuff leak but was immediately reintubated for severe stridor and work of breathing. Extubated on 3/6 with overall clinical improvement    # Epidural Abscess  - F/u CTH interpretation.  - Shunt ligation scheduling.   - Q4 NC.   - Appreciate ID consultation  --- Cefepime, flagyl, vanco for empyema      # Tubes/Lines/Drains:   - PIV  - Dominguez  - epidural drain (3/1-3/5)    Disposition: Floor
60F PMH hydrocephalus s/p  shunt (10/4/2013), neurofibromatosis, s/p left retrosigmoid craniotomy for resection of cerebellar brain tumor (12/2023 Dr Brush), traumatic acute right SDH s/p emergent right decompressive hemicraniectomy (5/22/24) followed by right cranioplasty with custom plate (6/17/24),  shunt malfunction s/p left third ventriculostomy and ligation of  shunt with retention of valve (7/19/24) presented as a transfer from [facility] for witnessed seizure at home found to have R frontal epidural collection with previous SICU stay for intubation; s/p R craniotomy evacuation of epidural empyema (3/1/25, Gonsalo), c/b upper airway edema requiring re-intubation, with floor course w/ continued abx now s/p RTOR (03/19, Gonsalo) for explantation of cranial plate, removal of  shunt valve+distal tubing with residual catheter left, and mesh plate cranioplasty with closure.     # S/p High Risk Neurosurgical Procedure  - Continue q1 NC per NSGY team.   - Monitor SHAYY drain outputs.   - Care with R sided incision (no bone flap)    # Empyema  - Appreciate ID recommendations.   - Cefepime 2g q8; flagyl 500 q8 and vanc 1g q12 for total of 6 weeks (until 4/11/25)    # Risk of Malnutrition  - Monitor response to diet.     LINES  - PIVs    I have personally seen, examined, reviewed pertinent labs/imaging, and fully participated in the care of this patient on this date.  I have made amendments to the documentation where necessary, and otherwise agree with the history, physical exam, and plan as documented by the SICU resident/PA team.     45 minutes in total were spent in providing direct critical care for the diagnoses/assessment/plan as outlined in this note, documentation, and decision-making.  This patient suffers from a critical illness that acutely impairs one or more vital organ systems such that there is a high probability of life threatening or imminent deterioration of the patient's condition.      Additionally, time spent in teaching or the performance of separately billable procedures was not counted toward my critical care time.  There is no overlap.  Time included review of vitals, labs, imaging, discussion with consultants.
Exam stable this morning.  EEG shows generalized slowing with superimposed right frontal slowing, without epileptiform activity.  Agree with continuing Keppra 1g BID.  Okay to stop EEG for now.  Rest of plan per neurosurgery, please call with any additional questions.
I agree with the history, physical, and plan, which I have reviewed and edited where appropriate.  I agree with notes/assessment of health care providers on my service.  I have personally examined the patient.  I was physically present for the key portions of the evaluation and management (E/M) service provided.  I reviewed data and laboratory tests/x-rays and all pertinent electronic images.  The patient is a critical care patient with life threatening hemodynamic and metabolic instability in SICU.  Risk benefit analyses discussed.    The patient is in SICU with diagnosis mentioned in the note.    The plan is specified below.    59 year old female with PMHx of neurofibromatosis, 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), s/p  shunt (in [facility]), s/p traumatic fall with head trauma and R SDH, s/p R decompressive hemicraniectomy, ICP monitor and R cranioplasty (5/2024), s/p L frontal Endoscopic third ventriculostomy and ligation of R  shunt with ligaclips with retention of valve, proximal and distal catheters (08/2024), Anxiety and depression presents to the ED at [place] with status epilepticus. CTH showed stable  shunt and moderate ventriculomegaly, slight increase in size of extra-axial CSF collection overlying the right frontal duraplasty, likely a hygroma. There is no mass effect. Transferred to [facility] for Neurosurgery evaluation, r/o infective etiology. SICU consulted for vent management & Q1 neurochecks. VEEG showed no seizure activity.    Neurologic: seizures   - Q4h neurochecks  - Keppra 1g BID  - Pain control: Tylenol PRN  - pending MR head w CSF flow     Respiratory:  - on RA    Cardiovascular:   - MAP > 65    Gastrointestinal/Nutrition:   - Regular diet    Renal/Genitourinary:   - Monitor electrolytes     Hematologic:   - DVT PPx: SqH    Infectious Disease:   - observe off abx     Endocrine:   - Monitor glucose
The patient was seen and examined, chart and notes reviewed.  The current diagnosis, plan of care and alternatives have been discussed with the patient.  All questions have been answered and updates have been discussed.  The case was discussed with B team residents/PA's and medical students at morning B surgical rounds and throughout the course of the day.    Postoperative respiratory insufficiency  a.  Remains MV dependent  b.  With adequate gas exchange  c.  Completed decadron course.  With 65% air leak, minimal secretions  CXR reviewed  d.  Plan for SBT  today    Epidural abscess  a.  NVS Q 1  b.  Continue IV abx regimen  c.  Follow up cultures  d.  On Keppra    Other fluid overload  a.  Heplock IVF  b.  Monitor I and O's  c.  May give lasix 20mg x 1    Malnutrition, severe  a.  NPO  b.  TF on hold for extubation  c.  Daily BMP    Hypomagnesemia  a.  Replete Mg
Patient overnight worsening stridor. ENT bedside scoped with some airway edema. Steroids started and emergently intubated. OR with neurosx for epidural abscess.  N mentating, pain controlled, antiseizure meds  resp on room air, mechanical ventilation for airway protection and airway edema, steroids, racemic epi  cv hemodynamically stable, perfusing adequately  gi npo, ivf  gu/renal adequate uop  heme vte ppx  id ancef, id eval, f/u cultures from or  endo steroids    The patient is a critical care patient with life threatening hemodynamic and metabolic instability in SICU.  I have personally interviewed when possible and examined the patient, reviewed data and laboratory tests/x-rays and all pertinent electronic images.  I was physically present for the key portions of the evaluation and management (E/M) service provided.   The SICU team has a constant risk benefit analyzes discussion with the primary team, all consultants, House Staff and PA's on all decisions.  These diagnoses are unrelated to the surgical procedure noted above.  I meet with family if needed to get further history, discuss the case and make care decisions for this patient who might not be able to participate.  Time involved in performance of separately billable procedures was not counted toward my critical care time. There is no overlap.  I spent 55-75 minutes ( 0800Hrs-0915Hrs in AM/ 1600Hrs-1715Hrs in PM, or other time indicated) of critical care time for the diagnoses, assessment, plan and interventions.  This time excludes time spent on separate procedures and teaching.
The patient was seen and examined, chart and notes reviewed.  The current diagnosis, plan of care and alternatives have been discussed with the patient.  All questions have been answered and updates have been discussed.  The case was discussed with B team residents/PA's and medical students at morning B surgical rounds and throughout the course of the day.    Postoperative respiratory insufficiency  a.  Tolerating CPAP, with significant air leak >60% and course of decadron given  b.  Developed stridor post extubation.  Despite o2 supplementation, lasix,  patient with increased WOB  c.  Reintubated.  Restart Precedex, prn Dilaudid  d.  Restart decadron 10 q 8  e.  Consider trial extubation in 48hrs    Epidural abscess  a.  S/P clarissa hole craniotomy  with abscess drainage  b.  On cefepime, flagyl, and vancomycin per ID  c.  Follow up OR cultures    Seizure disorder  a,  On Keppra  b.  No activity noted    Malnutrition  a.  NPO  b.  Restart TF
I have examined this patient, reviewed pertinent labs and imaging on SICU rounds.    55  minutes in total were spent in providing direct critical care for the diagnoses, assessment and plan outlined below.  This patient suffers from a critical illness that acutely impairs one or more vital organ systems such that there is a high probability of life threatening or imminent deterioration of the patient's condition.  These diagnoses are unrelated to the surgical procedure.    Additionally, time spent in teaching or the performance of separately billable procedures was not counted toward my critical care time.  There is no overlap.  Time included review of vitals, labs, imaging, discussion with consultants.    59 year old female with PMHx of neurofibromatosis, 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), s/p  shunt (in [facility]), s/p traumatic fall with head trauma and R SDH, s/p R decompressive hemicraniectomy, ICP monitor and R cranioplasty (5/2024), s/p L frontal Endoscopic third ventriculostomy and ligation of R  shunt with ligaclips with retention of valve, proximal and distal catheters (08/2024), Anxiety and depression presents to the ED at [place] with CC of witnessed seizure at home.Pt was admitted to ICU for status epilepticus. CTH showed stable  shunt and moderate ventriculomegaly, slight increase in size of extra-axial CSF collection overlying the right frontal duraplasty, likely a hygroma. There is no mass effect. Transferred to [facility] for Neurosurgery evaluation, r/o seizures.    crit care issues: vent insufficiency, vent management, hypotension    ICU Vital Signs Last 24 Hrs  T(C): 38.3 (26 Feb 2025 16:00), Max: 38.6 (26 Feb 2025 12:00)  T(F): 100.9 (26 Feb 2025 16:00), Max: 101.5 (26 Feb 2025 12:00)  HR: 63 (26 Feb 2025 16:00) (51 - 82)  BP: 111/58 (26 Feb 2025 16:00) (75/47 - 131/66)  BP(mean): 73 (26 Feb 2025 16:00) (57 - 84)  RR: 14 (26 Feb 2025 16:00) (14 - 15)  SpO2: 100% (26 Feb 2025 16:00) (99% - 100%)    O2 Parameters below as of 26 Feb 2025 16:00  Patient On (Oxygen Delivery Method): ventilator    O2 Concentration (%): 40    chemically sedated  intubated  RRR  abd nonfocal                          12.6   6.05  )-----------( 155      ( 26 Feb 2025 00:50 )             38.3   02-26    139  |  104  |  7   ----------------------------<  90  3.7   |  17[L]  |  0.39[L]    Ca    8.7      26 Feb 2025 00:50  Phos  3.3     02-26  Mg     2.00     02-26    MEDICATIONS  (STANDING):  chlorhexidine 0.12% Liquid 15 milliLiter(s) Oral Mucosa every 12 hours  chlorhexidine 2% Cloths 1 Application(s) Topical daily  dexMEDEtomidine Infusion 0.2 MICROgram(s)/kG/Hr (3.33 mL/Hr) IV Continuous <Continuous>  fentaNYL   Infusion 0.501 MICROgram(s)/kG/Hr (3.33 mL/Hr) IV Continuous <Continuous>  heparin   Injectable 5000 Unit(s) SubCutaneous every 8 hours  levETIRAcetam   Injectable 1000 milliGRAM(s) IV Push every 12 hours  pantoprazole  Injectable 40 milliGRAM(s) IV Push daily  phenylephrine    Infusion 0.1 MICROgram(s)/kG/Min (1.25 mL/Hr) IV Continuous <Continuous>  polyethylene glycol 3350 17 Gram(s) Oral daily  senna Syrup 10 milliLiter(s) Oral at bedtime    vEEG: no seizures
The patient was seen and examined, chart and notes reviewed.  The current diagnosis, plan of care and alternatives have been discussed with the patient.  All questions have been answered and updates have been discussed.  The case was discussed with B team residents/PA's and medical students at morning B surgical rounds and throughout the course of the day.    Postoperative respiratory insufficiency  a.  Remains MV dependent  b.  With adequate gas exchange  c.  On decadron  d.  Plan for SBT in am    Epidural abscess  a.  NVS Q 1  b.  Continue IV abx regimen  c.  Follow up cultures  d.  On Keppra    Other fluid overload  a.  Heplock IVF  b.  Monitor I and O's  c.  May give lasix 40mg x 1    Malnutrition, severe  a.  NPO  b.  Tolerating TF at goal  c.  Daily BMP    Hypomagnesemia  a.  Replete Mg
I have examined this patient, reviewed pertinent labs and imaging on SICU rounds.    55   minutes in total were spent in providing direct critical care for the diagnoses, assessment and plan outlined below.  This patient suffers from a critical illness that acutely impairs one or more vital organ systems such that there is a high probability of life threatening or imminent deterioration of the patient's condition.  These diagnoses are unrelated to the surgical procedure.    Additionally, time spent in teaching or the performance of separately billable procedures was not counted toward my critical care time.  There is no overlap.  Time included review of vitals, labs, imaging, discussion with consultants/surrogate decision-makers.    FUO    59 year old female with PMHx of neurofibromatosis, 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), s/p  shunt (in [facility]), s/p traumatic fall with head trauma and R SDH, s/p R decompressive hemicraniectomy, ICP monitor and R cranioplasty (5/2024), s/p L frontal Endoscopic third ventriculostomy and ligation of R  shunt with ligaclips with retention of valve, proximal and distal catheters (08/2024), Anxiety and depression presents to the ED at [place] with CC of witnessed seizure at home.Pt was admitted to ICU for status epilepticus. CTH showed stable  shunt and moderate ventriculomegaly, slight increase in size of extra-axial CSF collection overlying the right frontal duraplasty, likely a hygroma. There is no mass effect. Transferred to [facility] for Neurosurgery evaluation, r/o infective etiology. SICU consulted for vent management & fever work-up (so far negative for infectious etiology).      Interval events:   - Hold tube feeds for extubation  - Fentanyl gtt DCed  - SBT, wean sedation to extubate  - passed TO    ICU Vital Signs Last 24 Hrs  T(C): 39.1 (27 Feb 2025 08:00), Max: 39.1 (27 Feb 2025 08:00)  T(F): 102.3 (27 Feb 2025 08:00), Max: 102.3 (27 Feb 2025 08:00)  HR: 79 (27 Feb 2025 12:00) (61 - 80)  BP: 116/101 (27 Feb 2025 12:00) (88/56 - 131/54)  BP(mean): 107 (27 Feb 2025 12:00) (62 - 107)  RR: 20 (27 Feb 2025 12:00) (14 - 20)  SpO2: 100% (27 Feb 2025 12:00) (98% - 100%)    O2 Parameters below as of 27 Feb 2025 08:00  Patient On (Oxygen Delivery Method): ventilator    O2 Concentration (%): 40    intubated  sedated  CTAB  RRR  abd nonfocal  trace edema                          12.8   8.78  )-----------( 215      ( 27 Feb 2025 00:50 )             38.3   02-27    140  |  106  |  15  ----------------------------<  131[H]  3.4[L]   |  21[L]  |  0.46[L]    Ca    8.5      27 Feb 2025 00:50  Phos  2.7     02-27  Mg     1.90     02-27    MEDICATIONS  (STANDING):  chlorhexidine 0.12% Liquid 15 milliLiter(s) Oral Mucosa every 12 hours  chlorhexidine 2% Cloths 1 Application(s) Topical daily  heparin   Injectable 5000 Unit(s) SubCutaneous every 8 hours  levETIRAcetam   Injectable 1000 milliGRAM(s) IV Push every 12 hours  mirtazapine 7.5 milliGRAM(s) Oral daily  pantoprazole  Injectable 40 milliGRAM(s) IV Push daily  phenylephrine    Infusion 0.1 MICROgram(s)/kG/Min (1.25 mL/Hr) IV Continuous <Continuous>  polyethylene glycol 3350 17 Gram(s) Oral daily  senna Syrup 10 milliLiter(s) Oral at bedtime  sertraline 50 milliGRAM(s) Oral daily
I have examined this patient, reviewed pertinent labs and imaging on SICU rounds.    55 minutes in total were spent in providing direct critical care for the diagnoses, assessment and plan outlined below.  This patient suffers from a critical illness that acutely impairs one or more vital organ systems such that there is a high probability of life threatening or imminent deterioration of the patient's condition.  These diagnoses are unrelated to the surgical procedure.    Additionally, time spent in teaching or the performance of separately billable procedures was not counted toward my critical care time.  There is no overlap.  Time included review of vitals, labs, imaging, discussion with consultants/surrogate decision-makers.    59 year old female with PMHx of neurofibromatosis, 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), s/p  shunt (in [facility]), s/p traumatic fall with head trauma and R SDH, s/p R decompressive hemicraniectomy, ICP monitor and R cranioplasty (5/2024), s/p L frontal Endoscopic third ventriculostomy and ligation of R  shunt with ligaclips with retention of valve, proximal and distal catheters (08/2024), Anxiety and depression presents to the ED at [place] with CC of witnessed seizure at home.Pt is admitted to ICU for status epilepticus. CTH showed stable  shunt and moderate ventriculomegaly, slight increase in size of extra-axial CSF collection overlying the right frontal duraplasty, likely a hygroma. There is no mass effect. Transferred to [facility] for Neurosurgery evaluation, r/o infective etiology. SICU consulted for vent management & Q1 neurochecks.    ICU Vital Signs Last 24 Hrs  T(C): 36.4 (25 Feb 2025 16:00), Max: 37.7 (24 Feb 2025 20:00)  T(F): 97.5 (25 Feb 2025 16:00), Max: 99.9 (24 Feb 2025 20:00)  HR: 70 (25 Feb 2025 15:15) (66 - 97)  BP: 82/53 (25 Feb 2025 15:15) (79/56 - 151/76)  BP(mean): 64 (25 Feb 2025 15:15) (64 - 94)  RR: 14 (25 Feb 2025 15:15) (13 - 18)  SpO2: 100% (25 Feb 2025 15:15) (94% - 100%)    O2 Parameters below as of 25 Feb 2025 16:00  Patient On (Oxygen Delivery Method): ventilator, ,14,5    O2 Concentration (%): 40    intubated  sedated  RRR  abd nonfocal                          11.9   5.74  )-----------( 159      ( 25 Feb 2025 02:45 )             35.3   02-25    139  |  104  |  4[L]  ----------------------------<  75  3.3[L]   |  19[L]  |  0.45[L]    Ca    8.4      25 Feb 2025 03:50  Phos  3.2     02-25  Mg     1.70     02-25    MEDICATIONS  (STANDING):  chlorhexidine 0.12% Liquid 15 milliLiter(s) Oral Mucosa every 12 hours  chlorhexidine 2% Cloths 1 Application(s) Topical daily  dexMEDEtomidine Infusion 0.2 MICROgram(s)/kG/Hr (3.33 mL/Hr) IV Continuous <Continuous>  dextrose 5% + lactated ringers with potassium chloride 20 mEq/L 1000 milliLiter(s) (42 mL/Hr) IV Continuous <Continuous>  heparin   Injectable 5000 Unit(s) SubCutaneous every 8 hours  levETIRAcetam   Injectable 1000 milliGRAM(s) IV Push every 12 hours  pantoprazole  Injectable 40 milliGRAM(s) IV Push daily  propofol Infusion 10 MICROgram(s)/kG/Min (3.99 mL/Hr) IV Continuous <Continuous>    CXR reviewed

## 2025-03-20 NOTE — PHYSICAL THERAPY INITIAL EVALUATION ADULT - PRECAUTIONS/LIMITATIONS, REHAB EVAL
fall precautions/seizure precautions
aspiration precautions/fall precautions/seizure precautions/surgical precautions
cardiac precautions/fall precautions/seizure precautions/vision precautions

## 2025-03-20 NOTE — PROGRESS NOTE ADULT - SUBJECTIVE AND OBJECTIVE BOX
SICU Progress Note  ====================     INTERVAL HPI/OVERNIGHT EVENTS:   Night 3/19:  - Stable, no events    VITAL SIGNS:  ICU Vital Signs Last 24 Hrs  T(C): 36.3 (20 Mar 2025 00:00), Max: 36.9 (19 Mar 2025 17:46)  T(F): 97.3 (20 Mar 2025 00:00), Max: 98.4 (19 Mar 2025 17:46)  HR: 82 (20 Mar 2025 00:00) (74 - 102)  BP: 117/72 (20 Mar 2025 00:00) (100/61 - 129/60)  BP(mean): 85 (20 Mar 2025 00:00) (67 - 87)  ABP: --  ABP(mean): --  RR: 16 (20 Mar 2025 00:00) (10 - 18)  SpO2: 97% (20 Mar 2025 00:00) (94% - 100%)    O2 Parameters below as of 20 Mar 2025 00:00  Patient On (Oxygen Delivery Method): room air          03-18 @ 07:01  -  03-19 @ 07:00  --------------------------------------------------------  IN: 1795 mL / OUT: 0 mL / NET: 1795 mL    03-19 @ 07:01  - 03-20 @ 00:23  --------------------------------------------------------  IN: 400 mL / OUT: 195 mL / NET: 205 mL        PHYSICAL EXAM:          MEDICATIONS:  MEDICATIONS  (STANDING):  acetaminophen   IVPB .. 1000 milliGRAM(s) IV Intermittent every 6 hours  cefepime   IVPB 2000 milliGRAM(s) IV Intermittent every 8 hours  chlorhexidine 2% Cloths 1 Application(s) Topical daily  levETIRAcetam 1000 milliGRAM(s) Oral two times a day  metroNIDAZOLE  IVPB 500 milliGRAM(s) IV Intermittent every 8 hours  polyethylene glycol 3350 17 Gram(s) Oral daily  sertraline 50 milliGRAM(s) Oral daily  vancomycin  IVPB 1000 milliGRAM(s) IV Intermittent every 12 hours    MEDICATIONS  (PRN):  albuterol    90 MICROgram(s) HFA Inhaler 2 Puff(s) Inhalation every 4 hours PRN Shortness of Breath and/or Wheezing  melatonin 3 milliGRAM(s) Oral at bedtime PRN Insomnia  oxyCODONE    IR 2.5 milliGRAM(s) Oral every 6 hours PRN Moderate Pain (4 - 6)  oxyCODONE    IR 5 milliGRAM(s) Oral every 6 hours PRN Severe Pain (7 - 10)  senna 2 Tablet(s) Oral at bedtime PRN Constipation      LABS:                        11.8   4.71  )-----------( 192      ( 19 Mar 2025 05:07 )             34.6     03-19    137  |  102  |  11  ----------------------------<  100[H]  3.8   |  22  |  0.40[L]    Ca    9.2      19 Mar 2025 05:07  Phos  3.9     03-19  Mg     2.00     03-19      PT/INR - ( 19 Mar 2025 05:07 )   PT: 13.4 sec;   INR: 1.13 ratio         PTT - ( 19 Mar 2025 05:07 )  PTT:33.4 sec  Urinalysis Basic - ( 19 Mar 2025 05:07 )    Color: x / Appearance: x / SG: x / pH: x  Gluc: 100 mg/dL / Ketone: x  / Bili: x / Urobili: x   Blood: x / Protein: x / Nitrite: x   Leuk Esterase: x / RBC: x / WBC x   Sq Epi: x / Non Sq Epi: x / Bacteria: x        I&O's Detail    18 Mar 2025 07:01  -  19 Mar 2025 07:00  --------------------------------------------------------  IN:    IV PiggyBack: 250 mL    IV PiggyBack: 450 mL    Oral Fluid: 720 mL    sodium chloride 0.9%: 375 mL  Total IN: 1795 mL    OUT:  Total OUT: 0 mL    Total NET: 1795 mL      19 Mar 2025 07:01  -  20 Mar 2025 00:23  --------------------------------------------------------  IN:    IV PiggyBack: 400 mL  Total IN: 400 mL    OUT:    Bulb (mL): 95 mL    Oral Fluid: 0 mL    Voided (mL): 100 mL  Total OUT: 195 mL    Total NET: 205 mL

## 2025-03-20 NOTE — PHYSICAL THERAPY INITIAL EVALUATION ADULT - IMPAIRMENTS FOUND, PT EVAL
gait, locomotion, and balance/muscle strength
gait, locomotion, and balance/muscle strength
aerobic capacity/endurance/arousal, attention, and cognition/gait, locomotion, and balance/muscle strength

## 2025-03-20 NOTE — PROGRESS NOTE ADULT - SUBJECTIVE AND OBJECTIVE BOX
Infectious Diseases Follow Up:    Patient is a 60y old  Female who presents with a chief complaint of seizure (20 Mar 2025 02:56)      Interval History/ROS:  No acute complaints, doing well after surgery     Allergies  No Known Allergies        ANTIMICROBIALS:  cefepime   IVPB 2000 every 8 hours  metroNIDAZOLE  IVPB 500 every 8 hours  vancomycin  IVPB 1000 every 12 hours      Current Abx:     Previous Abx     OTHER MEDS:  MEDICATIONS  (STANDING):  albuterol    90 MICROgram(s) HFA Inhaler 2 every 4 hours PRN  levETIRAcetam 1000 two times a day  melatonin 3 at bedtime PRN  oxyCODONE    IR 2.5 every 6 hours PRN  oxyCODONE    IR 5 every 6 hours PRN  polyethylene glycol 3350 17 daily  senna 2 at bedtime PRN  sertraline 50 daily      Vital Signs Last 24 Hrs  T(C): 37.1 (20 Mar 2025 08:00), Max: 37.2 (20 Mar 2025 04:00)  T(F): 98.7 (20 Mar 2025 08:00), Max: 98.9 (20 Mar 2025 04:00)  HR: 84 (20 Mar 2025 10:00) (74 - 102)  BP: 120/63 (20 Mar 2025 10:00) (100/61 - 131/69)  BP(mean): 79 (20 Mar 2025 10:00) (66 - 119)  RR: 15 (20 Mar 2025 10:00) (10 - 22)  SpO2: 98% (20 Mar 2025 10:00) (93% - 99%)    Parameters below as of 20 Mar 2025 10:00  Patient On (Oxygen Delivery Method): room air        PHYSICAL EXAM:  GENERAL: NAD, well-developed  HEAD:  SHAYY drain from R head   EYES: EOMI, , conjunctiva and sclera clear  CHEST/LUNG: Clear to auscultation bilaterally; No wheeze  HEART: Regular rate and rhythm; No murmurs, rubs, or gallops  ABDOMEN: Soft, Nontender, Nondistended;   PSYCH: AAOx3                            12.1   7.13  )-----------( 195      ( 20 Mar 2025 00:50 )             35.2       03-20    134[L]  |  100  |  9   ----------------------------<  110[H]  3.7   |  20[L]  |  0.39[L]    Ca    9.1      20 Mar 2025 00:50  Phos  3.8     03-20  Mg     1.80     03-20        Urinalysis Basic - ( 20 Mar 2025 00:50 )    Color: x / Appearance: x / SG: x / pH: x  Gluc: 110 mg/dL / Ketone: x  / Bili: x / Urobili: x   Blood: x / Protein: x / Nitrite: x   Leuk Esterase: x / RBC: x / WBC x   Sq Epi: x / Non Sq Epi: x / Bacteria: x        MICROBIOLOGY:  v  Tissue 1) EPIDURAL EXUDATE  03-19-25 --  --    No polymorphonuclear cells seen  No organisms seen      Surgical Swab  03-01-25   No growth at 5 days  --    Few polymorphonuclear leukocytes per low power field  No organisms seen per oil power field      CSF CSF  02-28-25   No growth at 14 days.  --    No polymorphonuclear cells seen  No organisms seen  by cytocentrifuge      Catheterized Catheterized  02-26-25   No growth  --  --      .Blood Blood-Peripheral  02-26-25   No growth at 5 days  --  --      .Blood Blood-Peripheral  02-26-25   No growth at 5 days  --  --      .Blood Blood  02-22-25   No growth at 5 days  --  --      .Blood Blood  02-22-25   No growth at 5 days  --  --                RADIOLOGY:

## 2025-03-20 NOTE — OCCUPATIONAL THERAPY INITIAL EVALUATION ADULT - PLANNED THERAPY INTERVENTIONS, OT EVAL
ADL retraining/balance training/bed mobility training/cognitive, visual perceptual/fine motor coordination training/ROM/strengthening/transfer training
ADL retraining/balance training/bed mobility training/cognitive, visual perceptual/fine motor coordination training/neuromuscular re-education/ROM/strengthening/transfer training

## 2025-03-20 NOTE — PROGRESS NOTE ADULT - SUBJECTIVE AND OBJECTIVE BOX
PAST 24HR EVENTS:  POD 1 Explanation of cranial plate, VPS valve and distal tubing removal, retained proximal catheter ligated with 2 clips, and placement of mesh cranioplasty w/ Dr. Brush, on cefepime, flagyl and vanc. ID following, will touch base regarding antibiotic regimen. No acute events reported overnight. R SGJP x1 bloody drainage 95cc/24h      Vital Signs Last 24 Hrs  T(C): 36.3 (20 Mar 2025 00:00), Max: 36.9 (19 Mar 2025 17:46)  T(F): 97.3 (20 Mar 2025 00:00), Max: 98.4 (19 Mar 2025 17:46)  HR: 77 (20 Mar 2025 02:00) (74 - 102)  BP: 123/70 (20 Mar 2025 02:00) (100/61 - 129/60)  BP(mean): 84 (20 Mar 2025 02:00) (66 - 87)  RR: 17 (20 Mar 2025 02:00) (10 - 18)  SpO2: 94% (20 Mar 2025 02:00) (94% - 100%)    Parameters below as of 20 Mar 2025 02:00  Patient On (Oxygen Delivery Method): room air        MEDS:   acetaminophen   IVPB .. 1000 milliGRAM(s) IV Intermittent every 6 hours  albuterol    90 MICROgram(s) HFA Inhaler 2 Puff(s) Inhalation every 4 hours PRN  cefepime   IVPB 2000 milliGRAM(s) IV Intermittent every 8 hours  chlorhexidine 2% Cloths 1 Application(s) Topical daily  levETIRAcetam 1000 milliGRAM(s) Oral two times a day  magnesium sulfate  IVPB 2 Gram(s) IV Intermittent once  melatonin 3 milliGRAM(s) Oral at bedtime PRN  metroNIDAZOLE  IVPB 500 milliGRAM(s) IV Intermittent every 8 hours  oxyCODONE    IR 2.5 milliGRAM(s) Oral every 6 hours PRN  oxyCODONE    IR 5 milliGRAM(s) Oral every 6 hours PRN  polyethylene glycol 3350 17 Gram(s) Oral daily  senna 2 Tablet(s) Oral at bedtime PRN  sertraline 50 milliGRAM(s) Oral daily  vancomycin  IVPB 1000 milliGRAM(s) IV Intermittent every 12 hours      LABS:                        12.1   7.13  )-----------( 195      ( 20 Mar 2025 00:50 )             35.2     03-20    134[L]  |  100  |  9   ----------------------------<  110[H]  3.7   |  20[L]  |  0.39[L]    Ca    9.1      20 Mar 2025 00:50  Phos  3.8     03-20  Mg     1.80     03-20      PT/INR - ( 20 Mar 2025 00:50 )   PT: 13.2 sec;   INR: 1.11 ratio         PTT - ( 20 Mar 2025 00:50 )  PTT:32.4 sec    RADIOLOGY:     PHYSICAL EXAM:  Aox3, fc  b/l pupils slow to light, but equal, round, reactive, EOMI   MAEx4 with good strength  No pronator drift  No dysmetria b/l  Incision c/d/i  R SGJP x1 bloody drainage 95cc/24h

## 2025-03-20 NOTE — PHYSICAL THERAPY INITIAL EVALUATION ADULT - ADDITIONAL COMMENTS
Pt lives in a private house with her significant other with 3 steps to enter; and a flight of stairs to negotiate to bedroom; (+)1 handrail. Prior to hospital admission, pt was completely independent and used no assistive device with ambulation. She does own a single axis cane if she needs. Pt has a history of falls with her last fall ~2 months ago, pt denies any injuries sustained. Pt also has very poor vision, does not wear glasses.     Pt left comfortable in bed, NAD, all lines intact, all precautions maintained, with call bell in reach, bed alarm on, visitor at bedside, and RN Afshan aware of PT evaluation.
Pt lives in a private house with her significant other with 3 steps to enter; and a flight of stairs to negotiate to bedroom; (+)1 handrail. Prior to hospital admission, pt was completely independent and used no assistive device with ambulation. She does own a single axis cane if she needs. Pt has a history of falls with her last fall ~2 months ago, pt denies any injuries sustained. Pt also has very poor vision, does not wear glasses.
Patient lives in a private home, +steps to negotiate.

## 2025-03-20 NOTE — OCCUPATIONAL THERAPY INITIAL EVALUATION ADULT - GENERAL OBSERVATIONS, REHAB EVAL
Pt. received semisupine in bed. No acute distress. Patient agreed to evaluation from Occupational Therapist. +Heplock, +Tele. O2 96%.
Patient received semisupine in bed in NAD; agreeable to participate in OT evaluation. +telemetry monitor. +IV. /60 mmHg. HR 86 bpm.

## 2025-03-20 NOTE — PHYSICAL THERAPY INITIAL EVALUATION ADULT - GENERAL OBSERVATIONS, REHAB EVAL
Pt found semi reclined in bed; +telemetry monitor; +SHAYY x 1; +IV; +cranial dressing clean, dry, intact; spoke with NOAH Cates, who advised patient may participate; HR 84bpm; 100 % 02 sat RA.
Patient received semi-supine in bed, all lines intact, NAD, HR:
Pt encountered in semisupine position, no distress, AxOx4, with +IV, +tele, +pulse oximeter, +flexiseal, +primafit, and visitor at bedside. Pt agreeable to participate in PT evaluation. Vitals taken; /62mmHg, heart rate 90bpm, SpO2 97%.

## 2025-03-20 NOTE — PHYSICAL THERAPY INITIAL EVALUATION ADULT - PLANNED THERAPY INTERVENTIONS, PT EVAL
Patient left sitting in chair, in NAD, call bell in reach, all lines intact. NOAH Cates, aware./balance training/bed mobility training/gait training/strengthening/transfer training
balance training/bed mobility training/gait training/strengthening/transfer training
balance training/bed mobility training/gait training/strengthening/transfer training

## 2025-03-20 NOTE — CHART NOTE - NSCHARTNOTESELECT_GEN_ALL_CORE
Nutrition Follow-up Note/Nutrition Services
Follow Up/Nutrition Services
Nutrition Follow-up Note/Nutrition Services
Nutrition Services
Transfer Note

## 2025-03-20 NOTE — PHYSICAL THERAPY INITIAL EVALUATION ADULT - DIAGNOSIS, PT EVAL
Decreased functional mobility
difficulty with mobility
Pt presents with decreased strength and decreased balance.

## 2025-03-20 NOTE — OCCUPATIONAL THERAPY INITIAL EVALUATION ADULT - DIAGNOSIS, OT EVAL
ASYSTOLE

PATIENT ASYSTOLE, NO PUPIL RESPONSE WITH NO HEART RTE BY AUSCULTATION
Right craniotomy and evacuation of epidural empyema with drain left in place; s/p Stridor; Hx of Left suboccipital craniectomy for resection of cerebellar brain tumor; Hx of Right cranioplasty; Decreased visual tracking; Left UE weakness; Decreased functional mobility; Decreased ADL management
s/p explantation of cranial plate, s/p removal of  shunt valve, s/p mesh plate cranioplasty; decreased functional mobility, decreased ADL performance

## 2025-03-20 NOTE — PROGRESS NOTE ADULT - ASSESSMENT
60 yo F with PMHx of neurofibromatosis, s/p L suboccipital craniectomy for resection of cerebellar brain tumor 2023 by Dr Brush, s/p traumatic fall with head trauma in 2024 with large acute right SDH, s/p emergent right decompressive hemicraniectomy on 24, s/p right cranioplasty on 24 with custom plate, s/p ETV, ligation of shunt on the right with ligaclips with retention of valve as rescue device, presents as a transfer from [facility] for witnessed seizure at home. CTH obtained at [facility] revealed moderate ventriculomegaly and slight increase in size of extra-axial CSF collection overlying the right frontal duraplasty     Exam with decreased strength on left side, post ictal vs intracranial causes, will get MRI/MRA to assess and CTH/CTA if MRI will be delayed. On zosyn, white count downtrending.   :  CTH/ CTA done stable , CTA negative, MRIw/flow/MRA ordered, intubated /sedated, neurology saw Veeg-O/ neg, on keppra, K+3.3 repleted, afbrile, bld cltr NGTD, UA neg, WBC nml  : stable exam,  Veeg on, keppra, mri w/flow/ mra pending  : Stable exam. No seizures noted on VEEG. On keppra. MRI with CSF flow study and MRA ordered  : Extubated, alert, mildly confused and restless, nonfocal exam. On keppra. MRI showed increase in right epidural collection with diffusion restriction. Shunt (as reservoir) tapped, W1 R1  GSN  3/1: Alert, neurologically intact. On keppra. OR for clarissa hold for drainage of epidural abscess. Cx sent, NGTD. EDJPx1  Was intubated preop for stridor, dex x24hrs  3: POD1: Intubated, ENT scoped determined airway patent, ID-Following initiated on broad spectrum abx, OR cultures GSN, AFB neg, Cx pending. Dex discontinued.  3/3 POD2, intubated, on flagyl/vanc/cefipime, K 3.1- repletion ordered. EDJPx1 105cc/24hrs, PT recc LOUIS  3/: pod 3, intubated for respiratory distress, on tripple abx, ED SHAYY to suction, f/u OR cltrs NGTD  3 pod 4, remains intubated, neuro exam stable, ED SHYAY drain pus tinged 30cc, on triple Abx, OR cltrs NGTD, ID following,    36: POD # 5 stable exam. Patient remains intubated. On triple antibiotics, decadron for stridor.    3-10: POD # 6-9 Neurologically intact. Patient tolerating extubation. On triple antibiotics  3: POD # 10  Neurologically intact. On triple antibiotics. Plan for removal of ommaya and cranioplasty next week  3/12-18 POD # 11-17: on vanc, cefepime, and metronidazole, neuro exam stable, plan for replacement of cranioplasty and removal of ligated shunt on 3/20. PT, OT, PMR following.  3/19 NPO after midnight for cranioplasty with Dr. Brush today. Patient had one episode of epistaxis from the L. nostril overnight, but no significant blood loss. ID following, pt. on vanc, cefepime and flagyl. Dvt prophylaxis held for OR.   3/19 OR for Explanation of cranial plate, VPS valve and distal tubing removal, retained proximal catheter ligated with 2 clips, and placement of mesh cranioplasty w/ Dr. Brush, closed with staples. PT/OT/PMR to see postop.   3/20 POD 1 Explanation of cranial plate, VPS valve and distal tubing removal, retained proximal catheter ligated with 2 clips, and placement of mesh cranioplasty w/ Dr. Brush, on cefepime, flagyl and vanc. ID following, will touch base regarding antibiotic regimen. R SGJP x1 bloody drainage 95cc/24h.   58 yo F with PMHx of neurofibromatosis, s/p L suboccipital craniectomy for resection of cerebellar brain tumor 2023 by Dr Brush, s/p traumatic fall with head trauma in 2024 with large acute right SDH, s/p emergent right decompressive hemicraniectomy on 24, s/p right cranioplasty on 24 with custom plate, s/p ETV, ligation of shunt on the right with ligaclips with retention of valve as rescue device, presents as a transfer from [facility] for witnessed seizure at home. [facility] at [facility] revealed moderate ventriculomegaly and slight increase in size of extra-axial CSF collection overlying the right frontal duraplasty     Exam with decreased strength on left side, post ictal vs intracranial causes, CTH stable,   : on VEEG, K+3.3 repleated, afebrile, blood CX NGTD, UA neg, white count normal   : Stable exam. No seizures noted on VEEG.   : MRI showed increase in right epidural collection with diffusion restriction. Shunt (as reservoir) tapped, W1 R1  GSN  3/1: OR for clarissa hold for drainage of epidural abscess. Cx sent, NGTD. EDJPx1, was intubated preop for stridor, dex x24hrs  3/2: POD1 Intubated, ENT scoped determined airway patent, ID initiated on broad spectrum abx, OR cultures GSN, AFB neg, Dex d/c'd  3/3 POD2 intubated, on flagyl/vanc/cefipime, EDJP 105cc/24hrs  3/4-6 remains intubated, ED SHAYY drain pus tinged 30cc, on triple Abx, OR cltrs NGTD    3/7-10: POD 6-9 Patient tolerating extubation  3/11: POD 10 OR next week for removal of hardware per ID  3/12- stable awaiting OR next week   3/19 had one episode of epistaxis from the L. nostril overnight, but no significant blood loss.  3/19 OR for removal of cranial plate, VPS valve and distal tubing removal, retained proximal catheter ligated with 2 clips, and placement of NEW mesh cranioplasty w/ Dr. Brush, closed with staples  3/20 ID rec IV Abx through . R SGJP x1 bloody drainage 95cc/24h. Cleared for floor.

## 2025-03-20 NOTE — PHYSICAL THERAPY INITIAL EVALUATION ADULT - CRITERIA FOR SKILLED THERAPEUTIC INTERVENTIONS
impairments found/functional limitations in following categories/risk reduction/prevention/rehab potential
bed mobility, transfers and ambulation/functional limitations in following categories
impairments found/functional limitations in following categories

## 2025-03-20 NOTE — PHYSICAL THERAPY INITIAL EVALUATION ADULT - PATIENT PROFILE REVIEW, REHAB EVAL
yes
ACTIVITY ORDER: Ambulate with Assistance/OOB with Assistance; Spoke with RN Madai Santos and SICU medical team prior to PT evaluation-->Pt OK for PT consult/OOB activity./yes
PT RE EVALUATION Activity Ambulate with assist/yes

## 2025-03-20 NOTE — PHYSICAL THERAPY INITIAL EVALUATION ADULT - FOLLOWS COMMANDS/ANSWERS QUESTIONS, REHAB EVAL
100% of the time/able to follow single-step instructions
able to follow multistep instructions
100% of the time/able to follow single-step instructions

## 2025-03-20 NOTE — PHYSICAL THERAPY INITIAL EVALUATION ADULT - GAIT DEVIATIONS NOTED, PT EVAL
decreased anita/decreased step length/decreased weight-shifting ability
decreased anita/decreased step length/decreased stride length

## 2025-03-20 NOTE — OCCUPATIONAL THERAPY INITIAL EVALUATION ADULT - PRECAUTIONS/LIMITATIONS, REHAB EVAL
aspiration precautions/fall precautions/seizure precautions/surgical precautions
fall precautions/surgical precautions

## 2025-03-20 NOTE — PHYSICAL THERAPY INITIAL EVALUATION ADULT - DID THE PATIENT HAVE SURGERY?
s/p explantation of cranial plate, s/p removal of  shunt valve, s/p mesh plate cranioplasty;/yes s/p explantation of cranial plate, s/p removal of  shunt valve, s/p mesh plate cranioplasty/yes

## 2025-03-20 NOTE — PHYSICAL THERAPY INITIAL EVALUATION ADULT - PHYSICAL ASSIST/NONPHYSICAL ASSIST: SIT/STAND, REHAB EVAL
set-up required/verbal cues/nonverbal cues (demo/gestures)/1 person assist
verbal cues/nonverbal cues (demo/gestures)/1 person assist
verbal cues/1 person assist

## 2025-03-20 NOTE — PHYSICAL THERAPY INITIAL EVALUATION ADULT - LEVEL OF INDEPENDENCE: GAIT, REHAB EVAL
patient deferred at this time despite maximum encouragement/unable to perform
minimum assist (75% patients effort)
contact guard

## 2025-03-20 NOTE — PHYSICAL THERAPY INITIAL EVALUATION ADULT - PHYSICAL ASSIST/NONPHYSICAL ASSIST: SUPINE/SIT, REHAB EVAL
verbal cues/nonverbal cues (demo/gestures)/1 person assist
verbal cues/1 person assist
set-up required/verbal cues/nonverbal cues (demo/gestures)/1 person assist

## 2025-03-20 NOTE — OCCUPATIONAL THERAPY INITIAL EVALUATION ADULT - PERTINENT HX OF CURRENT PROBLEM, REHAB EVAL
Pt is a 60 year old female with hx of neurofibromatosis, s/p Left suboccipital craniectomy for resection of cerebellar brain tumor 12/2023 by Dr Brush, s/p traumatic fall with head trauma in 5/20/2024 and sustained a large acute right SDH, and underwent an emergent right decompressive hemicraniectomy on 5/22/24, s/p Right cranioplasty on 6/17/24 with custom plate, s/p ETV, ligation of shunt on the right with ligaclips with retention of valve as rescue device. Pt presented to [facility] for seizure-like activity at home. vEEG performed with no seizures/seizure-like activity. MR Head 2/28/25 demonstrated Right frontal epidural collection. Pt intubated in SICU 3/1/25 for stridor prior to OR, and then taken for Right craniotomy and evacuation of epidural empyema with drain left in place. Pt extubated POD2 following 24 hour decadron with appropriate cuff leak but was immediately reintubated for severe stridor and work of breathing, pt completed additional 48 hour decadron course. Pt successfully extubated on 3/7/25.
60 year old female with history of hydrocephalus s/p  shunt (10/4/2013), neurofibromatosis, s/p left retrosigmoid craniotomy for resection of cerebellar brain tumor (12/2023 Dr Brush), traumatic acute right SDH s/p emergent right decompressive hemicraniectomy (5/22/24) followed by right cranioplasty with custom plate (6/17/24),  shunt malfunction s/p left third ventriculostomy and ligation of  shunt with retention of valve (7/19/24) presented as a transfer from [facility] for witnessed seizure at home. Found to have right frontal epidural collection with previous SICU stay for intubation; s/p right craniotomy evacuation of epidural empyema (3/1/25, Gonsalo), c/b upper airway edema requiring re-intubation now s/p RTOR (03/19, Gonsalo) for explantation of cranial plate, removal of  shunt valve+distal tubing with residual catheter left, and mesh plate cranioplasty with closure.

## 2025-03-21 LAB
ANION GAP SERPL CALC-SCNC: 11 MMOL/L — SIGNIFICANT CHANGE UP (ref 7–14)
BUN SERPL-MCNC: 6 MG/DL — LOW (ref 7–23)
CALCIUM SERPL-MCNC: 9.2 MG/DL — SIGNIFICANT CHANGE UP (ref 8.4–10.5)
CHLORIDE SERPL-SCNC: 101 MMOL/L — SIGNIFICANT CHANGE UP (ref 98–107)
CO2 SERPL-SCNC: 23 MMOL/L — SIGNIFICANT CHANGE UP (ref 22–31)
CREAT SERPL-MCNC: 0.37 MG/DL — LOW (ref 0.5–1.3)
CULTURE RESULTS: SIGNIFICANT CHANGE UP
EGFR: 115 ML/MIN/1.73M2 — SIGNIFICANT CHANGE UP
EGFR: 115 ML/MIN/1.73M2 — SIGNIFICANT CHANGE UP
GLUCOSE SERPL-MCNC: 105 MG/DL — HIGH (ref 70–99)
HCT VFR BLD CALC: 37.1 % — SIGNIFICANT CHANGE UP (ref 34.5–45)
HGB BLD-MCNC: 12.6 G/DL — SIGNIFICANT CHANGE UP (ref 11.5–15.5)
MAGNESIUM SERPL-MCNC: 2 MG/DL — SIGNIFICANT CHANGE UP (ref 1.6–2.6)
MCHC RBC-ENTMCNC: 27.3 PG — SIGNIFICANT CHANGE UP (ref 27–34)
MCHC RBC-ENTMCNC: 34 G/DL — SIGNIFICANT CHANGE UP (ref 32–36)
MCV RBC AUTO: 80.3 FL — SIGNIFICANT CHANGE UP (ref 80–100)
NRBC # BLD AUTO: 0 K/UL — SIGNIFICANT CHANGE UP (ref 0–0)
NRBC # FLD: 0 K/UL — SIGNIFICANT CHANGE UP (ref 0–0)
NRBC BLD AUTO-RTO: 0 /100 WBCS — SIGNIFICANT CHANGE UP (ref 0–0)
PHOSPHATE SERPL-MCNC: 3.4 MG/DL — SIGNIFICANT CHANGE UP (ref 2.5–4.5)
PLATELET # BLD AUTO: 194 K/UL — SIGNIFICANT CHANGE UP (ref 150–400)
POTASSIUM SERPL-MCNC: 3.5 MMOL/L — SIGNIFICANT CHANGE UP (ref 3.5–5.3)
POTASSIUM SERPL-SCNC: 3.5 MMOL/L — SIGNIFICANT CHANGE UP (ref 3.5–5.3)
RBC # BLD: 4.62 M/UL — SIGNIFICANT CHANGE UP (ref 3.8–5.2)
RBC # FLD: 14.8 % — HIGH (ref 10.3–14.5)
SODIUM SERPL-SCNC: 135 MMOL/L — SIGNIFICANT CHANGE UP (ref 135–145)
SPECIMEN SOURCE: SIGNIFICANT CHANGE UP
WBC # BLD: 5.53 K/UL — SIGNIFICANT CHANGE UP (ref 3.8–10.5)
WBC # FLD AUTO: 5.53 K/UL — SIGNIFICANT CHANGE UP (ref 3.8–10.5)

## 2025-03-21 PROCEDURE — 99232 SBSQ HOSP IP/OBS MODERATE 35: CPT

## 2025-03-21 PROCEDURE — G0545: CPT

## 2025-03-21 RX ORDER — LORAZEPAM 4 MG/ML
0.5 VIAL (ML) INJECTION ONCE
Refills: 0 | Status: DISCONTINUED | OUTPATIENT
Start: 2025-03-21 | End: 2025-03-21

## 2025-03-21 RX ADMIN — Medication 100 MILLIGRAM(S): at 14:11

## 2025-03-21 RX ADMIN — HEPARIN SODIUM 5000 UNIT(S): 1000 INJECTION INTRAVENOUS; SUBCUTANEOUS at 22:19

## 2025-03-21 RX ADMIN — Medication 650 MILLIGRAM(S): at 16:30

## 2025-03-21 RX ADMIN — OXYCODONE HYDROCHLORIDE 5 MILLIGRAM(S): 30 TABLET ORAL at 03:58

## 2025-03-21 RX ADMIN — LEVETIRACETAM 1000 MILLIGRAM(S): 10 INJECTION, SOLUTION INTRAVENOUS at 06:30

## 2025-03-21 RX ADMIN — LEVETIRACETAM 1000 MILLIGRAM(S): 10 INJECTION, SOLUTION INTRAVENOUS at 17:36

## 2025-03-21 RX ADMIN — OXYCODONE HYDROCHLORIDE 5 MILLIGRAM(S): 30 TABLET ORAL at 18:59

## 2025-03-21 RX ADMIN — Medication 100 MILLIGRAM(S): at 06:31

## 2025-03-21 RX ADMIN — CEFEPIME 100 MILLIGRAM(S): 2 INJECTION, POWDER, FOR SOLUTION INTRAVENOUS at 14:10

## 2025-03-21 RX ADMIN — POLYETHYLENE GLYCOL 3350 17 GRAM(S): 17 POWDER, FOR SOLUTION ORAL at 11:20

## 2025-03-21 RX ADMIN — Medication 250 MILLIGRAM(S): at 18:30

## 2025-03-21 RX ADMIN — Medication 100 MILLIGRAM(S): at 22:19

## 2025-03-21 RX ADMIN — CEFEPIME 100 MILLIGRAM(S): 2 INJECTION, POWDER, FOR SOLUTION INTRAVENOUS at 05:52

## 2025-03-21 RX ADMIN — Medication 650 MILLIGRAM(S): at 00:19

## 2025-03-21 RX ADMIN — SERTRALINE 50 MILLIGRAM(S): 100 TABLET, FILM COATED ORAL at 11:20

## 2025-03-21 RX ADMIN — Medication 0.5 MILLIGRAM(S): at 04:55

## 2025-03-21 RX ADMIN — Medication 650 MILLIGRAM(S): at 01:19

## 2025-03-21 RX ADMIN — Medication 1 APPLICATION(S): at 11:20

## 2025-03-21 RX ADMIN — Medication 250 MILLIGRAM(S): at 07:32

## 2025-03-21 RX ADMIN — CEFEPIME 100 MILLIGRAM(S): 2 INJECTION, POWDER, FOR SOLUTION INTRAVENOUS at 22:18

## 2025-03-21 RX ADMIN — HEPARIN SODIUM 5000 UNIT(S): 1000 INJECTION INTRAVENOUS; SUBCUTANEOUS at 06:30

## 2025-03-21 RX ADMIN — HEPARIN SODIUM 5000 UNIT(S): 1000 INJECTION INTRAVENOUS; SUBCUTANEOUS at 14:11

## 2025-03-21 RX ADMIN — Medication 3 MILLIGRAM(S): at 03:27

## 2025-03-21 RX ADMIN — OXYCODONE HYDROCHLORIDE 5 MILLIGRAM(S): 30 TABLET ORAL at 18:30

## 2025-03-21 RX ADMIN — Medication 650 MILLIGRAM(S): at 15:46

## 2025-03-21 NOTE — PROGRESS NOTE ADULT - ASSESSMENT
60 yo F with PMHx of neurofibromatosis, s/p L suboccipital craniectomy for resection of cerebellar brain tumor 2023 by Dr Brush, s/p traumatic fall with head trauma in 2024 with large acute right SDH, s/p emergent right decompressive hemicraniectomy on 24, s/p right cranioplasty on 24 with custom plate, s/p ETV, ligation of shunt on the right with ligaclips with retention of valve as rescue device, presents as a transfer from [facility] for witnessed seizure at home. CTH at [facility] revealed moderate ventriculomegaly and slight increase in size of extra-axial CSF collection overlying the right frontal duraplasty     Exam with decreased strength on left side, post ictal vs intracranial causes, CTH stable,   : on VEEG, K+3.3 repleated, afebrile, blood CX NGTD, UA neg, white count normal   : Stable exam. No seizures noted on VEEG.   : MRI showed increase in right epidural collection with diffusion restriction. Shunt (as reservoir) tapped, W1 R1  GSN  3/1: OR for clarissa hold for drainage of epidural abscess. Cx sent, NGTD. EDJPx1, was intubated preop for stridor, dex x24hrs  3/2: POD1 Intubated, ENT scoped determined airway patent, ID initiated on broad spectrum abx, OR cultures GSN, AFB neg, Dex d/c'd  3/3 POD2 intubated, on flagyl/vanc/cefipime, EDJP 105cc/24hrs  3/4-6 remains intubated, ED SHAYY drain pus tinged 30cc, on triple Abx, OR cltrs NGTD    3/7-10: POD 6-9 Patient tolerating extubation  3/11: POD 10 OR next week for removal of hardware per ID  3/12- stable awaiting OR next week   3/19 had one episode of epistaxis from the L. nostril overnight, but no significant blood loss.  3/19 OR for removal of cranial plate, VPS valve and distal tubing removal, retained proximal catheter ligated with 2 clips, and placement of NEW mesh cranioplasty w/ Dr. Brush, closed with staples  3/20 ID rec IV Abx through . R SGJP x1 bloody drainage 95cc/24h. Cleared for floor.   3/21: Stable exam. SHAYY X 1, 80cc/24H. Abx through  per ID. PICC placement pending

## 2025-03-21 NOTE — PROGRESS NOTE ADULT - PROBLEM SELECTOR PLAN 1
Neurologic checks Q4H  Pain control  Consider dental consult if tooth pain persists  Monitor drain output  Continue PT/OT  Discharge planning  Continue Abx per ID, PICC pending

## 2025-03-21 NOTE — PROGRESS NOTE ADULT - SUBJECTIVE AND OBJECTIVE BOX
Infectious Diseases Follow Up:    Patient is a 60y old  Female who presents with a chief complaint of seizure (21 Mar 2025 03:03)      Interval History/ROS:  Some R head discomfort     Allergies  No Known Allergies        ANTIMICROBIALS:  cefepime   IVPB 2000 every 8 hours  metroNIDAZOLE  IVPB 500 every 8 hours  vancomycin  IVPB 1000 every 12 hours      Current Abx:     Previous Abx     OTHER MEDS:  MEDICATIONS  (STANDING):  acetaminophen     Tablet .. 650 every 6 hours PRN  albuterol    90 MICROgram(s) HFA Inhaler 2 every 4 hours PRN  heparin   Injectable 5000 every 8 hours  levETIRAcetam 1000 two times a day  melatonin 3 at bedtime PRN  oxyCODONE    IR 2.5 every 4 hours PRN  oxyCODONE    IR 5 every 4 hours PRN  polyethylene glycol 3350 17 daily  senna 2 at bedtime PRN  sertraline 50 daily      Vital Signs Last 24 Hrs  T(C): 37.1 (21 Mar 2025 09:21), Max: 37.2 (21 Mar 2025 05:03)  T(F): 98.7 (21 Mar 2025 09:21), Max: 99 (21 Mar 2025 05:03)  HR: 81 (21 Mar 2025 09:21) (79 - 88)  BP: 132/61 (21 Mar 2025 09:21) (122/60 - 133/70)  BP(mean): --  RR: 18 (21 Mar 2025 09:21) (16 - 18)  SpO2: 98% (21 Mar 2025 09:21) (94% - 98%)    Parameters below as of 21 Mar 2025 09:21  Patient On (Oxygen Delivery Method): room air        PHYSICAL EXAM:  GENERAL: NAD, well-developed  HEAD:  SHAYY drain from R head, dressing   EYES: EOMI, , conjunctiva and sclera clear  CHEST/LUNG: Clear to auscultation bilaterally; No wheeze  HEART: Regular rate and rhythm; No murmurs, rubs, or gallops  ABDOMEN: Soft, Nontender, Nondistended;   PSYCH: AAOx3                          12.6   5.53  )-----------( 194      ( 21 Mar 2025 04:25 )             37.1       03-21    135  |  101  |  6[L]  ----------------------------<  105[H]  3.5   |  23  |  0.37[L]    Ca    9.2      21 Mar 2025 04:25  Phos  3.4     03-21  Mg     2.00     03-21        Urinalysis Basic - ( 21 Mar 2025 04:25 )    Color: x / Appearance: x / SG: x / pH: x  Gluc: 105 mg/dL / Ketone: x  / Bili: x / Urobili: x   Blood: x / Protein: x / Nitrite: x   Leuk Esterase: x / RBC: x / WBC x   Sq Epi: x / Non Sq Epi: x / Bacteria: x        MICROBIOLOGY:  Vancomycin Level, Trough: 11.9 ug/mL (03-20-25 @ 18:38)  v  Nose  03-19-25   No staphylococcus aureus isolated.  "PCR is more Sensitive for identifying MRSA/MSSA."  --  --      Tissue 1) EPIDURAL EXUDATE  03-19-25   No growth  --    No polymorphonuclear cells seen  No organisms seen      Surgical Swab  03-01-25   No growth at 5 days  --    Few polymorphonuclear leukocytes per low power field  No organisms seen per oil power field      CSF CSF  02-28-25   No growth at 14 days.  --    No polymorphonuclear cells seen  No organisms seen  by cytocentrifuge      Catheterized Catheterized  02-26-25   No growth  --  --      .Blood Blood-Peripheral  02-26-25   No growth at 5 days  --  --      .Blood Blood-Peripheral  02-26-25   No growth at 5 days  --  --      .Blood Blood  02-22-25   No growth at 5 days  --  --      .Blood Blood  02-22-25   No growth at 5 days  --  --                RADIOLOGY:

## 2025-03-21 NOTE — PROGRESS NOTE ADULT - SUBJECTIVE AND OBJECTIVE BOX
POD # 2  patient C/O toothache, mildly agitated at times  Vital Signs Last 24 Hrs  T(C): 37.1 (21 Mar 2025 00:51), Max: 37.2 (20 Mar 2025 04:00)  T(F): 98.7 (21 Mar 2025 00:51), Max: 98.9 (20 Mar 2025 04:00)  HR: 83 (21 Mar 2025 00:51) (75 - 89)  BP: 133/70 (21 Mar 2025 00:51) (107/51 - 133/70)  BP(mean): 66 (20 Mar 2025 12:00) (66 - 119)  RR: 18 (21 Mar 2025 00:51) (11 - 22)  SpO2: 96% (21 Mar 2025 00:51) (94% - 98%)    Parameters below as of 21 Mar 2025 00:51  Patient On (Oxygen Delivery Method): room air    AAO X 3  PERRLA, EOMI  CN 2-12 grossly intact  CLARK strength 5/5  No dysmetria or drift  SILT    SHAYY: 80cc/24H    MEDICATIONS  (STANDING):  cefepime   IVPB 2000 milliGRAM(s) IV Intermittent every 8 hours  chlorhexidine 2% Cloths 1 Application(s) Topical daily  heparin   Injectable 5000 Unit(s) SubCutaneous every 8 hours  levETIRAcetam 1000 milliGRAM(s) Oral two times a day  metroNIDAZOLE  IVPB 500 milliGRAM(s) IV Intermittent every 8 hours  polyethylene glycol 3350 17 Gram(s) Oral daily  sertraline 50 milliGRAM(s) Oral daily  vancomycin  IVPB 1000 milliGRAM(s) IV Intermittent every 12 hours    MEDICATIONS  (PRN):  acetaminophen     Tablet .. 650 milliGRAM(s) Oral every 6 hours PRN Temp greater or equal to 38C (100.4F), Moderate Pain (4 - 6)  albuterol    90 MICROgram(s) HFA Inhaler 2 Puff(s) Inhalation every 4 hours PRN Shortness of Breath and/or Wheezing  melatonin 3 milliGRAM(s) Oral at bedtime PRN Insomnia  oxyCODONE    IR 2.5 milliGRAM(s) Oral every 4 hours PRN Moderate Pain (4 - 6)  oxyCODONE    IR 5 milliGRAM(s) Oral every 4 hours PRN Severe Pain (7 - 10)  senna 2 Tablet(s) Oral at bedtime PRN Constipation                          12.1   7.13  )-----------( 195      ( 20 Mar 2025 00:50 )             35.2     03-20    134[L]  |  100  |  9   ----------------------------<  110[H]  3.7   |  20[L]  |  0.39[L]    Ca    9.1      20 Mar 2025 00:50  Phos  3.8     03-20  Mg     1.80     03-20    Vancomycin Level, Trough (03.20.25 @ 18:38)    Vancomycin Level, Trough: 11.9: Vancomycin trough levels should be rapidly reached and maintained at  15-20 ug/mL for life threatening MRSA infections such as sepsis,  endocarditis, osteomyelitis and pneumonia. A first trough level should be  drawn before the 3rd or 4th dose. Risk of renal toxicity is increased for  levels >15 ug/mL, in patients on other nephrotoxic drugs, who are  hemodynamically unstable, have unstable renal function, or are on  vancomycin therapy for >14 days. Renal function with creatinine levels  should bemonitored for those patients. ug/mL    < from: CT Head No Cont (03.19.25 @ 17:34) >  Redemonstration of right frontotemporal craniectomy with cranioplasty. A   drainage catheter is noted traversing the cranioplasty. There has been   resolution of the previously noted extra-axial collection underlying the   cranioplasty.    There is similar encephalomalacia noted involving the underlying right   frontal lobe as well as the right lateral temporal lobe.    The right lateral approach ventriculostomy catheter is noted in   appropriate position terminating within the right lateral ventricle. The   ventricles are similar in overall dilation.    Redemonstration of left retrosigmoid craniotomy with encephalomalacia of   the underlying glenoid hemisphere.    The visualized intraorbital compartments, paranasal sinuses and mastoid   complexes appear free of acute disease.    IMPRESSION:  In comparison to the prior CT examination has been resolution of   previously noted fluid collection underlying the right frontal parietal   cranioplasty.    Otherwise stable examination with chronic findings, detailed above.    --- End of Report ---    < end of copied text >

## 2025-03-21 NOTE — PROGRESS NOTE ADULT - ASSESSMENT
This is a 60 y/o F w/ very extensive PMHx of NF, 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), s/p  shunt (in [facility]), s/p traumatic fall w/ R SDH, s/p R decompressive hemicraniectomy, ICP monitor and R cranioplasty (5/2024), s/p L frontal Endoscopic third ventriculostomy and ligation of R  shunt with Ligaclips with retention of valve, proximal and distal catheters (08/2024) admitted initially to [facility] on 2/22 for seizure, intubated for airway protection and status epilepticus, initially on Zosyn for aspiration PNA.  Initially CTH w/ slight increase in extra axial fluid collection measuring up to 1.5 cm. Pt was transferred to [facility] on 2/23 for neurosurgery eval, given decreased L sided strength, MRI done with increase in R sided collection.  Pt extubated on 2/28, CSF reservoir tapped, no pleocytosis.    Pt went to the OR on 3/1 for R clarissa pole with evacuation of epidural collection, with purulent drainage.     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), s/p  shunt (in [facility]), s/p traumatic fall w/ R SDH, s/p R decompressive hemicraniectomy, ICP monitor and R cranioplasty (5/2024), s/p L frontal Endoscopic third ventriculostomy and ligation of R  shunt with ligaclips with retention of valve, proximal and distal catheters (08/2024) admitted initially to [facility] on 2/22 for seizure, intubated for airway protection and status epilepticus, initially on Zosyn for aspiration PNA, transferred to [facility] on 2/23 for neurosurgery evaluation, now found to with have epidural abscess s/p R Clarissa w/ purulent/milky drainage.  Extubated on 3/3, reintubated for stridor/work of breaking, now extubated on 3/6.    D/w neurosurgery, Cx all negative, CRP low, consider allergic reaction to prosthetic cranioplasty. Would likely treat w/ abx for 6 weeks   OR on 3/19: Explantation of cranial plate, removal of ventriculoperitoneal shunt valve + distal tubing, proximal catheter ligated with 2 clips and left behind, cranioplasty with mesh plate place    Recommendations:   1. Vancomycin 1g q12, f/u level goal -600, Cefepime 2 g q8, Flagyl 500 mg q8, plan for total 6 week course (4/11/25)  2. F/u OR Cx 3/19    Thank you for consulting us and involving us in the management of this patient's case. In addition to reviewing history, imaging, documents, labs, microbiology, and infection control strategies and potential issues.     ID will continue to follow peripherally     Inder Mcintosh M.D.  Attending Physician  Division of Infectious Diseases  Department of Medicine

## 2025-03-21 NOTE — PROGRESS NOTE ADULT - SUBJECTIVE AND OBJECTIVE BOX
Patient is a 60y old  Female who presents with a chief complaint of seizure (21 Mar 2025 12:01)    Now s/p explantation of cranial plate, removal of  shunt valve, cranioplasty on 3/19  per ID to continue antibiotics until 25.    REVIEW OF SYSTEMS  Constitutional - No fever, No weight loss, No fatigue  HEENT - No eye pain, No visual disturbances, No difficulty hearing, No tinnitus, No vertigo, No neck pain  Respiratory - No cough, No wheezing, No shortness of breath  Cardiovascular - No chest pain, No palpitations  Gastrointestinal - No abdominal pain, No nausea, No vomiting, No diarrhea, No constipation  Genitourinary - No dysuria, No frequency, No hematuria, No incontinence  Neurological - No headaches, No memory loss, +loss of strength, No numbness, No tremors  Skin - No itching, No rashes, No lesions   Endocrine - No temperature intolerance  Musculoskeletal - No joint pain, No joint swelling, No muscle pain  Psychiatric - No depression, No anxiety    PAST MEDICAL & SURGICAL HISTORY  Asthma    Depression    Neoplasm of uncertain behavior of brain    Neurofibromatosis, type 1    Neurofibromatosis    History of hydrocephalus    Anxiety    Delivery with history of     S/P tubal ligation    History of arthroscopy of left knee    S/P LASIK Surgery    H/O brain tumor    S/P  shunt         CURRENT FUNCTIONAL STATUS  3/20  Bed Mobility: Rolling/Turning:     · Level of Story	contact guard  · Physical Assist/Nonphysical Assist	1 person assist; nonverbal cues (demo/gestures); verbal cues    Bed Mobility: Supine to Sit:     · Level of Story	contact guard  · Physical Assist/Nonphysical Assist	1 person assist; nonverbal cues (demo/gestures); verbal cues    Transfer: Sit to Stand:     · Level of Story	contact guard  · Physical Assist/Nonphysical Assist	1 person assist; nonverbal cues (demo/gestures); verbal cues  · Assistive Device	rolling walker    Transfer: Stand to Sit:     · Level of Story	contact guard  · Physical Assist/Nonphysical Assist	1 person assist; nonverbal cues (demo/gestures); verbal cues  · Assistive Device	rolling walker    Gait Skills:     · Level of Story	contact guard  · Physical Assist/Nonphysical Assist	1 person assist; nonverbal cues (demo/gestures); verbal cues  · Assistive Device	rolling walker  · Gait Distance	bed to chair    Gait Analysis:     · Gait Deviations Noted	decreased anita; decreased step length; decreased weight-shifting ability  · Impairments Contributing to Gait Deviations	impaired balance; decreased strength    Stair Negotiation:     · Level of Story	unable to perform; patient too weak to ambulate to stairs         RECENT LABS/IMAGING  CBC Full  -  ( 21 Mar 2025 04:25 )  WBC Count : 5.53 K/uL  RBC Count : 4.62 M/uL  Hemoglobin : 12.6 g/dL  Hematocrit : 37.1 %  Platelet Count - Automated : 194 K/uL  Mean Cell Volume : 80.3 fL  Mean Cell Hemoglobin : 27.3 pg  Mean Cell Hemoglobin Concentration : 34.0 g/dL  Auto Neutrophil # : x  Auto Lymphocyte # : x  Auto Monocyte # : x  Auto Eosinophil # : x  Auto Basophil # : x  Auto Neutrophil % : x  Auto Lymphocyte % : x  Auto Monocyte % : x  Auto Eosinophil % : x  Auto Basophil % : x        135  |  101  |  6[L]  ----------------------------<  105[H]  3.5   |  23  |  0.37[L]    Ca    9.2      21 Mar 2025 04:25  Phos  3.4       Mg     2.00           Urinalysis Basic - ( 21 Mar 2025 04:25 )    Color: x / Appearance: x / SG: x / pH: x  Gluc: 105 mg/dL / Ketone: x  / Bili: x / Urobili: x   Blood: x / Protein: x / Nitrite: x   Leuk Esterase: x / RBC: x / WBC x   Sq Epi: x / Non Sq Epi: x / Bacteria: x        VITALS  T(C): 36.8 (25 @ 13:58), Max: 37.2 (25 @ 05:03)  HR: 75 (25 @ 13:58) (75 - 88)  BP: 108/55 (25 @ 13:58) (108/55 - 133/70)  RR: 18 (25 @ 13:58) (16 - 18)  SpO2: 100% (25 @ 13:58) (94% - 100%)  Wt(kg): --    ALLERGIES  No Known Allergies      MEDICATIONS   acetaminophen     Tablet .. 650 milliGRAM(s) Oral every 6 hours PRN  albuterol    90 MICROgram(s) HFA Inhaler 2 Puff(s) Inhalation every 4 hours PRN  cefepime   IVPB 2000 milliGRAM(s) IV Intermittent every 8 hours  chlorhexidine 2% Cloths 1 Application(s) Topical daily  heparin   Injectable 5000 Unit(s) SubCutaneous every 8 hours  levETIRAcetam 1000 milliGRAM(s) Oral two times a day  melatonin 3 milliGRAM(s) Oral at bedtime PRN  metroNIDAZOLE  IVPB 500 milliGRAM(s) IV Intermittent every 8 hours  oxyCODONE    IR 2.5 milliGRAM(s) Oral every 4 hours PRN  oxyCODONE    IR 5 milliGRAM(s) Oral every 4 hours PRN  polyethylene glycol 3350 17 Gram(s) Oral daily  senna 2 Tablet(s) Oral at bedtime PRN  sertraline 50 milliGRAM(s) Oral daily  vancomycin  IVPB 1000 milliGRAM(s) IV Intermittent every 12 hours      ----------------------------------------------------------------------------------------    PHYSICAL EXAM  Constitutional - NAD, Comfortable  HEENT - + dressing R scalp  Chest - no respiratory distress  Cardiovascular - RRR, S1S2   Abdomen -  Soft, NTND  Extremities - No C/C/E, No calf tenderness   Neurologic Exam -                    Cognitive - Awake, Alert, AAO to self, place, date, year, situation     Communication - Fluent, No dysarthria     Cranial Nerves - CN 2-12 intact     Motor -                      LEFT    UE - ShAB 5/5, EF 5/5, EE 5/5, WE 5/5,  5/5                    RIGHT UE - ShAB 5/5, EF 5/5, EE 5/5, WE 5/5,  5/5                    LEFT    LE - HF 4+/5, KE 5/5, DF 5/5, PF 5/5                    RIGHT LE - HF 5/5, KE 5/5, DF 5/5, PF 5/5        Sensory - Intact to LT       Balance - WNL Static  Psychiatric - Mood stable, Affect WNL  ----------------------------------------------------------------------------------------  ASSESSMENT/PLAN   60 year old Female status post Left suboccipital craniectomy for resection of cerebellar brain tumor 2023 by Dr Brush, status post traumatic fall with head trauma in 2024 with large acute right SDH, status post emergent right decompressive hemicraniectomy on 24, status post right cranioplasty on 24 with custom plate, status post ETV, ligation of shunt on the right with ligaclips with retention of valve as rescue device, presents as a transfer from [facility] for witnessed seizure at home. CTH obtained at [facility] revealed moderate ventriculomegaly and slight increase in size of extra-axial CSF collection overlying the right frontal duraplasty.  s/p clarissa hole 3/1 with purulent drainage  on keppra  abx per ID, planning for additional neurosurgery 3/20  Pain -denies pain  DVT PPX - heparin  continue bedside PT and OT  Rehab - will monitor for improvement and progress with bedside therapy, as well as medical course.   currently patient is appropriate for acute rehab when medically cleared. Patient can tolerate 3 hours per day of therapy with medical supervision.     Total time taken to review relevant records and imaging results, examine patient, write note, and when applicable discuss the case with patient, family, , , and medical providers:  35 minutes

## 2025-03-22 LAB — VANCOMYCIN TROUGH SERPL-MCNC: 9.9 UG/ML — LOW (ref 10–20)

## 2025-03-22 RX ORDER — MELATONIN 5 MG
3 TABLET ORAL ONCE
Refills: 0 | Status: COMPLETED | OUTPATIENT
Start: 2025-03-22 | End: 2025-03-22

## 2025-03-22 RX ADMIN — Medication 100 MILLIGRAM(S): at 22:46

## 2025-03-22 RX ADMIN — HEPARIN SODIUM 5000 UNIT(S): 1000 INJECTION INTRAVENOUS; SUBCUTANEOUS at 05:23

## 2025-03-22 RX ADMIN — Medication 3 MILLIGRAM(S): at 00:27

## 2025-03-22 RX ADMIN — LEVETIRACETAM 1000 MILLIGRAM(S): 10 INJECTION, SOLUTION INTRAVENOUS at 05:23

## 2025-03-22 RX ADMIN — SERTRALINE 50 MILLIGRAM(S): 100 TABLET, FILM COATED ORAL at 13:09

## 2025-03-22 RX ADMIN — OXYCODONE HYDROCHLORIDE 5 MILLIGRAM(S): 30 TABLET ORAL at 16:02

## 2025-03-22 RX ADMIN — Medication 1 APPLICATION(S): at 13:30

## 2025-03-22 RX ADMIN — HEPARIN SODIUM 5000 UNIT(S): 1000 INJECTION INTRAVENOUS; SUBCUTANEOUS at 13:09

## 2025-03-22 RX ADMIN — LEVETIRACETAM 1000 MILLIGRAM(S): 10 INJECTION, SOLUTION INTRAVENOUS at 19:11

## 2025-03-22 RX ADMIN — CEFEPIME 100 MILLIGRAM(S): 2 INJECTION, POWDER, FOR SOLUTION INTRAVENOUS at 23:26

## 2025-03-22 RX ADMIN — Medication 3 MILLIGRAM(S): at 02:38

## 2025-03-22 RX ADMIN — CEFEPIME 100 MILLIGRAM(S): 2 INJECTION, POWDER, FOR SOLUTION INTRAVENOUS at 13:10

## 2025-03-22 RX ADMIN — Medication 100 MILLIGRAM(S): at 13:09

## 2025-03-22 RX ADMIN — HEPARIN SODIUM 5000 UNIT(S): 1000 INJECTION INTRAVENOUS; SUBCUTANEOUS at 21:15

## 2025-03-22 RX ADMIN — CEFEPIME 100 MILLIGRAM(S): 2 INJECTION, POWDER, FOR SOLUTION INTRAVENOUS at 05:22

## 2025-03-22 RX ADMIN — Medication 100 MILLIGRAM(S): at 05:23

## 2025-03-22 RX ADMIN — OXYCODONE HYDROCHLORIDE 5 MILLIGRAM(S): 30 TABLET ORAL at 15:02

## 2025-03-22 RX ADMIN — Medication 250 MILLIGRAM(S): at 06:27

## 2025-03-22 RX ADMIN — Medication 250 MILLIGRAM(S): at 20:58

## 2025-03-22 NOTE — PROGRESS NOTE ADULT - ASSESSMENT
58 yo F with PMHx of neurofibromatosis, s/p L suboccipital craniectomy for resection of cerebellar brain tumor 2023 by Dr Brush, s/p traumatic fall with head trauma in 2024 with large acute right SDH, s/p emergent right decompressive hemicraniectomy on 24, s/p right cranioplasty on 24 with custom plate, s/p ETV, ligation of shunt on the right with ligaclips with retention of valve as rescue device, presents as a transfer from [facility] for witnessed seizure at home. CTH at [facility] revealed moderate ventriculomegaly and slight increase in size of extra-axial CSF collection overlying the right frontal duraplasty     Exam with decreased strength on left side, post ictal vs intracranial causes, CTH stable,   : on VEEG, K+3.3 repleated, afebrile, blood CX NGTD, UA neg, white count normal   : Stable exam. No seizures noted on VEEG.   : MRI showed increase in right epidural collection with diffusion restriction. Shunt (as reservoir) tapped, W1 R1  GSN  3/1: OR for clarissa hold for drainage of epidural abscess. Cx sent, NGTD. EDJPx1, was intubated preop for stridor, dex x24hrs  3/2: POD1 Intubated, ENT scoped determined airway patent, ID initiated on broad spectrum abx, OR cultures GSN, AFB neg, Dex d/c'd  3/3 POD2 intubated, on flagyl/vanc/cefipime, EDJP 105cc/24hrs  3/4-6 remains intubated, ED SHAYY drain pus tinged 30cc, on triple Abx, OR cltrs NGTD    3/7-10: POD 6-9 Patient tolerating extubation  3/11: POD 10 OR next week for removal of hardware per ID  3/12- stable awaiting OR next week   3/19 had one episode of epistaxis from the L. nostril overnight, but no significant blood loss.  3/19 OR for removal of cranial plate, VPS valve and distal tubing removal, retained proximal catheter ligated with 2 clips, and placement of NEW mesh cranioplasty w/ Dr. Brush, closed with staples  3/20 ID rec IV Abx through . R SGJP x1 bloody drainage 95cc/24h. Cleared for floor.   3/21-: Stable exam. SHAYY X 1, 37.5cc/24H. Abx through  per ID. PICC placement pending

## 2025-03-22 NOTE — PROGRESS NOTE ADULT - SUBJECTIVE AND OBJECTIVE BOX
Toothache resolved  No longer agitated  No issues overnight  Vital Signs Last 24 Hrs  T(C): 36.8 (22 Mar 2025 01:21), Max: 37.2 (21 Mar 2025 05:03)  T(F): 98.2 (22 Mar 2025 01:21), Max: 99 (21 Mar 2025 05:03)  HR: 84 (22 Mar 2025 01:21) (75 - 89)  BP: 114/51 (22 Mar 2025 01:21) (108/55 - 132/61)  BP(mean): --  RR: 95 (22 Mar 2025 01:21) (17 - 95)  SpO2: 98% (21 Mar 2025 20:58) (98% - 100%)    Parameters below as of 21 Mar 2025 20:58  Patient On (Oxygen Delivery Method): room air    AAO X 3  PERRLA, EOMI  CN 2-12 grossly intact  CLARK strength 5/5  No dysmetria or drift  SILT    SHAYY: 37.5cc/24H    MEDICATIONS  (STANDING):  cefepime   IVPB 2000 milliGRAM(s) IV Intermittent every 8 hours  chlorhexidine 2% Cloths 1 Application(s) Topical daily  heparin   Injectable 5000 Unit(s) SubCutaneous every 8 hours  levETIRAcetam 1000 milliGRAM(s) Oral two times a day  metroNIDAZOLE  IVPB 500 milliGRAM(s) IV Intermittent every 8 hours  polyethylene glycol 3350 17 Gram(s) Oral daily  sertraline 50 milliGRAM(s) Oral daily  vancomycin  IVPB 1000 milliGRAM(s) IV Intermittent every 12 hours    MEDICATIONS  (PRN):  acetaminophen     Tablet .. 650 milliGRAM(s) Oral every 6 hours PRN Temp greater or equal to 38C (100.4F), Moderate Pain (4 - 6)  albuterol    90 MICROgram(s) HFA Inhaler 2 Puff(s) Inhalation every 4 hours PRN Shortness of Breath and/or Wheezing  melatonin 3 milliGRAM(s) Oral at bedtime PRN Insomnia  oxyCODONE    IR 2.5 milliGRAM(s) Oral every 4 hours PRN Moderate Pain (4 - 6)  oxyCODONE    IR 5 milliGRAM(s) Oral every 4 hours PRN Severe Pain (7 - 10)  senna 2 Tablet(s) Oral at bedtime PRN Constipation                          12.6   5.53  )-----------( 194      ( 21 Mar 2025 04:25 )             37.1     03-21    135  |  101  |  6[L]  ----------------------------<  105[H]  3.5   |  23  |  0.37[L]    Ca    9.2      21 Mar 2025 04:25  Phos  3.4     03-21  Mg     2.00     03-21

## 2025-03-22 NOTE — PROGRESS NOTE ADULT - PROBLEM SELECTOR PLAN 1
Neurologic checks Q6H  Pain control  Monitor drain output  Continue PT/OT  Discharge planning  Continue Abx per ID, PICC pending

## 2025-03-23 RX ADMIN — CEFEPIME 100 MILLIGRAM(S): 2 INJECTION, POWDER, FOR SOLUTION INTRAVENOUS at 06:26

## 2025-03-23 RX ADMIN — CEFEPIME 100 MILLIGRAM(S): 2 INJECTION, POWDER, FOR SOLUTION INTRAVENOUS at 22:40

## 2025-03-23 RX ADMIN — HEPARIN SODIUM 5000 UNIT(S): 1000 INJECTION INTRAVENOUS; SUBCUTANEOUS at 15:35

## 2025-03-23 RX ADMIN — Medication 250 MILLIGRAM(S): at 21:39

## 2025-03-23 RX ADMIN — Medication 1 APPLICATION(S): at 12:11

## 2025-03-23 RX ADMIN — HEPARIN SODIUM 5000 UNIT(S): 1000 INJECTION INTRAVENOUS; SUBCUTANEOUS at 22:41

## 2025-03-23 RX ADMIN — Medication 250 MILLIGRAM(S): at 09:18

## 2025-03-23 RX ADMIN — Medication 100 MILLIGRAM(S): at 23:49

## 2025-03-23 RX ADMIN — Medication 3 MILLIGRAM(S): at 02:30

## 2025-03-23 RX ADMIN — HEPARIN SODIUM 5000 UNIT(S): 1000 INJECTION INTRAVENOUS; SUBCUTANEOUS at 06:35

## 2025-03-23 RX ADMIN — CEFEPIME 100 MILLIGRAM(S): 2 INJECTION, POWDER, FOR SOLUTION INTRAVENOUS at 15:32

## 2025-03-23 RX ADMIN — Medication 100 MILLIGRAM(S): at 07:09

## 2025-03-23 RX ADMIN — Medication 100 MILLIGRAM(S): at 15:34

## 2025-03-23 RX ADMIN — LEVETIRACETAM 1000 MILLIGRAM(S): 10 INJECTION, SOLUTION INTRAVENOUS at 19:42

## 2025-03-23 RX ADMIN — SERTRALINE 50 MILLIGRAM(S): 100 TABLET, FILM COATED ORAL at 12:10

## 2025-03-23 RX ADMIN — LEVETIRACETAM 1000 MILLIGRAM(S): 10 INJECTION, SOLUTION INTRAVENOUS at 06:35

## 2025-03-23 NOTE — PROGRESS NOTE ADULT - ASSESSMENT
60 yo F with PMHx of neurofibromatosis, s/p L suboccipital craniectomy for resection of cerebellar brain tumor 2023 by Dr Brush, s/p traumatic fall with head trauma in 2024 with large acute right SDH, s/p emergent right decompressive hemicraniectomy on 24, s/p right cranioplasty on 24 with custom plate, s/p ETV, ligation of shunt on the right with ligaclips with retention of valve as rescue device, presents as a transfer from [facility] for witnessed seizure at home. CTH at [facility] revealed moderate ventriculomegaly and slight increase in size of extra-axial CSF collection overlying the right frontal duraplasty     Exam with decreased strength on left side, post ictal vs intracranial causes, CTH stable,   : on VEEG, K+3.3 repleated, afebrile, blood CX NGTD, UA neg, white count normal   : Stable exam. No seizures noted on VEEG.   : MRI showed increase in right epidural collection with diffusion restriction. Shunt (as reservoir) tapped, W1 R1  GSN  3/1: OR for clarissa hold for drainage of epidural abscess. Cx sent, NGTD. EDJPx1, was intubated preop for stridor, dex x24hrs  3/2: POD1 Intubated, ENT scoped determined airway patent, ID initiated on broad spectrum abx, OR cultures GSN, AFB neg, Dex d/c'd  3/3 POD2 intubated, on flagyl/vanc/cefipime, EDJP 105cc/24hrs  3/4-6 remains intubated, ED SHAYY drain pus tinged 30cc, on triple Abx, OR cltrs NGTD    3/7-10: POD 6-9 Patient tolerating extubation  3/11: POD 10 OR next week for removal of hardware per ID  3/12- stable awaiting OR next week   3/19 had one episode of epistaxis from the L. nostril overnight, but no significant blood loss.  3/19 OR for removal of cranial plate, VPS valve and distal tubing removal, retained proximal catheter ligated with 2 clips, and placement of NEW mesh cranioplasty w/ Dr. Brush, closed with staples  3/20 ID rec IV Abx through . R SGJP x1 bloody drainage 95cc/24h. Cleared for floor.   3/21-: Stable exam. SHAYY X 1, 10cc/24H. Abx through  per ID. PICC placement pending. Vancomycin trough 9.9, will discuss dosing with ID in AM

## 2025-03-23 NOTE — PROGRESS NOTE ADULT - SUBJECTIVE AND OBJECTIVE BOX
No issues overnight  Vital Signs Last 24 Hrs  T(C): 36.7 (23 Mar 2025 01:00), Max: 36.9 (22 Mar 2025 05:10)  T(F): 98.1 (23 Mar 2025 01:00), Max: 98.5 (22 Mar 2025 05:10)  HR: 81 (23 Mar 2025 01:00) (78 - 97)  BP: 118/56 (23 Mar 2025 01:00) (108/54 - 123/60)  BP(mean): --  RR: 17 (23 Mar 2025 01:00) (17 - 18)  SpO2: 98% (23 Mar 2025 01:00) (95% - 99%)    Parameters below as of 23 Mar 2025 01:00  Patient On (Oxygen Delivery Method): room air    AAO X 3  PERRLA, EOMI  CN 2-12 grossly intact  CLARK strength 5/5  No dysmetria or drift  SILT    SHAYY: 10cc/24H    MEDICATIONS  (STANDING):  cefepime   IVPB 2000 milliGRAM(s) IV Intermittent every 8 hours  chlorhexidine 2% Cloths 1 Application(s) Topical daily  heparin   Injectable 5000 Unit(s) SubCutaneous every 8 hours  levETIRAcetam 1000 milliGRAM(s) Oral two times a day  metroNIDAZOLE  IVPB 500 milliGRAM(s) IV Intermittent every 8 hours  polyethylene glycol 3350 17 Gram(s) Oral daily  sertraline 50 milliGRAM(s) Oral daily  vancomycin  IVPB 1000 milliGRAM(s) IV Intermittent every 12 hours    MEDICATIONS  (PRN):  acetaminophen     Tablet .. 650 milliGRAM(s) Oral every 6 hours PRN Temp greater or equal to 38C (100.4F), Moderate Pain (4 - 6)  albuterol    90 MICROgram(s) HFA Inhaler 2 Puff(s) Inhalation every 4 hours PRN Shortness of Breath and/or Wheezing  melatonin 3 milliGRAM(s) Oral at bedtime PRN Insomnia  oxyCODONE    IR 2.5 milliGRAM(s) Oral every 4 hours PRN Moderate Pain (4 - 6)  oxyCODONE    IR 5 milliGRAM(s) Oral every 4 hours PRN Severe Pain (7 - 10)  senna 2 Tablet(s) Oral at bedtime PRN Constipation                          12.6   5.53  )-----------( 194      ( 21 Mar 2025 04:25 )             37.1     03-21    135  |  101  |  6[L]  ----------------------------<  105[H]  3.5   |  23  |  0.37[L]    Ca    9.2      21 Mar 2025 04:25  Phos  3.4     03-21  Mg     2.00     03-21    Vancomycin Level, Trough - Prior to Next Dose (03.22.25 @ 18:30)    Vancomycin Level, Trough: 9.9: Vancomycin trough levels should be rapidly reached and maintained at  15-20 ug/mL for life threatening MRSA infections such as sepsis,  endocarditis, osteomyelitis and pneumonia. A first trough level should be  drawn before the 3rd or 4th dose. Risk of renal toxicity is increased for  levels >15 ug/mL, in patients on other nephrotoxic drugs, who are  hemodynamically unstable, have unstable renal function, or are on  vancomycin therapy for >14 days. Renal function with creatinine levels  should bemonitored for those patients. ug/mL

## 2025-03-24 LAB
ANION GAP SERPL CALC-SCNC: 13 MMOL/L — SIGNIFICANT CHANGE UP (ref 7–14)
BUN SERPL-MCNC: 6 MG/DL — LOW (ref 7–23)
CALCIUM SERPL-MCNC: 9.4 MG/DL — SIGNIFICANT CHANGE UP (ref 8.4–10.5)
CHLORIDE SERPL-SCNC: 101 MMOL/L — SIGNIFICANT CHANGE UP (ref 98–107)
CO2 SERPL-SCNC: 22 MMOL/L — SIGNIFICANT CHANGE UP (ref 22–31)
CREAT SERPL-MCNC: 0.34 MG/DL — LOW (ref 0.5–1.3)
CULTURE RESULTS: SIGNIFICANT CHANGE UP
EGFR: 118 ML/MIN/1.73M2 — SIGNIFICANT CHANGE UP
EGFR: 118 ML/MIN/1.73M2 — SIGNIFICANT CHANGE UP
GLUCOSE SERPL-MCNC: 100 MG/DL — HIGH (ref 70–99)
HCT VFR BLD CALC: 34.4 % — LOW (ref 34.5–45)
HGB BLD-MCNC: 11.9 G/DL — SIGNIFICANT CHANGE UP (ref 11.5–15.5)
MAGNESIUM SERPL-MCNC: 1.9 MG/DL — SIGNIFICANT CHANGE UP (ref 1.6–2.6)
MCHC RBC-ENTMCNC: 27.1 PG — SIGNIFICANT CHANGE UP (ref 27–34)
MCHC RBC-ENTMCNC: 34.6 G/DL — SIGNIFICANT CHANGE UP (ref 32–36)
MCV RBC AUTO: 78.4 FL — LOW (ref 80–100)
NRBC # BLD AUTO: 0 K/UL — SIGNIFICANT CHANGE UP (ref 0–0)
NRBC # FLD: 0 K/UL — SIGNIFICANT CHANGE UP (ref 0–0)
NRBC BLD AUTO-RTO: 0 /100 WBCS — SIGNIFICANT CHANGE UP (ref 0–0)
PHOSPHATE SERPL-MCNC: 3.2 MG/DL — SIGNIFICANT CHANGE UP (ref 2.5–4.5)
PLATELET # BLD AUTO: 215 K/UL — SIGNIFICANT CHANGE UP (ref 150–400)
POTASSIUM SERPL-MCNC: 3.5 MMOL/L — SIGNIFICANT CHANGE UP (ref 3.5–5.3)
POTASSIUM SERPL-SCNC: 3.5 MMOL/L — SIGNIFICANT CHANGE UP (ref 3.5–5.3)
RBC # BLD: 4.39 M/UL — SIGNIFICANT CHANGE UP (ref 3.8–5.2)
RBC # FLD: 14.6 % — HIGH (ref 10.3–14.5)
SODIUM SERPL-SCNC: 136 MMOL/L — SIGNIFICANT CHANGE UP (ref 135–145)
SPECIMEN SOURCE: SIGNIFICANT CHANGE UP
WBC # BLD: 4.39 K/UL — SIGNIFICANT CHANGE UP (ref 3.8–10.5)
WBC # FLD AUTO: 4.39 K/UL — SIGNIFICANT CHANGE UP (ref 3.8–10.5)

## 2025-03-24 PROCEDURE — 70553 MRI BRAIN STEM W/O & W/DYE: CPT | Mod: 26

## 2025-03-24 PROCEDURE — 99232 SBSQ HOSP IP/OBS MODERATE 35: CPT

## 2025-03-24 RX ORDER — LORAZEPAM 4 MG/ML
1 VIAL (ML) INJECTION ONCE
Refills: 0 | Status: DISCONTINUED | OUTPATIENT
Start: 2025-03-24 | End: 2025-03-24

## 2025-03-24 RX ADMIN — LEVETIRACETAM 1000 MILLIGRAM(S): 10 INJECTION, SOLUTION INTRAVENOUS at 18:59

## 2025-03-24 RX ADMIN — HEPARIN SODIUM 5000 UNIT(S): 1000 INJECTION INTRAVENOUS; SUBCUTANEOUS at 18:27

## 2025-03-24 RX ADMIN — LEVETIRACETAM 1000 MILLIGRAM(S): 10 INJECTION, SOLUTION INTRAVENOUS at 07:05

## 2025-03-24 RX ADMIN — CEFEPIME 100 MILLIGRAM(S): 2 INJECTION, POWDER, FOR SOLUTION INTRAVENOUS at 07:01

## 2025-03-24 RX ADMIN — SERTRALINE 50 MILLIGRAM(S): 100 TABLET, FILM COATED ORAL at 12:56

## 2025-03-24 RX ADMIN — Medication 250 MILLIGRAM(S): at 09:05

## 2025-03-24 RX ADMIN — Medication 100 MILLIGRAM(S): at 07:05

## 2025-03-24 RX ADMIN — CEFEPIME 100 MILLIGRAM(S): 2 INJECTION, POWDER, FOR SOLUTION INTRAVENOUS at 19:00

## 2025-03-24 RX ADMIN — Medication 1 MILLIGRAM(S): at 13:56

## 2025-03-24 RX ADMIN — Medication 100 MILLIGRAM(S): at 20:39

## 2025-03-24 RX ADMIN — HEPARIN SODIUM 5000 UNIT(S): 1000 INJECTION INTRAVENOUS; SUBCUTANEOUS at 07:04

## 2025-03-24 NOTE — PROGRESS NOTE ADULT - SUBJECTIVE AND OBJECTIVE BOX
Patient is a 60y old  Female who presents with a chief complaint of seizure (24 Mar 2025 04:29)    Interval history:  s/p explantation of cranial plate, removal of  shunt valve, cranioplasty on 3/19   denies new complaints    REVIEW OF SYSTEMS  Constitutional - No fever, No weight loss, No fatigue  HEENT - No eye pain, No visual disturbances, No difficulty hearing, No tinnitus, No vertigo, No neck pain  Respiratory - No cough, No wheezing, No shortness of breath  Cardiovascular - No chest pain, No palpitations  Gastrointestinal - No abdominal pain, No nausea, No vomiting, No diarrhea, No constipation  Genitourinary - No dysuria, No frequency, No hematuria, No incontinence  Neurological - No headaches, No memory loss, + loss of strength, No numbness, No tremors  Skin - No itching, No rashes, No lesions   Endocrine - No temperature intolerance  Musculoskeletal - No joint pain, No joint swelling, No muscle pain  Psychiatric - No depression, No anxiety    PAST MEDICAL & SURGICAL HISTORY  Asthma    Depression    Neoplasm of uncertain behavior of brain    Neurofibromatosis, type 1    Neurofibromatosis    History of hydrocephalus    Anxiety    Delivery with history of     S/P tubal ligation    History of arthroscopy of left knee    S/P LASIK Surgery    H/O brain tumor    S/P  shunt         CURRENT FUNCTIONAL STATUS   3/21      Bed Mobility  Bed Mobility Training Rehab Potential: good, to achieve stated therapy goals  Bed Mobility Training Symptoms Noted During/After Treatment: none  Bed Mobility Training Sit-to-Supine: contact guard;  1 person assist;  verbal cues  Bed Mobility Training Supine-to-Sit: contact guard;  1 person assist;  verbal cues  Bed Mobility Training Limitations: decreased ability to use legs for bridging/pushing;  impaired ability to control trunk for mobility;  decreased strength;  impaired coordination;  impaired motor control    Sit-Stand Transfer Training  Sit-to-Stand Transfer Training Rehab Potential: good, to achieve stated therapy goals  Sit-to-Stand Transfer Training Symptoms Noted During/After Treatment: none  Transfer Training Sit-to-Stand Transfer: minimum assist (75% patient effort);  1 person assist;  verbal cues;  full weight-bearing   rolling walker  Transfer Training Stand-to-Sit Transfer: minimum assist (75% patient effort);  1 person assist;  verbal cues;  full weight-bearing   rolling walker  Sit-to-Stand Transfer Training Transfer Safety Analysis: decreased balance;  decreased weight-shifting ability;  decreased strength;  impaired coordination;  impaired motor control;  rolling walker    Gait Training  Gait Training Rehab Potential: good, to achieve stated therapy goals  Gait Training Symptoms Noted During/After Treatment: none  Gait Training: contact guard;  minimum assist (75% patient effort);  1 person assist;  verbal cues;  full weight-bearing   rolling walker;  40ft  Gait Analysis: 3-point gait   decreased anita;  decreased step length;  decreased weight-shifting ability;  decreased strength;  impaired coordination;  impaired motor control;  impaired balance;  decreased safety awareness with rolling walker ;  40ft;  rolling walker  Gait Number of Times:: x 1    Therapeutic Exercise  Therapeutic Exercise Rehab Effort: good  Therapeutic Exercise Symptoms Noted During/After Treatment: none  Therapeutic Exercise Detail: Seated active ROM knee extension, hip flexion x10 bilaterally           RECENT LABS/IMAGING  CBC Full  -  ( 24 Mar 2025 04:29 )  WBC Count : 4.39 K/uL  RBC Count : 4.39 M/uL  Hemoglobin : 11.9 g/dL  Hematocrit : 34.4 %  Platelet Count - Automated : 215 K/uL  Mean Cell Volume : 78.4 fL  Mean Cell Hemoglobin : 27.1 pg  Mean Cell Hemoglobin Concentration : 34.6 g/dL  Auto Neutrophil # : x  Auto Lymphocyte # : x  Auto Monocyte # : x  Auto Eosinophil # : x  Auto Basophil # : x  Auto Neutrophil % : x  Auto Lymphocyte % : x  Auto Monocyte % : x  /Auto Eosinophil % : x  Auto Basophil % : x        136  |  101  |  6[L]  ----------------------------<  100[H]  3.5   |  22  |  0.34[L]    Ca    9.4      24 Mar 2025 04:29  Phos  3.2       Mg     1.90     24      Urinalysis Basic - ( 24 Mar 2025 04:29 )    Color: x / Appearance: x / SG: x / pH: x  Gluc: 100 mg/dL / Ketone: x  / Bili: x / Urobili: x   Blood: x / Protein: x / Nitrite: x   Leuk Esterase: x / RBC: x / WBC x   Sq Epi: x / Non Sq Epi: x / Bacteria: x        VITALS  T(C): 36.5 (25 @ 09:24), Max: 37.3 (25 @ 17:57)  HR: 82 (25 @ 09:24) (59 - 89)  BP: 116/54 (25 @ 09:24) (104/58 - 116/54)  RR: 16 (25 @ 09:24) (16 - 18)  SpO2: 98% (25 @ 09:24) (97% - 100%)  Wt(kg): --    ALLERGIES  No Known Allergies      MEDICATIONS   acetaminophen     Tablet .. 650 milliGRAM(s) Oral every 6 hours PRN  albuterol    90 MICROgram(s) HFA Inhaler 2 Puff(s) Inhalation every 4 hours PRN  cefepime   IVPB 2000 milliGRAM(s) IV Intermittent every 8 hours  chlorhexidine 2% Cloths 1 Application(s) Topical daily  heparin   Injectable 5000 Unit(s) SubCutaneous every 8 hours  levETIRAcetam 1000 milliGRAM(s) Oral two times a day  melatonin 3 milliGRAM(s) Oral at bedtime PRN  metroNIDAZOLE  IVPB 500 milliGRAM(s) IV Intermittent every 8 hours  oxyCODONE    IR 2.5 milliGRAM(s) Oral every 4 hours PRN  oxyCODONE    IR 5 milliGRAM(s) Oral every 4 hours PRN  polyethylene glycol 3350 17 Gram(s) Oral daily  senna 2 Tablet(s) Oral at bedtime PRN  sertraline 50 milliGRAM(s) Oral daily  vancomycin  IVPB 1000 milliGRAM(s) IV Intermittent every 12 hours      ----------------------------------------------------------------------------------------     PHYSICAL EXAM  Constitutional - NAD, Comfortable  + tattoos  HEENT - + R scalp staples  Chest - no respiratory distress  Cardiovascular - RRR, S1S2   Abdomen -  Soft, NTND  Extremities - No C/C/E, No calf tenderness   Neurologic Exam -                    Cognitive - Awake, Alert, AAO to self, place, date, year, situation     Communication - Fluent, No dysarthria     Cranial Nerves - CN 2-12 intact     Motor -                      LEFT    UE - ShAB 5/5, EF 5/5, EE 5/5, WE 5/5,  5/5                    RIGHT UE - ShAB 5/5, EF 5/5, EE 5/5, WE 5/5,  5/5                    LEFT    LE - HF 4+/5, KE 5/5, DF 5/5, PF 5/5                    RIGHT LE - HF 5/5, KE 5/5, DF 5/5, PF 5/5        Sensory - Intact to LT       Balance - WNL Static  Psychiatric - Mood stable, Affect WNL  ----------------------------------------------------------------------------------------  ASSESSMENT/PLAN   60 year old Female status post Left suboccipital craniectomy for resection of cerebellar brain tumor 2023 by Dr Brush, status post traumatic fall with head trauma in 2024 with large acute right SDH, status post emergent right decompressive hemicraniectomy on 24, status post right cranioplasty on 24 with custom plate, status post ETV, ligation of shunt on the right with ligaclips with retention of valve as rescue device, presents as a transfer from [facility] for witnessed seizure at home. CTH obtained at [facility] revealed moderate ventriculomegaly and slight increase in size of extra-axial CSF collection overlying the right frontal duraplasty.  s/p clarissa hole 3/1 with purulent drainage  on keppra  abx per ID   Pain -oxycodone  DVT PPX - heparin  continue bedside PT and OT  Rehab - will monitor for improvement and progress with bedside therapy, as well as medical course.   currently patient is appropriate for acute rehab when medically cleared. Patient can tolerate 3 hours per day of therapy with medical supervision.     Total time taken to review relevant records and imaging results, examine patient, write note, and when applicable discuss the case with patient, family, , , and medical providers:  35 minutes

## 2025-03-24 NOTE — PROGRESS NOTE ADULT - PROBLEM SELECTOR PLAN 1
Neurologic checks Q6H  Pain control  Continue PT/OT  Discharge planning  Continue Abx per ID, PICC pending

## 2025-03-24 NOTE — PROGRESS NOTE ADULT - ASSESSMENT
58 yo F with PMHx of neurofibromatosis, s/p L suboccipital craniectomy for resection of cerebellar brain tumor 2023 by Dr Brush, s/p traumatic fall with head trauma in 2024 with large acute right SDH, s/p emergent right decompressive hemicraniectomy on 24, s/p right cranioplasty on 24 with custom plate, s/p ETV, ligation of shunt on the right with ligaclips with retention of valve as rescue device, presents as a transfer from [facility] for witnessed seizure at home. CTH at [facility] revealed moderate ventriculomegaly and slight increase in size of extra-axial CSF collection overlying the right frontal duraplasty     Exam with decreased strength on left side, post ictal vs intracranial causes, CTH stable,   : on VEEG, K+3.3 repleated, afebrile, blood CX NGTD, UA neg, white count normal   : Stable exam. No seizures noted on VEEG.   : MRI showed increase in right epidural collection with diffusion restriction. Shunt (as reservoir) tapped, W1 R1  GSN  3/1: OR for clarissa hold for drainage of epidural abscess. Cx sent, NGTD. EDJPx1, was intubated preop for stridor, dex x24hrs  3/2: POD1 Intubated, ENT scoped determined airway patent, ID initiated on broad spectrum abx, OR cultures GSN, AFB neg, Dex d/c'd  3/3 POD2 intubated, on flagyl/vanc/cefipime, EDJP 105cc/24hrs  3/4-6 remains intubated, ED SHAYY drain pus tinged 30cc, on triple Abx, OR cltrs NGTD    3/7-10: POD 6-9 Patient tolerating extubation  3/11: POD 10 OR next week for removal of hardware per ID  3/12- stable awaiting OR next week   3/19 had one episode of epistaxis from the L. nostril overnight, but no significant blood loss.  3/19 OR for removal of cranial plate, VPS valve and distal tubing removal, retained proximal catheter ligated with 2 clips, and placement of NEW mesh cranioplasty w/ Dr. Brush, closed with staples  3/20 ID rec IV Abx through . R SGJP x1 bloody drainage 95cc/24h. Cleared for floor.   3/21-: Stable exam. SHAYY X 1, 10cc/24H. Abx through  per ID. PICC placement pending. Vancomycin trough 9.9, will discuss dosing with ID in AM  3/24: SHAYY drain dc'd yesterday 2 staples placed, picc pending, PT recc [facility], IV abx through

## 2025-03-24 NOTE — PROGRESS NOTE ADULT - SUBJECTIVE AND OBJECTIVE BOX
No issues overnight     Vital Signs Last 24 Hrs  T(C): 36.6 (24 Mar 2025 01:04), Max: 37.3 (23 Mar 2025 17:57)  T(F): 97.9 (24 Mar 2025 01:04), Max: 99.2 (23 Mar 2025 17:57)  HR: 76 (24 Mar 2025 01:04) (75 - 89)  BP: 114/59 (24 Mar 2025 01:04) (104/58 - 114/59)  BP(mean): --  ABP: --  ABP(mean): --  RR: 18 (24 Mar 2025 01:04) (16 - 18)  SpO2: 97% (24 Mar 2025 01:04) (97% - 100%)    O2 Parameters below as of 24 Mar 2025 01:04  Patient On (Oxygen Delivery Method): room air            AAO X 3  PERRLA, EOMI  CN 2-12 grossly intact  CLARK strength 5/5  No dysmetria or drift  SILT      MEDICATIONS  (STANDING):  cefepime   IVPB 2000 milliGRAM(s) IV Intermittent every 8 hours  chlorhexidine 2% Cloths 1 Application(s) Topical daily  heparin   Injectable 5000 Unit(s) SubCutaneous every 8 hours  levETIRAcetam 1000 milliGRAM(s) Oral two times a day  metroNIDAZOLE  IVPB 500 milliGRAM(s) IV Intermittent every 8 hours  polyethylene glycol 3350 17 Gram(s) Oral daily  sertraline 50 milliGRAM(s) Oral daily  vancomycin  IVPB 1000 milliGRAM(s) IV Intermittent every 12 hours    MEDICATIONS  (PRN):  acetaminophen     Tablet .. 650 milliGRAM(s) Oral every 6 hours PRN Temp greater or equal to 38C (100.4F), Moderate Pain (4 - 6)  albuterol    90 MICROgram(s) HFA Inhaler 2 Puff(s) Inhalation every 4 hours PRN Shortness of Breath and/or Wheezing  melatonin 3 milliGRAM(s) Oral at bedtime PRN Insomnia  oxyCODONE    IR 2.5 milliGRAM(s) Oral every 4 hours PRN Moderate Pain (4 - 6)  oxyCODONE    IR 5 milliGRAM(s) Oral every 4 hours PRN Severe Pain (7 - 10)  senna 2 Tablet(s) Oral at bedtime PRN Constipation                          12.6   5.53  )-----------( 194      ( 21 Mar 2025 04:25 )             37.1     03-21    135  |  101  |  6[L]  ----------------------------<  105[H]  3.5   |  23  |  0.37[L]    Ca    9.2      21 Mar 2025 04:25  Phos  3.4     03-21  Mg     2.00     03-21    Vancomycin Level, Trough - Prior to Next Dose (03.22.25 @ 18:30)    Vancomycin Level, Trough: 9.9: Vancomycin trough levels should be rapidly reached and maintained at  15-20 ug/mL for life threatening MRSA infections such as sepsis,  endocarditis, osteomyelitis and pneumonia. A first trough level should be  drawn before the 3rd or 4th dose. Risk of renal toxicity is increased for  levels >15 ug/mL, in patients on other nephrotoxic drugs, who are  hemodynamically unstable, have unstable renal function, or are on  vancomycin therapy for >14 days. Renal function with creatinine levels  should bemonitored for those patients. ug/mL

## 2025-03-25 LAB
ANION GAP SERPL CALC-SCNC: 14 MMOL/L — SIGNIFICANT CHANGE UP (ref 7–14)
BUN SERPL-MCNC: 10 MG/DL — SIGNIFICANT CHANGE UP (ref 7–23)
CALCIUM SERPL-MCNC: 8.9 MG/DL — SIGNIFICANT CHANGE UP (ref 8.4–10.5)
CHLORIDE SERPL-SCNC: 100 MMOL/L — SIGNIFICANT CHANGE UP (ref 98–107)
CO2 SERPL-SCNC: 21 MMOL/L — LOW (ref 22–31)
CREAT SERPL-MCNC: 0.39 MG/DL — LOW (ref 0.5–1.3)
EGFR: 114 ML/MIN/1.73M2 — SIGNIFICANT CHANGE UP
EGFR: 114 ML/MIN/1.73M2 — SIGNIFICANT CHANGE UP
GLUCOSE SERPL-MCNC: 118 MG/DL — HIGH (ref 70–99)
HCT VFR BLD CALC: 33 % — LOW (ref 34.5–45)
HGB BLD-MCNC: 11.4 G/DL — LOW (ref 11.5–15.5)
MAGNESIUM SERPL-MCNC: 2 MG/DL — SIGNIFICANT CHANGE UP (ref 1.6–2.6)
MCHC RBC-ENTMCNC: 27.3 PG — SIGNIFICANT CHANGE UP (ref 27–34)
MCHC RBC-ENTMCNC: 34.5 G/DL — SIGNIFICANT CHANGE UP (ref 32–36)
MCV RBC AUTO: 78.9 FL — LOW (ref 80–100)
NRBC # BLD AUTO: 0 K/UL — SIGNIFICANT CHANGE UP (ref 0–0)
NRBC # FLD: 0 K/UL — SIGNIFICANT CHANGE UP (ref 0–0)
NRBC BLD AUTO-RTO: 0 /100 WBCS — SIGNIFICANT CHANGE UP (ref 0–0)
PHOSPHATE SERPL-MCNC: 3.7 MG/DL — SIGNIFICANT CHANGE UP (ref 2.5–4.5)
PLATELET # BLD AUTO: 197 K/UL — SIGNIFICANT CHANGE UP (ref 150–400)
POTASSIUM SERPL-MCNC: 3.5 MMOL/L — SIGNIFICANT CHANGE UP (ref 3.5–5.3)
POTASSIUM SERPL-SCNC: 3.5 MMOL/L — SIGNIFICANT CHANGE UP (ref 3.5–5.3)
RBC # BLD: 4.18 M/UL — SIGNIFICANT CHANGE UP (ref 3.8–5.2)
RBC # FLD: 14.7 % — HIGH (ref 10.3–14.5)
SODIUM SERPL-SCNC: 135 MMOL/L — SIGNIFICANT CHANGE UP (ref 135–145)
SURGICAL PATHOLOGY STUDY: SIGNIFICANT CHANGE UP
VANCOMYCIN TROUGH SERPL-MCNC: 8.9 UG/ML — LOW (ref 10–20)
WBC # BLD: 4.34 K/UL — SIGNIFICANT CHANGE UP (ref 3.8–10.5)
WBC # FLD AUTO: 4.34 K/UL — SIGNIFICANT CHANGE UP (ref 3.8–10.5)

## 2025-03-25 PROCEDURE — 99232 SBSQ HOSP IP/OBS MODERATE 35: CPT

## 2025-03-25 PROCEDURE — G0545: CPT

## 2025-03-25 RX ORDER — ALPRAZOLAM 0.5 MG
0.25 TABLET, EXTENDED RELEASE 24 HR ORAL
Refills: 0 | Status: DISCONTINUED | OUTPATIENT
Start: 2025-03-25 | End: 2025-03-27

## 2025-03-25 RX ADMIN — Medication 100 MILLIGRAM(S): at 09:47

## 2025-03-25 RX ADMIN — SERTRALINE 50 MILLIGRAM(S): 100 TABLET, FILM COATED ORAL at 12:02

## 2025-03-25 RX ADMIN — Medication 100 MILLIGRAM(S): at 18:08

## 2025-03-25 RX ADMIN — CEFEPIME 100 MILLIGRAM(S): 2 INJECTION, POWDER, FOR SOLUTION INTRAVENOUS at 00:48

## 2025-03-25 RX ADMIN — LEVETIRACETAM 1000 MILLIGRAM(S): 10 INJECTION, SOLUTION INTRAVENOUS at 06:04

## 2025-03-25 RX ADMIN — HEPARIN SODIUM 5000 UNIT(S): 1000 INJECTION INTRAVENOUS; SUBCUTANEOUS at 09:48

## 2025-03-25 RX ADMIN — Medication 250 MILLIGRAM(S): at 04:46

## 2025-03-25 RX ADMIN — Medication 100 MILLIGRAM(S): at 03:14

## 2025-03-25 RX ADMIN — Medication 250 MILLIGRAM(S): at 16:00

## 2025-03-25 RX ADMIN — HEPARIN SODIUM 5000 UNIT(S): 1000 INJECTION INTRAVENOUS; SUBCUTANEOUS at 00:47

## 2025-03-25 RX ADMIN — Medication 0.25 MILLIGRAM(S): at 11:14

## 2025-03-25 RX ADMIN — POLYETHYLENE GLYCOL 3350 17 GRAM(S): 17 POWDER, FOR SOLUTION ORAL at 12:02

## 2025-03-25 RX ADMIN — LEVETIRACETAM 1000 MILLIGRAM(S): 10 INJECTION, SOLUTION INTRAVENOUS at 18:03

## 2025-03-25 RX ADMIN — CEFEPIME 100 MILLIGRAM(S): 2 INJECTION, POWDER, FOR SOLUTION INTRAVENOUS at 18:07

## 2025-03-25 RX ADMIN — HEPARIN SODIUM 5000 UNIT(S): 1000 INJECTION INTRAVENOUS; SUBCUTANEOUS at 18:07

## 2025-03-25 RX ADMIN — Medication 0.25 MILLIGRAM(S): at 22:03

## 2025-03-25 RX ADMIN — CEFEPIME 100 MILLIGRAM(S): 2 INJECTION, POWDER, FOR SOLUTION INTRAVENOUS at 09:47

## 2025-03-25 NOTE — PROVIDER CONTACT NOTE (OTHER) - ASSESSMENT
Patient axox2-3. R head sutures ana. Vital signs stable.
Pt is A&Ox3. Pt denies SOB, chest pain, headache, and palpitations. No signs of acute distress. Pt was resting comfortably in bed with equal chest expansion. Other vitals /82, respiratory rate 18 breaths per minute, oxygen saturation 99%on RA.
pt has no complaints at this time. Bilateral pupils sluggish during PERRL exam and are now symmetrical in size since recovery from anesthesia.
AXOX2-3. Vital signs stable. R head sutures CRISS
patient anox3 to 4 patient vs stable no chest pain or sob neuro check complete
Patient AOx3, VSS, denies sob and chest pain
Pt aox4, vss, no c/o resp distress or chest pain.

## 2025-03-25 NOTE — PROVIDER CONTACT NOTE (OTHER) - SITUATION
Upon arrivcal to PACU left pupil was noted smaller than right pupil. Both pupils were noted to be sluggish during PERRL exam. Pupil size resolved and they are now equal in size but still sluggish.
Pt has a HR of 104.
MD Rodas informed that [facility] is requesting that the MRI safety form be completed before patient can be cleared for MRI in the am as Patient is AXOX2-3.
Pt was complaining of not being able to sleep. Requested to get something to help her sleep.
ARMIDA Cooper informed that patient vanco trough this am is 8.3
Patient had bloody nose while sleeping
Vancomycin trough level 8.9

## 2025-03-25 NOTE — PROGRESS NOTE ADULT - SUBJECTIVE AND OBJECTIVE BOX
Infectious Diseases Follow Up:    Patient is a 60y old  Female who presents with a chief complaint of seizure (25 Mar 2025 00:24)      Interval History/ROS:  Pt w/o acute complaints     Allergies  No Known Allergies        ANTIMICROBIALS:  cefepime   IVPB 2000 every 8 hours  metroNIDAZOLE  IVPB 500 every 8 hours  vancomycin  IVPB 1000 every 12 hours      Current Abx:     Previous Abx     OTHER MEDS:  MEDICATIONS  (STANDING):  acetaminophen     Tablet .. 650 every 6 hours PRN  albuterol    90 MICROgram(s) HFA Inhaler 2 every 4 hours PRN  ALPRAZolam 0.25 two times a day PRN  heparin   Injectable 5000 every 8 hours  levETIRAcetam 1000 two times a day  melatonin 3 at bedtime PRN  oxyCODONE    IR 2.5 every 4 hours PRN  oxyCODONE    IR 5 every 4 hours PRN  polyethylene glycol 3350 17 daily  senna 2 at bedtime PRN  sertraline 50 daily      Vital Signs Last 24 Hrs  T(C): 36.6 (25 Mar 2025 09:07), Max: 36.9 (24 Mar 2025 13:00)  T(F): 97.9 (25 Mar 2025 09:07), Max: 98.5 (24 Mar 2025 13:00)  HR: 79 (25 Mar 2025 09:07) (73 - 94)  BP: 107/59 (25 Mar 2025 09:07) (92/60 - 116/54)  BP(mean): --  RR: 17 (25 Mar 2025 09:07) (16 - 18)  SpO2: 98% (25 Mar 2025 09:07) (98% - 100%)    Parameters below as of 25 Mar 2025 09:07  Patient On (Oxygen Delivery Method): room air      PHYSICAL EXAM:  GENERAL: NAD, well-developed  HEAD:  R head staples well appearing   EYES: EOMI, , conjunctiva and sclera clear  CHEST/LUNG: Clear to auscultation bilaterally; No wheeze  HEART: Regular rate and rhythm; No murmurs, rubs, or gallops  ABDOMEN: Soft, Nontender, Nondistended;   PSYCH: AAOx3                          11.4   4.34  )-----------( 197      ( 25 Mar 2025 07:34 )             33.0       03-25    135  |  100  |  10  ----------------------------<  118[H]  3.5   |  21[L]  |  0.39[L]    Ca    8.9      25 Mar 2025 07:34  Phos  3.7     03-25  Mg     2.00     03-25        Urinalysis Basic - ( 25 Mar 2025 07:34 )    Color: x / Appearance: x / SG: x / pH: x  Gluc: 118 mg/dL / Ketone: x  / Bili: x / Urobili: x   Blood: x / Protein: x / Nitrite: x   Leuk Esterase: x / RBC: x / WBC x   Sq Epi: x / Non Sq Epi: x / Bacteria: x        MICROBIOLOGY:  Vancomycin Level, Trough: 8.9 ug/mL (03-25-25 @ 00:20)  v  Nose  03-19-25   No staphylococcus aureus isolated.  "PCR is more Sensitive for identifying MRSA/MSSA."  --  --      Tissue 1) EPIDURAL EXUDATE  03-19-25   No growth at 5 days  --    No polymorphonuclear cells seen  No organisms seen      Surgical Swab  03-01-25   No growth at 5 days  --    Few polymorphonuclear leukocytes per low power field  No organisms seen per oil power field      CSF CSF  02-28-25   No growth at 14 days.  --    No polymorphonuclear cells seen  No organisms seen  by cytocentrifuge      Catheterized Catheterized  02-26-25   No growth  --  --      .Blood Blood-Peripheral  02-26-25   No growth at 5 days  --  --      .Blood Blood-Peripheral  02-26-25   No growth at 5 days  --  --                RADIOLOGY:

## 2025-03-25 NOTE — PROVIDER CONTACT NOTE (OTHER) - REASON
Bloody nose
MRI safety checklist/form to be completed
Vancomycin trough level 8.9
previous pupil asymmetry
vanco trough this am is 8.3
Pt was complaining of not being able to sleep. Requested to get something to help her sleep.

## 2025-03-25 NOTE — PROGRESS NOTE ADULT - SUBJECTIVE AND OBJECTIVE BOX
Patient is a 60y old  Female who presents with a chief complaint of seizure (25 Mar 2025 11:45)      HPI:  58 yo F with PMHx of neurofibromatosis, s/p L suboccipital craniectomy for resection of cerebellar brain tumor 2023 by Dr Brush, s/p traumatic fall with head trauma in 2024, at that time sustained a large acute right SDH, and underwent an emergent right decompressive hemicraniectomy on 24, s/p right cranioplasty on 24 with custom plate, s/p ETV, ligation of shunt on the right with ligaclips with retention of valve as rescue device, presents as a transfer from [facility] for witnessed seizure at home. CTH obtained at [facility] revealed stable  shunt and moderate ventriculomegaly and slight increase in size of extra-axial CSF collection overlying the right frontal duraplasty, likely a hygroma and no mass effect.    (2025 23:29)      REVIEW OF SYSTEMS  Constitutional - No fever, No weight loss, + fatigue  HEENT - No eye pain, No visual disturbances, No difficulty hearing, No tinnitus, No vertigo, No neck pain  Respiratory - No cough, No wheezing, No shortness of breath  Cardiovascular - No chest pain, No palpitations  Gastrointestinal - No abdominal pain, No nausea, No vomiting, No diarrhea, No constipation  Genitourinary - No dysuria, No frequency, No hematuria, No incontinence  Neurological - No headaches, No memory loss, + loss of strength, No numbness, No tremors  Skin - No itching, No rashes, No lesions   Endocrine - No temperature intolerance  Musculoskeletal - No joint pain, No joint swelling, No muscle pain  Psychiatric - No depression, No anxiety    PAST MEDICAL & SURGICAL HISTORY  Asthma    Depression    Neoplasm of uncertain behavior of brain    Neurofibromatosis, type 1    Neurofibromatosis    History of hydrocephalus    Anxiety    Delivery with history of     S/P tubal ligation    History of arthroscopy of left knee    S/P LASIK Surgery    H/O brain tumor    S/P  shunt       CURRENT FUNCTIONAL STATUS   3/25  Bed Mobility  Bed Mobility Training Rolling/Turning: contact guard;  1 person assist  Bed Mobility Training Scooting: contact guard;  1 person assist  Bed Mobility Training Bridging: contact guard;  1 person assist  Bed Mobility Training Sit-to-Supine: contact guard;  1 person assist  Bed Mobility Training Supine-to-Sit: contact guard;  1 person assist  Bed Mobility Training Limitations: decreased flexibility;  decreased strength    Sit-Stand Transfer Training  Transfer Training Sit-to-Stand Transfer: minimum assist (75% patient effort);  1 person assist;  weight-bearing as tolerated   rolling walker  Transfer Training Stand-to-Sit Transfer: minimum assist (75% patient effort);  1 person assist;  weight-bearing as tolerated   rolling walker  Sit-to-Stand Transfer Training Transfer Safety Analysis: decreased step length;  decreased balance;  decreased strength;  decreased flexibility;  rolling walker    Gait Training  Gait Training: minimum assist (75% patient effort);  1 person assist;  weight-bearing as tolerated   rolling walker;  50 feet  Gait Analysis: 3-point gait   decreased anita;  decreased step length;  decreased strength;  impaired balance;  50 feet;  rolling walker    Therapeutic Exercise  Therapeutic Exercise Charges: Pt performed ActiveROM, ankle pumps Bilateral LE's.             RECENT LABS/IMAGING  CBC Full  -  ( 25 Mar 2025 07:34 )  WBC Count : 4.34 K/uL  RBC Count : 4.18 M/uL  Hemoglobin : 11.4 g/dL  Hematocrit : 33.0 %  Platelet Count - Automated : 197 K/uL  Mean Cell Volume : 78.9 fL  Mean Cell Hemoglobin : 27.3 pg  Mean Cell Hemoglobin Concentration : 34.5 g/dL  Auto Neutrophil # : x  Auto Lymphocyte # : x  Auto Monocyte # : x  Auto Eosinophil # : x  Auto Basophil # : x  Auto Neutrophil % : x  Auto Lymphocyte % : x  Auto Monocyte % : x  Auto Eosinophil % : x  Auto Basophil % : x        135  |  100  |  10  ----------------------------<  118[H]  3.5   |  21[L]  |  0.39[L]    Ca    8.9      25 Mar 2025 07:34  Phos  3.7     -  Mg     2.00           Urinalysis Basic - ( 25 Mar 2025 07:34 )    Color: x / Appearance: x / SG: x / pH: x  Gluc: 118 mg/dL / Ketone: x  / Bili: x / Urobili: x   Blood: x / Protein: x / Nitrite: x   Leuk Esterase: x / RBC: x / WBC x   Sq Epi: x / Non Sq Epi: x / Bacteria: x        VITALS  T(C): 36.6 (25 @ 09:07), Max: 36.9 (25 @ 17:32)  HR: 79 (25 @ 09:07) (73 - 94)  BP: 107/59 (25 @ 09:07) (92/60 - 116/54)  RR: 17 (25 @ 09:07) (16 - 18)  SpO2: 98% (25 @ 09:07) (98% - 100%)  Wt(kg): --    ALLERGIES  No Known Allergies      MEDICATIONS   acetaminophen     Tablet .. 650 milliGRAM(s) Oral every 6 hours PRN  albuterol    90 MICROgram(s) HFA Inhaler 2 Puff(s) Inhalation every 4 hours PRN  ALPRAZolam 0.25 milliGRAM(s) Oral two times a day PRN  cefepime   IVPB 2000 milliGRAM(s) IV Intermittent every 8 hours  heparin   Injectable 5000 Unit(s) SubCutaneous every 8 hours  levETIRAcetam 1000 milliGRAM(s) Oral two times a day  melatonin 3 milliGRAM(s) Oral at bedtime PRN  metroNIDAZOLE  IVPB 500 milliGRAM(s) IV Intermittent every 8 hours  oxyCODONE    IR 2.5 milliGRAM(s) Oral every 4 hours PRN  oxyCODONE    IR 5 milliGRAM(s) Oral every 4 hours PRN  polyethylene glycol 3350 17 Gram(s) Oral daily  senna 2 Tablet(s) Oral at bedtime PRN  sertraline 50 milliGRAM(s) Oral daily  vancomycin  IVPB 1000 milliGRAM(s) IV Intermittent every 12 hours      ----------------------------------------------------------------------------------------    PHYSICAL EXAM  Constitutional - NAD, Comfortable  + tattoos  HEENT - + R scalp staples  Chest - no respiratory distress  Cardiovascular - RRR, S1S2   Abdomen -  Soft, NTND  Extremities - No C/C/E, No calf tenderness   Neurologic Exam -                    Cognitive - Awake, Alert, AAO to self, place, date, year, situation     Communication - Fluent, No dysarthria     Cranial Nerves - CN 2-12 intact     Motor -                      LEFT    UE - ShAB 5/5, EF 5/5, EE 5/5, WE 5/5,  5/5                    RIGHT UE - ShAB 5/5, EF 5/5, EE 5/5, WE 5/5,  5/5                    LEFT    LE - HF 4+/5, KE 5/5, DF 5/5, PF 5/5                    RIGHT LE - HF 5/5, KE 5/5, DF 5/5, PF 5/5        Sensory - Intact to LT       Balance - WNL Static  Psychiatric - Mood stable, Affect WNL  ----------------------------------------------------------------------------------------  ASSESSMENT/PLAN   60 year old Female status post Left suboccipital craniectomy for resection of cerebellar brain tumor 2023 by Dr Brush, status post traumatic fall with head trauma in 2024 with large acute right SDH, status post emergent right decompressive hemicraniectomy on 24, status post right cranioplasty on 24 with custom plate, status post ETV, ligation of shunt on the right with ligaclips with retention of valve as rescue device, presents as a transfer from [facility] for witnessed seizure at home. CTH obtained at [facility] revealed moderate ventriculomegaly and slight increase in size of extra-axial CSF collection overlying the right frontal duraplasty.  s/p clarissa hole 3/1 with purulent drainage  on keppra  abx per ID   Pain -oxycodone prn  DVT PPX - heparin  continue bedside PT and OT  Rehab - will monitor for improvement and progress with bedside therapy, as well as medical course.   currently patient is appropriate for acute rehab when medically cleared. Patient can tolerate 3 hours per day of therapy with medical supervision.     Total time taken to review relevant records and imaging results, examine patient, write note, and when applicable discuss the case with patient, family, , , and medical providers:  35 minutes

## 2025-03-25 NOTE — PROGRESS NOTE ADULT - SUBJECTIVE AND OBJECTIVE BOX
PAST 24HR EVENTS:  No issues overnight.     Vital Signs Last 24 Hrs  T(C): 36.7 (24 Mar 2025 21:44), Max: 36.9 (24 Mar 2025 13:00)  T(F): 98 (24 Mar 2025 21:44), Max: 98.5 (24 Mar 2025 13:00)  HR: 80 (24 Mar 2025 21:44) (59 - 94)  BP: 98/58 (24 Mar 2025 21:44) (98/58 - 116/54)  BP(mean): --  RR: 16 (24 Mar 2025 21:44) (16 - 18)  SpO2: 98% (24 Mar 2025 21:44) (97% - 100%)    Parameters below as of 24 Mar 2025 21:44  Patient On (Oxygen Delivery Method): room air        MEDS:   acetaminophen     Tablet .. 650 milliGRAM(s) Oral every 6 hours PRN  albuterol    90 MICROgram(s) HFA Inhaler 2 Puff(s) Inhalation every 4 hours PRN  cefepime   IVPB 2000 milliGRAM(s) IV Intermittent every 8 hours  heparin   Injectable 5000 Unit(s) SubCutaneous every 8 hours  levETIRAcetam 1000 milliGRAM(s) Oral two times a day  melatonin 3 milliGRAM(s) Oral at bedtime PRN  metroNIDAZOLE  IVPB 500 milliGRAM(s) IV Intermittent every 8 hours  oxyCODONE    IR 2.5 milliGRAM(s) Oral every 4 hours PRN  oxyCODONE    IR 5 milliGRAM(s) Oral every 4 hours PRN  polyethylene glycol 3350 17 Gram(s) Oral daily  senna 2 Tablet(s) Oral at bedtime PRN  sertraline 50 milliGRAM(s) Oral daily  vancomycin  IVPB 1000 milliGRAM(s) IV Intermittent every 12 hours      LABS:                        11.9   4.39  )-----------( 215      ( 24 Mar 2025 04:29 )             34.4     03-24    136  |  101  |  6[L]  ----------------------------<  100[H]  3.5   |  22  |  0.34[L]    Ca    9.4      24 Mar 2025 04:29  Phos  3.2     03-24  Mg     1.90     03-24          RADIOLOGY:     PHYSICAL EXAM: PAST 24HR EVENTS:  No issues overnight. Afebrile.     Vital Signs Last 24 Hrs  T(C): 36.7 (24 Mar 2025 21:44), Max: 36.9 (24 Mar 2025 13:00)  T(F): 98 (24 Mar 2025 21:44), Max: 98.5 (24 Mar 2025 13:00)  HR: 80 (24 Mar 2025 21:44) (59 - 94)  BP: 98/58 (24 Mar 2025 21:44) (98/58 - 116/54)  BP(mean): --  RR: 16 (24 Mar 2025 21:44) (16 - 18)  SpO2: 98% (24 Mar 2025 21:44) (97% - 100%)    Parameters below as of 24 Mar 2025 21:44  Patient On (Oxygen Delivery Method): room air        MEDS:   acetaminophen     Tablet .. 650 milliGRAM(s) Oral every 6 hours PRN  albuterol    90 MICROgram(s) HFA Inhaler 2 Puff(s) Inhalation every 4 hours PRN  cefepime   IVPB 2000 milliGRAM(s) IV Intermittent every 8 hours  heparin   Injectable 5000 Unit(s) SubCutaneous every 8 hours  levETIRAcetam 1000 milliGRAM(s) Oral two times a day  melatonin 3 milliGRAM(s) Oral at bedtime PRN  metroNIDAZOLE  IVPB 500 milliGRAM(s) IV Intermittent every 8 hours  oxyCODONE    IR 2.5 milliGRAM(s) Oral every 4 hours PRN  oxyCODONE    IR 5 milliGRAM(s) Oral every 4 hours PRN  polyethylene glycol 3350 17 Gram(s) Oral daily  senna 2 Tablet(s) Oral at bedtime PRN  sertraline 50 milliGRAM(s) Oral daily  vancomycin  IVPB 1000 milliGRAM(s) IV Intermittent every 12 hours      LABS:                        11.9   4.39  )-----------( 215      ( 24 Mar 2025 04:29 )             34.4     03-24    136  |  101  |  6[L]  ----------------------------<  100[H]  3.5   |  22  |  0.34[L]    Ca    9.4      24 Mar 2025 04:29  Phos  3.2     03-24  Mg     1.90     03-24          RADIOLOGY:   < from: MR Head w/wo IV Cont (03.24.25 @ 16:10) >  IMPRESSION:  Patient motion degrades imagequality.  RIGHT frontotemporal   cranioplasty with underlying encephalomalacia and gliosis within the   RIGHT frontal and temporal lobes. Encephalomalacia and gliosis in the   LEFT cerebellum. RIGHT frontal shunt catheter is unchanged in position   with tip in anterior horn of the RIGHT lateral ventricle. Gliosis in LEFT   frontal lobe from prior catheter tract.    --- End of Report ---    < end of copied text >      PHYSICAL EXAM:  Aox3, fc  EOS, EOMI, PERRL  MAEx4 with good strength  Incisions c/d/i   Neg PD

## 2025-03-25 NOTE — PROVIDER CONTACT NOTE (OTHER) - BACKGROUND
Patient admitted for craniotomy.
Patient s/p R crani and evacuation of epidural empyema.
s/p revision right cranioplasty and removal of right  shunt. Just returned from CAT SCAN.
s/p revision right cranioplasty and removal of right  shunt. Just returned from CAT SCAN.
Patient is S/P R craniotomy and evacuation of epidural empyema on 3/1
Pt is admitted for intractable epilepsy with status epilepticus.
patient came in for a subdural hematoma

## 2025-03-25 NOTE — PROGRESS NOTE ADULT - PROBLEM SELECTOR PLAN 1
- Neurologic checks Q6H  - Pain control prn w/ BM regimen  - Continue PT/OT/PMR - acute rehab   - Discharge planning pending  juwan for rehab   - Continue Abx per ID, PICC pending  - dvt ppx     [id number]    Case to be discussed with attending neurosurgeon Dr. Brush

## 2025-03-25 NOTE — PROVIDER CONTACT NOTE (OTHER) - ACTION/TREATMENT ORDERED:
Provider made aware
RN instructed to give am dose as ordered. Team will discuss with infectious disease on increasing next dose.
Will contact pharmacy to change dose
no further intervention at this time.
ARMIDA Rodas #[id number] stated that form will be done on rounds in the am.
One time dose of melatonin 3mg ordered.
PA aware. Continue to monitor BP and HR. Plan of care ongoing.

## 2025-03-25 NOTE — PROVIDER CONTACT NOTE (OTHER) - DATE AND TIME:
22-Mar-2025 02:35
19-Mar-2025 01:35
25-Mar-2025 01:40
11-Mar-2025 07:10
14-Mar-2025 01:00
11-Mar-2025 02:24
19-Mar-2025 17:15

## 2025-03-25 NOTE — PROGRESS NOTE ADULT - ASSESSMENT
"[employer]:    [facility] [address] [facility] - [place], [place] - [address] [place] [address] [phone]  [facility] [place], [place] - [facility] [address]     Clinical concerns: None     2 minutes for nursing intake (face to face time)     Mile Gonzales RN              " 58 yo F with PMHx of neurofibromatosis, s/p L suboccipital craniectomy for resection of cerebellar brain tumor 2023 by Dr Brush, s/p traumatic fall with head trauma in 2024 with large acute right SDH, s/p emergent right decompressive hemicraniectomy on 24, s/p right cranioplasty on 24 with custom plate, s/p ETV, ligation of shunt on the right with ligaclips with retention of valve as rescue device, presents as a transfer from [facility] for witnessed seizure at home. CTH at [facility] revealed moderate ventriculomegaly and slight increase in size of extra-axial CSF collection overlying the right frontal duraplasty     Exam with decreased strength on left side, post ictal vs intracranial causes, CTH stable,   : on VEEG, K+3.3 repleated, afebrile, blood CX NGTD, UA neg, white count normal   : Stable exam. No seizures noted on VEEG.   : MRI showed increase in right epidural collection with diffusion restriction. Shunt (as reservoir) tapped, W1 R1  GSN  3/1: OR for clarissa hold for drainage of epidural abscess. Cx sent, NGTD. EDJPx1, was intubated preop for stridor, dex x24hrs  3/2: POD1 Intubated, ENT scoped determined airway patent, ID initiated on broad spectrum abx, OR cultures GSN, AFB neg, Dex d/c'd  3/3 POD2 intubated, on flagyl/vanc/cefipime, EDJP 105cc/24hrs  3/4-6 remains intubated, ED SHAYY drain pus tinged 30cc, on triple Abx, OR cltrs NGTD    3/7-10: POD 6-9 Patient tolerating extubation  3/11: POD 10 OR next week for removal of hardware per ID  3/12- stable awaiting OR next week   3/19 had one episode of epistaxis from the L. nostril overnight, but no significant blood loss.  3/19 OR for removal of cranial plate, VPS valve and distal tubing removal, retained proximal catheter ligated with 2 clips, and placement of NEW mesh cranioplasty w/ Dr. Brush, closed with staples  3/20 ID rec IV Abx through . R SGJP x1 bloody drainage 95cc/24h. Cleared for floor.   3/21-: Stable exam. SHAYY X 1, 10cc/24H. Abx through  per ID. PICC placement pending. Vancomycin trough 9.9, will discuss dosing with ID in AM  3/24: SHAYY drain dc'd yesterday 2 staples placed, picc pending, PT recc [facility], IV abx through 4/11  3/25 stable exam, picc pending, PMR recc acute rehab. Pending ANAID echeverria for rehab placement. IV abx through .

## 2025-03-26 LAB — VANCOMYCIN TROUGH SERPL-MCNC: 10.5 UG/ML — SIGNIFICANT CHANGE UP (ref 10–20)

## 2025-03-26 PROCEDURE — 99232 SBSQ HOSP IP/OBS MODERATE 35: CPT

## 2025-03-26 PROCEDURE — G0545: CPT

## 2025-03-26 RX ADMIN — SERTRALINE 50 MILLIGRAM(S): 100 TABLET, FILM COATED ORAL at 11:43

## 2025-03-26 RX ADMIN — CEFEPIME 100 MILLIGRAM(S): 2 INJECTION, POWDER, FOR SOLUTION INTRAVENOUS at 08:46

## 2025-03-26 RX ADMIN — HEPARIN SODIUM 5000 UNIT(S): 1000 INJECTION INTRAVENOUS; SUBCUTANEOUS at 16:59

## 2025-03-26 RX ADMIN — Medication 250 MILLIGRAM(S): at 18:48

## 2025-03-26 RX ADMIN — Medication 650 MILLIGRAM(S): at 13:34

## 2025-03-26 RX ADMIN — Medication 250 MILLIGRAM(S): at 06:24

## 2025-03-26 RX ADMIN — CEFEPIME 100 MILLIGRAM(S): 2 INJECTION, POWDER, FOR SOLUTION INTRAVENOUS at 00:38

## 2025-03-26 RX ADMIN — Medication 3 MILLIGRAM(S): at 00:40

## 2025-03-26 RX ADMIN — HEPARIN SODIUM 5000 UNIT(S): 1000 INJECTION INTRAVENOUS; SUBCUTANEOUS at 00:38

## 2025-03-26 RX ADMIN — Medication 100 MILLIGRAM(S): at 08:46

## 2025-03-26 RX ADMIN — LEVETIRACETAM 1000 MILLIGRAM(S): 10 INJECTION, SOLUTION INTRAVENOUS at 06:34

## 2025-03-26 RX ADMIN — Medication 0.25 MILLIGRAM(S): at 22:19

## 2025-03-26 RX ADMIN — LEVETIRACETAM 1000 MILLIGRAM(S): 10 INJECTION, SOLUTION INTRAVENOUS at 17:03

## 2025-03-26 RX ADMIN — Medication 100 MILLIGRAM(S): at 16:56

## 2025-03-26 RX ADMIN — Medication 100 MILLIGRAM(S): at 01:15

## 2025-03-26 RX ADMIN — Medication 650 MILLIGRAM(S): at 13:04

## 2025-03-26 RX ADMIN — Medication 0.25 MILLIGRAM(S): at 10:24

## 2025-03-26 RX ADMIN — CEFEPIME 100 MILLIGRAM(S): 2 INJECTION, POWDER, FOR SOLUTION INTRAVENOUS at 16:28

## 2025-03-26 RX ADMIN — HEPARIN SODIUM 5000 UNIT(S): 1000 INJECTION INTRAVENOUS; SUBCUTANEOUS at 08:46

## 2025-03-26 NOTE — PROGRESS NOTE ADULT - ASSESSMENT
This is a 60 y/o F w/ very extensive PMHx of NF, 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), s/p  shunt (in [facility]), s/p traumatic fall w/ R SDH, s/p R decompressive hemicraniectomy, ICP monitor and R cranioplasty (5/2024), s/p L frontal Endoscopic third ventriculostomy and ligation of R  shunt with Ligaclips with retention of valve, proximal and distal catheters (08/2024) admitted initially to [facility] on 2/22 for seizure, intubated for airway protection and status epilepticus, initially on Zosyn for aspiration PNA.  Initially CTH w/ slight increase in extra axial fluid collection measuring up to 1.5 cm. Pt was transferred to [facility] on 2/23 for neurosurgery eval, given decreased L sided strength, MRI done with increase in R sided collection.  Pt extubated on 2/28, CSF reservoir tapped, no pleocytosis.    Pt went to the OR on 3/1 for R clarissa pole with evacuation of epidural collection, with purulent drainage.     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), s/p  shunt (in [facility]), s/p traumatic fall w/ R SDH, s/p R decompressive hemicraniectomy, ICP monitor and R cranioplasty (5/2024), s/p L frontal Endoscopic third ventriculostomy and ligation of R  shunt with ligaclips with retention of valve, proximal and distal catheters (08/2024) admitted initially to [facility] on 2/22 for seizure, intubated for airway protection and status epilepticus, initially on Zosyn for aspiration PNA, transferred to [facility] on 2/23 for neurosurgery evaluation, now found to with have epidural abscess s/p R Clarissa w/ purulent/milky drainage.  Extubated on 3/3, reintubated for stridor/work of breaking, now extubated on 3/6.    D/w neurosurgery, Cx all negative, CRP low, consider allergic reaction to prosthetic cranioplasty. Would likely treat w/ abx for 6 weeks   OR on 3/19: Explantation of cranial plate, removal of ventriculoperitoneal shunt valve + distal tubing, proximal catheter ligated with 2 clips and left behind, cranioplasty with mesh plate place    Recommendations:   1. Vancomycin 1g q12, f/u level goal -600, Cefepime 2 g q8, Flagyl 500 mg q8, plan for total 6 week course (4/11/25)  2. F/u OR Cx 3/19    OPAT recommendations:   1. Vancomycin 1 g q12, Cefepime 2 g q8, Flagyl 500 mg q8   2. Weekly CBC, CMP, vancomycin trough to be followed by rehab provider     Thank you for consulting us and involving us in the management of this patient's case. In addition to reviewing history, imaging, documents, labs, microbiology, and infection control strategies and potential issues.     ID will continue to follow peripherally     Inder Mcintosh M.D.  Attending Physician  Division of Infectious Diseases  Department of Medicine

## 2025-03-26 NOTE — PROGRESS NOTE ADULT - SUBJECTIVE AND OBJECTIVE BOX
Infectious Diseases Follow Up:    Patient is a 60y old  Female who presents with a chief complaint of seizure (26 Mar 2025 05:36)      Interval History/ROS:  No acute events, pt w/o acute complaints     Allergies  No Known Allergies        ANTIMICROBIALS:  cefepime   IVPB 2000 every 8 hours  metroNIDAZOLE  IVPB 500 every 8 hours  vancomycin  IVPB 1000 every 12 hours      Current Abx:     Previous Abx     OTHER MEDS:  MEDICATIONS  (STANDING):  acetaminophen     Tablet .. 650 every 6 hours PRN  albuterol    90 MICROgram(s) HFA Inhaler 2 every 4 hours PRN  ALPRAZolam 0.25 two times a day PRN  heparin   Injectable 5000 every 8 hours  levETIRAcetam 1000 two times a day  melatonin 3 at bedtime PRN  oxyCODONE    IR 2.5 every 4 hours PRN  oxyCODONE    IR 5 every 4 hours PRN  polyethylene glycol 3350 17 daily  senna 2 at bedtime PRN  sertraline 50 daily      Vital Signs Last 24 Hrs  T(C): 36.7 (26 Mar 2025 09:00), Max: 37 (25 Mar 2025 13:00)  T(F): 98.1 (26 Mar 2025 09:00), Max: 98.6 (25 Mar 2025 13:00)  HR: 70 (26 Mar 2025 09:00) (70 - 93)  BP: 119/51 (26 Mar 2025 09:00) (107/56 - 119/51)  BP(mean): --  RR: 18 (26 Mar 2025 09:00) (17 - 18)  SpO2: 100% (26 Mar 2025 09:00) (95% - 100%)    Parameters below as of 26 Mar 2025 09:00  Patient On (Oxygen Delivery Method): room air        PHYSICAL EXAM:  GENERAL: NAD, well-developed  HEAD:  R head staples well appearing   EYES: EOMI, , conjunctiva and sclera clear  CHEST/LUNG: Clear to auscultation bilaterally; No wheeze  HEART: Regular rate and rhythm; No murmurs, rubs, or gallops  ABDOMEN: Soft, Nontender, Nondistended;   PSYCH: AAOx3                            11.4   4.34  )-----------( 197      ( 25 Mar 2025 07:34 )             33.0       03-25    135  |  100  |  10  ----------------------------<  118[H]  3.5   |  21[L]  |  0.39[L]    Ca    8.9      25 Mar 2025 07:34  Phos  3.7     03-25  Mg     2.00     03-25        Urinalysis Basic - ( 25 Mar 2025 07:34 )    Color: x / Appearance: x / SG: x / pH: x  Gluc: 118 mg/dL / Ketone: x  / Bili: x / Urobili: x   Blood: x / Protein: x / Nitrite: x   Leuk Esterase: x / RBC: x / WBC x   Sq Epi: x / Non Sq Epi: x / Bacteria: x        MICROBIOLOGY:  v  Nose  03-19-25   No staphylococcus aureus isolated.  "PCR is more Sensitive for identifying MRSA/MSSA."  --  --      Tissue 1) EPIDURAL EXUDATE  03-19-25   No growth at 5 days  --    No polymorphonuclear cells seen  No organisms seen      Surgical Swab  03-01-25   No growth at 5 days  --    Few polymorphonuclear leukocytes per low power field  No organisms seen per oil power field      CSF CSF  02-28-25   No growth at 14 days.  --    No polymorphonuclear cells seen  No organisms seen  by cytocentrifuge      Catheterized Catheterized  02-26-25   No growth  --  --      .Blood Blood-Peripheral  02-26-25   No growth at 5 days  --  --      .Blood Blood-Peripheral  02-26-25   No growth at 5 days  --  --                RADIOLOGY:

## 2025-03-26 NOTE — PROGRESS NOTE ADULT - SUBJECTIVE AND OBJECTIVE BOX
PAST 24HR EVENTS:  No issues overnight. Afebrile.     Vital Signs Last 24 Hrs  T(C): 36.8 (26 Mar 2025 01:23), Max: 37 (25 Mar 2025 13:00)  T(F): 98.2 (26 Mar 2025 01:23), Max: 98.6 (25 Mar 2025 13:00)  HR: 71 (26 Mar 2025 01:23) (71 - 93)  BP: 107/56 (26 Mar 2025 01:23) (107/56 - 118/63)  BP(mean): --  ABP: --  ABP(mean): --  RR: 18 (26 Mar 2025 01:23) (17 - 18)  SpO2: 97% (26 Mar 2025 01:23) (95% - 99%)    O2 Parameters below as of 26 Mar 2025 01:23  Patient On (Oxygen Delivery Method): room air                MEDS:   acetaminophen     Tablet .. 650 milliGRAM(s) Oral every 6 hours PRN  albuterol    90 MICROgram(s) HFA Inhaler 2 Puff(s) Inhalation every 4 hours PRN  cefepime   IVPB 2000 milliGRAM(s) IV Intermittent every 8 hours  heparin   Injectable 5000 Unit(s) SubCutaneous every 8 hours  levETIRAcetam 1000 milliGRAM(s) Oral two times a day  melatonin 3 milliGRAM(s) Oral at bedtime PRN  metroNIDAZOLE  IVPB 500 milliGRAM(s) IV Intermittent every 8 hours  oxyCODONE    IR 2.5 milliGRAM(s) Oral every 4 hours PRN  oxyCODONE    IR 5 milliGRAM(s) Oral every 4 hours PRN  polyethylene glycol 3350 17 Gram(s) Oral daily  senna 2 Tablet(s) Oral at bedtime PRN  sertraline 50 milliGRAM(s) Oral daily  vancomycin  IVPB 1000 milliGRAM(s) IV Intermittent every 12 hours      LABS:                                   11.4   4.34  )-----------( 197      ( 25 Mar 2025 07:34 )             33.0     03-25    135  |  100  |  10  ----------------------------<  118[H]  3.5   |  21[L]  |  0.39[L]    Ca    8.9      25 Mar 2025 07:34  Phos  3.7     03-25  Mg     2.00     03-25              RADIOLOGY:   < from: MR Head w/wo IV Cont (03.24.25 @ 16:10) >  IMPRESSION:  Patient motion degrades imagequality.  RIGHT frontotemporal   cranioplasty with underlying encephalomalacia and gliosis within the   RIGHT frontal and temporal lobes. Encephalomalacia and gliosis in the   LEFT cerebellum. RIGHT frontal shunt catheter is unchanged in position   with tip in anterior horn of the RIGHT lateral ventricle. Gliosis in LEFT   frontal lobe from prior catheter tract.    --- End of Report ---    < end of copied text >      PHYSICAL EXAM:  Aox3, fc  EOS, EOMI, PERRL  MAEx4 with good strength  Incisions c/d/i   Neg PD

## 2025-03-26 NOTE — PROGRESS NOTE ADULT - PROBLEM SELECTOR PLAN 1
- Neurologic checks Q6H  - Pain control prn w/ BM regimen  - Continue PT/OT/PMR - acute rehab   - Discharge planning pending  juwan for rehab   - Continue Abx per ID, PICC pending  - dvt ppx chemical    [id number]    Case to be discussed with attending neurosurgeon Dr. Brush

## 2025-03-26 NOTE — PROGRESS NOTE ADULT - ASSESSMENT
58 yo F with PMHx of neurofibromatosis, s/p L suboccipital craniectomy for resection of cerebellar brain tumor 2023 by Dr Brush, s/p traumatic fall with head trauma in 2024 with large acute right SDH, s/p emergent right decompressive hemicraniectomy on 24, s/p right cranioplasty on 24 with custom plate, s/p ETV, ligation of shunt on the right with ligaclips with retention of valve as rescue device, presents as a transfer from [facility] for witnessed seizure at home. CTH at [facility] revealed moderate ventriculomegaly and slight increase in size of extra-axial CSF collection overlying the right frontal duraplasty     Exam with decreased strength on left side, post ictal vs intracranial causes, CTH stable,   : on VEEG, K+3.3 repleated, afebrile, blood CX NGTD, UA neg, white count normal   : Stable exam. No seizures noted on VEEG.   : MRI showed increase in right epidural collection with diffusion restriction. Shunt (as reservoir) tapped, W1 R1  GSN  3/1: OR for clarissa hold for drainage of epidural abscess. Cx sent, NGTD. EDJPx1, was intubated preop for stridor, dex x24hrs  3/2: POD1 Intubated, ENT scoped determined airway patent, ID initiated on broad spectrum abx, OR cultures GSN, AFB neg, Dex d/c'd  3/3 POD2 intubated, on flagyl/vanc/cefipime, EDJP 105cc/24hrs  3/4-6 remains intubated, ED SHAYY drain pus tinged 30cc, on triple Abx, OR cltrs NGTD    3/7-10: POD 6-9 Patient tolerating extubation  3/11: POD 10 OR next week for removal of hardware per ID  3/12- stable awaiting OR next week   3/19 had one episode of epistaxis from the L. nostril overnight, but no significant blood loss.  3/19 OR for removal of cranial plate, VPS valve and distal tubing removal, retained proximal catheter ligated with 2 clips, and placement of NEW mesh cranioplasty w/ Dr. Brush, closed with staples  3/20 ID rec IV Abx through . R SGJP x1 bloody drainage 95cc/24h. Cleared for floor.   3/21-: Stable exam. SHAYY X 1, 10cc/24H. Abx through  per ID. PICC placement pending. Vancomycin trough 9.9, will discuss dosing with ID in AM  3/24: SHAYY drain dc'd yesterday 2 staples placed, picc pending, PT recc [facility], IV abx through 4/11  3/25- stable exam, mri w/wo yesterday with postop changes  without enhancement, picc pending, PMR recc acute rehab. Pending [facility]NA[facility] echeverria for rehab placement. IV abx through .

## 2025-03-27 LAB
ANION GAP SERPL CALC-SCNC: 13 MMOL/L — SIGNIFICANT CHANGE UP (ref 7–14)
BUN SERPL-MCNC: 6 MG/DL — LOW (ref 7–23)
CALCIUM SERPL-MCNC: 9 MG/DL — SIGNIFICANT CHANGE UP (ref 8.4–10.5)
CHLORIDE SERPL-SCNC: 101 MMOL/L — SIGNIFICANT CHANGE UP (ref 98–107)
CO2 SERPL-SCNC: 22 MMOL/L — SIGNIFICANT CHANGE UP (ref 22–31)
CREAT SERPL-MCNC: 0.36 MG/DL — LOW (ref 0.5–1.3)
EGFR: 116 ML/MIN/1.73M2 — SIGNIFICANT CHANGE UP
EGFR: 116 ML/MIN/1.73M2 — SIGNIFICANT CHANGE UP
GLUCOSE SERPL-MCNC: 96 MG/DL — SIGNIFICANT CHANGE UP (ref 70–99)
HCT VFR BLD CALC: 33.4 % — LOW (ref 34.5–45)
HGB BLD-MCNC: 11.6 G/DL — SIGNIFICANT CHANGE UP (ref 11.5–15.5)
MAGNESIUM SERPL-MCNC: 1.9 MG/DL — SIGNIFICANT CHANGE UP (ref 1.6–2.6)
MCHC RBC-ENTMCNC: 27.4 PG — SIGNIFICANT CHANGE UP (ref 27–34)
MCHC RBC-ENTMCNC: 34.7 G/DL — SIGNIFICANT CHANGE UP (ref 32–36)
MCV RBC AUTO: 78.8 FL — LOW (ref 80–100)
NRBC # BLD AUTO: 0 K/UL — SIGNIFICANT CHANGE UP (ref 0–0)
NRBC # FLD: 0 K/UL — SIGNIFICANT CHANGE UP (ref 0–0)
NRBC BLD AUTO-RTO: 0 /100 WBCS — SIGNIFICANT CHANGE UP (ref 0–0)
PHOSPHATE SERPL-MCNC: 3.5 MG/DL — SIGNIFICANT CHANGE UP (ref 2.5–4.5)
PLATELET # BLD AUTO: 233 K/UL — SIGNIFICANT CHANGE UP (ref 150–400)
POTASSIUM SERPL-MCNC: 3.6 MMOL/L — SIGNIFICANT CHANGE UP (ref 3.5–5.3)
POTASSIUM SERPL-SCNC: 3.6 MMOL/L — SIGNIFICANT CHANGE UP (ref 3.5–5.3)
RBC # BLD: 4.24 M/UL — SIGNIFICANT CHANGE UP (ref 3.8–5.2)
RBC # FLD: 14.5 % — SIGNIFICANT CHANGE UP (ref 10.3–14.5)
SODIUM SERPL-SCNC: 136 MMOL/L — SIGNIFICANT CHANGE UP (ref 135–145)
WBC # BLD: 4.62 K/UL — SIGNIFICANT CHANGE UP (ref 3.8–10.5)
WBC # FLD AUTO: 4.62 K/UL — SIGNIFICANT CHANGE UP (ref 3.8–10.5)

## 2025-03-27 PROCEDURE — G0545: CPT

## 2025-03-27 PROCEDURE — 99232 SBSQ HOSP IP/OBS MODERATE 35: CPT

## 2025-03-27 RX ORDER — ALPRAZOLAM 0.5 MG
0.25 TABLET, EXTENDED RELEASE 24 HR ORAL
Refills: 0 | Status: DISCONTINUED | OUTPATIENT
Start: 2025-03-27 | End: 2025-03-27

## 2025-03-27 RX ORDER — ALPRAZOLAM 0.5 MG
0.25 TABLET, EXTENDED RELEASE 24 HR ORAL EVERY 8 HOURS
Refills: 0 | Status: DISCONTINUED | OUTPATIENT
Start: 2025-03-27 | End: 2025-03-30

## 2025-03-27 RX ADMIN — LEVETIRACETAM 1000 MILLIGRAM(S): 10 INJECTION, SOLUTION INTRAVENOUS at 05:09

## 2025-03-27 RX ADMIN — HEPARIN SODIUM 5000 UNIT(S): 1000 INJECTION INTRAVENOUS; SUBCUTANEOUS at 18:30

## 2025-03-27 RX ADMIN — Medication 250 MILLIGRAM(S): at 05:08

## 2025-03-27 RX ADMIN — Medication 100 MILLIGRAM(S): at 09:41

## 2025-03-27 RX ADMIN — Medication 0.25 MILLIGRAM(S): at 20:14

## 2025-03-27 RX ADMIN — Medication 100 MILLIGRAM(S): at 17:39

## 2025-03-27 RX ADMIN — Medication 100 MILLIGRAM(S): at 02:03

## 2025-03-27 RX ADMIN — HEPARIN SODIUM 5000 UNIT(S): 1000 INJECTION INTRAVENOUS; SUBCUTANEOUS at 02:03

## 2025-03-27 RX ADMIN — CEFEPIME 100 MILLIGRAM(S): 2 INJECTION, POWDER, FOR SOLUTION INTRAVENOUS at 02:03

## 2025-03-27 RX ADMIN — LEVETIRACETAM 1000 MILLIGRAM(S): 10 INJECTION, SOLUTION INTRAVENOUS at 18:30

## 2025-03-27 RX ADMIN — CEFEPIME 100 MILLIGRAM(S): 2 INJECTION, POWDER, FOR SOLUTION INTRAVENOUS at 18:29

## 2025-03-27 RX ADMIN — HEPARIN SODIUM 5000 UNIT(S): 1000 INJECTION INTRAVENOUS; SUBCUTANEOUS at 09:41

## 2025-03-27 RX ADMIN — CEFEPIME 100 MILLIGRAM(S): 2 INJECTION, POWDER, FOR SOLUTION INTRAVENOUS at 09:41

## 2025-03-27 RX ADMIN — Medication 0.25 MILLIGRAM(S): at 11:45

## 2025-03-27 RX ADMIN — SERTRALINE 50 MILLIGRAM(S): 100 TABLET, FILM COATED ORAL at 12:55

## 2025-03-27 RX ADMIN — Medication 250 MILLIGRAM(S): at 19:04

## 2025-03-27 NOTE — PROGRESS NOTE ADULT - ASSESSMENT
60 yo F with PMHx of neurofibromatosis, s/p L suboccipital craniectomy for resection of cerebellar brain tumor 2023 by Dr Brush, s/p traumatic fall with head trauma in 2024 with large acute right SDH, s/p emergent right decompressive hemicraniectomy on 24, s/p right cranioplasty on 24 with custom plate, s/p ETV, ligation of shunt on the right with ligaclips with retention of valve as rescue device, presents as a transfer from [facility] for witnessed seizure at home. CTH at [facility] revealed moderate ventriculomegaly and slight increase in size of extra-axial CSF collection overlying the right frontal duraplasty     Exam with decreased strength on left side, post ictal vs intracranial causes, CTH stable,   : on VEEG, K+3.3 repleated, afebrile, blood CX NGTD, UA neg, white count normal   : Stable exam. No seizures noted on VEEG.   : MRI showed increase in right epidural collection with diffusion restriction. Shunt (as reservoir) tapped, W1 R1  GSN  3/1: OR for clarissa hold for drainage of epidural abscess. Cx sent, NGTD. EDJPx1, was intubated preop for stridor, dex x24hrs  3/2: POD1 Intubated, ENT scoped determined airway patent, ID initiated on broad spectrum abx, OR cultures GSN, AFB neg, Dex d/c'd  3/3 POD2 intubated, on flagyl/vanc/cefipime, EDJP 105cc/24hrs  3/4-6 remains intubated, ED SHAYY drain pus tinged 30cc, on triple Abx, OR cltrs NGTD    3/7-10: POD 6-9 Patient tolerating extubation  3/11: POD 10 OR next week for removal of hardware per ID  3/12- stable awaiting OR next week   3/19 had one episode of epistaxis from the L. nostril overnight, but no significant blood loss.  3/19 OR for removal of cranial plate, VPS valve and distal tubing removal, retained proximal catheter ligated with 2 clips, and placement of NEW mesh cranioplasty w/ Dr. Brush, closed with staples  3/20 ID rec IV Abx through . R SGJP x1 bloody drainage 95cc/24h. Cleared for floor.   3/21-: Stable exam. SHAYY X 1, 10cc/24H. Abx through  per ID. PICC placement pending. Vancomycin trough 9.9, will discuss dosing with ID in AM  3/24: SHAYY drain dc'd yesterday 2 staples placed, picc pending, PT recc [facility], IV abx through 4/11  3/25- stable exam, mri w/wo yesterday with postop changes  without enhancement, picc pending, PMR recc acute rehab. Pending [facility] echeverria for rehab placement. IV abx through .

## 2025-03-27 NOTE — PROGRESS NOTE ADULT - SUBJECTIVE AND OBJECTIVE BOX
Patient is a 60y old  Female who presents with a chief complaint of seizure (27 Mar 2025 14:34)      HPI:  60 yo F with PMHx of neurofibromatosis, s/p L suboccipital craniectomy for resection of cerebellar brain tumor 2023 by Dr Brush, s/p traumatic fall with head trauma in 2024, at that time sustained a large acute right SDH, and underwent an emergent right decompressive hemicraniectomy on 24, s/p right cranioplasty on 24 with custom plate, s/p ETV, ligation of shunt on the right with ligaclips with retention of valve as rescue device, presents as a transfer from [facility] for witnessed seizure at home. CTH obtained at [facility] revealed stable  shunt and moderate ventriculomegaly and slight increase in size of extra-axial CSF collection overlying the right frontal duraplasty, likely a hygroma and no mass effect.    (2025 23:29)      REVIEW OF SYSTEMS  Constitutional - No fever, No weight loss, No fatigue  HEENT - No eye pain, No visual disturbances, No difficulty hearing, No tinnitus, No vertigo, No neck pain  Respiratory - No cough, No wheezing, No shortness of breath  Cardiovascular - No chest pain, No palpitations  Gastrointestinal - No abdominal pain, No nausea, No vomiting, No diarrhea, No constipation  Genitourinary - No dysuria, No frequency, No hematuria, No incontinence  Neurological - No headaches, No memory loss, No loss of strength, No numbness, No tremors  Skin - No itching, No rashes, No lesions   Endocrine - No temperature intolerance  Musculoskeletal - No joint pain, No joint swelling, No muscle pain  Psychiatric - No depression, No anxiety    PAST MEDICAL & SURGICAL HISTORY  Asthma    Depression    Neoplasm of uncertain behavior of brain    Neurofibromatosis, type 1    Neurofibromatosis    History of hydrocephalus    Anxiety    Delivery with history of     S/P tubal ligation    History of arthroscopy of left knee    S/P LASIK Surgery    H/O brain tumor    S/P  shunt           CURRENT FUNCTIONAL STATUS  3/26  Bed Mobility  Bed Mobility Training Rolling/Turning: supervsion  Bed Mobility Training Scooting: supervsion  Bed Mobility Training Bridging: supervsion  Bed Mobility Training Sit-to-Supine: supervsion  Bed Mobility Training Supine-to-Sit: supervsion  Bed Mobility Training Limitations: decreased flexibility;  decreased strength    Sit-Stand Transfer Training  Transfer Training Sit-to-Stand Transfer: contact guard;  1 person assist;  weight-bearing as tolerated   rolling walker  Transfer Training Stand-to-Sit Transfer: contact guard;  1 person assist;  weight-bearing as tolerated   rolling walker  Sit-to-Stand Transfer Training Transfer Safety Analysis: decreased step length;  decreased balance;  decreased strength;  rolling walker    Gait Training  Gait Training: minimum assist (75% patient effort);  1 person assist;  weight-bearing as tolerated   rolling walker;  50 feet  Gait Analysis: 3-point gait   decreased step length;  decreased anita;  decreased strength;  50 feet;  rolling walker    Therapeutic Exercise  Therapeutic Exercise Detail: Pt performed ActiveROM, ankle pumps.     LABS  CBC Full  -  ( 27 Mar 2025 06:51 )  WBC Count : 4.62 K/uL  RBC Count : 4.24 M/uL  Hemoglobin : 11.6 g/dL  Hematocrit : 33.4 %  Platelet Count - Automated : 233 K/uL  Mean Cell Volume : 78.8 fL  Mean Cell Hemoglobin : 27.4 pg  Mean Cell Hemoglobin Concentration : 34.7 g/dL  Auto Neutrophil # : x  Auto Lymphocyte # : x  Auto Monocyte # : x  Auto Eosinophil # : x  Auto Basophil # : x  Auto Neutrophil % : x  Auto Lymphocyte % : x  Auto Monocyte % : x  Auto Eosinophil % : x  Auto Basophil % : x        136  |  101  |  6[L]  ----------------------------<  96  3.6   |  22  |  0.36[L]    Ca    9.0      27 Mar 2025 06:51  Phos  3.5       Mg     1.90           Urinalysis Basic - ( 27 Mar 2025 06:51 )    Color: x / Appearance: x / SG: x / pH: x  Gluc: 96 mg/dL / Ketone: x  / Bili: x / Urobili: x   Blood: x / Protein: x / Nitrite: x   Leuk Esterase: x / RBC: x / WBC x   Sq Epi: x / Non Sq Epi: x / Bacteria: x        VITALS  T(C): 36.9 (25 @ 13:33), Max: 36.9 (25 @ 13:33)  HR: 97 (25 @ 13:33) (74 - 97)  BP: 103/78 (03-27-25 @ 13:33) (103/78 - 120/56)  RR: 18 (25 @ 13:33) (17 - 18)  SpO2: 98% (25 @ 13:33) (98% - 99%)  Wt(kg): --    ALLERGIES  No Known Allergies      MEDICATIONS   acetaminophen     Tablet .. 650 milliGRAM(s) Oral every 6 hours PRN  albuterol    90 MICROgram(s) HFA Inhaler 2 Puff(s) Inhalation every 4 hours PRN  ALPRAZolam 0.25 milliGRAM(s) Oral two times a day PRN  cefepime   IVPB 2000 milliGRAM(s) IV Intermittent every 8 hours  heparin   Injectable 5000 Unit(s) SubCutaneous every 8 hours  levETIRAcetam 1000 milliGRAM(s) Oral two times a day  melatonin 3 milliGRAM(s) Oral at bedtime PRN  metroNIDAZOLE  IVPB 500 milliGRAM(s) IV Intermittent every 8 hours  oxyCODONE    IR 5 milliGRAM(s) Oral every 4 hours PRN  oxyCODONE    IR 2.5 milliGRAM(s) Oral every 4 hours PRN  polyethylene glycol 3350 17 Gram(s) Oral daily  senna 2 Tablet(s) Oral at bedtime PRN  sertraline 50 milliGRAM(s) Oral daily  vancomycin  IVPB 1000 milliGRAM(s) IV Intermittent every 12 hours      ----------------------------------------------------------------------------------------      PHYSICAL EXAM  Constitutional - NAD, Comfortable  + tattoos  HEENT - + R scalp staples  Chest - no respiratory distress  Cardiovascular - RRR, S1S2   Abdomen -  Soft, NTND  Extremities - No C/C/E, No calf tenderness   Neurologic Exam -                    Cognitive - Awake, Alert, AAO to self, place, date, year, situation     Communication - Fluent, No dysarthria     Cranial Nerves - CN 2-12 intact     Motor -                      LEFT    UE - ShAB 5/5, EF 5/5, EE 5/5, WE 5/5,  5/5                    RIGHT UE - ShAB 5/5, EF 5/5, EE 5/5, WE 5/5,  5/5                    LEFT    LE - HF 4+/5, KE 5/5, DF 5/5, PF 5/5                    RIGHT LE - HF 5/5, KE 5/5, DF 5/5, PF 5/5        Sensory - Intact to LT       Balance - WNL Static  Psychiatric - Mood stable, Affect WNL  ----------------------------------------------------------------------------------------  ASSESSMENT/PLAN   60 year old Female status post Left suboccipital craniectomy for resection of cerebellar brain tumor 2023 by Dr Brush, status post traumatic fall with head trauma in 2024 with large acute right SDH, status post emergent right decompressive hemicraniectomy on 24, status post right cranioplasty on 24 with custom plate, status post ETV, ligation of shunt on the right with ligaclips with retention of valve as rescue device, presents as a transfer from [facility] for witnessed seizure at home. CTH obtained at [facility] revealed moderate ventriculomegaly and slight increase in size of extra-axial CSF collection overlying the right frontal duraplasty.  s/p clarissa hole 3/ with purulent drainage  on keppra  vancomycin per ID   Pain -oxycodone prn  DVT PPX - heparin  continue bedside PT and OT  Rehab -recommend acute rehab when medically cleared. Patient can tolerate 3 hours per day of therapy with medical supervision.     Total time taken to review relevant records and imaging results, examine patient, write note, and when applicable discuss the case with patient, family, , , and medical providers:  35 minutes

## 2025-03-27 NOTE — PROGRESS NOTE ADULT - ASSESSMENT
This is a 58 y/o F w/ very extensive PMHx of NF, 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), s/p  shunt (in [facility]), s/p traumatic fall w/ R SDH, s/p R decompressive hemicraniectomy, ICP monitor and R cranioplasty (5/2024), s/p L frontal Endoscopic third ventriculostomy and ligation of R  shunt with Ligaclips with retention of valve, proximal and distal catheters (08/2024) admitted initially to [facility] on 2/22 for seizure, intubated for airway protection and status epilepticus, initially on Zosyn for aspiration PNA.  Initially CTH w/ slight increase in extra axial fluid collection measuring up to 1.5 cm. Pt was transferred to [facility] on 2/23 for neurosurgery eval, given decreased L sided strength, MRI done with increase in R sided collection.  Pt extubated on 2/28, CSF reservoir tapped, no pleocytosis.    Pt went to the OR on 3/1 for R clarissa pole with evacuation of epidural collection, with purulent drainage.     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), s/p  shunt (in [facility]), s/p traumatic fall w/ R SDH, s/p R decompressive hemicraniectomy, ICP monitor and R cranioplasty (5/2024), s/p L frontal Endoscopic third ventriculostomy and ligation of R  shunt with ligaclips with retention of valve, proximal and distal catheters (08/2024) admitted initially to [facility] on 2/22 for seizure, intubated for airway protection and status epilepticus, initially on Zosyn for aspiration PNA, transferred to [facility] on 2/23 for neurosurgery evaluation, now found to with have epidural abscess s/p R Clarissa w/ purulent/milky drainage.  Extubated on 3/3, reintubated for stridor/work of breaking, now extubated on 3/6.    D/w neurosurgery, Cx all negative, CRP low, consider allergic reaction to prosthetic cranioplasty. Would likely treat w/ abx for 6 weeks   OR on 3/19: Explantation of cranial plate, removal of ventriculoperitoneal shunt valve + distal tubing, proximal catheter ligated with 2 clips and left behind, cranioplasty with mesh plate place    Recommendations:   1. Vancomycin 1g q12, f/u level goal -600, Cefepime 2 g q8, Flagyl 500 mg q8, plan for total 6 week course (4/11/25)  2. F/u OR Cx 3/19  3. Will need PICC given IV Vanco     OPAT recommendations:   1. Vancomycin 1 g q12, Cefepime 2 g q8, Flagyl 500 mg q8   2. Weekly CBC, CMP, vancomycin trough to be followed by rehab provider     Thank you for consulting us and involving us in the management of this patient's case. In addition to reviewing history, imaging, documents, labs, microbiology, and infection control strategies and potential issues.     ID will continue to follow peripherally     Inder Mcintosh M.D.  Attending Physician  Division of Infectious Diseases  Department of Medicine

## 2025-03-27 NOTE — PROGRESS NOTE ADULT - SUBJECTIVE AND OBJECTIVE BOX
PAST 24HR EVENTS:  No issues overnight. Afebrile.     ICU Vital Signs Last 24 Hrs  T(C): 36.7 (27 Mar 2025 00:40), Max: 36.7 (26 Mar 2025 09:00)  T(F): 98.1 (27 Mar 2025 00:40), Max: 98.1 (26 Mar 2025 09:00)  HR: 81 (27 Mar 2025 00:40) (70 - 84)  BP: 113/67 (27 Mar 2025 00:40) (107/64 - 119/51)  BP(mean): --  ABP: --  ABP(mean): --  RR: 17 (27 Mar 2025 00:40) (17 - 18)  SpO2: 99% (27 Mar 2025 00:40) (98% - 100%)    O2 Parameters below as of 27 Mar 2025 00:40  Patient On (Oxygen Delivery Method): room air              MEDS:   acetaminophen     Tablet .. 650 milliGRAM(s) Oral every 6 hours PRN  albuterol    90 MICROgram(s) HFA Inhaler 2 Puff(s) Inhalation every 4 hours PRN  cefepime   IVPB 2000 milliGRAM(s) IV Intermittent every 8 hours  heparin   Injectable 5000 Unit(s) SubCutaneous every 8 hours  levETIRAcetam 1000 milliGRAM(s) Oral two times a day  melatonin 3 milliGRAM(s) Oral at bedtime PRN  metroNIDAZOLE  IVPB 500 milliGRAM(s) IV Intermittent every 8 hours  oxyCODONE    IR 2.5 milliGRAM(s) Oral every 4 hours PRN  oxyCODONE    IR 5 milliGRAM(s) Oral every 4 hours PRN  polyethylene glycol 3350 17 Gram(s) Oral daily  senna 2 Tablet(s) Oral at bedtime PRN  sertraline 50 milliGRAM(s) Oral daily  vancomycin  IVPB 1000 milliGRAM(s) IV Intermittent every 12 hours      LABS:                                                       11.4   4.34  )-----------( 197      ( 25 Mar 2025 07:34 )             33.0     03-25    135  |  100  |  10  ----------------------------<  118[H]  3.5   |  21[L]  |  0.39[L]    Ca    8.9      25 Mar 2025 07:34  Phos  3.7     03-25  Mg     2.00     03-25                RADIOLOGY:   < from: MR Head w/wo IV Cont (03.24.25 @ 16:10) >  IMPRESSION:  Patient motion degrades imagequality.  RIGHT frontotemporal   cranioplasty with underlying encephalomalacia and gliosis within the   RIGHT frontal and temporal lobes. Encephalomalacia and gliosis in the   LEFT cerebellum. RIGHT frontal shunt catheter is unchanged in position   with tip in anterior horn of the RIGHT lateral ventricle. Gliosis in LEFT   frontal lobe from prior catheter tract.    --- End of Report ---    < end of copied text >      PHYSICAL EXAM:  Aox3, fc  EOS, EOMI, PERRL  MAEx4 with good strength  Incisions c/d/i   no pronator drift

## 2025-03-27 NOTE — PROGRESS NOTE ADULT - PROBLEM SELECTOR PLAN 1
- Neurologic checks Q6H  - Pain control prn w/ BM regimen  - Continue PT/OT/PMR - acute rehab   - Discharge planning pending  juwan for rehab   - Continue Abx per ID, PICC pending  - dvt ppx chemical    [id number]    Case to be discussed with attending neurosurgeon Dr. Brush

## 2025-03-27 NOTE — PROGRESS NOTE ADULT - SUBJECTIVE AND OBJECTIVE BOX
Infectious Diseases Follow Up:    Patient is a 60y old  Female who presents with a chief complaint of seizure (27 Mar 2025 05:39)      Interval History/ROS:  No acute events, pt w/o fevers, chills, no acute complaints     Allergies  No Known Allergies        ANTIMICROBIALS:  cefepime   IVPB 2000 every 8 hours  metroNIDAZOLE  IVPB 500 every 8 hours  vancomycin  IVPB 1000 every 12 hours      Current Abx:     Previous Abx     OTHER MEDS:  MEDICATIONS  (STANDING):  acetaminophen     Tablet .. 650 every 6 hours PRN  albuterol    90 MICROgram(s) HFA Inhaler 2 every 4 hours PRN  ALPRAZolam 0.25 two times a day PRN  heparin   Injectable 5000 every 8 hours  levETIRAcetam 1000 two times a day  melatonin 3 at bedtime PRN  oxyCODONE    IR 2.5 every 4 hours PRN  oxyCODONE    IR 5 every 4 hours PRN  polyethylene glycol 3350 17 daily  senna 2 at bedtime PRN  sertraline 50 daily      Vital Signs Last 24 Hrs  T(C): 36.9 (27 Mar 2025 13:33), Max: 36.9 (27 Mar 2025 13:33)  T(F): 98.4 (27 Mar 2025 13:33), Max: 98.4 (27 Mar 2025 13:33)  HR: 97 (27 Mar 2025 13:33) (74 - 97)  BP: 103/78 (27 Mar 2025 13:33) (103/78 - 120/56)  BP(mean): --  RR: 18 (27 Mar 2025 13:33) (17 - 18)  SpO2: 98% (27 Mar 2025 13:33) (98% - 99%)    Parameters below as of 27 Mar 2025 13:33  Patient On (Oxygen Delivery Method): room air        PHYSICAL EXAM:  GENERAL: NAD, well-developed  HEAD:  R head staples well appearing   EYES: EOMI, , conjunctiva and sclera clear  CHEST/LUNG: Clear to auscultation bilaterally; No wheeze  HEART: Regular rate and rhythm; No murmurs, rubs, or gallops  ABDOMEN: Soft, Nontender, Nondistended;   PSYCH: AAOx3                            11.6   4.62  )-----------( 233      ( 27 Mar 2025 06:51 )             33.4       03-27    136  |  101  |  6[L]  ----------------------------<  96  3.6   |  22  |  0.36[L]    Ca    9.0      27 Mar 2025 06:51  Phos  3.5     03-27  Mg     1.90     03-27        Urinalysis Basic - ( 27 Mar 2025 06:51 )    Color: x / Appearance: x / SG: x / pH: x  Gluc: 96 mg/dL / Ketone: x  / Bili: x / Urobili: x   Blood: x / Protein: x / Nitrite: x   Leuk Esterase: x / RBC: x / WBC x   Sq Epi: x / Non Sq Epi: x / Bacteria: x        MICROBIOLOGY:  Vancomycin Level, Trough: 10.5 ug/mL (03-26-25 @ 17:14)  v  Nose  03-19-25   No staphylococcus aureus isolated.  "PCR is more Sensitive for identifying MRSA/MSSA."  --  --      Tissue 1) EPIDURAL EXUDATE  03-19-25   No growth at 5 days  --    No polymorphonuclear cells seen  No organisms seen      Surgical Swab  03-01-25   No growth at 5 days  --    Few polymorphonuclear leukocytes per low power field  No organisms seen per oil power field      CSF CSF  02-28-25   No growth at 14 days.  --    No polymorphonuclear cells seen  No organisms seen  by cytocentrifuge      Catheterized Catheterized  02-26-25   No growth  --  --      .Blood Blood-Peripheral  02-26-25   No growth at 5 days  --  --      .Blood Blood-Peripheral  02-26-25   No growth at 5 days  --  --                RADIOLOGY:

## 2025-03-28 LAB — VANCOMYCIN TROUGH SERPL-MCNC: 12.2 UG/ML — SIGNIFICANT CHANGE UP (ref 10–20)

## 2025-03-28 PROCEDURE — G0545: CPT

## 2025-03-28 PROCEDURE — 99232 SBSQ HOSP IP/OBS MODERATE 35: CPT

## 2025-03-28 RX ADMIN — LEVETIRACETAM 1000 MILLIGRAM(S): 10 INJECTION, SOLUTION INTRAVENOUS at 17:54

## 2025-03-28 RX ADMIN — CEFEPIME 100 MILLIGRAM(S): 2 INJECTION, POWDER, FOR SOLUTION INTRAVENOUS at 08:57

## 2025-03-28 RX ADMIN — Medication 250 MILLIGRAM(S): at 06:27

## 2025-03-28 RX ADMIN — HEPARIN SODIUM 5000 UNIT(S): 1000 INJECTION INTRAVENOUS; SUBCUTANEOUS at 01:51

## 2025-03-28 RX ADMIN — Medication 100 MILLIGRAM(S): at 08:57

## 2025-03-28 RX ADMIN — CEFEPIME 100 MILLIGRAM(S): 2 INJECTION, POWDER, FOR SOLUTION INTRAVENOUS at 01:50

## 2025-03-28 RX ADMIN — Medication 250 MILLIGRAM(S): at 17:53

## 2025-03-28 RX ADMIN — SERTRALINE 50 MILLIGRAM(S): 100 TABLET, FILM COATED ORAL at 11:44

## 2025-03-28 RX ADMIN — Medication 0.25 MILLIGRAM(S): at 08:57

## 2025-03-28 RX ADMIN — Medication 3 MILLIGRAM(S): at 17:54

## 2025-03-28 RX ADMIN — LEVETIRACETAM 1000 MILLIGRAM(S): 10 INJECTION, SOLUTION INTRAVENOUS at 06:02

## 2025-03-28 RX ADMIN — Medication 100 MILLIGRAM(S): at 01:50

## 2025-03-28 RX ADMIN — HEPARIN SODIUM 5000 UNIT(S): 1000 INJECTION INTRAVENOUS; SUBCUTANEOUS at 10:03

## 2025-03-28 RX ADMIN — CEFEPIME 100 MILLIGRAM(S): 2 INJECTION, POWDER, FOR SOLUTION INTRAVENOUS at 17:53

## 2025-03-28 RX ADMIN — Medication 0.25 MILLIGRAM(S): at 22:39

## 2025-03-28 RX ADMIN — HEPARIN SODIUM 5000 UNIT(S): 1000 INJECTION INTRAVENOUS; SUBCUTANEOUS at 17:53

## 2025-03-28 NOTE — PROGRESS NOTE ADULT - PROBLEM SELECTOR PLAN 1
- Neurologic checks Q6H  - Pain control prn w/ BM regimen  - Continue PT/OT/PMR - acute rehab   - Discharge planning pending  juwan for rehab   - Continue Abx per ID, PICC pending  - dvt ppx chemical    [id number]    Case to be discussed with attending neurosurgeon Dr. Brush

## 2025-03-28 NOTE — PROGRESS NOTE ADULT - SUBJECTIVE AND OBJECTIVE BOX
PAST 24HR EVENTS: Patient seen and examined at bedside. No issues overnight, afebrile.    Vital Signs Last 24 Hrs  T(C): 36.7 (27 Mar 2025 22:52), Max: 36.9 (27 Mar 2025 13:33)  T(F): 98 (27 Mar 2025 22:52), Max: 98.4 (27 Mar 2025 13:33)  HR: 73 (27 Mar 2025 22:52) (73 - 97)  BP: 105/60 (27 Mar 2025 22:52) (103/78 - 120/56)  BP(mean): --  RR: 18 (27 Mar 2025 22:52) (17 - 18)  SpO2: 98% (27 Mar 2025 22:52) (97% - 99%)    Parameters below as of 27 Mar 2025 22:52  Patient On (Oxygen Delivery Method): room air      MEDS:   acetaminophen     Tablet .. 650 milliGRAM(s) Oral every 6 hours PRN  albuterol    90 MICROgram(s) HFA Inhaler 2 Puff(s) Inhalation every 4 hours PRN  ALPRAZolam 0.25 milliGRAM(s) Oral every 8 hours PRN  cefepime   IVPB 2000 milliGRAM(s) IV Intermittent every 8 hours  heparin   Injectable 5000 Unit(s) SubCutaneous every 8 hours  levETIRAcetam 1000 milliGRAM(s) Oral two times a day  melatonin 3 milliGRAM(s) Oral at bedtime PRN  metroNIDAZOLE  IVPB 500 milliGRAM(s) IV Intermittent every 8 hours  oxyCODONE    IR 2.5 milliGRAM(s) Oral every 4 hours PRN  oxyCODONE    IR 5 milliGRAM(s) Oral every 4 hours PRN  polyethylene glycol 3350 17 Gram(s) Oral daily  senna 2 Tablet(s) Oral at bedtime PRN  sertraline 50 milliGRAM(s) Oral daily  vancomycin  IVPB 1000 milliGRAM(s) IV Intermittent every 12 hours      LABS:                        11.6   4.62  )-----------( 233      ( 27 Mar 2025 06:51 )             33.4     03-27    136  |  101  |  6[L]  ----------------------------<  96  3.6   |  22  |  0.36[L]    Ca    9.0      27 Mar 2025 06:51  Phos  3.5     03-27  Mg     1.90     03-27    PHYSICAL EXAM:  AOx3, appropriate, follows commands  PERRL, EOMI, face symmetrical   CLARK x 4 with good strength   Sensation intact to light touch   No pronator drift   Incision C/D/I

## 2025-03-28 NOTE — PROGRESS NOTE ADULT - ASSESSMENT
58 yo F with PMHx of neurofibromatosis, s/p L suboccipital craniectomy for resection of cerebellar brain tumor 2023 by Dr Brush, s/p traumatic fall with head trauma in 2024 with large acute right SDH, s/p emergent right decompressive hemicraniectomy on 24, s/p right cranioplasty on 24 with custom plate, s/p ETV, ligation of shunt on the right with ligaclips with retention of valve as rescue device, presents as a transfer from [facility] for witnessed seizure at home. CTH at [facility] revealed moderate ventriculomegaly and slight increase in size of extra-axial CSF collection overlying the right frontal duraplasty     Exam with decreased strength on left side, post ictal vs intracranial causes, CTH stable,   : on VEEG, K+3.3 repleated, afebrile, blood CX NGTD, UA neg, white count normal   : Stable exam. No seizures noted on VEEG.   : MRI showed increase in right epidural collection with diffusion restriction. Shunt (as reservoir) tapped, W1 R1  GSN  3/1: OR for clarissa hold for drainage of epidural abscess. Cx sent, NGTD. EDJPx1, was intubated preop for stridor, dex x24hrs  3/2: POD1 Intubated, ENT scoped determined airway patent, ID initiated on broad spectrum abx, OR cultures GSN, AFB neg, Dex d/c'd  3/3 POD2 intubated, on flagyl/vanc/cefipime, EDJP 105cc/24hrs  3/4-6 remains intubated, ED SHAYY drain pus tinged 30cc, on triple Abx, OR cltrs NGTD    3/7-10: POD 6-9 Patient tolerating extubation  3/11: POD 10 OR next week for removal of hardware per ID  3/12- stable awaiting OR next week   3/19 had one episode of epistaxis from the L. nostril overnight, but no significant blood loss.  3/19 OR for removal of cranial plate, VPS valve and distal tubing removal, retained proximal catheter ligated with 2 clips, and placement of NEW mesh cranioplasty w/ Dr. Brush, closed with staples  3/20 ID rec IV Abx through . R SGJP x1 bloody drainage 95cc/24h. Cleared for floor.   3/21-: Stable exam. SHAYY X 1, 10cc/24H. Abx through  per ID. PICC placement pending. Vancomycin trough 9.9, will discuss dosing with ID in AM  3/24: SHAYY drain dc'd yesterday 2 staples placed, picc pending, PT recc [facility], IV abx through 4/11  3/25- stable exam, mri w/wo yesterday with postop changes  without enhancement, picc pending, PMR recc acute rehab. Pending [facility]NA[facility] echeverria for rehab placement. IV abx through .

## 2025-03-28 NOTE — PROGRESS NOTE ADULT - SUBJECTIVE AND OBJECTIVE BOX
Infectious Diseases Follow Up:    Patient is a 60y old  Female who presents with a chief complaint of seizure (28 Mar 2025 00:00)      Interval History/ROS:  No acute events noted     Allergies  No Known Allergies        ANTIMICROBIALS:  cefepime   IVPB 2000 every 8 hours  metroNIDAZOLE  IVPB 500 every 8 hours  vancomycin  IVPB 1000 every 12 hours      Current Abx:     Previous Abx     OTHER MEDS:  MEDICATIONS  (STANDING):  acetaminophen     Tablet .. 650 every 6 hours PRN  albuterol    90 MICROgram(s) HFA Inhaler 2 every 4 hours PRN  ALPRAZolam 0.25 every 8 hours PRN  heparin   Injectable 5000 every 8 hours  levETIRAcetam 1000 two times a day  melatonin 3 at bedtime PRN  polyethylene glycol 3350 17 daily  senna 2 at bedtime PRN  sertraline 50 daily      Vital Signs Last 24 Hrs  T(C): 36.7 (28 Mar 2025 09:46), Max: 36.9 (27 Mar 2025 13:33)  T(F): 98.1 (28 Mar 2025 09:46), Max: 98.4 (27 Mar 2025 13:33)  HR: 80 (28 Mar 2025 09:46) (71 - 97)  BP: 113/62 (28 Mar 2025 09:46) (103/78 - 118/56)  BP(mean): --  RR: 18 (28 Mar 2025 09:46) (16 - 18)  SpO2: 97% (28 Mar 2025 09:46) (97% - 98%)    Parameters below as of 28 Mar 2025 09:46  Patient On (Oxygen Delivery Method): room air        PHYSICAL EXAM:  GENERAL: NAD, well-developed  HEAD:  R head staples well appearing   EYES: EOMI, , conjunctiva and sclera clear  CHEST/LUNG: Clear to auscultation bilaterally; No wheeze  HEART: Regular rate and rhythm; No murmurs, rubs, or gallops  ABDOMEN: Soft, Nontender, Nondistended;   PSYCH: AAOx3                          11.6   4.62  )-----------( 233      ( 27 Mar 2025 06:51 )             33.4       03-27    136  |  101  |  6[L]  ----------------------------<  96  3.6   |  22  |  0.36[L]    Ca    9.0      27 Mar 2025 06:51  Phos  3.5     03-27  Mg     1.90     03-27        Urinalysis Basic - ( 27 Mar 2025 06:51 )    Color: x / Appearance: x / SG: x / pH: x  Gluc: 96 mg/dL / Ketone: x  / Bili: x / Urobili: x   Blood: x / Protein: x / Nitrite: x   Leuk Esterase: x / RBC: x / WBC x   Sq Epi: x / Non Sq Epi: x / Bacteria: x        MICROBIOLOGY:  Vancomycin Level, Trough: 12.2 ug/mL (03-28-25 @ 05:09)  v  Nose  03-19-25   No staphylococcus aureus isolated.  "PCR is more Sensitive for identifying MRSA/MSSA."  --  --      Tissue 1) EPIDURAL EXUDATE  03-19-25   No growth at 5 days  --    No polymorphonuclear cells seen  No organisms seen      Surgical Swab  03-01-25   No growth at 5 days  --    Few polymorphonuclear leukocytes per low power field  No organisms seen per oil power field      CSF CSF  02-28-25   No growth at 14 days.  --    No polymorphonuclear cells seen  No organisms seen  by cytocentrifuge      Catheterized Catheterized  02-26-25   No growth  --  --      .Blood Blood-Peripheral  02-26-25   No growth at 5 days  --  --      .Blood Blood-Peripheral  02-26-25   No growth at 5 days  --  --                RADIOLOGY:

## 2025-03-28 NOTE — PROGRESS NOTE ADULT - ASSESSMENT
This is a 58 y/o F w/ very extensive PMHx of NF, 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), s/p  shunt (in [facility]), s/p traumatic fall w/ R SDH, s/p R decompressive hemicraniectomy, ICP monitor and R cranioplasty (5/2024), s/p L frontal Endoscopic third ventriculostomy and ligation of R  shunt with Ligaclips with retention of valve, proximal and distal catheters (08/2024) admitted initially to [facility] on 2/22 for seizure, intubated for airway protection and status epilepticus, initially on Zosyn for aspiration PNA.  Initially CTH w/ slight increase in extra axial fluid collection measuring up to 1.5 cm. Pt was transferred to [facility] on 2/23 for neurosurgery eval, given decreased L sided strength, MRI done with increase in R sided collection.  Pt extubated on 2/28, CSF reservoir tapped, no pleocytosis.    Pt went to the OR on 3/1 for R clarissa pole with evacuation of epidural collection, with purulent drainage.     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), s/p  shunt (in [facility]), s/p traumatic fall w/ R SDH, s/p R decompressive hemicraniectomy, ICP monitor and R cranioplasty (5/2024), s/p L frontal Endoscopic third ventriculostomy and ligation of R  shunt with ligaclips with retention of valve, proximal and distal catheters (08/2024) admitted initially to [facility] on 2/22 for seizure, intubated for airway protection and status epilepticus, initially on Zosyn for aspiration PNA, transferred to [facility] on 2/23 for neurosurgery evaluation, now found to with have epidural abscess s/p R Clarissa w/ purulent/milky drainage.  Extubated on 3/3, reintubated for stridor/work of breaking, now extubated on 3/6.    D/w neurosurgery, Cx all negative, CRP low, consider allergic reaction to prosthetic cranioplasty. Would likely treat w/ abx for 6 weeks   OR on 3/19: Explantation of cranial plate, removal of ventriculoperitoneal shunt valve + distal tubing, proximal catheter ligated with 2 clips and left behind, cranioplasty with mesh plate place  Path with mulinucleated giant cells, could be associated with allergy reaction from previous prosthesis?    Recommendations:   1. Vancomycin 1g q12, f/u level goal -600, Cefepime 2 g q8, plan for total 6 week course (4/11/25). D/c flagyl (risks >benefits at this point)   2. OR Cx 3/19 NG  3. Will need PICC given IV Vanco     OPAT recommendations:   1. Vancomycin 1 g q12, Cefepime 2 g q8  2. Weekly CBC, CMP, vancomycin trough to be followed by rehab provider     Thank you for consulting us and involving us in the management of this patient's case. In addition to reviewing history, imaging, documents, labs, microbiology, and infection control strategies and potential issues.     ID will continue to follow peripherally     Inder Mcintosh M.D.  Attending Physician  Division of Infectious Diseases  Department of Medicine

## 2025-03-29 LAB
CULTURE RESULTS: SIGNIFICANT CHANGE UP
SPECIMEN SOURCE: SIGNIFICANT CHANGE UP
VANCOMYCIN TROUGH SERPL-MCNC: 10.7 UG/ML — SIGNIFICANT CHANGE UP (ref 10–20)

## 2025-03-29 RX ADMIN — Medication 3 MILLIGRAM(S): at 20:21

## 2025-03-29 RX ADMIN — Medication 250 MILLIGRAM(S): at 05:32

## 2025-03-29 RX ADMIN — CEFEPIME 100 MILLIGRAM(S): 2 INJECTION, POWDER, FOR SOLUTION INTRAVENOUS at 10:50

## 2025-03-29 RX ADMIN — LEVETIRACETAM 1000 MILLIGRAM(S): 10 INJECTION, SOLUTION INTRAVENOUS at 17:52

## 2025-03-29 RX ADMIN — Medication 0.25 MILLIGRAM(S): at 14:11

## 2025-03-29 RX ADMIN — HEPARIN SODIUM 5000 UNIT(S): 1000 INJECTION INTRAVENOUS; SUBCUTANEOUS at 17:53

## 2025-03-29 RX ADMIN — CEFEPIME 100 MILLIGRAM(S): 2 INJECTION, POWDER, FOR SOLUTION INTRAVENOUS at 17:53

## 2025-03-29 RX ADMIN — LEVETIRACETAM 1000 MILLIGRAM(S): 10 INJECTION, SOLUTION INTRAVENOUS at 05:32

## 2025-03-29 RX ADMIN — HEPARIN SODIUM 5000 UNIT(S): 1000 INJECTION INTRAVENOUS; SUBCUTANEOUS at 00:43

## 2025-03-29 RX ADMIN — CEFEPIME 100 MILLIGRAM(S): 2 INJECTION, POWDER, FOR SOLUTION INTRAVENOUS at 00:43

## 2025-03-29 RX ADMIN — SERTRALINE 50 MILLIGRAM(S): 100 TABLET, FILM COATED ORAL at 12:50

## 2025-03-29 RX ADMIN — Medication 250 MILLIGRAM(S): at 20:21

## 2025-03-29 RX ADMIN — HEPARIN SODIUM 5000 UNIT(S): 1000 INJECTION INTRAVENOUS; SUBCUTANEOUS at 10:04

## 2025-03-29 NOTE — PROGRESS NOTE ADULT - ASSESSMENT
58 yo F with PMHx of neurofibromatosis, s/p L suboccipital craniectomy for resection of cerebellar brain tumor 2023 by Dr Brush, s/p traumatic fall with head trauma in 2024 with large acute right SDH, s/p emergent right decompressive hemicraniectomy on 24, s/p right cranioplasty on 24 with custom plate, s/p ETV, ligation of shunt on the right with ligaclips with retention of valve as rescue device, presents as a transfer from [facility] for witnessed seizure at home. CTH at [facility] revealed moderate ventriculomegaly and slight increase in size of extra-axial CSF collection overlying the right frontal duraplasty     Exam with decreased strength on left side, post ictal vs intracranial causes, CTH stable,   : on VEEG, K+3.3 repleated, afebrile, blood CX NGTD, UA neg, white count normal   : Stable exam. No seizures noted on VEEG.   : MRI showed increase in right epidural collection with diffusion restriction. Shunt (as reservoir) tapped, W1 R1  GSN  3/1: OR for clarissa hold for drainage of epidural abscess. Cx sent, NGTD. EDJPx1, was intubated preop for stridor, dex x24hrs  3/2: POD1 Intubated, ENT scoped determined airway patent, ID initiated on broad spectrum abx, OR cultures GSN, AFB neg, Dex d/c'd  3/3 POD2 intubated, on flagyl/vanc/cefipime, EDJP 105cc/24hrs  3/4-6 remains intubated, ED SHAYY drain pus tinged 30cc, on triple Abx, OR cltrs NGTD    3/7-10: POD 6-9 Patient tolerating extubation  3/11: POD 10 OR next week for removal of hardware per ID  3/12- stable awaiting OR next week   3/19 had one episode of epistaxis from the L. nostril overnight, but no significant blood loss.  3/19 OR for removal of cranial plate, VPS valve and distal tubing removal, retained proximal catheter ligated with 2 clips, and placement of NEW mesh cranioplasty w/ Dr. Brush, closed with staples  3/20 ID rec IV Abx through . R SGJP x1 bloody drainage 95cc/24h. Cleared for floor.   3/21-: Stable exam. SHAYY X 1, 10cc/24H. Abx through  per ID. PICC placement pending. Vancomycin trough 9.9, will discuss dosing with ID in AM  3/24: SHAYY drain dc'd yesterday 2 staples placed, picc pending, PT recc [facility], IV abx through 4/11  3/25-: stable exam, mri w/wo yesterday with postop changes  without enhancement, picc pending, PMR recc acute rehab. Pending ANAID echeverria for rehab placement. IV abx through .

## 2025-03-29 NOTE — PROGRESS NOTE ADULT - PROBLEM SELECTOR PLAN 1
Neurologic checks Q6H  Pain control prn w/ BM regimen  Continue PT/OT/PMR - acute rehab   Discharge planning pending ANAID echeverria for rehab   Continue Abx per ID, PICC pending  dvt ppx chemical

## 2025-03-29 NOTE — PROGRESS NOTE ADULT - SUBJECTIVE AND OBJECTIVE BOX
Resting comfortably  No issues overnight  Vital Signs Last 24 Hrs  T(C): 36.8 (29 Mar 2025 00:52), Max: 36.8 (29 Mar 2025 00:52)  T(F): 98.3 (29 Mar 2025 00:52), Max: 98.3 (29 Mar 2025 00:52)  HR: 84 (29 Mar 2025 00:52) (72 - 93)  BP: 102/55 (29 Mar 2025 00:52) (101/71 - 119/56)  BP(mean): --  RR: 18 (29 Mar 2025 00:52) (18 - 19)  SpO2: 98% (29 Mar 2025 00:52) (97% - 100%)    Parameters below as of 29 Mar 2025 00:52  Patient On (Oxygen Delivery Method): room air    AAO X 3  PERRLA, EOMI  CN 2-12 grossly intact  CLARK strength 5/5  No dysmetria or drift  SILT    MEDICATIONS  (STANDING):  cefepime   IVPB 2000 milliGRAM(s) IV Intermittent every 8 hours  heparin   Injectable 5000 Unit(s) SubCutaneous every 8 hours  levETIRAcetam 1000 milliGRAM(s) Oral two times a day  polyethylene glycol 3350 17 Gram(s) Oral daily  sertraline 50 milliGRAM(s) Oral daily  vancomycin  IVPB 1000 milliGRAM(s) IV Intermittent every 12 hours    MEDICATIONS  (PRN):  acetaminophen     Tablet .. 650 milliGRAM(s) Oral every 6 hours PRN Temp greater or equal to 38C (100.4F), Moderate Pain (4 - 6)  albuterol    90 MICROgram(s) HFA Inhaler 2 Puff(s) Inhalation every 4 hours PRN Shortness of Breath and/or Wheezing  ALPRAZolam 0.25 milliGRAM(s) Oral every 8 hours PRN Anxiety  melatonin 3 milliGRAM(s) Oral at bedtime PRN Insomnia  senna 2 Tablet(s) Oral at bedtime PRN Constipation                          11.6   4.62  )-----------( 233      ( 27 Mar 2025 06:51 )             33.4     03-27    136  |  101  |  6[L]  ----------------------------<  96  3.6   |  22  |  0.36[L]    Ca    9.0      27 Mar 2025 06:51  Phos  3.5     03-27  Mg     1.90     03-27

## 2025-03-30 RX ORDER — ALPRAZOLAM 0.5 MG
0.25 TABLET, EXTENDED RELEASE 24 HR ORAL ONCE
Refills: 0 | Status: DISCONTINUED | OUTPATIENT
Start: 2025-03-30 | End: 2025-03-30

## 2025-03-30 RX ORDER — ALPRAZOLAM 0.5 MG
0.5 TABLET, EXTENDED RELEASE 24 HR ORAL THREE TIMES A DAY
Refills: 0 | Status: DISCONTINUED | OUTPATIENT
Start: 2025-03-30 | End: 2025-03-30

## 2025-03-30 RX ADMIN — HEPARIN SODIUM 5000 UNIT(S): 1000 INJECTION INTRAVENOUS; SUBCUTANEOUS at 08:34

## 2025-03-30 RX ADMIN — LEVETIRACETAM 1000 MILLIGRAM(S): 10 INJECTION, SOLUTION INTRAVENOUS at 05:32

## 2025-03-30 RX ADMIN — CEFEPIME 100 MILLIGRAM(S): 2 INJECTION, POWDER, FOR SOLUTION INTRAVENOUS at 17:24

## 2025-03-30 RX ADMIN — LEVETIRACETAM 1000 MILLIGRAM(S): 10 INJECTION, SOLUTION INTRAVENOUS at 17:25

## 2025-03-30 RX ADMIN — CEFEPIME 100 MILLIGRAM(S): 2 INJECTION, POWDER, FOR SOLUTION INTRAVENOUS at 09:38

## 2025-03-30 RX ADMIN — SERTRALINE 50 MILLIGRAM(S): 100 TABLET, FILM COATED ORAL at 21:21

## 2025-03-30 RX ADMIN — Medication 250 MILLIGRAM(S): at 08:28

## 2025-03-30 RX ADMIN — CEFEPIME 100 MILLIGRAM(S): 2 INJECTION, POWDER, FOR SOLUTION INTRAVENOUS at 01:12

## 2025-03-30 RX ADMIN — Medication 3 MILLIGRAM(S): at 20:11

## 2025-03-30 RX ADMIN — Medication 0.25 MILLIGRAM(S): at 11:17

## 2025-03-30 RX ADMIN — Medication 250 MILLIGRAM(S): at 20:20

## 2025-03-30 RX ADMIN — Medication 0.25 MILLIGRAM(S): at 14:26

## 2025-03-30 RX ADMIN — HEPARIN SODIUM 5000 UNIT(S): 1000 INJECTION INTRAVENOUS; SUBCUTANEOUS at 17:24

## 2025-03-30 RX ADMIN — Medication 0.25 MILLIGRAM(S): at 03:13

## 2025-03-30 RX ADMIN — HEPARIN SODIUM 5000 UNIT(S): 1000 INJECTION INTRAVENOUS; SUBCUTANEOUS at 01:12

## 2025-03-30 NOTE — PROGRESS NOTE ADULT - ASSESSMENT
60 yo F with PMHx of neurofibromatosis, s/p L suboccipital craniectomy for resection of cerebellar brain tumor 2023 by Dr Brush, s/p traumatic fall with head trauma in 2024 with large acute right SDH, s/p emergent right decompressive hemicraniectomy on 24, s/p right cranioplasty on 24 with custom plate, s/p ETV, ligation of shunt on the right with ligaclips with retention of valve as rescue device, presents as a transfer from [facility] for witnessed seizure at home. CTH at [facility] revealed moderate ventriculomegaly and slight increase in size of extra-axial CSF collection overlying the right frontal duraplasty     Exam with decreased strength on left side, post ictal vs intracranial causes, CTH stable,   : on VEEG, K+3.3 repleated, afebrile, blood CX NGTD, UA neg, white count normal   : Stable exam. No seizures noted on VEEG.   : MRI showed increase in right epidural collection with diffusion restriction. Shunt (as reservoir) tapped, W1 R1  GSN  3/1: OR for clarissa hold for drainage of epidural abscess. Cx sent, NGTD. EDJPx1, was intubated preop for stridor, dex x24hrs  3/2: POD1 Intubated, ENT scoped determined airway patent, ID initiated on broad spectrum abx, OR cultures GSN, AFB neg, Dex d/c'd  3/3 POD2 intubated, on flagyl/vanc/cefipime, EDJP 105cc/24hrs  3/4-6 remains intubated, ED SHAYY drain pus tinged 30cc, on triple Abx, OR cltrs NGTD    3/7-10: POD 6-9 Patient tolerating extubation  3/11: POD 10 OR next week for removal of hardware per ID  3/12- stable awaiting OR next week   3/19 had one episode of epistaxis from the L. nostril overnight, but no significant blood loss.  3/19 OR for removal of cranial plate, VPS valve and distal tubing removal, retained proximal catheter ligated with 2 clips, and placement of NEW mesh cranioplasty w/ Dr. Brush, closed with staples  3/20 ID rec IV Abx through . R SGJP x1 bloody drainage 95cc/24h. Cleared for floor.   3/21-: Stable exam. SHAYY X 1, 10cc/24H. Abx through  per ID. PICC placement pending. Vancomycin trough 9.9, will discuss dosing with ID in AM  3/24: SHAYY drain dc'd yesterday 2 staples placed, picc pending, PT recc [facility], IV abx through 4/11  3/25-: stable exam, mri w/wo yesterday with postop changes  without enhancement, picc pending, PMR recc acute rehab. Pending [facility] echeverria for rehab placement. IV abx through .

## 2025-03-30 NOTE — PROGRESS NOTE ADULT - PROBLEM SELECTOR PLAN 1
Neurologic checks Q6H  Pain control prn w/ BM regimen  Continue PT/OT/PMR - acute rehab   Discharge planning pending A[facility] echeverria for rehab   Continue Abx per ID, PICC pending  dvt ppx chemical.

## 2025-03-30 NOTE — PROGRESS NOTE ADULT - SUBJECTIVE AND OBJECTIVE BOX
No issues overnight  Vital Signs Last 24 Hrs  T(C): 36.3 (30 Mar 2025 01:04), Max: 37 (29 Mar 2025 18:00)  T(F): 97.3 (30 Mar 2025 01:04), Max: 98.6 (29 Mar 2025 18:00)  HR: 72 (30 Mar 2025 01:04) (72 - 88)  BP: 123/58 (30 Mar 2025 01:04) (108/60 - 123/58)  BP(mean): --  RR: 18 (30 Mar 2025 01:04) (16 - 19)  SpO2: 100% (30 Mar 2025 01:04) (97% - 100%)    Parameters below as of 30 Mar 2025 01:04  Patient On (Oxygen Delivery Method): room air    AAO X 3  PERRLA, EOMI  CN 2-12 grossly intact  CLARK strength 5/5  No dysmetria or drift  SILT    MEDICATIONS  (STANDING):  cefepime   IVPB 2000 milliGRAM(s) IV Intermittent every 8 hours  heparin   Injectable 5000 Unit(s) SubCutaneous every 8 hours  levETIRAcetam 1000 milliGRAM(s) Oral two times a day  polyethylene glycol 3350 17 Gram(s) Oral daily  sertraline 50 milliGRAM(s) Oral daily  vancomycin  IVPB 1000 milliGRAM(s) IV Intermittent every 12 hours    MEDICATIONS  (PRN):  acetaminophen     Tablet .. 650 milliGRAM(s) Oral every 6 hours PRN Temp greater or equal to 38C (100.4F), Moderate Pain (4 - 6)  albuterol    90 MICROgram(s) HFA Inhaler 2 Puff(s) Inhalation every 4 hours PRN Shortness of Breath and/or Wheezing  ALPRAZolam 0.25 milliGRAM(s) Oral every 8 hours PRN Anxiety  melatonin 3 milliGRAM(s) Oral at bedtime PRN Insomnia  senna 2 Tablet(s) Oral at bedtime PRN Constipation    Vancomycin Level, Trough -Pre 4th Dose, order if dosed q6/8/12h (03.29.25 @ 18:51)    Vancomycin Level, Trough: 10.7: Vancomycin trough levels should be rapidly reached and maintained at  15-20 ug/mL for life threatening MRSA infections such as sepsis,  endocarditis, osteomyelitis and pneumonia. A first trough level should be  drawn before the 3rd or 4th dose. Risk of renal toxicity is increased for  levels >15 ug/mL, in patients on other nephrotoxic drugs, who are  hemodynamically unstable, have unstable renal function, or are on  vancomycin therapy for >14 days. Renal function with creatinine levels  should bemonitored for those patients. ug/mL

## 2025-03-31 LAB — VANCOMYCIN TROUGH SERPL-MCNC: 12 UG/ML — SIGNIFICANT CHANGE UP (ref 10–20)

## 2025-03-31 PROCEDURE — 99232 SBSQ HOSP IP/OBS MODERATE 35: CPT

## 2025-03-31 RX ORDER — ALPRAZOLAM 0.5 MG
0.25 TABLET, EXTENDED RELEASE 24 HR ORAL ONCE
Refills: 0 | Status: DISCONTINUED | OUTPATIENT
Start: 2025-03-31 | End: 2025-03-31

## 2025-03-31 RX ORDER — LORAZEPAM 4 MG/ML
0.5 VIAL (ML) INJECTION ONCE
Refills: 0 | Status: DISCONTINUED | OUTPATIENT
Start: 2025-03-31 | End: 2025-03-31

## 2025-03-31 RX ORDER — ALPRAZOLAM 0.5 MG
0.25 TABLET, EXTENDED RELEASE 24 HR ORAL EVERY 8 HOURS
Refills: 0 | Status: DISCONTINUED | OUTPATIENT
Start: 2025-03-31 | End: 2025-04-01

## 2025-03-31 RX ADMIN — CEFEPIME 100 MILLIGRAM(S): 2 INJECTION, POWDER, FOR SOLUTION INTRAVENOUS at 09:03

## 2025-03-31 RX ADMIN — LEVETIRACETAM 1000 MILLIGRAM(S): 10 INJECTION, SOLUTION INTRAVENOUS at 17:16

## 2025-03-31 RX ADMIN — CEFEPIME 100 MILLIGRAM(S): 2 INJECTION, POWDER, FOR SOLUTION INTRAVENOUS at 01:08

## 2025-03-31 RX ADMIN — LEVETIRACETAM 1000 MILLIGRAM(S): 10 INJECTION, SOLUTION INTRAVENOUS at 05:24

## 2025-03-31 RX ADMIN — Medication 3 MILLIGRAM(S): at 21:18

## 2025-03-31 RX ADMIN — Medication 250 MILLIGRAM(S): at 21:19

## 2025-03-31 RX ADMIN — Medication 0.25 MILLIGRAM(S): at 09:04

## 2025-03-31 RX ADMIN — Medication 0.25 MILLIGRAM(S): at 17:41

## 2025-03-31 RX ADMIN — HEPARIN SODIUM 5000 UNIT(S): 1000 INJECTION INTRAVENOUS; SUBCUTANEOUS at 01:08

## 2025-03-31 RX ADMIN — SERTRALINE 50 MILLIGRAM(S): 100 TABLET, FILM COATED ORAL at 21:19

## 2025-03-31 RX ADMIN — Medication 250 MILLIGRAM(S): at 08:12

## 2025-03-31 RX ADMIN — Medication 0.5 MILLIGRAM(S): at 01:49

## 2025-03-31 RX ADMIN — HEPARIN SODIUM 5000 UNIT(S): 1000 INJECTION INTRAVENOUS; SUBCUTANEOUS at 09:04

## 2025-03-31 RX ADMIN — CEFEPIME 100 MILLIGRAM(S): 2 INJECTION, POWDER, FOR SOLUTION INTRAVENOUS at 17:15

## 2025-03-31 RX ADMIN — HEPARIN SODIUM 5000 UNIT(S): 1000 INJECTION INTRAVENOUS; SUBCUTANEOUS at 17:17

## 2025-03-31 RX ADMIN — Medication 0.25 MILLIGRAM(S): at 14:45

## 2025-03-31 NOTE — PROGRESS NOTE ADULT - SUBJECTIVE AND OBJECTIVE BOX
Infectious Diseases Follow Up:    Patient is a 60y old  Female who presents with a chief complaint of seizure (31 Mar 2025 09:55)      Interval History/ROS:  No acute events, pt eager to leave hospital     Allergies  No Known Allergies        ANTIMICROBIALS:  cefepime   IVPB 2000 every 8 hours  vancomycin  IVPB 1000 every 12 hours      Current Abx:     Previous Abx     OTHER MEDS:  MEDICATIONS  (STANDING):  acetaminophen     Tablet .. 650 every 6 hours PRN  albuterol    90 MICROgram(s) HFA Inhaler 2 every 4 hours PRN  ALPRAZolam 0.25 every 8 hours PRN  heparin   Injectable 5000 every 8 hours  levETIRAcetam 1000 two times a day  melatonin 3 at bedtime PRN  polyethylene glycol 3350 17 daily  senna 2 at bedtime PRN  sertraline 50 daily      Vital Signs Last 24 Hrs  T(C): 36.7 (31 Mar 2025 13:50), Max: 36.7 (31 Mar 2025 09:45)  T(F): 98.1 (31 Mar 2025 13:50), Max: 98.1 (31 Mar 2025 13:50)  HR: 79 (31 Mar 2025 13:50) (73 - 94)  BP: 110/50 (31 Mar 2025 13:50) (110/50 - 126/67)  BP(mean): --  RR: 16 (31 Mar 2025 13:50) (16 - 17)  SpO2: 99% (31 Mar 2025 13:50) (98% - 100%)    Parameters below as of 31 Mar 2025 13:50  Patient On (Oxygen Delivery Method): room air        PHYSICAL EXAM:  GENERAL: NAD, well-developed  HEAD:  R head staples well appearing   EYES: EOMI, , conjunctiva and sclera clear  CHEST/LUNG: Clear to auscultation bilaterally; No wheeze  HEART: Regular rate and rhythm; No murmurs, rubs, or gallops  ABDOMEN: Soft, Nontender, Nondistended;   PSYCH: AAOx3                  MICROBIOLOGY:  v  Nose  03-19-25   No staphylococcus aureus isolated.  "PCR is more Sensitive for identifying MRSA/MSSA."  --  --      Tissue 1) EPIDURAL EXUDATE  03-19-25   No growth at 5 days  --    No polymorphonuclear cells seen  No organisms seen                RADIOLOGY:

## 2025-03-31 NOTE — PROGRESS NOTE ADULT - SUBJECTIVE AND OBJECTIVE BOX
No issues overnight  Vital Signs Last 24 Hrs  T(C): 36.2 (31 Mar 2025 01:04), Max: 36.7 (30 Mar 2025 13:09)  T(F): 97.2 (31 Mar 2025 01:04), Max: 98 (30 Mar 2025 13:09)  HR: 82 (31 Mar 2025 01:04) (70 - 98)  BP: 125/73 (31 Mar 2025 01:04) (114/55 - 125/73)  BP(mean): --  RR: 17 (31 Mar 2025 01:04) (16 - 18)  SpO2: 100% (31 Mar 2025 01:04) (98% - 100%)    Parameters below as of 31 Mar 2025 01:04  Patient On (Oxygen Delivery Method): room air    AAO X 3  PERRLA, EOMI  CN 2-12 grossly intact  CLARK strength 5/5  No dysmetria or drift  SILT    MEDICATIONS  (STANDING):  cefepime   IVPB 2000 milliGRAM(s) IV Intermittent every 8 hours  heparin   Injectable 5000 Unit(s) SubCutaneous every 8 hours  levETIRAcetam 1000 milliGRAM(s) Oral two times a day  polyethylene glycol 3350 17 Gram(s) Oral daily  sertraline 50 milliGRAM(s) Oral daily  vancomycin  IVPB 1000 milliGRAM(s) IV Intermittent every 12 hours    MEDICATIONS  (PRN):  acetaminophen     Tablet .. 650 milliGRAM(s) Oral every 6 hours PRN Temp greater or equal to 38C (100.4F), Moderate Pain (4 - 6)  albuterol    90 MICROgram(s) HFA Inhaler 2 Puff(s) Inhalation every 4 hours PRN Shortness of Breath and/or Wheezing  melatonin 3 milliGRAM(s) Oral at bedtime PRN Insomnia  senna 2 Tablet(s) Oral at bedtime PRN Constipation

## 2025-03-31 NOTE — PROGRESS NOTE ADULT - ASSESSMENT
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 on 2/22 for seizure, intubated for airway protection and status epilepticus, initially on Zosyn for aspiration PNA.  Initially CTH w/ slight increase in extra axial fluid collection measuring up to 1.5 cm. Pt was transferred to [facility] on 2/23 for neurosurgery eval, given decreased L sided strength, MRI done with increase in R sided collection.  Pt extubated on 2/28, CSF reservoir tapped, no pleocytosis.    Pt went to the OR on 3/1 for R clarissa pole with evacuation of epidural collection, with purulent drainage.     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 on 2/22 for seizure, intubated for airway protection and status epilepticus, initially on Zosyn for aspiration PNA, transferred to [facility] on 2/23 for neurosurgery evaluation, now found to with have epidural abscess s/p R Clarissa w/ purulent/milky drainage.  Extubated on 3/3, reintubated for stridor/work of breaking, now extubated on 3/6.    D/w neurosurgery, Cx all negative, CRP low, consider allergic reaction to prosthetic cranioplasty. Would likely treat w/ abx for 6 weeks   OR on 3/19: Explantation of cranial plate, removal of ventriculoperitoneal shunt valve + distal tubing, proximal catheter ligated with 2 clips and left behind, cranioplasty with mesh plate place  Path with multinucleated giant cells, could be associated with allergy reaction from previous prosthesis?    Recommendations:   1. Vancomycin 1g q12, f/u level goal -600, Cefepime 2 g q8, plan for total 6 week course (4/11/25).   2. OR Cx 3/19 NG  3. Will need PICC given IV Vanco     OPAT recommendations:   1. Vancomycin 1 g q12, Cefepime 2 g q8 until 4/11/25  2. Weekly CBC, CMP, vancomycin trough to be followed by rehab provider     Thank you for consulting us and involving us in the management of this patient's case. In addition to reviewing history, imaging, documents, labs, microbiology, and infection control strategies and potential issues.     Inpatient ID consult team will sign off.    Further changes in lab values, imaging studies, or clinical status will not be known to ID inpatient consultants unless specifically communicated by primary team.    Inder Mcintosh MD  Attending Physician  Division of Infectious Diseases  Department of Medicine    Please contact through MS Teams message.  Office: [phone] (after 5 PM or weekend)

## 2025-03-31 NOTE — PROGRESS NOTE ADULT - ASSESSMENT
58 yo F with PMHx of neurofibromatosis, s/p L suboccipital craniectomy for resection of cerebellar brain tumor 2023 by Dr Brush, s/p traumatic fall with head trauma in 2024 with large acute right SDH, s/p emergent right decompressive hemicraniectomy on 24, s/p right cranioplasty on 24 with custom plate, s/p ETV, ligation of shunt on the right with ligaclips with retention of valve as rescue device, presents as a transfer from [facility] for witnessed seizure at home. CTH at [facility] revealed moderate ventriculomegaly and slight increase in size of extra-axial CSF collection overlying the right frontal duraplasty     Exam with decreased strength on left side, post ictal vs intracranial causes, CTH stable,   : on VEEG, K+3.3 repleated, afebrile, blood CX NGTD, UA neg, white count normal   : Stable exam. No seizures noted on VEEG.   : MRI showed increase in right epidural collection with diffusion restriction. Shunt (as reservoir) tapped, W1 R1  GSN  3/1: OR for clarissa hold for drainage of epidural abscess. Cx sent, NGTD. EDJPx1, was intubated preop for stridor, dex x24hrs  3/2: POD1 Intubated, ENT scoped determined airway patent, ID initiated on broad spectrum abx, OR cultures GSN, AFB neg, Dex d/c'd  3/3 POD2 intubated, on flagyl/vanc/cefipime, EDJP 105cc/24hrs  3/4-6 remains intubated, ED SHAYY drain pus tinged 30cc, on triple Abx, OR cltrs NGTD    3/7-10: POD 6-9 Patient tolerating extubation  3/11: POD 10 OR next week for removal of hardware per ID  3/12- stable awaiting OR next week   3/19 had one episode of epistaxis from the L. nostril overnight, but no significant blood loss.  3/19 OR for removal of cranial plate, VPS valve and distal tubing removal, retained proximal catheter ligated with 2 clips, and placement of NEW mesh cranioplasty w/ Dr. Brush, closed with staples  3/20 ID rec IV Abx through . R SGJP x1 bloody drainage 95cc/24h. Cleared for floor.   3/21-: Stable exam. SHAYY X 1, 10cc/24H. Abx through  per ID. PICC placement pending. Vancomycin trough 9.9, will discuss dosing with ID in AM  3/24: SHAYY drain dc'd yesterday 2 staples placed, picc pending, PT recc [facility], IV abx through 4/11  3/25-: stable exam, mri w/wo yesterday with postop changes  without enhancement, picc pending, PM&R recc acute rehab. Pending [facility] e[facility]ria for rehab placement. IV abx through .

## 2025-03-31 NOTE — PROGRESS NOTE ADULT - SUBJECTIVE AND OBJECTIVE BOX
Patient is a 60y old  Female who presents with a chief complaint of seizure (31 Mar 2025 02:08)    Interval history: states she has been working with PT but did not work with them yet today    REVIEW OF SYSTEMS  Constitutional - No fever, No weight loss, No fatigue  HEENT - No eye pain, No visual disturbances, No difficulty hearing, No tinnitus, No vertigo, No neck pain  Respiratory - No cough, No wheezing, No shortness of breath  Cardiovascular - No chest pain, No palpitations  Gastrointestinal - No abdominal pain, No nausea, No vomiting, No diarrhea, No constipation  Genitourinary - No dysuria, No frequency, No hematuria, No incontinence  Neurological - No headaches, No memory loss, + loss of strength, No numbness, No tremors  Skin - No itching, No rashes, No lesions   + staples  Endocrine - No temperature intolerance  Musculoskeletal - No joint pain, No joint swelling, No muscle pain  Psychiatric - No depression, No anxiety    PAST MEDICAL & SURGICAL HISTORY  Asthma    Depression    Neoplasm of uncertain behavior of brain    Neurofibromatosis, type 1    Neurofibromatosis    History of hydrocephalus    Anxiety    Delivery with history of     S/P tubal ligation    History of arthroscopy of left knee    S/P LASIK Surgery    H/O brain tumor    S/P  shunt       CURRENT FUNCTIONAL STATUS  3/29  Bed Mobility  Bed Mobility Training Rehab Potential: fair, will monitor progress closely  Bed Mobility Training Rolling/Turning: supervsion;  1 person assist  Bed Mobility Training Scooting: supervsion;  1 person assist  Bed Mobility Training Sit-to-Supine: supervsion;  1 person assist  Bed Mobility Training Supine-to-Sit: supervsion;  1 person assist  Bed Mobility Training Limitations: decreased strength    Sit-Stand Transfer Training  Sit-to-Stand Transfer Training Rehab Potential: fair, will monitor progress closely  Transfer Training Sit-to-Stand Transfer: supervsion;  1 person assist  Transfer Training Stand-to-Sit Transfer: supervsion;  1 person assist  Sit-to-Stand Transfer Training Transfer Safety Analysis: decreased strength    Gait Training  Gait Training Rehab Potential: fair, will monitor progress closely  Gait Training: supervsion;  1 person assist;  rolling walker;  50 feet         VITALS  T(C): 36.2 (25 @ 04:44), Max: 36.7 (25 @ 13:09)  HR: 94 (25 @ 04:44) (73 - 94)  BP: 126/67 (25 @ 04:44) (114/55 - 126/67)  RR: 17 (25 @ 04:44) (17 - 18)  SpO2: 100% (25 @ 04:44) (98% - 100%)  Wt(kg): --    ALLERGIES  No Known Allergies      MEDICATIONS   acetaminophen     Tablet .. 650 milliGRAM(s) Oral every 6 hours PRN  albuterol    90 MICROgram(s) HFA Inhaler 2 Puff(s) Inhalation every 4 hours PRN  ALPRAZolam 0.25 milliGRAM(s) Oral every 8 hours PRN  cefepime   IVPB 2000 milliGRAM(s) IV Intermittent every 8 hours  heparin   Injectable 5000 Unit(s) SubCutaneous every 8 hours  levETIRAcetam 1000 milliGRAM(s) Oral two times a day  melatonin 3 milliGRAM(s) Oral at bedtime PRN  polyethylene glycol 3350 17 Gram(s) Oral daily  senna 2 Tablet(s) Oral at bedtime PRN  sertraline 50 milliGRAM(s) Oral daily  vancomycin  IVPB 1000 milliGRAM(s) IV Intermittent every 12 hours      ----------------------------------------------------------------------------------------   PHYSICAL EXAM  Constitutional - NAD, Comfortable  + tattoos  HEENT - + R scalp staples  Chest - no respiratory distress  Cardiovascular - RRR, S1S2   Abdomen -  Soft, NTND  Extremities - No C/C/E, No calf tenderness   Neurologic Exam -                    Cognitive - Awake, Alert, AAO to self, place, date, year, situation     Communication - Fluent, No dysarthria     Cranial Nerves - CN 2-12 intact     Motor -                      LEFT    UE - ShAB 5/5, EF 5/5, EE 5/5, WE 5/5,  5/5                    RIGHT UE - ShAB 5/5, EF 5/5, EE 5/5, WE 5/5,  5/5                    LEFT    LE - HF 4+/5, KE 5/5, DF 5/5, PF 5/5                    RIGHT LE - HF 5/5, KE 5/5, DF 5/5, PF 5/5        Sensory - Intact to LT       Balance - WNL Static  Psychiatric - Mood stable, Affect WNL  ----------------------------------------------------------------------------------------  ASSESSMENT/PLAN   60 year old Female status post Left suboccipital craniectomy for resection of cerebellar brain tumor 2023 by Dr Brush, status post traumatic fall with head trauma in 2024 with large acute right SDH, status post emergent right decompressive hemicraniectomy on 24, status post right cranioplasty on 24 with custom plate, status post ETV, ligation of shunt on the right with ligaclips with retention of valve as rescue device, presents as a transfer from [facility] for witnessed seizure at home. CTH obtained at [facility] revealed moderate ventriculomegaly and slight increase in size of extra-axial CSF collection overlying the right frontal duraplasty.  s/p clarissa hole 3/1 with purulent drainage  on keppra  abx per ID    DVT PPX - heparin  continue bedside PT and OT  Rehab -improving with bedside therapy.  patient prefers subacute rehab when medically cleared.     Total time taken to review relevant records and imaging results, examine patient, write note, and when applicable discuss the case with patient, family, , , and medical providers:  35 minutes

## 2025-04-01 LAB
ANION GAP SERPL CALC-SCNC: 16 MMOL/L — HIGH (ref 7–14)
BUN SERPL-MCNC: 8 MG/DL — SIGNIFICANT CHANGE UP (ref 7–23)
CALCIUM SERPL-MCNC: 9.3 MG/DL — SIGNIFICANT CHANGE UP (ref 8.4–10.5)
CHLORIDE SERPL-SCNC: 99 MMOL/L — SIGNIFICANT CHANGE UP (ref 98–107)
CO2 SERPL-SCNC: 21 MMOL/L — LOW (ref 22–31)
CREAT SERPL-MCNC: 0.35 MG/DL — LOW (ref 0.5–1.3)
EGFR: 117 ML/MIN/1.73M2 — SIGNIFICANT CHANGE UP
EGFR: 117 ML/MIN/1.73M2 — SIGNIFICANT CHANGE UP
GLUCOSE SERPL-MCNC: 97 MG/DL — SIGNIFICANT CHANGE UP (ref 70–99)
HCT VFR BLD CALC: 35 % — SIGNIFICANT CHANGE UP (ref 34.5–45)
HGB BLD-MCNC: 12.1 G/DL — SIGNIFICANT CHANGE UP (ref 11.5–15.5)
MCHC RBC-ENTMCNC: 27.5 PG — SIGNIFICANT CHANGE UP (ref 27–34)
MCHC RBC-ENTMCNC: 34.6 G/DL — SIGNIFICANT CHANGE UP (ref 32–36)
MCV RBC AUTO: 79.5 FL — LOW (ref 80–100)
NRBC # BLD AUTO: 0 K/UL — SIGNIFICANT CHANGE UP (ref 0–0)
NRBC # FLD: 0 K/UL — SIGNIFICANT CHANGE UP (ref 0–0)
NRBC BLD AUTO-RTO: 0 /100 WBCS — SIGNIFICANT CHANGE UP (ref 0–0)
PLATELET # BLD AUTO: 236 K/UL — SIGNIFICANT CHANGE UP (ref 150–400)
POTASSIUM SERPL-MCNC: 4 MMOL/L — SIGNIFICANT CHANGE UP (ref 3.5–5.3)
POTASSIUM SERPL-SCNC: 4 MMOL/L — SIGNIFICANT CHANGE UP (ref 3.5–5.3)
RBC # BLD: 4.4 M/UL — SIGNIFICANT CHANGE UP (ref 3.8–5.2)
RBC # FLD: 14 % — SIGNIFICANT CHANGE UP (ref 10.3–14.5)
SODIUM SERPL-SCNC: 136 MMOL/L — SIGNIFICANT CHANGE UP (ref 135–145)
WBC # BLD: 4.76 K/UL — SIGNIFICANT CHANGE UP (ref 3.8–10.5)
WBC # FLD AUTO: 4.76 K/UL — SIGNIFICANT CHANGE UP (ref 3.8–10.5)

## 2025-04-01 RX ORDER — ALPRAZOLAM 0.5 MG
0.25 TABLET, EXTENDED RELEASE 24 HR ORAL ONCE
Refills: 0 | Status: DISCONTINUED | OUTPATIENT
Start: 2025-04-01 | End: 2025-04-01

## 2025-04-01 RX ORDER — ALPRAZOLAM 0.5 MG
0.5 TABLET, EXTENDED RELEASE 24 HR ORAL EVERY 8 HOURS
Refills: 0 | Status: DISCONTINUED | OUTPATIENT
Start: 2025-04-01 | End: 2025-04-03

## 2025-04-01 RX ADMIN — Medication 250 MILLIGRAM(S): at 09:14

## 2025-04-01 RX ADMIN — LEVETIRACETAM 1000 MILLIGRAM(S): 10 INJECTION, SOLUTION INTRAVENOUS at 18:07

## 2025-04-01 RX ADMIN — CEFEPIME 100 MILLIGRAM(S): 2 INJECTION, POWDER, FOR SOLUTION INTRAVENOUS at 00:26

## 2025-04-01 RX ADMIN — Medication 0.5 MILLIGRAM(S): at 20:29

## 2025-04-01 RX ADMIN — HEPARIN SODIUM 5000 UNIT(S): 1000 INJECTION INTRAVENOUS; SUBCUTANEOUS at 10:22

## 2025-04-01 RX ADMIN — LEVETIRACETAM 1000 MILLIGRAM(S): 10 INJECTION, SOLUTION INTRAVENOUS at 05:43

## 2025-04-01 RX ADMIN — Medication 0.25 MILLIGRAM(S): at 02:40

## 2025-04-01 RX ADMIN — Medication 0.25 MILLIGRAM(S): at 17:06

## 2025-04-01 RX ADMIN — HEPARIN SODIUM 5000 UNIT(S): 1000 INJECTION INTRAVENOUS; SUBCUTANEOUS at 00:27

## 2025-04-01 RX ADMIN — CEFEPIME 100 MILLIGRAM(S): 2 INJECTION, POWDER, FOR SOLUTION INTRAVENOUS at 10:21

## 2025-04-01 RX ADMIN — CEFEPIME 100 MILLIGRAM(S): 2 INJECTION, POWDER, FOR SOLUTION INTRAVENOUS at 18:07

## 2025-04-01 RX ADMIN — HEPARIN SODIUM 5000 UNIT(S): 1000 INJECTION INTRAVENOUS; SUBCUTANEOUS at 17:06

## 2025-04-01 RX ADMIN — Medication 250 MILLIGRAM(S): at 20:30

## 2025-04-01 RX ADMIN — Medication 0.25 MILLIGRAM(S): at 11:34

## 2025-04-01 NOTE — PROGRESS NOTE ADULT - ASSESSMENT
58 yo F with PMHx of neurofibromatosis, s/p L suboccipital craniectomy for resection of cerebellar brain tumor 2023 by Dr Brush, s/p traumatic fall with head trauma in 2024 with large acute right SDH, s/p emergent right decompressive hemicraniectomy on 24, s/p right cranioplasty on 24 with custom plate, s/p ETV, ligation of shunt on the right with ligaclips with retention of valve as rescue device, presents as a transfer from [facility] for witnessed seizure at home. [facility] revealed moderate ventriculomegaly and slight increase in size of extra-axial CSF collection overlying the right frontal duraplasty     Exam with decreased strength on left side, post ictal vs intracranial causes, CTH stable,   : on VEEG, K+3.3 repleated, afebrile, blood CX NGTD, UA neg, white count normal   : Stable exam. No seizures noted on VEEG.   : MRI showed increase in right epidural collection with diffusion restriction. Shunt (as reservoir) tapped, W1 R1  GSN  3/1: OR for clarissa hold for drainage of epidural abscess. Cx sent, NGTD. EDJPx1, was intubated preop for stridor, dex x24hrs  3/2: POD1 Intubated, ENT scoped determined airway patent, ID initiated on broad spectrum abx, OR cultures GSN, AFB neg, Dex d/c'd  3/3 POD2 intubated, on flagyl/vanc/cefipime, EDJP 105cc/24hrs  3/4-6 remains intubated, ED SHAYY drain pus tinged 30cc, on triple Abx, OR cltrs NGTD    3/7-10: POD 6-9 Patient tolerating extubation  3/11: POD 10 OR next week for removal of hardware per ID  3/12- stable awaiting OR next week   3/19 had one episode of epistaxis from the L. nostril overnight, but no significant blood loss.  3/19 OR for removal of cranial plate, VPS valve and distal tubing removal, retained proximal catheter ligated with 2 clips, and placement of NEW mesh cranioplasty w/ Dr. Brush, closed with staples  3/20 ID rec IV Abx through . R SGJP x1 bloody drainage 95cc/24h. Cleared for floor.   3/21-: Stable exam. SHAYY X 1, 10cc/24H. Abx through  per ID. PICC placement pending. Vancomycin trough 9.9, will discuss dosing with ID in AM  3/24: SHAYY drain dc'd yesterday 2 staples placed, picc pending, PT recc [facility], IV abx through 4/11  3/25-: stable exam, mri w/wo yesterday with postop changes  without enhancement, picc pending, PM&R recc acute rehab, pending SW auth for rehab placement, SW will reach out to [facility] as well. IV abx through .

## 2025-04-01 NOTE — PROGRESS NOTE ADULT - SUBJECTIVE AND OBJECTIVE BOX
PAST 24HR EVENTS:  No issues overnight. Afebrile.     Vital Signs Last 24 Hrs  T(C): 37.1 (31 Mar 2025 20:00), Max: 37.1 (31 Mar 2025 20:00)  T(F): 98.8 (31 Mar 2025 20:00), Max: 98.8 (31 Mar 2025 20:00)  HR: 84 (31 Mar 2025 20:00) (71 - 94)  BP: 106/58 (31 Mar 2025 20:00) (104/58 - 126/67)  BP(mean): --  RR: 17 (31 Mar 2025 20:00) (16 - 17)  SpO2: 99% (31 Mar 2025 20:00) (97% - 100%)    Parameters below as of 31 Mar 2025 23:54  Patient On (Oxygen Delivery Method): room air        MEDS:   acetaminophen     Tablet .. 650 milliGRAM(s) Oral every 6 hours PRN  albuterol    90 MICROgram(s) HFA Inhaler 2 Puff(s) Inhalation every 4 hours PRN  ALPRAZolam 0.25 milliGRAM(s) Oral every 8 hours PRN  cefepime   IVPB 2000 milliGRAM(s) IV Intermittent every 8 hours  heparin   Injectable 5000 Unit(s) SubCutaneous every 8 hours  levETIRAcetam 1000 milliGRAM(s) Oral two times a day  melatonin 3 milliGRAM(s) Oral at bedtime PRN  polyethylene glycol 3350 17 Gram(s) Oral daily  senna 2 Tablet(s) Oral at bedtime PRN  sertraline 50 milliGRAM(s) Oral daily  vancomycin  IVPB 1000 milliGRAM(s) IV Intermittent every 12 hours      LABS:              RADIOLOGY:   < from: MR Head w/wo IV Cont (03.24.25 @ 16:10) >  IMPRESSION:  Patient motion degrades imagequality.  RIGHT frontotemporal   cranioplasty with underlying encephalomalacia and gliosis within the   RIGHT frontal and temporal lobes. Encephalomalacia and gliosis in the   LEFT cerebellum. RIGHT frontal shunt catheter is unchanged in position   with tip in anterior horn of the RIGHT lateral ventricle. Gliosis in LEFT   frontal lobe from prior catheter tract.    --- End of Report ---    < end of copied text >      PHYSICAL EXAM:  Aox3, fc, face sym  EOS, EOMI, PERRL  MAEx4 with good strength  Neg PD  SILT  Incision c/d/i

## 2025-04-01 NOTE — PROGRESS NOTE ADULT - PROBLEM SELECTOR PLAN 1
- Neurologic checks Q6H  - Pain control prn w/ BM regimen  - Continue PT/OT/PMR - acute rehab   -Continue Abx per ID, PICC pending  - Discharge planning pending [facility] for rehab,  to reach out to [facility] also  - dvt ppx chemical.    [id number]    Case discussed with attending neurosurgeon Dr. Brush

## 2025-04-02 ENCOUNTER — NON-APPOINTMENT (OUTPATIENT)
Age: 60
End: 2025-04-02

## 2025-04-02 LAB — VANCOMYCIN TROUGH SERPL-MCNC: 13 UG/ML — SIGNIFICANT CHANGE UP (ref 10–20)

## 2025-04-02 RX ORDER — ALPRAZOLAM 0.5 MG
0.25 TABLET, EXTENDED RELEASE 24 HR ORAL ONCE
Refills: 0 | Status: DISCONTINUED | OUTPATIENT
Start: 2025-04-02 | End: 2025-04-02

## 2025-04-02 RX ADMIN — CEFEPIME 100 MILLIGRAM(S): 2 INJECTION, POWDER, FOR SOLUTION INTRAVENOUS at 00:38

## 2025-04-02 RX ADMIN — Medication 0.25 MILLIGRAM(S): at 18:04

## 2025-04-02 RX ADMIN — HEPARIN SODIUM 5000 UNIT(S): 1000 INJECTION INTRAVENOUS; SUBCUTANEOUS at 00:38

## 2025-04-02 RX ADMIN — Medication 250 MILLIGRAM(S): at 08:31

## 2025-04-02 RX ADMIN — HEPARIN SODIUM 5000 UNIT(S): 1000 INJECTION INTRAVENOUS; SUBCUTANEOUS at 08:51

## 2025-04-02 RX ADMIN — SERTRALINE 50 MILLIGRAM(S): 100 TABLET, FILM COATED ORAL at 21:40

## 2025-04-02 RX ADMIN — LEVETIRACETAM 1000 MILLIGRAM(S): 10 INJECTION, SOLUTION INTRAVENOUS at 05:46

## 2025-04-02 RX ADMIN — CEFEPIME 100 MILLIGRAM(S): 2 INJECTION, POWDER, FOR SOLUTION INTRAVENOUS at 10:08

## 2025-04-02 RX ADMIN — SERTRALINE 50 MILLIGRAM(S): 100 TABLET, FILM COATED ORAL at 00:37

## 2025-04-02 RX ADMIN — Medication 250 MILLIGRAM(S): at 20:57

## 2025-04-02 RX ADMIN — Medication 3 MILLIGRAM(S): at 00:37

## 2025-04-02 RX ADMIN — Medication 0.5 MILLIGRAM(S): at 11:16

## 2025-04-02 RX ADMIN — LEVETIRACETAM 1000 MILLIGRAM(S): 10 INJECTION, SOLUTION INTRAVENOUS at 17:58

## 2025-04-02 RX ADMIN — HEPARIN SODIUM 5000 UNIT(S): 1000 INJECTION INTRAVENOUS; SUBCUTANEOUS at 17:58

## 2025-04-02 RX ADMIN — CEFEPIME 100 MILLIGRAM(S): 2 INJECTION, POWDER, FOR SOLUTION INTRAVENOUS at 18:56

## 2025-04-02 NOTE — PROGRESS NOTE ADULT - ASSESSMENT
60 yo F with PMHx of neurofibromatosis, s/p L suboccipital craniectomy for resection of cerebellar brain tumor 2023 by Dr Brush, s/p traumatic fall with head trauma in 2024 with large acute right SDH, s/p emergent right decompressive hemicraniectomy on 24, s/p right cranioplasty on 24 with custom plate, s/p ETV, ligation of shunt on the right with ligaclips with retention of valve as rescue device, presents as a transfer from [facility] for witnessed seizure at home. CTH at [facility] revealed moderate ventriculomegaly and slight increase in size of extra-axial CSF collection overlying the right frontal duraplasty     Exam with decreased strength on left side, post ictal vs intracranial causes, CTH stable,   : on VEEG, K+3.3 repleated, afebrile, blood CX NGTD, UA neg, white count normal   : Stable exam. No seizures noted on VEEG.   : MRI showed increase in right epidural collection with diffusion restriction. Shunt (as reservoir) tapped, W1 R1  GSN  3/1: OR for clarissa hold for drainage of epidural abscess. Cx sent, NGTD. EDJPx1, was intubated preop for stridor, dex x24hrs  3/2: POD1 Intubated, ENT scoped determined airway patent, ID initiated on broad spectrum abx, OR cultures GSN, AFB neg, Dex d/c'd  3/3 POD2 intubated, on flagyl/vanc/cefipime, EDJP 105cc/24hrs  3/4-6 remains intubated, ED SHAYY drain pus tinged 30cc, on triple Abx, OR cltrs NGTD    3/7-10: POD 6-9 Patient tolerating extubation  3/11: POD 10 OR next week for removal of hardware per ID  3/12- stable awaiting OR next week   3/19 had one episode of epistaxis from the L. nostril overnight, but no significant blood loss.  3/19 OR for removal of cranial plate, VPS valve and distal tubing removal, retained proximal catheter ligated with 2 clips, and placement of NEW mesh cranioplasty w/ Dr. Brush, closed with staples  3/20 ID rec IV Abx through . R SGJP x1 bloody drainage 95cc/24h. Cleared for floor.   3/21-: Stable exam. SHAYY X 1, 10cc/24H. Abx through  per ID. PICC placement pending. Vancomycin trough 9.9, will discuss dosing with ID in AM  3/24: SHAYY drain dc'd yesterday 2 staples placed, picc pending, PT recc [facility], IV abx through 4/11  3/25-: stable exam, mri w/wo yesterday with postop changes  without enhancement, picc pending, PM&R recc acute rehab, pending SW auth for rehab placement, SW will reach out to [facility] as well. IV abx through .

## 2025-04-02 NOTE — PROGRESS NOTE ADULT - SUBJECTIVE AND OBJECTIVE BOX
Patient resting comfortably  No issues overnight  Vital Signs Last 24 Hrs  T(C): 36.9 (02 Apr 2025 00:41), Max: 36.9 (02 Apr 2025 00:41)  T(F): 98.4 (02 Apr 2025 00:41), Max: 98.4 (02 Apr 2025 00:41)  HR: 82 (02 Apr 2025 00:41) (74 - 86)  BP: 114/57 (02 Apr 2025 00:41) (105/53 - 123/70)  BP(mean): --  RR: 19 (02 Apr 2025 00:41) (18 - 19)  SpO2: 98% (02 Apr 2025 00:41) (96% - 100%)    Parameters below as of 02 Apr 2025 00:41  Patient On (Oxygen Delivery Method): room air    AAO X 3  PERRLA, EOMI  CN 2-12 grossly intact  CLARK strength 5/5  No dysmetria or drift  SILT    MEDICATIONS  (STANDING):  cefepime   IVPB 2000 milliGRAM(s) IV Intermittent every 8 hours  heparin   Injectable 5000 Unit(s) SubCutaneous every 8 hours  levETIRAcetam 1000 milliGRAM(s) Oral two times a day  polyethylene glycol 3350 17 Gram(s) Oral daily  sertraline 50 milliGRAM(s) Oral daily  vancomycin  IVPB 1000 milliGRAM(s) IV Intermittent every 12 hours    MEDICATIONS  (PRN):  acetaminophen     Tablet .. 650 milliGRAM(s) Oral every 6 hours PRN Temp greater or equal to 38C (100.4F), Moderate Pain (4 - 6)  albuterol    90 MICROgram(s) HFA Inhaler 2 Puff(s) Inhalation every 4 hours PRN Shortness of Breath and/or Wheezing  ALPRAZolam 0.5 milliGRAM(s) Oral every 8 hours PRN anxiety  melatonin 3 milliGRAM(s) Oral at bedtime PRN Insomnia  senna 2 Tablet(s) Oral at bedtime PRN Constipation                          12.1   4.76  )-----------( 236      ( 01 Apr 2025 04:42 )             35.0     04-01    136  |  99  |  8   ----------------------------<  97  4.0   |  21[L]  |  0.35[L]    Ca    9.3      01 Apr 2025 04:42

## 2025-04-02 NOTE — PROGRESS NOTE ADULT - PROBLEM SELECTOR PLAN 1
Neurologic checks Q6H  Pain control prn w/ BM regimen  Continue PT/OT/PMR - acute rehab   Continue Abx per ID, PICC pending  Discharge planning pending SW juwan for rehab, SW to reach out to [facility] also  DVT ppx chemical.

## 2025-04-02 NOTE — PRE PROCEDURE NOTE - PRE PROCEDURE EVALUATION
PRE-INTERVENTIONAL RADIOLOGY PROCEDURE NOTE      Patient Age: 60    Patient Gender: F    Procedure: PICC placement    Diagnosis/Indication: long term abx with vanco/cefepime    Interventional Radiology Attending Physician:    Ordering Attending Physician: Dr. Brush     Pertinent Medical History: h/o epidural abscess s/p drainage, cranioplasty     Pertinent labs:                      12.1   4.76  )-----------( 236      ( 01 Apr 2025 04:42 )             35.0       04-01    136  |  99  |  8   ----------------------------<  97  4.0   |  21[L]  |  0.35[L]    Ca    9.3      01 Apr 2025 04:42                Patient and Family Aware ? Yes

## 2025-04-03 ENCOUNTER — TRANSCRIPTION ENCOUNTER (OUTPATIENT)
Age: 60
End: 2025-04-03

## 2025-04-03 VITALS
OXYGEN SATURATION: 97 % | TEMPERATURE: 98 F | SYSTOLIC BLOOD PRESSURE: 110 MMHG | RESPIRATION RATE: 16 BRPM | DIASTOLIC BLOOD PRESSURE: 65 MMHG | HEART RATE: 92 BPM

## 2025-04-03 LAB
ANION GAP SERPL CALC-SCNC: 14 MMOL/L — SIGNIFICANT CHANGE UP (ref 7–14)
BUN SERPL-MCNC: 11 MG/DL — SIGNIFICANT CHANGE UP (ref 7–23)
CALCIUM SERPL-MCNC: 9.3 MG/DL — SIGNIFICANT CHANGE UP (ref 8.4–10.5)
CHLORIDE SERPL-SCNC: 98 MMOL/L — SIGNIFICANT CHANGE UP (ref 98–107)
CO2 SERPL-SCNC: 21 MMOL/L — LOW (ref 22–31)
CREAT SERPL-MCNC: 0.41 MG/DL — LOW (ref 0.5–1.3)
EGFR: 113 ML/MIN/1.73M2 — SIGNIFICANT CHANGE UP
EGFR: 113 ML/MIN/1.73M2 — SIGNIFICANT CHANGE UP
GLUCOSE SERPL-MCNC: 99 MG/DL — SIGNIFICANT CHANGE UP (ref 70–99)
HCT VFR BLD CALC: 37.8 % — SIGNIFICANT CHANGE UP (ref 34.5–45)
HGB BLD-MCNC: 13 G/DL — SIGNIFICANT CHANGE UP (ref 11.5–15.5)
MCHC RBC-ENTMCNC: 27.3 PG — SIGNIFICANT CHANGE UP (ref 27–34)
MCHC RBC-ENTMCNC: 34.4 G/DL — SIGNIFICANT CHANGE UP (ref 32–36)
MCV RBC AUTO: 79.2 FL — LOW (ref 80–100)
NRBC # BLD AUTO: 0 K/UL — SIGNIFICANT CHANGE UP (ref 0–0)
NRBC # FLD: 0 K/UL — SIGNIFICANT CHANGE UP (ref 0–0)
NRBC BLD AUTO-RTO: 0 /100 WBCS — SIGNIFICANT CHANGE UP (ref 0–0)
PLATELET # BLD AUTO: 240 K/UL — SIGNIFICANT CHANGE UP (ref 150–400)
POTASSIUM SERPL-MCNC: 4.2 MMOL/L — SIGNIFICANT CHANGE UP (ref 3.5–5.3)
POTASSIUM SERPL-SCNC: 4.2 MMOL/L — SIGNIFICANT CHANGE UP (ref 3.5–5.3)
RBC # BLD: 4.77 M/UL — SIGNIFICANT CHANGE UP (ref 3.8–5.2)
RBC # FLD: 13.9 % — SIGNIFICANT CHANGE UP (ref 10.3–14.5)
SODIUM SERPL-SCNC: 133 MMOL/L — LOW (ref 135–145)
WBC # BLD: 5.59 K/UL — SIGNIFICANT CHANGE UP (ref 3.8–10.5)
WBC # FLD AUTO: 5.59 K/UL — SIGNIFICANT CHANGE UP (ref 3.8–10.5)

## 2025-04-03 PROCEDURE — 36573 INSJ PICC RS&I 5 YR+: CPT

## 2025-04-03 RX ORDER — SERTRALINE 100 MG/1
1 TABLET, FILM COATED ORAL
Qty: 0 | Refills: 0 | DISCHARGE
Start: 2025-04-03

## 2025-04-03 RX ORDER — VANCOMYCIN HCL IN 5 % DEXTROSE 1.5G/250ML
1 PLASTIC BAG, INJECTION (ML) INTRAVENOUS
Qty: 0 | Refills: 0 | DISCHARGE
Start: 2025-04-03

## 2025-04-03 RX ORDER — MELATONIN 5 MG
1 TABLET ORAL
Qty: 0 | Refills: 0 | DISCHARGE
Start: 2025-04-03

## 2025-04-03 RX ORDER — CEFEPIME 2 G/20ML
2 INJECTION, POWDER, FOR SOLUTION INTRAVENOUS
Qty: 0 | Refills: 0 | DISCHARGE
Start: 2025-04-03

## 2025-04-03 RX ORDER — LEVETIRACETAM 10 MG/ML
1 INJECTION, SOLUTION INTRAVENOUS
Qty: 0 | Refills: 0 | DISCHARGE
Start: 2025-04-03

## 2025-04-03 RX ORDER — ACETAMINOPHEN 500 MG/5ML
2 LIQUID (ML) ORAL
Qty: 0 | Refills: 0 | DISCHARGE
Start: 2025-04-03

## 2025-04-03 RX ORDER — ALPRAZOLAM 0.5 MG
1 TABLET, EXTENDED RELEASE 24 HR ORAL
Qty: 0 | Refills: 0 | DISCHARGE
Start: 2025-04-03

## 2025-04-03 RX ORDER — POLYETHYLENE GLYCOL 3350 17 G/17G
17 POWDER, FOR SOLUTION ORAL
Qty: 0 | Refills: 0 | DISCHARGE
Start: 2025-04-03

## 2025-04-03 RX ORDER — SENNA 187 MG
2 TABLET ORAL
Qty: 0 | Refills: 0 | DISCHARGE
Start: 2025-04-03

## 2025-04-03 RX ORDER — ALBUTEROL SULFATE 2.5 MG/3ML
2 VIAL, NEBULIZER (ML) INHALATION
Qty: 0 | Refills: 0 | DISCHARGE
Start: 2025-04-03

## 2025-04-03 RX ADMIN — HEPARIN SODIUM 5000 UNIT(S): 1000 INJECTION INTRAVENOUS; SUBCUTANEOUS at 00:38

## 2025-04-03 RX ADMIN — CEFEPIME 100 MILLIGRAM(S): 2 INJECTION, POWDER, FOR SOLUTION INTRAVENOUS at 00:38

## 2025-04-03 RX ADMIN — Medication 250 MILLIGRAM(S): at 08:24

## 2025-04-03 RX ADMIN — Medication 3 MILLIGRAM(S): at 00:33

## 2025-04-03 RX ADMIN — Medication 0.5 MILLIGRAM(S): at 01:23

## 2025-04-03 RX ADMIN — CEFEPIME 100 MILLIGRAM(S): 2 INJECTION, POWDER, FOR SOLUTION INTRAVENOUS at 12:41

## 2025-04-03 RX ADMIN — Medication 650 MILLIGRAM(S): at 14:15

## 2025-04-03 RX ADMIN — LEVETIRACETAM 1000 MILLIGRAM(S): 10 INJECTION, SOLUTION INTRAVENOUS at 05:21

## 2025-04-03 RX ADMIN — Medication 0.5 MILLIGRAM(S): at 13:09

## 2025-04-03 RX ADMIN — Medication 650 MILLIGRAM(S): at 13:21

## 2025-04-03 RX ADMIN — HEPARIN SODIUM 5000 UNIT(S): 1000 INJECTION INTRAVENOUS; SUBCUTANEOUS at 08:23

## 2025-04-03 NOTE — PROGRESS NOTE ADULT - PROVIDER SPECIALTY LIST ADULT
Anesthesia
Infectious Disease
Neurosurgery
Rehab Medicine
SICU
Infectious Disease
Neurosurgery
Rehab Medicine
Rehab Medicine
SICU
Infectious Disease
Neurosurgery
Neurosurgery
Rehab Medicine
SICU
Infectious Disease
Neurology
Neurosurgery
SICU
SICU
Neurosurgery

## 2025-04-03 NOTE — PROGRESS NOTE ADULT - ASSESSMENT
58 yo F with PMHx of neurofibromatosis, s/p L suboccipital craniectomy for resection of cerebellar brain tumor 2023 by Dr Brush, s/p traumatic fall with head trauma in 2024 with large acute right SDH, s/p emergent right decompressive hemicraniectomy on 24, s/p right cranioplasty on 24 with custom plate, s/p ETV, ligation of shunt on the right with ligaclips with retention of valve as rescue device, presents as a transfer from [facility] for witnessed seizure at home. CTH at [facility] revealed moderate ventriculomegaly and slight increase in size of extra-axial CSF collection overlying the right frontal duraplasty     Exam with decreased strength on left side, post ictal vs intracranial causes, CTH stable,   : on VEEG, K+3.3 repleated, afebrile, blood CX NGTD, UA neg, white count normal   : Stable exam. No seizures noted on VEEG.   : MRI showed increase in right epidural collection with diffusion restriction. Shunt (as reservoir) tapped, W1 R1  GSN  3/1: OR for clarissa hold for drainage of epidural abscess. Cx sent, NGTD. EDJPx1, was intubated preop for stridor, dex x24hrs  3/2: POD1 Intubated, ENT scoped determined airway patent, ID initiated on broad spectrum abx, OR cultures GSN, AFB neg, Dex d/c'd  3/3 POD2 intubated, on flagyl/vanc/cefipime, EDJP 105cc/24hrs  3/4-6 remains intubated, ED SHAYY drain pus tinged 30cc, on triple Abx, OR cltrs NGTD    3/7-10: POD 6-9 Patient tolerating extubation  3/11: POD 10 OR next week for removal of hardware per ID  3/12- stable awaiting OR next week   3/19 had one episode of epistaxis from the L. nostril overnight, but no significant blood loss.  3/19 OR for removal of cranial plate, VPS valve and distal tubing removal, retained proximal catheter ligated with 2 clips, and placement of NEW mesh cranioplasty w/ Dr. Brush, closed with staples  3/20 ID rec IV Abx through . R SGJP x1 bloody drainage 95cc/24h. Cleared for floor.   3/21-: Stable exam. SHAYY X 1, 10cc/24H. Abx through  per ID. PICC placement pending. Vancomycin trough 9.9, will discuss dosing with ID in AM  3/24: SHAYY drain dc'd yesterday 2 staples placed, picc pending, PT recc [facility], IV abx through 4/11  3/25-4/3: stable exam, mri w/wo yesterday with postop changes  without enhancement, picc pending, PM&R recc acute rehab, pending SW auth for rehab placement, SW will reach out to [facility] as well. IV abx through .

## 2025-04-03 NOTE — PROGRESS NOTE ADULT - REASON FOR ADMISSION
seizure

## 2025-04-03 NOTE — DISCHARGE NOTE NURSING/CASE MANAGEMENT/SOCIAL WORK - PATIENT PORTAL LINK FT
You can access the FollowMyHealth Patient Portal offered by [facility] by registering at the following website: [email or web address]. By joining [employer]’s FollowMyHealth portal, you will also be able to view your health information using other applications (apps) compatible with our system.

## 2025-04-03 NOTE — PROGRESS NOTE ADULT - SUBJECTIVE AND OBJECTIVE BOX
Patient resting comfortably  No issues overnight  PICC placement pending  Vital Signs Last 24 Hrs  T(C): 36.7 (03 Apr 2025 01:06), Max: 36.9 (02 Apr 2025 17:31)  T(F): 98.1 (03 Apr 2025 01:06), Max: 98.5 (02 Apr 2025 17:31)  HR: 80 (03 Apr 2025 01:06) (70 - 96)  BP: 110/55 (03 Apr 2025 01:06) (109/51 - 127/58)  BP(mean): --  RR: 17 (03 Apr 2025 01:06) (16 - 18)  SpO2: 97% (03 Apr 2025 01:06) (97% - 99%)    Parameters below as of 03 Apr 2025 01:06  Patient On (Oxygen Delivery Method): room air    AAO X 3  PERRLA, EOMI  CN 2-12 grossly intact  CLARK strength 5/5  No dysmetria or drift  SILT    MEDICATIONS  (STANDING):  cefepime   IVPB 2000 milliGRAM(s) IV Intermittent every 8 hours  heparin   Injectable 5000 Unit(s) SubCutaneous every 8 hours  levETIRAcetam 1000 milliGRAM(s) Oral two times a day  polyethylene glycol 3350 17 Gram(s) Oral daily  sertraline 50 milliGRAM(s) Oral daily  vancomycin  IVPB 1000 milliGRAM(s) IV Intermittent every 12 hours    MEDICATIONS  (PRN):  acetaminophen     Tablet .. 650 milliGRAM(s) Oral every 6 hours PRN Temp greater or equal to 38C (100.4F), Moderate Pain (4 - 6)  albuterol    90 MICROgram(s) HFA Inhaler 2 Puff(s) Inhalation every 4 hours PRN Shortness of Breath and/or Wheezing  ALPRAZolam 0.5 milliGRAM(s) Oral every 8 hours PRN anxiety  melatonin 3 milliGRAM(s) Oral at bedtime PRN Insomnia  senna 2 Tablet(s) Oral at bedtime PRN Constipation                          12.1   4.76  )-----------( 236      ( 01 Apr 2025 04:42 )             35.0     04-01    136  |  99  |  8   ----------------------------<  97  4.0   |  21[L]  |  0.35[L]    Ca    9.3      01 Apr 2025 04:42

## 2025-04-03 NOTE — DISCHARGE NOTE NURSING/CASE MANAGEMENT/SOCIAL WORK - FINANCIAL ASSISTANCE
[facility] provides services at a reduced cost to those who are determined to be eligible through [facility]’s financial assistance program. Information regarding [facility]’s financial assistance program can be found by going to [email or web address] or by calling [phone].

## 2025-04-03 NOTE — PROGRESS NOTE ADULT - PROBLEM SELECTOR PROBLEM 1
Epidural abscess
S/P craniotomy
Epidural abscess
S/P craniotomy
Status epilepticus
Status epilepticus
Epidural abscess
S/P craniotomy
Epidural abscess
Epidural abscess
Hygroma
S/P craniotomy
Epidural abscess
S/P craniotomy
S/P craniotomy
Epidural abscess
Epidural abscess
Hygroma
S/P craniotomy
S/P craniotomy
Status epilepticus
Epidural abscess
S/P craniotomy
S/P craniotomy
Epidural abscess
Hygroma
Hygroma
S/P craniotomy
S/P craniotomy

## 2025-04-03 NOTE — PROGRESS NOTE ADULT - PROBLEM SELECTOR PLAN 1
Neurologic checks Q6H  Pain control prn w/ BM regimen  Continue PT/OT/PMR - acute rehab   Continue Abx per ID, PICC pending  Discharge planning pending SW juwan for rehab, SW to reach out to [facility] also  DVT ppx chemical.

## 2025-04-03 NOTE — DISCHARGE NOTE PROVIDER - CARE PROVIDER_API CALL
Andrews Brush  Pediatric Neurosurgery  [address] [place] [address], [facility] [address]  [place] [address]  Phone: [phone]  Fax: [phone]  Follow Up Time: 2 weeks

## 2025-04-03 NOTE — DISCHARGE NOTE PROVIDER - NSDCMRMEDTOKEN_GEN_ALL_CORE_FT
acetaminophen 325 mg oral tablet: 2 tab(s) orally every 6 hours As needed Temp greater or equal to 38C (100.4F), Moderate Pain (4 - 6)  albuterol 90 mcg/inh inhalation aerosol: 2 puff(s) inhaled every 4 hours As needed Shortness of Breath and/or Wheezing  ALPRAZolam 0.5 mg oral tablet: 1 tab(s) orally every 8 hours As needed anxiety  cefepime 2 g intravenous injection: 2 gram(s) intravenous every 8 hours  levETIRAcetam 1000 mg oral tablet: 1 tab(s) orally 2 times a day  melatonin 3 mg oral tablet: 1 tab(s) orally once a day (at bedtime) As needed Insomnia  mirtazapine 7.5 mg oral tablet: 1 tab(s) orally once a day  polyethylene glycol 3350 oral powder for reconstitution: 17 gram(s) orally once a day  senna leaf extract oral tablet: 2 tab(s) orally once a day (at bedtime) As needed Constipation  sertraline 50 mg oral tablet: 1 tab(s) orally once a day  vancomycin 1 g intravenous injection: 1 gram(s) intravenous every 12 hours

## 2025-04-03 NOTE — PROGRESS NOTE ADULT - THIS PATIENT HAS THE FOLLOWING CONDITION(S)/DIAGNOSES ON THIS ADMISSION:
None
status epilepticus
None
epidural abscess/Sepsis
status epilepticus
None
epidural abscess/Sepsis
None
status epilepticus
None

## 2025-04-03 NOTE — DISCHARGE NOTE PROVIDER - NSDCFUADDINST_GEN_ALL_CORE_FT
- You had surgery on 3/1/25. The surgery you had was clarissa hole for epidural abscess evacuation  - You had surgery on 3/19/25. The surgery you had was removal of cranioplasty plate/distal shunt tubing and valve, ligation of proximal shunt tubing, replacement of cranioplasty         -   Do not pick or scratch at incision.     - Shower daily with shampoo/soap on post operative day 4 (DATE: 3/23/25 ) Avoid long soaks and do not submerge incision in water (no baths.) Allow soap and water to run over the incision. Pat incision area dry with clean towel- do not scrub. Please shower regularly to ensure incision stays clean to avoid post operative infections.  You may have a body shower daily, as long as your head incision is covered by a shower cap and does not get wet until post op day 4.     - Notify your surgeon if you notice increased redness, drainage or your incision area opening.     - Return to ER immediately for high fevers, severe headache, vomiting, lethargy or weakness    - Please call your neurosurgeon following discharge to make follow up appointment in 1 week after discharge unless otherwise specified. See contact information.    - You can also take over the counter tylenol for pain as needed.     - Ambulate as tolerate. Continue with all "activities of daily living." Avoid strenuous activity or heavy lifting until cleared for additional activity at your follow up appointment. You cannot drive while taking narcotics (oxycodone, valium, etc.)     - Do not return to work or school until cleared by your neurosurgeon at your follow up visit unless specified to you during your hospital stay    - Do not take any blood thinning medications such as aspirin, motrin, ibuprofen, warfarin, coumadin, plavix, heparin, lovenox, Xarelto, Eliquis etc. until cleared by your neurosurgeon    - Surgery, anesthesia, and pain medications can cause constipation. Please take over the counter stool softeners daily until regular bowel movements are achieved. Examples include Miralax, Senna, Colace, Milk of Magnesia, or Dulcolax suppositories. Please consult drug store pharmacist or pediatrician if there are question about dosing if the patient is pediatric.

## 2025-04-03 NOTE — DISCHARGE NOTE PROVIDER - NSDCCPTREATMENT_GEN_ALL_CORE_FT
PRINCIPAL PROCEDURE  Procedure: Hubbell hole with drainage of hematoma  Findings and Treatment:       SECONDARY PROCEDURE  Procedure: Hubbell hole with drainage of hematoma  Findings and Treatment:

## 2025-04-03 NOTE — DISCHARGE NOTE NURSING/CASE MANAGEMENT/SOCIAL WORK - NSDCPEFALRISK_GEN_ALL_CORE
For information on Fall & Injury Prevention, visit: [email or web address] OR  [email or web address] OR  https://www.cdc.gov/steadi/patient.html

## 2025-04-03 NOTE — PROCEDURE NOTE - PROCEDURE FINDINGS AND DETAILS
Successful insertion of a 4Fr single lumen PICC via the left basilic vein.  Tip of PICC in SVC. PICC length: 42cm

## 2025-04-03 NOTE — DISCHARGE NOTE PROVIDER - HOSPITAL COURSE
60 yo F with PMHx of neurofibromatosis, s/p L suboccipital craniectomy for resection of cerebellar brain tumor 2023 by Dr Brush, s/p traumatic fall with head trauma in 2024 with large acute right SDH, s/p emergent right decompressive hemicraniectomy on 24, s/p right cranioplasty on 24 with custom plate, s/p ETV, ligation of shunt on the right with ligaclips with retention of valve as rescue device, presents as a transfer from [facility] for witnessed seizure at home. CTH at [facility] revealed moderate ventriculomegaly and slight increase in size of extra-axial CSF collection overlying the right frontal duraplasty     Exam with decreased strength on left side, post ictal vs intracranial causes, CTH stable,   : on VEEG, K+3.3 repleated, afebrile, blood CX NGTD, UA neg, white count normal   : Stable exam. No seizures noted on VEEG.   : MRI showed increase in right epidural collection with diffusion restriction. Shunt (as reservoir) tapped, W1 R1  GSN  3/1: OR for clarissa hold for drainage of epidural abscess. Cx sent, NGTD. EDJPx1, was intubated preop for stridor, dex x24hrs  3/2: POD1 Intubated, ENT scoped determined airway patent, ID initiated on broad spectrum abx, OR cultures GSN, AFB neg, Dex d/c'd  3/3 POD2 intubated, on flagyl/vanc/cefipime, EDJP 105cc/24hrs  3/4-6 remains intubated, ED SHAYY drain pus tinged 30cc, on triple Abx, OR cltrs NGTD    3/7-10: POD 6-9 Patient tolerating extubation  3/11: POD 10 OR next week for removal of hardware per ID  3/12- stable awaiting OR next week   3/19 had one episode of epistaxis from the L. nostril overnight, but no significant blood loss.  3/19 OR for removal of cranial plate, VPS valve and distal tubing removal, retained proximal catheter ligated with 2 clips, and placement of NEW mesh cranioplasty w/ Dr. Brush, closed with staples  3/20 ID rec IV Abx through . R SGJP x1 bloody drainage 95cc/24h. Cleared for floor.   3/21-: Stable exam. SHAYY X 1, 10cc/24H. Abx through  per ID. PICC placement pending. Vancomycin trough 9.9, will discuss dosing with ID in AM  3/24: SHAYY drain dc'd yesterday 2 staples placed, picc pending, PT recc [facility], IV abx through 4/11  3/25-4/3: stable exam, mri w/wo yesterday with postop changes  without enhancement, picc pending, PM&R recc acute rehab, pending SW auth for rehab placement, SW will reach out to [facility] as well. IV abx through .  4/3 PICC line placement today, d/c to [facility]

## 2025-04-16 LAB
CULTURE RESULTS: SIGNIFICANT CHANGE UP
SPECIMEN SOURCE: SIGNIFICANT CHANGE UP

## 2025-06-30 NOTE — DISCHARGE NOTE PROVIDER - PROVIDER RX CONTACT NUMBER
Kathryn Grijalva DARIA LeN, RN, PHN         
[phone]

## (undated) DEVICE — PROTECTOR HEEL / ELBOW FLUFFY

## (undated) DEVICE — BIPOLAR FORCEP STRYKER STANDARD 8" X 0.5MM (YELLOW)

## (undated) DEVICE — CANISTER SUCTION LID GUARD 3000CC

## (undated) DEVICE — PREP BETADINE KIT

## (undated) DEVICE — DRAPE BACK TABLE COVER 44X90"

## (undated) DEVICE — LIJ-STORZ MITAKA ARM CLAMPS: Type: DURABLE MEDICAL EQUIPMENT

## (undated) DEVICE — DRAPE SPLIT SHEET 77" X 108"

## (undated) DEVICE — PREP DURAPREP 26CC

## (undated) DEVICE — BIPOLAR ISOCOOL TIP 2MM

## (undated) DEVICE — MIDAS REX LEGEND TAPERED FOOTED SM BORE 1.5MM X 8CM

## (undated) DEVICE — CANISTER DISPOSABLE THIN WALL 3000CC

## (undated) DEVICE — MIDAS REX LEGEND TAPERED SM BORE 2.3MM X 8CM

## (undated) DEVICE — DRAPE TOWEL BLUE STICKY

## (undated) DEVICE — STAPLER SKIN VISI-STAT 35 WIDE

## (undated) DEVICE — AESCULAP INTRODUCER 19FR FOR MINOP TROCAR

## (undated) DEVICE — DRAPE CRAN

## (undated) DEVICE — DRAIN JACKSON PRATT 7MM FLAT FULL NO TROCAR

## (undated) DEVICE — SOL IRR POUR H2O 500ML

## (undated) DEVICE — DRAPE 1/2 SHEET 40X57"

## (undated) DEVICE — VENODYNE/SCD SLEEVE CALF MEDIUM

## (undated) DEVICE — VYCOR VIEWSITE BRAIN ACCESS SYSTEM (VBAS) DEVICE 21MM / 15MM / 5CM

## (undated) DEVICE — ELCTR GROUNDING PAD INFANT COVIDIEN

## (undated) DEVICE — ACRA-CUT CRANIAL PERFORATOR ADULT 14MM X 11MM (WHITE)

## (undated) DEVICE — DRAPE TOWEL BLUE 17" X 24"

## (undated) DEVICE — MIDAS REX LEGEND LUBRICANT DIFFUSER CARTRIDGE

## (undated) DEVICE — DRAIN RESERVOIR FOR JACKSON PRATT 100CC CARDINAL

## (undated) DEVICE — COVER ULTRASOUND PROBE T-SHAPED INTRAOP SM

## (undated) DEVICE — LABELS BLANK W PEN

## (undated) DEVICE — WARMING BLANKET UNDERBODY PEDS 36 X 33"

## (undated) DEVICE — BIPOLAR FORCEP STRYKER STANDARD 8" X 1MM (YELLOW)

## (undated) DEVICE — POSITIONER FOAM EGG CRATE ULNAR 2PCS (PINK)

## (undated) DEVICE — PACK CRANIOTOMY  B

## (undated) DEVICE — SUT VICRYL 3-0 18" SH UNDYED (POP-OFF)

## (undated) DEVICE — WARMING BLANKET LOWER ADULT

## (undated) DEVICE — INTRO SHEATH INTEGRA PD 61CM

## (undated) DEVICE — CATH IV SAFE BC 18G X 1.16" (GREEN)

## (undated) DEVICE — MEDTRONIC AXIEM STYLET 23CM

## (undated) DEVICE — SUT NUROLON 4-0 8-18" RB-1 (POP-OFF)

## (undated) DEVICE — DRAPE 3/4 SHEET 52X76"

## (undated) DEVICE — LIJ-STRYKER SONOPET: Type: DURABLE MEDICAL EQUIPMENT

## (undated) DEVICE — SUT MONOCRYL 4-0 27" PS-2 UNDYED

## (undated) DEVICE — SOL IRR POUR H2O 1500ML

## (undated) DEVICE — BIPOLAR FORCEP STRYKER STANDARD 7" X 0.5MM (YELLOW)

## (undated) DEVICE — DRSG CURITY GAUZE SPONGE 4 X 4" 12-PLY

## (undated) DEVICE — DRAPE UTILITY SHEET 44X60"

## (undated) DEVICE — DRSG XEROFORM 1 X 8"

## (undated) DEVICE — DRAPE MICROSCOPE ZEISS

## (undated) DEVICE — LONE STAR RETRACTOR RING 12MM BLUNT DISP

## (undated) DEVICE — SUT VICRYL PLUS 4-0 18" RB-1 UNDYED (POP-OFF)

## (undated) DEVICE — SUT SILK 2-0 24" TIES

## (undated) DEVICE — ELCTR GROUNDING PAD ADULT COVIDIEN

## (undated) DEVICE — DRSG TAPE HYPAFIX 4"

## (undated) DEVICE — ACRA-CUT CRANIAL PERFORATOR PEDS 11MM X 7MM MINI (BLUE)

## (undated) DEVICE — MODEL SUPPLEMENTAL HALF IPS LP 6.0MM

## (undated) DEVICE — TUBING TRUWAVE PRESSURE MALE/FEMALE 72"

## (undated) DEVICE — SOL IRR POUR NS 0.9% 1500ML

## (undated) DEVICE — SOL IRR POUR NS 0.9% 500ML

## (undated) DEVICE — MARKING PEN W RULER

## (undated) DEVICE — DRAPE SPLIT SHEET 77" X 120"

## (undated) DEVICE — AESCULAP SCALPFIX 10 CLIPS

## (undated) DEVICE — PACK NEURO

## (undated) DEVICE — POSITIONER STRAP ARMBOARD VELCRO TS-30

## (undated) DEVICE — Device

## (undated) DEVICE — CANISTER SUCTION 3000ML

## (undated) DEVICE — PACK BATTERY SINGLE USE SD-2000

## (undated) DEVICE — DRSG TELFA 3 X 8

## (undated) DEVICE — GLV 7.5 PROTEXIS (WHITE)

## (undated) DEVICE — MEDTRONIC AXIEM PATIENT TRACKER NON-INVASIVE

## (undated) DEVICE — PACK NEURO MINOR

## (undated) DEVICE — LIJ-MEDTRONIC STEALTH NAVIGATION: Type: DURABLE MEDICAL EQUIPMENT

## (undated) DEVICE — SOL INJ LR 1000ML

## (undated) DEVICE — SUT MONOCRYL 5-0 18" P-1 UNDYED

## (undated) DEVICE — TAPE SILK 3"

## (undated) DEVICE — CLIPPER BLADE NEURO (BLUE)

## (undated) DEVICE — GLV 7 PROTEXIS (WHITE)

## (undated) DEVICE — STAPLER SKIN MULTI DIRECTION W35

## (undated) DEVICE — STRYKER SONOPET IQ TIP 12CM BARRACUDA

## (undated) DEVICE — ELCTR BOVIE PENCIL SMOKE EVACUATION

## (undated) DEVICE — ELCTR STRYKER NEPTUNE SMOKE EVACUATION PENCIL (GREEN)

## (undated) DEVICE — MIDAS REX LEGEND BALL FLUTED LG BORE 6.0MM X 9CM

## (undated) DEVICE — BATTERY PACK VARISPEED SINGLE USE

## (undated) DEVICE — CLIP APPLIER CODMAN SCALP

## (undated) DEVICE — MEDTRONIC AXIEM NAVIGATION POINTER

## (undated) DEVICE — SUT ETHILON 3-0 18" FS-1

## (undated) DEVICE — DRAPE MICROSCOPE OPMI VISIONGUARD 48X118"

## (undated) DEVICE — ULTRASOUND PROBE COVER T-SHAPED INTRAOP SM

## (undated) DEVICE — GLV 7 PROTEXIS (BLUE)

## (undated) DEVICE — STAPLER SKIN PROXIMATE

## (undated) DEVICE — MIDAS REX MR8 TAPERED SM BORE 2.3MM X 7CM FOOTED

## (undated) DEVICE — MEDTRONIC TRACKER BUNDLE NI

## (undated) DEVICE — VYCOR VIEWSITE BRAIN ACCESS SYSTEM (VBAS) DEVICE 21MM / 15MM / 7CM

## (undated) DEVICE — DRAPE MAYO STAND 23"

## (undated) DEVICE — FLUID DISPENSE SYS W/NDL 10CC LUER LOCK

## (undated) DEVICE — DRAPE CRANI INCISION 70X110"

## (undated) DEVICE — SUT SILK 2-0 18" TIES

## (undated) DEVICE — GLV 7.5 PROTEXIS (BLUE)

## (undated) DEVICE — NDL HYPO SAFE 21G X 1.5" (GREEN)

## (undated) DEVICE — ELCTR VALLEYLAB PENCIL SMOKE W/ EDGE ELECTRODE 10FT

## (undated) DEVICE — SNAP ON SPHERZ 3 PACK

## (undated) DEVICE — PACK CRANIOTOMY  A

## (undated) DEVICE — GOWN XXL

## (undated) DEVICE — ELCTR BOVIE PENCIL BLADE 10FT

## (undated) DEVICE — DRAPE MITAKA POINT SETTER UNIARM & CHAIR

## (undated) DEVICE — DRSG BENZOIN 0.6CC

## (undated) DEVICE — DRAPE MAYO STAND 30"

## (undated) DEVICE — BIPOLAR FORCEP STRYKER STANDARD 7" X 1MM (YELLOW)

## (undated) DEVICE — TUBING IV EXTENSION LO PRES 60"